# Patient Record
Sex: MALE | Race: BLACK OR AFRICAN AMERICAN | Employment: OTHER | URBAN - METROPOLITAN AREA
[De-identification: names, ages, dates, MRNs, and addresses within clinical notes are randomized per-mention and may not be internally consistent; named-entity substitution may affect disease eponyms.]

---

## 2017-01-09 ENCOUNTER — TELEPHONE (OUTPATIENT)
Dept: FAMILY MEDICINE CLINIC | Age: 57
End: 2017-01-09

## 2017-02-24 ENCOUNTER — TELEPHONE (OUTPATIENT)
Dept: FAMILY MEDICINE CLINIC | Age: 57
End: 2017-02-24

## 2017-02-24 NOTE — TELEPHONE ENCOUNTER
Spoke with pt and notified pt that  has given him a extension for his fmla paperwork to the end of February and also informed pt that paperwork can take up to 7-10days to be completed. Tolf pt that Brianne Patel is out of the office but will inform him to complete when he returns on Monday.  Pt verbalized understanding

## 2017-02-24 NOTE — TELEPHONE ENCOUNTER
Called pt back and informed fmla was not ready yet. Pt reported he dropped it off on Monday and that it is due today. Told pt would call aetyakelin about fmla and ask for a extension and called him back.  Pt verbalized understanding

## 2017-03-07 ENCOUNTER — OFFICE VISIT (OUTPATIENT)
Dept: FAMILY MEDICINE CLINIC | Age: 57
End: 2017-03-07

## 2017-03-07 VITALS
TEMPERATURE: 98.6 F | HEIGHT: 75 IN | DIASTOLIC BLOOD PRESSURE: 74 MMHG | BODY MASS INDEX: 34.02 KG/M2 | SYSTOLIC BLOOD PRESSURE: 140 MMHG | RESPIRATION RATE: 14 BRPM | HEART RATE: 79 BPM | WEIGHT: 273.6 LBS | OXYGEN SATURATION: 98 %

## 2017-03-07 DIAGNOSIS — E11.9 TYPE 2 DIABETES MELLITUS WITHOUT COMPLICATION, WITHOUT LONG-TERM CURRENT USE OF INSULIN (HCC): ICD-10-CM

## 2017-03-07 DIAGNOSIS — Z00.00 ROUTINE GENERAL MEDICAL EXAMINATION AT A HEALTH CARE FACILITY: Primary | ICD-10-CM

## 2017-03-07 DIAGNOSIS — R29.898 WEAKNESS OF BOTH LEGS: ICD-10-CM

## 2017-03-07 LAB — HBA1C MFR BLD HPLC: 11.8 %

## 2017-03-07 RX ORDER — SILDENAFIL 100 MG/1
100 TABLET, FILM COATED ORAL AS NEEDED
Qty: 3 TAB | Refills: 12 | Status: SHIPPED | OUTPATIENT
Start: 2017-03-07 | End: 2018-12-05

## 2017-03-07 NOTE — MR AVS SNAPSHOT
Visit Information Date & Time Provider Department Dept. Phone Encounter #  
 3/7/2017  9:30 AM Valentina العلي MD [facility] [phone] [id number] Follow-up Instructions Return in about 6 months (around 9/7/2017). Upcoming Health Maintenance Date Due  
 FOOT EXAM Q1 6/30/2016 EYE EXAM RETINAL OR DILATED Q1 6/30/2016 HEMOGLOBIN A1C Q6M 5/29/2017 MICROALBUMIN Q1 11/29/2017 LIPID PANEL Q1 11/29/2017 COLONOSCOPY 5/28/2019 DTaP/Tdap/Td series (2 - Td) 11/29/2026 Allergies as of 3/7/2017  Review Complete On: 3/7/2017 By: Valentina العلي MD  
  
 Severity Noted Reaction Type Reactions Lisinopril  07/06/2011   Systemic Cough Current Immunizations  Reviewed on 7/6/2011 No immunizations on file. Not reviewed this visit You Were Diagnosed With   
  
 Codes Comments Routine general medical examination at a health care facility    -  Primary ICD-10-CM: Z00.00 ICD-9-CM: V70.0 Type 2 diabetes mellitus without complication, without long-term current use of insulin (HCC)     ICD-10-CM: E11.9 ICD-9-CM: 250.00 Weakness of both legs     ICD-10-CM: M62.81 ICD-9-CM: 729.89 Vitals BP Pulse Temp Resp Height(growth percentile) Weight(growth percentile) 140/74 79 98.6 °F (37 °C) (Oral) 14 6' 3\" (1.905 m) 273 lb 9.6 oz (124.1 kg) SpO2 BMI Smoking Status 98% 34.2 kg/m2 Former Smoker BMI and BSA Data Body Mass Index Body Surface Area  
 34.2 kg/m 2 2.56 m 2 Preferred Pharmacy [facility] Phone [facility] [address] [phone] Your Updated Medication List  
  
   
This list is accurate as of: 3/7/17 10:38 AM.  Always use your most recent med list.  
  
  
  
  
 aspirin delayed-release 81 mg tablet Take  by mouth daily. CRESTOR 20 mg tablet Generic drug:  rosuvastatin TAKE 1 TABLET DAILY FOR    CHOLESTEROL * exenatide microspheres 2 mg Serr Commonly known as:  BYDUREON  
2 mg by SubCUTAneous route every seven (7) days. Please give pt needles also * exenatide microspheres 2 mg Serr Commonly known as:  BYDUREON  
2 mg by SubCUTAneous route every seven (7) days. Please give pt needles for shots also FISH OIL 1,000 mg Cap Generic drug:  omega-3 fatty acids-vitamin e Take 2 Caps by mouth two (2) times a day. gemfibrozil 600 mg tablet Commonly known as:  LOPID TAKE 1 TABLET TWICE A DAY  FOR CHOLESTEROL  
  
 glipiZIDE 10 mg tablet Commonly known as:  GLUCOTROL  
TAKE 1 TABLET TWICE A DAY  FOR DIABETES  
  
 losartan 25 mg tablet Commonly known as:  COZAAR  
TAKE 1 TABLET BY MOUTH EVERY DAY ---REPLACES LISINOPRIL---  
  
 magnesium oxide 400 mg tablet Commonly known as:  MAG-OX  
TAKE 2 TABLETS BY MOUTH DAILY  
  
 metFORMIN 1,000 mg tablet Commonly known as:  GLUCOPHAGE  
TAKE 1 TABLET TWICE A DAY  FOR DIABETES  
  
 methocarbamol 500 mg tablet Commonly known as:  ROBAXIN  
TAKE 2 TABLETS BY MOUTH 4 TIMES A DAY AS NEEDED FOR MUSCLE SPASMS  
  
 metoprolol tartrate 25 mg tablet Commonly known as:  LOPRESSOR  
TAKE 1 TABLET BY MOUTH EVERY 12 HOURS *APPOINTMENT REQUIRED FOR MORE REFILLS*  
  
 nitroglycerin 0.4 mg SL tablet Commonly known as:  NITROSTAT  
1 Tab by SubLINGual route every five (5) minutes as needed for Chest Pain (call 911 if CP/ SOB not relieved by 3 tabs). piroxicam 20 mg capsule Commonly known as:  Dean Hence Take 1 Cap by mouth daily. For back pain  
  
 sildenafil citrate 100 mg tablet Commonly known as:  VIAGRA Take 1 Tab by mouth as needed. 1/2- 1 tab  1/2 hour before sex on an empty stomach * Notice: This list has 2 medication(s) that are the same as other medications prescribed for you. Read the directions carefully, and ask your doctor or other care provider to review them with you. Prescriptions Sent to Pharmacy Refills sildenafil citrate (VIAGRA) 100 mg tablet 12 Sig: Take 1 Tab by mouth as needed. 1/2- 1 tab  1/2 hour before sex on an empty stomach Class: Normal  
 Pharmacy: [address] [place] [address], [address] [place] [address] #: [phone] Route: Oral  
  
We Performed the Following AMB POC HEMOGLOBIN A1C [88014 CPT(R)] CBC WITH AUTOMATED DIFF [09849 CPT(R)]  DIABETES FOOT EXAM [HM7 Custom] LIPID PANEL [65079 CPT(R)] METABOLIC PANEL, COMPREHENSIVE [50461 CPT(R)] REFERRAL TO NEUROLOGY [XFQ88 Custom] Comments:  
 Please evaluate patient for bilateral leg weakness when climbs stairs. TSH 3RD GENERATION [92972 CPT(R)] Follow-up Instructions Return in about 6 months (around 9/7/2017). Referral Information Referral ID Referred By Referred To  
  
 [id number] Lilly Isa Not Available Visits Status Start Date End Date 1 New Request 3/7/17 3/7/18 If your referral has a status of pending review or denied, additional information will be sent to support the outcome of this decision. Introducing [facility]! [facility] introduces [employer] patient portal. Now you can access parts of your medical record, email your doctor's office, and request medication refills online. 1. In your internet browser, go to [email or web address] "[email or web address]"/[employer] 2. Click on the First Time User? Click Here link in the Sign In box. You will see the New Member Sign Up page. 3. Enter your [employer] Access Code exactly as it appears below. You will not need to use this code after youve completed the sign-up process. If you do not sign up before the expiration date, you must request a new code. · [employer] Access Code: [email or web address] Expires: 6/5/2017 10:38 AM 
 
4. Enter the last four digits of your Social Security Number (xxxx) and Date of Birth (mm/dd/yyyy) as indicated and click Submit. You will be taken to the next sign-up page. 5. Create a [employer] ID. This will be your [employer] login ID and cannot be changed, so think of one that is secure and easy to remember. 6. Create a [employer] password. You can change your password at any time. 7. Enter your Password Reset Question and Answer. This can be used at a later time if you forget your password. 8. Enter your e-mail address. You will receive e-mail notification when new information is available in [address] 9. Click Sign Up. You can now view and download portions of your medical record. 10. Click the Download Summary menu link to download a portable copy of your medical information. If you have questions, please visit the Frequently Asked Questions section of the [employer] website. Remember, [employer] is NOT to be used for urgent needs. For medical emergencies, dial 911. Now available from your iPhone and Android! Please provide this summary of care documentation to your next provider. Your primary care clinician is listed as Nakita Lepe. If you have any questions after today's visit, please call [phone].

## 2017-03-07 NOTE — PROGRESS NOTES
HISTORY OF PRESENT ILLNESS  Gabbie Martinez is a 60 y.o. male. HPI in for physical. Has been having some problems with ED. Has tried cialis- no relief. Has never tried viagra. Says that legs have been somewhat weak, has difficulty climbing stairs, ever since knee replacements. hasnt started bydurion yet, just got needles. Review of Systems   Constitutional: Positive for weight loss. Negative for malaise/fatigue. HENT: Negative for hearing loss. Respiratory: Negative for cough and shortness of breath. Quit smoking 1989   Cardiovascular: Negative for chest pain and claudication. Gastrointestinal: Negative for abdominal pain and blood in stool. Genitourinary: Negative for frequency and hematuria. Skin: Negative for itching and rash. Neurological: Negative for focal weakness, loss of consciousness and headaches. Endo/Heme/Allergies: Negative for environmental allergies and polydipsia. Psychiatric/Behavioral: Negative for depression. The patient does not have insomnia. Physical Exam   Constitutional: He appears well-developed and well-nourished. HENT:   Right Ear: External ear normal.   Left Ear: External ear normal.   Mouth/Throat: Oropharynx is clear and moist.   Neck: No thyromegaly present. Cardiovascular: Normal rate, regular rhythm, normal heart sounds and intact distal pulses. Absent distal pulses   Pulmonary/Chest: Effort normal and breath sounds normal. No respiratory distress. He has no wheezes. Abdominal: Soft. Bowel sounds are normal. He exhibits no distension and no mass. There is no tenderness. There is no guarding. Musculoskeletal: Normal range of motion. He exhibits no edema. Knees- full rom  Hips- full rom   Lymphadenopathy:     He has no cervical adenopathy. Nursing note and vitals reviewed.       ASSESSMENT and PLAN  Orders Placed This Encounter    CBC WITH AUTOMATED DIFF    METABOLIC PANEL, COMPREHENSIVE    LIPID PANEL    TSH 3RD GENERATION    REFERRAL TO NEUROLOGY    AMB POC HEMOGLOBIN A1C     DIABETES FOOT EXAM    sildenafil citrate (VIAGRA) 100 mg tablet     Ruby Simon was seen today for complete physical.    Diagnoses and all orders for this visit:    Routine general medical examination at a health care facility  -     CBC WITH AUTOMATED DIFF  -     METABOLIC PANEL, COMPREHENSIVE    Type 2 diabetes mellitus without complication, without long-term current use of insulin ([facility])  -     LIPID PANEL  -      DIABETES FOOT EXAM  -     AMB POC HEMOGLOBIN A1C    Weakness of both legs  -     TSH 3RD GENERATION  -     REFERRAL TO NEUROLOGY    Other orders  -     sildenafil citrate (VIAGRA) 100 mg tablet; Take 1 Tab by mouth as needed. 1/2- 1 tab  1/2 hour before sex on an empty stomach      Follow-up Disposition:  Return in about 6 months (around 9/7/2017).

## 2017-03-07 NOTE — PROGRESS NOTES
Chief Complaint   Patient presents with    Complete Physical     1. Have you been to the ER, urgent care clinic since your last visit? Hospitalized since your last visit? No    2. Have you seen or consulted any other health care providers outside of the [address] [place] [address] since your last visit? Include any pap smears or colon screening.  No     Health Maintenance Due   Topic Date Due    FOOT EXAM Q1  06/30/2016    EYE EXAM RETINAL OR DILATED Q1  06/30/2016

## 2017-03-08 LAB
ALBUMIN SERPL-MCNC: 4.3 G/DL (ref 3.5–5.5)
ALBUMIN/GLOB SERPL: 1.9 {RATIO} (ref 1.1–2.5)
ALP SERPL-CCNC: 54 IU/L (ref 39–117)
ALT SERPL-CCNC: 10 IU/L (ref 0–44)
AST SERPL-CCNC: 9 IU/L (ref 0–40)
BASOPHILS # BLD AUTO: 0 X10E3/UL (ref 0–0.2)
BASOPHILS NFR BLD AUTO: 1 %
BILIRUB SERPL-MCNC: 0.3 MG/DL (ref 0–1.2)
BUN SERPL-MCNC: 16 MG/DL (ref 6–24)
BUN/CREAT SERPL: 24 (ref 9–20)
CALCIUM SERPL-MCNC: 9.4 MG/DL (ref 8.7–10.2)
CHLORIDE SERPL-SCNC: 98 MMOL/L (ref 96–106)
CHOLEST SERPL-MCNC: 116 MG/DL (ref 100–199)
CO2 SERPL-SCNC: 22 MMOL/L (ref 18–29)
CREAT SERPL-MCNC: 0.68 MG/DL (ref 0.76–1.27)
EOSINOPHIL # BLD AUTO: 0.2 X10E3/UL (ref 0–0.4)
EOSINOPHIL NFR BLD AUTO: 2 %
ERYTHROCYTE [DISTWIDTH] IN BLOOD BY AUTOMATED COUNT: 13.6 % (ref 12.3–15.4)
GLOBULIN SER CALC-MCNC: 2.3 G/DL (ref 1.5–4.5)
GLUCOSE SERPL-MCNC: 281 MG/DL (ref 65–99)
HCT VFR BLD AUTO: 40 % (ref 37.5–51)
HDLC SERPL-MCNC: 28 MG/DL
HGB BLD-MCNC: 13.2 G/DL (ref 12.6–17.7)
IMM GRANULOCYTES # BLD: 0 X10E3/UL (ref 0–0.1)
IMM GRANULOCYTES NFR BLD: 0 %
INTERPRETATION, 910389: NORMAL
LDLC SERPL CALC-MCNC: 67 MG/DL (ref 0–99)
LYMPHOCYTES # BLD AUTO: 2 X10E3/UL (ref 0.7–3.1)
LYMPHOCYTES NFR BLD AUTO: 30 %
Lab: NORMAL
MCH RBC QN AUTO: 27.2 PG (ref 26.6–33)
MCHC RBC AUTO-ENTMCNC: 33 G/DL (ref 31.5–35.7)
MCV RBC AUTO: 82 FL (ref 79–97)
MONOCYTES # BLD AUTO: 0.5 X10E3/UL (ref 0.1–0.9)
MONOCYTES NFR BLD AUTO: 7 %
NEUTROPHILS # BLD AUTO: 4 X10E3/UL (ref 1.4–7)
NEUTROPHILS NFR BLD AUTO: 60 %
PLATELET # BLD AUTO: 257 X10E3/UL (ref 150–379)
POTASSIUM SERPL-SCNC: 4.3 MMOL/L (ref 3.5–5.2)
PROT SERPL-MCNC: 6.6 G/DL (ref 6–8.5)
RBC # BLD AUTO: 4.86 X10E6/UL (ref 4.14–5.8)
SODIUM SERPL-SCNC: 139 MMOL/L (ref 134–144)
TRIGL SERPL-MCNC: 105 MG/DL (ref 0–149)
TSH SERPL DL<=0.005 MIU/L-ACNC: 4.67 UIU/ML (ref 0.45–4.5)
VLDLC SERPL CALC-MCNC: 21 MG/DL (ref 5–40)
WBC # BLD AUTO: 6.7 X10E3/UL (ref 3.4–10.8)

## 2017-06-13 RX ORDER — GEMFIBROZIL 600 MG/1
TABLET, FILM COATED ORAL
Qty: 180 TAB | Refills: 0 | Status: SHIPPED | OUTPATIENT
Start: 2017-06-13 | End: 2017-12-28 | Stop reason: SDUPTHER

## 2017-06-13 RX ORDER — ROSUVASTATIN CALCIUM 20 MG/1
TABLET, COATED ORAL
Qty: 90 TAB | Refills: 0 | Status: SHIPPED | OUTPATIENT
Start: 2017-06-13 | End: 2017-12-28 | Stop reason: SDUPTHER

## 2017-08-01 ENCOUNTER — TELEPHONE (OUTPATIENT)
Dept: FAMILY MEDICINE CLINIC | Age: 57
End: 2017-08-01

## 2017-08-02 ENCOUNTER — OFFICE VISIT (OUTPATIENT)
Dept: FAMILY MEDICINE CLINIC | Age: 57
End: 2017-08-02

## 2017-08-02 VITALS
OXYGEN SATURATION: 99 % | RESPIRATION RATE: 14 BRPM | SYSTOLIC BLOOD PRESSURE: 116 MMHG | HEART RATE: 68 BPM | DIASTOLIC BLOOD PRESSURE: 69 MMHG | WEIGHT: 278.4 LBS | BODY MASS INDEX: 34.62 KG/M2 | HEIGHT: 75 IN | TEMPERATURE: 96.8 F

## 2017-08-02 DIAGNOSIS — G89.29 CHRONIC BILATERAL LOW BACK PAIN WITHOUT SCIATICA: Primary | ICD-10-CM

## 2017-08-02 DIAGNOSIS — M54.50 CHRONIC BILATERAL LOW BACK PAIN WITHOUT SCIATICA: Primary | ICD-10-CM

## 2017-08-02 RX ORDER — IBUPROFEN 800 MG/1
800 TABLET ORAL
Qty: 60 TAB | Refills: 5 | Status: SHIPPED | OUTPATIENT
Start: 2017-08-02 | End: 2018-02-08 | Stop reason: ALTCHOICE

## 2017-08-02 RX ORDER — IBUPROFEN 800 MG/1
800 TABLET ORAL
Qty: 50 TAB | Refills: 12 | Status: SHIPPED | OUTPATIENT
Start: 2017-08-02 | End: 2020-01-28 | Stop reason: SDUPTHER

## 2017-08-02 NOTE — PROGRESS NOTES
HISTORY OF PRESENT ILLNESS  Amando Kramer is a 60 y.o. male. HPI Onset low back pain Sunday night, while at work. Pain was on L side. No radiation of pain into leg. [address] working. Has been taking some pain pills, Ibuprofen 800 mg- moderate relief. Has had some intermittent back pain for the last few years. Took PT- minimal relief. No hematuria. ROS    Physical Exam   Constitutional: He appears well-developed and well-nourished. HENT:   Right Ear: External ear normal.   Left Ear: External ear normal.   Mouth/Throat: Oropharynx is clear and moist.   Neck: No thyromegaly present. Cardiovascular: Normal rate, regular rhythm, normal heart sounds and intact distal pulses. Pulmonary/Chest: Effort normal and breath sounds normal. No respiratory distress. He has no wheezes. Abdominal: Soft. Bowel sounds are normal. He exhibits no distension and no mass. There is no tenderness. There is no guarding. Musculoskeletal: Normal range of motion. He exhibits no edema. Lymphadenopathy:     He has no cervical adenopathy. Nursing note and vitals reviewed. ASSESSMENT and PLAN  Orders Placed This Encounter    ibuprofen (MOTRIN) 800 mg tablet    ibuprofen (MOTRIN) 800 mg tablet     Diagnoses and all orders for this visit:    1. Chronic bilateral low back pain without sciatica    Other orders  -     ibuprofen (MOTRIN) 800 mg tablet; Take 1 Tab by mouth every six (6) hours as needed for Pain.  -     ibuprofen (MOTRIN) 800 mg tablet; Take 1 Tab by mouth every six (6) hours as needed for Pain.       Follow-up Disposition: Not on File

## 2017-08-02 NOTE — PROGRESS NOTES
Chief Complaint   Patient presents with    LOW BACK PAIN      near Left  flank   pronouced pain Last night   pain up to a \"10\"     1. Have you been to the ER, urgent care clinic since your last visit? Hospitalized since your last visit? No    2. Have you seen or consulted any other health care providers outside of the [address] [place] [address] since your last visit? Include any pap smears or colon screening.  No     Health Maintenance Due   Topic Date Due    EYE EXAM RETINAL OR DILATED Q1  06/30/2016    INFLUENZA AGE 9 TO ADULT  08/01/2017     Pt refused retinal scan

## 2017-08-02 NOTE — MR AVS SNAPSHOT
Visit Information Date & Time Provider Department Dept. Phone Encounter #  
 8/2/2017 12:30 PM Jodee Davenport MD [facility] [phone] [id number] Upcoming Health Maintenance Date Due  
 EYE EXAM RETINAL OR DILATED Q1 6/30/2016 INFLUENZA AGE 9 TO ADULT 8/1/2017 HEMOGLOBIN A1C Q6M 9/7/2017 MICROALBUMIN Q1 11/29/2017 FOOT EXAM Q1 3/7/2018 LIPID PANEL Q1 3/7/2018 COLONOSCOPY 5/28/2019 DTaP/Tdap/Td series (2 - Td) 11/29/2026 Allergies as of 8/2/2017  Review Complete On: 8/2/2017 By: Jodee Davenport MD  
  
 Severity Noted Reaction Type Reactions Lisinopril  07/06/2011   Systemic Cough Current Immunizations  Reviewed on 7/6/2011 No immunizations on file. Not reviewed this visit You Were Diagnosed With   
  
 Codes Comments Chronic bilateral low back pain without sciatica    -  Primary ICD-10-CM: M54.5, G89.29 ICD-9-CM: 724.2, 338.29 Vitals BP Pulse Temp Resp Height(growth percentile) Weight(growth percentile) 116/69 68 96.8 °F (36 °C) (Oral) 14 6' 3\" (1.905 m) 278 lb 6.4 oz (126.3 kg) SpO2 BMI Smoking Status 99% 34.8 kg/m2 Former Smoker Vitals History BMI and BSA Data Body Mass Index Body Surface Area 34.8 kg/m 2 2.59 m 2 Preferred Pharmacy [facility] [address] [place] [address] [phone] Your Updated Medication List  
  
   
This list is accurate as of: 8/2/17  1:46 PM.  Always use your most recent med list.  
  
  
  
  
 aspirin delayed-release 81 mg tablet Take  by mouth daily. * exenatide microspheres 2 mg Serr Commonly known as:  BYDUREON  
2 mg by SubCUTAneous route every seven (7) days. Please give pt needles also * exenatide microspheres 2 mg Serr Commonly known as:  BYDUREON  
2 mg by SubCUTAneous route every seven (7) days. Please give pt needles for shots also FISH OIL 1,000 mg Cap Generic drug:  omega-3 fatty acids-vitamin e Take 2 Caps by mouth two (2) times a day. gemfibrozil 600 mg tablet Commonly known as:  LOPID TAKE 1 TABLET BY MOUTH TWICE DAILY FOR CHOLESTEROL  
  
 glipiZIDE 10 mg tablet Commonly known as:  GLUCOTROL  
TAKE 1 TABLET TWICE A DAY  FOR DIABETES * ibuprofen 800 mg tablet Commonly known as:  MOTRIN Take 1 Tab by mouth every six (6) hours as needed for Pain. * ibuprofen 800 mg tablet Commonly known as:  MOTRIN Take 1 Tab by mouth every six (6) hours as needed for Pain.  
  
 losartan 25 mg tablet Commonly known as:  COZAAR  
TAKE 1 TABLET BY MOUTH EVERY DAY ---REPLACES LISINOPRIL---  
  
 magnesium oxide 400 mg tablet Commonly known as:  MAG-OX  
TAKE 2 TABLETS BY MOUTH DAILY  
  
 metFORMIN 1,000 mg tablet Commonly known as:  GLUCOPHAGE  
TAKE 1 TABLET TWICE A DAY  FOR DIABETES  
  
 methocarbamol 500 mg tablet Commonly known as:  ROBAXIN  
TAKE 2 TABLETS BY MOUTH 4 TIMES A DAY AS NEEDED FOR MUSCLE SPASMS  
  
 metoprolol tartrate 25 mg tablet Commonly known as:  LOPRESSOR  
TAKE 1 TABLET BY MOUTH EVERY 12 HOURS *APPOINTMENT REQUIRED FOR MORE REFILLS*  
  
 nitroglycerin 0.4 mg SL tablet Commonly known as:  NITROSTAT  
1 Tab by SubLINGual route every five (5) minutes as needed for Chest Pain (call 911 if CP/ SOB not relieved by 3 tabs). piroxicam 20 mg capsule Commonly known as:  Andria Gonzalez Take 1 Cap by mouth daily. For back pain  
  
 rosuvastatin 20 mg tablet Commonly known as:  CRESTOR  
TAKE 1 TABLET BY MOUTH DAILY FOR CHOLESTEROL  
  
 sildenafil citrate 100 mg tablet Commonly known as:  VIAGRA Take 1 Tab by mouth as needed. 1/2- 1 tab  1/2 hour before sex on an empty stomach * Notice: This list has 4 medication(s) that are the same as other medications prescribed for you. Read the directions carefully, and ask your doctor or other care provider to review them with you. Prescriptions Sent to Pharmacy Refills  
 ibuprofen (MOTRIN) 800 mg tablet 12 Sig: Take 1 Tab by mouth every six (6) hours as needed for Pain. Class: Normal  
 Pharmacy: [facility] [address] Ph #: [phone] Route: Oral  
 ibuprofen (MOTRIN) 800 mg tablet 5 Sig: Take 1 Tab by mouth every six (6) hours as needed for Pain. Class: Normal  
 Pharmacy: [facility], [address] [place] [address] Ph #: [phone] Route: Oral  
  
Introducing [facility]! [facility] introduces [employer] patient portal. Now you can access parts of your medical record, email your doctor's office, and request medication refills online. 1. In your internet browser, go to [email or web address] 2. Click on the First Time User? Click Here link in the Sign In box. You will see the New Member Sign Up page. 3. Enter your [employer] Access Code exactly as it appears below. You will not need to use this code after youve completed the sign-up process. If you do not sign up before the expiration date, you must request a new code. · [employer] Access Code: [email or web address] Expires: 10/31/2017  1:40 PM 
 
4. Enter the last four digits of your Social Security Number (xxxx) and Date of Birth (mm/dd/yyyy) as indicated and click Submit. You will be taken to the next sign-up page. 5. Create a [employer] ID. This will be your [employer] login ID and cannot be changed, so think of one that is secure and easy to remember. 6. Create a [employer] password. You can change your password at any time. 7. Enter your Password Reset Question and Answer. This can be used at a later time if you forget your password. 8. Enter your e-mail address. You will receive e-mail notification when new information is available in [address] 9. Click Sign Up. You can now view and download portions of your medical record. 10. Click the Download Summary menu link to download a portable copy of your medical information. If you have questions, please visit the Frequently Asked Questions section of the [employer] website. Remember, [employer] is NOT to be used for urgent needs. For medical emergencies, dial 911. Now available from your iPhone and Android! Please provide this summary of care documentation to your next provider. Your primary care clinician is listed as Johana Sebastian. If you have any questions after today's visit, please call [phone].

## 2017-09-25 RX ORDER — GLIPIZIDE 10 MG/1
TABLET ORAL
Qty: 180 TAB | Refills: 1 | Status: SHIPPED | OUTPATIENT
Start: 2017-09-25 | End: 2018-05-25 | Stop reason: SDUPTHER

## 2017-09-25 RX ORDER — METFORMIN HYDROCHLORIDE 1000 MG/1
TABLET ORAL
Qty: 180 TAB | Refills: 1 | Status: SHIPPED | OUTPATIENT
Start: 2017-09-25 | End: 2018-05-25 | Stop reason: SDUPTHER

## 2017-10-31 RX ORDER — METOPROLOL TARTRATE 25 MG/1
TABLET, FILM COATED ORAL
Qty: 180 TAB | Refills: 2 | Status: SHIPPED | OUTPATIENT
Start: 2017-10-31 | End: 2018-08-22 | Stop reason: SDUPTHER

## 2017-11-03 ENCOUNTER — DOCUMENTATION ONLY (OUTPATIENT)
Dept: FAMILY MEDICINE CLINIC | Age: 57
End: 2017-11-03

## 2017-12-20 RX ORDER — LOSARTAN POTASSIUM 25 MG/1
TABLET ORAL
Qty: 90 TAB | Refills: 3 | Status: SHIPPED | OUTPATIENT
Start: 2017-12-20 | End: 2018-08-28 | Stop reason: ALTCHOICE

## 2017-12-28 RX ORDER — ROSUVASTATIN CALCIUM 20 MG/1
TABLET, COATED ORAL
Qty: 90 TAB | Refills: 0 | Status: SHIPPED | OUTPATIENT
Start: 2017-12-28 | End: 2018-08-28 | Stop reason: SDUPTHER

## 2017-12-28 RX ORDER — GEMFIBROZIL 600 MG/1
TABLET, FILM COATED ORAL
Qty: 180 TAB | Refills: 0 | Status: SHIPPED | OUTPATIENT
Start: 2017-12-28 | End: 2018-05-25 | Stop reason: SDUPTHER

## 2018-02-08 ENCOUNTER — OFFICE VISIT (OUTPATIENT)
Dept: FAMILY MEDICINE CLINIC | Age: 58
End: 2018-02-08

## 2018-02-08 VITALS
DIASTOLIC BLOOD PRESSURE: 69 MMHG | HEIGHT: 75 IN | HEART RATE: 67 BPM | WEIGHT: 281.6 LBS | SYSTOLIC BLOOD PRESSURE: 138 MMHG | RESPIRATION RATE: 14 BRPM | BODY MASS INDEX: 35.01 KG/M2 | OXYGEN SATURATION: 96 % | TEMPERATURE: 98.1 F

## 2018-02-08 DIAGNOSIS — B36.0 TINEA VERSICOLOR: ICD-10-CM

## 2018-02-08 DIAGNOSIS — E11.9 TYPE 2 DIABETES MELLITUS WITHOUT COMPLICATION, WITHOUT LONG-TERM CURRENT USE OF INSULIN (HCC): Primary | ICD-10-CM

## 2018-02-08 LAB — HBA1C MFR BLD HPLC: 12.5 %

## 2018-02-08 RX ORDER — KETOCONAZOLE 20 MG/G
CREAM TOPICAL DAILY
Qty: 60 G | Refills: 5 | Status: SHIPPED | OUTPATIENT
Start: 2018-02-08 | End: 2018-02-09 | Stop reason: SDUPTHER

## 2018-02-08 NOTE — MR AVS SNAPSHOT
[facility] 
 
 
 [address] [facility] [place] [facility] [phone] 
[phone] Patient: Catherine Bronson MRN: [id number] EJI:[id number] Visit Information Date & Time Provider Department Dept. Phone Encounter #  
 2/8/2018 11:30 AM Maryan Wetzel MD [facility] [phone] [id number] Upcoming Health Maintenance Date Due  
 EYE EXAM RETINAL OR DILATED Q1 6/30/2016 HEMOGLOBIN A1C Q6M 9/7/2017 MICROALBUMIN Q1 11/29/2017 FOOT EXAM Q1 3/7/2018 LIPID PANEL Q1 3/7/2018 COLONOSCOPY 5/28/2019 DTaP/Tdap/Td series (2 - Td) 11/29/2026 Allergies as of 2/8/2018  Review Complete On: 2/8/2018 By: Maryan Wetzel MD  
  
 Severity Noted Reaction Type Reactions Lisinopril  07/06/2011   Systemic Cough Current Immunizations  Reviewed on 7/6/2011 No immunizations on file. Not reviewed this visit You Were Diagnosed With   
  
 Codes Comments Type 2 diabetes mellitus without complication, without long-term current use of insulin ([facility])    -  Primary ICD-10-CM: E11.9 ICD-9-CM: 250.00 Tinea versicolor     ICD-10-CM: B36.0 ICD-9-CM: 111.0 Vitals BP Pulse Temp Resp Height(growth percentile) Weight(growth percentile)  
 138/69 67 98.1 °F (36.7 °C) (Oral) 14 6' 3\" (1.905 m) 281 lb 9.6 oz (127.7 kg) SpO2 BMI Smoking Status 96% 35.2 kg/m2 Former Smoker BMI and BSA Data Body Mass Index Body Surface Area  
 35.2 kg/m 2 2.6 m 2 Preferred Pharmacy Pharmacy Name Phone [facility], [address] [phone] Your Updated Medication List  
  
   
This list is accurate as of: 2/8/18 12:15 PM.  Always use your most recent med list.  
  
  
  
  
 aspirin delayed-release 81 mg tablet Take  by mouth daily. FISH OIL 1,000 mg Cap Generic drug:  omega-3 fatty acids-vitamin e Take 2 Caps by mouth two (2) times a day. gemfibrozil 600 mg tablet Commonly known as:  LOPID TAKE 1 TABLET BY MOUTH TWICE DAILY FOR CHOLESTEROL  
  
 glipiZIDE 10 mg tablet Commonly known as:  GLUCOTROL  
TAKE 1 TABLET BY MOUTH TWICE DAILY FOR DIABETES  
  
 ibuprofen 800 mg tablet Commonly known as:  MOTRIN Take 1 Tab by mouth every six (6) hours as needed for Pain.  
  
 ketoconazole 2 % topical cream  
Commonly known as:  NIZORAL Apply  to affected area daily. losartan 25 mg tablet Commonly known as:  COZAAR  
TAKE 1 TABLET BY MOUTH EVERY DAY ---REPLACES LISINOPRIL---  
  
 magnesium oxide 400 mg tablet Commonly known as:  MAG-OX  
TAKE 2 TABLETS BY MOUTH DAILY  
  
 metFORMIN 1,000 mg tablet Commonly known as:  GLUCOPHAGE  
TAKE 1 TABLET BY MOUTH TWICE DAILY FOR DIABETES  
  
 metoprolol tartrate 25 mg tablet Commonly known as:  LOPRESSOR  
TAKE 1 TABLET BY MOUTH EVERY 12 HOURS *APPOINTMENT REQUIRED FOR MORE REFILLS*  
  
 nitroglycerin 0.4 mg SL tablet Commonly known as:  NITROSTAT  
1 Tab by SubLINGual route every five (5) minutes as needed for Chest Pain (call 911 if CP/ SOB not relieved by 3 tabs). rosuvastatin 20 mg tablet Commonly known as:  CRESTOR  
TAKE 1 TABLET BY MOUTH DAILY FOR CHOLESTEROL  
  
 sildenafil citrate 100 mg tablet Commonly known as:  VIAGRA Take 1 Tab by mouth as needed. 1/2- 1 tab  1/2 hour before sex on an empty stomach Prescriptions Sent to Pharmacy Refills  
 ketoconazole (NIZORAL) 2 % topical cream 5 Sig: Apply  to affected area daily. Class: Normal  
 Pharmacy: [address] [place], [place] [address]y [address], [place] [address] Ph #: [phone] Route: Topical  
  
We Performed the Following AMB POC HEMOGLOBIN A1C [38541 CPT(R)] CBC WITH AUTOMATED DIFF [22158 CPT(R)]  DIABETES FOOT EXAM [HM7 Custom] LIPID PANEL [23153 CPT(R)] METABOLIC PANEL, COMPREHENSIVE [18882 CPT(R)] MICROALBUMIN, UR, RAND W/ MICROALBUMIN/CREA RATIO [id number] CPT(R)] Introducing [facility] & HEALTH SERVICES! Sunil Danielle introduces "[employer]" patient portal. Now you can access parts of your medical record, email your doctor's office, and request medication refills online. 1. In your internet browser, go to [email or web address] 2. Click on the First Time User? Click Here link in the Sign In box. You will see the New Member Sign Up page. 3. Enter your "[employer]" Access Code exactly as it appears below. You will not need to use this code after youve completed the sign-up process. If you do not sign up before the expiration date, you must request a new code. · "[employer]" Access Code: [email or web address] Expires: 5/9/2018 11:25 AM 
 
4. Enter the last four digits of your Social Security Number (xxxx) and Date of Birth (mm/dd/yyyy) as indicated and click Submit. You will be taken to the next sign-up page. 5. Create a "[employer]" ID. This will be your "[employer]" login ID and cannot be changed, so think of one that is secure and easy to remember. 6. Create a "[employer]" password. You can change your password at any time. 7. Enter your Password Reset Question and Answer. This can be used at a later time if you forget your password. 8. Enter your e-mail address. You will receive e-mail notification when new information is available in [address] 9. Click Sign Up. You can now view and download portions of your medical record. 10. Click the Download Summary menu link to download a portable copy of your medical information. If you have questions, please visit the Frequently Asked Questions section of the "[employer]" website. Remember, "[employer]" is NOT to be used for urgent needs. For medical emergencies, dial 911. Now available from your iPhone and Android! Please provide this summary of care documentation to your next provider. Your primary care clinician is listed as Janette Goodrich.  If you have any questions after today's visit, please call [phone].

## 2018-02-08 NOTE — PROGRESS NOTES
Chief Complaint   Patient presents with    Malaise    Decreased Appetite    Sweats     1. Have you been to the ER, urgent care clinic since your last visit? Hospitalized since your last visit? No    2. Have you seen or consulted any other health care providers outside of the [address] [place] [address] since your last visit? Include any pap smears or colon screening. No       Health Maintenance Due   Topic Date Due    EYE EXAM RETINAL OR DILATED Q1  06/30/2016    HEMOGLOBIN A1C Q6M  09/07/2017    MICROALBUMIN Q1  11/29/2017    FOOT EXAM Q1  03/07/2018    LIPID PANEL Q1  03/07/2018     Pt refused retinal scan.   Pt given release form for eye doctor

## 2018-02-08 NOTE — PROGRESS NOTES
HISTORY OF PRESENT ILLNESS  Narinder Powell is a 59 y.o. male. HPI Monday night had the sweats, doing better now. Also has a rash on chest and antecubital fossa bilaterally. Has been there for 6 months. Needs  diabetes checked. Breathing has been doing ok. No chest pains, tia symptoms. ROS    Physical Exam   Constitutional: He appears well-developed and well-nourished. HENT:   Right Ear: External ear normal.   Left Ear: External ear normal.   Mouth/Throat: Oropharynx is clear and moist.   Neck: No thyromegaly present. Cardiovascular: Normal rate, regular rhythm, normal heart sounds and intact distal pulses. Pulmonary/Chest: Effort normal and breath sounds normal. No respiratory distress. He has no wheezes. Abdominal: Soft. Bowel sounds are normal. He exhibits no distension and no mass. There is no tenderness. There is no guarding. Musculoskeletal: Normal range of motion. He exhibits no edema. Lymphadenopathy:     He has no cervical adenopathy. Skin:   Multiple hyperpigmented maculse on antecubital area and chest area   Nursing note and vitals reviewed. ASSESSMENT and PLAN  Orders Placed This Encounter    CBC WITH AUTOMATED DIFF    METABOLIC PANEL, COMPREHENSIVE    LIPID PANEL    MICROALBUMIN, UR, RAND W/ MICROALBUMIN/CREA RATIO    AMB POC HEMOGLOBIN A1C     DIABETES FOOT EXAM    ketoconazole (NIZORAL) 2 % topical cream     Diagnoses and all orders for this visit:    1. Type 2 diabetes mellitus without complication, without long-term current use of insulin ([facility])  -     CBC WITH AUTOMATED DIFF  -     METABOLIC PANEL, COMPREHENSIVE  -     LIPID PANEL  -     AMB POC HEMOGLOBIN A1C  -      DIABETES FOOT EXAM  -     MICROALBUMIN, UR, RAND W/ MICROALBUMIN/CREA RATIO    2. Tinea versicolor    Other orders  -     ketoconazole (NIZORAL) 2 % topical cream; Apply  to affected area daily. Follow-up Disposition:  Return in about 6 months (around 8/8/2018).

## 2018-02-09 LAB
ALBUMIN SERPL-MCNC: 4 G/DL (ref 3.5–5.5)
ALBUMIN/CREAT UR: 161.4 MG/G CREAT (ref 0–30)
ALBUMIN/GLOB SERPL: 1.7 {RATIO} (ref 1.2–2.2)
ALP SERPL-CCNC: 56 IU/L (ref 39–117)
ALT SERPL-CCNC: 16 IU/L (ref 0–44)
AST SERPL-CCNC: 16 IU/L (ref 0–40)
BASOPHILS # BLD AUTO: 0 X10E3/UL (ref 0–0.2)
BASOPHILS NFR BLD AUTO: 1 %
BILIRUB SERPL-MCNC: 0.2 MG/DL (ref 0–1.2)
BUN SERPL-MCNC: 16 MG/DL (ref 6–24)
BUN/CREAT SERPL: 23 (ref 9–20)
CALCIUM SERPL-MCNC: 9.4 MG/DL (ref 8.7–10.2)
CHLORIDE SERPL-SCNC: 99 MMOL/L (ref 96–106)
CHOLEST SERPL-MCNC: 89 MG/DL (ref 100–199)
CO2 SERPL-SCNC: 23 MMOL/L (ref 18–29)
CREAT SERPL-MCNC: 0.69 MG/DL (ref 0.76–1.27)
CREAT UR-MCNC: 43.5 MG/DL
EOSINOPHIL # BLD AUTO: 0.2 X10E3/UL (ref 0–0.4)
EOSINOPHIL NFR BLD AUTO: 4 %
ERYTHROCYTE [DISTWIDTH] IN BLOOD BY AUTOMATED COUNT: 13.8 % (ref 12.3–15.4)
GFR SERPLBLD CREATININE-BSD FMLA CKD-EPI: 104 ML/MIN/1.73
GFR SERPLBLD CREATININE-BSD FMLA CKD-EPI: 120 ML/MIN/1.73
GLOBULIN SER CALC-MCNC: 2.3 G/DL (ref 1.5–4.5)
GLUCOSE SERPL-MCNC: 307 MG/DL (ref 65–99)
HCT VFR BLD AUTO: 39.6 % (ref 37.5–51)
HDLC SERPL-MCNC: 19 MG/DL
HGB BLD-MCNC: 13.2 G/DL (ref 13–17.7)
IMM GRANULOCYTES # BLD: 0 X10E3/UL (ref 0–0.1)
IMM GRANULOCYTES NFR BLD: 0 %
INTERPRETATION, 910389: NORMAL
LDLC SERPL CALC-MCNC: 38 MG/DL (ref 0–99)
LYMPHOCYTES # BLD AUTO: 2 X10E3/UL (ref 0.7–3.1)
LYMPHOCYTES NFR BLD AUTO: 40 %
Lab: NORMAL
Lab: NORMAL
MCH RBC QN AUTO: 27.3 PG (ref 26.6–33)
MCHC RBC AUTO-ENTMCNC: 33.3 G/DL (ref 31.5–35.7)
MCV RBC AUTO: 82 FL (ref 79–97)
MICROALBUMIN UR-MCNC: 70.2 UG/ML
MONOCYTES # BLD AUTO: 0.5 X10E3/UL (ref 0.1–0.9)
MONOCYTES NFR BLD AUTO: 11 %
NEUTROPHILS # BLD AUTO: 2.2 X10E3/UL (ref 1.4–7)
NEUTROPHILS NFR BLD AUTO: 44 %
PLATELET # BLD AUTO: 252 X10E3/UL (ref 150–379)
POTASSIUM SERPL-SCNC: 4.2 MMOL/L (ref 3.5–5.2)
PROT SERPL-MCNC: 6.3 G/DL (ref 6–8.5)
RBC # BLD AUTO: 4.83 X10E6/UL (ref 4.14–5.8)
SODIUM SERPL-SCNC: 139 MMOL/L (ref 134–144)
TRIGL SERPL-MCNC: 162 MG/DL (ref 0–149)
VLDLC SERPL CALC-MCNC: 32 MG/DL (ref 5–40)
WBC # BLD AUTO: 4.9 X10E3/UL (ref 3.4–10.8)

## 2018-02-09 RX ORDER — KETOCONAZOLE 20 MG/G
CREAM TOPICAL DAILY
Qty: 60 G | Refills: 5 | Status: SHIPPED | OUTPATIENT
Start: 2018-02-09 | End: 2018-12-05

## 2018-02-09 NOTE — TELEPHONE ENCOUNTER
Pt.states that he saw Pauline Quiros yesterday and he was supposed to give him a RX (cream) for spots on his arms he did not get the RX he can be reached at 21 [phone]

## 2018-02-09 NOTE — TELEPHONE ENCOUNTER
Called pt back and informed pt that rx was prescribed but sent to [facility] instead of local. [facility] [address]. Told pt would resend to DR. Bonds to sign and [facility].

## 2018-02-13 ENCOUNTER — TELEPHONE (OUTPATIENT)
Dept: FAMILY MEDICINE CLINIC | Age: 58
End: 2018-02-13

## 2018-02-14 ENCOUNTER — TELEPHONE (OUTPATIENT)
Dept: FAMILY MEDICINE CLINIC | Age: 58
End: 2018-02-14

## 2018-02-14 NOTE — TELEPHONE ENCOUNTER
Received new referral from [address] for Diabetes Education / [employer] teaching (new start). Called pt to attempt to schedule visit. Left message and asked to call back.     Dayton Sawant, PHARMD, CDE

## 2018-03-05 RX ORDER — LANOLIN ALCOHOL/MO/W.PET/CERES
CREAM (GRAM) TOPICAL
Qty: 180 TAB | Refills: 3 | Status: SHIPPED | OUTPATIENT
Start: 2018-03-05 | End: 2019-08-16 | Stop reason: SDUPTHER

## 2018-05-25 NOTE — TELEPHONE ENCOUNTER
Pt.is requesting three RX refills metFORMIN (GLUCOPHAGE) 1,000 mg tablet,gemfibrozil (LOPID) 600 mg tablet,glipiZIDE (GLUCOTROL) 10 mg tablet he can be reached @ 21 [phone]

## 2018-05-26 RX ORDER — GEMFIBROZIL 600 MG/1
TABLET, FILM COATED ORAL
Qty: 180 TAB | Refills: 0 | Status: SHIPPED | OUTPATIENT
Start: 2018-05-26 | End: 2018-08-24 | Stop reason: SDUPTHER

## 2018-05-26 RX ORDER — GLIPIZIDE 10 MG/1
TABLET ORAL
Qty: 180 TAB | Refills: 1 | Status: SHIPPED | OUTPATIENT
Start: 2018-05-26 | End: 2018-11-29 | Stop reason: SDUPTHER

## 2018-05-26 RX ORDER — METFORMIN HYDROCHLORIDE 1000 MG/1
TABLET ORAL
Qty: 180 TAB | Refills: 1 | Status: SHIPPED | OUTPATIENT
Start: 2018-05-26 | End: 2018-10-10 | Stop reason: SDUPTHER

## 2018-08-22 RX ORDER — METOPROLOL TARTRATE 25 MG/1
TABLET, FILM COATED ORAL
Qty: 60 TAB | Refills: 0 | Status: SHIPPED | OUTPATIENT
Start: 2018-08-22 | End: 2018-08-28 | Stop reason: SDUPTHER

## 2018-08-24 RX ORDER — GEMFIBROZIL 600 MG/1
TABLET, FILM COATED ORAL
Qty: 180 TAB | Refills: 2 | Status: SHIPPED | OUTPATIENT
Start: 2018-08-24 | End: 2019-05-24 | Stop reason: SDUPTHER

## 2018-08-28 ENCOUNTER — OFFICE VISIT (OUTPATIENT)
Dept: FAMILY MEDICINE CLINIC | Age: 58
End: 2018-08-28

## 2018-08-28 VITALS
SYSTOLIC BLOOD PRESSURE: 112 MMHG | WEIGHT: 275 LBS | HEART RATE: 72 BPM | RESPIRATION RATE: 14 BRPM | OXYGEN SATURATION: 96 % | BODY MASS INDEX: 34.19 KG/M2 | HEIGHT: 75 IN | TEMPERATURE: 97.7 F | DIASTOLIC BLOOD PRESSURE: 61 MMHG

## 2018-08-28 DIAGNOSIS — E78.2 MIXED HYPERLIPIDEMIA: ICD-10-CM

## 2018-08-28 DIAGNOSIS — Z12.5 PROSTATE CANCER SCREENING: ICD-10-CM

## 2018-08-28 DIAGNOSIS — I10 BENIGN ESSENTIAL HYPERTENSION: Chronic | ICD-10-CM

## 2018-08-28 DIAGNOSIS — E11.9 TYPE 2 DIABETES MELLITUS WITHOUT COMPLICATION, WITHOUT LONG-TERM CURRENT USE OF INSULIN (HCC): Primary | ICD-10-CM

## 2018-08-28 LAB — HBA1C MFR BLD HPLC: 12.9 %

## 2018-08-28 RX ORDER — METOPROLOL TARTRATE 25 MG/1
TABLET, FILM COATED ORAL
Qty: 60 TAB | Refills: 12 | Status: SHIPPED | OUTPATIENT
Start: 2018-08-28 | End: 2018-12-05

## 2018-08-28 RX ORDER — ROSUVASTATIN CALCIUM 20 MG/1
TABLET, COATED ORAL
Qty: 90 TAB | Refills: 3 | Status: SHIPPED | OUTPATIENT
Start: 2018-08-28 | End: 2020-01-28 | Stop reason: SDUPTHER

## 2018-08-28 NOTE — PROGRESS NOTES
Chief Complaint   Patient presents with    Hypotension    Diabetes    ED Follow-up     WORK PLACE NURSE   HYPOTENSION    PT GOT SENT HOME 8/27/18 LAST NIGHT     1. Have you been to the ER, urgent care clinic since your last visit? Hospitalized since your last visit? SEE CHIEF COMPLAINT    2. Have you seen or consulted any other health care providers outside of the [address] [place] [address] since your last visit? Include any pap smears or colon screening.  No   Health Maintenance Due   Topic Date Due    EYE EXAM RETINAL OR DILATED Q1  06/30/2016    Influenza Age 5 to Adult  08/01/2018    HEMOGLOBIN A1C Q6M  08/08/2018

## 2018-08-28 NOTE — PROGRESS NOTES
HISTORY OF PRESENT ILLNESS  Evie Lieberman is a 59 y.o. male. HPI in for blood pressure and cholesterol check. Got dizzy at work last night, bp was low 105/60, 95/60. Feels better today. Needs diabetes checked also. Blood sugar was 310 last night. ROS    Physical Exam   Constitutional: He appears well-developed and well-nourished. HENT:   Right Ear: External ear normal.   Left Ear: External ear normal.   Mouth/Throat: Oropharynx is clear and moist.   Neck: No thyromegaly present. Cardiovascular: Normal rate, regular rhythm, normal heart sounds and intact distal pulses. Pulmonary/Chest: Effort normal and breath sounds normal. No respiratory distress. He has no wheezes. Abdominal: Soft. Bowel sounds are normal. He exhibits no distension and no mass. There is no tenderness. There is no guarding. Musculoskeletal: Normal range of motion. He exhibits no edema. Lymphadenopathy:     He has no cervical adenopathy. Nursing note and vitals reviewed. ASSESSMENT and PLAN  Orders Placed This Encounter    METABOLIC PANEL, COMPREHENSIVE    LIPID PANEL    CBC WITH AUTOMATED DIFF    MICROALBUMIN, UR, RAND W/ MICROALB/CREAT RATIO    PROSTATE SPECIFIC AG    AMB POC HEMOGLOBIN A1C    metoprolol tartrate (LOPRESSOR) 25 mg tablet    rosuvastatin (CRESTOR) 20 mg tablet     Diagnoses and all orders for this visit:    1. Type 2 diabetes mellitus without complication, without long-term current use of insulin (HCC)  -     AMB POC HEMOGLOBIN A1C  -     MICROALBUMIN, UR, RAND W/ MICROALB/CREAT RATIO    2. Benign essential hypertension  -     METABOLIC PANEL, COMPREHENSIVE    3. Mixed hyperlipidemia  -     LIPID PANEL  -     CBC WITH AUTOMATED DIFF    4.  Prostate cancer screening  -     PROSTATE SPECIFIC AG    Other orders  -     metoprolol tartrate (LOPRESSOR) 25 mg tablet; TAKE 1 TABLET BY MOUTH EVERY 12 HOURS *APPOINTMENT REQUIRED FOR MORE REFILLS*  -     rosuvastatin (CRESTOR) 20 mg tablet; TAKE 1 TABLET BY MOUTH DAILY FOR CHOLESTEROL      Follow-up Disposition:  Return in about 6 months (around 2/28/2019).

## 2018-08-28 NOTE — MR AVS SNAPSHOT
[facility] 
 
 
 [address] [facility] [id number] [phone] Patient: Jaylen Gavin MRN: [id number] MQI:[id number] Visit Information Date & Time Provider Department Dept. Phone Encounter #  
 8/28/2018 12:15 PM Stalin Dunn MD [facility] [phone] [id number] Follow-up Instructions Return in about 6 months (around 2/28/2019). Upcoming Health Maintenance Date Due  
 EYE EXAM RETINAL OR DILATED Q1 6/30/2016 Influenza Age 5 to Adult 8/1/2018 HEMOGLOBIN A1C Q6M 8/8/2018 FOOT EXAM Q1 2/8/2019 MICROALBUMIN Q1 2/8/2019 LIPID PANEL Q1 2/8/2019 COLONOSCOPY 5/28/2019 DTaP/Tdap/Td series (2 - Td) 11/29/2026 Allergies as of 8/28/2018  Review Complete On: 8/28/2018 By: Stalin Dunn MD  
  
 Severity Noted Reaction Type Reactions Lisinopril  07/06/2011   Systemic Cough Losartan  08/28/2018    Other (comments) Hypotension Current Immunizations  Reviewed on 7/6/2011 No immunizations on file. Not reviewed this visit You Were Diagnosed With   
  
 Codes Comments Type 2 diabetes mellitus without complication, without long-term current use of insulin ([facility]CC)    -  Primary ICD-10-CM: E11.9 ICD-9-CM: 250.00 Benign essential hypertension     ICD-10-CM: I10 
ICD-9-CM: 401.1 Mixed hyperlipidemia     ICD-10-CM: E78.2 ICD-9-CM: 272.2 Prostate cancer screening     ICD-10-CM: Z12.5 ICD-9-CM: V76.44 Vitals BP Pulse Temp Resp Height(growth percentile) Weight(growth percentile) 112/61 72 97.7 °F (36.5 °C) (Oral) 14 6' 3\" (1.905 m) 275 lb (124.7 kg) SpO2 BMI Smoking Status 96% 34.37 kg/m2 Former Smoker BMI and BSA Data Body Mass Index Body Surface Area  
 34.37 kg/m 2 2.57 m 2 Preferred Pharmacy Pharmacy Name Phone [facility] [address] [place] [address] [phone] Your Updated Medication List  
  
   
This list is accurate as of 8/28/18  1:57 PM.  Always use your most recent med list.  
  
  
  
  
 aspirin delayed-release 81 mg tablet Take  by mouth daily. FISH OIL 1,000 mg Cap Generic drug:  omega-3 fatty acids-vitamin e Take 2 Caps by mouth two (2) times a day. gemfibrozil 600 mg tablet Commonly known as:  LOPID TAKE 1 TABLET BY MOUTH TWICE DAILY FOR CHOLESTEROL  
  
 glipiZIDE 10 mg tablet Commonly known as:  GLUCOTROL  
TAKE 1 TABLET BY MOUTH TWICE DAILY FOR DIABETES  
  
 ibuprofen 800 mg tablet Commonly known as:  MOTRIN Take 1 Tab by mouth every six (6) hours as needed for Pain.  
  
 ketoconazole 2 % topical cream  
Commonly known as:  NIZORAL Apply  to affected area daily. magnesium oxide 400 mg tablet Commonly known as:  MAG-OX  
TAKE 2 TABLETS BY MOUTH EVERY DAY  
  
 metFORMIN 1,000 mg tablet Commonly known as:  GLUCOPHAGE  
TAKE 1 TABLET BY MOUTH TWICE DAILY FOR DIABETES  
  
 metoprolol tartrate 25 mg tablet Commonly known as:  LOPRESSOR  
TAKE 1 TABLET BY MOUTH EVERY 12 HOURS *APPOINTMENT REQUIRED FOR MORE REFILLS*  
  
 nitroglycerin 0.4 mg SL tablet Commonly known as:  NITROSTAT  
1 Tab by SubLINGual route every five (5) minutes as needed for Chest Pain (call 911 if CP/ SOB not relieved by 3 tabs). rosuvastatin 20 mg tablet Commonly known as:  CRESTOR  
TAKE 1 TABLET BY MOUTH DAILY FOR CHOLESTEROL  
  
 sildenafil citrate 100 mg tablet Commonly known as:  VIAGRA Take 1 Tab by mouth as needed. 1/2- 1 tab  1/2 hour before sex on an empty stomach Prescriptions Sent to Pharmacy Refills  
 metoprolol tartrate (LOPRESSOR) 25 mg tablet 12 Sig: TAKE 1 TABLET BY MOUTH EVERY 12 HOURS *APPOINTMENT REQUIRED FOR MORE REFILLS*  Class: Normal  
 Pharmacy: [address] [place] [address] Ph #: [phone]  
 rosuvastatin (CRESTOR) 20 mg tablet 3  
 Sig: TAKE 1 TABLET BY MOUTH DAILY FOR CHOLESTEROL Class: Normal  
 Pharmacy: [address] [place] [address], [address] [place] [address] #: [phone] We Performed the Following AMB POC HEMOGLOBIN A1C [71665 CPT(R)] CBC WITH AUTOMATED DIFF [77162 CPT(R)] LIPID PANEL [13438 CPT(R)] METABOLIC PANEL, COMPREHENSIVE [47696 CPT(R)] MICROALBUMIN, UR, RAND W/ MICROALB/CREAT RATIO [id number] CPT(R)] PSA, DIAGNOSTIC (PROSTATE SPECIFIC AG) [id number] CPT(R)] Follow-up Instructions Return in about 6 months (around 2/28/2019). Introducing [facility] & HEALTH SERVICES! [employer] introduces [employer] patient portal. Now you can access parts of your medical record, email your doctor's office, and request medication refills online. 1. In your internet browser, go to [email or web address] "[email or web address] 2. Click on the First Time User? Click Here link in the Sign In box. You will see the New Member Sign Up page. 3. Enter your [employer] Access Code exactly as it appears below. You will not need to use this code after youve completed the sign-up process. If you do not sign up before the expiration date, you must request a new code. · [employer] Access Code: [email or web address] Expires: 11/26/2018  1:57 PM 
 
4. Enter the last four digits of your Social Security Number (xxxx) and Date of Birth (mm/dd/yyyy) as indicated and click Submit. You will be taken to the next sign-up page. 5. Create a [employer] ID. This will be your [employer] login ID and cannot be changed, so think of one that is secure and easy to remember. 6. Create a [employer] password. You can change your password at any time. 7. Enter your Password Reset Question and Answer. This can be used at a later time if you forget your password. 8. Enter your e-mail address. You will receive e-mail notification when new information is available in [address] 9. Click Sign Up.  You can now view and download portions of your medical record. 10. Click the Download Summary menu link to download a portable copy of your medical information. If you have questions, please visit the Frequently Asked Questions section of the [employer] website. Remember, [employer] is NOT to be used for urgent needs. For medical emergencies, dial 911. Now available from your iPhone and Android! Please provide this summary of care documentation to your next provider. Your primary care clinician is listed as Roque Ragland. If you have any questions after today's visit, please call [phone].

## 2018-08-29 LAB
ALBUMIN SERPL-MCNC: 4.1 G/DL (ref 3.5–5.5)
ALBUMIN/CREAT UR: 173 MG/G CREAT (ref 0–30)
ALBUMIN/GLOB SERPL: 1.6 {RATIO} (ref 1.2–2.2)
ALP SERPL-CCNC: 50 IU/L (ref 39–117)
ALT SERPL-CCNC: 10 IU/L (ref 0–44)
AST SERPL-CCNC: 14 IU/L (ref 0–40)
BASOPHILS # BLD AUTO: 0 X10E3/UL (ref 0–0.2)
BASOPHILS NFR BLD AUTO: 1 %
BILIRUB SERPL-MCNC: 0.4 MG/DL (ref 0–1.2)
BUN SERPL-MCNC: 25 MG/DL (ref 6–24)
BUN/CREAT SERPL: 31 (ref 9–20)
CALCIUM SERPL-MCNC: 9.5 MG/DL (ref 8.7–10.2)
CHLORIDE SERPL-SCNC: 100 MMOL/L (ref 96–106)
CHOLEST SERPL-MCNC: 172 MG/DL (ref 100–199)
CO2 SERPL-SCNC: 22 MMOL/L (ref 20–29)
CREAT SERPL-MCNC: 0.8 MG/DL (ref 0.76–1.27)
CREAT UR-MCNC: 78.5 MG/DL
EOSINOPHIL # BLD AUTO: 0.1 X10E3/UL (ref 0–0.4)
EOSINOPHIL NFR BLD AUTO: 2 %
ERYTHROCYTE [DISTWIDTH] IN BLOOD BY AUTOMATED COUNT: 13.3 % (ref 12.3–15.4)
GLOBULIN SER CALC-MCNC: 2.6 G/DL (ref 1.5–4.5)
GLUCOSE SERPL-MCNC: 306 MG/DL (ref 65–99)
HCT VFR BLD AUTO: 40.1 % (ref 37.5–51)
HDLC SERPL-MCNC: 28 MG/DL
HGB BLD-MCNC: 13.3 G/DL (ref 13–17.7)
IMM GRANULOCYTES # BLD: 0 X10E3/UL (ref 0–0.1)
IMM GRANULOCYTES NFR BLD: 0 %
INTERPRETATION, 910389: NORMAL
LDLC SERPL CALC-MCNC: 111 MG/DL (ref 0–99)
LYMPHOCYTES # BLD AUTO: 1.8 X10E3/UL (ref 0.7–3.1)
LYMPHOCYTES NFR BLD AUTO: 32 %
Lab: NORMAL
MCH RBC QN AUTO: 27.4 PG (ref 26.6–33)
MCHC RBC AUTO-ENTMCNC: 33.2 G/DL (ref 31.5–35.7)
MCV RBC AUTO: 83 FL (ref 79–97)
MICROALBUMIN UR-MCNC: 135.8 UG/ML
MONOCYTES # BLD AUTO: 0.3 X10E3/UL (ref 0.1–0.9)
MONOCYTES NFR BLD AUTO: 6 %
NEUTROPHILS # BLD AUTO: 3.5 X10E3/UL (ref 1.4–7)
NEUTROPHILS NFR BLD AUTO: 59 %
PLATELET # BLD AUTO: 280 X10E3/UL (ref 150–379)
POTASSIUM SERPL-SCNC: 4.4 MMOL/L (ref 3.5–5.2)
PROT SERPL-MCNC: 6.7 G/DL (ref 6–8.5)
PSA SERPL-MCNC: 0.2 NG/ML (ref 0–4)
RBC # BLD AUTO: 4.85 X10E6/UL (ref 4.14–5.8)
SODIUM SERPL-SCNC: 136 MMOL/L (ref 134–144)
TRIGL SERPL-MCNC: 166 MG/DL (ref 0–149)
VLDLC SERPL CALC-MCNC: 33 MG/DL (ref 5–40)
WBC # BLD AUTO: 5.8 X10E3/UL (ref 3.4–10.8)

## 2018-09-10 NOTE — PROGRESS NOTES
pc with pt. Will start invokana for diabetes. He had been off crestor for awhile, but is now back on it.

## 2018-10-10 RX ORDER — METFORMIN HYDROCHLORIDE 1000 MG/1
TABLET ORAL
Qty: 180 TAB | Refills: 2 | Status: SHIPPED | OUTPATIENT
Start: 2018-10-10 | End: 2019-10-14 | Stop reason: SDUPTHER

## 2018-10-10 NOTE — TELEPHONE ENCOUNTER
Last Visit: 8/28  Next Appt: 2/27/19  Previous Refill Encounter: 5/+1    Requested Prescriptions     Pending Prescriptions Disp Refills    metFORMIN (GLUCOPHAGE) 1,000 mg tablet 180 Tab 2     Sig: TAKE 1 TABLET BY MOUTH TWICE DAILY FOR DIABETES

## 2018-11-29 RX ORDER — GLIPIZIDE 10 MG/1
TABLET ORAL
Qty: 180 TAB | Refills: 1 | Status: SHIPPED | OUTPATIENT
Start: 2018-11-29 | End: 2019-05-23 | Stop reason: SDUPTHER

## 2018-11-29 NOTE — TELEPHONE ENCOUNTER
Last Visit: 8/28  Next Appt: 2/27  Previous Refill Encounter: 5/+1    Requested Prescriptions     Pending Prescriptions Disp Refills    glipiZIDE (GLUCOTROL) 10 mg tablet 180 Tab 1     Sig: TAKE 1 TABLET BY MOUTH TWICE DAILY FOR DIABETES

## 2018-12-05 ENCOUNTER — HOSPITAL ENCOUNTER (EMERGENCY)
Age: 58
Discharge: HOME | End: 2018-12-05
Attending: EMERGENCY MEDICINE
Payer: COMMERCIAL

## 2018-12-05 VITALS
HEART RATE: 71 BPM | TEMPERATURE: 97.4 F | RESPIRATION RATE: 14 BRPM | SYSTOLIC BLOOD PRESSURE: 147 MMHG | BODY MASS INDEX: 35.16 KG/M2 | OXYGEN SATURATION: 97 % | DIASTOLIC BLOOD PRESSURE: 69 MMHG | HEIGHT: 74 IN | WEIGHT: 274 LBS

## 2018-12-05 DIAGNOSIS — I48.91 ATRIAL FIBRILLATION WITH RAPID VENTRICULAR RESPONSE (HCC): Primary | ICD-10-CM

## 2018-12-05 LAB
ALBUMIN SERPL-MCNC: 3.2 G/DL (ref 3.5–5)
ALBUMIN/GLOB SERPL: 0.9 {RATIO} (ref 1.1–2.2)
ALP SERPL-CCNC: 52 U/L (ref 45–117)
ALT SERPL-CCNC: 20 U/L (ref 12–78)
AMPHET UR QL SCN: NEGATIVE
ANION GAP SERPL CALC-SCNC: 7 MMOL/L (ref 5–15)
APTT PPP: 25.1 SEC (ref 22.1–32)
AST SERPL-CCNC: 10 U/L (ref 15–37)
ATRIAL RATE: 144 BPM
ATRIAL RATE: 72 BPM
BARBITURATES UR QL SCN: NEGATIVE
BASOPHILS # BLD: 0 K/UL (ref 0–0.1)
BASOPHILS NFR BLD: 0 % (ref 0–1)
BENZODIAZ UR QL: NEGATIVE
BILIRUB SERPL-MCNC: 0.6 MG/DL (ref 0.2–1)
BUN SERPL-MCNC: 17 MG/DL (ref 6–20)
BUN/CREAT SERPL: 14 (ref 12–20)
CALCIUM SERPL-MCNC: 8.1 MG/DL (ref 8.5–10.1)
CALCULATED P AXIS, ECG09: 49 DEGREES
CALCULATED R AXIS, ECG10: -3 DEGREES
CALCULATED R AXIS, ECG10: 14 DEGREES
CALCULATED T AXIS, ECG11: -7 DEGREES
CALCULATED T AXIS, ECG11: 4 DEGREES
CANNABINOIDS UR QL SCN: NEGATIVE
CHLORIDE SERPL-SCNC: 102 MMOL/L (ref 97–108)
CK MB CFR SERPL CALC: 3 % (ref 0–2.5)
CK MB SERPL-MCNC: 7.8 NG/ML (ref 5–25)
CK SERPL-CCNC: 259 U/L (ref 39–308)
CO2 SERPL-SCNC: 25 MMOL/L (ref 21–32)
COCAINE UR QL SCN: NEGATIVE
CREAT SERPL-MCNC: 1.21 MG/DL (ref 0.7–1.3)
DIAGNOSIS, 93000: NORMAL
DIAGNOSIS, 93000: NORMAL
DIFFERENTIAL METHOD BLD: ABNORMAL
DRUG SCRN COMMENT,DRGCM: NORMAL
EOSINOPHIL # BLD: 0.1 K/UL (ref 0–0.4)
EOSINOPHIL NFR BLD: 1 % (ref 0–7)
ERYTHROCYTE [DISTWIDTH] IN BLOOD BY AUTOMATED COUNT: 13.1 % (ref 11.5–14.5)
GLOBULIN SER CALC-MCNC: 3.5 G/DL (ref 2–4)
GLUCOSE BLD STRIP.AUTO-MCNC: 295 MG/DL (ref 65–100)
GLUCOSE SERPL-MCNC: 413 MG/DL (ref 65–100)
HCT VFR BLD AUTO: 39.6 % (ref 36.6–50.3)
HGB BLD-MCNC: 13.4 G/DL (ref 12.1–17)
IMM GRANULOCYTES # BLD: 0 K/UL (ref 0–0.04)
IMM GRANULOCYTES NFR BLD AUTO: 0 % (ref 0–0.5)
INR PPP: 1 (ref 0.9–1.1)
LYMPHOCYTES # BLD: 1.4 K/UL (ref 0.8–3.5)
LYMPHOCYTES NFR BLD: 14 % (ref 12–49)
MAGNESIUM SERPL-MCNC: 1.4 MG/DL (ref 1.6–2.4)
MCH RBC QN AUTO: 27.9 PG (ref 26–34)
MCHC RBC AUTO-ENTMCNC: 33.8 G/DL (ref 30–36.5)
MCV RBC AUTO: 82.5 FL (ref 80–99)
METHADONE UR QL: NEGATIVE
MONOCYTES # BLD: 0.7 K/UL (ref 0–1)
MONOCYTES NFR BLD: 7 % (ref 5–13)
NEUTS SEG # BLD: 8 K/UL (ref 1.8–8)
NEUTS SEG NFR BLD: 78 % (ref 32–75)
NRBC # BLD: 0 K/UL (ref 0–0.01)
NRBC BLD-RTO: 0 PER 100 WBC
OPIATES UR QL: NEGATIVE
P-R INTERVAL, ECG05: 178 MS
PCP UR QL: NEGATIVE
PLATELET # BLD AUTO: 265 K/UL (ref 150–400)
PMV BLD AUTO: 9.6 FL (ref 8.9–12.9)
POTASSIUM SERPL-SCNC: 3.8 MMOL/L (ref 3.5–5.1)
PROT SERPL-MCNC: 6.7 G/DL (ref 6.4–8.2)
PROTHROMBIN TIME: 10.6 SEC (ref 9–11.1)
Q-T INTERVAL, ECG07: 304 MS
Q-T INTERVAL, ECG07: 376 MS
QRS DURATION, ECG06: 86 MS
QRS DURATION, ECG06: 92 MS
QTC CALCULATION (BEZET), ECG08: 411 MS
QTC CALCULATION (BEZET), ECG08: 452 MS
RBC # BLD AUTO: 4.8 M/UL (ref 4.1–5.7)
SERVICE CMNT-IMP: ABNORMAL
SODIUM SERPL-SCNC: 134 MMOL/L (ref 136–145)
THERAPEUTIC RANGE,PTTT: NORMAL SECS (ref 58–77)
TROPONIN I SERPL-MCNC: <0.05 NG/ML
TSH SERPL DL<=0.05 MIU/L-ACNC: 5.23 UIU/ML (ref 0.36–3.74)
VENTRICULAR RATE, ECG03: 133 BPM
VENTRICULAR RATE, ECG03: 72 BPM
WBC # BLD AUTO: 10.2 K/UL (ref 4.1–11.1)

## 2018-12-05 PROCEDURE — 96361 HYDRATE IV INFUSION ADD-ON: CPT

## 2018-12-05 PROCEDURE — 99285 EMERGENCY DEPT VISIT HI MDM: CPT

## 2018-12-05 PROCEDURE — 85610 PROTHROMBIN TIME: CPT

## 2018-12-05 PROCEDURE — 36415 COLL VENOUS BLD VENIPUNCTURE: CPT

## 2018-12-05 PROCEDURE — 83735 ASSAY OF MAGNESIUM: CPT

## 2018-12-05 PROCEDURE — 80307 DRUG TEST PRSMV CHEM ANLYZR: CPT

## 2018-12-05 PROCEDURE — 80053 COMPREHEN METABOLIC PANEL: CPT

## 2018-12-05 PROCEDURE — 93005 ELECTROCARDIOGRAM TRACING: CPT

## 2018-12-05 PROCEDURE — 84484 ASSAY OF TROPONIN QUANT: CPT

## 2018-12-05 PROCEDURE — 85730 THROMBOPLASTIN TIME PARTIAL: CPT

## 2018-12-05 PROCEDURE — 96360 HYDRATION IV INFUSION INIT: CPT

## 2018-12-05 PROCEDURE — 74011250636 HC RX REV CODE- 250/636: Performed by: EMERGENCY MEDICINE

## 2018-12-05 PROCEDURE — 85025 COMPLETE CBC W/AUTO DIFF WBC: CPT

## 2018-12-05 PROCEDURE — 82962 GLUCOSE BLOOD TEST: CPT

## 2018-12-05 PROCEDURE — 84443 ASSAY THYROID STIM HORMONE: CPT

## 2018-12-05 PROCEDURE — 82550 ASSAY OF CK (CPK): CPT

## 2018-12-05 RX ORDER — METOPROLOL TARTRATE 25 MG/1
TABLET, FILM COATED ORAL
Qty: 60 TAB | Refills: 0 | Status: SHIPPED | OUTPATIENT
Start: 2018-12-05 | End: 2019-01-11 | Stop reason: SDUPTHER

## 2018-12-05 RX ORDER — SODIUM CHLORIDE 0.9 % (FLUSH) 0.9 %
5-10 SYRINGE (ML) INJECTION AS NEEDED
Status: DISCONTINUED | OUTPATIENT
Start: 2018-12-05 | End: 2018-12-05 | Stop reason: HOSPADM

## 2018-12-05 RX ORDER — SODIUM CHLORIDE 0.9 % (FLUSH) 0.9 %
5-10 SYRINGE (ML) INJECTION EVERY 8 HOURS
Status: DISCONTINUED | OUTPATIENT
Start: 2018-12-05 | End: 2018-12-05 | Stop reason: HOSPADM

## 2018-12-05 RX ADMIN — SODIUM CHLORIDE 1000 ML: 900 INJECTION, SOLUTION INTRAVENOUS at 01:49

## 2018-12-05 RX ADMIN — Medication 10 ML: at 01:27

## 2018-12-05 RX ADMIN — Medication 10 ML: at 01:49

## 2018-12-05 NOTE — LETTER
ADULT WORK/SCHOOL NOTE Francois Rodriguez [address] [id number] 
 
 
12/05/18 To whom it may concern, Please be advised that Francois Rodriguez was evaluated and discharged from the Emergency Department on 12/05/18. Francois Rodriguez is excused from work/school now through 12/06/2018 Francois Rodriguez may return to work with the following restrictions: 
 
         none Should Francois Rodriguez require more time off or further clearance, he should follow up with his  own regular physician or specialist. 
 
 
Sincerely, Kalia Curiel

## 2018-12-05 NOTE — DISCHARGE INSTRUCTIONS

## 2018-12-05 NOTE — LETTER
NOTIFICATION RETURN TO WORK / SCHOOL 
 
12/5/2018 6:24 AM 
 
Mr. Raúl Muniz [address] [id number] [facility] To Whom It May Concern: 
 
Raúl Muniz is currently under the care of [facility] EMERGENCY DEPT. He will return to work/school on: 12/6/2018 If there are questions or concerns please have the patient contact our office. Sincerely, 
 
 
Melanie Sutton.  Monica Parry

## 2018-12-05 NOTE — ED PROVIDER NOTES
EMERGENCY DEPARTMENT HISTORY AND PHYSICAL EXAM 
     
 
Date: 12/5/2018 Patient Name: Yumiko Macdonald History of Presenting Illness Chief Complaint Patient presents with  Dizziness History Provided By: Patient HPI: Yumiko Macdonald is a 59 y.o. male, pmhx HTN / DM / Billee Cesar / CAD, who presents ambulatory to the ED c/o sudden onset lightheadedness and nausea that began while at work x 2030 yesterday evening. Pt states he followed up with the nurse at work at which time he had an EKG that showed A-fib with RVR HR 140s, and was advised to report to the ED for further workup. He reports having a single episode of similar sxs in August 2018, but denies any known hx of arrhythmias at that time. Pt notes a hx of BLE swelling at baseline. He states his lightheadedness is resolved with rest and exacerbated with movement and positional changes. Pt reports drinking up to 2 liters of pop in addition to coffee daily. He denies any recent alcohol ingestion. Pt denies any recent medications for his current sxs. Per chart review, pt's last 2D ECHO in 06/2014 demonstrated an EF 60-65%. Pt's last catheterization documented in 11/2012. Pt otherwise specifically denies any recent fever, chills, vomiting, diarrhea, abd pain, CP, SOB, dizziness, numbness, weakness, tingling, BLE swelling, HA, heart palpitations, urinary sxs, changes in BM, changes in PO intake, melena, hematochezia, cough, or congestion. PCP: Marion Gregory MD  
Cardiology: Shira Andres PMHx: Significant for arthritis, HLD, DM, CAD, HTN 
PSHx: Significant for orthopedic surgery, cardiac cath / stent Social Hx: -tobacco (former 3209), -EtOH, -Illicit Drugs There are no other complaints, changes, or physical findings at this time. Current Facility-Administered Medications Medication Dose Route Frequency Provider Last Rate Last Dose  sodium chloride (NS) flush 5-10 mL  5-10 mL IntraVENous Q8H Dianne Brothers MD   10 mL at 12/05/18 0125  sodium chloride (NS) flush 5-10 mL  5-10 mL IntraVENous PRN Dianne Brothers MD   10 mL at 12/05/18 0149 Current Outpatient Medications Medication Sig Dispense Refill  metoprolol tartrate (LOPRESSOR) 25 mg tablet TAKE 2 TABLETs BY MOUTH EVERY 12 HOURS *APPOINTMENT REQUIRED FOR MORE REFILLS* 60 Tab 0  
 apixaban (ELIQUIS) 5 mg tablet Take 1 Tab by mouth two (2) times a day. 60 Tab 0  
 glipiZIDE (GLUCOTROL) 10 mg tablet TAKE 1 TABLET BY MOUTH TWICE DAILY FOR DIABETES 180 Tab 1  
 metFORMIN (GLUCOPHAGE) 1,000 mg tablet TAKE 1 TABLET BY MOUTH TWICE DAILY FOR DIABETES 180 Tab 2  
 rosuvastatin (CRESTOR) 20 mg tablet TAKE 1 TABLET BY MOUTH DAILY FOR CHOLESTEROL 90 Tab 3  
 gemfibrozil (LOPID) 600 mg tablet TAKE 1 TABLET BY MOUTH TWICE DAILY FOR CHOLESTEROL 180 Tab 2  
 magnesium oxide (MAG-OX) 400 mg tablet TAKE 2 TABLETS BY MOUTH EVERY  Tab 3  ibuprofen (MOTRIN) 800 mg tablet Take 1 Tab by mouth every six (6) hours as needed for Pain. 50 Tab 12  
 nitroglycerin (NITROSTAT) 0.4 mg SL tablet 1 Tab by SubLINGual route every five (5) minutes as needed for Chest Pain (call 911 if CP/ SOB not relieved by 3 tabs). 25 Tab 1  
 aspirin delayed-release 81 mg tablet Take  by mouth daily.  omega-3 fatty acids-vitamin e (FISH OIL) 1,000 mg Cap Take 2 Caps by mouth two (2) times a day. Past History Past Medical History: 
Past Medical History:  
Diagnosis Date  Arthritis   
 knees  Benign essential hypertension 6/10/2011  CAD (coronary artery disease)  Diabetes ([facility] [address] DX  AGE 26    
 Hypercholesterolemia  Hypertriglyceridemia  Other ill-defined conditions(799.89)   
 obesity  Other ill-defined conditions(799.89)   
 high cholesterol  SOB (shortness of breath) on exertion 5/16/2011 Past Surgical History: 
Past Surgical History:  
Procedure Laterality Date  CARDIAC CATHETERIZATION  6/9/2011  CARDIAC CATHETERIZATION  11/1/2012  CARDIAC SURG PROCEDURE UNLIST    
 STENT RCA    [facility]. ANGIOSEAL VIP 6FR  2011  HX ORTHOPAEDIC    
 left knee replacement  HX ORTHOPAEDIC    
 R knee replacement  2010 [place]  [facility]ING[facility]  2011 Family History: 
Family History Problem Relation Age of Onset  Diabetes Mother  Hypertension Mother  Elevated Lipids Mother  Cancer Mother  Diabetes Father  Heart Disease Father  Hypertension Father  Elevated Lipids Father Social History: 
Social History Tobacco Use  Smoking status: Former Smoker Last attempt to quit: 11/10/1989 Years since quittin.0  Smokeless tobacco: Never Used Substance Use Topics  Alcohol use: Yes Comment: social  DRINKS ONE WEEKEND A MONTH/\"BIG IMPROVEMENT\"  Drug use: No  
 
 
Allergies: Allergies Allergen Reactions  Lisinopril Cough  Losartan Other (comments) Hypotension Review of Systems Review of Systems Constitutional: Negative for chills and fever. HENT: Negative for congestion, ear pain, rhinorrhea and sore throat. Respiratory: Negative for cough and shortness of breath. Cardiovascular: Negative for chest pain, palpitations and leg swelling. Gastrointestinal: Positive for nausea. Negative for abdominal pain, constipation, diarrhea and vomiting. No melena No hematochezia Endocrine: Negative for polyuria. Genitourinary: Negative for dysuria, frequency and hematuria. Neurological: Positive for light-headedness. Negative for dizziness, weakness, numbness and headaches. No tingling All other systems reviewed and are negative. Physical Exam  
Physical Exam  
Nursing note and vitals reviewed. General appearance - overweight, well appearing, and in no distress Eyes - pupils equal and reactive, extraocular eye movements intact ENT - mucous membranes moist, pharynx normal without lesions Neck - supple, no significant adenopathy; non-tender to palpation Chest - clear to auscultation, no wheezes, rales or rhonchi; non-tender to palpation Heart - tachycardic rate and Irregularly irregular rhythm, while in room pt converted to NSR with HR 70s-80s, S1 and S2 normal, no murmurs noted Abdomen - soft, nontender, nondistended, no masses or organomegaly Musculoskeletal - no joint tenderness, deformity or swelling; normal ROM Extremities - peripheral pulses normal, no pedal edema Skin - normal coloration and turgor, no rashes Neurological - alert, oriented x3, normal speech, no focal findings or movement disorder noted Written by Shivani Khan ED Scribe, as dictated by Iza Sanchez MD 
 
 
Diagnostic Study Results Labs - Recent Results (from the past 12 hour(s)) EKG, 12 LEAD, INITIAL Collection Time: 12/05/18 12:59 AM  
Result Value Ref Range Ventricular Rate 133 BPM  
 Atrial Rate 144 BPM  
 QRS Duration 86 ms  
 Q-T Interval 304 ms QTC Calculation (Bezet) 452 ms Calculated R Axis 14 degrees Calculated T Axis -7 degrees Diagnosis Atrial fibrillation with rapid ventricular response with premature  
ventricular or aberrantly conducted complexes Nonspecific T wave abnormality When compared with ECG of 09-JUN-2011 15:01, Atrial fibrillation has replaced Sinus rhythm Vent. rate has increased BY  61 BPM 
Nonspecific T wave abnormality has replaced inverted T waves in Inferior  
leads CBC WITH AUTOMATED DIFF Collection Time: 12/05/18  1:44 AM  
Result Value Ref Range WBC 10.2 4.1 - 11.1 K/uL  
 RBC 4.80 4. 10 - 5.70 M/uL  
 HGB 13.4 12.1 - 17.0 g/dL HCT 39.6 36.6 - 50.3 % MCV 82.5 80.0 - 99.0 FL  
 MCH 27.9 26.0 - 34.0 PG  
 MCHC 33.8 30.0 - 36.5 g/dL  
 RDW 13.1 11.5 - 14.5 % PLATELET 538 206 - 897 K/uL MPV 9.6 8.9 - 12.9 FL  
 NRBC 0.0 0  WBC ABSOLUTE NRBC 0.00 0.00 - 0.01 K/uL NEUTROPHILS 78 (H) 32 - 75 % LYMPHOCYTES 14 12 - 49 % MONOCYTES 7 5 - 13 % EOSINOPHILS 1 0 - 7 % BASOPHILS 0 0 - 1 % IMMATURE GRANULOCYTES 0 0.0 - 0.5 % ABS. NEUTROPHILS 8.0 1.8 - 8.0 K/UL  
 ABS. LYMPHOCYTES 1.4 0.8 - 3.5 K/UL  
 ABS. MONOCYTES 0.7 0.0 - 1.0 K/UL  
 ABS. EOSINOPHILS 0.1 0.0 - 0.4 K/UL  
 ABS. BASOPHILS 0.0 0.0 - 0.1 K/UL  
 ABS. IMM. GRANS. 0.0 0.00 - 0.04 K/UL  
 DF AUTOMATED METABOLIC PANEL, COMPREHENSIVE Collection Time: 12/05/18  1:44 AM  
Result Value Ref Range Sodium 134 (L) 136 - 145 mmol/L Potassium 3.8 3.5 - 5.1 mmol/L Chloride 102 97 - 108 mmol/L  
 CO2 25 21 - 32 mmol/L Anion gap 7 5 - 15 mmol/L Glucose 413 (H) 65 - 100 mg/dL BUN 17 6 - 20 MG/DL Creatinine 1.21 0.70 - 1.30 MG/DL  
 BUN/Creatinine ratio 14 12 - 20 GFR est AA >60 >60 ml/min/1.73m2 GFR est non-AA >60 >60 ml/min/1.73m2 Calcium 8.1 (L) 8.5 - 10.1 MG/DL Bilirubin, total 0.6 0.2 - 1.0 MG/DL  
 ALT (SGPT) 20 12 - 78 U/L  
 AST (SGOT) 10 (L) 15 - 37 U/L Alk. phosphatase 52 45 - 117 U/L Protein, total 6.7 6.4 - 8.2 g/dL Albumin 3.2 (L) 3.5 - 5.0 g/dL Globulin 3.5 2.0 - 4.0 g/dL A-G Ratio 0.9 (L) 1.1 - 2.2 CK W/ CKMB & INDEX Collection Time: 12/05/18  1:44 AM  
Result Value Ref Range  39 - 308 U/L  
 CK - MB 7.8 (H) <3.6 NG/ML  
 CK-MB Index 3.0 (H) 0 - 2.5    
TROPONIN I Collection Time: 12/05/18  1:44 AM  
Result Value Ref Range Troponin-I, Qt. <0.05 <0.05 ng/mL MAGNESIUM Collection Time: 12/05/18  1:44 AM  
Result Value Ref Range Magnesium 1.4 (L) 1.6 - 2.4 mg/dL PROTHROMBIN TIME + INR Collection Time: 12/05/18  1:44 AM  
Result Value Ref Range INR 1.0 0.9 - 1.1 Prothrombin time 10.6 9.0 - 11.1 sec PTT Collection Time: 12/05/18  1:44 AM  
Result Value Ref Range aPTT 25.1 22.1 - 32.0 sec  
 aPTT, therapeutic range     58.0 - 77.0 SECS  
TSH 3RD GENERATION Collection Time: 12/05/18  1:44 AM  
Result Value Ref Range TSH 5.23 (H) 0.36 - 3.74 uIU/mL DRUG SCREEN, URINE Collection Time: 12/05/18  3:05 AM  
Result Value Ref Range AMPHETAMINES NEGATIVE  NEG    
 BARBITURATES NEGATIVE  NEG BENZODIAZEPINES NEGATIVE  NEG    
 COCAINE NEGATIVE  NEG METHADONE NEGATIVE  NEG    
 OPIATES NEGATIVE  NEG    
 PCP(PHENCYCLIDINE) NEGATIVE  NEG    
 THC (TH-CANNABINOL) NEGATIVE  NEG Drug screen comment (NOTE) GLUCOSE, POC Collection Time: 12/05/18  4:39 AM  
Result Value Ref Range Glucose (POC) 295 (H) 65 - 100 mg/dL Performed by Erin Rivero EKG, 12 LEAD, SUBSEQUENT Collection Time: 12/05/18  4:40 AM  
Result Value Ref Range Ventricular Rate 72 BPM  
 Atrial Rate 72 BPM  
 P-R Interval 178 ms QRS Duration 92 ms Q-T Interval 376 ms QTC Calculation (Bezet) 411 ms Calculated P Axis 49 degrees Calculated R Axis -3 degrees Calculated T Axis 4 degrees Diagnosis Sinus rhythm with premature atrial complexes Nonspecific T wave abnormality When compared with ECG of 05-DEC-2018 00:59, 
MANUAL COMPARISON REQUIRED, DATA IS UNCONFIRMED Radiologic Studies - No orders to display CT Results  (Last 48 hours) None CXR Results  (Last 48 hours) None Medical Decision Making I am the first provider for this patient. I reviewed the vital signs, available nursing notes, past medical history, past surgical history, family history and social history. Vital Signs-Reviewed the patient's vital signs. Patient Vitals for the past 12 hrs: 
 Temp Pulse Resp BP SpO2  
12/05/18 0613  71 14  97 % 12/05/18 0445  75 14 147/69 98 % 12/05/18 0430  72 13 131/66 96 % 12/05/18 0330  72 14 131/68 93 % 12/05/18 0257  76 15  95 % 12/05/18 0238  84 14 146/73 98 % 12/05/18 0237  79 14 131/70 99 % 12/05/18 0234  76 13 137/69 98 % 12/05/18 0145  84 15 123/66 95 % 12/05/18 0143  85 11  97 % 12/05/18 0130  85 14 122/62 96 % 12/05/18 0115  (!) 138 17 131/58 96 % 12/05/18 0056 97.4 °F (36.3 °C) 96 14 102/64 98 % Pulse Oximetry Analysis - 99% on RA Cardiac Monitor:  
Rate: 113bpm 
Rhythm: Atrial Fibrillation Records Reviewed: Nursing Notes, Old Medical Records, Previous electrocardiograms, Previous Radiology Studies and Previous Laboratory Studies Provider Notes (Medical Decision Making): DDx: dehydration, electrolyte abnormality, a-fib with RVR, ACS, hyperthyroidism ED Course:  
Initial assessment performed. The patients presenting problems have been discussed, and they are in agreement with the care plan formulated and outlined with them. I have encouraged them to ask questions as they arise throughout their visit. EKG interpretation: (Preliminary) 0104 Rhythm: atrial fib with RVR. Rate (approx.): 133bpm; Axis: normal; Normal SC, QRS, QTc intervals; ST/T wave: non-specific changes; Other findings: possible ischemia. Written by Marj Lees ED Scribe, as dictated by Whitney Bedoya MD 
 
EKG interpretation: [id number] Rhythm: sinus rhythm and PAC's. Rate (approx.): 72bpm; Axis: normal; Normal SC, QRS, QTc intervals; ST/T wave: non-specific changes; 
Written by Parrish Deleon. Amita Zapien ED scribe, as dictated by Whitney Bedoya MD  
 
CONSULT NOTE:  
6:06 AM 
Whitney Bedoya MD spoke with Dr. Elo Edward, Specialty: Cardiology Discussed pt's hx, disposition, and available diagnostic and imaging results. Reviewed care plans. Consultant recommends starting the pt on Eliquis and increasing his daily Metoprolol to 50mg. Follow up as outpatient with Dr. Nikky Richards. Written by Marj Lees ED Scribe, as dictated by Whitney Bedoya MD. 
 
Progress note: 
6:15 AM 
Pt noted to be feeling better, ready for discharge. Updated pt and/or family on all final lab findings. Will follow up as instructed.  All questions have been answered, pt voiced understanding and agreement with plan. Specific return precautions provided as well as instructions to return to the ED should sx worsen at any time. Vital signs stable for discharge. Written by Gracia Mejia, ED Scribe, as dictated by Luis Irwin MD 
 
Critical Care Time: CRITICAL CARE NOTE : 
7:00 AM 
 
IMPENDING DETERIORATION -Cardiovascular ASSOCIATED RISK FACTORS - Dysrhythmia MANAGEMENT- Bedside Assessment and Supervision of Care INTERPRETATION -  ECG and Blood Pressure INTERVENTIONS - hemodynamic mngmt and vascular control CASE REVIEW - Medical Sub-Specialist (cardiology) Nursing and Family TREATMENT RESPONSE -Improved PERFORMED BY - Self NOTES   : 
I have spent 45 minutes of critical care time involved in lab review, consultations with specialist, family decision- making, bedside attention and documentation. During this entire length of time I was immediately available to the patient . Luis Irwin MD  
 
 
Diagnosis Clinical Impression: 1. Atrial fibrillation with rapid ventricular response ([address] PLAN: 
1. Discharge Medication List as of 12/5/2018  6:15 AM  
  
START taking these medications Details  
apixaban (ELIQUIS) 5 mg tablet Take 1 Tab by mouth two (2) times a day., Print, Disp-60 Tab, R-0  
  
  
CONTINUE these medications which have CHANGED Details  
metoprolol tartrate (LOPRESSOR) 25 mg tablet TAKE 2 TABLETs BY MOUTH EVERY 12 HOURS *APPOINTMENT REQUIRED FOR MORE REFILLS*, Print, Disp-60 Tab, R-0  
  
  
CONTINUE these medications which have NOT CHANGED  Details  
glipiZIDE (GLUCOTROL) 10 mg tablet TAKE 1 TABLET BY MOUTH TWICE DAILY FOR DIABETES, Normal, Disp-180 Tab, R-1  
  
metFORMIN (GLUCOPHAGE) 1,000 mg tablet TAKE 1 TABLET BY MOUTH TWICE DAILY FOR DIABETES, Normal, Disp-180 Tab, R-2  
  
rosuvastatin (CRESTOR) 20 mg tablet TAKE 1 TABLET BY MOUTH DAILY FOR CHOLESTEROL, Normal, Disp-90 Tab, R-3  
  
gemfibrozil (LOPID) 600 mg tablet TAKE 1 TABLET BY MOUTH TWICE DAILY FOR CHOLESTEROL, Normal, Disp-180 Tab, R-2  
  
magnesium oxide (MAG-OX) 400 mg tablet TAKE 2 TABLETS BY MOUTH EVERY DAY, Normal, Disp-180 Tab, R-3  
  
ibuprofen (MOTRIN) 800 mg tablet Take 1 Tab by mouth every six (6) hours as needed for Pain., Normal, Disp-50 Tab, R-12  
  
nitroglycerin (NITROSTAT) 0.4 mg SL tablet 1 Tab by SubLINGual route every five (5) minutes as needed for Chest Pain (call 911 if CP/ SOB not relieved by 3 tabs). , Normal, Disp-25 Tab, R-1  
  
aspirin delayed-release 81 mg tablet Take  by mouth daily. , Historical Med  
  
omega-3 fatty acids-vitamin e (FISH OIL) 1,000 mg Cap Take 2 Caps by mouth two (2) times a day., Historical Med 2. Follow-up Information Follow up With Specialties Details Why Contact Info Gualberto Bullock MD [facility] Call  [address] [facility] [phone] Leslie Cardona MD Cardiology, [address] [place] [address] Vascular Surgery, Internal Medicine Schedule an appointment as soon as possible for a visit  [address] 
[phone] [facility] [place] EMERGENCY DEPT Emergency Medicine  If symptoms worsen [address] [facility] 6[address] [facility] 
[phone] Return to ED if worse Disposition: 
 
DISCHARGE NOTE: 
6:15 AM 
The patient's results have been reviewed with family and/or caregiver. They verbally convey their understanding and agreement of the patient's signs, symptoms, diagnosis, treatment, and prognosis. They additionally agree to follow up as recommended in the discharge instructions or to return to the Emergency Room should the patient's condition change prior to their follow-up appointment. The family and/or caregiver verbally agrees with the care-plan and all of their questions have been answered.  The discharge instructions have also been provided to the them along with educational information regarding the patient's diagnosis and a list of reasons why the patient would want to return to the ER prior to their follow-up appointment should their condition change. Written by Alex Rashid ED Scribe, as dictated by Kenneth Skiff, MD. Attestations: This note is prepared by Alex Rashid, acting as Scribe for MD Dianne Guerrero MD : The scribe's documentation has been prepared under my direction and personally reviewed by me in its entirety. I confirm that the note above accurately reflects all work, treatment, procedures, and medical decision making performed by me. This note will not be viewable in [address]

## 2018-12-05 NOTE — LETTER
[address] 
[facility] EMERGENCY DEPT 
[address] [id number] 
[phone] Work/School Note Date: 12/5/2018 To Whom It May concern: 
 
Jonh Kim was seen and treated today in the emergency room by the following provider(s): 
Attending Provider: Jaya aGffney MD. Jonh Kim should be excused from work on 12/5/2018. Sincerely, Nikole Gibson MD

## 2018-12-12 ENCOUNTER — OFFICE VISIT (OUTPATIENT)
Dept: CARDIOLOGY CLINIC | Age: 58
End: 2018-12-12

## 2018-12-12 VITALS
HEART RATE: 70 BPM | BODY MASS INDEX: 35.99 KG/M2 | RESPIRATION RATE: 16 BRPM | SYSTOLIC BLOOD PRESSURE: 120 MMHG | WEIGHT: 280.4 LBS | OXYGEN SATURATION: 96 % | HEIGHT: 74 IN | DIASTOLIC BLOOD PRESSURE: 60 MMHG

## 2018-12-12 DIAGNOSIS — E11.8 CONTROLLED TYPE 2 DIABETES MELLITUS WITH COMPLICATION, WITHOUT LONG-TERM CURRENT USE OF INSULIN (HCC): ICD-10-CM

## 2018-12-12 DIAGNOSIS — I25.10 CORONARY ARTERY DISEASE DUE TO LIPID RICH PLAQUE: ICD-10-CM

## 2018-12-12 DIAGNOSIS — E78.2 MIXED HYPERLIPIDEMIA: ICD-10-CM

## 2018-12-12 DIAGNOSIS — I10 BENIGN ESSENTIAL HYPERTENSION: ICD-10-CM

## 2018-12-12 DIAGNOSIS — I25.83 CORONARY ARTERY DISEASE DUE TO LIPID RICH PLAQUE: ICD-10-CM

## 2018-12-12 DIAGNOSIS — I48.0 PAF (PAROXYSMAL ATRIAL FIBRILLATION) (HCC): Primary | ICD-10-CM

## 2018-12-12 PROBLEM — E66.01 SEVERE OBESITY (HCC): Status: ACTIVE | Noted: 2018-12-12

## 2018-12-12 PROBLEM — E11.21 TYPE 2 DIABETES WITH NEPHROPATHY (HCC): Status: ACTIVE | Noted: 2018-12-12

## 2018-12-12 NOTE — PROGRESS NOTES
1. Have you been to the ER, urgent care clinic since your last visit? Hospitalized since your last visit? Yes When: 12/2018 [facility] irregular heart beat    2. Have you seen or consulted any other health care providers outside of the [address] [place] [address] since your last visit? Include any pap smears or colon screening. No     Patient seen in ED recently due to irregular heart rhythm.

## 2018-12-12 NOTE — PROGRESS NOTES
[address]  [phone]     Subjective:      Jake Rodriguez is a 59 y.o. male with pmhx HTN / DM / Valentina Sidhu / CAD, who presents to clinic today to reestablish care. Initially presented to ED 12/5/18 c/o sudden onset lightheadedness and nausea while at work, where his EKG showed A-fib with RVR HR 140s, and was advised to report to the ED for further workup. In the ED, was found to be in AF with RVR, normal labs/neg troponin. Currently, reports unchanged mild LE swelling, resolves with elevation. The patient denies chest pain/ shortness of breath, orthopnea, PND, palpitations syncope, or presyncope.        Patient Active Problem List    Diagnosis Date Noted    Type 2 diabetes with nephropathy ([address] 12/12/2018    Severe obesity ([address] 12/12/2018    Controlled type 2 diabetes mellitus with complication, without long-term current use of insulin ([address] 12/12/2018    PAF (paroxysmal atrial fibrillation) ([address] 12/12/2018    Mixed hyperlipidemia 04/18/2012    Mitral valve disorders(424.0) 03/27/2012    Coronary artery disease 06/10/2011    S/P coronary artery stent placement 06/10/2011    Type 2 diabetes mellitus ([address] 06/10/2011    Benign essential hypertension 06/10/2011    Hypertriglyceridemia     Arthritis       Kyrie Acharya MD  Past Medical History:   Diagnosis Date    Arthritis     knees    Benign essential hypertension 6/10/2011    CAD (coronary artery disease)     Diabetes ([address]     DX  AGE 26      Hypercholesterolemia     Hypertriglyceridemia     Other ill-defined conditions(799.89)     obesity    Other ill-defined conditions(799.89)     high cholesterol    PAF (paroxysmal atrial fibrillation) (HCC) 12/12/2018    SOB (shortness of breath) on exertion 5/16/2011      Past Surgical History:   Procedure Laterality Date    CARDIAC CATHETERIZATION  6/9/2011         CARDIAC CATHETERIZATION  11/1/2012         CARDIAC SURG PROCEDURE UNLIST STENT RCA      HC ANGIOSEAL VIP 6FR  2011         HX ORTHOPAEDIC      left knee replacement    HX ORTHOPAEDIC      R knee replacement   -Clau Castillo.  AK DRUG-ELUTING STENTS, SINGLE  2011          Allergies   Allergen Reactions    Lisinopril Cough    Losartan Other (comments)     Hypotension        Family History   Problem Relation Age of Onset    Diabetes Mother     Hypertension Mother    Tramaine Davenport Elevated Lipids Mother     Cancer Mother     Diabetes Father     Heart Disease Father     Hypertension Father     Elevated Lipids Father       Social History     Socioeconomic History    Marital status:      Spouse name: Not on file    Number of children: Not on file    Years of education: Not on file    Highest education level: Not on file   Social Needs    Financial resource strain: Not on file    Food insecurity - worry: Not on file    Food insecurity - inability: Not on file   [email or web address] needs - medical: Not on file   [email or web address] needs - non-medical: Not on file   Occupational History    Not on file   Tobacco Use    Smoking status: Former Smoker     Last attempt to quit: 11/10/1989     Years since quittin.1    Smokeless tobacco: Never Used   Substance and Sexual Activity    Alcohol use: Yes     Comment: social  DRINKS ONE WEEKEND A MONTH/\"BIG IMPROVEMENT\"    Drug use: No    Sexual activity: Yes     Partners: Female     Birth control/protection: None   Other Topics Concern    Not on file   Social History Narrative    , 3 children, twin sons 25 and one son 15. Works at [employer] for the last 9 years, used to work a [employer]      Current Outpatient Medications   Medication Sig    metoprolol tartrate (LOPRESSOR) 25 mg tablet TAKE 2 TABLETs BY MOUTH EVERY 12 HOURS *APPOINTMENT REQUIRED FOR MORE REFILLS*    apixaban (ELIQUIS) 5 mg tablet Take 1 Tab by mouth two (2) times a day.     glipiZIDE (GLUCOTROL) 10 mg tablet TAKE 1 TABLET BY MOUTH TWICE DAILY FOR DIABETES    metFORMIN (GLUCOPHAGE) 1,000 mg tablet TAKE 1 TABLET BY MOUTH TWICE DAILY FOR DIABETES    rosuvastatin (CRESTOR) 20 mg tablet TAKE 1 TABLET BY MOUTH DAILY FOR CHOLESTEROL    gemfibrozil (LOPID) 600 mg tablet TAKE 1 TABLET BY MOUTH TWICE DAILY FOR CHOLESTEROL    magnesium oxide (MAG-OX) 400 mg tablet TAKE 2 TABLETS BY MOUTH EVERY DAY    ibuprofen (MOTRIN) 800 mg tablet Take 1 Tab by mouth every six (6) hours as needed for Pain.  nitroglycerin (NITROSTAT) 0.4 mg SL tablet 1 Tab by SubLINGual route every five (5) minutes as needed for Chest Pain (call 911 if CP/ SOB not relieved by 3 tabs).  aspirin delayed-release 81 mg tablet Take  by mouth daily.  omega-3 fatty acids-vitamin e (FISH OIL) 1,000 mg Cap Take 2 Caps by mouth two (2) times a day. No current facility-administered medications for this visit. Review of Symptoms:  11 systems reviewed, negative other than as stated in the HPI    Physical ExamPhysical Exam:    Vitals:    12/12/18 1340 12/12/18 1341   BP: 130/60 120/60   Pulse: 70    Resp: 16    SpO2: 96%    Weight: 280 lb 6.4 oz (127.2 kg)    Height: 6' 2\" (1.88 m)      Body mass index is 36 kg/m². General PE   Gen:  NAD  Mental Status - Alert. General Appearance - Not in acute distress. Chest and Lung Exam   Inspection: Accessory muscles - No use of accessory muscles in breathing. Auscultation:   Breath sounds: - Normal.   Cardiovascular   Inspection: Jugular vein - Bilateral - Inspection Normal.   Palpation/Percussion:   Apical Impulse: - Normal.   Auscultation: Rhythm - Regular. Heart Sounds - S1 WNL and S2 WNL. No S3 or S4. Murmurs & Other Heart Sounds: Auscultation of the heart reveals - No Murmurs. Peripheral Vascular   Upper Extremity: Inspection - Bilateral - No Cyanotic nailbeds or Digital clubbing. Lower Extremity:   Palpation: Edema - Bilateral - No edema. Abdomen:   Soft, non-tender, bowel sounds are active.   Neuro: A&O times 3, CN and motor grossly WNL    Labs:   Lab Results   Component Value Date/Time    Cholesterol, total 172 08/28/2018 02:13 PM    Cholesterol, total 89 (L) 02/08/2018 12:23 PM    Cholesterol, total 116 03/07/2017 11:06 AM    Cholesterol, total 144 11/29/2016 10:51 AM    Cholesterol, total 112 06/30/2015 10:58 AM    HDL Cholesterol 28 (L) 08/28/2018 02:13 PM    HDL Cholesterol 19 (L) 02/08/2018 12:23 PM    HDL Cholesterol 28 (L) 03/07/2017 11:06 AM    HDL Cholesterol 37 (L) 11/29/2016 10:51 AM    HDL Cholesterol 21 (L) 06/30/2015 10:58 AM    LDL, calculated 111 (H) 08/28/2018 02:13 PM    LDL, calculated 38 02/08/2018 12:23 PM    LDL, calculated 67 03/07/2017 11:06 AM    LDL, calculated 89 11/29/2016 10:51 AM    LDL, calculated 38 06/30/2015 10:58 AM    Triglyceride 166 (H) 08/28/2018 02:13 PM    Triglyceride 162 (H) 02/08/2018 12:23 PM    Triglyceride 105 03/07/2017 11:06 AM    Triglyceride 89 11/29/2016 10:51 AM    Triglyceride 265 (H) 06/30/2015 10:58 AM    CHOL/HDL Ratio 7.1 (H) 06/09/2011 02:12 PM    CHOL/HDL Ratio 5.2 (H) 12/21/2009 10:25 AM    CHOL/HDL Ratio 5.9 (H) 06/18/2009 12:14 PM     Lab Results   Component Value Date/Time     12/05/2018 01:44 AM     Lab Results   Component Value Date/Time    Sodium 134 (L) 12/05/2018 01:44 AM    Potassium 3.8 12/05/2018 01:44 AM    Chloride 102 12/05/2018 01:44 AM    CO2 25 12/05/2018 01:44 AM    Anion gap 7 12/05/2018 01:44 AM    Glucose 413 (H) 12/05/2018 01:44 AM    BUN 17 12/05/2018 01:44 AM    Creatinine 1.21 12/05/2018 01:44 AM    BUN/Creatinine ratio 14 12/05/2018 01:44 AM    GFR est AA >60 12/05/2018 01:44 AM    GFR est non-AA >60 12/05/2018 01:44 AM    Calcium 8.1 (L) 12/05/2018 01:44 AM    Bilirubin, total 0.6 12/05/2018 01:44 AM    AST (SGOT) 10 (L) 12/05/2018 01:44 AM    Alk.  phosphatase 52 12/05/2018 01:44 AM    Protein, total 6.7 12/05/2018 01:44 AM    Albumin 3.2 (L) 12/05/2018 01:44 AM    Globulin 3.5 12/05/2018 01:44 AM    A-G Ratio 0.9 (L) 12/05/2018 01:44 AM ALT (SGPT) 20 12/05/2018 01:44 AM       EKG:  NSR     Assessment:      1. PAF (paroxysmal atrial fibrillation) ([address]    2. Mixed hyperlipidemia    3. Coronary artery disease due to lipid rich plaque    4. Benign essential hypertension    5. Controlled type 2 diabetes mellitus with complication, without long-term current use of insulin (HCC)        Orders Placed This Encounter    AMB POC EKG ROUTINE W/ 12 LEADS, INTER & REP     Order Specific Question:   Reason for Exam:     Answer:   routine        Plan:     Pt is a 58 YO M with pmhx HTN / DM / HLD / CAD, who presents to clinic today to reestablish care. Initially presented to ED 12/5/18 c/o sudden onset lightheadedness and nausea while at work, where his EKG showed A-fib with RVR HR 140s, and was advised to report to the ED for further workup. In the ED, was found to be in AF with RVR, normal labs/neg troponin. To note, he spontaneously converted to SR in the ED. Lasted about 4 hours. EKG in [facility]. Currently, reports unchanged mild LE swelling, resolves with elevation. AF with RVR, documented in the ED 12/5/18  Normal EF, grade 1 diastolic dysfunction, no significant valvular pathology per echo in 06/14  Admits to consuming about a liter of soda a day, recommend to cut back if not stop  Mildly elevated TSH, unknown free T4 per lab 12/18  Presenting SR in clinic today  Continue BB  [place] [facility] [place]      BP well controlled    ASHD per cardiac cath 11/12  Unable to cross Mid Cx lesion due to extreme bend in proximal LCx and will treat medically unless recurrent symptoms. Stable. Continue ASA, BB, statin    BP well controlled    HLD  8/18 LDL at 111. On statin. Lipids and labs followed by PCP. He admits there is a period of time where he had not been taking his Crestor for about 3 weeks when his insurance changed. Aching it regularly again. DM  On oral agents    Continue current care and f/u in 6 months.   If more than rare episodes of atrial fibrillation, will need to consider additional medications and/or referral to Dr. Bill Garcia MD

## 2019-01-11 NOTE — TELEPHONE ENCOUNTER
----- Message from Chidi Haley sent at 1/11/2019  1:54 PM EST -----  Regarding: Dr. Carmel Prince requesting a refill on prescription: \"Metoprolol\" . [facility] pharmacy is on file. Pt best contact number is [phone].

## 2019-01-13 RX ORDER — METOPROLOL TARTRATE 25 MG/1
TABLET, FILM COATED ORAL
Qty: 60 TAB | Refills: 0 | Status: SHIPPED | OUTPATIENT
Start: 2019-01-13 | End: 2019-01-25 | Stop reason: SDUPTHER

## 2019-01-26 RX ORDER — METOPROLOL TARTRATE 25 MG/1
TABLET, FILM COATED ORAL
Qty: 60 TAB | Refills: 0 | Status: SHIPPED | OUTPATIENT
Start: 2019-01-26 | End: 2019-02-06 | Stop reason: SDUPTHER

## 2019-01-29 ENCOUNTER — OFFICE VISIT (OUTPATIENT)
Dept: FAMILY MEDICINE CLINIC | Age: 59
End: 2019-01-29

## 2019-01-29 VITALS — WEIGHT: 280 LBS | BODY MASS INDEX: 35.94 KG/M2 | HEIGHT: 74 IN

## 2019-01-29 DIAGNOSIS — E11.9 CONTROLLED TYPE 2 DIABETES MELLITUS WITHOUT COMPLICATION, WITHOUT LONG-TERM CURRENT USE OF INSULIN (HCC): Primary | ICD-10-CM

## 2019-01-29 DIAGNOSIS — I10 ESSENTIAL HYPERTENSION: ICD-10-CM

## 2019-01-29 DIAGNOSIS — I48.0 PAROXYSMAL ATRIAL FIBRILLATION (HCC): ICD-10-CM

## 2019-01-29 LAB — HBA1C MFR BLD HPLC: 12.2 %

## 2019-01-29 RX ORDER — PROMETHAZINE HYDROCHLORIDE AND DEXTROMETHORPHAN HYDROBROMIDE 6.25; 15 MG/5ML; MG/5ML
5 SYRUP ORAL
Qty: 240 ML | Refills: 2 | Status: SHIPPED | OUTPATIENT
Start: 2019-01-29 | End: 2021-02-24 | Stop reason: ALTCHOICE

## 2019-01-29 RX ORDER — AMOXICILLIN 500 MG/1
500 CAPSULE ORAL 3 TIMES DAILY
Qty: 30 CAP | Refills: 0 | Status: SHIPPED | OUTPATIENT
Start: 2019-01-29 | End: 2019-02-08

## 2019-01-29 NOTE — PROGRESS NOTES
Chief Complaint Patient presents with  Foot Pain  
  sharp pain  dorsal side of left foot   Started this past Thurs.  ED Follow-up Low BP   [facility]   DEC 2018    pt saw Dr. Dalia Gaytan on 12/12/18    Arrhythmia  Documentation FMLA 1. Have you been to the ER, urgent care clinic since your last visit? Hospitalized since your last visit? No 
 
2. Have you seen or consulted any other health care providers outside of the [address] [place] [address] since your last visit? Include any pap smears or colon screening. No  
 
Health Maintenance Due Topic Date Due  Shingrix Vaccine Age 50> (1 of 2) 11/16/2008  
 EYE EXAM RETINAL OR DILATED  06/30/2017  Influenza Age 5 to Adult  08/01/2018  
 FOOT EXAM Q1  02/08/2019  
 HEMOGLOBIN A1C Q6M  02/28/2019  COLONOSCOPY  05/28/2019

## 2019-01-29 NOTE — PROGRESS NOTES
HISTORY OF PRESENT ILLNESS Cb Gifford is a 59 y.o. male. HPI Onset pain in L foot last Thursday, pain got worse, has been taking tylenol 500 mg and heat- some relief. Icy Hot - helped pain. Has also developed a cough and scratchy throat since Friday. Was in ER a month ago for rapid heart and low blood pressure. Had gone to see her for dizziness. Had metoprolol dose doubled in Er and was started on Eloiquis. Had also had an episode of dizziness in August.  
Some back pain and legs get tired at times. Says that legs have been weak since knee surgery. ROS Physical Exam  
Constitutional: He appears well-developed and well-nourished. HENT:  
Right Ear: External ear normal.  
Left Ear: External ear normal.  
Mouth/Throat: Oropharynx is clear and moist.  
Neck: No thyromegaly present. Cardiovascular: Normal rate, regular rhythm and normal heart sounds. Faint pulses bilaterally. No femoral bruits. Pulmonary/Chest: Effort normal and breath sounds normal. No respiratory distress. He has no wheezes. Abdominal: Soft. Bowel sounds are normal. He exhibits no distension and no mass. There is no tenderness. There is no guarding. Musculoskeletal: Normal range of motion. He exhibits no edema. Lymphadenopathy:  
  He has no cervical adenopathy. Nursing note and vitals reviewed. ASSESSMENT and PLAN Orders Placed This Encounter  METABOLIC PANEL, COMPREHENSIVE  LIPID PANEL  
 MICROALBUMIN, UR, RAND W/ MICROALB/CREAT RATIO  AMB POC HEMOGLOBIN A1C  
 promethazine-dextromethorphan (PROMETHAZINE-DM) 6.25-15 mg/5 mL syrup  amoxicillin (AMOXIL) 500 mg capsule Diagnoses and all orders for this visit: 1. Controlled type 2 diabetes mellitus without complication, without long-term current use of insulin ([address] -     LIPID PANEL 
-     AMB POC HEMOGLOBIN A1C 
-     MICROALBUMIN, UR, RAND W/ MICROALB/CREAT RATIO 2. Essential hypertension -     METABOLIC PANEL, COMPREHENSIVE 
 
 3. Paroxysmal atrial fibrillation (HCC) Other orders -     promethazine-dextromethorphan (PROMETHAZINE-DM) 6.25-15 mg/5 mL syrup; Take 5 mL by mouth four (4) times daily as needed for Cough. -     amoxicillin (AMOXIL) 500 mg capsule; Take 1 Cap by mouth three (3) times daily for 10 days. Follow-up Disposition: 
Return in about 6 months (around 7/29/2019).

## 2019-01-30 LAB
ALBUMIN SERPL-MCNC: 4.3 G/DL (ref 3.6–4.8)
ALBUMIN/CREAT UR: 462.1 MG/G CREAT (ref 0–30)
ALBUMIN/GLOB SERPL: 1.6 {RATIO} (ref 1.2–2.2)
ALP SERPL-CCNC: 59 IU/L (ref 39–117)
ALT SERPL-CCNC: 11 IU/L (ref 0–44)
AST SERPL-CCNC: 9 IU/L (ref 0–40)
BILIRUB SERPL-MCNC: 0.4 MG/DL (ref 0–1.2)
BUN SERPL-MCNC: 13 MG/DL (ref 8–27)
BUN/CREAT SERPL: 18 (ref 10–24)
CALCIUM SERPL-MCNC: 9.4 MG/DL (ref 8.6–10.2)
CHLORIDE SERPL-SCNC: 99 MMOL/L (ref 96–106)
CHOLEST SERPL-MCNC: 120 MG/DL (ref 100–199)
CO2 SERPL-SCNC: 22 MMOL/L (ref 20–29)
CREAT SERPL-MCNC: 0.72 MG/DL (ref 0.76–1.27)
CREAT UR-MCNC: 34.6 MG/DL
GLOBULIN SER CALC-MCNC: 2.7 G/DL (ref 1.5–4.5)
GLUCOSE SERPL-MCNC: 362 MG/DL (ref 65–99)
HDLC SERPL-MCNC: 30 MG/DL
INTERPRETATION, 910389: NORMAL
LDLC SERPL CALC-MCNC: 68 MG/DL (ref 0–99)
Lab: NORMAL
Lab: NORMAL
MICROALBUMIN UR-MCNC: 159.9 UG/ML
POTASSIUM SERPL-SCNC: 4.7 MMOL/L (ref 3.5–5.2)
PROT SERPL-MCNC: 7 G/DL (ref 6–8.5)
SODIUM SERPL-SCNC: 138 MMOL/L (ref 134–144)
TRIGL SERPL-MCNC: 112 MG/DL (ref 0–149)
VLDLC SERPL CALC-MCNC: 22 MG/DL (ref 5–40)

## 2019-02-06 RX ORDER — METOPROLOL TARTRATE 25 MG/1
TABLET, FILM COATED ORAL
Qty: 180 TAB | Refills: 1 | Status: SHIPPED | OUTPATIENT
Start: 2019-02-06 | End: 2019-05-15 | Stop reason: SDUPTHER

## 2019-02-06 NOTE — TELEPHONE ENCOUNTER
[facility] Requests A 90 day supply on the patients behalf.        Last Visit: 1/29/19  Next Appt: 7/29/19  Previous Refill Encounter: 1/26-60+0    Requested Prescriptions     Pending Prescriptions Disp Refills    metoprolol tartrate (LOPRESSOR) 25 mg tablet 180 Tab 1     Sig: TAKE 2 TABLETS BY MOUTH EVERY 12 HOURS *APPOINTMENT REQUIRED FOR MORE REFILLS*

## 2019-02-07 RX ORDER — APIXABAN 5 MG/1
TABLET, FILM COATED ORAL
Qty: 60 TAB | Refills: 0 | Status: SHIPPED | OUTPATIENT
Start: 2019-02-07 | End: 2020-08-20

## 2019-04-01 NOTE — TELEPHONE ENCOUNTER
**Patient's pharmacy requesting refill for Magnesium Oxide 400mg**.      Last fill: 12/30/18- 90 day supply + 3 refills  Last visit:1.29/19  Next appt:7/29/19    Thank you,   Aleth, CPhT

## 2019-05-15 RX ORDER — METOPROLOL TARTRATE 25 MG/1
TABLET, FILM COATED ORAL
Qty: 180 TAB | Refills: 1 | Status: SHIPPED | OUTPATIENT
Start: 2019-05-15 | End: 2019-07-29 | Stop reason: SDUPTHER

## 2019-05-15 NOTE — TELEPHONE ENCOUNTER
Last visit:1/29/19  Next visit:7/29/19  Previous refill: 2/06/19 (180+ 1R)    Requested Prescriptions     Pending Prescriptions Disp Refills    metoprolol tartrate (LOPRESSOR) 25 mg tablet 180 Tab 1     Sig: TAKE 2 TABLETS BY MOUTH EVERY 12 HOURS

## 2019-05-23 RX ORDER — GLIPIZIDE 10 MG/1
10 TABLET ORAL 2 TIMES DAILY
Qty: 180 TAB | Refills: 1 | Status: SHIPPED | OUTPATIENT
Start: 2019-05-23 | End: 2020-03-02

## 2019-05-23 NOTE — TELEPHONE ENCOUNTER
Last Visit: 1/29/19 with MD Elisha Gatica  Next Appointment: 7/29/19 with MD Elisha Gatica  Previous Refill Encounter(s): 11/29/18 #180 with 1 refill    Requested Prescriptions     Pending Prescriptions Disp Refills    glipiZIDE (GLUCOTROL) 10 mg tablet 180 Tab 1     Sig: Take 1 Tab by mouth two (2) times a day.  FOR DIABETES

## 2019-05-24 NOTE — TELEPHONE ENCOUNTER
Last Visit: 1/29/19 with MD Sunitha Jacob  Next Appointment: 7/29/19 with MD Sunitha Jacob  Previous Refill Encounter(s): 8/24/18 #180 with 2 refills    Requested Prescriptions     Pending Prescriptions Disp Refills    gemfibrozil (LOPID) 600 mg tablet 180 Tab 2     Sig: Take 1 Tab by mouth two (2) times daily as needed (cholesterol).

## 2019-05-25 RX ORDER — GEMFIBROZIL 600 MG/1
600 TABLET, FILM COATED ORAL
Qty: 180 TAB | Refills: 2 | Status: SHIPPED | OUTPATIENT
Start: 2019-05-25 | End: 2020-08-28 | Stop reason: SDUPTHER

## 2019-06-17 RX ORDER — LANOLIN ALCOHOL/MO/W.PET/CERES
CREAM (GRAM) TOPICAL
Qty: 180 TAB | Refills: 3 | OUTPATIENT
Start: 2019-06-17

## 2019-07-29 ENCOUNTER — OFFICE VISIT (OUTPATIENT)
Dept: FAMILY MEDICINE CLINIC | Age: 59
End: 2019-07-29

## 2019-07-29 VITALS
OXYGEN SATURATION: 96 % | BODY MASS INDEX: 36.09 KG/M2 | RESPIRATION RATE: 14 BRPM | WEIGHT: 281.2 LBS | SYSTOLIC BLOOD PRESSURE: 130 MMHG | HEART RATE: 78 BPM | DIASTOLIC BLOOD PRESSURE: 70 MMHG | HEIGHT: 74 IN | TEMPERATURE: 98.1 F

## 2019-07-29 DIAGNOSIS — E78.00 HYPERCHOLESTEROLEMIA: ICD-10-CM

## 2019-07-29 DIAGNOSIS — E11.9 DIABETES MELLITUS WITHOUT COMPLICATION (HCC): ICD-10-CM

## 2019-07-29 DIAGNOSIS — I10 ESSENTIAL HYPERTENSION: Primary | ICD-10-CM

## 2019-07-29 LAB — HBA1C MFR BLD HPLC: 11.2 %

## 2019-07-29 RX ORDER — METOPROLOL TARTRATE 50 MG/1
TABLET ORAL
Qty: 180 TAB | Refills: 3 | Status: SHIPPED | OUTPATIENT
Start: 2019-07-29 | End: 2020-09-28 | Stop reason: SDUPTHER

## 2019-07-29 RX ORDER — NITROGLYCERIN 0.4 MG/1
0.4 TABLET SUBLINGUAL
Qty: 25 TAB | Refills: 1 | Status: SHIPPED | OUTPATIENT
Start: 2019-07-29 | End: 2021-07-12 | Stop reason: SDUPTHER

## 2019-07-29 NOTE — PROGRESS NOTES
Chief Complaint   Patient presents with    Neck Pain     lateral sides  \"sore\" pt said   2 weeks ago.  Hypertension    Cholesterol Problem    Diabetes     1. Have you been to the ER, urgent care clinic since your last visit? [facility]  Stiff neck 2 or 3 weeks ago     2. Have you seen or consulted any other health care providers outside of the [address] [place] [address] since your last visit? Include any pap smears or colon screening.  No     Health Maintenance Due   Topic Date Due    Pneumococcal 0-64 years (1 of 1 - PPSV23) 11/16/1966    Shingrix Vaccine Age 50> (1 of 2) 11/16/2008    EYE EXAM RETINAL OR DILATED  06/30/2017    FOOT EXAM Q1  02/08/2019    COLONOSCOPY  05/28/2019    HEMOGLOBIN A1C Q6M  07/29/2019

## 2019-07-29 NOTE — PROGRESS NOTES
HISTORY OF PRESENT ILLNESS  Elizabeth Garsia is a 24[address] [facility] y.o. male. HPI Pt. Comes in for blood pressure, cholesterol, and diabetes check. No complaints of chest pain, shortness of breath, TIAs, claudication or edema. Has been having some neck pain on both sides. Pt attributed pain to ozempic, stopped this medicine 2 weeks ago. Had gotten up to the 1 mg dose. ROS    Physical Exam   Constitutional: He appears well-developed and well-nourished. HENT:   Right Ear: External ear normal.   Left Ear: External ear normal.   Mouth/Throat: Oropharynx is clear and moist.   Neck: No thyromegaly present. Cardiovascular: Normal rate, regular rhythm, normal heart sounds and intact distal pulses. Pulmonary/Chest: Effort normal and breath sounds normal. No respiratory distress. He has no wheezes. Abdominal: Soft. Bowel sounds are normal. He exhibits no distension and no mass. There is no tenderness. There is no guarding. Musculoskeletal: Normal range of motion. He exhibits no edema. Lymphadenopathy:     He has no cervical adenopathy. Nursing note and vitals reviewed. ASSESSMENT and PLAN  Orders Placed This Encounter    CBC WITH AUTOMATED DIFF    METABOLIC PANEL, COMPREHENSIVE    LIPID PANEL    MICROALBUMIN, UR, RAND W/ MICROALB/CREAT RATIO    AMB POC HEMOGLOBIN A1C     DIABETES FOOT EXAM    nitroglycerin (NITROSTAT) 0.4 mg SL tablet    metoprolol tartrate (LOPRESSOR) 50 mg tablet    semaglutide (OZEMPIC) 0.25 mg/0.2 mL (2 mg/1.5 mL) sub-q pen     Diagnoses and all orders for this visit:    1. Essential hypertension  -     CBC WITH AUTOMATED DIFF  -     METABOLIC PANEL, COMPREHENSIVE    2.  Hypercholesterolemia  -     LIPID PANEL    3. Diabetes mellitus without complication (HCC)  -     MICROALBUMIN, UR, RAND W/ MICROALB/CREAT RATIO  -     AMB POC HEMOGLOBIN A1C  -     HM DIABETES FOOT EXAM    Other orders  -     nitroglycerin (NITROSTAT) 0.4 mg SL tablet; 1 Tab by SubLINGual route every five (5) minutes as needed for Chest Pain (call 911 if CP/ SOB not relieved by 3 tabs). -     metoprolol tartrate (LOPRESSOR) 50 mg tablet; TAKE 1 TABLET BY MOUTH EVERY 12 HOURS  -     semaglutide (OZEMPIC) 0.25 mg/0.2 mL (2 mg/1.5 mL) sub-q pen; 0.5 mg by SubCUTAneous route every seven (7) days. Follow-up and Dispositions    · Return in about 6 months (around 1/29/2020).

## 2019-07-30 LAB
ALBUMIN SERPL-MCNC: 3.9 G/DL (ref 3.6–4.8)
ALBUMIN/CREAT UR: 664.8 MG/G CREAT (ref 0–30)
ALBUMIN/GLOB SERPL: 1.7 {RATIO} (ref 1.2–2.2)
ALP SERPL-CCNC: 52 IU/L (ref 39–117)
ALT SERPL-CCNC: 10 IU/L (ref 0–44)
AST SERPL-CCNC: 12 IU/L (ref 0–40)
BASOPHILS # BLD AUTO: 0.1 X10E3/UL (ref 0–0.2)
BASOPHILS NFR BLD AUTO: 1 %
BILIRUB SERPL-MCNC: 0.4 MG/DL (ref 0–1.2)
BUN SERPL-MCNC: 12 MG/DL (ref 8–27)
BUN/CREAT SERPL: 17 (ref 10–24)
CALCIUM SERPL-MCNC: 8.8 MG/DL (ref 8.6–10.2)
CHLORIDE SERPL-SCNC: 101 MMOL/L (ref 96–106)
CHOLEST SERPL-MCNC: 116 MG/DL (ref 100–199)
CO2 SERPL-SCNC: 23 MMOL/L (ref 20–29)
CREAT SERPL-MCNC: 0.69 MG/DL (ref 0.76–1.27)
CREAT UR-MCNC: 72.8 MG/DL
EOSINOPHIL # BLD AUTO: 0.2 X10E3/UL (ref 0–0.4)
EOSINOPHIL NFR BLD AUTO: 2 %
ERYTHROCYTE [DISTWIDTH] IN BLOOD BY AUTOMATED COUNT: 13 % (ref 12.3–15.4)
GLOBULIN SER CALC-MCNC: 2.3 G/DL (ref 1.5–4.5)
GLUCOSE SERPL-MCNC: 318 MG/DL (ref 65–99)
HCT VFR BLD AUTO: 38.2 % (ref 37.5–51)
HDLC SERPL-MCNC: 25 MG/DL
HGB BLD-MCNC: 12.4 G/DL (ref 13–17.7)
IMM GRANULOCYTES # BLD AUTO: 0 X10E3/UL (ref 0–0.1)
IMM GRANULOCYTES NFR BLD AUTO: 0 %
INTERPRETATION, 910389: NORMAL
LDLC SERPL CALC-MCNC: 53 MG/DL (ref 0–99)
LYMPHOCYTES # BLD AUTO: 1.8 X10E3/UL (ref 0.7–3.1)
LYMPHOCYTES NFR BLD AUTO: 23 %
Lab: NORMAL
MCH RBC QN AUTO: 26.6 PG (ref 26.6–33)
MCHC RBC AUTO-ENTMCNC: 32.5 G/DL (ref 31.5–35.7)
MCV RBC AUTO: 82 FL (ref 79–97)
MICROALBUMIN UR-MCNC: 484 UG/ML
MONOCYTES # BLD AUTO: 0.6 X10E3/UL (ref 0.1–0.9)
MONOCYTES NFR BLD AUTO: 7 %
NEUTROPHILS # BLD AUTO: 5.5 X10E3/UL (ref 1.4–7)
NEUTROPHILS NFR BLD AUTO: 67 %
PLATELET # BLD AUTO: 286 X10E3/UL (ref 150–450)
POTASSIUM SERPL-SCNC: 4.4 MMOL/L (ref 3.5–5.2)
PROT SERPL-MCNC: 6.2 G/DL (ref 6–8.5)
RBC # BLD AUTO: 4.66 X10E6/UL (ref 4.14–5.8)
SODIUM SERPL-SCNC: 139 MMOL/L (ref 134–144)
TRIGL SERPL-MCNC: 188 MG/DL (ref 0–149)
VLDLC SERPL CALC-MCNC: 38 MG/DL (ref 5–40)
WBC # BLD AUTO: 8.1 X10E3/UL (ref 3.4–10.8)

## 2019-08-17 RX ORDER — LANOLIN ALCOHOL/MO/W.PET/CERES
CREAM (GRAM) TOPICAL
Qty: 180 TAB | Refills: 3 | Status: SHIPPED | OUTPATIENT
Start: 2019-08-17 | End: 2020-08-27 | Stop reason: SDUPTHER

## 2019-10-14 RX ORDER — METFORMIN HYDROCHLORIDE 1000 MG/1
TABLET ORAL
Qty: 180 TAB | Refills: 2 | Status: SHIPPED | OUTPATIENT
Start: 2019-10-14 | End: 2020-08-28 | Stop reason: SDUPTHER

## 2019-11-08 RX ORDER — SEMAGLUTIDE 1.34 MG/ML
INJECTION, SOLUTION SUBCUTANEOUS
Qty: 2 PEN | Refills: 12 | Status: SHIPPED | OUTPATIENT
Start: 2019-11-08 | End: 2021-02-09 | Stop reason: SDUPTHER

## 2020-01-28 ENCOUNTER — OFFICE VISIT (OUTPATIENT)
Dept: FAMILY MEDICINE CLINIC | Age: 60
End: 2020-01-28

## 2020-01-28 VITALS
TEMPERATURE: 97.2 F | RESPIRATION RATE: 16 BRPM | BODY MASS INDEX: 35.73 KG/M2 | HEART RATE: 87 BPM | DIASTOLIC BLOOD PRESSURE: 60 MMHG | SYSTOLIC BLOOD PRESSURE: 118 MMHG | OXYGEN SATURATION: 98 % | HEIGHT: 74 IN | WEIGHT: 278.4 LBS

## 2020-01-28 DIAGNOSIS — M54.2 NECK PAIN: ICD-10-CM

## 2020-01-28 DIAGNOSIS — E11.9 TYPE 2 DIABETES MELLITUS WITHOUT COMPLICATION, WITHOUT LONG-TERM CURRENT USE OF INSULIN (HCC): Primary | ICD-10-CM

## 2020-01-28 DIAGNOSIS — I10 BENIGN ESSENTIAL HYPERTENSION: Chronic | ICD-10-CM

## 2020-01-28 DIAGNOSIS — E78.1 HYPERTRIGLYCERIDEMIA: ICD-10-CM

## 2020-01-28 DIAGNOSIS — E11.8 CONTROLLED TYPE 2 DIABETES MELLITUS WITH COMPLICATION, WITHOUT LONG-TERM CURRENT USE OF INSULIN (HCC): ICD-10-CM

## 2020-01-28 LAB — HBA1C MFR BLD HPLC: 9.8 %

## 2020-01-28 RX ORDER — ROSUVASTATIN CALCIUM 20 MG/1
TABLET, COATED ORAL
Qty: 90 TAB | Refills: 3 | Status: SHIPPED | OUTPATIENT
Start: 2020-01-28 | End: 2021-04-16

## 2020-01-28 RX ORDER — IBUPROFEN 800 MG/1
800 TABLET ORAL
Qty: 90 TAB | Refills: 3 | Status: SHIPPED | OUTPATIENT
Start: 2020-01-28 | End: 2020-08-20

## 2020-01-28 RX ORDER — CYCLOBENZAPRINE HCL 10 MG
10 TABLET ORAL
COMMUNITY
Start: 2020-01-25 | End: 2021-02-24 | Stop reason: ALTCHOICE

## 2020-01-28 NOTE — LETTER
NOTIFICATION RETURN TO WORK / SCHOOL 
 
1/28/2020 4:58 PM 
 
Mr. Moreno Nielsen [address] [id number] [facility] To Whom It May Concern: 
 
Moreno Nielsen is currently under the care of [facility]. He will return to work/school on: 2-2-2020 If there are questions or concerns please have the patient contact our office.  
 
 
 
Sincerely, 
 
 
Roderick Villagran MD

## 2020-01-28 NOTE — PROGRESS NOTES
HISTORY OF PRESENT ILLNESS  Samson Hinds is a 62 y.o. male. HPI Pt. Comes in for blood pressure, cholesterol, and diabetes check. No complaints of chest pain, shortness of breath, TIAs, claudication or edema. Has been having 1 1/2 week hx pain and stiffness in neck. Pain radiates into L shoulder at times. Pain is worse with movement. [facility] a muscle relaxer- didn't help that much. No prior hx frequent neck pain. [facility]. Has also been taking tylenol 500 mg- no relief. Has been off crestor for a few months. ROS    Physical Exam  Vitals signs and nursing note reviewed. Constitutional:       Appearance: He is well-developed. HENT:      Right Ear: External ear normal.      Left Ear: External ear normal.   Neck:      Thyroid: No thyromegaly. Cardiovascular:      Rate and Rhythm: Normal rate and regular rhythm. Heart sounds: Normal heart sounds. Pulmonary:      Effort: Pulmonary effort is normal. No respiratory distress. Breath sounds: Normal breath sounds. No wheezing. Abdominal:      General: Bowel sounds are normal. There is no distension. Palpations: Abdomen is soft. There is no mass. Tenderness: There is no abdominal tenderness. There is no guarding. Musculoskeletal: Normal range of motion. Comments: Neck- marked decrease in rom. Moderate tenderness paravertebral cervical muscles. Ext- gross motor intact   Lymphadenopathy:      Cervical: No cervical adenopathy. ASSESSMENT and PLAN  Orders Placed This Encounter    XR SPINE CERV PA LAT ODONT 3 V MAX    CBC WITH AUTOMATED DIFF    METABOLIC PANEL, COMPREHENSIVE    AMB POC HEMOGLOBIN A1C    rosuvastatin (CRESTOR) 20 mg tablet    empagliflozin (JARDIANCE) 10 mg tablet    ibuprofen (MOTRIN) 800 mg tablet     Diagnoses and all orders for this visit:    1.  Type 2 diabetes mellitus without complication, without long-term current use of insulin (HCC)  -     AMB POC HEMOGLOBIN A1C    2. Hypertriglyceridemia    3. Controlled type 2 diabetes mellitus with complication, without long-term current use of insulin (HCC)  -     AMB POC HEMOGLOBIN A1C    4. Benign essential hypertension  -     CBC WITH AUTOMATED DIFF  -     METABOLIC PANEL, COMPREHENSIVE    5. Neck pain  -     XR SPINE CERV PA LAT ODONT 3 V MAX; Future    Other orders  -     rosuvastatin (CRESTOR) 20 mg tablet; TAKE 1 TABLET BY MOUTH DAILY FOR CHOLESTEROL  -     empagliflozin (JARDIANCE) 10 mg tablet; Take 1 Tab by mouth daily. For diabetes  -     ibuprofen (MOTRIN) 800 mg tablet; Take 1 Tab by mouth every six (6) hours as needed for Pain (neck pain). Follow-up and Dispositions    · Return in about 6 months (around 7/28/2020).

## 2020-01-28 NOTE — PROGRESS NOTES
Chief Complaint   Patient presents with    Follow Up Chronic Condition    Stiff Neck       1. Have you been to the ER, urgent care clinic since your last visit? Hospitalized [facility] last visit? [facility]    2. Have you seen or consulted any other health care providers outside of the [address] [place] [address] since your last visit? Include any pap smears or colon screening. No     Patient here for six month follow up. Neck stiffness onset two weeks.

## 2020-01-29 LAB
ALBUMIN SERPL-MCNC: 3.6 G/DL (ref 3.8–4.8)
ALBUMIN/GLOB SERPL: 1.4 {RATIO} (ref 1.2–2.2)
ALP SERPL-CCNC: 62 IU/L (ref 39–117)
ALT SERPL-CCNC: 9 IU/L (ref 0–44)
AST SERPL-CCNC: 9 IU/L (ref 0–40)
BASOPHILS # BLD AUTO: 0 X10E3/UL (ref 0–0.2)
BASOPHILS NFR BLD AUTO: 1 %
BILIRUB SERPL-MCNC: <0.2 MG/DL (ref 0–1.2)
BUN SERPL-MCNC: 18 MG/DL (ref 8–27)
BUN/CREAT SERPL: 24 (ref 10–24)
CALCIUM SERPL-MCNC: 9.1 MG/DL (ref 8.6–10.2)
CHLORIDE SERPL-SCNC: 97 MMOL/L (ref 96–106)
CHOLEST SERPL-MCNC: 188 MG/DL (ref 100–199)
CO2 SERPL-SCNC: 22 MMOL/L (ref 20–29)
CREAT SERPL-MCNC: 0.76 MG/DL (ref 0.76–1.27)
EOSINOPHIL # BLD AUTO: 0.2 X10E3/UL (ref 0–0.4)
EOSINOPHIL NFR BLD AUTO: 3 %
ERYTHROCYTE [DISTWIDTH] IN BLOOD BY AUTOMATED COUNT: 12.7 % (ref 11.6–15.4)
GLOBULIN SER CALC-MCNC: 2.6 G/DL (ref 1.5–4.5)
GLUCOSE SERPL-MCNC: 253 MG/DL (ref 65–99)
HCT VFR BLD AUTO: 39.9 % (ref 37.5–51)
HDLC SERPL-MCNC: 24 MG/DL
HGB BLD-MCNC: 13.4 G/DL (ref 13–17.7)
IMM GRANULOCYTES # BLD AUTO: 0 X10E3/UL (ref 0–0.1)
IMM GRANULOCYTES NFR BLD AUTO: 0 %
INTERPRETATION, 910389: NORMAL
LDLC SERPL CALC-MCNC: 125 MG/DL (ref 0–99)
LYMPHOCYTES # BLD AUTO: 2.1 X10E3/UL (ref 0.7–3.1)
LYMPHOCYTES NFR BLD AUTO: 28 %
MCH RBC QN AUTO: 27.6 PG (ref 26.6–33)
MCHC RBC AUTO-ENTMCNC: 33.6 G/DL (ref 31.5–35.7)
MCV RBC AUTO: 82 FL (ref 79–97)
MONOCYTES # BLD AUTO: 0.5 X10E3/UL (ref 0.1–0.9)
MONOCYTES NFR BLD AUTO: 7 %
NEUTROPHILS # BLD AUTO: 4.6 X10E3/UL (ref 1.4–7)
NEUTROPHILS NFR BLD AUTO: 61 %
PLATELET # BLD AUTO: 409 X10E3/UL (ref 150–450)
POTASSIUM SERPL-SCNC: 4.5 MMOL/L (ref 3.5–5.2)
PROT SERPL-MCNC: 6.2 G/DL (ref 6–8.5)
RBC # BLD AUTO: 4.86 X10E6/UL (ref 4.14–5.8)
SODIUM SERPL-SCNC: 138 MMOL/L (ref 134–144)
TRIGL SERPL-MCNC: 196 MG/DL (ref 0–149)
VLDLC SERPL CALC-MCNC: 39 MG/DL (ref 5–40)
WBC # BLD AUTO: 7.5 X10E3/UL (ref 3.4–10.8)

## 2020-02-13 ENCOUNTER — DOCUMENTATION ONLY (OUTPATIENT)
Dept: FAMILY MEDICINE CLINIC | Age: 60
End: 2020-02-13

## 2020-03-02 RX ORDER — GLIPIZIDE 10 MG/1
TABLET ORAL
Qty: 180 TAB | Refills: 1 | Status: SHIPPED | OUTPATIENT
Start: 2020-03-02 | End: 2020-12-29 | Stop reason: SDUPTHER

## 2020-07-28 ENCOUNTER — OFFICE VISIT (OUTPATIENT)
Dept: FAMILY MEDICINE CLINIC | Age: 60
End: 2020-07-28

## 2020-07-28 VITALS
RESPIRATION RATE: 16 BRPM | OXYGEN SATURATION: 95 % | HEIGHT: 74 IN | WEIGHT: 275 LBS | DIASTOLIC BLOOD PRESSURE: 62 MMHG | TEMPERATURE: 97.5 F | BODY MASS INDEX: 35.29 KG/M2 | SYSTOLIC BLOOD PRESSURE: 122 MMHG | HEART RATE: 75 BPM

## 2020-07-28 DIAGNOSIS — E11.9 TYPE 2 DIABETES MELLITUS WITHOUT COMPLICATION, WITHOUT LONG-TERM CURRENT USE OF INSULIN (HCC): Primary | ICD-10-CM

## 2020-07-28 DIAGNOSIS — E78.00 HYPERCHOLESTEROLEMIA: ICD-10-CM

## 2020-07-28 DIAGNOSIS — Z12.5 PROSTATE CANCER SCREENING: ICD-10-CM

## 2020-07-28 DIAGNOSIS — I10 ESSENTIAL HYPERTENSION: ICD-10-CM

## 2020-07-28 LAB — HBA1C MFR BLD HPLC: 7.3 %

## 2020-07-28 NOTE — PROGRESS NOTES
HISTORY OF PRESENT ILLNESS  Williams Christiansen is a 62 y.o. male. HPI  Pt. Comes in for blood pressure, cholesterol, and diabetes check. No complaints of chest pain, shortness of breath, TIAs, claudication or edema. Not checking blood sugars. No hypoglycemic episodes. ROS    Physical Exam  Vitals signs and nursing note reviewed. Constitutional:       Appearance: He is well-developed. HENT:      Right Ear: External ear normal.      Left Ear: External ear normal.   Neck:      Thyroid: No thyromegaly. Cardiovascular:      Rate and Rhythm: Normal rate and regular rhythm. Heart sounds: Normal heart sounds. Pulmonary:      Effort: Pulmonary effort is normal. No respiratory distress. Breath sounds: Normal breath sounds. No wheezing. Abdominal:      General: Bowel sounds are normal. There is no distension. Palpations: Abdomen is soft. There is no mass. Tenderness: There is no abdominal tenderness. There is no guarding. Musculoskeletal: Normal range of motion. Lymphadenopathy:      Cervical: No cervical adenopathy. ASSESSMENT and PLAN  Orders Placed This Encounter    METABOLIC PANEL, COMPREHENSIVE    LIPID PANEL    PROSTATE SPECIFIC AG    AMB POC COMPLETE CBC, AUTOMATED    AMB POC HEMOGLOBIN A1C    empagliflozin (Jardiance) 10 mg tablet     Diagnoses and all orders for this visit:    1. Type 2 diabetes mellitus without complication, without long-term current use of insulin (HCC)  -     AMB POC HEMOGLOBIN A1C    2. Hypercholesterolemia  -     LIPID PANEL    3. Essential hypertension  -     METABOLIC PANEL, COMPREHENSIVE  -     AMB POC COMPLETE CBC, AUTOMATED    4. Prostate cancer screening  -     PSA, DIAGNOSTIC (PROSTATE SPECIFIC AG)    Other orders  -     empagliflozin (Jardiance) 10 mg tablet; Take 1 Tab by mouth daily. For diabetes      Follow-up and Dispositions    · Return in about 6 months (around 1/28/2021).

## 2020-07-28 NOTE — PROGRESS NOTES
Chief Complaint   Patient presents with    Follow Up Chronic Condition     six month follow up     1. Have you been to the ER, urgent care clinic since your last visit? Hospitalized since your last visit? [facility] covid exposure 2-3 weeks ago . Results negative    2. Have you seen or consulted any other health care providers outside of the [address] [place] [address] since your last visit? Include any pap smears or colon screening. No     Patient here for routine follow up.

## 2020-07-29 LAB
ALBUMIN SERPL-MCNC: 4.2 G/DL (ref 3.8–4.8)
ALBUMIN/GLOB SERPL: 1.9 {RATIO} (ref 1.2–2.2)
ALP SERPL-CCNC: 44 IU/L (ref 39–117)
ALT SERPL-CCNC: 9 IU/L (ref 0–44)
AST SERPL-CCNC: 14 IU/L (ref 0–40)
BILIRUB SERPL-MCNC: 0.2 MG/DL (ref 0–1.2)
BUN SERPL-MCNC: 22 MG/DL (ref 8–27)
BUN/CREAT SERPL: 24 (ref 10–24)
CALCIUM SERPL-MCNC: 9.4 MG/DL (ref 8.6–10.2)
CHLORIDE SERPL-SCNC: 103 MMOL/L (ref 96–106)
CHOLEST SERPL-MCNC: 126 MG/DL (ref 100–199)
CO2 SERPL-SCNC: 21 MMOL/L (ref 20–29)
CREAT SERPL-MCNC: 0.91 MG/DL (ref 0.76–1.27)
GLOBULIN SER CALC-MCNC: 2.2 G/DL (ref 1.5–4.5)
GLUCOSE SERPL-MCNC: 154 MG/DL (ref 65–99)
HDLC SERPL-MCNC: 30 MG/DL
INTERPRETATION, 910389: NORMAL
LDLC SERPL CALC-MCNC: 72 MG/DL (ref 0–99)
POTASSIUM SERPL-SCNC: 4.4 MMOL/L (ref 3.5–5.2)
PROT SERPL-MCNC: 6.4 G/DL (ref 6–8.5)
PSA SERPL-MCNC: 0.2 NG/ML (ref 0–4)
SODIUM SERPL-SCNC: 141 MMOL/L (ref 134–144)
TRIGL SERPL-MCNC: 121 MG/DL (ref 0–149)
VLDLC SERPL CALC-MCNC: 24 MG/DL (ref 5–40)

## 2020-08-16 ENCOUNTER — HOSPITAL ENCOUNTER (INPATIENT)
Age: 60
LOS: 4 days | Discharge: HOME HEALTH | DRG: 481 | End: 2020-08-20
Attending: EMERGENCY MEDICINE | Admitting: INTERNAL MEDICINE
Payer: OTHER MISCELLANEOUS

## 2020-08-16 ENCOUNTER — APPOINTMENT (OUTPATIENT)
Dept: GENERAL RADIOLOGY | Age: 60
DRG: 481 | End: 2020-08-16
Attending: EMERGENCY MEDICINE
Payer: OTHER MISCELLANEOUS

## 2020-08-16 ENCOUNTER — APPOINTMENT (OUTPATIENT)
Dept: CT IMAGING | Age: 60
DRG: 481 | End: 2020-08-16
Attending: PHYSICIAN ASSISTANT
Payer: OTHER MISCELLANEOUS

## 2020-08-16 DIAGNOSIS — S72.451A CLOSED DISPLACED SUPRACONDYLAR FRACTURE OF DISTAL END OF RIGHT FEMUR WITHOUT INTRACONDYLAR EXTENSION, INITIAL ENCOUNTER (HCC): Primary | ICD-10-CM

## 2020-08-16 DIAGNOSIS — S72.91XA CLOSED FRACTURE OF RIGHT FEMUR, UNSPECIFIED FRACTURE MORPHOLOGY, UNSPECIFIED PORTION OF FEMUR, INITIAL ENCOUNTER (HCC): ICD-10-CM

## 2020-08-16 PROBLEM — S72.90XA FEMUR FRACTURE (HCC): Status: ACTIVE | Noted: 2020-08-16

## 2020-08-16 LAB
ABO + RH BLD: NORMAL
ALBUMIN SERPL-MCNC: 3.2 G/DL (ref 3.5–5)
ALBUMIN/GLOB SERPL: 0.9 {RATIO} (ref 1.1–2.2)
ALP SERPL-CCNC: 45 U/L (ref 45–117)
ALT SERPL-CCNC: 18 U/L (ref 12–78)
ANION GAP SERPL CALC-SCNC: 5 MMOL/L (ref 5–15)
AST SERPL-CCNC: 12 U/L (ref 15–37)
ATRIAL RATE: 63 BPM
BASOPHILS # BLD: 0 K/UL (ref 0–0.1)
BASOPHILS NFR BLD: 1 % (ref 0–1)
BILIRUB SERPL-MCNC: 0.4 MG/DL (ref 0.2–1)
BLOOD GROUP ANTIBODIES SERPL: NORMAL
BNP SERPL-MCNC: 145 PG/ML
BUN SERPL-MCNC: 16 MG/DL (ref 6–20)
BUN/CREAT SERPL: 21 (ref 12–20)
CALCIUM SERPL-MCNC: 8.9 MG/DL (ref 8.5–10.1)
CALCULATED P AXIS, ECG09: 45 DEGREES
CALCULATED R AXIS, ECG10: -14 DEGREES
CALCULATED T AXIS, ECG11: -5 DEGREES
CHLORIDE SERPL-SCNC: 111 MMOL/L (ref 97–108)
CO2 SERPL-SCNC: 25 MMOL/L (ref 21–32)
COMMENT, HOLDF: NORMAL
CREAT SERPL-MCNC: 0.75 MG/DL (ref 0.7–1.3)
DIAGNOSIS, 93000: NORMAL
DIFFERENTIAL METHOD BLD: ABNORMAL
EOSINOPHIL # BLD: 0.1 K/UL (ref 0–0.4)
EOSINOPHIL NFR BLD: 2 % (ref 0–7)
ERYTHROCYTE [DISTWIDTH] IN BLOOD BY AUTOMATED COUNT: 14 % (ref 11.5–14.5)
EST. AVERAGE GLUCOSE BLD GHB EST-MCNC: 160 MG/DL
GLOBULIN SER CALC-MCNC: 3.5 G/DL (ref 2–4)
GLUCOSE BLD STRIP.AUTO-MCNC: 109 MG/DL (ref 65–100)
GLUCOSE BLD STRIP.AUTO-MCNC: 130 MG/DL (ref 65–100)
GLUCOSE SERPL-MCNC: 127 MG/DL (ref 65–100)
HBA1C MFR BLD: 7.2 % (ref 4–5.6)
HCT VFR BLD AUTO: 38.8 % (ref 36.6–50.3)
HGB BLD-MCNC: 12.1 G/DL (ref 12.1–17)
IMM GRANULOCYTES # BLD AUTO: 0 K/UL (ref 0–0.04)
IMM GRANULOCYTES NFR BLD AUTO: 0 % (ref 0–0.5)
LYMPHOCYTES # BLD: 1.2 K/UL (ref 0.8–3.5)
LYMPHOCYTES NFR BLD: 14 % (ref 12–49)
MCH RBC QN AUTO: 26.9 PG (ref 26–34)
MCHC RBC AUTO-ENTMCNC: 31.2 G/DL (ref 30–36.5)
MCV RBC AUTO: 86.2 FL (ref 80–99)
MONOCYTES # BLD: 0.5 K/UL (ref 0–1)
MONOCYTES NFR BLD: 6 % (ref 5–13)
NEUTS SEG # BLD: 6.5 K/UL (ref 1.8–8)
NEUTS SEG NFR BLD: 77 % (ref 32–75)
NRBC # BLD: 0 K/UL (ref 0–0.01)
NRBC BLD-RTO: 0 PER 100 WBC
P-R INTERVAL, ECG05: 190 MS
PLATELET # BLD AUTO: 258 K/UL (ref 150–400)
PMV BLD AUTO: 9.1 FL (ref 8.9–12.9)
POTASSIUM SERPL-SCNC: 3.9 MMOL/L (ref 3.5–5.1)
PROT SERPL-MCNC: 6.7 G/DL (ref 6.4–8.2)
Q-T INTERVAL, ECG07: 408 MS
QRS DURATION, ECG06: 92 MS
QTC CALCULATION (BEZET), ECG08: 417 MS
RBC # BLD AUTO: 4.5 M/UL (ref 4.1–5.7)
SAMPLES BEING HELD,HOLD: NORMAL
SERVICE CMNT-IMP: ABNORMAL
SERVICE CMNT-IMP: ABNORMAL
SODIUM SERPL-SCNC: 141 MMOL/L (ref 136–145)
SPECIMEN EXP DATE BLD: NORMAL
VENTRICULAR RATE, ECG03: 63 BPM
WBC # BLD AUTO: 8.4 K/UL (ref 4.1–11.1)

## 2020-08-16 PROCEDURE — 73562 X-RAY EXAM OF KNEE 3: CPT

## 2020-08-16 PROCEDURE — 73700 CT LOWER EXTREMITY W/O DYE: CPT

## 2020-08-16 PROCEDURE — 83880 ASSAY OF NATRIURETIC PEPTIDE: CPT

## 2020-08-16 PROCEDURE — 36415 COLL VENOUS BLD VENIPUNCTURE: CPT

## 2020-08-16 PROCEDURE — 82962 GLUCOSE BLOOD TEST: CPT

## 2020-08-16 PROCEDURE — 65270000029 HC RM PRIVATE

## 2020-08-16 PROCEDURE — 85025 COMPLETE CBC W/AUTO DIFF WBC: CPT

## 2020-08-16 PROCEDURE — 73610 X-RAY EXAM OF ANKLE: CPT

## 2020-08-16 PROCEDURE — 86900 BLOOD TYPING SEROLOGIC ABO: CPT

## 2020-08-16 PROCEDURE — 74011250637 HC RX REV CODE- 250/637: Performed by: INTERNAL MEDICINE

## 2020-08-16 PROCEDURE — 93005 ELECTROCARDIOGRAM TRACING: CPT

## 2020-08-16 PROCEDURE — 74011250636 HC RX REV CODE- 250/636: Performed by: INTERNAL MEDICINE

## 2020-08-16 PROCEDURE — 83036 HEMOGLOBIN GLYCOSYLATED A1C: CPT

## 2020-08-16 PROCEDURE — 99284 EMERGENCY DEPT VISIT MOD MDM: CPT

## 2020-08-16 PROCEDURE — 80053 COMPREHEN METABOLIC PANEL: CPT

## 2020-08-16 RX ORDER — ACETAMINOPHEN 650 MG/1
650 SUPPOSITORY RECTAL
Status: DISCONTINUED | OUTPATIENT
Start: 2020-08-16 | End: 2020-08-17

## 2020-08-16 RX ORDER — SODIUM CHLORIDE 0.9 % (FLUSH) 0.9 %
5-40 SYRINGE (ML) INJECTION EVERY 8 HOURS
Status: DISCONTINUED | OUTPATIENT
Start: 2020-08-16 | End: 2020-08-20 | Stop reason: HOSPADM

## 2020-08-16 RX ORDER — POLYETHYLENE GLYCOL 3350 17 G/17G
17 POWDER, FOR SOLUTION ORAL DAILY PRN
Status: DISCONTINUED | OUTPATIENT
Start: 2020-08-16 | End: 2020-08-20 | Stop reason: HOSPADM

## 2020-08-16 RX ORDER — DEXTROSE MONOHYDRATE 100 MG/ML
0-250 INJECTION, SOLUTION INTRAVENOUS AS NEEDED
Status: DISCONTINUED | OUTPATIENT
Start: 2020-08-16 | End: 2020-08-20 | Stop reason: HOSPADM

## 2020-08-16 RX ORDER — ACETAMINOPHEN 325 MG/1
650 TABLET ORAL
Status: DISCONTINUED | OUTPATIENT
Start: 2020-08-16 | End: 2020-08-17

## 2020-08-16 RX ORDER — OXYCODONE AND ACETAMINOPHEN 5; 325 MG/1; MG/1
1 TABLET ORAL
Status: DISCONTINUED | OUTPATIENT
Start: 2020-08-16 | End: 2020-08-17

## 2020-08-16 RX ORDER — MAGNESIUM SULFATE 100 %
4 CRYSTALS MISCELLANEOUS AS NEEDED
Status: DISCONTINUED | OUTPATIENT
Start: 2020-08-16 | End: 2020-08-20 | Stop reason: HOSPADM

## 2020-08-16 RX ORDER — SODIUM CHLORIDE 0.9 % (FLUSH) 0.9 %
5-40 SYRINGE (ML) INJECTION AS NEEDED
Status: DISCONTINUED | OUTPATIENT
Start: 2020-08-16 | End: 2020-08-20 | Stop reason: HOSPADM

## 2020-08-16 RX ORDER — SODIUM CHLORIDE 9 MG/ML
75 INJECTION, SOLUTION INTRAVENOUS ONCE
Status: COMPLETED | OUTPATIENT
Start: 2020-08-16 | End: 2020-08-16

## 2020-08-16 RX ORDER — ONDANSETRON 2 MG/ML
4 INJECTION INTRAMUSCULAR; INTRAVENOUS
Status: DISCONTINUED | OUTPATIENT
Start: 2020-08-16 | End: 2020-08-18 | Stop reason: SDUPTHER

## 2020-08-16 RX ORDER — INSULIN LISPRO 100 [IU]/ML
INJECTION, SOLUTION INTRAVENOUS; SUBCUTANEOUS
Status: DISCONTINUED | OUTPATIENT
Start: 2020-08-16 | End: 2020-08-20 | Stop reason: HOSPADM

## 2020-08-16 RX ADMIN — Medication 10 ML: at 06:20

## 2020-08-16 RX ADMIN — OXYCODONE HYDROCHLORIDE AND ACETAMINOPHEN 1 TABLET: 5; 325 TABLET ORAL at 09:35

## 2020-08-16 RX ADMIN — OXYCODONE HYDROCHLORIDE AND ACETAMINOPHEN 1 TABLET: 5; 325 TABLET ORAL at 20:28

## 2020-08-16 RX ADMIN — Medication 10 ML: at 14:26

## 2020-08-16 RX ADMIN — SODIUM CHLORIDE 75 ML/HR: 9 INJECTION, SOLUTION INTRAVENOUS at 06:19

## 2020-08-16 NOTE — ED NOTES
TRANSFER - OUT REPORT:    Verbal report given to Bradley García RN(name) on Sandro Diez  being transferred to (unit) for routine progression of care       Report consisted of patients Situation, Background, Assessment and   Recommendations(SBAR). Information from the following report(s) SBAR, ED Summary, [facility] and Recent Results was reviewed with the receiving nurse. Lines:       Opportunity for questions and clarification was provided.       Patient transported with:   Registered Nurse

## 2020-08-16 NOTE — PROGRESS NOTES
TRANSFER - IN REPORT:    Verbal report received from Winsome Griffith RN(name) on Génesis Brink  being received from ED(unit) for routine progression of care      Report consisted of patients Situation, Background, Assessment and   Recommendations(SBAR). Information from the following report(s) Kardex and ED Summary was reviewed with the receiving nurse. Opportunity for questions and clarification was provided. Assessment completed upon patients arrival to unit and care assumed.

## 2020-08-16 NOTE — CONSULTS
Geriatric Fracture Consult  Patient: Charlyn Gosselin  YOB: 1960   MRN: [id number]      Consult Date: 2020     Chief Complaint: RIGHT DISTAL FEMUR FRACTURE  ED Presentation Time: < 8 hours  Mechanism of Injury: Fall from standing  Ambulatory Status: Independent  Past Medical History:   Past Medical History:   Diagnosis Date    Arthritis     knees    Benign essential hypertension 6/10/2011    CAD (coronary artery disease)     Diabetes ([facility] [address]     DX  AGE 26      Hypercholesterolemia     Hypertriglyceridemia     Other ill-defined conditions(799.89)     obesity    Other ill-defined conditions(799.89)     high cholesterol    PAF (paroxysmal atrial fibrillation) ([facility] [address] 2018    SOB (shortness of breath) on exertion 2011       Allergies: Allergies   Allergen Reactions    Lisinopril Cough    Losartan Other (comments)     Hypotension        Past Surgical History:   Past Surgical History:   Procedure Laterality Date    CARDIAC CATHETERIZATION  2011         CARDIAC CATHETERIZATION  2012         CARDIAC SURG PROCEDURE UNLIST      STENT RCA      HC ANGIOSEAL VIP 6FR  2011         HX ORTHOPAEDIC      left knee replacement    HX ORTHOPAEDIC      R knee replacement   -Jackson Morgan.     [place] DRUG-ELUTING STENTS, SINGLE  2011           Social History:   Social History     Socioeconomic History    Marital status:      Spouse name: Not on file    Number of children: Not on file    Years of education: Not on file    Highest education level: Not on file   Occupational History    Not on file   Social Needs    Financial resource strain: Not on file    Food insecurity     Worry: Not on file     Inability: Not on file    Transportation needs     Medical: Not on file     Non-medical: Not on file   Tobacco Use    Smoking status: Former Smoker     Last attempt to quit: 11/10/1989     Years since quittin.7    Smokeless tobacco: Never Used   Substance and Sexual Activity    Alcohol use: Yes     Comment: social  DRINKS ONE WEEKEND A MONTH/\"BIG IMPROVEMENT\"    Drug use: No    Sexual activity: Yes     Partners: Female     Birth control/protection: None   Lifestyle    Physical activity     Days per week: Not on file     Minutes per session: Not on file    Stress: Not on file   Relationships    Social connections     Talks on phone: Not on file     Gets together: Not on file     Attends Protestant service: Not on file     Active member of club or organization: Not on file     Attends meetings of clubs or organizations: Not on file     Relationship status: Not on file    Intimate partner violence     Fear of current or ex partner: Not on file     Emotionally abused: Not on file     Physically abused: Not on file     Forced sexual activity: Not on file   Other Topics Concern    Not on file   Social History Narrative    , 3 children, twin sons 25 and one son 15. Works at [employer] for the last 9 years, used to work a [email or web address]      Dwelling Status: Alone with Spouse/Significant Other  Current Anticoagulant Medications:none  History of falls: NO    Labs:    Lab Results   Component Value Date/Time    HGB 12.1 08/16/2020 02:48 AM    WBC 8.4 08/16/2020 02:48 AM    INR 1.0 12/05/2018 01:44 AM    Albumin 3.2 (L) 08/16/2020 02:48 AM     Imaging Studies:  XR KNEE RT 3 V 8/16/2020 02:03  INDICATION: Right knee pain after fall and injury. COMPARISON: None. FINDINGS: Three views of the right knee demonstrate comminuted, probably  transverse fracture of the distal right femoral metaphysis. The fracture is  located 1.5 cm proximal to the femoral prosthesis. Posterior angulation of the  distal femoral fragment. There is a suprapatellar joint effusion. Tricompartmental prosthesis. Patellar component is radiolucent. No evidence of  loosening on x-ray. No tibia, patella, or fibular fracture.   IMPRESSION:   Distal femoral metaphyseal fracture    CT LOW EXT RT WO CONT 8/16/2020 09:28  ----PRELIMINARY REPORT----  The patient is status post total knee replacement. Comminuted fracture distal  femur is noted just proximal to the femoral component. There is posterior  displacement of the distal femoral fracture by nearly 1/2 shaft width. Preliminary report was provided by Dr. Kevin Campbell, the on-call radiologist, at 9:40  AM.  Final report to follow.  ----END PRELIMINARY REPORT----    Physical Exam:   General: 59yo male, pleasant/cooperative, no distress, appears stated age  CNS: alert/oriented, normal mood  Vascular: pedal pulses normal and mild bilateral LE edema   Skin: hematoma right knee, remote surgical scar anterior knee  Extremities:     Assessment: Comminuted fracture right distal femur just proximal to femoral component of total knee    Plan: Discussed diagnosis and treatment options with patient. Will need surgical repair. Plan for ORIF of right femur later today vs tomorrow. Will keep NPO for now. Will discuss with on call orthopedic attending, Dr. Mirta Vogel, as well as patient's total knee surgeon, Dr. Macie Ca. Continue knee immobilizer. NWB. Pain management. Will update with plan for surgical timing.            Signed by: JUS Corado   Today's Date: August 16, 2020

## 2020-08-16 NOTE — H&P
[address] [facility] Adult  Hospitalist Group  History and Physical    Primary Care Provider: Isma Camargo MD  Date of Service:  8/16/2020    Subjective:     Nasra Ochoa is a 62 y.o. male with PMH Diabetes Mellitus, HTN , CAD presents to the ED c/o right knee pain after he sustained a fall at work. Patient states that he was walking on wet floor when he slipped and fell falling with his right leg bended under him. He was not able to stand up due to pain. He denies any symptoms such as lightheadedness, dizziness, tunnel vision, palpitations, chest pain or SOB prior to the fall. In the ED he had X-ray that showed a Distal femoral metaphyseal fracture. Orthopedic surgery was contacted by the ER physician who requested hospitalist admission and plan for possible ORIF. No history of complication related to anesthesia. No history of CHF, CKD. Pre-Operative Risk Assessment Using 2014 ACC/AHA Guidelines     Emergent procedure: No  Active Cardiac Condition including decompensated HF, Arrhythmia, MI <3 weeks, severe valve disease: No  Risk Level of Procedure: low risk  Revised Cardiac Risk Index Risk factors: III  Measurement of Exercise Tolerance before Surgery >4 METS (climbing > 1 flight of stairs without stopping, walking up hill > 1-2 blocks, scrubbing floors, moving furniture, golf, bowling, dancing or tennis, or running short distance): YES      Review of Systems:    Review of Systems   Constitutional: Negative for chills, fever, malaise/fatigue and weight loss. HENT: Negative for congestion, ear discharge and hearing loss. Eyes: Negative for blurred vision and double vision. Respiratory: Negative for cough, sputum production, shortness of breath and wheezing. Cardiovascular: Negative for chest pain, palpitations, orthopnea, leg swelling and PND. Gastrointestinal: Negative for abdominal pain, constipation, diarrhea, melena, nausea and vomiting.    Genitourinary: Negative for dysuria, frequency and urgency. Musculoskeletal: Positive for joint pain (knee pain). Negative for back pain and myalgias. Skin: Negative for itching and rash. Neurological: Negative for dizziness, sensory change, speech change, focal weakness, weakness and headaches. Endo/Heme/Allergies: Negative for polydipsia. Does not bruise/bleed easily. Past Medical History:   Diagnosis Date    Arthritis     knees    Benign essential hypertension 6/10/2011    CAD (coronary artery disease)     Diabetes ([facility] [address]     DX  AGE 33      Hypercholesterolemia     Hypertriglyceridemia     Other ill-defined conditions(799.89)     obesity    Other ill-defined conditions(799.89)     high cholesterol    PAF (paroxysmal atrial fibrillation) ([facility] [address] 12/12/2018    SOB (shortness of breath) on exertion 5/16/2011      Past Surgical History:   Procedure Laterality Date    CARDIAC CATHETERIZATION  6/9/2011         CARDIAC CATHETERIZATION  11/1/2012         CARDIAC SURG PROCEDURE UNLIST      STENT RCA  6/11    HC ANGIOSEAL VIP 6FR  6/9/2011         HX ORTHOPAEDIC      left knee replacement    HX ORTHOPAEDIC      R knee replacement  2010 -Nikia Kelly.  [place] DRUG-ELUTING STENTS, SINGLE  6/9/2011          Prior to Admission medications    Medication Sig Start Date End Date Taking? Authorizing Provider   empagliflozin (Jardiance) 10 mg tablet Take 1 Tab by mouth daily. For diabetes 7/28/20   Sarah Quispe MD   glipiZIDE (GLUCOTROL) 10 mg tablet TAKE 1 TABLET BY MOUTH TWO (2) TIMES A DAY. FOR DIABETES 3/2/20   Sarah Quispe MD   cyclobenzaprine (FLEXERIL) 10 mg tablet Take 10 mg by mouth three (3) times daily as needed. 1/25/20   Provider, Historical   rosuvastatin (CRESTOR) 20 mg tablet TAKE 1 TABLET BY MOUTH DAILY FOR CHOLESTEROL 1/28/20   Sarah Quispe MD   ibuprofen (MOTRIN) 800 mg tablet Take 1 Tab by mouth every six (6) hours as needed for Pain (neck pain).  1/28/20   Sarah Quispe MD   OZEMPIC 1 mg/dose (2 mg/1.5 mL) sub-q pen 1 MG BY SUBCUTANEOUS ROUTE EVERY SEVEN (7) DAYS. 11/8/19   Yee Clark MD   metFORMIN (GLUCOPHAGE) 1,000 mg tablet TAKE 1 TABLET BY MOUTH TWICE DAILY FOR DIABETES 10/14/19   Yee Clark MD   magnesium oxide (MAG-OX) 400 mg tablet TAKE 2 TABLETS BY MOUTH EVERY DAY 8/17/19   Yee Clark MD   nitroglycerin (NITROSTAT) 0.4 mg SL tablet 1 Tab by SubLINGual route every five (5) minutes as needed for Chest Pain (call 911 if CP/ SOB not relieved by 3 tabs). 7/29/19   Yee Clark MD   metoprolol tartrate (LOPRESSOR) 50 mg tablet TAKE 1 TABLET BY MOUTH EVERY 12 HOURS 7/29/19   Yee Clark MD   semaglutide (OZEMPIC) 0.25 mg/0.2 mL (2 mg/1.5 mL) sub-q pen 0.5 mg by SubCUTAneous route every seven (7) days. 7/29/19   Yee Clark MD   gemfibrozil (LOPID) 600 mg tablet Take 1 Tab by mouth two (2) times daily as needed (cholesterol). 5/25/19   Yee Clark MD   ELIQUIS 5 mg tablet TAKE 1 TABLET BY MOUTH TWICE A DAY 2/7/19   Yee Clark MD   promethazine-dextromethorphan (PROMETHAZINE-DM) 6.25-15 mg/5 mL syrup Take 5 mL by mouth four (4) times daily as needed for Cough. 1/29/19   Yee Clark MD   aspirin delayed-release 81 mg tablet Take  by mouth daily. Provider, Historical   omega-3 fatty acids-vitamin e (FISH OIL) 1,000 mg Cap Take 2 Caps by mouth two (2) times a day.  7/13/11   Yee Clark MD     Allergies   Allergen Reactions    Lisinopril Cough    Losartan Other (comments)     Hypotension        Family History   Problem Relation Age of Onset    Diabetes Mother     Hypertension Mother     Elevated Lipids Mother     Cancer Mother     Diabetes Father     Heart Disease Father     Hypertension Father     Elevated Lipids Father         SOCIAL HISTORY:  Patient resides at home  Patient ambulates independently   Smoking history: none  Alcohol history: occasional         Objective:       Physical Exam:   Patient Vitals for the past 12 hrs:   Temp Pulse Resp BP SpO2   08/16/20 0430    143/67 99 %   08/16/20 0345    136/64 96 %   08/16/20 0132 98 °F (36.7 °C) 64 16 161/76 99 %     GEN APPEARANCE: Patient resting in bed in NAD  HEENT: Conjunctiva Clear  CVS: RRR, S1, S2; No M/G/R  LUNGS: CTAB; No Wheezes; No Rhonchi: No rales  ABD: Soft; No TTP; + Normoactive BS  EXT:right leg on splint. Left leg trace edema   Skin exam: No gross lesions noted on exposed skin surfaces  MENTAL STATUS: Answers questions appropriately, responds to commands. NEURO: No gross motor or sensory deficits      Data Review:   Recent Results (from the past 24 hour(s))   CBC WITH AUTOMATED DIFF    Collection Time: 08/16/20  2:48 AM   Result Value Ref Range    WBC 8.4 4.1 - 11.1 K/uL    RBC 4.50 4. 10 - 5.70 M/uL    HGB 12.1 12.1 - 17.0 g/dL    HCT 38.8 36.6 - 50.3 %    MCV 86.2 80.0 - 99.0 FL    MCH 26.9 26.0 - 34.0 PG    MCHC 31.2 30.0 - 36.5 g/dL    RDW 14.0 11.5 - 14.5 %    PLATELET 390 175 - 577 K/uL    MPV 9.1 8.9 - 12.9 FL    NRBC 0.0 0  WBC    ABSOLUTE NRBC 0.00 0.00 - 0.01 K/uL    NEUTROPHILS 77 (H) 32 - 75 %    LYMPHOCYTES 14 12 - 49 %    MONOCYTES 6 5 - 13 %    EOSINOPHILS 2 0 - 7 %    BASOPHILS 1 0 - 1 %    IMMATURE GRANULOCYTES 0 0.0 - 0.5 %    ABS. NEUTROPHILS 6.5 1.8 - 8.0 K/UL    ABS. LYMPHOCYTES 1.2 0.8 - 3.5 K/UL    ABS. MONOCYTES 0.5 0.0 - 1.0 K/UL    ABS. EOSINOPHILS 0.1 0.0 - 0.4 K/UL    ABS. BASOPHILS 0.0 0.0 - 0.1 K/UL    ABS. IMM.  GRANS. 0.0 0.00 - 0.04 K/UL    DF AUTOMATED     METABOLIC PANEL, COMPREHENSIVE    Collection Time: 08/16/20  2:48 AM   Result Value Ref Range    Sodium 141 136 - 145 mmol/L    Potassium 3.9 3.5 - 5.1 mmol/L    Chloride 111 (H) 97 - 108 mmol/L    CO2 25 21 - 32 mmol/L    Anion gap 5 5 - 15 mmol/L    Glucose 127 (H) 65 - 100 mg/dL    BUN 16 6 - 20 MG/DL    Creatinine 0.75 0.70 - 1.30 MG/DL    BUN/Creatinine ratio 21 (H) 12 - 20      GFR est AA >60 >60 ml/min/1.73m2    GFR est non-AA >60 >60 ml/min/1.73m2    Calcium 8.9 8.5 - 10.1 MG/DL Bilirubin, total 0.4 0.2 - 1.0 MG/DL    ALT (SGPT) 18 12 - 78 U/L    AST (SGOT) 12 (L) 15 - 37 U/L    Alk. phosphatase 45 45 - 117 U/L    Protein, total 6.7 6.4 - 8.2 g/dL    Albumin 3.2 (L) 3.5 - 5.0 g/dL    Globulin 3.5 2.0 - 4.0 g/dL    A-G Ratio 0.9 (L) 1.1 - 2.2     TYPE & SCREEN    Collection Time: 08/16/20  2:48 AM   Result Value Ref Range    Crossmatch Expiration 08/19/2020     ABO/Rh(D) B POSITIVE     Antibody screen NEG    SAMPLES BEING HELD    Collection Time: 08/16/20  2:48 AM   Result Value Ref Range    SAMPLES BEING HELD 1RED 1BLUE     COMMENT        Add-on orders for these samples will be processed based on acceptable specimen integrity and analyte stability, which may vary by analyte. EKG, 12 LEAD, INITIAL    Collection Time: 08/16/20  2:57 AM   Result Value Ref Range    Ventricular Rate 63 BPM    Atrial Rate 63 BPM    P-R Interval 190 ms    QRS Duration 92 ms    Q-T Interval 408 ms    QTC Calculation (Bezet) 417 ms    Calculated P Axis 45 degrees    Calculated R Axis -14 degrees    Calculated T Axis -5 degrees    Diagnosis       Normal sinus rhythm  Nonspecific T wave abnormality  When compared with ECG of 05-DEC-2018 04:40,  Sinus rhythm has replaced Atrial fibrillation       Xr Ankle Rt Min 3 V    Result Date: 8/16/2020  IMPRESSION: Mild lateral soft tissue swelling without evidence of underlying fracture. Xr Knee Rt 3 V    Result Date: 8/16/2020  IMPRESSION: Distal femoral metaphyseal fracture. Assessment:     Active Problems:    Femur fracture ([address] (8/16/2020)        Plan:     1. Distal Femoral fracture: due to mechanical fall.  - Ortho consult  - Pain control  - NPO  - According to the 2014 ACC/AHA pre-operative risk assessment guidelines Hornell Bolds is at intermidaite risk for major cardiac complications during a low risk procedure, exercise tolerance is > MET >4. Recommend to obtain probnp. - ECG: NSR.  non specific t wave changes.    - continue with Metoprolol the day of the surgery. 2.  Diabetes mellitus type 2:  Hold metformin, Glipizide and Jardiance for now. Place patient sliding-scale insulin before meals and at bedtime. 3.  Hypertension: BP stable. Continue with metoprolol for now. 4.  CAD: No active cardiac issues. - EKG showed normal sinus rhythm with nonspecific T wave changes.  -Continue with aspirin after surgery. 5.  Hyperlipidemia: Continue home medication after surgical intervention. 6.  History of paroxysmal A. fib: Currently normal sinus rhythm. ANGELA?DS?-VASc Score 3. Not on Eliquis anymore due to side effect. DVT prophy: SCD for now  Code status: full code  Plan discussed with patient.      FUNCTIONAL STATUS PRIOR TO HOSPITALIZATION Ambulates Independently     Signed By: Janak Brooks MD     August 16, 2020

## 2020-08-16 NOTE — PROGRESS NOTES
Ortho Update    ORIF right femur planned for tomorrow 8/17/20 with Dr. Selena Denney  Can eat today, NPO after midnight  Patient updated    [facility]

## 2020-08-16 NOTE — ED TRIAGE NOTES
Patient arrives via EMS from [facility]. Stated that he stumbled and fell onto his right knee.      Stated that he has had bilateral knee replacements in the past.

## 2020-08-17 ENCOUNTER — APPOINTMENT (OUTPATIENT)
Dept: GENERAL RADIOLOGY | Age: 60
DRG: 481 | End: 2020-08-17
Attending: ORTHOPAEDIC SURGERY
Payer: OTHER MISCELLANEOUS

## 2020-08-17 ENCOUNTER — ANESTHESIA (OUTPATIENT)
Dept: SURGERY | Age: 60
DRG: 481 | End: 2020-08-17
Payer: OTHER MISCELLANEOUS

## 2020-08-17 ENCOUNTER — ANESTHESIA EVENT (OUTPATIENT)
Dept: SURGERY | Age: 60
DRG: 481 | End: 2020-08-17
Payer: OTHER MISCELLANEOUS

## 2020-08-17 PROBLEM — S72.453A SUPRACONDYLAR FRACTURE OF DISTAL END OF FEMUR WITHOUT INTRACONDYLAR EXTENSION (HCC): Status: ACTIVE | Noted: 2020-08-17

## 2020-08-17 LAB
GLUCOSE BLD STRIP.AUTO-MCNC: 117 MG/DL (ref 65–100)
GLUCOSE BLD STRIP.AUTO-MCNC: 123 MG/DL (ref 65–100)
GLUCOSE BLD STRIP.AUTO-MCNC: 124 MG/DL (ref 65–100)
GLUCOSE BLD STRIP.AUTO-MCNC: 171 MG/DL (ref 65–100)
SERVICE CMNT-IMP: ABNORMAL

## 2020-08-17 PROCEDURE — C1769 GUIDE WIRE: HCPCS | Performed by: ORTHOPAEDIC SURGERY

## 2020-08-17 PROCEDURE — C1713 ANCHOR/SCREW BN/BN,TIS/BN: HCPCS | Performed by: ORTHOPAEDIC SURGERY

## 2020-08-17 PROCEDURE — 74011000258 HC RX REV CODE- 258: Performed by: PHYSICIAN ASSISTANT

## 2020-08-17 PROCEDURE — 76010000171 HC OR TIME 2 TO 2.5 HR INTENSV-TIER 1: Performed by: ORTHOPAEDIC SURGERY

## 2020-08-17 PROCEDURE — 74011250636 HC RX REV CODE- 250/636: Performed by: NURSE ANESTHETIST, CERTIFIED REGISTERED

## 2020-08-17 PROCEDURE — 77030031139 HC SUT VCRL2 J&J -A: Performed by: ORTHOPAEDIC SURGERY

## 2020-08-17 PROCEDURE — 74011250636 HC RX REV CODE- 250/636: Performed by: PHYSICIAN ASSISTANT

## 2020-08-17 PROCEDURE — 77030002933 HC SUT MCRYL J&J -A: Performed by: ORTHOPAEDIC SURGERY

## 2020-08-17 PROCEDURE — 73552 X-RAY EXAM OF FEMUR 2/>: CPT

## 2020-08-17 PROCEDURE — 82962 GLUCOSE BLOOD TEST: CPT

## 2020-08-17 PROCEDURE — 77030040922 HC BLNKT HYPOTHRM STRY -A

## 2020-08-17 PROCEDURE — 77030026438 HC STYL ET INTUB CARD -A: Performed by: NURSE ANESTHETIST, CERTIFIED REGISTERED

## 2020-08-17 PROCEDURE — 74011000250 HC RX REV CODE- 250: Performed by: NURSE ANESTHETIST, CERTIFIED REGISTERED

## 2020-08-17 PROCEDURE — 77030011640 HC PAD GRND REM COVD -A: Performed by: ORTHOPAEDIC SURGERY

## 2020-08-17 PROCEDURE — 74011636637 HC RX REV CODE- 636/637: Performed by: INTERNAL MEDICINE

## 2020-08-17 PROCEDURE — 76060000036 HC ANESTHESIA 2.5 TO 3 HR: Performed by: ORTHOPAEDIC SURGERY

## 2020-08-17 PROCEDURE — 77030008684 HC TU ET CUF COVD -B: Performed by: NURSE ANESTHETIST, CERTIFIED REGISTERED

## 2020-08-17 PROCEDURE — 74011250636 HC RX REV CODE- 250/636: Performed by: ANESTHESIOLOGY

## 2020-08-17 PROCEDURE — 77030026352 HC BIT DRL QC PERC SYNT -C: Performed by: ORTHOPAEDIC SURGERY

## 2020-08-17 PROCEDURE — 74011250637 HC RX REV CODE- 250/637: Performed by: PHYSICIAN ASSISTANT

## 2020-08-17 PROCEDURE — 76210000006 HC OR PH I REC 0.5 TO 1 HR: Performed by: ORTHOPAEDIC SURGERY

## 2020-08-17 PROCEDURE — 74011250637 HC RX REV CODE- 250/637: Performed by: INTERNAL MEDICINE

## 2020-08-17 PROCEDURE — 77030038552 HC DRN WND MDII -A: Performed by: ORTHOPAEDIC SURGERY

## 2020-08-17 PROCEDURE — 77030008462 HC STPLR SKN PROX J&J -A: Performed by: ORTHOPAEDIC SURGERY

## 2020-08-17 PROCEDURE — 77030003862 HC BIT DRL SYNT -B: Performed by: ORTHOPAEDIC SURGERY

## 2020-08-17 PROCEDURE — 65270000029 HC RM PRIVATE

## 2020-08-17 PROCEDURE — P9045 ALBUMIN (HUMAN), 5%, 250 ML: HCPCS | Performed by: NURSE ANESTHETIST, CERTIFIED REGISTERED

## 2020-08-17 PROCEDURE — 77030039497 HC CST PAD STERILE CHCS -A: Performed by: ORTHOPAEDIC SURGERY

## 2020-08-17 PROCEDURE — 0QSB04Z REPOSITION RIGHT LOWER FEMUR WITH INTERNAL FIXATION DEVICE, OPEN APPROACH: ICD-10-PCS | Performed by: ORTHOPAEDIC SURGERY

## 2020-08-17 DEVICE — SCREW BNE L52MM DIA4.5MM PROX CORT TIB S STL ST LOK FULL
Type: IMPLANTABLE DEVICE | Site: LEG | Status: NON-FUNCTIONAL
Removed: 2022-01-03

## 2020-08-17 DEVICE — SCREW BNE L85MM DIA5MM CNDYL S STL ST VAR ANG LOK COMPR T25
Type: IMPLANTABLE DEVICE | Site: LEG | Status: NON-FUNCTIONAL
Removed: 2022-01-03

## 2020-08-17 DEVICE — SCREW BNE L54MM DIA4.5MM PROX CORT TIB S STL ST LOK FULL
Type: IMPLANTABLE DEVICE | Site: LEG | Status: NON-FUNCTIONAL
Removed: 2022-01-03

## 2020-08-17 DEVICE — SCREW BNE L90MM DIA5MM CNDYL S STL ST VAR ANG LOK FULL THRD
Type: IMPLANTABLE DEVICE | Site: LEG | Status: NON-FUNCTIONAL
Removed: 2022-01-03

## 2020-08-17 DEVICE — SCREW BNE L90MM DIA5MM S STL ST LOK FULL THRD T25 STARDRV
Type: IMPLANTABLE DEVICE | Site: LEG | Status: NON-FUNCTIONAL
Removed: 2022-01-03

## 2020-08-17 DEVICE — PLATE BNE L159MM 6 H NONSTERILE R CNDYL S STL CRV LOK COMPR
Type: IMPLANTABLE DEVICE | Site: LEG | Status: NON-FUNCTIONAL
Removed: 2022-01-03

## 2020-08-17 DEVICE — SCREW BNE L50MM DIA4.5MM PROX CORT TIB S STL ST LOK FULL
Type: IMPLANTABLE DEVICE | Site: LEG | Status: NON-FUNCTIONAL
Removed: 2022-01-03

## 2020-08-17 DEVICE — SCREW BNE L48MM DIA4.5MM PROX CORT TIB S STL ST LOK FULL
Type: IMPLANTABLE DEVICE | Site: LEG | Status: NON-FUNCTIONAL
Removed: 2022-01-03

## 2020-08-17 RX ORDER — SODIUM CHLORIDE, SODIUM LACTATE, POTASSIUM CHLORIDE, CALCIUM CHLORIDE 600; 310; 30; 20 MG/100ML; MG/100ML; MG/100ML; MG/100ML
50 INJECTION, SOLUTION INTRAVENOUS CONTINUOUS
Status: DISCONTINUED | OUTPATIENT
Start: 2020-08-17 | End: 2020-08-17 | Stop reason: HOSPADM

## 2020-08-17 RX ORDER — POLYETHYLENE GLYCOL 3350 17 G/17G
17 POWDER, FOR SOLUTION ORAL DAILY
Status: DISCONTINUED | OUTPATIENT
Start: 2020-08-18 | End: 2020-08-20 | Stop reason: HOSPADM

## 2020-08-17 RX ORDER — LIDOCAINE HYDROCHLORIDE 10 MG/ML
0.1 INJECTION, SOLUTION EPIDURAL; INFILTRATION; INTRACAUDAL; PERINEURAL AS NEEDED
Status: DISCONTINUED | OUTPATIENT
Start: 2020-08-17 | End: 2020-08-17 | Stop reason: HOSPADM

## 2020-08-17 RX ORDER — FAMOTIDINE 20 MG/1
20 TABLET, FILM COATED ORAL 2 TIMES DAILY
Status: DISCONTINUED | OUTPATIENT
Start: 2020-08-17 | End: 2020-08-20 | Stop reason: HOSPADM

## 2020-08-17 RX ORDER — ONDANSETRON 2 MG/ML
4 INJECTION INTRAMUSCULAR; INTRAVENOUS AS NEEDED
Status: DISCONTINUED | OUTPATIENT
Start: 2020-08-17 | End: 2020-08-17 | Stop reason: HOSPADM

## 2020-08-17 RX ORDER — NITROGLYCERIN 0.4 MG/1
0.4 TABLET SUBLINGUAL
Status: DISCONTINUED | OUTPATIENT
Start: 2020-08-17 | End: 2020-08-20 | Stop reason: HOSPADM

## 2020-08-17 RX ORDER — MORPHINE SULFATE 2 MG/ML
2 INJECTION, SOLUTION INTRAMUSCULAR; INTRAVENOUS
Status: ACTIVE | OUTPATIENT
Start: 2020-08-17 | End: 2020-08-18

## 2020-08-17 RX ORDER — SODIUM CHLORIDE 9 MG/ML
125 INJECTION, SOLUTION INTRAVENOUS CONTINUOUS
Status: DISPENSED | OUTPATIENT
Start: 2020-08-17 | End: 2020-08-18

## 2020-08-17 RX ORDER — HYDROXYZINE HYDROCHLORIDE 10 MG/1
10 TABLET, FILM COATED ORAL
Status: DISCONTINUED | OUTPATIENT
Start: 2020-08-17 | End: 2020-08-20 | Stop reason: HOSPADM

## 2020-08-17 RX ORDER — SUCCINYLCHOLINE CHLORIDE 20 MG/ML
INJECTION INTRAMUSCULAR; INTRAVENOUS AS NEEDED
Status: DISCONTINUED | OUTPATIENT
Start: 2020-08-17 | End: 2020-08-17 | Stop reason: HOSPADM

## 2020-08-17 RX ORDER — SODIUM CHLORIDE 0.9 % (FLUSH) 0.9 %
5-40 SYRINGE (ML) INJECTION EVERY 8 HOURS
Status: DISCONTINUED | OUTPATIENT
Start: 2020-08-17 | End: 2020-08-17 | Stop reason: HOSPADM

## 2020-08-17 RX ORDER — METFORMIN HYDROCHLORIDE 500 MG/1
1000 TABLET ORAL 2 TIMES DAILY WITH MEALS
Status: DISCONTINUED | OUTPATIENT
Start: 2020-08-18 | End: 2020-08-19 | Stop reason: SDUPTHER

## 2020-08-17 RX ORDER — SODIUM CHLORIDE, SODIUM LACTATE, POTASSIUM CHLORIDE, CALCIUM CHLORIDE 600; 310; 30; 20 MG/100ML; MG/100ML; MG/100ML; MG/100ML
INJECTION, SOLUTION INTRAVENOUS
Status: DISCONTINUED | OUTPATIENT
Start: 2020-08-17 | End: 2020-08-17 | Stop reason: HOSPADM

## 2020-08-17 RX ORDER — FACIAL-BODY WIPES
10 EACH TOPICAL DAILY PRN
Status: DISCONTINUED | OUTPATIENT
Start: 2020-08-19 | End: 2020-08-20 | Stop reason: HOSPADM

## 2020-08-17 RX ORDER — OXYCODONE HYDROCHLORIDE 5 MG/1
10 TABLET ORAL
Status: DISCONTINUED | OUTPATIENT
Start: 2020-08-17 | End: 2020-08-20 | Stop reason: HOSPADM

## 2020-08-17 RX ORDER — SODIUM CHLORIDE 0.9 % (FLUSH) 0.9 %
5-40 SYRINGE (ML) INJECTION AS NEEDED
Status: DISCONTINUED | OUTPATIENT
Start: 2020-08-17 | End: 2020-08-17 | Stop reason: HOSPADM

## 2020-08-17 RX ORDER — LIDOCAINE HYDROCHLORIDE 20 MG/ML
INJECTION, SOLUTION EPIDURAL; INFILTRATION; INTRACAUDAL; PERINEURAL AS NEEDED
Status: DISCONTINUED | OUTPATIENT
Start: 2020-08-17 | End: 2020-08-17 | Stop reason: HOSPADM

## 2020-08-17 RX ORDER — OXYCODONE HYDROCHLORIDE 5 MG/1
5 TABLET ORAL
Status: DISCONTINUED | OUTPATIENT
Start: 2020-08-17 | End: 2020-08-20 | Stop reason: HOSPADM

## 2020-08-17 RX ORDER — HYDROMORPHONE HYDROCHLORIDE 2 MG/ML
INJECTION, SOLUTION INTRAMUSCULAR; INTRAVENOUS; SUBCUTANEOUS AS NEEDED
Status: DISCONTINUED | OUTPATIENT
Start: 2020-08-17 | End: 2020-08-17 | Stop reason: HOSPADM

## 2020-08-17 RX ORDER — ONDANSETRON 2 MG/ML
4 INJECTION INTRAMUSCULAR; INTRAVENOUS
Status: ACTIVE | OUTPATIENT
Start: 2020-08-17 | End: 2020-08-18

## 2020-08-17 RX ORDER — AMOXICILLIN 250 MG
1 CAPSULE ORAL 2 TIMES DAILY
Status: DISCONTINUED | OUTPATIENT
Start: 2020-08-17 | End: 2020-08-20 | Stop reason: HOSPADM

## 2020-08-17 RX ORDER — ROSUVASTATIN CALCIUM 10 MG/1
20 TABLET, COATED ORAL DAILY
Status: DISCONTINUED | OUTPATIENT
Start: 2020-08-18 | End: 2020-08-20 | Stop reason: HOSPADM

## 2020-08-17 RX ORDER — ALBUMIN HUMAN 50 G/1000ML
SOLUTION INTRAVENOUS AS NEEDED
Status: DISCONTINUED | OUTPATIENT
Start: 2020-08-17 | End: 2020-08-17 | Stop reason: HOSPADM

## 2020-08-17 RX ORDER — NALOXONE HYDROCHLORIDE 0.4 MG/ML
0.4 INJECTION, SOLUTION INTRAMUSCULAR; INTRAVENOUS; SUBCUTANEOUS AS NEEDED
Status: DISCONTINUED | OUTPATIENT
Start: 2020-08-17 | End: 2020-08-20 | Stop reason: HOSPADM

## 2020-08-17 RX ORDER — SODIUM CHLORIDE 0.9 % (FLUSH) 0.9 %
5-40 SYRINGE (ML) INJECTION EVERY 8 HOURS
Status: DISCONTINUED | OUTPATIENT
Start: 2020-08-17 | End: 2020-08-20 | Stop reason: HOSPADM

## 2020-08-17 RX ORDER — DEXMEDETOMIDINE HYDROCHLORIDE 100 UG/ML
INJECTION, SOLUTION INTRAVENOUS AS NEEDED
Status: DISCONTINUED | OUTPATIENT
Start: 2020-08-17 | End: 2020-08-17 | Stop reason: HOSPADM

## 2020-08-17 RX ORDER — ROCURONIUM BROMIDE 10 MG/ML
INJECTION, SOLUTION INTRAVENOUS AS NEEDED
Status: DISCONTINUED | OUTPATIENT
Start: 2020-08-17 | End: 2020-08-17 | Stop reason: HOSPADM

## 2020-08-17 RX ORDER — ONDANSETRON 2 MG/ML
INJECTION INTRAMUSCULAR; INTRAVENOUS AS NEEDED
Status: DISCONTINUED | OUTPATIENT
Start: 2020-08-17 | End: 2020-08-17 | Stop reason: HOSPADM

## 2020-08-17 RX ORDER — SODIUM CHLORIDE 0.9 % (FLUSH) 0.9 %
5-40 SYRINGE (ML) INJECTION AS NEEDED
Status: DISCONTINUED | OUTPATIENT
Start: 2020-08-17 | End: 2020-08-20 | Stop reason: HOSPADM

## 2020-08-17 RX ORDER — GLYCOPYRROLATE 0.2 MG/ML
INJECTION INTRAMUSCULAR; INTRAVENOUS AS NEEDED
Status: DISCONTINUED | OUTPATIENT
Start: 2020-08-17 | End: 2020-08-17 | Stop reason: HOSPADM

## 2020-08-17 RX ORDER — MIDAZOLAM HYDROCHLORIDE 1 MG/ML
INJECTION, SOLUTION INTRAMUSCULAR; INTRAVENOUS AS NEEDED
Status: DISCONTINUED | OUTPATIENT
Start: 2020-08-17 | End: 2020-08-17 | Stop reason: HOSPADM

## 2020-08-17 RX ORDER — NEOSTIGMINE METHYLSULFATE 1 MG/ML
INJECTION, SOLUTION INTRAVENOUS AS NEEDED
Status: DISCONTINUED | OUTPATIENT
Start: 2020-08-17 | End: 2020-08-17 | Stop reason: HOSPADM

## 2020-08-17 RX ORDER — FENTANYL CITRATE 50 UG/ML
25 INJECTION, SOLUTION INTRAMUSCULAR; INTRAVENOUS
Status: DISCONTINUED | OUTPATIENT
Start: 2020-08-17 | End: 2020-08-17 | Stop reason: HOSPADM

## 2020-08-17 RX ORDER — METOPROLOL TARTRATE 50 MG/1
50 TABLET ORAL 2 TIMES DAILY
Status: DISCONTINUED | OUTPATIENT
Start: 2020-08-18 | End: 2020-08-20 | Stop reason: HOSPADM

## 2020-08-17 RX ORDER — FENTANYL CITRATE 50 UG/ML
INJECTION, SOLUTION INTRAMUSCULAR; INTRAVENOUS AS NEEDED
Status: DISCONTINUED | OUTPATIENT
Start: 2020-08-17 | End: 2020-08-17 | Stop reason: HOSPADM

## 2020-08-17 RX ORDER — ACETAMINOPHEN 500 MG
1000 TABLET ORAL EVERY 6 HOURS
Status: DISCONTINUED | OUTPATIENT
Start: 2020-08-17 | End: 2020-08-19 | Stop reason: SDUPTHER

## 2020-08-17 RX ORDER — HYDROMORPHONE HYDROCHLORIDE 1 MG/ML
0.2 INJECTION, SOLUTION INTRAMUSCULAR; INTRAVENOUS; SUBCUTANEOUS
Status: DISCONTINUED | OUTPATIENT
Start: 2020-08-17 | End: 2020-08-17 | Stop reason: HOSPADM

## 2020-08-17 RX ORDER — ONDANSETRON 4 MG/1
4 TABLET, ORALLY DISINTEGRATING ORAL
Status: DISCONTINUED | OUTPATIENT
Start: 2020-08-17 | End: 2020-08-20 | Stop reason: HOSPADM

## 2020-08-17 RX ORDER — GLIPIZIDE 5 MG/1
10 TABLET ORAL 2 TIMES DAILY
Status: DISCONTINUED | OUTPATIENT
Start: 2020-08-17 | End: 2020-08-19 | Stop reason: SDUPTHER

## 2020-08-17 RX ORDER — GEMFIBROZIL 600 MG/1
600 TABLET, FILM COATED ORAL
Status: DISCONTINUED | OUTPATIENT
Start: 2020-08-17 | End: 2020-08-20 | Stop reason: HOSPADM

## 2020-08-17 RX ORDER — PROPOFOL 10 MG/ML
INJECTION, EMULSION INTRAVENOUS AS NEEDED
Status: DISCONTINUED | OUTPATIENT
Start: 2020-08-17 | End: 2020-08-17 | Stop reason: HOSPADM

## 2020-08-17 RX ORDER — MORPHINE SULFATE 10 MG/ML
2 INJECTION, SOLUTION INTRAMUSCULAR; INTRAVENOUS
Status: DISCONTINUED | OUTPATIENT
Start: 2020-08-17 | End: 2020-08-17 | Stop reason: HOSPADM

## 2020-08-17 RX ORDER — PHENYLEPHRINE HCL IN 0.9% NACL 0.4MG/10ML
SYRINGE (ML) INTRAVENOUS
Status: DISCONTINUED | OUTPATIENT
Start: 2020-08-17 | End: 2020-08-17 | Stop reason: HOSPADM

## 2020-08-17 RX ADMIN — ACETAMINOPHEN 1000 MG: 500 TABLET ORAL at 19:32

## 2020-08-17 RX ADMIN — INSULIN LISPRO 2 UNITS: 100 INJECTION, SOLUTION INTRAVENOUS; SUBCUTANEOUS at 09:10

## 2020-08-17 RX ADMIN — HYDROMORPHONE HYDROCHLORIDE 0.5 MG: 2 INJECTION, SOLUTION INTRAMUSCULAR; INTRAVENOUS; SUBCUTANEOUS at 16:50

## 2020-08-17 RX ADMIN — SUCCINYLCHOLINE CHLORIDE 200 MG: 20 INJECTION, SOLUTION INTRAMUSCULAR; INTRAVENOUS at 14:58

## 2020-08-17 RX ADMIN — SODIUM CHLORIDE, POTASSIUM CHLORIDE, SODIUM LACTATE AND CALCIUM CHLORIDE: 600; 310; 30; 20 INJECTION, SOLUTION INTRAVENOUS at 14:45

## 2020-08-17 RX ADMIN — GLYCOPYRROLATE 0.8 MG: 0.2 INJECTION, SOLUTION INTRAMUSCULAR; INTRAVENOUS at 16:31

## 2020-08-17 RX ADMIN — SUCCINYLCHOLINE CHLORIDE 200 MG: 20 INJECTION, SOLUTION INTRAMUSCULAR; INTRAVENOUS at 15:15

## 2020-08-17 RX ADMIN — ROCURONIUM BROMIDE 45 MG: 10 SOLUTION INTRAVENOUS at 14:58

## 2020-08-17 RX ADMIN — SODIUM CHLORIDE, SODIUM LACTATE, POTASSIUM CHLORIDE, AND CALCIUM CHLORIDE 50 ML/HR: 600; 310; 30; 20 INJECTION, SOLUTION INTRAVENOUS at 14:05

## 2020-08-17 RX ADMIN — DOCUSATE SODIUM 50MG AND SENNOSIDES 8.6MG 1 TABLET: 8.6; 5 TABLET, FILM COATED ORAL at 19:32

## 2020-08-17 RX ADMIN — MIDAZOLAM 2 MG: 1 INJECTION INTRAMUSCULAR; INTRAVENOUS at 14:45

## 2020-08-17 RX ADMIN — SODIUM CHLORIDE, POTASSIUM CHLORIDE, SODIUM LACTATE AND CALCIUM CHLORIDE: 600; 310; 30; 20 INJECTION, SOLUTION INTRAVENOUS at 16:26

## 2020-08-17 RX ADMIN — SUCCINYLCHOLINE CHLORIDE 200 MG: 20 INJECTION, SOLUTION INTRAMUSCULAR; INTRAVENOUS at 15:20

## 2020-08-17 RX ADMIN — OXYCODONE HYDROCHLORIDE 5 MG: 5 TABLET ORAL at 22:41

## 2020-08-17 RX ADMIN — DEXMEDETOMIDINE HYDROCHLORIDE 10 MCG: 100 INJECTION, SOLUTION, CONCENTRATE INTRAVENOUS at 16:13

## 2020-08-17 RX ADMIN — CEFAZOLIN 3 G: 1 INJECTION, POWDER, FOR SOLUTION INTRAMUSCULAR; INTRAVENOUS; PARENTERAL at 15:00

## 2020-08-17 RX ADMIN — PROPOFOL 200 MG: 10 INJECTION, EMULSION INTRAVENOUS at 14:53

## 2020-08-17 RX ADMIN — DEXMEDETOMIDINE HYDROCHLORIDE 10 MCG: 100 INJECTION, SOLUTION, CONCENTRATE INTRAVENOUS at 15:51

## 2020-08-17 RX ADMIN — LIDOCAINE HYDROCHLORIDE 100 MG: 20 INJECTION, SOLUTION EPIDURAL; INFILTRATION; INTRACAUDAL; PERINEURAL at 14:53

## 2020-08-17 RX ADMIN — Medication 40 MCG/MIN: at 15:15

## 2020-08-17 RX ADMIN — SUCCINYLCHOLINE CHLORIDE 200 MG: 20 INJECTION, SOLUTION INTRAMUSCULAR; INTRAVENOUS at 15:10

## 2020-08-17 RX ADMIN — HYDROMORPHONE HYDROCHLORIDE 0.5 MG: 2 INJECTION, SOLUTION INTRAMUSCULAR; INTRAVENOUS; SUBCUTANEOUS at 17:07

## 2020-08-17 RX ADMIN — ALBUMIN (HUMAN) 250 ML: 12.5 INJECTION, SOLUTION INTRAVENOUS at 16:50

## 2020-08-17 RX ADMIN — OXYCODONE HYDROCHLORIDE AND ACETAMINOPHEN 1 TABLET: 5; 325 TABLET ORAL at 12:17

## 2020-08-17 RX ADMIN — CEFAZOLIN SODIUM 3 G: 10 INJECTION, POWDER, FOR SOLUTION INTRAVENOUS at 22:41

## 2020-08-17 RX ADMIN — FAMOTIDINE 20 MG: 20 TABLET ORAL at 19:32

## 2020-08-17 RX ADMIN — ONDANSETRON HYDROCHLORIDE 4 MG: 2 INJECTION, SOLUTION INTRAMUSCULAR; INTRAVENOUS at 16:20

## 2020-08-17 RX ADMIN — FENTANYL CITRATE 100 MCG: 50 INJECTION, SOLUTION INTRAMUSCULAR; INTRAVENOUS at 14:53

## 2020-08-17 RX ADMIN — ALBUMIN (HUMAN) 250 ML: 12.5 INJECTION, SOLUTION INTRAVENOUS at 16:13

## 2020-08-17 RX ADMIN — OXYCODONE HYDROCHLORIDE 5 MG: 5 TABLET ORAL at 19:35

## 2020-08-17 RX ADMIN — Medication 4 MG: at 16:31

## 2020-08-17 RX ADMIN — ROCURONIUM BROMIDE 5 MG: 10 SOLUTION INTRAVENOUS at 14:53

## 2020-08-17 NOTE — ANESTHESIA POSTPROCEDURE EVALUATION
Post-Anesthesia Evaluation and Assessment    Patient: Angeles Villar MRN: [id number]  SSN: [id number]    YOB: 1960  Age: 62 y.o. Sex: male      I have evaluated the patient and they are stable and ready for discharge from the PACU. Cardiovascular Function/Vital Signs  Visit Vitals  /79   Pulse 78   Temp 36.7 °C (98.1 °F)   Resp 16   Ht 6' 1\" (1.854 m)   Wt 127.2 kg (280 lb 6.8 oz)   SpO2 100%   BMI 37.00 kg/m²       Patient is status post General anesthesia for Procedure(s):  Open reduction internal reductionof right supracondylar femur fracture. Synthes plate. Flouro. Request to follow self. Nausea/Vomiting: None    Postoperative hydration reviewed and adequate. Pain:  Pain Scale 1: Numeric (0 - 10) (08/17/20 1350)  Pain Intensity 1: 0 (08/17/20 1350)   Managed    Neurological Status:   Neuro  Neurologic State: Alert (08/17/20 3907)  Orientation Level: Oriented X4 (08/17/20 2803)  Cognition: Appropriate for age attention/concentration (08/17/20 7960)  Speech: Appropriate for age (08/17/20 0808)   At baseline    Mental Status, Level of Consciousness: Alert and  oriented to person, place, and time    Pulmonary Status:   O2 Device: CO2 nasal cannula (08/17/20 1711)   Adequate oxygenation and airway patent    Complications related to anesthesia: None    Post-anesthesia assessment completed. No concerns    Signed By: Noemi Bowie MD     August 17, 2020              Procedure(s):  Open reduction internal reductionof right supracondylar femur fracture. Synthes plate. Flouro. Request to follow self.    general    <BSHSIANPOST>    INITIAL Post-op Vital signs:   Vitals Value Taken Time   /79 8/17/2020  5:21 PM   Temp 36.7 °C (98.1 °F) 8/17/2020  5:11 PM   Pulse 78 8/17/2020  5:23 PM   Resp 16 8/17/2020  5:11 PM   SpO2 99 % 8/17/2020  5:23 PM   Vitals shown include unvalidated device data.

## 2020-08-17 NOTE — PROGRESS NOTES
Bedside and Verbal shift change report given to Reymundo Holloway RN (oncoming nurse) by Kd Mueller RN (offgoing nurse). Report included the following information SBAR.

## 2020-08-17 NOTE — PROGRESS NOTES
[facility] [address] FRACTURE PROGRESS NOTE    2020  Admit Date:   2020    Post Op day: * No surgery date entered *    Subjective:    Audrey Muniz states his pain is controlled at rest. No new issues. Somewhat frustrated about not knowing when his surgery will be.    NPO now  Denies N/V/SOB or CP     PT/OT:   Gait:                    Vital Signs:    Patient Vitals for the past 8 hrs:   BP Temp Pulse Resp SpO2   20 0808 126/81 98.6 °F (37 °C) (!) 109 16 98 %   20 0421 152/77 97.4 °F (36.3 °C) 83 16 97 %     Temp (24hrs), Av °F (36.7 °C), Min:97.4 °F (36.3 °C), Max:98.6 °F (37 °C)      Pain Control:   Pain Assessment  Pain Scale 1: Numeric (0 - 10)  Pain Intensity 1: 0  Pain Location 1: Leg  Pain Orientation 1: Right  Pain Description 1: Aching  Pain Intervention(s) 1: Medication (see MAR)    Meds:    Current Facility-Administered Medications   Medication Dose Route Frequency    sodium chloride (NS) flush 5-40 mL  5-40 mL IntraVENous Q8H    sodium chloride (NS) flush 5-40 mL  5-40 mL IntraVENous PRN    acetaminophen (TYLENOL) tablet 650 mg  650 mg Oral Q6H PRN    Or    acetaminophen (TYLENOL) suppository 650 mg  650 mg Rectal Q6H PRN    polyethylene glycol (MIRALAX) packet 17 g  17 g Oral DAILY PRN    ondansetron (ZOFRAN) injection 4 mg  4 mg IntraVENous Q6H PRN    oxyCODONE-acetaminophen (PERCOCET) 5-325 mg per tablet 1 Tab  1 Tab Oral Q4H PRN    glucose chewable tablet 16 g  4 Tab Oral PRN    glucagon (GLUCAGEN) injection 1 mg  1 mg IntraMUSCular PRN    dextrose 10% infusion 0-250 mL  0-250 mL IntraVENous PRN    insulin lispro (HUMALOG) injection   SubCUTAneous AC&HS       LAB:    Recent Labs     20  0248   HCT 38.8   HGB 12.1       Transfuse PRBC's:      Assessment & Physician's Comment:  Right leg in knee immobilizer  Moves through ankles and toes to command  Distal sensory function grossly intact  Distal pulses palpable  Neurovascular checks within normal limits  Orientation: Oriented    Active Problems:    Femur fracture ([facility] [address] (8/16/2020)        Plan:    NPO  Bed rest  Remain in knee immobilizer for now  Pain control  Consent for ORIF right femur fracture signed and on chart  To OR later today with Dr Sharon Louis PA-C   Orthopedic Trauma Service  Replaced by [facility]

## 2020-08-17 NOTE — PROGRESS NOTES
Hospitalist Progress Note  Ynes Corcoran MD  Answering service: [phone] from in house phone      Date of Service:  2020  NAME:  Betty Simeon  :  1960  MRN:  [id number]      Admission Summary:   Betty Simeon is a 62 y.o. male with PMH Diabetes Mellitus, HTN , CAD presents to the ED c/o right knee pain after he sustained a fall at work. Patient states that he was walking on wet floor when he slipped and fell falling with his right leg bended under him. He was not able to stand up due to pain. He denies any symptoms such as lightheadedness, dizziness, tunnel vision, palpitations, chest pain or SOB prior to the fall. In the ED he had X-ray that showed a Distal femoral metaphyseal fracture. Orthopedic surgery was contacted by the ER physician who requested hospitalist admission and plan for possible ORIF. Interval history / Subjective:      Patient is seen and examined this morning. Patient pain is well controlled with oral oxycodone. Leg in brace. No other complaints     Assessment & Plan:     # Distal Femoral fracture due to mechanical fall.  - Ortho consulted-- plan for ORIF today   - Pain control  - NPO  - According to the 2014 ACC/AHA pre-operative risk assessment guidelines Betty Simeon is at intermidaite risk for major cardiac complications during a low risk procedure, exercise tolerance is > MET >4. Recommend to obtain probnp. - ECG: NSR.  non specific t wave changes.        # Diabetes mellitus type 2:  - Hold metformin, Glipizide and Jardiance for now. - on SSI, accu-chek      # Hypertension: BP stable. Continue with metoprolol for now.     # CAD: No active cardiac issues. - EKG showed normal sinus rhythm with nonspecific T wave changes.   - Continue with aspirin after surgery.     # Hyperlipidemia: Continue home medication after surgical intervention.     # History of paroxysmal A. fib: Currently normal sinus rhythm. ANGELA?DS?-VASc Score 3. Not on Eliquis anymore due to side effect.     DVT prophy: SCD for now  Code status: full code  Plan discussed with patient.      FUNCTIONAL STATUS PRIOR TO HOSPITALIZATION Ambulates Independently      Hospital Problems  Date Reviewed: 1/28/2020          Codes Class Noted POA    Femur fracture ([facility] [address] ICD-10-CM: H21.37CD  ICD-9-CM: 821.00  8/16/2020 Unknown                Review of Systems:   Pertinent positive mentioned in interval hx/HPI. Other systems reviewed and negative     Vital Signs:    Last 24hrs VS reviewed since prior progress note. Most recent are:  Visit Vitals  /81 (BP 1 Location: Right arm)   Pulse (!) 109   Temp 98.6 °F (37 °C)   Resp 16   Ht 6' 1\" (1.854 m)   Wt 127.2 kg (280 lb 6.8 oz)   SpO2 98%   BMI 37.00 kg/m²         Intake/Output Summary (Last 24 hours) at 8/17/2020 1155  Last data filed at 8/17/2020 9400  Gross per 24 hour   Intake 0 ml   Output    Net 0 ml        Physical Examination:             Constitutional:  No acute distress, cooperative, pleasant    ENT:  Oral mucous moist, oropharynx benign. Neck supple,    Resp:  CTA bilaterally. No wheezing/rhonchi/rales. No accessory muscle use   CV:  Regular rhythm, normal rate, no murmurs, gallops, rubs    GI:  Soft, non distended, non tender. normoactive bowel sounds, no hepatosplenomegaly     Musculoskeletal:  right leg in brace. Intact pulse and sensation     Neurologic:  Moves all extremities. AAOx3, CN II-XII reviewed           Data Review:     I personally reviewed labs and imaging     Labs:     Recent Labs     08/16/20 0248   WBC 8.4   HGB 12.1   HCT 38.8        Recent Labs     08/16/20 0248      K 3.9   *   CO2 25   BUN 16   CREA 0.75   *   CA 8.9     Recent Labs     08/16/20 0248   ALT 18   AP 45   TBILI 0.4   TP 6.7   ALB 3.2*   GLOB 3.5     No results for input(s): INR, PTP, APTT, INREXT in the last 72 hours.    No results for input(s): FE, TIBC, PSAT, FERR in the last 72 hours. No results found for: FOL, RBCF   No results for input(s): PH, PCO2, PO2 in the last 72 hours. No results for input(s): CPK, CKNDX, TROIQ in the last 72 hours.     No lab exists for component: CPKMB  Lab Results   Component Value Date/Time    Cholesterol, total 126 07/28/2020 09:09 AM    HDL Cholesterol 30 (L) 07/28/2020 09:09 AM    LDL, calculated 72 07/28/2020 09:09 AM    Triglyceride 121 07/28/2020 09:09 AM    CHOL/HDL Ratio 7.1 (H) 06/09/2011 02:12 PM     Lab Results   Component Value Date/Time    Glucose (POC) 117 (H) 08/17/2020 11:26 AM    Glucose (POC) 171 (H) 08/17/2020 06:18 AM    Glucose (POC) 130 (H) 08/16/2020 09:11 PM    Glucose (POC) 109 (H) 08/16/2020 04:03 PM    Glucose (POC) 295 (H) 12/05/2018 04:39 AM     Lab Results   Component Value Date/Time    Color YELLOW 02/21/2012 05:15 PM    Appearance CLEAR 02/21/2012 05:15 PM    Specific gravity 1.027 02/21/2012 05:15 PM    pH (UA) 7.0 02/21/2012 05:15 PM    Protein NEGATIVE  02/21/2012 05:15 PM    Glucose >1000 (A) 02/21/2012 05:15 PM    Ketone NEGATIVE  02/21/2012 05:15 PM    Bilirubin NEGATIVE  02/21/2012 05:15 PM    Urobilinogen 0.2 02/21/2012 05:15 PM    Nitrites NEGATIVE  02/21/2012 05:15 PM    Leukocyte Esterase NEGATIVE  02/21/2012 05:15 PM    Epithelial cells 0-5 02/21/2012 05:15 PM    Bacteria NEGATIVE  02/21/2012 05:15 PM    WBC 0-4 02/21/2012 05:15 PM    RBC 0-3 02/21/2012 05:15 PM         Medications Reviewed:     Current Facility-Administered Medications   Medication Dose Route Frequency    sodium chloride (NS) flush 5-40 mL  5-40 mL IntraVENous Q8H    sodium chloride (NS) flush 5-40 mL  5-40 mL IntraVENous PRN    acetaminophen (TYLENOL) tablet 650 mg  650 mg Oral Q6H PRN    Or    acetaminophen (TYLENOL) suppository 650 mg  650 mg Rectal Q6H PRN    polyethylene glycol (MIRALAX) packet 17 g  17 g Oral DAILY PRN    ondansetron (ZOFRAN) injection 4 mg  4 mg IntraVENous Q6H PRN    oxyCODONE-acetaminophen (PERCOCET) 5-325 mg per tablet 1 Tab  1 Tab Oral Q4H PRN    glucose chewable tablet 16 g  4 Tab Oral PRN    glucagon (GLUCAGEN) injection 1 mg  1 mg IntraMUSCular PRN    dextrose 10% infusion 0-250 mL  0-250 mL IntraVENous PRN    insulin lispro (HUMALOG) injection   SubCUTAneous AC&HS     ______________________________________________________________________  EXPECTED LENGTH OF STAY: - - -  ACTUAL LENGTH OF STAY:          1                 Nadira Isbell MD   Patient has given Verbal permission to discuss medical care with   persons present in the room and and also with contact as listed on face sheet.

## 2020-08-17 NOTE — BRIEF OP NOTE
Brief Postoperative Note    Patient: Nasra Ochoa  YOB: 1960  MRN: [id number]    Date of Procedure: 8/17/2020     Pre-Op Diagnosis: Right Supracondylar femur fracture    Post-Op Diagnosis: Same as preoperative diagnosis. Procedure(s):  Open reduction internal reductionof right supracondylar femur fracture. Synthes plate. Flouro. Request to follow self    Surgeon(s):  Karl Lawson MD    Surgical Assistant: Physician Assistant: Malvina Felty, PA-C    Anesthesia: General     Estimated Blood Loss (mL): 125     Complications: None    Specimens: * No specimens in log *     Implants:   Implant Name Type Inv.  Item Serial No.  Lot No. LRB No. Used Action   SCR LCK VA ST STARDRV [id number] --  - SN/A  SCR LCK VA ST STARDRV 5X90MM --  N/A SYNTHES [place] N/A Right 3 Implanted   SCR BNE CRTX ST 4.5X54MM SS --  - SN/A  SCR BNE CRTX ST 4.5X54MM SS --  N/A SY[employer] [place] N/A Right 2 Implanted   PLATE CONDYLR 6H [id number] RT -- VA-LCP - SN/A  PLATE CONDYLR 6H [id number] RT -- VA-LCP N/A SY[employer]TH[employer] [place] N/A Right 1 Implanted   SCR LCK VA ST STARDRV 5X85MM --  - SN/A  SCR LCK VA ST STARDRV 5X85MM --  N/A SYNTHES [place] N/A Right 1 Implanted   SCR BNE CRTX ST 4.5X48MM SS --  - SN/A  SCR BNE CRTX ST 4.5X48MM SS --  N/A SYNTHES [place] N/A Right 1 Implanted   SCR BNE LCK ST T25 3.5X90MM SS --  - SN/A  SCR BNE LCK ST T25 3.5X90MM SS --  N/A SYNTHES [place] N/A Right 2 Implanted   SCR BNE CRTX ST 4.5X52MM SS --  - SN/A  SCR BNE CRTX ST 4.5X52MM SS --  N/A SYNTHES [place] N/A Right 2 Implanted   SCR BNE CRTX ST 4.5X50MM SS --  - SN/A  SCR BNE CRTX ST 4.5X50MM SS --  N/A SYNTHES [place] N/A Right 1 Implanted       Drains: * No LDAs found *    Findings: as above    Electronically Signed by Peyton Schumacher MD on 8/17/2020 at 4:48 PM

## 2020-08-17 NOTE — PROGRESS NOTES
The following SGLT2 inhibitor has been discontinued per [facility] approved policy:    Empagliflozin 10 mg daily    Please reorder upon discharge if appropriate.

## 2020-08-17 NOTE — ROUTINE PROCESS
Patient: Amadou Riggs MRN: [id number]  SSN: [id number] YOB: 1960  Age: 62 y.o. Sex: male Patient is status post Procedure(s): 
Open reduction internal reductionof right supracondylar femur fracture. Synthes plate. Flouro. Request to follow self. Surgeon(s) and Role: David Perez MD - Primary Local/Dose/Irrigation:   
 
             
Peripheral IV 08/16/20 Right Antecubital (Active) Site Assessment Clean, dry, & intact 08/17/20 2590 Phlebitis Assessment 0 08/17/20 3658 Infiltration Assessment 0 08/17/20 7331 Dressing Status Clean, dry, & intact 08/17/20 0908 Dressing Type Transparent 08/17/20 8422 Hub Color/Line Status Capped 08/17/20 6114 Action Taken Open ports on tubing capped 08/17/20 0008 Alcohol Cap Used Yes 08/17/20 0008 Airway - Endotracheal Tube 08/17/20 Oral (Active) Dressing/Packing:  Wound Leg Right;Lateral-Dressing Type: 4 x 4;ABD pad;Cast padding;Elastic bandage;Petroleum gauze (08/17/20 1651) Splint/Cast:  ] Other:  No alford for case

## 2020-08-17 NOTE — ANESTHESIA PREPROCEDURE EVALUATION
Relevant Problems   No relevant active problems       Anesthetic History   No history of anesthetic complications            Review of Systems / Medical History  Patient summary reviewed, nursing notes reviewed and pertinent labs reviewed    Pulmonary  Within defined limits                 Neuro/Psych   Within defined limits           Cardiovascular    Hypertension        Dysrhythmias   CAD and cardiac stents    Exercise tolerance: >4 METS     GI/Hepatic/Renal  Within defined limits              Endo/Other    Diabetes    Obesity and arthritis     Other Findings              Physical Exam    Airway  Mallampati: I  TM Distance: > 6 cm  Neck ROM: normal range of motion   Mouth opening: Normal     Cardiovascular    Rhythm: regular  Rate: normal         Dental  No notable dental hx       Pulmonary  Breath sounds clear to auscultation               Abdominal         Other Findings            Anesthetic Plan    ASA: 3  Anesthesia type: general          Induction: Intravenous  Anesthetic plan and risks discussed with: Patient

## 2020-08-17 NOTE — PROGRESS NOTES
Bedside shift change report given to Marianna Nelson (oncoming nurse) by Hailey Centeno (offgoing nurse). Report included the following information SBAR, Kardex, Intake/Output and MAR.

## 2020-08-18 LAB
ANION GAP SERPL CALC-SCNC: 13 MMOL/L (ref 5–15)
BUN SERPL-MCNC: 14 MG/DL (ref 6–20)
BUN/CREAT SERPL: 21 (ref 12–20)
CALCIUM SERPL-MCNC: 7.8 MG/DL (ref 8.5–10.1)
CHLORIDE SERPL-SCNC: 109 MMOL/L (ref 97–108)
CO2 SERPL-SCNC: 16 MMOL/L (ref 21–32)
CREAT SERPL-MCNC: 0.68 MG/DL (ref 0.7–1.3)
GLUCOSE BLD STRIP.AUTO-MCNC: 117 MG/DL (ref 65–100)
GLUCOSE BLD STRIP.AUTO-MCNC: 132 MG/DL (ref 65–100)
GLUCOSE BLD STRIP.AUTO-MCNC: 161 MG/DL (ref 65–100)
GLUCOSE BLD STRIP.AUTO-MCNC: 241 MG/DL (ref 65–100)
GLUCOSE SERPL-MCNC: 111 MG/DL (ref 65–100)
HGB BLD-MCNC: 9.7 G/DL (ref 12.1–17)
POTASSIUM SERPL-SCNC: 3.5 MMOL/L (ref 3.5–5.1)
SERVICE CMNT-IMP: ABNORMAL
SODIUM SERPL-SCNC: 138 MMOL/L (ref 136–145)

## 2020-08-18 PROCEDURE — 85018 HEMOGLOBIN: CPT

## 2020-08-18 PROCEDURE — 97165 OT EVAL LOW COMPLEX 30 MIN: CPT

## 2020-08-18 PROCEDURE — 74011000258 HC RX REV CODE- 258: Performed by: PHYSICIAN ASSISTANT

## 2020-08-18 PROCEDURE — 97166 OT EVAL MOD COMPLEX 45 MIN: CPT

## 2020-08-18 PROCEDURE — 36415 COLL VENOUS BLD VENIPUNCTURE: CPT

## 2020-08-18 PROCEDURE — 80048 BASIC METABOLIC PNL TOTAL CA: CPT

## 2020-08-18 PROCEDURE — 97530 THERAPEUTIC ACTIVITIES: CPT

## 2020-08-18 PROCEDURE — 74011250636 HC RX REV CODE- 250/636: Performed by: PHYSICIAN ASSISTANT

## 2020-08-18 PROCEDURE — 97116 GAIT TRAINING THERAPY: CPT

## 2020-08-18 PROCEDURE — 97161 PT EVAL LOW COMPLEX 20 MIN: CPT

## 2020-08-18 PROCEDURE — 65270000029 HC RM PRIVATE

## 2020-08-18 PROCEDURE — 74011250637 HC RX REV CODE- 250/637: Performed by: PHYSICIAN ASSISTANT

## 2020-08-18 PROCEDURE — 74011636637 HC RX REV CODE- 636/637: Performed by: INTERNAL MEDICINE

## 2020-08-18 PROCEDURE — 82962 GLUCOSE BLOOD TEST: CPT

## 2020-08-18 RX ORDER — AMOXICILLIN 250 MG
1 CAPSULE ORAL 2 TIMES DAILY
Qty: 20 TAB | Refills: 0 | Status: SHIPPED | OUTPATIENT
Start: 2020-08-18 | End: 2021-01-12 | Stop reason: ALTCHOICE

## 2020-08-18 RX ORDER — ACETAMINOPHEN 500 MG
1000 TABLET ORAL EVERY 6 HOURS
Qty: 120 TAB | Refills: 0 | Status: SHIPPED | OUTPATIENT
Start: 2020-08-18 | End: 2020-08-20

## 2020-08-18 RX ORDER — OXYCODONE HYDROCHLORIDE 5 MG/1
5 TABLET ORAL
Qty: 40 TAB | Refills: 0 | Status: SHIPPED | OUTPATIENT
Start: 2020-08-18 | End: 2020-08-25

## 2020-08-18 RX ADMIN — METOPROLOL TARTRATE 50 MG: 50 TABLET, FILM COATED ORAL at 09:44

## 2020-08-18 RX ADMIN — ACETAMINOPHEN 1000 MG: 500 TABLET ORAL at 03:01

## 2020-08-18 RX ADMIN — FAMOTIDINE 20 MG: 20 TABLET ORAL at 09:43

## 2020-08-18 RX ADMIN — METFORMIN HYDROCHLORIDE 1000 MG: 500 TABLET ORAL at 09:43

## 2020-08-18 RX ADMIN — OXYCODONE HYDROCHLORIDE 5 MG: 5 TABLET ORAL at 03:01

## 2020-08-18 RX ADMIN — DOCUSATE SODIUM 50MG AND SENNOSIDES 8.6MG 1 TABLET: 8.6; 5 TABLET, FILM COATED ORAL at 09:43

## 2020-08-18 RX ADMIN — FAMOTIDINE 20 MG: 20 TABLET ORAL at 17:49

## 2020-08-18 RX ADMIN — ACETAMINOPHEN 1000 MG: 500 TABLET ORAL at 19:41

## 2020-08-18 RX ADMIN — OXYCODONE HYDROCHLORIDE 10 MG: 5 TABLET ORAL at 18:53

## 2020-08-18 RX ADMIN — INSULIN LISPRO 2 UNITS: 100 INJECTION, SOLUTION INTRAVENOUS; SUBCUTANEOUS at 17:48

## 2020-08-18 RX ADMIN — DOCUSATE SODIUM 50MG AND SENNOSIDES 8.6MG 1 TABLET: 8.6; 5 TABLET, FILM COATED ORAL at 17:49

## 2020-08-18 RX ADMIN — ACETAMINOPHEN 1000 MG: 500 TABLET ORAL at 14:13

## 2020-08-18 RX ADMIN — OXYCODONE HYDROCHLORIDE 10 MG: 5 TABLET ORAL at 10:32

## 2020-08-18 RX ADMIN — ACETAMINOPHEN 1000 MG: 500 TABLET ORAL at 07:16

## 2020-08-18 RX ADMIN — METFORMIN HYDROCHLORIDE 1000 MG: 500 TABLET ORAL at 17:49

## 2020-08-18 RX ADMIN — POLYETHYLENE GLYCOL 3350 17 G: 17 POWDER, FOR SOLUTION ORAL at 09:42

## 2020-08-18 RX ADMIN — CEFAZOLIN SODIUM 3 G: 10 INJECTION, POWDER, FOR SOLUTION INTRAVENOUS at 07:16

## 2020-08-18 RX ADMIN — METOPROLOL TARTRATE 50 MG: 50 TABLET, FILM COATED ORAL at 17:49

## 2020-08-18 RX ADMIN — INSULIN LISPRO 3 UNITS: 100 INJECTION, SOLUTION INTRAVENOUS; SUBCUTANEOUS at 12:47

## 2020-08-18 RX ADMIN — GLIPIZIDE 10 MG: 5 TABLET ORAL at 17:49

## 2020-08-18 RX ADMIN — ROSUVASTATIN 20 MG: 10 TABLET, FILM COATED ORAL at 21:51

## 2020-08-18 RX ADMIN — OXYCODONE HYDROCHLORIDE 5 MG: 5 TABLET ORAL at 07:17

## 2020-08-18 RX ADMIN — APIXABAN 2.5 MG: 2.5 TABLET, FILM COATED ORAL at 17:50

## 2020-08-18 RX ADMIN — GLIPIZIDE 10 MG: 5 TABLET ORAL at 07:21

## 2020-08-18 RX ADMIN — APIXABAN 2.5 MG: 2.5 TABLET, FILM COATED ORAL at 07:26

## 2020-08-18 RX ADMIN — Medication 10 ML: at 21:16

## 2020-08-18 NOTE — PROGRESS NOTES
Nutrition Note    Nutrition risk trigger for hx EN/TPN in error. No hx nutrition support. Regular diet; 's; Admit wt ~280# BMI 37 obesity. Pt claims wt stable. Natural teeth, chew/swallow without difficulty. Current appetite ok, \"eat what I want to\". No GI complaints. Rx Glucophage and Glucotrol. Last A1C POC 7.3 (7/28/2020). No malnutrition risk.      Electronically signed by Amna Smith RD on 8/18/2020 at 12:31 PM    Contact: [employer]

## 2020-08-18 NOTE — PROGRESS NOTES
Problem: Mobility Impaired (Adult and Pediatric)  Goal: *Acute Goals and Plan of Care (Insert Text)  Description: FUNCTIONAL STATUS PRIOR TO ADMISSION: Patient was independent and active without use of DME. Pt was working full time. HOME SUPPORT PRIOR TO ADMISSION: The patient lived with wife but did not require assist.    Physical Therapy Goals  Initiated 8/18/2020  1. Patient will move from supine to sit and sit to supine , scoot up and down, and roll side to side in bed with modified independence within 7 day(s). 2.  Patient will transfer from bed to chair and chair to bed with modified independence using the least restrictive device within 7 day(s). 3.  Patient will perform sit to stand with modified independence within 7 day(s). 4.  Patient will ambulate with modified independence for 40 feet with the least restrictive device within 7 day(s). 5.  Patient will ascend/descend 1 step with no handrail(s) with minimal assistance/contact guard assist within 7 day(s). PHYSICAL THERAPY EVALUATION  Patient: Génesis Brink (89 y.o. male)  Date: 8/18/2020  Primary Diagnosis: Femur fracture ([address] [S72.90XA]  Procedure(s) (LRB):  Open reduction internal reductionof right supracondylar femur fracture. Synthes plate. Flouro. Request to follow self (Right) 1 Day Post-Op   Precautions:   Fall, TTWB Right leg    ASSESSMENT  Based on the objective data described below, the patient presents POD # 1 open reduction internal reduction of right supracondylar femur fracture - TTWB - with pain right thigh, decreased ROM/strength/function right leg, decline in functional mobility, and impaired gait/balance. Pt mobilized bed to chair - no reports of dizziness - pt having difficulty maintaining TTWB status. Current Level of Function Impacting Discharge (mobility/balance):  Mod assist x 1 supine to sit; mod assist x 2 for sit to/from stand, min assist x 1 for TTWB / RW - 2 feet (limited secondary to unable to maintain TTWB status)    Functional Outcome Measure: The patient scored 45/100 on the Barthel Index outcome measure which is indicative of 55% disability of ADL's. Other factors to consider for discharge: pt planning to stay on first floor of home     Patient will benefit from skilled therapy intervention to address the above noted impairments. PLAN :  Recommendations and Planned Interventions: bed mobility training, transfer training, gait training, therapeutic exercises, patient and family training/education, and therapeutic activities      Frequency/Duration: Patient will be followed by physical therapy:  twice daily to address goals. Recommendation for discharge: (in order for the patient to meet his/her long term goals)  Physical therapy at least 2 days/week in the home AND ensure assist and/or supervision for safety with functional mobility    This discharge recommendation:  Has been made in collaboration with the attending provider and/or case management    IF patient discharges home will need the following DME: patient owns DME required for discharge         SUBJECTIVE:   Patient stated i'm in a lot of pain.   when right leg dependent    OBJECTIVE DATA SUMMARY:   HISTORY:    Past Medical History:   Diagnosis Date    Arthritis     knees    Benign essential hypertension 6/10/2011    CAD (coronary artery disease)     Diabetes (HCC)     DX  AGE 33      Hypercholesterolemia     Hypertriglyceridemia     Other ill-defined conditions(799.89)     obesity    Other ill-defined conditions(799.89)     high cholesterol    PAF (paroxysmal atrial fibrillation) ([facility] [address] 12/12/2018    SOB (shortness of breath) on exertion 5/16/2011     Past Surgical History:   Procedure Laterality Date    CARDIAC CATHETERIZATION  6/9/2011         CARDIAC CATHETERIZATION  11/1/2012         CARDIAC SURG PROCEDURE UNLIST      STENT RCA  6/11    HC ANGIOSEAL VIP 6FR  6/9/2011         HX ORTHOPAEDIC      left knee replacement    HX ORTHOPAEDIC R knee replacement  2010 -Starr Sauer. [place] DRUG-ELUTING STENTS, SINGLE  6/9/2011            Personal factors and/or comorbidities impacting plan of care: none    Home Situation  Home Environment: Private residence  # Steps to Enter: 1  Rails to Enter: No  One/Two Story Residence: Two story(pt plans to stay on first floor)  Living Alone: No  Support Systems: Spouse/Significant Other/Partner(2 children)  Patient Expects to be Discharged to[de-identified] Private residence  Current DME Used/Available at Home: oliva Pacheco Harvin Cater, straight    EXAMINATION/PRESENTATION/DECISION MAKING:   Critical Behavior:  Neurologic State: Alert  Orientation Level: Oriented X4  Cognition: Appropriate decision making, Appropriate for age attention/concentration, Appropriate safety awareness  Safety/Judgement: Awareness of environment, Good awareness of safety precautions  Hearing: Auditory  Auditory Impairment: None    Range Of Motion:  AROM: Within functional limits(except right leg)                       Strength:    Strength: Within functional limits(except right leg)                    Tone & Sensation:   Tone: Normal              Sensation: Intact               Coordination:  Coordination: Within functional limits  Functional Mobility:  Bed Mobility:     Supine to Sit: Moderate assistance;Assist x1     Scooting: Moderate assistance;Assist x1  Transfers:  Sit to Stand: Moderate assistance;Assist x1(min assist x 1)  Stand to Sit: Moderate assistance;Assist x2        Bed to Chair: Moderate assistance;Assist x2              Balance:   Sitting: Impaired; Without support  Sitting - Static: Good (unsupported)  Sitting - Dynamic: Fair (occasional)(limited by pain - keeps right leg extended)  Standing: Impaired; With support  Standing - Static: Good;Constant support  Standing - Dynamic : Fair;Constant support  Ambulation/Gait Training:  Distance (ft): 2 Feet (ft)     Ambulation - Level of Assistance: Minimal assistance;Assist x1(repeated verbal cues for TTWB right leg)        Gait Abnormalities: Antalgic;Decreased step clearance(decreased stride)  Right Side Weight Bearing: Toe touch  Left Side Weight Bearing: Full  Base of Support: Narrowed; Shift to left  Stance: (right TTWB)  Speed/Lexi: Slow  Step Length: Left shortened  Swing Pattern: Right asymmetrical       Therapeutic Exercises:   Pt instructed to perform ankle pumps - 10 reps once hr when awake. Functional Measure:  Barthel Index:    Bathin  Bladder: 10  Bowels: 10  Groomin  Dressin  Feeding: 10  Mobility: 0  Stairs: 0  Toilet Use: 0  Transfer (Bed to Chair and Back): 5  Total: 45/100       The Barthel ADL Index: Guidelines  1. The index should be used as a record of what a patient does, not as a record of what a patient could do. 2. The main aim is to establish degree of independence from any help, physical or verbal, however minor and for whatever reason. 3. The need for supervision renders the patient not independent. 4. A patient's performance should be established using the best available evidence. Asking the patient, friends/relatives and nurses are the usual sources, but direct observation and common sense are also important. However direct testing is not needed. 5. Usually the patient's performance over the preceding 24-48 hours is important, but occasionally longer periods will be relevant. 6. Middle categories imply that the patient supplies over 50 per cent of the effort. 7. Use of aids to be independent is allowed. Rashawn Barber., Barthel, DNatashaW. (4613). Functional evaluation: the Barthel Index. [address] [facility] (14)2. [facility] Fall GUCCI Chu Maybell Castilla.[facility]., Cindy, [address] [place] [address] Ave (). Measuring the change indisability after inpatient rehabilitation; comparison of the responsiveness of the Barthel Index and Functional Cherry Measure. Journal of Neurology, Neurosurgery, and Psychiatry, 66(4), 797-134.   KELSEY Hamilton.CHERELLE, TROY Yao, & Tremayne Regan M.A. (2004.) Assessment of post-stroke quality of life in cost-effectiveness studies: The usefulness of the Barthel Index and the EuroQoL-5D. Quality of Life Research, 15, 177-20           Physical Therapy Evaluation Charge Determination   History Examination Presentation Decision-Making   LOW Complexity : Zero comorbidities / personal factors that will impact the outcome / POC LOW Complexity : 1-2 Standardized tests and measures addressing body structure, function, activity limitation and / or participation in recreation  LOW Complexity : Stable, uncomplicated  LOW Complexity : FOTO score of       Based on the above components, the patient evaluation is determined to be of the following complexity level: LOW     Pain Rating:  Right thigh 3/10 at rest; during standing/transfers 8/10    Activity Tolerance:   Fair - limited by pain and ability to perform TTWB  Please refer to the flowsheet for vital signs taken during this treatment. After treatment patient left in no apparent distress:   Sitting in chair, Call bell within reach, and Ice right thigh    COMMUNICATION/EDUCATION:   The patients plan of care was discussed with: Registered nurse. Fall prevention education was provided and the patient/caregiver indicated understanding., Patient/family have participated as able in goal setting and plan of care. , and Patient/family agree to work toward stated goals and plan of care.     Thank you for this referral.  Binh Sauer, PT   Time Calculation: 20 mins

## 2020-08-18 NOTE — PROGRESS NOTES
Problem: Mobility Impaired (Adult and Pediatric)  Goal: *Acute Goals and Plan of Care (Insert Text)  Description: FUNCTIONAL STATUS PRIOR TO ADMISSION: Patient was independent and active without use of DME. Pt was working full time. HOME SUPPORT PRIOR TO ADMISSION: The patient lived with wife but did not require assist.    Physical Therapy Goals  Initiated 8/18/2020  1. Patient will move from supine to sit and sit to supine , scoot up and down, and roll side to side in bed with modified independence within 7 day(s). 2.  Patient will transfer from bed to chair and chair to bed with modified independence using the least restrictive device within 7 day(s). 3.  Patient will perform sit to stand with modified independence within 7 day(s). 4.  Patient will ambulate with modified independence for 40 feet with the least restrictive device within 7 day(s). 5.  Patient will ascend/descend 1 step with no handrail(s) with minimal assistance/contact guard assist within 7 day(s). 8/18/2020 1331 by Dottie Cope PT  Outcome: Progressing Towards Goal  PHYSICAL THERAPY TREATMENT  Patient: Tali Calderon (21 y.o. male)  Date: 8/18/2020  Diagnosis: Femur fracture ([address] [S72.90XA]   Supracondylar fracture of distal end of femur without intracondylar extension ([address]  Procedure(s) (LRB):  Open reduction internal reductionof right supracondylar femur fracture. Synthes plate. Flouro. Request to follow self (Right) 1 Day Post-Op  Precautions: Fall, TTWB  Chart, physical therapy assessment, plan of care and goals were reviewed. ASSESSMENT  Patient continues with skilled PT services and is progressing towards goals. Pt tolerated OOB/ up in chair > 2 hours. Pt requiring mod assist x 2 to stand from low surface - TTWB right leg. Pt continues to have difficulty maintaining TTWB right leg. Current Level of Function Impacting Discharge (mobility/balance):  Mod assist x 2 for sit to stand from low surface; CGA for amb with RW - limited distance 2 feet     Other factors to consider for discharge: only 1 step to enter; per pt has adult sons who can assist at home         PLAN :  Patient continues to benefit from skilled intervention to address the above impairments. Continue treatment per established plan of care. to address goals. Recommendation for discharge: (in order for the patient to meet his/her long term goals)  Physical therapy at least 2 days/week in the home AND ensure assist and/or supervision for safety with functional transfers    This discharge recommendation:  Has been made in collaboration with the attending provider and/or case management    IF patient discharges home will need the following DME: patient owns DME required for discharge - pt may rent wheelchair       SUBJECTIVE:   Patient stated I might just rent a wheelchair.     OBJECTIVE DATA SUMMARY:   Critical Behavior:  Neurologic State: Alert  Orientation Level: Oriented X4  Cognition: Appropriate decision making, Appropriate for age attention/concentration, Appropriate safety awareness  Safety/Judgement: Awareness of environment, Good awareness of safety precautions  Functional Mobility Training:  Bed Mobility:     Supine to Sit: Moderate assistance;Assist x1     Scooting: Moderate assistance;Assist x1        Transfers:  Sit to Stand: Moderate assistance;Assist x2(from low surfaces)  Stand to Sit: Moderate assistance;Assist x1        Bed to Chair: Moderate assistance;Assist x1                    Balance:  Sitting: Impaired; Without support  Sitting - Static: Good (unsupported)  Sitting - Dynamic: Fair (occasional)  Standing: Impaired; With support  Standing - Static: Good  Standing - Dynamic : Fair  Ambulation/Gait Training:  Distance (ft): 2 Feet (ft)     Ambulation - Level of Assistance: Contact guard assistance        Gait Abnormalities: Antalgic;Decreased step clearance  Right Side Weight Bearing:  Toe touch  Left Side Weight Bearing: Full  Base of Support: Narrowed; Shift to left  Stance: (right TTWB)  Speed/Lexi: Slow  Step Length: Left shortened  Swing Pattern: Right asymmetrical             Therapeutic Exercises:   Pt perform ankle pumps x 10 - sitting in chair. Pain Rating:  Right thigh 3/10    Activity Tolerance:   Good - POD # 1 - tolerated up in chair > 2 hrs. Please refer to the flowsheet for vital signs taken during this treatment. After treatment patient left in no apparent distress:   Supine in bed and Call bell within reach    COMMUNICATION/COLLABORATION:   The patients plan of care was discussed with: Registered nurse.      Nayeli Roberts, PT   Time Calculation: 15 mins

## 2020-08-18 NOTE — OP NOTES
[address] [facility]  OPERATIVE REPORT    Name:  Swati Clark  MR#:  [id number]  :  1960  ACCOUNT #:  [de-identified]  DATE OF SERVICE:  2020      PREOPERATIVE DIAGNOSIS:  Right supracondylar femur fracture above total knee prosthesis. POSTOPERATIVE DIAGNOSIS:  Right supracondylar femur fracture above total knee prosthesis. PROCEDURE PERFORMED:  Open reduction and internal fixation of right supracondylar femur fracture with Synthes plate. SURGEON:  Gene Cordova MD    ASSISTANT:  Brii Greer, physician assistant    ANESTHESIA:  General.    COMPLICATIONS:  None. SPECIMENS REMOVED:  None. IMPLANTS:  Supracondylar femoral plate (Synthes). ESTIMATED BLOOD LOSS:  300 mL. INDICATIONS:  A 57-year-old gentleman who has undergone bilateral total knee replacement in the past, who fell yesterday on a wet floor as he was going in to his home and suffered an acute hyperflexion of the knee resulting in a displaced supracondylar femur fracture. PROCEDURE:  The patient was taken to the operating room and underwent general inhalational anesthesia. The right leg was prepped and draped in the usual fashion. A longitudinal lateral incision was made down through the skin and subcutaneous tissue. Fascia was incised longitudinally. Vastus lateralis muscle was incised longitudinally with the Bovie. Vessels were cauterized. Distal femur was dissected subperiosteally exposing the joint and distal femoral component. A six-hole supracondylar plate was approximated to the distal femur and fluoro was used to assess our position. The reduction was anatomic both visually and by x-ray. Two pins were placed in the distal screw holes to temporarily hold the plate in place. A turkey claw clamp was used to approximate the plate proximally to the femoral shaft. Again, x-rays were used to confirm our reduction both in AP and lateral planes. I then placed locking screws distally.   I had to angulate these to go around the posterior stabilized box of the femoral component. We got good fixation with all our screws distally. Cortical screws were placed proximally also with good fixation. Fluoroscopy was used to confirm reduction of the fracture and placement of all hardware in AP and lateral planes. Reduction appeared anatomic. Wounds were extensively irrigated with saline solution. Fascia was repaired using #2 Vicryl interrupted figure-of-eight fashion. Subcutaneous tissue was approximated using 2-0 Vicryl in interrupted fashion. Skin edges were approximated using stapling apparatus. Bulky sterile dressing was applied and the patient was awakened, extubated, and transferred to recovery room in stable condition. The physician assistant was critical throughout the case in assisting with reduction of the fracture as well as retraction and closure.         Mayo Srivastava MD      [email or web address]  D:  08/17/2020 16:54  T:  08/17/2020 20:41  JOB #:  [id number]

## 2020-08-18 NOTE — PROGRESS NOTES
Problem: Self Care Deficits Care Plan (Adult)  Goal: *Acute Goals and Plan of Care (Insert Text)  Description:   FUNCTIONAL STATUS PRIOR TO ADMISSION: Pt was independent; working full time, driving     HOME SUPPORT: The patient lived with wife and 2 adult sons but did not require assist.    Occupational Therapy Goals  Initiated 8/18/2020  1. Patient will perform grooming standing at sink with minimal assistance/contact guard assist within 7 day(s). 2.  Patient will perform upper body dressing with supervision/set-up within 7 day(s). 3.  Patient will perform lower body dressing with moderate assistance  within 7 day(s). 4.  Patient will perform toilet transfers with moderate assistance  within 7 day(s). 5.  Patient will perform all aspects of toileting with moderate assistance  within 7 day(s). Outcome: Not Met     OCCUPATIONAL THERAPY EVALUATION  Patient: Amadou Riggs (89 y.o. male)  Date: 8/18/2020  Primary Diagnosis: Femur fracture ([address] [S72.90XA]  Procedure(s) (LRB):  Open reduction internal reductionof right supracondylar femur fracture. Synthes plate. Flouro. Request to follow self (Right) 1 Day Post-Op   Precautions:   Fall, TTWB    ASSESSMENT  Based on the objective data described below, the patient presents with decreased LB strength, endurance, activity tolerance, difficulty maintaining TTWB, functional transfers, standing tolerance, RLE pain s/p open reduction internal reduction of R supracondylar femur fx. Pt received sitting up in chair agreeable to therapy and received pain meds from RN at start of session. Pt unable to perform anterior reach to perform LB dressing limited by pain requiring overall total A. Educated on LB AE and pt reports he owns hip kit and will utilize at home. Pt performed sit>stand with max A RW with pt demonstrating difficulty with RLE management and maintaining TTWB precautions.  Recommend x2 for safety to maintain precautions and pt presents with difficulty with transitional movements. Pt is functioning below his baseline and initially recommended IPR for d/c but spoke with pt and pt declined 2/2 Covid epidemic. Pt reports his adult sons can assist at home. OT educated pt on need for mod-max A with transfers with RW and assist with dressing and pt reports family will assist. Donny Ha at d/c to address increased independence with ADLs. Current Level of Function Impacting Discharge (ADLs/self-care): total A LB dressing, mod-max A transfers with RW, min A UB dressing, mod A bathing    Functional Outcome Measure: The patient scored 45/100 on the Barthel Index outcome measure which is indicative of 55% impairment. Other factors to consider for discharge: Independent prior, requires mod-max A with transfers     Patient will benefit from skilled therapy intervention to address the above noted impairments. PLAN :  Recommendations and Planned Interventions: self care training, functional mobility training, therapeutic exercise, balance training, therapeutic activities, endurance activities, patient education, home safety training, and family training/education    Frequency/Duration: Patient will be followed by occupational therapy 5 times a week to address goals. Recommendation for discharge: (in order for the patient to meet his/her long term goals)  Occupational therapy at least 2 days/week in the home AND ensure assist and/or supervision for safety with ADLs and functional transfers    This discharge recommendation:  Has been made in collaboration with the attending provider and/or case management    IF patient discharges home will need the following DME: patient owns DME required for discharge       SUBJECTIVE:   Patient stated i'm not going there (rehab). Thank you for telling me about it but no way. I'm already nervous about Covid here.     OBJECTIVE DATA SUMMARY:   HISTORY:   Past Medical History:   Diagnosis Date    Arthritis     knees    Benign essential hypertension 6/10/2011    CAD (coronary artery disease)     Diabetes ([facility]     DX  AGE 33      Hypercholesterolemia     Hypertriglyceridemia     Other ill-defined conditions(799.89)     obesity    Other ill-defined conditions(799.89)     high cholesterol    PAF (paroxysmal atrial fibrillation) ([facility] 12/12/2018    SOB (shortness of breath) on exertion 5/16/2011     Past Surgical History:   Procedure Laterality Date    CARDIAC CATHETERIZATION  6/9/2011         CARDIAC CATHETERIZATION  11/1/2012         CARDIAC SURG PROCEDURE UNLIST      STENT RCA  6/11    HC ANGIOSEAL VIP 6FR  6/9/2011         HX ORTHOPAEDIC      left knee replacement    HX ORTHOPAEDIC      R knee replacement  2010 -Nikia Kelly.  MA DRUG-ELUTING STENTS, SINGLE  6/9/2011            Expanded or extensive additional review of patient history:     Home Situation  Home Environment: Private residence  # Steps to Enter: 1  Rails to Enter: No  One/Two Story Residence: Two story(pt plans to stay on first floor)  Living Alone: No  Support Systems: Spouse/Significant Other/Partner(2 children)  Patient Expects to be Discharged to[de-identified] Private residence  Current DME Used/Available at Home: [employer], [facility], [place], straight  Tub or Shower Type: Tub/Shower combination    Hand dominance: Right    EXAMINATION OF PERFORMANCE DEFICITS:  Cognitive/Behavioral Status:  Neurologic State: Alert  Orientation Level: Oriented X4  Cognition: Appropriate decision making; Appropriate for age attention/concentration; Appropriate safety awareness  Perception: Appears intact  Perseveration: No perseveration noted  Safety/Judgement: Awareness of environment;Good awareness of safety precautions    Skin: visible skin intact    Edema: none noted    Hearing:   Auditory  Auditory Impairment: None    Vision/Perceptual:    Tracking: Able to track stimulus in all quadrants w/o difficulty                                Range of Motion:    AROM: Within functional limits(except right leg)                         Strength:    Strength: Within functional limits(except right leg)                Coordination:  Coordination: Within functional limits  Fine Motor Skills-Upper: Left Intact; Right Intact    Gross Motor Skills-Upper: Left Intact; Right Intact    Tone & Sensation:    Tone: Normal  Sensation: Intact                      Balance:  Sitting: Impaired; Without support  Sitting - Static: Good (unsupported)  Sitting - Dynamic: Fair (occasional)(limited by pain - keeps right leg extended)  Standing: Impaired; With support  Standing - Static: Good;Constant support  Standing - Dynamic : Fair;Constant support    Functional Mobility and Transfers for ADLs:  Bed Mobility:  Supine to Sit: Moderate assistance;Assist x1  Scooting: Moderate assistance;Assist x1    Transfers:  Sit to Stand: Moderate assistance;Assist x1(min assist x 1)  Stand to Sit: Moderate assistance;Assist x2  Bed to Chair: Moderate assistance;Assist x2    ADL Assessment:  Feeding: Independent    Oral Facial Hygiene/Grooming: Independent    Bathing: Maximum assistance    Upper Body Dressing: Minimum assistance    Lower Body Dressing: Total assistance    Toileting: Maximum assistance                ADL Intervention and task modifications:                                     Cognitive Retraining  Safety/Judgement: Awareness of environment;Good awareness of safety precautions    Therapeutic Exercise:     Functional Measure:  Barthel Index:    Bathin  Bladder: 10  Bowels: 10  Groomin  Dressin  Feeding: 10  Mobility: 0  Stairs: 0  Toilet Use: 0  Transfer (Bed to Chair and Back): 5  Total: 45/100        The Barthel ADL Index: Guidelines  1. The index should be used as a record of what a patient does, not as a record of what a patient could do. 2. The main aim is to establish degree of independence from any help, physical or verbal, however minor and for whatever reason.   3. The need for supervision renders the patient not independent. 4. A patient's performance should be established using the best available evidence. Asking the patient, friends/relatives and nurses are the usual sources, but direct observation and common sense are also important. However direct testing is not needed. 5. Usually the patient's performance over the preceding 24-48 hours is important, but occasionally longer periods will be relevant. 6. Middle categories imply that the patient supplies over 50 per cent of the effort. 7. Use of aids to be independent is allowed. Medina Moore., Barthel, D.W. (0271). Functional evaluation: the Barthel Index. [address] [facility] (14)2. [facility] percy GUCCI Vargas, Steffanie Jack., Reta Mckenzie., Anastasiia Washburn, [address] [place] [address] (1999). Measuring the change indisability after inpatient rehabilitation; comparison of the responsiveness of the Barthel Index and Functional [place]row Measure. Journal of Neurology, Neurosurgery, and Psychiatry, 66(4), 266-451. LULU Wheeler, TROY Yao, & Corey Zuluaga M.A. (2004.) Assessment of post-stroke quality of life in cost-effectiveness studies: The usefulness of the Barthel Index and the EuroQoL-5D.  Quality of Life Research, 15, 705-98        Occupational Therapy Evaluation Charge Determination   History Examination Decision-Making   LOW Complexity : Brief history review  MEDIUM Complexity : 3-5 performance deficits relating to physical, cognitive , or psychosocial skils that result in activity limitations and / or participation restrictions LOW Complexity : No comorbidities that affect functional and no verbal or physical assistance needed to complete eval tasks       Based on the above components, the patient evaluation is determined to be of the following complexity level: LOW   Pain Rating:  10/10 R leg; RN aware and provided pain meds at start of session    Activity Tolerance:   Fair and requires frequent rest breaks  Please refer to the flowsheet for vital signs taken during this treatment. After treatment patient left in no apparent distress:    Sitting in chair and Call bell within reach    COMMUNICATION/EDUCATION:   The patients plan of care was discussed with: Physical therapist and Case management. Home safety education was provided and the patient/caregiver indicated understanding., Patient/family have participated as able in goal setting and plan of care. and Patient/family agree to work toward stated goals and plan of care. This patients plan of care is appropriate for delegation to [facility].     Thank you for this referral.  Kristen Carrasco  Time Calculation: 28 mins

## 2020-08-18 NOTE — PROGRESS NOTES
Primary Nurse Sia Schwarz RN and Elisa Anton RN performed a dual skin assessment on this patient No impairment noted  Joseph score is 17

## 2020-08-18 NOTE — PROGRESS NOTES
0800  Bedside and Verbal shift change report given to Daisy Irvin (oncoming nurse) by Marcie Lucas RN  (offgoing nurse).  Report included the following information SBAR, Kardex, OR Summary, Procedure Summary, Intake/Output, MAR and Recent Results.

## 2020-08-18 NOTE — PROGRESS NOTES
Hospitalist Progress Note  Savannah Do MD  Answering service: [phone] from in house phone      Date of Service:  2020  NAME:  Lidia Bailey  :  1960  MRN:  [id number]      Admission Summary:   Lidia Bailey is a 62 y.o. male with PMH Diabetes Mellitus, HTN , CAD presents to the ED c/o right knee pain after he sustained a fall at work. Patient states that he was walking on wet floor when he slipped and fell falling with his right leg bended under him. He was not able to stand up due to pain. He denies any symptoms such as lightheadedness, dizziness, tunnel vision, palpitations, chest pain or SOB prior to the fall. In the ED he had X-ray that showed a Distal femoral metaphyseal fracture. Orthopedic surgery was contacted by the ER physician who requested hospitalist admission and plan for possible ORIF. Interval history / Subjective:      Patient is seen and examined this morning. He has tolerated the procedure well. No overnight event. Patient not in pain but afraid to move his leg. Pain controlled with oral oxycodone. Assessment & Plan:     # Distal Femoral fracture due to mechanical fall.  - s/p ORIF with synthes plate   - Pain control  - PT/OT  - Ortho following appt input   - DVT ppx: Eliquis      # Acute anemia due to blood loss  - expected blood loss during surgery   - monitor Hgb, no need for transfusion     # Diabetes mellitus type 2:  - resume home metformin, Glipizide and Jardiance for now. - off SSI     # Hypertension: BP stable. Continue with metoprolol for now.     # CAD: No active cardiac issues. - EKG showed normal sinus rhythm with nonspecific T wave changes. - Continue with aspirin after surgery.     # Hyperlipidemia: Continue home medication after surgical intervention.     # History of paroxysmal A. fib: Currently normal sinus rhythm. ANGELA?DS?-VASc Score 3.  Not on Eliquis anymore due to side effect but now he will be on it for one month for DVT ppx      DVT prophy: Eliquis   Code status: full code  Plan discussed with patient.      FUNCTIONAL STATUS PRIOR TO HOSPITALIZATION Ambulates Independently      Hospital Problems  Date Reviewed: 8/17/2020          Codes Class Noted POA    * (Principal) Supracondylar fracture of distal end of femur without intracondylar extension ([facility] [address] ICD-10-CM: X58.336D  ICD-9-CM: 821.23  8/17/2020 Yes        Femur fracture ([facility] [address] ICD-10-CM: U37.75OY  ICD-9-CM: 821.00  8/16/2020 Unknown                Review of Systems:   Pertinent positive mentioned in interval hx/HPI. Other systems reviewed and negative     Vital Signs:    Last 24hrs VS reviewed since prior progress note. Most recent are:  Visit Vitals  /62 (BP 1 Location: Left arm, BP Patient Position: At rest)   Pulse 93   Temp 99.6 °F (37.6 °C)   Resp 16   Ht 6' 1\" (1.854 m)   Wt 127.2 kg (280 lb 6.8 oz)   SpO2 95%   BMI 37.00 kg/m²         Intake/Output Summary (Last 24 hours) at 8/18/2020 1111  Last data filed at 8/18/2020 0946  Gross per 24 hour   Intake 1100 ml   Output 1275 ml   Net -175 ml        Physical Examination:             Constitutional:  No acute distress, cooperative, pleasant    ENT:  Oral mucous moist, oropharynx benign. Neck supple,    Resp:  CTA bilaterally. No wheezing/rhonchi/rales. No accessory muscle use   CV:  Regular rhythm, normal rate, no murmurs, gallops, rubs    GI:  Soft, non distended, non tender. normoactive bowel sounds, no hepatosplenomegaly     Musculoskeletal:  right leg covered with dressing. +DP, PT pulse. He can wiggle his toes     Neurologic:  Moves all extremities except right leg due to fear of pain.   AAOx3, CN II-XII reviewed           Data Review:     I personally reviewed labs and imaging     Labs:     Recent Labs     08/18/20  0303 08/16/20  0248   WBC  --  8.4   HGB 9.7* 12.1   HCT  --  38.8   PLT  --  258     Recent Labs     08/18/20  0303 08/16/20  0248    141   K 3.5 3.9   * 111*   CO2 16* 25   BUN 14 16   CREA 0.68* 0.75   * 127*   CA 7.8* 8.9     Recent Labs     08/16/20  0248   ALT 18   AP 45   TBILI 0.4   TP 6.7   ALB 3.2*   GLOB 3.5     No results for input(s): INR, PTP, APTT, INREXT, INREXT in the last 72 hours. No results for input(s): FE, TIBC, PSAT, FERR in the last 72 hours. No results found for: FOL, RBCF   No results for input(s): PH, PCO2, PO2 in the last 72 hours. No results for input(s): CPK, CKNDX, TROIQ in the last 72 hours.     No lab exists for component: CPKMB  Lab Results   Component Value Date/Time    Cholesterol, total 126 07/28/2020 09:09 AM    HDL Cholesterol 30 (L) 07/28/2020 09:09 AM    LDL, calculated 72 07/28/2020 09:09 AM    Triglyceride 121 07/28/2020 09:09 AM    CHOL/HDL Ratio 7.1 (H) 06/09/2011 02:12 PM     Lab Results   Component Value Date/Time    Glucose (POC) 132 (H) 08/18/2020 07:06 AM    Glucose (POC) 124 (H) 08/17/2020 09:09 PM    Glucose (POC) 123 (H) 08/17/2020 05:59 PM    Glucose (POC) 117 (H) 08/17/2020 11:26 AM    Glucose (POC) 171 (H) 08/17/2020 06:18 AM     Lab Results   Component Value Date/Time    Color YELLOW 02/21/2012 05:15 PM    Appearance CLEAR 02/21/2012 05:15 PM    Specific gravity 1.027 02/21/2012 05:15 PM    pH (UA) 7.0 02/21/2012 05:15 PM    Protein NEGATIVE  02/21/2012 05:15 PM    Glucose >1000 (A) 02/21/2012 05:15 PM    Ketone NEGATIVE  02/21/2012 05:15 PM    Bilirubin NEGATIVE  02/21/2012 05:15 PM    Urobilinogen 0.2 02/21/2012 05:15 PM    Nitrites NEGATIVE  02/21/2012 05:15 PM    Leukocyte Esterase NEGATIVE  02/21/2012 05:15 PM    Epithelial cells 0-5 02/21/2012 05:15 PM    Bacteria NEGATIVE  02/21/2012 05:15 PM    WBC 0-4 02/21/2012 05:15 PM    RBC 0-3 02/21/2012 05:15 PM         Medications Reviewed:     Current Facility-Administered Medications   Medication Dose Route Frequency    gemfibroziL (LOPID) tablet 600 mg  600 mg Oral BID PRN    glipiZIDE (GLUCOTROL) tablet 10 mg  10 mg Oral BID    metFORMIN (GLUCOPHAGE) tablet 1,000 mg  1,000 mg Oral BID WITH MEALS    metoprolol tartrate (LOPRESSOR) tablet 50 mg  50 mg Oral BID    nitroglycerin (NITROSTAT) tablet 0.4 mg  0.4 mg SubLINGual Q5MIN PRN    rosuvastatin (CRESTOR) tablet 20 mg  20 mg Oral DAILY    apixaban (ELIQUIS) tablet 2.5 mg  2.5 mg Oral BID    0.9% sodium chloride infusion  125 mL/hr IntraVENous CONTINUOUS    sodium chloride 0.9 % bolus infusion 500 mL  500 mL IntraVENous ONCE PRN    sodium chloride (NS) flush 5-40 mL  5-40 mL IntraVENous Q8H    sodium chloride (NS) flush 5-40 mL  5-40 mL IntraVENous PRN    acetaminophen (TYLENOL) tablet 1,000 mg  1,000 mg Oral Q6H    oxyCODONE IR (ROXICODONE) tablet 5 mg  5 mg Oral Q3H PRN    oxyCODONE IR (ROXICODONE) tablet 10 mg  10 mg Oral Q3H PRN    morphine injection 2 mg  2 mg IntraVENous Q3H PRN    naloxone (NARCAN) injection 0.4 mg  0.4 mg IntraVENous PRN    ondansetron (ZOFRAN) injection 4 mg  4 mg IntraVENous Q4H PRN    prochlorperazine (COMPAZINE) with saline injection 5 mg  5 mg IntraVENous Q6H PRN    ondansetron (ZOFRAN ODT) tablet 4 mg  4 mg Oral Q6H PRN    hydrOXYzine HCL (ATARAX) tablet 10 mg  10 mg Oral Q8H PRN    famotidine (PEPCID) tablet 20 mg  20 mg Oral BID    senna-docusate (PERICOLACE) 8.6-50 mg per tablet 1 Tab  1 Tab Oral BID    polyethylene glycol (MIRALAX) packet 17 g  17 g Oral DAILY    [START ON 8/19/2020] bisacodyL (DULCOLAX) suppository 10 mg  10 mg Rectal DAILY PRN    sodium chloride (NS) flush 5-40 mL  5-40 mL IntraVENous Q8H    sodium chloride (NS) flush 5-40 mL  5-40 mL IntraVENous PRN    polyethylene glycol (MIRALAX) packet 17 g  17 g Oral DAILY PRN    ondansetron (ZOFRAN) injection 4 mg  4 mg IntraVENous Q6H PRN    glucose chewable tablet 16 g  4 Tab Oral PRN    glucagon (GLUCAGEN) injection 1 mg  1 mg IntraMUSCular PRN    dextrose 10% infusion 0-250 mL  0-250 mL IntraVENous PRN    insulin lispro (HUMALOG) injection   SubCUTAneous AC&HS     ______________________________________________________________________  EXPECTED LENGTH OF STAY: - - -  ACTUAL LENGTH OF STAY:          2                 Nadira Isbell MD   Patient has given Verbal permission to discuss medical care with   persons present in the room and and also with contact as listed on face sheet.

## 2020-08-18 NOTE — PROGRESS NOTES
DAVID: Home with wife and home health. Patient is worker's comp. Worker's comp will need to arrange the home health. Patient has a cane and walker at home. Care Management Interventions  PCP Verified by CM: Yes  Mode of Transport at Discharge: Other (see comment)(TBD)  Transition of Care Consult (CM Consult): Home Health, Discharge Planning(TBD by worker's comp)  MyChart Signup: No  Discharge Durable Medical Equipment: (patient owns cane and walker)  Physical Therapy Consult: Yes  Occupational Therapy Consult: Yes  Speech Therapy Consult: No  Current Support Network: Lives with Spouse, Own Home  Confirm Follow Up Transport: Other (see comment)(TBD)  The Patient and/or Patient Representative was Provided with a Choice of Provider and Agrees with the Discharge Plan?: Yes  Freedom of Choice List was Provided with Basic Dialogue that Supports the Patient's Individualized Plan of Care/Goals, Treatment Preferences and Shares the Quality Data Associated with the Providers?: Yes  Discharge Location  Discharge Placement: Home with home health     Reason for Admission:  Femur fracture                    RUR Score:   11%                  Plan for utilizing home health:   TBD by worker's comp       PCP: First and Last name:  Erna Clark MD   Name of Practice: [facility]   Are you a current patient: Yes/No: yes   Approximate date of last visit: last week   Can you participate in a virtual visit with your PCP: yes                    Current Advanced Directive/Advance Care Plan: full code. No AMD on file                         Transition of Care Plan:   Home with wife and home health. Chart reviewed. CM met with patient at bedside. Patient stated he is here under worker's comp. Claim information is:    [employer] [facility] [employer] [address] , [address] [place] [address]  FAX#[phone]  Claim# [id number]. Claims rep is [employer]; [phone] [id number]; direct# [phone].     CM called Jennie with worker's comp. She is arranging for a nurse  to come and assess the patient tomorrow. The nurse LAY will follow-up to arrange home health.     CM faxed clinicals to worker's comp  at [phone], B[place]/CRM

## 2020-08-18 NOTE — PROGRESS NOTES
Ortho Progress Note  1 Day Post-Op  Procedure(s):  Open reduction internal reductionof right supracondylar femur fracture. Synthes plate. Flouro. Request to follow self    Subjective: Pt doing well today, c/o pain in operative leg. Is very nervous about working with PT. \"I'm afraid to move that leg because I'm afraid it will really hurt. \" Denies N/V, lightheadedness, dizziness, SOB, or abdominal pain. Physical Exam:   Visit Vitals  /65 (BP 1 Location: Left arm, BP Patient Position: At rest)   Pulse 98   Temp 99.8 °F (37.7 °C)   Resp 16   Ht 6' 1\" (1.854 m)   Wt 127.2 kg (280 lb 6.8 oz)   SpO2 96%   BMI 37.00 kg/m²     General appearance: alert, cooperative, no distress, appears stated age  Abdomen: soft, non-tender.  Bowel sounds normal. No masses,  no organomegaly  Extremities: Homans sign is negative, no sign of DVT, limited AROM RLE, good PROM RLE without difficulty, thigh soft with tenderness upon palpation, calf soft NTTP  Pulses: 2+ and symmetric  Wound: dressing c/d/i, no drainage  Neurologic: Grossly normal, ankle dorsi/plantar flexion intact, heel raise limited, good quad sets    Recent Results (from the past 24 hour(s))   GLUCOSE, POC    Collection Time: 08/17/20 11:26 AM   Result Value Ref Range    Glucose (POC) 117 (H) 65 - 100 mg/dL    Performed by Geeta Amin    GLUCOSE, POC    Collection Time: 08/17/20  5:59 PM   Result Value Ref Range    Glucose (POC) 123 (H) 65 - 100 mg/dL    Performed by [facility], POC    Collection Time: 08/17/20  9:09 PM   Result Value Ref Range    Glucose (POC) 124 (H) 65 - 100 mg/dL    Performed by Jose E Mclean    METABOLIC PANEL, BASIC    Collection Time: 08/18/20  3:03 AM   Result Value Ref Range    Sodium 138 136 - 145 mmol/L    Potassium 3.5 3.5 - 5.1 mmol/L    Chloride 109 (H) 97 - 108 mmol/L    CO2 16 (L) 21 - 32 mmol/L    Anion gap 13 5 - 15 mmol/L    Glucose 111 (H) 65 - 100 mg/dL    BUN 14 6 - 20 MG/DL    Creatinine 0.68 (L) 0.70 - 1.30 MG/DL    BUN/Creatinine ratio 21 (H) 12 - 20      GFR est AA >60 >60 ml/min/1.73m2    GFR est non-AA >60 >60 ml/min/1.73m2    Calcium 7.8 (L) 8.5 - 10.1 MG/DL   HEMOGLOBIN    Collection Time: 08/18/20  3:03 AM   Result Value Ref Range    HGB 9.7 (L) 12.1 - 17.0 g/dL   GLUCOSE, POC    Collection Time: 08/18/20  7:06 AM   Result Value Ref Range    Glucose (POC) 132 (H) 65 - 100 mg/dL    Performed by Jimmie Chapin        Assessment:  Stable Post Op    Plan:  DVT Prophylaxis:Eliquis 2.5 mg x 1mo  Physical Therapy: TTWB  Discharge Planning  Pain control: tylenol, oxycodone 5mg  Acute blood loss anemia: hgb 9.7, normal post op finding, pt asymptomatic, will continue to monitor  Obesity: BMI 37, chronic, encourage OOB for all meals, use of IS, good pulmonary toilet, may limit pt's ability to progress with PT/OT

## 2020-08-18 NOTE — PROGRESS NOTES
Bedside shift change report given to PAU Martini (oncoming nurse) by Jewels Diamond (offgoing nurse). Report included the following information SBAR, Kardex, Intake/Output and MAR. Principal Discharge DX:	Fall, initial encounter

## 2020-08-19 LAB
GLUCOSE BLD STRIP.AUTO-MCNC: 140 MG/DL (ref 65–100)
GLUCOSE BLD STRIP.AUTO-MCNC: 150 MG/DL (ref 65–100)
GLUCOSE BLD STRIP.AUTO-MCNC: 158 MG/DL (ref 65–100)
GLUCOSE BLD STRIP.AUTO-MCNC: 198 MG/DL (ref 65–100)
GLUCOSE BLD STRIP.AUTO-MCNC: 246 MG/DL (ref 65–100)
SERVICE CMNT-IMP: ABNORMAL

## 2020-08-19 PROCEDURE — 74011636637 HC RX REV CODE- 636/637: Performed by: INTERNAL MEDICINE

## 2020-08-19 PROCEDURE — 97116 GAIT TRAINING THERAPY: CPT

## 2020-08-19 PROCEDURE — 82962 GLUCOSE BLOOD TEST: CPT

## 2020-08-19 PROCEDURE — 97530 THERAPEUTIC ACTIVITIES: CPT

## 2020-08-19 PROCEDURE — 74011250637 HC RX REV CODE- 250/637: Performed by: PHYSICIAN ASSISTANT

## 2020-08-19 PROCEDURE — 65270000029 HC RM PRIVATE

## 2020-08-19 RX ORDER — ACETAMINOPHEN 500 MG
1000 TABLET ORAL EVERY 6 HOURS
Status: DISCONTINUED | OUTPATIENT
Start: 2020-08-19 | End: 2020-08-20 | Stop reason: HOSPADM

## 2020-08-19 RX ORDER — GUAIFENESIN 100 MG/5ML
81 LIQUID (ML) ORAL DAILY
Status: DISCONTINUED | OUTPATIENT
Start: 2020-08-20 | End: 2020-08-20 | Stop reason: HOSPADM

## 2020-08-19 RX ORDER — METFORMIN HYDROCHLORIDE 500 MG/1
1000 TABLET ORAL 2 TIMES DAILY WITH MEALS
Status: DISCONTINUED | OUTPATIENT
Start: 2020-08-19 | End: 2020-08-20 | Stop reason: HOSPADM

## 2020-08-19 RX ORDER — GLIPIZIDE 5 MG/1
10 TABLET ORAL 2 TIMES DAILY
Status: DISCONTINUED | OUTPATIENT
Start: 2020-08-19 | End: 2020-08-20 | Stop reason: HOSPADM

## 2020-08-19 RX ADMIN — OXYCODONE HYDROCHLORIDE 5 MG: 5 TABLET ORAL at 12:27

## 2020-08-19 RX ADMIN — Medication 10 ML: at 23:34

## 2020-08-19 RX ADMIN — Medication 10 ML: at 23:33

## 2020-08-19 RX ADMIN — FAMOTIDINE 20 MG: 20 TABLET ORAL at 18:18

## 2020-08-19 RX ADMIN — ACETAMINOPHEN 1000 MG: 500 TABLET ORAL at 16:15

## 2020-08-19 RX ADMIN — GLIPIZIDE 10 MG: 5 TABLET ORAL at 07:04

## 2020-08-19 RX ADMIN — INSULIN LISPRO 2 UNITS: 100 INJECTION, SOLUTION INTRAVENOUS; SUBCUTANEOUS at 12:27

## 2020-08-19 RX ADMIN — GLIPIZIDE 10 MG: 5 TABLET ORAL at 09:03

## 2020-08-19 RX ADMIN — FAMOTIDINE 20 MG: 20 TABLET ORAL at 09:03

## 2020-08-19 RX ADMIN — APIXABAN 2.5 MG: 2.5 TABLET, FILM COATED ORAL at 09:03

## 2020-08-19 RX ADMIN — POLYETHYLENE GLYCOL 3350 17 G: 17 POWDER, FOR SOLUTION ORAL at 09:03

## 2020-08-19 RX ADMIN — METFORMIN HYDROCHLORIDE 1000 MG: 500 TABLET ORAL at 07:04

## 2020-08-19 RX ADMIN — Medication 10 ML: at 16:15

## 2020-08-19 RX ADMIN — DOCUSATE SODIUM 50MG AND SENNOSIDES 8.6MG 1 TABLET: 8.6; 5 TABLET, FILM COATED ORAL at 18:18

## 2020-08-19 RX ADMIN — INSULIN LISPRO 2 UNITS: 100 INJECTION, SOLUTION INTRAVENOUS; SUBCUTANEOUS at 18:18

## 2020-08-19 RX ADMIN — ACETAMINOPHEN 1000 MG: 500 TABLET ORAL at 09:03

## 2020-08-19 RX ADMIN — APIXABAN 2.5 MG: 2.5 TABLET, FILM COATED ORAL at 18:18

## 2020-08-19 RX ADMIN — METFORMIN HYDROCHLORIDE 1000 MG: 500 TABLET ORAL at 18:18

## 2020-08-19 RX ADMIN — OXYCODONE HYDROCHLORIDE 10 MG: 5 TABLET ORAL at 09:03

## 2020-08-19 RX ADMIN — INSULIN LISPRO 2 UNITS: 100 INJECTION, SOLUTION INTRAVENOUS; SUBCUTANEOUS at 07:03

## 2020-08-19 RX ADMIN — METFORMIN HYDROCHLORIDE 1000 MG: 500 TABLET ORAL at 09:03

## 2020-08-19 RX ADMIN — GLIPIZIDE 10 MG: 5 TABLET ORAL at 18:18

## 2020-08-19 RX ADMIN — ACETAMINOPHEN 1000 MG: 500 TABLET ORAL at 07:04

## 2020-08-19 RX ADMIN — ROSUVASTATIN 20 MG: 10 TABLET, FILM COATED ORAL at 21:23

## 2020-08-19 RX ADMIN — Medication 10 ML: at 07:05

## 2020-08-19 RX ADMIN — METOPROLOL TARTRATE 50 MG: 50 TABLET, FILM COATED ORAL at 09:03

## 2020-08-19 RX ADMIN — ACETAMINOPHEN 1000 MG: 500 TABLET ORAL at 21:23

## 2020-08-19 RX ADMIN — DOCUSATE SODIUM 50MG AND SENNOSIDES 8.6MG 1 TABLET: 8.6; 5 TABLET, FILM COATED ORAL at 09:03

## 2020-08-19 NOTE — PROGRESS NOTES
Bedside shift change report given to Daisy Noble (oncoming nurse) by Debra Romero (offgoing nurse). Report included the following information Kardex, MAR and Recent Results.

## 2020-08-19 NOTE — PROGRESS NOTES
Bedside and Verbal shift change report given to Marcial Dick RN (oncoming nurse) by Shruti Rogers RN (offgoing nurse). Report included the following information SBAR, Kardex, Procedure Summary, Intake/Output, MAR and Recent Results.

## 2020-08-19 NOTE — PROGRESS NOTES
Informed Dr. Mccall Show that pt's BP is 93/53, and he has metoprolol due. Per Dr. Mccall Show, hold metoprolol.

## 2020-08-19 NOTE — PROGRESS NOTES
Hospitalist Progress Note  Armando Mckeon MD  Answering service: [phone] from in house phone      Date of Service:  2020  NAME:  Vanesa Mccoy  :  1960  MRN:  [id number]      Admission Summary:   Vanesa Mccoy is a 62 y.o. male with PMH Diabetes Mellitus, HTN , CAD presents to the ED c/o right knee pain after he sustained a fall at work. Patient states that he was walking on wet floor when he slipped and fell falling with his right leg bended under him. He was not able to stand up due to pain. He denies any symptoms such as lightheadedness, dizziness, tunnel vision, palpitations, chest pain or SOB prior to the fall. In the ED he had X-ray that showed a Distal femoral metaphyseal fracture. Orthopedic surgery was contacted by the ER physician who requested hospitalist admission and plan for possible ORIF. Interval history / Subjective:      Patient is seen and examined this morning. Pain well controlled on oral medication. Was about to do PT/OT. No BM yet. Pain controlled with oral oxycodone. Assessment & Plan:     # Distal Femoral fracture due to mechanical fall.  - s/p ORIF with synthes plate   - Pain control  - PT/OT  - Ortho following appt input   - DVT ppx: Eliquis      # Acute anemia due to blood loss  - expected blood loss during surgery   - monitor Hgb, no need for transfusion     # Diabetes mellitus type 2:  - resume home metformin, Glipizide and Jardiance for now. - off SSI  - diabetic diet, accu-chek      # Hypertension: BP stable. Continue with metoprolol for now.     # CAD: No active cardiac issues. - EKG showed normal sinus rhythm with nonspecific T wave changes. - - on aspirin and Metoprolol      # Hyperlipidemia:   - resume statin      # History of paroxysmal A. fib: Currently normal sinus rhythm. ANGELA?DS?-VASc Score 3.  Not on Eliquis anymore due to side effect but now he will be on it for one month for DVT ppx      DVT prophy: Eliquis   Code status: full code  Plan discussed with patient.      FUNCTIONAL STATUS PRIOR TO HOSPITALIZATION Ambulates Independently      Hospital Problems  Date Reviewed: 8/17/2020          Codes Class Noted POA    * (Principal) Supracondylar fracture of distal end of femur without intracondylar extension ([facility] [address] ICD-10-CM: M75.164Z  ICD-9-CM: 821.23  8/17/2020 Yes        Femur fracture ([facility] [address] ICD-10-CM: H18.78TM  ICD-9-CM: 821.00  8/16/2020 Unknown                Review of Systems:   Pertinent positive mentioned in interval hx/HPI. Other systems reviewed and negative     Vital Signs:    Last 24hrs VS reviewed since prior progress note. Most recent are:  Visit Vitals  /68   Pulse 95   Temp 98.9 °F (37.2 °C)   Resp 16   Ht 6' 1\" (1.854 m)   Wt 127.2 kg (280 lb 6.8 oz)   SpO2 95%   BMI 37.00 kg/m²         Intake/Output Summary (Last 24 hours) at 8/19/2020 1253  Last data filed at 8/18/2020 1940  Gross per 24 hour   Intake    Output 350 ml   Net -350 ml        Physical Examination:             Constitutional:  No acute distress, cooperative, pleasant    ENT:  Oral mucous moist, oropharynx benign. Neck supple,    Resp:  CTA bilaterally. No wheezing/rhonchi/rales. No accessory muscle use   CV:  Regular rhythm, normal rate, no murmurs, gallops, rubs    GI:  Soft, non distended, non tender. normoactive bowel sounds, no hepatosplenomegaly     Musculoskeletal:  right leg covered with dressing. +DP, PT pulse. He can wiggle his toes     Neurologic:  Moves all extremities except right leg due to fear of pain. AAOx3, CN II-XII reviewed           Data Review:     I personally reviewed labs and imaging     Labs:     Recent Labs     08/18/20  0303   HGB 9.7*     Recent Labs     08/18/20  0303      K 3.5   *   CO2 16*   BUN 14   CREA 0.68*   *   CA 7.8*     No results for input(s): ALT, AP, TBIL, TBILI, TP, ALB, GLOB, GGT, AML, LPSE in the last 72 hours.     No lab exists for component: SGOT, GPT, AMYP, HLPSE  No results for input(s): INR, PTP, APTT, INREXT, INREXT in the last 72 hours. No results for input(s): FE, TIBC, PSAT, FERR in the last 72 hours. No results found for: FOL, RBCF   No results for input(s): PH, PCO2, PO2 in the last 72 hours. No results for input(s): CPK, CKNDX, TROIQ in the last 72 hours.     No lab exists for component: CPKMB  Lab Results   Component Value Date/Time    Cholesterol, total 126 07/28/2020 09:09 AM    HDL Cholesterol 30 (L) 07/28/2020 09:09 AM    LDL, calculated 72 07/28/2020 09:09 AM    Triglyceride 121 07/28/2020 09:09 AM    CHOL/HDL Ratio 7.1 (H) 06/09/2011 02:12 PM     Lab Results   Component Value Date/Time    Glucose (POC) 198 (H) 08/19/2020 12:07 PM    Glucose (POC) 140 (H) 08/19/2020 06:23 AM    Glucose (POC) 117 (H) 08/18/2020 09:01 PM    Glucose (POC) 161 (H) 08/18/2020 04:12 PM    Glucose (POC) 241 (H) 08/18/2020 12:42 PM     Lab Results   Component Value Date/Time    Color YELLOW 02/21/2012 05:15 PM    Appearance CLEAR 02/21/2012 05:15 PM    Specific gravity 1.027 02/21/2012 05:15 PM    pH (UA) 7.0 02/21/2012 05:15 PM    Protein NEGATIVE  02/21/2012 05:15 PM    Glucose >1000 (A) 02/21/2012 05:15 PM    Ketone NEGATIVE  02/21/2012 05:15 PM    Bilirubin NEGATIVE  02/21/2012 05:15 PM    Urobilinogen 0.2 02/21/2012 05:15 PM    Nitrites NEGATIVE  02/21/2012 05:15 PM    Leukocyte Esterase NEGATIVE  02/21/2012 05:15 PM    Epithelial cells 0-5 02/21/2012 05:15 PM    Bacteria NEGATIVE  02/21/2012 05:15 PM    WBC 0-4 02/21/2012 05:15 PM    RBC 0-3 02/21/2012 05:15 PM         Medications Reviewed:     Current Facility-Administered Medications   Medication Dose Route Frequency    glipiZIDE (GLUCOTROL) tablet 10 mg  10 mg Oral BID    metFORMIN (GLUCOPHAGE) tablet 1,000 mg  1,000 mg Oral BID WITH MEALS    acetaminophen (TYLENOL) tablet 1,000 mg  1,000 mg Oral Q6H    gemfibroziL (LOPID) tablet 600 mg  600 mg Oral BID PRN    metoprolol tartrate (LOPRESSOR) tablet 50 mg  50 mg Oral BID    nitroglycerin (NITROSTAT) tablet 0.4 mg  0.4 mg SubLINGual Q5MIN PRN    rosuvastatin (CRESTOR) tablet 20 mg  20 mg Oral DAILY    apixaban (ELIQUIS) tablet 2.5 mg  2.5 mg Oral BID    sodium chloride (NS) flush 5-40 mL  5-40 mL IntraVENous Q8H    sodium chloride (NS) flush 5-40 mL  5-40 mL IntraVENous PRN    oxyCODONE IR (ROXICODONE) tablet 5 mg  5 mg Oral Q3H PRN    oxyCODONE IR (ROXICODONE) tablet 10 mg  10 mg Oral Q3H PRN    naloxone (NARCAN) injection 0.4 mg  0.4 mg IntraVENous PRN    ondansetron (ZOFRAN ODT) tablet 4 mg  4 mg Oral Q6H PRN    hydrOXYzine HCL (ATARAX) tablet 10 mg  10 mg Oral Q8H PRN    famotidine (PEPCID) tablet 20 mg  20 mg Oral BID    senna-docusate (PERICOLACE) 8.6-50 mg per tablet 1 Tab  1 Tab Oral BID    polyethylene glycol (MIRALAX) packet 17 g  17 g Oral DAILY    bisacodyL (DULCOLAX) suppository 10 mg  10 mg Rectal DAILY PRN    sodium chloride (NS) flush 5-40 mL  5-40 mL IntraVENous Q8H    sodium chloride (NS) flush 5-40 mL  5-40 mL IntraVENous PRN    polyethylene glycol (MIRALAX) packet 17 g  17 g Oral DAILY PRN    glucose chewable tablet 16 g  4 Tab Oral PRN    glucagon (GLUCAGEN) injection 1 mg  1 mg IntraMUSCular PRN    dextrose 10% infusion 0-250 mL  0-250 mL IntraVENous PRN    insulin lispro (HUMALOG) injection   SubCUTAneous AC&HS     ______________________________________________________________________  EXPECTED LENGTH OF STAY: 3d 7h  ACTUAL LENGTH OF STAY:          3                 Nadira Isbell MD   Patient has given Verbal permission to discuss medical care with   persons present in the room and and also with contact as listed on face sheet.

## 2020-08-19 NOTE — PROGRESS NOTES
Problem: Mobility Impaired (Adult and Pediatric)  Goal: *Acute Goals and Plan of Care (Insert Text)  Description: FUNCTIONAL STATUS PRIOR TO ADMISSION: Patient was independent and active without use of DME. Pt was working full time. HOME SUPPORT PRIOR TO ADMISSION: The patient lived with wife but did not require assist.    Physical Therapy Goals  Initiated 8/18/2020  1. Patient will move from supine to sit and sit to supine , scoot up and down, and roll side to side in bed with modified independence within 7 day(s). 2.  Patient will transfer from bed to chair and chair to bed with modified independence using the least restrictive device within 7 day(s). 3.  Patient will perform sit to stand with modified independence within 7 day(s). 4.  Patient will ambulate with modified independence for 40 feet with the least restrictive device within 7 day(s). 5.  Patient will ascend/descend 1 step with no handrail(s) with minimal assistance/contact guard assist within 7 day(s). 8/19/2020 1245 by Bal Lane PT  Outcome: Progressing Towards Goal   PHYSICAL THERAPY TREATMENT  Patient: Rachelle Mayorga (42 y.o. male)  Date: 8/19/2020  Diagnosis: Femur fracture ([address] [S72.90XA]   Supracondylar fracture of distal end of femur without intracondylar extension ([address]  Procedure(s) (LRB):  Open reduction internal reductionof right supracondylar femur fracture. Synthes plate. Flouro. Request to follow self (Right) 2 Days Post-Op  Precautions: Fall, 50% weight bearing right leg  Chart, physical therapy assessment, plan of care and goals were reviewed. ASSESSMENT  Patient continues with skilled PT services and is progressing towards goals. Pt continues to report \"no energy\" as limiting factor to progressing with ambulation - this pm able to progress to 30 feet with multiple standing rest with RW. With effort and mod assist x 1 able to perform 1 stair with no rails using crutches.   Patient continues to be fearful increasing weight bearing to right leg or bending right leg. Pt c/o dizziness at end of session - Bp 99/50 - . Nursing notified. Current Level of Function Impacting Discharge (mobility/balance): Min to mod assist x 1 for sit to stand with RW; CGA for amb with RW; mod assist for 1 step without rail    Other factors to consider for discharge: has 1 step onto stoop - no rail - adult sons who can assist         PLAN :  Patient continues to benefit from skilled intervention to address the above impairments. Continue treatment per established plan of care. to address goals. Recommendation for discharge: (in order for the patient to meet his/her long term goals)  Physical therapy at least 2 days/week in the home AND ensure assist and/or supervision for safety with step into home    This discharge recommendation:  Has been made in collaboration with the attending provider and/or case management    IF patient discharges home will need the following DME: patient owns DME required for discharge       SUBJECTIVE:   Patient stated Delle Minors will have to check with my wife to see if I still have my crutches.     OBJECTIVE DATA SUMMARY:   Critical Behavior:  Neurologic State: Alert  Orientation Level: Oriented X4  Cognition: Appropriate for age attention/concentration  Safety/Judgement: Awareness of environment, Good awareness of safety precautions  Functional Mobility Training:  Bed Mobility:     Supine to Sit: Minimum assistance;Assist x1     Scooting: Minimum assistance;Assist x1        Transfers:  Sit to Stand: Minimum assistance; Additional time(to mod assist x 1 ; difficulty bending right knee)  Stand to Sit: Minimum assistance;Assist x1;Additional time(requires assist to extend right foot prior to sitting)        Bed to Chair: Moderate assistance;Assist x1                    Balance:  Sitting: Intact; Without support  Sitting - Static: Good (unsupported)  Sitting - Dynamic: Good (unsupported)(verbal cues to lean to right)  Standing: Impaired; With support  Standing - Static: Good;Constant support  Standing - Dynamic : Fair;Constant support  Ambulation/Gait Training:  Distance (ft): 30 Feet (ft)(x 2)     Ambulation - Level of Assistance: Contact guard assistance        Gait Abnormalities: Antalgic;Decreased step clearance  Right Side Weight Bearing: Partial (%)(Right 50% weight bearing)  Left Side Weight Bearing: Full  Base of Support: Narrowed; Shift to left  Stance: Right decreased  Speed/Lexi: Slow  Step Length: Left shortened;Right shortened  Swing Pattern: Right asymmetrical        Stairs:  Number of Stairs Trained: 1(No rail; attempted use of RW; performed with crutches)  Stairs - Level of Assistance: Moderate assistance;Assist X1        Therapeutic Exercises:   Pt performed ankle pumps and heel slides. Pain Rating:  Right thigh 5/10    Activity Tolerance:   Fair and requires frequent rest breaks  Please refer to the flowsheet for vital signs taken during this treatment. After treatment patient left in no apparent distress:   Sitting in chair - legs elevated and Call bell within reach    COMMUNICATION/COLLABORATION:   The patients plan of care was discussed with: Registered nurse.      Conchita Fuchs PT   Time Calculation: 45 mins

## 2020-08-19 NOTE — PROGRESS NOTES
DAVID: Home with wife and home health. Patient is worker's comp. Home health to be arranged through [facility]. This has not been secured yet. Patient has a cane and walker at home. CM received call from [employer],  with worker's comp #[phone]. The home health will need to be arranged through [facility]. CM called [facility] at [phone] and spoke with Farida Remy. CM faxed home health orders to [phone]. Referral #[id number]. Farida Remy stated that they will follow-up with CM once home care is secured.         FABRICE NolandW/CRM

## 2020-08-19 NOTE — PROGRESS NOTES
Christina LUU notified of T.100.9. Pt given tylenol from 1600 on bedside table and encouraged to use IS q hour.

## 2020-08-19 NOTE — PROGRESS NOTES
Problem: Mobility Impaired (Adult and Pediatric)  Goal: *Acute Goals and Plan of Care (Insert Text)  Description: FUNCTIONAL STATUS PRIOR TO ADMISSION: Patient was independent and active without use of DME. Pt was working full time. HOME SUPPORT PRIOR TO ADMISSION: The patient lived with wife but did not require assist.    Physical Therapy Goals  Initiated 8/18/2020  1. Patient will move from supine to sit and sit to supine , scoot up and down, and roll side to side in bed with modified independence within 7 day(s). 2.  Patient will transfer from bed to chair and chair to bed with modified independence using the least restrictive device within 7 day(s). 3.  Patient will perform sit to stand with modified independence within 7 day(s). 4.  Patient will ambulate with modified independence for 40 feet with the least restrictive device within 7 day(s). 5.  Patient will ascend/descend 1 step with no handrail(s) with minimal assistance/contact guard assist within 7 day(s). Outcome: Progressing Towards Goal  PHYSICAL THERAPY TREATMENT  Patient: Nasra Ochoa (52 y.o. male)  Date: 8/19/2020  Diagnosis: Femur fracture ([address] [S72.90XA]   Supracondylar fracture of distal end of femur without intracondylar extension ([address]  Procedure(s) (LRB):  Open reduction internal reductionof right supracondylar femur fracture. Synthes plate. Flouro. Request to follow self (Right) 2 Days Post-Op  Precautions: Fall, Right leg 50% WB  Chart, physical therapy assessment, plan of care and goals were reviewed. ASSESSMENT  Patient continues with skilled PT services and is progressing towards goals. Per Dr. Naty High, pt 50% weight bearing right leg - however pt continues very limited in distance he can tolerate with RW - pt c/o maximal effort. Pt agreeable to attempt 1 stair this pm.  Anticipate pt will be cleared for discharge in 1 - 2 sessions.       Current Level of Function Impacting Discharge (mobility/balance): Min assist x 1 supine to sit; mod assist x 1 sit to stand; CGA for amb with RW - 3 feet    Other factors to consider for discharge: has adult sons at home to assist with 1 step into home         PLAN :  Patient continues to benefit from skilled intervention to address the above impairments. Continue treatment per established plan of care. to address goals. Recommendation for discharge: (in order for the patient to meet his/her long term goals)  Physical therapy at least 2 days/week in the home     This discharge recommendation:  Has been made in collaboration with the attending provider and/or case management    IF patient discharges home will need the following DME: patient owns DME required for discharge       SUBJECTIVE:   Patient stated it's not the pain - it just takes so much effort.     OBJECTIVE DATA SUMMARY:   Critical Behavior:  Neurologic State: Alert  Orientation Level: Oriented X4  Cognition: Appropriate for age attention/concentration  Safety/Judgement: Awareness of environment, Good awareness of safety precautions  Functional Mobility Training:  Bed Mobility:     Supine to Sit: Minimum assistance;Assist x1     Scooting: Minimum assistance;Assist x1        Transfers:  Sit to Stand: Minimum assistance;Assist x1(from high bed surface)  Stand to Sit: Moderate assistance;Assist x1(requires assist to move right leg out to extended position)        Bed to Chair: Moderate assistance;Assist x1                    Balance:  Sitting: Intact; With support  Sitting - Static: Good (unsupported)  Sitting - Dynamic: Fair (occasional)(pt tends to lean left - decreased weight to right hip)  Standing: Impaired; With support  Standing - Static: Good;Constant support  Standing - Dynamic : Fair;Constant support(limited by right leg 50% weight bearing)  Ambulation/Gait Training:  Distance (ft): 3 Feet (ft)     Ambulation - Level of Assistance: Contact guard assistance        Gait Abnormalities: Antalgic;Decreased step clearance  Right Side Weight Bearing: Partial (%)(right leg 50% weight bearing)  Left Side Weight Bearing: Full  Base of Support: Narrowed; Shift to left  Stance: Right decreased(right 50% weight bearing)  Speed/Lexi: Slow  Step Length: Left shortened  Swing Pattern: Right asymmetrical        Pain Rating:  Right hip/thigh - 0/10 at rest; during amb 3-4/10    Activity Tolerance:   Fair - decreased amb endurance/distance  Please refer to the flowsheet for vital signs taken during this treatment. After treatment patient left in no apparent distress:   Sitting in chair and Call bell within reach    COMMUNICATION/COLLABORATION:   The patients plan of care was discussed with: Registered nurse.      Claudio Day, PT   Time Calculation: 25 mins

## 2020-08-19 NOTE — PROGRESS NOTES
Ortho POD#2  No c/o pain, concerned about ability to ambulate and protect fracture  afeb  Dressing intact  N/V intact  Plan: PT: PEDRO           D/C planning

## 2020-08-20 VITALS
HEART RATE: 98 BPM | HEIGHT: 73 IN | OXYGEN SATURATION: 96 % | BODY MASS INDEX: 37.17 KG/M2 | DIASTOLIC BLOOD PRESSURE: 64 MMHG | RESPIRATION RATE: 16 BRPM | TEMPERATURE: 99.4 F | SYSTOLIC BLOOD PRESSURE: 124 MMHG | WEIGHT: 280.43 LBS

## 2020-08-20 LAB
GLUCOSE BLD STRIP.AUTO-MCNC: 125 MG/DL (ref 65–100)
GLUCOSE BLD STRIP.AUTO-MCNC: 143 MG/DL (ref 65–100)
GLUCOSE BLD STRIP.AUTO-MCNC: 183 MG/DL (ref 65–100)
SERVICE CMNT-IMP: ABNORMAL

## 2020-08-20 PROCEDURE — 74011250637 HC RX REV CODE- 250/637: Performed by: INTERNAL MEDICINE

## 2020-08-20 PROCEDURE — 82962 GLUCOSE BLOOD TEST: CPT

## 2020-08-20 PROCEDURE — 74011636637 HC RX REV CODE- 636/637: Performed by: INTERNAL MEDICINE

## 2020-08-20 PROCEDURE — 97116 GAIT TRAINING THERAPY: CPT

## 2020-08-20 PROCEDURE — 74011250637 HC RX REV CODE- 250/637: Performed by: PHYSICIAN ASSISTANT

## 2020-08-20 PROCEDURE — 97530 THERAPEUTIC ACTIVITIES: CPT

## 2020-08-20 RX ORDER — ACETAMINOPHEN 325 MG/1
650 TABLET ORAL
Qty: 1 TAB | Status: SHIPPED | COMMUNITY
Start: 2020-08-20 | End: 2021-02-24 | Stop reason: DRUGHIGH

## 2020-08-20 RX ADMIN — APIXABAN 2.5 MG: 2.5 TABLET, FILM COATED ORAL at 09:12

## 2020-08-20 RX ADMIN — GLIPIZIDE 10 MG: 5 TABLET ORAL at 07:02

## 2020-08-20 RX ADMIN — DOCUSATE SODIUM 50MG AND SENNOSIDES 8.6MG 1 TABLET: 8.6; 5 TABLET, FILM COATED ORAL at 09:12

## 2020-08-20 RX ADMIN — ACETAMINOPHEN 1000 MG: 500 TABLET ORAL at 09:12

## 2020-08-20 RX ADMIN — FAMOTIDINE 20 MG: 20 TABLET ORAL at 09:12

## 2020-08-20 RX ADMIN — OXYCODONE HYDROCHLORIDE 10 MG: 5 TABLET ORAL at 16:25

## 2020-08-20 RX ADMIN — Medication 10 ML: at 07:03

## 2020-08-20 RX ADMIN — OXYCODONE HYDROCHLORIDE 5 MG: 5 TABLET ORAL at 12:38

## 2020-08-20 RX ADMIN — METFORMIN HYDROCHLORIDE 1000 MG: 500 TABLET ORAL at 07:02

## 2020-08-20 RX ADMIN — INSULIN LISPRO 2 UNITS: 100 INJECTION, SOLUTION INTRAVENOUS; SUBCUTANEOUS at 07:06

## 2020-08-20 RX ADMIN — ASPIRIN 81 MG CHEWABLE TABLET 81 MG: 81 TABLET CHEWABLE at 09:13

## 2020-08-20 RX ADMIN — OXYCODONE HYDROCHLORIDE 5 MG: 5 TABLET ORAL at 09:12

## 2020-08-20 RX ADMIN — METOPROLOL TARTRATE 50 MG: 50 TABLET, FILM COATED ORAL at 09:13

## 2020-08-20 RX ADMIN — ACETAMINOPHEN 1000 MG: 500 TABLET ORAL at 16:25

## 2020-08-20 RX ADMIN — POLYETHYLENE GLYCOL 3350 17 G: 17 POWDER, FOR SOLUTION ORAL at 09:12

## 2020-08-20 RX ADMIN — INSULIN LISPRO 2 UNITS: 100 INJECTION, SOLUTION INTRAVENOUS; SUBCUTANEOUS at 12:37

## 2020-08-20 NOTE — PROGRESS NOTES
Bedside and verbal shift change report given to Mat Calles (oncoming nurse) by Sofie Goode (offgoing nurse). Report included the following information SBAR, Kardex, Intake/Output, MAR and Recent Results.

## 2020-08-20 NOTE — PROGRESS NOTES
Ortho Daily Progress Note      Patient: Nasra Ochoa                   MRN: [id number]  Sex: male  YOB: 1960           Age: 62 y.o.    3 Days Post-Op    Procedure(s): ORIF right supracondylar femur fracture    Subjective: No pain at rest, walking with therapy now    Afeb  Dressing intact  N/V intact    Home w/HHPT today      JUS Mayer  8/20/2020   10:13 AM      .

## 2020-08-20 NOTE — DISCHARGE SUMMARY
Orthopedic Service Discharge Summary    Patient ID:  Carolin Rosas  [id number]  male  62 y.o.  1960    Admit date: 8/16/2020    Discharge date and time: 8/20/2020  6:43 PM     Admitting Physician: Abdirahman Langley MD     Discharge Physician: Abdirahman Langley MD    Consulting Physician(s): Treatment Team: Consulting Provider: JUS Vickers; Surgeon: Jesu Wilkinson MD; Care Manager: Elaine Garces; Utilization Review: Mary Rodriguez RN    Date of Surgery: 8/17/2020     Preoperative Diagnosis:  Right Supracondylar femur fracture    Postoperative Diagnosis: Right Supracondylar femur fracture    Procedure(s): Procedure(s):  Open reduction internal reductionof right supracondylar femur fracture. Synthes plate. Flouro. Request to follow self    Surgeon: Surgeon(s) and Role:     * Jesu Wilkinson MD - Primary      Anesthesia:  General    Preoperative Medical Clearance:                           HPI:  Pt is a 62 y.o. male who has a history of Femur fracture ([facility] [address] [S72.90XA]  with pain and limitations of activities of daily living who presents at this time for an ORIF right supracondylar femur fracture following the failure of conservative management. PMH:   Past Medical History:   Diagnosis Date    Arthritis     knees    Benign essential hypertension 6/10/2011    CAD (coronary artery disease)     Diabetes (HCC)     DX  AGE 26      Hypercholesterolemia     Hypertriglyceridemia     Other ill-defined conditions(799.89)     obesity    Other ill-defined conditions(799.89)     high cholesterol    PAF (paroxysmal atrial fibrillation) ([facility]) 12/12/2018    SOB (shortness of breath) on exertion 5/16/2011       Medications upon admission :   Prior to Admission Medications   Prescriptions Last Dose Informant Patient Reported? Taking?    ELIQUIS 5 mg tablet Not Taking at Unknown time  No No   Sig: TAKE 1 TABLET BY MOUTH TWICE A DAY   OZEMPIC 1 mg/dose (2 mg/1.5 mL) sub-q pen 8/13/2020 at Unknown time  No Yes Si MG BY SUBCUTANEOUS ROUTE EVERY SEVEN (7) DAYS. acetaminophen (TYLENOL) 325 mg tablet   Yes Yes   Sig: Take 2 Tabs by mouth every six (6) hours as needed for Pain. aspirin delayed-release 81 mg tablet 8/15/2020  Yes No   Sig: Take  by mouth daily. cyclobenzaprine (FLEXERIL) 10 mg tablet Not Taking at Unknown time  Yes No   Sig: Take 10 mg by mouth three (3) times daily as needed. empagliflozin (Jardiance) 10 mg tablet 8/15/2020 at Unknown time  No Yes   Sig: Take 1 Tab by mouth daily. For diabetes   gemfibrozil (LOPID) 600 mg tablet 8/15/2020 at Unknown time  No Yes   Sig: Take 1 Tab by mouth two (2) times daily as needed (cholesterol). glipiZIDE (GLUCOTROL) 10 mg tablet 8/15/2020 at Unknown time  No Yes   Sig: TAKE 1 TABLET BY MOUTH TWO (2) TIMES A DAY. FOR DIABETES   ibuprofen (MOTRIN) 800 mg tablet Unknown at Unknown time  No No   Sig: Take 1 Tab by mouth every six (6) hours as needed for Pain (neck pain). magnesium oxide (MAG-OX) 400 mg tablet 8/15/2020 at Unknown time  No Yes   Sig: TAKE 2 TABLETS BY MOUTH EVERY DAY   metFORMIN (GLUCOPHAGE) 1,000 mg tablet 8/15/2020 at Unknown time  No Yes   Sig: TAKE 1 TABLET BY MOUTH TWICE DAILY FOR DIABETES   metoprolol tartrate (LOPRESSOR) 50 mg tablet 8/15/2020 at Unknown time  No Yes   Sig: TAKE 1 TABLET BY MOUTH EVERY 12 HOURS   nitroglycerin (NITROSTAT) 0.4 mg SL tablet Unknown at Unknown time  No No   Si Tab by SubLINGual route every five (5) minutes as needed for Chest Pain (call 911 if CP/ SOB not relieved by 3 tabs). omega-3 fatty acids-vitamin e (FISH OIL) 1,000 mg Cap 8/15/2020 at Unknown time  Yes Yes   Sig: Take 2 Caps by mouth two (2) times a day. promethazine-dextromethorphan (PROMETHAZINE-DM) 6.25-15 mg/5 mL syrup Unknown at Unknown time  No No   Sig: Take 5 mL by mouth four (4) times daily as needed for Cough.    rosuvastatin (CRESTOR) 20 mg tablet Not Taking at Unknown time  No No   Sig: TAKE 1 TABLET BY MOUTH DAILY FOR CHOLESTEROL   semaglutide (OZEMPIC) 0.25 mg/0.2 mL (2 mg/1.5 mL) sub-q pen Not Taking at Unknown time  No No   Si.5 mg by SubCUTAneous route every seven (7) days. Facility-Administered Medications: None        Allergies: Allergies   Allergen Reactions    Lisinopril Cough    Losartan Other (comments)     Hypotension          Hospital Course: The patient underwent surgery. Complications:  None; patient tolerated the procedure well. Was taken to the PACU in stable condition and then transferred to the Orthopedics floor. Condition on discharge: good    Perioperative Antibiotics:  Ancef     Postoperative Pain Management:  Acetaminophen and Oxycodone    DVT Prophylaxis: ASA and Eliquis, scds    Postoperative transfusions:     none Banked PRBCs     Post Op complications: none    Hemoglobin at discharge:    Lab Results   Component Value Date/Time    HGB 9.7 (L) 2020 03:03 AM    INR 1.0 2018 01:44 AM       Dressing was changed on POD # 2. Incision - clean, dry and intact. No significant erythema or swelling. Neurovascular exam within normal limits. Wound appears to be healing without any evidence of infection. Physical Therapy started on the day following surgery and progressed to ambulation with the aid of a rolling walker for distances of 30 feet with supervision. Range of motion  limited by pain. At the time of discharge, able to go up and down stairs and had understanding of precautions needed following surgery. Discharged to: home with home health. Discharge instructions:  -See Full Summary of discharge instructions attached  -Anticoagulate with ASA and eliquis  -Resume pre hospital diet            -Resume home medications   Discharge Medication List as of 2020  4:34 PM      START taking these medications    Details   oxyCODONE IR (ROXICODONE) 5 mg immediate release tablet Take 1 Tab by mouth every four (4) hours as needed (severe pain) for up to 7 days.  Max Daily Amount: 30 mg., Normal, Disp-40 Tab,R-0      senna-docusate (PERICOLACE) 8.6-50 mg per tablet Take 1 Tab by mouth two (2) times a day., Normal, Disp-20 Tab,R-0         CONTINUE these medications which have CHANGED    Details   acetaminophen (TYLENOL) 325 mg tablet Take 2 Tabs by mouth every six (6) hours as needed for Pain., OTC, Disp-1 Tab      apixaban (ELIQUIS) 2.5 mg tablet Take 1 Tab by mouth two (2) times a day. Indications: deep vein thrombosis prevention, Normal, Disp-28 Tab,R-0         CONTINUE these medications which have NOT CHANGED    Details   empagliflozin (Jardiance) 10 mg tablet Take 1 Tab by mouth daily. For diabetes, Normal, Disp-90 Tab,R-3      glipiZIDE (GLUCOTROL) 10 mg tablet TAKE 1 TABLET BY MOUTH TWO (2) TIMES A DAY. FOR DIABETES, Normal, Disp-180 Tab,R-1      OZEMPIC 1 mg/dose (2 mg/1.5 mL) sub-q pen 1 MG BY SUBCUTANEOUS ROUTE EVERY SEVEN (7) DAYS., Normal, Disp-2 Pen, R-12      metFORMIN (GLUCOPHAGE) 1,000 mg tablet TAKE 1 TABLET BY MOUTH TWICE DAILY FOR DIABETES, Normal, Disp-180 Tab, R-2      magnesium oxide (MAG-OX) 400 mg tablet TAKE 2 TABLETS BY MOUTH EVERY DAY, Normal, Disp-180 Tab, R-3      metoprolol tartrate (LOPRESSOR) 50 mg tablet TAKE 1 TABLET BY MOUTH EVERY 12 HOURS, Normal, Disp-180 Tab, R-3      gemfibrozil (LOPID) 600 mg tablet Take 1 Tab by mouth two (2) times daily as needed (cholesterol). , Normal, Disp-180 Tab, R-2      omega-3 fatty acids-vitamin e (FISH OIL) 1,000 mg Cap Take 2 Caps by mouth two (2) times a day., Historical Med      cyclobenzaprine (FLEXERIL) 10 mg tablet Take 10 mg by mouth three (3) times daily as needed., Historical Med      rosuvastatin (CRESTOR) 20 mg tablet TAKE 1 TABLET BY MOUTH DAILY FOR CHOLESTEROL, Normal, Disp-90 Tab, R-3      nitroglycerin (NITROSTAT) 0.4 mg SL tablet 1 Tab by SubLINGual route every five (5) minutes as needed for Chest Pain (call 911 if CP/ SOB not relieved by 3 tabs). , Normal, Disp-25 Tab, R-1      semaglutide (OZEMPIC) 0.25 mg/0.2 mL (2 mg/1.5 mL) sub-q pen 0.5 mg by SubCUTAneous route every seven (7) days. , Normal, Disp-1 Box, R-3      promethazine-dextromethorphan (PROMETHAZINE-DM) 6.25-15 mg/5 mL syrup Take 5 mL by mouth four (4) times daily as needed for Cough., Normal, Disp-240 mL, R-2      aspirin delayed-release 81 mg tablet Take  by mouth daily.   , Historical Med         STOP taking these medications       ibuprofen (MOTRIN) 800 mg tablet Comments:   Reason for Stopping:            per medical continuation form  -Discharge activity: Activity as tolerated  -Ambulate with Crutches, Type: [place] Crutch, appropriate total joint protocol  -Wound Care Keep wound clean and dry, Reinforce dressing PRN, Ice to area for comfort and As directed  -Follow up in office in 2 weeks      Signed:  Bradford Cole PA-C  8/26/2020  12:18 PM        No att. providers found

## 2020-08-20 NOTE — PROGRESS NOTES
DAVID: Home with wife and home health. Patient is worker's comp. [facility] has accepted patient for home health. Patient has a cane and walker at home. Worker's comp is ordering the 3-in-1. Patient's wife will transport him home via car at discharge. CM called [facility] at [phone] and left message for Scott Nurse to follow-up on the status of home health. Awaiting response. CM called [employer],  with worker's comp #[phone]. Naun Trung stated that patient can get prescriptions filled at any pharmacy. The pharmacy is to call #[phone] and give the Claim# [id number]. Patient is aware. Naun Nino is following up on the status of  and will contact this CM shortly. Juan Daniel spoke with Scott Nurse with [employer]. She [facility]. She will call this CM once this is secured.       Marlon Kiran, BSW/CRM

## 2020-08-20 NOTE — PROGRESS NOTES
Bedside and Verbal shift change report given to Lalo Mcclure RN (oncoming nurse) by Amy Kruger RN (offgoing nurse). Report included the following information SBAR, Kardex, Procedure Summary, Intake/Output, MAR and Recent Results.

## 2020-08-20 NOTE — PROGRESS NOTES
Pt received sitting up in chair fast asleep. Pt declined participating in OT session stating \"I just need to relax because I'm discharging soon. \" Will continue to follow and will follow up with pt tomorrow if still in hospital.     Thank you,  Nikia Rice, OTR/L

## 2020-08-20 NOTE — PROGRESS NOTES
Problem: Mobility Impaired (Adult and Pediatric)  Goal: *Acute Goals and Plan of Care (Insert Text)  Description: FUNCTIONAL STATUS PRIOR TO ADMISSION: Patient was independent and active without use of DME. Pt was working full time. HOME SUPPORT PRIOR TO ADMISSION: The patient lived with wife but did not require assist.    Physical Therapy Goals  Initiated 8/18/2020  1. Patient will move from supine to sit and sit to supine , scoot up and down, and roll side to side in bed with modified independence within 7 day(s). 2.  Patient will transfer from bed to chair and chair to bed with modified independence using the least restrictive device within 7 day(s). 3.  Patient will perform sit to stand with modified independence within 7 day(s). 4.  Patient will ambulate with modified independence for 40 feet with the least restrictive device within 7 day(s). 5.  Patient will ascend/descend 1 step with no handrail(s) with minimal assistance/contact guard assist within 7 day(s). Outcome: Progressing Towards Goal  PHYSICAL THERAPY TREATMENT  Patient: Amna Delcid (7 y.o. male)  Date: 8/20/2020  Diagnosis: Femur fracture ([address] [S72.90XA]   Supracondylar fracture of distal end of femur without intracondylar extension ([address]  Procedure(s) (LRB):  Open reduction internal reductionof right supracondylar femur fracture. Synthes plate. Flouro. Request to follow self (Right) 3 Days Post-Op  Precautions: Fall, TTWB  Chart, physical therapy assessment, plan of care and goals were reviewed. ASSESSMENT  Patient continues with skilled PT services and is progressing towards goals. Pt with significant improvement with bed mobility and transfers. Pt tolerating amb with less reported pain - right thigh 4-5 today and required only CGA for 1 step today with crutches. Pt cleared for discharge from PT standpoint.     Current Level of Function Impacting Discharge (mobility/balance): Min assist for bed mobility / transfers; CGA for amb with RW/ crutches for 1 step    Other factors to consider for discharge:          PLAN :  Patient continues to benefit from skilled intervention to address the above impairments. Continue treatment per established plan of care. to address goals. Recommendation for discharge: (in order for the patient to meet his/her long term goals)  Physical therapy at least 2 days/week in the home     This discharge recommendation:  Has been made in collaboration with the attending provider and/or case management    IF patient discharges home will need the following DME: patient owns DME required for discharge       SUBJECTIVE:   Patient stated THe pain is not that bad today.     OBJECTIVE DATA SUMMARY:   Critical Behavior:  Neurologic State: Alert  Orientation Level: Oriented X4  Cognition: Appropriate for age attention/concentration  Safety/Judgement: Awareness of environment, Good awareness of safety precautions  Functional Mobility Training:  Bed Mobility:     Supine to Sit: Minimum assistance;Assist x1(to support weight right leg )              Transfers:  Sit to Stand: Minimum assistance;Assist x1  Stand to Sit: Contact guard assistance;Assist x1        Bed to Chair: Minimum assistance;Assist x1                    Balance:  Sitting: Intact; Without support  Sitting - Static: Good (unsupported)  Sitting - Dynamic: Good (unsupported)  Standing: Impaired; With support  Standing - Static: Good;Constant support  Standing - Dynamic : Fair;Constant support  Ambulation/Gait Training:  Distance (ft): 30 Feet (ft)     Ambulation - Level of Assistance: Contact guard assistance        Gait Abnormalities: Antalgic;Decreased step clearance  Right Side Weight Bearing: Partial (%)(50% weight bearing)  Left Side Weight Bearing: Full  Base of Support: Narrowed; Shift to left  Stance: Right decreased  Speed/Lexi: Slow  Step Length: Left shortened;Right shortened  Swing Pattern: Right asymmetrical           Stairs:  Number of Stairs Trained: 1(crutches)  Stairs - Level of Assistance: Contact guard assistance;Assist X1        Therapeutic Exercises:   Pt performed in recliner - ankle pumps x 10, quad sets x 10, and AAROM right hip/knee flexion. Pain Rating:  Right thigh - 0/10 at rest; 4-5/10 during ambulation    Activity Tolerance:   Fair and requires rest breaks; amb limited to short distances but improving. Please refer to the flowsheet for vital signs taken during this treatment. After treatment patient left in no apparent distress:   Call bell within reach and sitting in recliner with legs elevated    COMMUNICATION/COLLABORATION:   The patients plan of care was discussed with: Registered nurse.      Randi Ames, PT   Time Calculation: 35 mins

## 2020-08-20 NOTE — PROGRESS NOTES
Problem: Falls - Risk of  Goal: *Absence of Falls  Description: Document Donata Carrel Fall Risk and appropriate interventions in the flowsheet.   Outcome: Progressing Towards Goal  Note: Fall Risk Interventions:  Mobility Interventions: Communicate number of staff needed for ambulation/transfer, OT consult for ADLs, Patient to call before getting OOB, PT Consult for mobility concerns, PT Consult for assist device competence, Utilize walker, cane, or other assistive device         Medication Interventions: Evaluate medications/consider consulting pharmacy, Patient to call before getting OOB, Teach patient to arise slowly, Utilize gait belt for transfers/ambulation    Elimination Interventions: Call light in reach, Elevated toilet seat, Patient to call for help with toileting needs, Urinal in reach    History of Falls Interventions: Consult care management for discharge planning, Utilize gait belt for transfer/ambulation

## 2020-08-20 NOTE — PROGRESS NOTES
Patient watched discharge joint video. I have reviewed discharge instructions with the patient and spouse. The patient and spouse verbalized understanding.

## 2020-08-20 NOTE — PROGRESS NOTES
Bedside shift change report given to Shemar Slaughter (oncoming nurse) by Hima Hernandez (offgoing nurse). Report included the following information Kardex, MAR and Recent Results.

## 2020-08-21 ENCOUNTER — PATIENT OUTREACH (OUTPATIENT)
Dept: CASE MANAGEMENT | Age: 60
End: 2020-08-21

## 2020-08-21 NOTE — PROGRESS NOTES
Patient was admitted to [facility] on 2020 and discharged on 2020 for femur fracture. Patient was contacted within 1 business days of discharge. Top Discharge Challenges to be reviewed by the provider   [facility] identified [facility] be [facility]  Discussed COVID-19 related testing which was not done at this time. Test results were not done. Patient informed of results, if available? n/a   Method of communication with provider : none       Advance Care Planning:   Does patient have an Advance Directive:  not on file     Inpatient Readmission Risk score:   Was this a readmission? no   Patient stated reason for the admission: surgery  Patients top risk factors for readmission: medical condition  Interventions to address risk factors: attend appointments, participate in [address] Transition Nurse (CTN) contacted the patient by telephone to perform post hospital discharge assessment. Verified name and  with patient as identifiers. Provided introduction to self, and explanation of the CTN role. CTN reviewed discharge instructions, medical action plan and red flags with patient who verbalized understanding. Patient given an opportunity to ask questions and does not have any further questions or concerns at this time. The patient agrees to contact the PCP office for questions related to their healthcare. Medication reconciliation was performed with patient, who verbalizes understanding of administration of home medications. Advised obtaining a 90-day supply of all daily and as-needed medications.    Referral to Pharm D needed: no     Home Health/Outpatient orders at discharge: home health care  [address]: [facility]  Date of initial visit: 2020    Durable Medical Equipment ordered at discharge: none  [address] received: n/a    Covid Risk Education    Patient has following risk factors of: diabetes. Education provided regarding infection prevention, and signs and symptoms of COVID-19 and when to seek medical attention with patient who verbalized understanding. Discussed exposure protocols and quarantine From CDC: Are you at higher risk for severe illness?  and given an opportunity for questions and concerns. The patient agrees to contact the COVID-19 hotline [phone] or PCP office for questions related to COVID-19. For more information on steps you can take to protect yourself, see CDC's How to Protect Yourself     Patient/family/caregiver given information for [employer] and agrees to enroll no  Patient's preferred e-mail: declines  Patient's preferred phone number: declines    Discussed follow-up appointments. If no appointment was previously scheduled, appointment scheduling offered: [facility] follow up appointment(s):   Future Appointments   Date Time Provider Jeff Henry   1/26/2021  8:40 AM Dixon Cooks, MD [facility]     Non-[facility] follow up appointment(s): Ortho TBD  Plan for follow-up call in 7-10 days based on severity of symptoms and risk factors. CTN provided contact information for future needs. Goals Addressed                 This Visit's Progress     Prevent complications post hospitalization. 8/21/2020 Patient reports pain control on current pain plan. HH PT will began today, 8/21/2020. Ortho appointment TBD. Denies chest pain, N/V/D, fever, SOB. Patient will report ortho appointment details at next outreach.  MARTINA

## 2020-08-23 NOTE — DISCHARGE SUMMARY
Discharge Summary       PATIENT ID: Luisa Longoria  MRN: [id number]   YOB: 1960    DATE OF ADMISSION: 8/16/2020  1:22 AM    DATE OF DISCHARGE: 8/20/2020   PRIMARY CARE PROVIDER: Ranjan Rashid MD     ATTENDING PHYSICIAN: Chace Blackman MD    DISCHARGING PROVIDER: Brenda Peace MD    To contact this individual call [phone] and ask the  to page. If unavailable ask to be transferred the Adult Hospitalist Department. CONSULTATIONS: IP CONSULT TO ORTHOPEDIC SURGERY    PROCEDURES/SURGERIES: Procedure(s):  Open reduction internal reductionof right supracondylar femur fracture. Synthes plate. Flouro. Request to follow self    [place] / PLAN:      Emily Santillan a 62 y. o. male with PMH Diabetes Mellitus, HTN , CAD presents to the ED c/o right knee pain after he sustained a fall at work. Patient states that he was walking on wet floor when he slipped and fell falling with his right leg bended under him. He was not able to stand up due to pain. # Distal Femoral fracture due to mechanical fall.  - s/p ORIF with synthes plate 1/19  - Pain control  - PT/OT  - Ortho following appt input   - DVT ppx: Eliquis      # Acute anemia due to blood loss  - expected blood loss during surgery   - monitor Hgb, no need for transfusion      # Diabetes mellitus type 2:  - resume home metformin, Glipizide and Jardiance for now.   - off SSI  - diabetic diet, accu-chek      # Hypertension: BP stable.  Continue with metoprolol for now.     # CAD: No active cardiac issues.     - on aspirin and Metoprolol      # Hyperlipidemia:   - resume statin               PENDING TEST RESULTS:   At the time of discharge the following test results are still pending: none    FOLLOW UP APPOINTMENTS:    Follow-up Information     Follow up With Specialties Details Why Contact Info    Ranjan Rashid MD Family Medicine   [address] [place] [address] [place] Markie Inman MD Orthopedic Surgery In 2 weeks  [address] [facility] [address] [place] [address]      [address] [facility] [phone]             ADDITIONAL CARE RECOMMENDATIONS:  -follow up with PCP and ortho surgeon  -You were prescribed blood thinners to prevent blood clots. DIET: Regular Diet      ACTIVITY: Activity as tolerated    WOUND CARE: na    EQUIPMENT needed: na      DISCHARGE MEDICATIONS:  Discharge Medication List as of 8/20/2020  4:34 PM      START taking these medications    Details   oxyCODONE IR (ROXICODONE) 5 mg immediate release tablet Take 1 Tab by mouth every four (4) hours as needed (severe pain) for up to 7 days. Max Daily Amount: 30 mg., Normal, Disp-40 Tab,R-0      senna-docusate (PERICOLACE) 8.6-50 mg per tablet Take 1 Tab by mouth two (2) times a day., Normal, Disp-20 Tab,R-0         CONTINUE these medications which have CHANGED    Details   acetaminophen (TYLENOL) 325 mg tablet Take 2 Tabs by mouth every six (6) hours as needed for Pain., OTC, Disp-1 Tab      apixaban (ELIQUIS) 2.5 mg tablet Take 1 Tab by mouth two (2) times a day. Indications: deep vein thrombosis prevention, Normal, Disp-28 Tab,R-0         CONTINUE these medications which have NOT CHANGED    Details   empagliflozin (Jardiance) 10 mg tablet Take 1 Tab by mouth daily. For diabetes, Normal, Disp-90 Tab,R-3      glipiZIDE (GLUCOTROL) 10 mg tablet TAKE 1 TABLET BY MOUTH TWO (2) TIMES A DAY.  FOR DIABETES, Normal, Disp-180 Tab,R-1      OZEMPIC 1 mg/dose (2 mg/1.5 mL) sub-q pen 1 MG BY SUBCUTANEOUS ROUTE EVERY SEVEN (7) DAYS., Normal, Disp-2 Pen, R-12      metFORMIN (GLUCOPHAGE) 1,000 mg tablet TAKE 1 TABLET BY MOUTH TWICE DAILY FOR DIABETES, Normal, Disp-180 Tab, R-2      magnesium oxide (MAG-OX) 400 mg tablet TAKE 2 TABLETS BY MOUTH EVERY DAY, Normal, Disp-180 Tab, R-3      metoprolol tartrate (LOPRESSOR) 50 mg tablet TAKE 1 TABLET BY MOUTH EVERY 12 HOURS, Normal, Disp-180 Tab, R-3      gemfibrozil (LOPID) 600 mg tablet Take 1 Tab by mouth two (2) times daily as needed (cholesterol). , Normal, Disp-180 Tab, R-2      omega-3 fatty acids-vitamin e (FISH OIL) 1,000 mg Cap Take 2 Caps by mouth two (2) times a day., Historical Med      cyclobenzaprine (FLEXERIL) 10 mg tablet Take 10 mg by mouth three (3) times daily as needed., Historical Med      rosuvastatin (CRESTOR) 20 mg tablet TAKE 1 TABLET BY MOUTH DAILY FOR CHOLESTEROL, Normal, Disp-90 Tab, R-3      nitroglycerin (NITROSTAT) 0.4 mg SL tablet 1 Tab by SubLINGual route every five (5) minutes as needed for Chest Pain (call 911 if CP/ SOB not relieved by 3 tabs). , Normal, Disp-25 Tab, R-1      semaglutide (OZEMPIC) 0.25 mg/0.2 mL (2 mg/1.5 mL) sub-q pen 0.5 mg by SubCUTAneous route every seven (7) days. , Normal, Disp-1 Box, R-3      promethazine-dextromethorphan (PROMETHAZINE-DM) 6.25-15 mg/5 mL syrup Take 5 mL by mouth four (4) times daily as needed for Cough., Normal, Disp-240 mL, R-2      aspirin delayed-release 81 mg tablet Take  by mouth daily. , Historical Med         STOP taking these medications       ibuprofen (MOTRIN) 800 mg tablet Comments:   Reason for Stopping:                 NOTIFY YOUR PHYSICIAN FOR ANY OF THE FOLLOWING:   Fever over 101 degrees for 24 hours. Chest pain, shortness of breath, fever, chills, nausea, vomiting, diarrhea, change in mentation, falling, weakness, bleeding. Severe pain or pain not relieved by medications. Or, any other signs or symptoms that you may have questions about.     DISPOSITION:  x  Home With:   OT  PT  HH  RN       Long term SNF/Inpatient Rehab    Independent/assisted living    Hospice    Other:       PATIENT CONDITION AT DISCHARGE:     Functional status    Poor     Deconditioned    x Independent      Cognition   x  Lucid     Forgetful     Dementia      Catheters/lines (plus indication)    Tyler     PICC     PEG    x None      Code status    x Full code     DNR PHYSICAL EXAMINATION AT DISCHARGE:  General:          Alert,  no distress     HEENT:           Atraumatic, anicteric sclerae, pink conjunctivae              Lungs:             Clear to auscultation bilaterally. No Wheezing or Rhonchi. No rales. Heart:              Regular  rhythm,  No  murmur   No edema  Abdomen:        Soft, non-tender. Not distended. Bowel sounds normal  Extremities:     dry dressing on RLE                          Psych:             Not anxious or agitated.   Neurologic:      Alert, moves all extremities, answers questions appropriately and responds to commands       [address]:  Problem List as of 8/20/2020 Date Reviewed: 8/17/2020          Codes Class Noted - Resolved    * (Principal) Supracondylar fracture of distal end of femur without intracondylar extension ([facility] ICD-10-CM: L39.713W  ICD-9-CM: 821.23  8/17/2020 - Present        Femur fracture ([facility] ICD-10-CM: S72.90XA  ICD-9-CM: 821.00  8/16/2020 - Present        Type 2 diabetes with nephropathy ([facility] ICD-10-CM: E11.21  ICD-9-CM: 250.40, 583.81  12/12/2018 - Present        Severe obesity ([facility] ICD-10-CM: E66.01  ICD-9-CM: 278.01  12/12/2018 - Present        Controlled type 2 diabetes mellitus with complication, without long-term current use of insulin ([facility] ICD-10-CM: E11.8  ICD-9-CM: 250.90  12/12/2018 - Present        PAF (paroxysmal atrial fibrillation) ([facility] ICD-10-CM: I48.0  ICD-9-CM: 427.31  12/12/2018 - Present        Mixed hyperlipidemia ICD-10-CM: E78.2  ICD-9-CM: 272.2  4/18/2012 - Present        Mitral valve disorders(424.0) ICD-10-CM: I05.9  ICD-9-CM: 424.0  3/27/2012 - Present        Coronary artery disease ICD-10-CM: I25.10  ICD-9-CM: 414.00  6/10/2011 - Present        S/P coronary artery stent placement ICD-10-CM: Z95.5  ICD-9-CM: V45.82  6/10/2011 - Present    Overview Addendum 6/10/2011  8:45 AM by Mathew Cruz NP     6/9/11 PCI/JENNIFER to RCA             Type 2 diabetes mellitus ([facility] ICD-10-CM: E11.9  ICD-9-CM: 250.00  6/10/2011 - Present        Benign essential hypertension (Chronic) ICD-10-CM: I10  ICD-9-CM: 401.1  6/10/2011 - Present        Hypertriglyceridemia ICD-10-CM: E78.1  ICD-9-CM: 272.1  Unknown - Present        Arthritis ICD-10-CM: M19.90  ICD-9-CM: 716.90  Unknown - Present    Overview Signed 1/3/2011  4:28 PM by Flavia No MD     knees             RESOLVED: Essential hypertension, benign ICD-10-CM: I10  ICD-9-CM: 401.1  4/18/2012 - 10/4/2012        RESOLVED: Coronary atherosclerosis of native coronary artery ICD-10-CM: I25.10  ICD-9-CM: 414.01  4/18/2012 - 10/4/2012        RESOLVED: Type II or unspecified type diabetes mellitus without mention of complication, not stated as uncontrolled ICD-10-CM: E11.9  ICD-9-CM: 250.00  4/18/2012 - 10/4/2012        RESOLVED: Pre-op evaluation ICD-10-CM: B53.048  ICD-9-CM: V72.84  4/2/2012 - 10/4/2012        RESOLVED: SOB (shortness of breath) on exertion ICD-10-CM: R06.02  ICD-9-CM: 786.05  5/16/2011 - 10/4/2012        RESOLVED: Acute bronchitis ICD-10-CM: J20.9  ICD-9-CM: 466.0  5/16/2011 - 10/4/2012        RESOLVED: Diabetes ([address] ICD-10-CM: E11.9  ICD-9-CM: 250.00  Unknown - 10/4/2012            20 minutes were spent with the patient on counseling and coordination of care    Signed:   Hugo Del Castillo MD  8/23/2020  9:51 AM

## 2020-08-27 RX ORDER — LANOLIN ALCOHOL/MO/W.PET/CERES
CREAM (GRAM) TOPICAL
Qty: 180 TAB | Refills: 3 | Status: SHIPPED | OUTPATIENT
Start: 2020-08-27 | End: 2021-12-06

## 2020-08-27 NOTE — DISCHARGE INSTRUCTIONS
Discharge Instructions Knee Replacement  Dr. aJckson Morgan      Patient Name  Charlyn Gosselin  Date of procedure  8/17/2020    Procedure  Procedure(s):  Open reduction internal reductionof right supracondylar femur fracture. Synthes plate. Flouro. Request to follow self  Surgeon  Surgeon(s) and Role:     * Pamela Coffey MD - Primary  Date of discharge: No discharge date for patient encounter. PCP: Layla Ruelas MD    Follow up care   Follow up visit with Dr. Jackson Morgan in 2 weeks. Call [phone] to make an appointment   The staples in your knee incision will be taken out at this follow up appointment    Activity at home   Take a short walk every hour; except at night when sleeping   Do your Home Exercise Program 3 times every day    After exercising lie down and elevate your leg on pillows for 15-30 minutes to decrease swelling   Refer to your patient notebook for more information  Bathing and caring for your incision   You may take a shower and wash your incision with soap and water   Change your knee dressing daily for one week. You may then leave your incision open to air unless you see drainage from your knee. Preventing blood clots   Take Eliquis every day as prescribed   Call Dr. Jackson Morgan if you have side effects of blood thinning medication: bleeding, bruising, upset stomach, or diarrhea.  Call Dr. Jackosn Morgan for signs of a blood clot in your leg: calf pain, tenderness, redness, swelling of lower leg   Preventing lung congestion   Use your incentive spirometer 4 times a day; do 10 repetitions each time   Remember to keep the small blue ball between the two arrows when taking a slow, deep breath   Pain Management   Get up and walk a short distance to relieve pain and stiffness.  Place ice wrap on your knee except when you are walking.  The gel ice packs should be changed about every 4 hours   Elevate your leg on pillows for 15-30 minutes    Take Tylenol 1000mg (take two 500mg tablets) every 6 hours for the next 2 weeks   If needed, take a narcotic pain pill every 4-6 hours as prescribed. Take with a small amount of food.  As your pain decreases, take the narcotics less often or take ½ of a pill   Call Dr. Romy Ford if you have side effects from your narcotic pain medication: itching, drowsiness, dizziness, upset stomach, dry mouth, constipation or if you medication is not relieving your pain. Diet after surgery   You may resume your normal diet. Include vegetables, fruit, whole grains, lean meats, and low-fat dairy products. Eat food high in fiber    Drink plenty of fluids, including 8 cups of water daily   Take over-the-counter stool softeners and laxatives to prevent constipation    Avoid after surgery   Do not take any over-the-counter medication for pain except Tylenol     Do not take more than 4000 mg (4 Grams) of Tylenol in 24 hours     Do not drink alcoholic beverages   Do not smoke   Do not drive until seen for follow up appointment   Do not place frozen gel pack directly on your skin. It can cause frostbite.  Do not take a tub bath, swim or get in a hot tub for 6 weeks  Prevention of falls and safety at home   Set up an area where you can rest comfortably leaving space around furniture to allow you to walk with your walker   Keep stairs, hallways and bathrooms well lit; especially at night   Arrange for care for your pets   Keep your home free of clutter   Call Dr. Romy Ford at [phone] for:   Pain that is not relieved by pain medication, ice and activity   Side effects of medications   Increased/spread of bruising   Warning signs of infection:  ? persistent fever greater than 100 degrees  ?  shaking or chills  ? increased redness, tenderness, swelling or drainage from incision  ? increased pain during activity or rest   Warning signs of a blood clot in your leg:  ? increased pain in your calf  ? tenderness or redness  ? increased swelling or knee, calf, ankle or foot    If you call after 5pm or on a weekend, the on call physician will return your phone call  Call your Primary Care Doctor for:    Concerns about your medical conditions such as diabetes, high blood pressure, asthma, congestive heart failure.  Blood sugars greater than 180   Persistent headache or dizziness   Coughing or congestion   Constipation or diarrhea   Burning when you go to the bathroom   Abnormal heart rate (fast or slow)               Call 911 and go to the nearest hospital for:    Sudden increased shortness of breath   Sudden onset of chest pain   Difficulty breathing   Localized chest pain with coughing or taking a deep breath                Discharge Instructions       PATIENT ID: Williams Christiansen  MRN: [id number]   YOB: 1960    DATE OF ADMISSION: 8/16/2020  1:22 AM    DATE OF DISCHARGE: 8/20/2020    PRIMARY CARE PROVIDER: Dixon Cooks, MD     ATTENDING PHYSICIAN: Alfredito Jose MD  DISCHARGING PROVIDER: Egbert Lundborg, MD    To contact this individual call [phone] and ask the  to page. If unavailable ask to be transferred the Adult Hospitalist Department. DISCHARGE DIAGNOSES -Right supracondylar femur fracture above total knee prosthesis.     PROCEDURE PERFORMED:  Open reduction and internal fixation of right supracondylar femur fracture with Synthes plate. CONSULTATIONS: IP CONSULT TO ORTHOPEDIC SURGERY    PROCEDURES/SURGERIES: Procedure(s):  Open reduction internal reductionof right supracondylar femur fracture. Synthes plate. Flouro. Request to follow self    PENDING TEST RESULTS:   At the time of discharge the following test results are still pending: none    FOLLOW UP APPOINTMENTS:   Follow-up Information     Follow up With Specialties Details Why Contact Info    Dixon Cooks, MD Family Medicine   [facility] [address].  [phone]      Jane Sosa MD Orthopedic Surgery In 2 weeks  [facility]   [address] [facility] [phone]           ADDITIONAL CARE RECOMMENDATIONS:  -follow up with PCP and ortho surgeon  -You were prescribed blood thinners to prevent blood clots. DIET: Regular Diet      ACTIVITY: Activity as tolerated    WOUND CARE: na    EQUIPMENT needed: na      DISCHARGE MEDICATIONS:   See Medication Reconciliation Form    · It is important that you take the medication exactly as they are prescribed. · Keep your medication in the bottles provided by the pharmacist and keep a list of the medication names, dosages, and times to be taken in your wallet. · Do not take other medications without consulting your doctor. NOTIFY YOUR PHYSICIAN FOR ANY OF THE FOLLOWING:   Fever over 101 degrees for 24 hours. Chest pain, shortness of breath, fever, chills, nausea, vomiting, diarrhea, change in mentation, falling, weakness, bleeding. Severe pain or pain not relieved by medications. Or, any other signs or symptoms that you may have questions about.       DISPOSITION:  X  Home With:   OT  PT  IONA  RN       SNF/Inpatient Rehab/LTAC    Independent/assisted living    Hospice    Other:         Signed:   Salazar Davila MD  8/20/2020  3:27 PM

## 2020-08-27 NOTE — TELEPHONE ENCOUNTER
Last visit:7/28/20  Next visit:1/26/21  Previous refill 8/17/19(180+3R)    Requested Prescriptions     Pending Prescriptions Disp Refills    magnesium oxide (MAG-OX) 400 mg tablet 180 Tab 3     Sig: TAKE 2 TABLETS BY MOUTH EVERY DAY

## 2020-08-28 RX ORDER — METFORMIN HYDROCHLORIDE 1000 MG/1
1000 TABLET ORAL 2 TIMES DAILY WITH MEALS
Qty: 180 TAB | Refills: 3 | Status: SHIPPED | OUTPATIENT
Start: 2020-08-28 | End: 2021-11-28

## 2020-08-28 RX ORDER — GEMFIBROZIL 600 MG/1
600 TABLET, FILM COATED ORAL
Qty: 180 TAB | Refills: 3 | Status: SHIPPED | OUTPATIENT
Start: 2020-08-28 | End: 2021-09-17

## 2020-09-28 RX ORDER — METOPROLOL TARTRATE 50 MG/1
TABLET ORAL
Qty: 180 TAB | Refills: 3 | Status: SHIPPED | OUTPATIENT
Start: 2020-09-28 | End: 2021-03-04

## 2020-09-28 NOTE — TELEPHONE ENCOUNTER
Last visit:7/28/20  Next visit:1/26/21  Previous refill 7/29/19(180+3R)    Requested Prescriptions     Pending Prescriptions Disp Refills    metoprolol tartrate (LOPRESSOR) 50 mg tablet 180 Tab 3     Sig: TAKE 1 TABLET BY MOUTH EVERY 12 HOURS

## 2020-12-29 RX ORDER — GLIPIZIDE 10 MG/1
TABLET ORAL
Qty: 180 TAB | Refills: 1 | Status: SHIPPED | OUTPATIENT
Start: 2020-12-29 | End: 2021-06-28

## 2020-12-29 NOTE — TELEPHONE ENCOUNTER
----- Message from Arline Jacobs sent at 12/29/2020 10:31 AM EST -----  Regarding: Dr. Bridgette Pinedo refill  Medication Refill    Caller (if not patient): Pt       Relationship of caller (if not patient): n/a       Best contact number(s): [phone]      Name of medication and dosage if known: glipizide 10 mg       Is patient out of this medication (yes/no): yes       Pharmacy name: [address] [place] [address], [address] [place] [address] listed in chart? (yes/no): yes       Pharmacy phone number: [phone]      Details to clarify the request: Pt submitted refill request to pharmacy two weeks prior to message       Arline Jacobs

## 2021-01-12 ENCOUNTER — OFFICE VISIT (OUTPATIENT)
Dept: FAMILY MEDICINE CLINIC | Age: 61
End: 2021-01-12
Payer: COMMERCIAL

## 2021-01-12 DIAGNOSIS — E78.00 HYPERCHOLESTEROLEMIA: ICD-10-CM

## 2021-01-12 DIAGNOSIS — I10 ESSENTIAL HYPERTENSION: ICD-10-CM

## 2021-01-12 DIAGNOSIS — E11.8 CONTROLLED TYPE 2 DIABETES MELLITUS WITH COMPLICATION, WITHOUT LONG-TERM CURRENT USE OF INSULIN (HCC): Primary | ICD-10-CM

## 2021-01-12 DIAGNOSIS — R53.1 WEAKNESS: ICD-10-CM

## 2021-01-12 PROCEDURE — 99214 OFFICE O/P EST MOD 30 MIN: CPT | Performed by: FAMILY MEDICINE

## 2021-01-12 RX ORDER — FUROSEMIDE 40 MG/1
TABLET ORAL
Qty: 60 TAB | Refills: 5 | Status: SHIPPED | OUTPATIENT
Start: 2021-01-12 | End: 2021-07-06

## 2021-01-12 NOTE — PROGRESS NOTES
Chief Complaint   Patient presents with    Leg Pain     1. Have you been to the ER, urgent care clinic since your last visit? Hospitalized since your last visit? No    2. Have you seen or consulted any other health care providers outside of the [address] [place] [address] since your last visit? Include any pap smears or colon screening.  Yes Ortho - Dr. Jeffrey Mills

## 2021-01-12 NOTE — PROGRESS NOTES
HISTORY OF PRESENT ILLNESS  Erin Mariee is a 56 y.o. male. HPI Has been having swelling in both legs since slipped and fell and broke R femur just above the knee on . No dyspnea. hasnt been back to work yet. NO chest pains, dyspnea, difficulty voiding, hematuria. Currently going to [facility] for a work hardening program 5 hours a day. Also needs diabetes, cholesterol and bp checked. No complaints of chest pain, shortness of breath, TIAs, claudication or edema. Past Medical History:   Diagnosis Date    Arthritis     knees    Benign essential hypertension 6/10/2011    CAD (coronary artery disease)     Diabetes ([facility])     DX  AGE 26      Hypercholesterolemia     Hypertriglyceridemia     Other ill-defined conditions(799.89)     obesity    Other ill-defined conditions(799.89)     high cholesterol    PAF (paroxysmal atrial fibrillation) ([facility]) 2018    SOB (shortness of breath) on exertion 2011       Social History     Socioeconomic History    Marital status:      Spouse name: Not on file    Number of children: Not on file    Years of education: Not on file    Highest education level: Not on file   Tobacco Use    Smoking status: Former Smoker     Quit date: 11/10/1989     Years since quittin.1    Smokeless tobacco: Never Used   Substance and Sexual Activity    Alcohol use: Yes     Comment: social  DRINKS ONE WEEKEND A MONTH/\"BIG IMPROVEMENT\"    Drug use: No    Sexual activity: Yes     Partners: Female     Birth control/protection: None   Social History Narrative    , 3 children, twin sons 25 and one son 15. Works at [employer] for the last 9 years, used to work a [employer]       Current Outpatient Medications   Medication Sig Dispense Refill    furosemide (LASIX) 40 mg tablet 1-2 tabs po daily as needed for fluid 60 Tab 5    glipiZIDE (GLUCOTROL) 10 mg tablet TAKE 1 TABLET BY MOUTH TWO (2) TIMES A DAY.  FOR DIABETES 180 Tab 1    metoprolol tartrate (LOPRESSOR) 50 mg tablet TAKE 1 TABLET BY MOUTH EVERY 12 HOURS 180 Tab 3    metFORMIN (GLUCOPHAGE) 1,000 mg tablet Take 1 Tab by mouth two (2) times daily (with meals). 180 Tab 3    gemfibroziL (LOPID) 600 mg tablet Take 1 Tab by mouth two (2) times daily as needed (cholesterol). 180 Tab 3    magnesium oxide (MAG-OX) 400 mg tablet TAKE 2 TABLETS BY MOUTH EVERY  Tab 3    acetaminophen (TYLENOL) 325 mg tablet Take 2 Tabs by mouth every six (6) hours as needed for Pain. 1 Tab     empagliflozin (Jardiance) 10 mg tablet Take 1 Tab by mouth daily. For diabetes 90 Tab 3    cyclobenzaprine (FLEXERIL) 10 mg tablet Take 10 mg by mouth three (3) times daily as needed.  rosuvastatin (CRESTOR) 20 mg tablet TAKE 1 TABLET BY MOUTH DAILY FOR CHOLESTEROL 90 Tab 3    OZEMPIC 1 mg/dose (2 mg/1.5 mL) sub-q pen 1 MG BY SUBCUTANEOUS ROUTE EVERY SEVEN (7) DAYS. 2 Pen 12    nitroglycerin (NITROSTAT) 0.4 mg SL tablet 1 Tab by SubLINGual route every five (5) minutes as needed for Chest Pain (call 911 if CP/ SOB not relieved by 3 tabs). 25 Tab 1    promethazine-dextromethorphan (PROMETHAZINE-DM) 6.25-15 mg/5 mL syrup Take 5 mL by mouth four (4) times daily as needed for Cough. 240 mL 2    aspirin delayed-release 81 mg tablet Take  by mouth daily.  omega-3 fatty acids-vitamin e (FISH OIL) 1,000 mg Cap Take 2 Caps by mouth two (2) times a day. ROS    Physical Exam  Vitals signs and nursing note reviewed. Constitutional:       Appearance: He is well-developed. HENT:      Right Ear: External ear normal.      Left Ear: External ear normal.   Neck:      Thyroid: No thyromegaly. Cardiovascular:      Rate and Rhythm: Normal rate and regular rhythm. Heart sounds: Normal heart sounds. Pulmonary:      Effort: Pulmonary effort is normal. No respiratory distress. Breath sounds: Normal breath sounds. No wheezing. Abdominal:      General: Bowel sounds are normal. There is no distension.       Palpations: Abdomen is soft. There is no mass.      Tenderness: There is no abdominal tenderness. There is no guarding.   Musculoskeletal: Normal range of motion.      Right lower leg: Edema (2+) present.      Left lower leg: Edema (1+) present.   Lymphadenopathy:      Cervical: No cervical adenopathy.         ASSESSMENT and PLAN  Orders Placed This Encounter   • METABOLIC PANEL, COMPREHENSIVE   • TSH 3RD GENERATION   • URINALYSIS W/ RFLX MICROSCOPIC   • HEMOGLOBIN A1C WITH EAG   • LIPID PANEL   • furosemide (LASIX) 40 mg tablet     Diagnoses and all orders for this visit:    1. Controlled type 2 diabetes mellitus with complication, without long-term current use of insulin (HCC)  -     METABOLIC PANEL, COMPREHENSIVE; Future  -     URINALYSIS W/ RFLX MICROSCOPIC; Future  -     HEMOGLOBIN A1C WITH EAG; Future    2. Weakness  -     TSH 3RD GENERATION; Future    3. Hypercholesterolemia  -     LIPID PANEL; Future    4. Essential hypertension    Other orders  -     furosemide (LASIX) 40 mg tablet; 1-2 tabs po daily as needed for fluid

## 2021-01-14 LAB
ALBUMIN SERPL-MCNC: 4.2 G/DL (ref 3.8–4.8)
ALBUMIN/GLOB SERPL: 1.7 {RATIO} (ref 1.2–2.2)
ALP SERPL-CCNC: 105 IU/L (ref 39–117)
ALT SERPL-CCNC: 7 IU/L (ref 0–44)
APPEARANCE UR: CLEAR
AST SERPL-CCNC: 11 IU/L (ref 0–40)
BACTERIA #/AREA URNS HPF: ABNORMAL /[HPF]
BILIRUB SERPL-MCNC: 0.4 MG/DL (ref 0–1.2)
BILIRUB UR QL STRIP: NEGATIVE
BUN SERPL-MCNC: 16 MG/DL (ref 8–27)
BUN/CREAT SERPL: 24 (ref 10–24)
CALCIUM SERPL-MCNC: 8.9 MG/DL (ref 8.6–10.2)
CASTS URNS QL MICRO: ABNORMAL /LPF
CHLORIDE SERPL-SCNC: 106 MMOL/L (ref 96–106)
CHOLEST SERPL-MCNC: 107 MG/DL (ref 100–199)
CO2 SERPL-SCNC: 22 MMOL/L (ref 20–29)
COLOR UR: YELLOW
CREAT SERPL-MCNC: 0.68 MG/DL (ref 0.76–1.27)
EPI CELLS #/AREA URNS HPF: ABNORMAL /HPF (ref 0–10)
EST. AVERAGE GLUCOSE BLD GHB EST-MCNC: 154 MG/DL
GLOBULIN SER CALC-MCNC: 2.5 G/DL (ref 1.5–4.5)
GLUCOSE SERPL-MCNC: 77 MG/DL (ref 65–99)
GLUCOSE UR QL: ABNORMAL
HBA1C MFR BLD: 7 % (ref 4.8–5.6)
HDLC SERPL-MCNC: 33 MG/DL
HGB UR QL STRIP: ABNORMAL
INTERPRETATION, 910389: NORMAL
KETONES UR QL STRIP: NEGATIVE
LDLC SERPL CALC-MCNC: 58 MG/DL (ref 0–99)
LEUKOCYTE ESTERASE UR QL STRIP: NEGATIVE
Lab: NORMAL
MICRO URNS: ABNORMAL
NITRITE UR QL STRIP: NEGATIVE
PH UR STRIP: 6 [PH] (ref 5–7.5)
POTASSIUM SERPL-SCNC: 4.3 MMOL/L (ref 3.5–5.2)
PROT SERPL-MCNC: 6.7 G/DL (ref 6–8.5)
PROT UR QL STRIP: ABNORMAL
RBC #/AREA URNS HPF: ABNORMAL /HPF (ref 0–2)
SODIUM SERPL-SCNC: 140 MMOL/L (ref 134–144)
SP GR UR: >=1.03 (ref 1–1.03)
TRIGL SERPL-MCNC: 77 MG/DL (ref 0–149)
TSH SERPL DL<=0.005 MIU/L-ACNC: 5.13 UIU/ML (ref 0.45–4.5)
UROBILINOGEN UR STRIP-MCNC: 0.2 MG/DL (ref 0.2–1)
VLDLC SERPL CALC-MCNC: 16 MG/DL (ref 5–40)
WBC #/AREA URNS HPF: ABNORMAL /HPF (ref 0–5)

## 2021-01-18 ENCOUNTER — TELEPHONE (OUTPATIENT)
Dept: FAMILY MEDICINE CLINIC | Age: 61
End: 2021-01-18

## 2021-01-18 RX ORDER — IBUPROFEN 800 MG/1
800 TABLET ORAL
Qty: 90 TAB | Refills: 5 | Status: SHIPPED | OUTPATIENT
Start: 2021-01-18 | End: 2022-01-06

## 2021-02-09 ENCOUNTER — OFFICE VISIT (OUTPATIENT)
Dept: FAMILY MEDICINE CLINIC | Age: 61
End: 2021-02-09
Payer: COMMERCIAL

## 2021-02-09 VITALS
BODY MASS INDEX: 37.72 KG/M2 | SYSTOLIC BLOOD PRESSURE: 154 MMHG | OXYGEN SATURATION: 96 % | HEIGHT: 73 IN | WEIGHT: 284.6 LBS | HEART RATE: 70 BPM | RESPIRATION RATE: 16 BRPM | TEMPERATURE: 98.6 F | DIASTOLIC BLOOD PRESSURE: 69 MMHG

## 2021-02-09 DIAGNOSIS — R42 DIZZINESS: Primary | ICD-10-CM

## 2021-02-09 DIAGNOSIS — R55 SYNCOPE, UNSPECIFIED SYNCOPE TYPE: ICD-10-CM

## 2021-02-09 PROCEDURE — 99213 OFFICE O/P EST LOW 20 MIN: CPT | Performed by: FAMILY MEDICINE

## 2021-02-09 PROCEDURE — 93000 ELECTROCARDIOGRAM COMPLETE: CPT | Performed by: FAMILY MEDICINE

## 2021-02-09 RX ORDER — SEMAGLUTIDE 1.34 MG/ML
INJECTION, SOLUTION SUBCUTANEOUS
Qty: 2 PEN | Refills: 12 | Status: SHIPPED | OUTPATIENT
Start: 2021-02-09 | End: 2021-07-12

## 2021-02-09 NOTE — PROGRESS NOTES
HISTORY OF PRESENT ILLNESS  Talisha Spears is a 56 y.o. male. HPIwent to work Sunday night, sat down, became very dizzy. Symptoms came back an hour and a half later, this one came on while kneeling down, woke up on floor. Had some assc nausea, no vomiting. The following night, bent over to take socks off, and became dizzy. No prior hx similar problems. NO new medications. No dyspnea, chest pains, palpitations. Some mild headaches at times. ROS    Physical Exam  Vitals signs and nursing note reviewed. Constitutional:       Appearance: He is well-developed. HENT:      Right Ear: External ear normal.      Left Ear: External ear normal.   Neck:      Thyroid: No thyromegaly. Cardiovascular:      Rate and Rhythm: Normal rate and regular rhythm. Heart sounds: Normal heart sounds. Pulmonary:      Effort: Pulmonary effort is normal. No respiratory distress. Breath sounds: Normal breath sounds. No wheezing. Abdominal:      General: Bowel sounds are normal. There is no distension. Palpations: Abdomen is soft. There is no mass. Tenderness: There is no abdominal tenderness. There is no guarding. Musculoskeletal: Normal range of motion. Lymphadenopathy:      Cervical: No cervical adenopathy. bp 140/80 sitting, 140/80 standing. ASSESSMENT and PLAN  Orders Placed This Encounter    METABOLIC PANEL, COMPREHENSIVE    CBC WITH AUTOMATED DIFF    [facility]    AMB POC EKG ROUTINE W/ 12 LEADS, INTER & REP    semaglutide (Ozempic) 1 mg/dose (2 mg/1.5 mL) sub-q pen     Diagnoses and all orders for this visit:    1. Dizziness  -     AMB POC EKG ROUTINE W/ 12 LEADS, INTER & REP  -     METABOLIC PANEL, COMPREHENSIVE; Future  -     CBC WITH AUTOMATED DIFF; Future    2. Syncope, unspecified syncope type  -     METABOLIC PANEL, COMPREHENSIVE; Future  -     CBC WITH AUTOMATED DIFF;  Future  -     REFERRAL TO CARDIOLOGY    Other orders  -     semaglutide (Ozempic) 1 mg/dose (2 mg/1.5 mL) sub-q pen; 1 MG BY SUBCUTANEOUS ROUTE EVERY SEVEN (7) DAYS.           Sounds like orthostatic hypotension

## 2021-02-13 LAB
ALBUMIN SERPL-MCNC: 4.1 G/DL (ref 3.8–4.8)
ALBUMIN/GLOB SERPL: 1.6 {RATIO} (ref 1.2–2.2)
ALP SERPL-CCNC: 117 IU/L (ref 39–117)
ALT SERPL-CCNC: 7 IU/L (ref 0–44)
AST SERPL-CCNC: 9 IU/L (ref 0–40)
BASOPHILS # BLD AUTO: 0 X10E3/UL (ref 0–0.2)
BASOPHILS NFR BLD AUTO: 1 %
BILIRUB SERPL-MCNC: 0.2 MG/DL (ref 0–1.2)
BUN SERPL-MCNC: 20 MG/DL (ref 8–27)
BUN/CREAT SERPL: 23 (ref 10–24)
CALCIUM SERPL-MCNC: 9.6 MG/DL (ref 8.6–10.2)
CHLORIDE SERPL-SCNC: 108 MMOL/L (ref 96–106)
CO2 SERPL-SCNC: 22 MMOL/L (ref 20–29)
CREAT SERPL-MCNC: 0.87 MG/DL (ref 0.76–1.27)
EOSINOPHIL # BLD AUTO: 0.2 X10E3/UL (ref 0–0.4)
EOSINOPHIL NFR BLD AUTO: 3 %
ERYTHROCYTE [DISTWIDTH] IN BLOOD BY AUTOMATED COUNT: 14.5 % (ref 11.6–15.4)
GLOBULIN SER CALC-MCNC: 2.6 G/DL (ref 1.5–4.5)
GLUCOSE SERPL-MCNC: 135 MG/DL (ref 65–99)
HCT VFR BLD AUTO: 39.4 % (ref 37.5–51)
HGB BLD-MCNC: 12.6 G/DL (ref 13–17.7)
IMM GRANULOCYTES # BLD AUTO: 0 X10E3/UL (ref 0–0.1)
IMM GRANULOCYTES NFR BLD AUTO: 0 %
LYMPHOCYTES # BLD AUTO: 1.6 X10E3/UL (ref 0.7–3.1)
LYMPHOCYTES NFR BLD AUTO: 23 %
MCH RBC QN AUTO: 25.8 PG (ref 26.6–33)
MCHC RBC AUTO-ENTMCNC: 32 G/DL (ref 31.5–35.7)
MCV RBC AUTO: 81 FL (ref 79–97)
MONOCYTES # BLD AUTO: 0.6 X10E3/UL (ref 0.1–0.9)
MONOCYTES NFR BLD AUTO: 9 %
NEUTROPHILS # BLD AUTO: 4.5 X10E3/UL (ref 1.4–7)
NEUTROPHILS NFR BLD AUTO: 64 %
PLATELET # BLD AUTO: 300 X10E3/UL (ref 150–450)
POTASSIUM SERPL-SCNC: 4.4 MMOL/L (ref 3.5–5.2)
PROT SERPL-MCNC: 6.7 G/DL (ref 6–8.5)
RBC # BLD AUTO: 4.88 X10E6/UL (ref 4.14–5.8)
SODIUM SERPL-SCNC: 143 MMOL/L (ref 134–144)
WBC # BLD AUTO: 6.9 X10E3/UL (ref 3.4–10.8)

## 2021-02-16 ENCOUNTER — TELEPHONE (OUTPATIENT)
Dept: FAMILY MEDICINE CLINIC | Age: 61
End: 2021-02-16

## 2021-02-16 NOTE — PROGRESS NOTES
pc with pt. Had an episode of chest pain after cutting down a tree this weekend. To remain off work until after seen by Dr. Ambrose Moncada 2-2.

## 2021-02-16 NOTE — TELEPHONE ENCOUNTER
Patient enquiring about recent lab results. Notes still having dizzy spells. Also while cutting up tree branch on Sunday experienced chest tightness only and notes it resolved after coming inside to rest.  Will share message with pcp.

## 2021-02-16 NOTE — TELEPHONE ENCOUNTER
Patient would like a call from [address] regarding his lab results and is it safe for him to go back to work he can be reached @ [phone]

## 2021-02-24 ENCOUNTER — OFFICE VISIT (OUTPATIENT)
Dept: CARDIOLOGY CLINIC | Age: 61
End: 2021-02-24
Payer: COMMERCIAL

## 2021-02-24 ENCOUNTER — CLINICAL SUPPORT (OUTPATIENT)
Dept: CARDIOLOGY CLINIC | Age: 61
End: 2021-02-24

## 2021-02-24 VITALS — RESPIRATION RATE: 16 BRPM | WEIGHT: 287.3 LBS | BODY MASS INDEX: 38.08 KG/M2 | OXYGEN SATURATION: 98 % | HEIGHT: 73 IN

## 2021-02-24 DIAGNOSIS — Z95.5 S/P CORONARY ARTERY STENT PLACEMENT: ICD-10-CM

## 2021-02-24 DIAGNOSIS — I25.83 CORONARY ARTERY DISEASE DUE TO LIPID RICH PLAQUE: ICD-10-CM

## 2021-02-24 DIAGNOSIS — E78.2 MIXED HYPERLIPIDEMIA: ICD-10-CM

## 2021-02-24 DIAGNOSIS — I48.0 PAF (PAROXYSMAL ATRIAL FIBRILLATION) (HCC): ICD-10-CM

## 2021-02-24 DIAGNOSIS — R55 SYNCOPE, UNSPECIFIED SYNCOPE TYPE: ICD-10-CM

## 2021-02-24 DIAGNOSIS — R55 SYNCOPE, UNSPECIFIED SYNCOPE TYPE: Primary | ICD-10-CM

## 2021-02-24 DIAGNOSIS — I10 BENIGN ESSENTIAL HYPERTENSION: ICD-10-CM

## 2021-02-24 DIAGNOSIS — I25.10 CORONARY ARTERY DISEASE DUE TO LIPID RICH PLAQUE: ICD-10-CM

## 2021-02-24 DIAGNOSIS — I48.0 PAF (PAROXYSMAL ATRIAL FIBRILLATION) (HCC): Primary | ICD-10-CM

## 2021-02-24 PROCEDURE — 93000 ELECTROCARDIOGRAM COMPLETE: CPT | Performed by: INTERNAL MEDICINE

## 2021-02-24 PROCEDURE — 93270 REMOTE 30 DAY ECG REV/REPORT: CPT | Performed by: INTERNAL MEDICINE

## 2021-02-24 PROCEDURE — 93272 ECG/REVIEW INTERPRET ONLY: CPT | Performed by: INTERNAL MEDICINE

## 2021-02-24 PROCEDURE — 99214 OFFICE O/P EST MOD 30 MIN: CPT | Performed by: INTERNAL MEDICINE

## 2021-02-24 NOTE — PROGRESS NOTES
Chief Complaint   Patient presents with   • Follow-up     Last seen 2018   • Coronary Artery Disease   • Hypertension   • Cholesterol Problem   • Irregular Heart Beat     Patient C/O dizziness and syncope spell at work 2/6/2021 felt dizzy went to bend over and passed out was seen by nurse at work and was sent home. Then followed up with PCP and was recommended to see cardiology before being released back to work. Was out of work with right broken femur from 8/2020 - 1/21/21 has questions about [place] paper work for work leave.

## 2021-02-24 NOTE — PROGRESS NOTES
[address], [address] [facility]  [phone]     Subjective:      [place] Hill is a 56 y.o. male is here for long follow up. He has pmhx PAF, CAD, HTN, HLD and DM. Last seen by us in 12/18. Doing well for the most part until presyncopal episode this month. Saw PCP 2/9/2021: went to work Sunday night 2/6/2021, sat down, became very dizzy. Symptoms came back an hour and a half later, this one came on while kneeling down, woke up on floor. Had some assc nausea, no vomiting. The following night, bent over to take socks off, and became dizzy. No prior hx similar problems. NO new medications. No dyspnea, chest pains, palpitations. Some mild headaches at times    No further syncope, still has intermittent dizziness. Note that he had right femur fracture in august and underwent surgery. He had some swelling post op, called the surgeon (Dr Maribel Salamanca at [facility]) and was started on Lasix 40 mg daily. Prior to his surgery he had no dizziness. The patient denies chest pain/ shortness of breath, orthopnea, PND,  palpitations.       Patient Active Problem List    Diagnosis Date Noted    Supracondylar fracture of distal end of femur without intracondylar extension ([address] 08/17/2020    Femur fracture ([address] 08/16/2020    Type 2 diabetes with nephropathy ([address] 12/12/2018    Severe obesity ([address] 12/12/2018    Controlled type 2 diabetes mellitus with complication, without long-term current use of insulin ([address] 12/12/2018    PAF (paroxysmal atrial fibrillation) ([address] 12/12/2018    Mixed hyperlipidemia 04/18/2012    Mitral valve disorders(424.0) 03/27/2012    Coronary artery disease 06/10/2011    S/P coronary artery stent placement 06/10/2011    Type 2 diabetes mellitus ([address] 06/10/2011    Benign essential hypertension 06/10/2011    Hypertriglyceridemia     Arthritis       Rom Torres MD  Past Medical History:   Diagnosis Date    Arthritis     knees    Benign essential hypertension 6/10/2011    CAD (coronary artery disease)     Diabetes ([facility]     DX  AGE 26      Hypercholesterolemia     Hypertriglyceridemia     Other ill-defined conditions(799.89)     obesity    Other ill-defined conditions(799.89)     high cholesterol    PAF (paroxysmal atrial fibrillation) ([facility] 2018    SOB (shortness of breath) on exertion 2011      Past Surgical History:   Procedure Laterality Date    CARDIAC CATHETERIZATION  2011         CARDIAC CATHETERIZATION  2012         HC ANGIOSEAL VIP 6FR  2011         HX ORTHOPAEDIC      left knee replacement    HX ORTHOPAEDIC      R knee replacement   -Gris Valladares.     GA CARDIAC SURG PROCEDURE UNLIST      STENT RCA      GA DRUG-ELUTING STENTS, SINGLE  2011          Allergies   Allergen Reactions    Lisinopril Cough    Losartan Other (comments)     Hypotension        Family History   Problem Relation Age of Onset    Diabetes Mother     Hypertension Mother    [facility] Elevated Lipids Mother     Cancer Mother     Diabetes Father     Heart Disease Father     Hypertension Father     Elevated Lipids Father       Social History     Socioeconomic History    Marital status:      Spouse name: Not on file    Number of children: Not on file    Years of education: Not on file    Highest education level: Not on file   Occupational History    Not on file   Social Needs    Financial resource strain: Not on file    Food insecurity     Worry: Not on file     Inability: Not on file    Transportation needs     Medical: Not on file     Non-medical: Not on file   Tobacco Use    Smoking status: Former Smoker     Quit date: 11/10/1989     Years since quittin.3    Smokeless tobacco: Never Used   Substance and Sexual Activity    Alcohol use: Yes     Comment: social  DRINKS ONE WEEKEND A MONTH/\"BIG IMPROVEMENT\"    Drug use: No    Sexual activity: Yes     Partners: Female     Birth control/protection: None   Lifestyle    Physical activity     Days per week: Not on file     Minutes per session: Not on file    Stress: Not on file   Relationships    Social connections     Talks on phone: Not on file     Gets together: Not on file     Attends Rastafari service: Not on file     Active member of club or organization: Not on file     Attends meetings of clubs or organizations: Not on file     Relationship status: Not on file    Intimate partner violence     Fear of current or ex partner: Not on file     Emotionally abused: Not on file     Physically abused: Not on file     Forced sexual activity: Not on file   Other Topics Concern    Not on file   Social History Narrative    , 3 children, twin sons 25 and one son 15. Works at [employer] for the last 9 years, used to work a [employer]      Current Outpatient Medications   Medication Sig    ACETAMINOPHEN PO Take 500 mg by mouth as needed. 1-2 tabs prn as needed    semaglutide (Ozempic) 1 mg/dose (2 mg/1.5 mL) sub-q pen 1 MG BY SUBCUTANEOUS ROUTE EVERY SEVEN (7) DAYS.  ibuprofen (MOTRIN) 800 mg tablet Take 1 Tab by mouth every eight (8) hours as needed for Pain.  furosemide (LASIX) 40 mg tablet 1-2 tabs po daily as needed for fluid    glipiZIDE (GLUCOTROL) 10 mg tablet TAKE 1 TABLET BY MOUTH TWO (2) TIMES A DAY. FOR DIABETES    metoprolol tartrate (LOPRESSOR) 50 mg tablet TAKE 1 TABLET BY MOUTH EVERY 12 HOURS    metFORMIN (GLUCOPHAGE) 1,000 mg tablet Take 1 Tab by mouth two (2) times daily (with meals).  gemfibroziL (LOPID) 600 mg tablet Take 1 Tab by mouth two (2) times daily as needed (cholesterol). (Patient taking differently: Take 600 mg by mouth two (2) times a day.)    magnesium oxide (MAG-OX) 400 mg tablet TAKE 2 TABLETS BY MOUTH EVERY DAY    empagliflozin (Jardiance) 10 mg tablet Take 1 Tab by mouth daily.  For diabetes    rosuvastatin (CRESTOR) 20 mg tablet TAKE 1 TABLET BY MOUTH DAILY FOR CHOLESTEROL    nitroglycerin (NITROSTAT) 0.4 mg SL tablet 1 Tab by SubLINGual route every five (5) minutes as needed for Chest Pain (call 911 if CP/ SOB not relieved by 3 tabs).  aspirin delayed-release 81 mg tablet Take  by mouth daily.  omega-3 fatty acids-vitamin e (FISH OIL) 1,000 mg Cap Take 2 Caps by mouth two (2) times a day. No current facility-administered medications for this visit. Review of Symptoms:  11 systems reviewed, negative other than as stated in the HPI    Physical ExamPhysical Exam:    Vitals:    02/24/21 1533 02/24/21 1534 02/24/21 1535   Resp:  16    SpO2: 99% 98% 98%   Weight:  287 lb 4.8 oz (130.3 kg)    Height:  6' 1\" (1.854 m)      Body mass index is 37.9 kg/m². General PE  Gen:  NAD  Mental Status - Alert. General Appearance - Not in acute distress. HEENT:  PERRL, no carotid bruits or JVD  Chest and Lung Exam   Inspection: Accessory muscles - No use of accessory muscles in breathing. Auscultation:   Breath sounds: - Normal.   Cardiovascular   Inspection: Jugular vein - Bilateral - Inspection Normal.   Palpation/Percussion:   Apical Impulse: - Normal.   Auscultation: Rhythm - Regular. Heart Sounds - S1 WNL and S2 WNL. No S3 or S4. Murmurs & Other Heart Sounds: Auscultation of the heart reveals - No Murmurs. Peripheral Vascular   Upper Extremity: Inspection - Bilateral - No Cyanotic nailbeds or Digital clubbing. Lower Extremity:   Palpation: Edema - Bilateral - 1+ bilateral ankle edema. Abdomen:   Soft, non-tender, bowel sounds are active.   Neuro: A&O times 3, CN and motor grossly WNL    Labs:   Lab Results   Component Value Date/Time    Cholesterol, total 107 01/13/2021 01:38 PM    Cholesterol, total 126 07/28/2020 09:09 AM    Cholesterol, total 188 01/28/2020 04:12 PM    Cholesterol, total 116 07/29/2019 09:23 AM    Cholesterol, total 120 01/29/2019 11:51 AM    HDL Cholesterol 33 (L) 01/13/2021 01:38 PM    HDL Cholesterol 30 (L) 07/28/2020 09:09 AM    HDL Cholesterol 24 (L) 01/28/2020 04:12 PM    HDL Cholesterol 25 (L) 07/29/2019 09:23 AM    HDL Cholesterol 30 (L) 01/29/2019 11:51 AM    LDL, calculated 58 01/13/2021 01:38 PM    LDL, calculated 72 07/28/2020 09:09 AM    LDL, calculated 125 (H) 01/28/2020 04:12 PM    LDL, calculated 53 07/29/2019 09:23 AM    LDL, calculated 68 01/29/2019 11:51 AM    LDL, calculated 111 (H) 08/28/2018 02:13 PM    Triglyceride 77 01/13/2021 01:38 PM    Triglyceride 121 07/28/2020 09:09 AM    Triglyceride 196 (H) 01/28/2020 04:12 PM    Triglyceride 188 (H) 07/29/2019 09:23 AM    Triglyceride 112 01/29/2019 11:51 AM    CHOL/HDL Ratio 7.1 (H) 06/09/2011 02:12 PM    CHOL/HDL Ratio 5.2 (H) 12/21/2009 10:25 AM    CHOL/HDL Ratio 5.9 (H) 06/18/2009 12:14 PM     Lab Results   Component Value Date/Time     12/05/2018 01:44 AM     Lab Results   Component Value Date/Time    Sodium 143 02/12/2021 03:57 PM    Potassium 4.4 02/12/2021 03:57 PM    Chloride 108 (H) 02/12/2021 03:57 PM    CO2 22 02/12/2021 03:57 PM    Anion gap 13 08/18/2020 03:03 AM    Glucose 135 (H) 02/12/2021 03:57 PM    BUN 20 02/12/2021 03:57 PM    Creatinine 0.87 02/12/2021 03:57 PM    BUN/Creatinine ratio 23 02/12/2021 03:57 PM    GFR est  02/12/2021 03:57 PM    GFR est non-AA 92 02/12/2021 03:57 PM    Calcium 9.6 02/12/2021 03:57 PM    Bilirubin, total 0.2 02/12/2021 03:57 PM    Alk. phosphatase 117 02/12/2021 03:57 PM    Protein, total 6.7 02/12/2021 03:57 PM    Albumin 4.1 02/12/2021 03:57 PM    Globulin 3.5 08/16/2020 02:48 AM    A-G Ratio 1.6 02/12/2021 03:57 PM    ALT (SGPT) 7 02/12/2021 03:57 PM       EKG:  NSR        Assessment:     Assessment:      1. Syncope, unspecified syncope type    2. PAF (paroxysmal atrial fibrillation) ([facility]    3. Mixed hyperlipidemia    4. Coronary artery disease due to lipid rich plaque    5. Benign essential hypertension    6.  S/P coronary artery stent placement        Orders Placed This Encounter    LOOP MONITOR, Clinic Performed     Standing Status:   Future     Standing Expiration Date: 8/24/2021     Scheduling Instructions:      1 month     Order Specific Question:   Reason for Exam:     Answer:   syncope    AMB POC EKG ROUTINE W/ 12 LEADS, INTER & REP     Order Specific Question:   Reason for Exam:     Answer:   routine    ACETAMINOPHEN PO     Sig: Take 500 mg by mouth as needed. 1-2 tabs prn as needed        Plan:     AF with RVR, documented in the ED 12/5/18  Normal EF, grade 1 diastolic dysfunction, no significant valvular pathology per echo in 06/14  Continue BB  Prev on Eliquis now on ASA  In light of recent syncope and intermittent dizziness, will repeat echo; obtain 1 mos event monitor  If negative, likely vaso- vagal and or contribution from borderline orthostasiscaution with position change, can consider decreasing diuretics if necessary        BP well controlled. 150/70 supine; 130/70 standing     ASHD per cardiac cath 11/12  Unable to cross Mid Cx lesion due to extreme bend in proximal LCx and will treat medically unless recurrent symptoms. Has done well since 2012 with medical management  Stable.    Continue ASA, BB, statin  No angina or exertional symptoms     BP well controlled     HLD  1/2021 LDL 58 On statin  Labs and lipids per PCP       DM  On oral agents    Distal Femoral fracture due to mechanical fall   s/p ORIF with synthes plate 9/60      Follow-up to be determined based on test results.              Orlin Alexander MD

## 2021-02-24 NOTE — PROGRESS NOTES
Patient received a 30 day event monitor. Instructions given verbally as well as an instruction sheet. Pt verbalized understanding.     [facility] Event Monitoring

## 2021-02-24 NOTE — LETTER
2/24/2021 Patient: [place] Hill YOB: 1960 Date of Visit: 2/24/2021 Franny Clarke MD 
[address] [place] [id number] [address] [employer] Dear Franny Clarke MD, Thank you for referring Mr. [place] Hill to [address] [place] [address] for evaluation. My notes for this consultation are attached. If you have questions, please do not hesitate to call me. I look forward to following your patient along with you.  
 
 
Sincerely, 
 
Jose Silva MD

## 2021-02-25 ENCOUNTER — TELEPHONE (OUTPATIENT)
Dept: CARDIOLOGY CLINIC | Age: 61
End: 2021-02-25

## 2021-02-25 RX ORDER — DILTIAZEM HYDROCHLORIDE 120 MG/1
120 CAPSULE, COATED, EXTENDED RELEASE ORAL DAILY
Qty: 90 CAP | Refills: 1 | Status: SHIPPED | OUTPATIENT
Start: 2021-02-25 | End: 2021-03-04 | Stop reason: SINTOL

## 2021-02-25 NOTE — TELEPHONE ENCOUNTER
Verified patient with two identifiers. Pt informed that the recent transmission from his event monitor revealed rapid a-fib ( explained a-fib). Per Dr Ana Huber he is to start diltiazem ( BP was above 110) and eliquis. Explained side effects of eliquis. He is to continue the metoprolol. He will  discount cards. I will have Alix Grace NP send the scripts to his pharmacy. He will continue to wear the monitor and follow up with Dr Ana Huber once complete. Pt verbalized understanding.

## 2021-02-25 NOTE — TELEPHONE ENCOUNTER
Please advise that the patient had rapid atrial fibrillation. This could predispose to formation of a clot inside of the heart which could go to the brain and cause a stroke. I do not see blood pressures from yesterday but if his blood pressure is above 110 on the top number I would recommend starting 120 mg of diltiazem daily in addition to his metoprolol, and Eliquis 5 mg p.o. twice daily. 30-day free voucher and discount cards can be obtained in our office. Follow-up after event monitor. Let us know if any signs of more than minor bleeding such as bloody or black stools or blood in the urine. FYI to Gus Brar.

## 2021-02-26 ENCOUNTER — TELEPHONE (OUTPATIENT)
Dept: FAMILY MEDICINE CLINIC | Age: 61
End: 2021-02-26

## 2021-02-26 NOTE — TELEPHONE ENCOUNTER
Patient wanted to make sure Dr. Alena Leiva saw updates in chart from Dr. Arianna Abarca - monitor placed on 2/24/21 and wearing for one month. Had event this week and had two meds started per chart. Patient to drop off FMLA form. Patient notified that office policy is 3-65 business days for form completion from date of receipt.

## 2021-03-04 ENCOUNTER — OFFICE VISIT (OUTPATIENT)
Dept: CARDIOLOGY CLINIC | Age: 61
End: 2021-03-04
Payer: COMMERCIAL

## 2021-03-04 ENCOUNTER — TELEPHONE (OUTPATIENT)
Dept: CARDIOLOGY CLINIC | Age: 61
End: 2021-03-04

## 2021-03-04 ENCOUNTER — TELEPHONE (OUTPATIENT)
Dept: FAMILY MEDICINE CLINIC | Age: 61
End: 2021-03-04

## 2021-03-04 ENCOUNTER — HOSPITAL ENCOUNTER (OUTPATIENT)
Dept: GENERAL RADIOLOGY | Age: 61
Discharge: HOME | End: 2021-03-04
Payer: COMMERCIAL

## 2021-03-04 VITALS
OXYGEN SATURATION: 98 % | WEIGHT: 289.8 LBS | DIASTOLIC BLOOD PRESSURE: 60 MMHG | SYSTOLIC BLOOD PRESSURE: 112 MMHG | HEIGHT: 73 IN | BODY MASS INDEX: 38.41 KG/M2 | RESPIRATION RATE: 18 BRPM | HEART RATE: 67 BPM

## 2021-03-04 DIAGNOSIS — I48.0 PAF (PAROXYSMAL ATRIAL FIBRILLATION) (HCC): ICD-10-CM

## 2021-03-04 DIAGNOSIS — E78.2 MIXED HYPERLIPIDEMIA: ICD-10-CM

## 2021-03-04 DIAGNOSIS — I10 BENIGN ESSENTIAL HYPERTENSION: ICD-10-CM

## 2021-03-04 DIAGNOSIS — I48.0 PAF (PAROXYSMAL ATRIAL FIBRILLATION) (HCC): Primary | ICD-10-CM

## 2021-03-04 DIAGNOSIS — I49.5 TACHY-BRADY SYNDROME (HCC): ICD-10-CM

## 2021-03-04 PROCEDURE — 99215 OFFICE O/P EST HI 40 MIN: CPT | Performed by: INTERNAL MEDICINE

## 2021-03-04 PROCEDURE — 71046 X-RAY EXAM CHEST 2 VIEWS: CPT

## 2021-03-04 RX ORDER — METOPROLOL TARTRATE 25 MG/1
25 TABLET, FILM COATED ORAL 2 TIMES DAILY
Qty: 180 TAB | Refills: 0 | Status: ON HOLD | OUTPATIENT
Start: 2021-03-04 | End: 2021-03-15 | Stop reason: SDUPTHER

## 2021-03-04 NOTE — LETTER
3/4/2021 Patient: Kam Marion YOB: 1960 Date of Visit: 3/4/2021 Isaiah Quezada MD 
[address] [place] [address] [address] [employer] Dear Isaiah Quezada MD, Thank you for referring Mr. Kam Marion to [facility] for evaluation. My notes for this consultation are attached. If you have questions, please do not hesitate to call me. I look forward to following your patient along with you. Sincerely, Giana Stiles MD

## 2021-03-04 NOTE — PROGRESS NOTES
Chief Complaint   Patient presents with    New Patient     Referred by Dr Chriss Martins for a fib/abn event monitor     Dizziness     had an episode yesterday     Leg Swelling     spike' lower legs and feet      1. Have you been to the ER, urgent care clinic since your last visit? Hospitalized since your last visit? No     2. Have you seen or consulted any other health care providers outside of the [address] [place] [address] since your last visit? Include any pap smears or colon screening.   No

## 2021-03-04 NOTE — TELEPHONE ENCOUNTER
Paper work from [employer] [facility] along with office notes  completed and faxed to [phone]

## 2021-03-04 NOTE — TELEPHONE ENCOUNTER
Verified patient with two identifiers. Pt informed that due to the recent event monitor transmissions revealing A-fib and 4 second pause per Dr Arianna Abarca he is to see Dr Esperanza Haskins for evaluation. His appt is today at 11:00 am.  Advised not to drive. Pt verbalized understanding.

## 2021-03-04 NOTE — H&P (VIEW-ONLY)
Subjective:  
  
Jacqueline John is a 56 y.o. male is here for EP consult. Pt with abnl monitor - had episode of dizziness. Patient Active Problem List  
 Diagnosis Date Noted  Supracondylar fracture of distal end of femur without intracondylar extension ([address] 08/17/2020  Femur fracture ([address] 08/16/2020  Type 2 diabetes with nephropathy ([address] 12/12/2018  Severe obesity ([address] 12/12/2018  Controlled type 2 diabetes mellitus with complication, without long-term current use of insulin ([address] 12/12/2018  PAF (paroxysmal atrial fibrillation) ([address] 12/12/2018  Mixed hyperlipidemia 04/18/2012  Mitral valve disorders(424.0) 03/27/2012  Coronary artery disease 06/10/2011  S/P coronary artery stent placement 06/10/2011  Type 2 diabetes mellitus ([address] 06/10/2011  Benign essential hypertension 06/10/2011  Hypertriglyceridemia  Arthritis Victoriano Whitt MD 
Past Medical History:  
Diagnosis Date  Arthritis   
 knees  Atrial fibrillation with RVR ([address]  Benign essential hypertension 6/10/2011  CAD (coronary artery disease)  Diabetes ([address] DX  AGE 26    
 Hypercholesterolemia  Hypertriglyceridemia  Other ill-defined conditions(799.89)   
 obesity  Other ill-defined conditions(799.89)   
 high cholesterol  PAF (paroxysmal atrial fibrillation) ([address] 12/12/2018  SOB (shortness of breath) on exertion 5/16/2011 Past Surgical History:  
Procedure Laterality Date  CARDIAC CATHETERIZATION  6/9/2011  CARDIAC CATHETERIZATION  11/1/2012  [facility]. ANGIOSEAL VIP 6FR  6/9/2011  HX ORTHOPAEDIC    
 left knee replacement  HX ORTHOPAEDIC    
 R knee replacement  2010 -Priscilla Guard.  NM CARDIAC SURG PROCEDURE UNLIST    
 STENT RCA  6/11  NM DRUG-ELUTING STENTS, SINGLE  6/9/2011 Allergies Allergen Reactions  Lisinopril Cough  Losartan Other (comments) Hypotension Family History Problem Relation Age of Onset  Diabetes Mother  Hypertension Mother  Elevated Lipids Mother  Cancer Mother  Diabetes Father  Heart Disease Father  Hypertension Father  Elevated Lipids Father   
 negative for cardiac disease Social History Socioeconomic History  Marital status:  Spouse name: Not on file  Number of children: Not on file  Years of education: Not on file  Highest education level: Not on file Tobacco Use  Smoking status: Former Smoker Quit date: 11/10/1989 Years since quittin.3  Smokeless tobacco: Never Used Substance and Sexual Activity  Alcohol use: Yes Comment: social  DRINKS ONE WEEKEND A MONTH/\"BIG IMPROVEMENT\"  Drug use: No  
 Sexual activity: Yes  
  Partners: Female Birth control/protection: None Social History Narrative , 3 children, twin sons 25 and one son 15. Works at [employer] for the last 9 years, used to work a [employer]  
 
Current Outpatient Medications Medication Sig  
 metoprolol tartrate (LOPRESSOR) 25 mg tablet Take 1 Tab by mouth two (2) times a day for 90 days. TAKE 1 TABLET BY MOUTH EVERY 12 HOURS  
 apixaban (ELIQUIS) 5 mg tablet Take 1 Tab by mouth two (2) times a day.  ACETAMINOPHEN PO Take 500 mg by mouth as needed. 1-2 tabs prn as needed  semaglutide (Ozempic) 1 mg/dose (2 mg/1.5 mL) sub-q pen 1 MG BY SUBCUTANEOUS ROUTE EVERY SEVEN (7) DAYS.  ibuprofen (MOTRIN) 800 mg tablet Take 1 Tab by mouth every eight (8) hours as needed for Pain.  furosemide (LASIX) 40 mg tablet 1-2 tabs po daily as needed for fluid  glipiZIDE (GLUCOTROL) 10 mg tablet TAKE 1 TABLET BY MOUTH TWO (2) TIMES A DAY. FOR DIABETES  
 metFORMIN (GLUCOPHAGE) 1,000 mg tablet Take 1 Tab by mouth two (2) times daily (with meals).  gemfibroziL (LOPID) 600 mg tablet Take 1 Tab by mouth two (2) times daily as needed (cholesterol).  (Patient taking differently: Take 600 mg by mouth two (2) times a day.)  magnesium oxide (MAG-OX) 400 mg tablet TAKE 2 TABLETS BY MOUTH EVERY DAY  empagliflozin (Jardiance) 10 mg tablet Take 1 Tab by mouth daily. For diabetes  rosuvastatin (CRESTOR) 20 mg tablet TAKE 1 TABLET BY MOUTH DAILY FOR CHOLESTEROL  nitroglycerin (NITROSTAT) 0.4 mg SL tablet 1 Tab by SubLINGual route every five (5) minutes as needed for Chest Pain (call 911 if CP/ SOB not relieved by 3 tabs).  aspirin delayed-release 81 mg tablet Take  by mouth daily.  omega-3 fatty acids-vitamin e (FISH OIL) 1,000 mg Cap Take 2 Caps by mouth two (2) times a day. No current facility-administered medications for this visit. Vitals:  
 03/04/21 1043 BP: 112/60 Pulse: 67 Resp: 18 SpO2: 98% Weight: 289 lb 12.8 oz (131.5 kg) Height: 6' 1\" (1.854 m) I have reviewed the nurses notes, vitals, problem list, allergy list, medical history, family, social history and medications. Review of Symptoms: 
 
General: Pt denies excessive weight gain or loss. Pt is able to conduct ADL's HEENT: Denies blurred vision, headaches, hearing loss, epistaxis and difficulty swallowing. Respiratory: Denies cough, congestion, shortness of breath, KRAMER, wheezing or stridor. Cardiovascular: +dizziness, Denies precordial pain, palpitations, edema or PND Gastrointestinal: Denies poor appetite, indigestion, abdominal pain or blood in stool Genitourinary: Denies hematuria, dysuria, increased urinary frequency Musculoskeletal: Denies joint pain or swelling from muscles or joints Neurologic: Denies tremor, paresthesias, headache, or sensory motor disturbance Psychiatric: Denies confusion, insomnia, depression Integumentray: Denies rash, itching or ulcers. Hematologic: Denies easy bruising, bleeding Physical Exam:   
 
General: Well developed, in no acute distress. HEENT: Eyes - PERRL, no jvd Heart:  Normal S1/S2 negative S3 or S4. Regular, no murmur, gallop or rub.   
Respiratory: Clear bilaterally x 4, no wheezing or rales Abdomen:   Soft, non-tender, bowel sounds are active. Extremities:  No edema, normal cap refill, no cyanosis. Musculoskeletal: No clubbing Neuro: A&Ox3, speech clear, gait stable. Skin: Skin color is normal. No rashes or lesions. Non diaphoretic, no ulcers or subcutaneous nodule Vascular: 2+ pulses symmetric in all extremities Psych - judgement intact and orientation is wnl Cardiographics Monitor - tachy-amina, PAF with rvr and greater than 4 sec pause, junctional rhythm associated with dizziness Results for orders placed or performed during the hospital encounter of 08/16/20 EKG, 12 LEAD, INITIAL Result Value Ref Range Ventricular Rate 63 BPM  
 Atrial Rate 63 BPM  
 P-R Interval 190 ms QRS Duration 92 ms Q-T Interval 408 ms QTC Calculation (Bezet) 417 ms Calculated P Axis 45 degrees Calculated R Axis -14 degrees Calculated T Axis -5 degrees Diagnosis Normal sinus rhythm Nonspecific T wave abnormality When compared with ECG of 05-DEC-2018 04:40, No significant change Confirmed by Cachorro Kahn M.D., Jeremys Joby ([id number]) on 8/16/2020 10:46:56 AM 
  
 
 
 
Lab Results Component Value Date/Time WBC 6.9 02/12/2021 03:57 PM  
 HGB 12.6 (L) 02/12/2021 03:57 PM  
 HCT 39.4 02/12/2021 03:57 PM  
 PLATELET 213 09/67/6404 03:57 PM  
 MCV 81 02/12/2021 03:57 PM  
  
Lab Results Component Value Date/Time Sodium 143 02/12/2021 03:57 PM  
 Potassium 4.4 02/12/2021 03:57 PM  
 Chloride 108 (H) 02/12/2021 03:57 PM  
 CO2 22 02/12/2021 03:57 PM  
 Anion gap 13 08/18/2020 03:03 AM  
 Glucose 135 (H) 02/12/2021 03:57 PM  
 BUN 20 02/12/2021 03:57 PM  
 Creatinine 0.87 02/12/2021 03:57 PM  
 BUN/Creatinine ratio 23 02/12/2021 03:57 PM  
 GFR est  02/12/2021 03:57 PM  
 GFR est non-AA 92 02/12/2021 03:57 PM  
 Calcium 9.6 02/12/2021 03:57 PM  
 Bilirubin, total 0.2 02/12/2021 03:57 PM  
 Alk.  phosphatase 117 02/12/2021 03:57 PM  
 Protein, total 6.7 02/12/2021 03:57 PM  
 Albumin 4.1 02/12/2021 03:57 PM  
 Globulin 3.5 08/16/2020 02:48 AM  
 A-G Ratio 1.6 02/12/2021 03:57 PM  
 ALT (SGPT) 7 02/12/2021 03:57 PM  
  
 
 Assessment: 
 
 Assessment: ICD-10-CM ICD-9-CM 1. PAF (paroxysmal atrial fibrillation) (HCC)  I48.0 427.31 CBC WITH AUTOMATED DIFF PROTHROMBIN TIME + INR  
   METABOLIC PANEL, COMPREHENSIVE  
   XR CHEST PA LAT  
   metoprolol tartrate (LOPRESSOR) 25 mg tablet 2. Benign essential hypertension  I10 401.1 CBC WITH AUTOMATED DIFF PROTHROMBIN TIME + INR  
   METABOLIC PANEL, COMPREHENSIVE  
   XR CHEST PA LAT  
   metoprolol tartrate (LOPRESSOR) 25 mg tablet 3. Mixed hyperlipidemia  E78.2 272.2 CBC WITH AUTOMATED DIFF PROTHROMBIN TIME + INR  
   METABOLIC PANEL, COMPREHENSIVE  
   XR CHEST PA LAT  
   metoprolol tartrate (LOPRESSOR) 25 mg tablet 4. Tachy-amina syndrome (HCC)  I49.5 427.81 CBC WITH AUTOMATED DIFF PROTHROMBIN TIME + INR  
   METABOLIC PANEL, COMPREHENSIVE  
   XR CHEST PA LAT  
   metoprolol tartrate (LOPRESSOR) 25 mg tablet Orders Placed This Encounter  XR CHEST PA LAT Standing Status:   Future Standing Expiration Date:   4/4/2021 Order Specific Question:   Reason for Exam  
  Answer:   pre op  CBC WITH AUTOMATED DIFF Standing Status:   Future Standing Expiration Date:   9/4/2021  
 PROTHROMBIN TIME + INR Standing Status:   Future Standing Expiration Date:   9/4/2021  METABOLIC PANEL, COMPREHENSIVE Standing Status:   Future Standing Expiration Date:   9/4/2021  
 metoprolol tartrate (LOPRESSOR) 25 mg tablet Sig: Take 1 Tab by mouth two (2) times a day for 90 days. TAKE 1 TABLET BY MOUTH EVERY 12 HOURS Dispense:  180 Tab Refill:  0 Plan:  
Duyen De Leon is a pleasant gentleman with symptomatic PAF with rvr and sick sinus syndrome.  He has been on lopressor 50 mg bid for cad and was recently started on dilt 120 mg daily for paf with rvr. We will try and avoid a pacemaker for his sick sinus. I will stop his dilt and lower his dose of bb. Velia Solis is a candidate for an afib ablation. I discussed the risks/benefits/alternatives of the procedure with the patient. Risks include (but are not limited to) bleeding, heart block, infection, cva/mi/tamponade/esophageal perforation/pv stenosis/death. The patient understands that there is a 2-8% major complication rate and agrees to proceed. Thank you for this interesting consultation. During this visit,  the patient and I had a comprehensive discussion of arrhythmia management using principles of shared decision making. This included a discussion of anti-arrhythmic medications including class I agents (e.g. flecainide) and class III agents (e.g. amiodarone, sotalol etc) and both class II and IV agents (beta-blockers and calcium channel blockers). We reviewed the potentially life-threatening side effects of these medications, including (but not limited to) fatal tachyarrhythmias, pulmonary toxicity, liver toxicity and thyroid disorders. We also reviewed the requisite monitoring required of some of these medications. In addition, we reviewed ablative therapy, including the potential benefits and risks of catheter ablation. These risks include death, myocardial infarction, stroke, cardiac perforation, vascular injury, and other less severe complications. The patient demonstrated a clear understanding of these issues during out discussion. Our plans, determined together after thorough consideration, are outlined else where in this note. Continue medical management for AF, htn and sss. Thank you for allowing me to participate in Velia Solis 's care.  
 
Raciel Beasley MD, Erum Menard

## 2021-03-04 NOTE — PROGRESS NOTES
Subjective:      Kevin Luna is a 56 y.o. male is here for EP consult. Pt with abnl monitor - had episode of dizziness. Patient Active Problem List    Diagnosis Date Noted    Supracondylar fracture of distal end of femur without intracondylar extension ([address] 08/17/2020    Femur fracture ([address] 08/16/2020    Type 2 diabetes with nephropathy ([address] 12/12/2018    Severe obesity ([address] 12/12/2018    Controlled type 2 diabetes mellitus with complication, without long-term current use of insulin ([address] 12/12/2018    PAF (paroxysmal atrial fibrillation) ([address] 12/12/2018    Mixed hyperlipidemia 04/18/2012    Mitral valve disorders(424.0) 03/27/2012    Coronary artery disease 06/10/2011    S/P coronary artery stent placement 06/10/2011    Type 2 diabetes mellitus ([address] 06/10/2011    Benign essential hypertension 06/10/2011    Hypertriglyceridemia     Arthritis       Yobani Aaron MD  Past Medical History:   Diagnosis Date    Arthritis     knees    Atrial fibrillation with RVR ([address]     Benign essential hypertension 6/10/2011    CAD (coronary artery disease)     Diabetes ([address]     DX  AGE 33      Hypercholesterolemia     Hypertriglyceridemia     Other ill-defined conditions(799.89)     obesity    Other ill-defined conditions(799.89)     high cholesterol    PAF (paroxysmal atrial fibrillation) ([address] 12/12/2018    SOB (shortness of breath) on exertion 5/16/2011      Past Surgical History:   Procedure Laterality Date    CARDIAC CATHETERIZATION  6/9/2011         CARDIAC CATHETERIZATION  11/1/2012         HC ANGIOSEAL VIP 6FR  6/9/2011         HX ORTHOPAEDIC      left knee replacement    HX ORTHOPAEDIC      R knee replacement  2010 -Michael Curran.     MD CARDIAC SURG PROCEDURE UNLIST      STENT RCA  6/11    MD DRUG-ELUTING STENTS, SINGLE  6/9/2011          Allergies   Allergen Reactions    Lisinopril Cough    Losartan Other (comments)     Hypotension        Family History   Problem Relation Age of Onset    Diabetes Mother     Hypertension Mother    [facility] Elevated Lipids Mother     Cancer Mother     Diabetes Father     Heart Disease Father     Hypertension Father     Elevated Lipids Father     negative for cardiac disease  Social History     Socioeconomic History    Marital status:      Spouse name: Not on file    Number of children: Not on file    Years of education: Not on file    Highest education level: Not on file   Tobacco Use    Smoking status: Former Smoker     Quit date: 11/10/1989     Years since quittin.3    Smokeless tobacco: Never Used   Substance and Sexual Activity    Alcohol use: Yes     Comment: social  DRINKS ONE WEEKEND A MONTH/\"BIG IMPROVEMENT\"    Drug use: No    Sexual activity: Yes     Partners: Female     Birth control/protection: None   Social History Narrative    , 3 children, twin sons 25 and one son 15. Works at [employer] for the last 9 years, used to work a [employer]     Current Outpatient Medications   Medication Sig    metoprolol tartrate (LOPRESSOR) 25 mg tablet Take 1 Tab by mouth two (2) times a day for 90 days. TAKE 1 TABLET BY MOUTH EVERY 12 HOURS    apixaban (ELIQUIS) 5 mg tablet Take 1 Tab by mouth two (2) times a day.  ACETAMINOPHEN PO Take 500 mg by mouth as needed. 1-2 tabs prn as needed    semaglutide (Ozempic) 1 mg/dose (2 mg/1.5 mL) sub-q pen 1 MG BY SUBCUTANEOUS ROUTE EVERY SEVEN (7) DAYS.  ibuprofen (MOTRIN) 800 mg tablet Take 1 Tab by mouth every eight (8) hours as needed for Pain.  furosemide (LASIX) 40 mg tablet 1-2 tabs po daily as needed for fluid    glipiZIDE (GLUCOTROL) 10 mg tablet TAKE 1 TABLET BY MOUTH TWO (2) TIMES A DAY. FOR DIABETES    metFORMIN (GLUCOPHAGE) 1,000 mg tablet Take 1 Tab by mouth two (2) times daily (with meals).  gemfibroziL (LOPID) 600 mg tablet Take 1 Tab by mouth two (2) times daily as needed (cholesterol).  (Patient taking differently: Take 600 mg by mouth two (2) times a day.)  magnesium oxide (MAG-OX) 400 mg tablet TAKE 2 TABLETS BY MOUTH EVERY DAY    empagliflozin (Jardiance) 10 mg tablet Take 1 Tab by mouth daily. For diabetes    rosuvastatin (CRESTOR) 20 mg tablet TAKE 1 TABLET BY MOUTH DAILY FOR CHOLESTEROL    nitroglycerin (NITROSTAT) 0.4 mg SL tablet 1 Tab by SubLINGual route every five (5) minutes as needed for Chest Pain (call 911 if CP/ SOB not relieved by 3 tabs).  aspirin delayed-release 81 mg tablet Take  by mouth daily.  omega-3 fatty acids-vitamin e (FISH OIL) 1,000 mg Cap Take 2 Caps by mouth two (2) times a day. No current facility-administered medications for this visit. Vitals:    03/04/21 1043   BP: 112/60   Pulse: 67   Resp: 18   SpO2: 98%   Weight: 289 lb 12.8 oz (131.5 kg)   Height: 6' 1\" (1.854 m)       I have reviewed the nurses notes, vitals, problem list, allergy list, medical history, family, social history and medications. Review of Symptoms:    General: Pt denies excessive weight gain or loss. Pt is able to conduct ADL's  HEENT: Denies blurred vision, headaches, hearing loss, epistaxis and difficulty swallowing. Respiratory: Denies cough, congestion, shortness of breath, KRAMER, wheezing or stridor. Cardiovascular: +dizziness, Denies precordial pain, palpitations, edema or PND  Gastrointestinal: Denies poor appetite, indigestion, abdominal pain or blood in stool  Genitourinary: Denies hematuria, dysuria, increased urinary frequency  Musculoskeletal: Denies joint pain or swelling from muscles or joints  Neurologic: Denies tremor, paresthesias, headache, or sensory motor disturbance  Psychiatric: Denies confusion, insomnia, depression  Integumentray: Denies rash, itching or ulcers. Hematologic: Denies easy bruising, bleeding    Physical Exam:      General: Well developed, in no acute distress. HEENT: Eyes - PERRL, no jvd  Heart:  Normal S1/S2 negative S3 or S4. Regular, no murmur, gallop or rub.    Respiratory: Clear bilaterally x 4, no wheezing or rales  Abdomen:   Soft, non-tender, bowel sounds are active. Extremities:  No edema, normal cap refill, no cyanosis. Musculoskeletal: No clubbing  Neuro: A&Ox3, speech clear, gait stable. Skin: Skin color is normal. No rashes or lesions. Non diaphoretic, no ulcers or subcutaneous nodule  Vascular: 2+ pulses symmetric in all extremities  Psych - judgement intact and orientation is wnl     Cardiographics    Monitor - tachy-amina, PAF with rvr and greater than 4 sec pause, junctional rhythm associated with dizziness    Results for orders placed or performed during the hospital encounter of 08/16/20   EKG, 12 LEAD, INITIAL   Result Value Ref Range    Ventricular Rate 63 BPM    Atrial Rate 63 BPM    P-R Interval 190 ms    QRS Duration 92 ms    Q-T Interval 408 ms    QTC Calculation (Bezet) 417 ms    Calculated P Axis 45 degrees    Calculated R Axis -14 degrees    Calculated T Axis -5 degrees    Diagnosis       Normal sinus rhythm  Nonspecific T wave abnormality  When compared with ECG of 05-DEC-2018 04:40,  No significant change  Confirmed by Rubén Olivrea M.D., Tinnie Millin ([id number]) on 8/16/2020 10:46:56 AM           Lab Results   Component Value Date/Time    WBC 6.9 02/12/2021 03:57 PM    HGB 12.6 (L) 02/12/2021 03:57 PM    HCT 39.4 02/12/2021 03:57 PM    PLATELET 661 08/29/0287 03:57 PM    MCV 81 02/12/2021 03:57 PM      Lab Results   Component Value Date/Time    Sodium 143 02/12/2021 03:57 PM    Potassium 4.4 02/12/2021 03:57 PM    Chloride 108 (H) 02/12/2021 03:57 PM    CO2 22 02/12/2021 03:57 PM    Anion gap 13 08/18/2020 03:03 AM    Glucose 135 (H) 02/12/2021 03:57 PM    BUN 20 02/12/2021 03:57 PM    Creatinine 0.87 02/12/2021 03:57 PM    BUN/Creatinine ratio 23 02/12/2021 03:57 PM    GFR est  02/12/2021 03:57 PM    GFR est non-AA 92 02/12/2021 03:57 PM    Calcium 9.6 02/12/2021 03:57 PM    Bilirubin, total 0.2 02/12/2021 03:57 PM    Alk.  phosphatase 117 02/12/2021 03:57 PM    Protein, total 6.7 02/12/2021 03:57 PM    Albumin 4.1 02/12/2021 03:57 PM    Globulin 3.5 08/16/2020 02:48 AM    A-G Ratio 1.6 02/12/2021 03:57 PM    ALT (SGPT) 7 02/12/2021 03:57 PM         Assessment:     Assessment:        ICD-10-CM ICD-9-CM    1. PAF (paroxysmal atrial fibrillation) (HCC)  I48.0 427.31 CBC WITH AUTOMATED DIFF      PROTHROMBIN TIME + INR      METABOLIC PANEL, COMPREHENSIVE      XR CHEST PA LAT      metoprolol tartrate (LOPRESSOR) 25 mg tablet   2. Benign essential hypertension  I10 401.1 CBC WITH AUTOMATED DIFF      PROTHROMBIN TIME + INR      METABOLIC PANEL, COMPREHENSIVE      XR CHEST PA LAT      metoprolol tartrate (LOPRESSOR) 25 mg tablet   3. Mixed hyperlipidemia  E78.2 272.2 CBC WITH AUTOMATED DIFF      PROTHROMBIN TIME + INR      METABOLIC PANEL, COMPREHENSIVE      XR CHEST PA LAT      metoprolol tartrate (LOPRESSOR) 25 mg tablet   4. Tachy-amina syndrome (HCC)  I49.5 427.81 CBC WITH AUTOMATED DIFF      PROTHROMBIN TIME + INR      METABOLIC PANEL, COMPREHENSIVE      XR CHEST PA LAT      metoprolol tartrate (LOPRESSOR) 25 mg tablet     Orders Placed This Encounter    XR CHEST PA LAT     Standing Status:   Future     Standing Expiration Date:   4/4/2021     Order Specific Question:   Reason for Exam     Answer:   pre op    CBC WITH AUTOMATED DIFF     Standing Status:   Future     Standing Expiration Date:   9/4/2021    PROTHROMBIN TIME + INR     Standing Status:   Future     Standing Expiration Date:   6/7/8655    METABOLIC PANEL, COMPREHENSIVE     Standing Status:   Future     Standing Expiration Date:   9/4/2021    metoprolol tartrate (LOPRESSOR) 25 mg tablet     Sig: Take 1 Tab by mouth two (2) times a day for 90 days. TAKE 1 TABLET BY MOUTH EVERY 12 HOURS     Dispense:  180 Tab     Refill:  0        Plan:   Jacqueline John is a pleasant gentleman with symptomatic PAF with rvr and sick sinus syndrome.  He has been on lopressor 50 mg bid for cad and was recently started on dilt 120 mg daily for paf with rvr. We will try and avoid a pacemaker for his sick sinus. I will stop his dilt and lower his dose of bb. Kevin Luna is a candidate for an afib ablation. I discussed the risks/benefits/alternatives of the procedure with the patient. Risks include (but are not limited to) bleeding, heart block, infection, cva/mi/tamponade/esophageal perforation/pv stenosis/death. The patient understands that there is a 2-6% major complication rate and agrees to proceed. Thank you for this interesting consultation. During this visit,  the patient and I had a comprehensive discussion of arrhythmia management using principles of shared decision making. This included a discussion of anti-arrhythmic medications including class I agents (e.g. flecainide) and class III agents (e.g. amiodarone, sotalol etc) and both class II and IV agents (beta-blockers and calcium channel blockers). We reviewed the potentially life-threatening side effects of these medications, including (but not limited to) fatal tachyarrhythmias, pulmonary toxicity, liver toxicity and thyroid disorders. We also reviewed the requisite monitoring required of some of these medications. In addition, we reviewed ablative therapy, including the potential benefits and risks of catheter ablation. These risks include death, myocardial infarction, stroke, cardiac perforation, vascular injury, and other less severe complications. The patient demonstrated a clear understanding of these issues during out discussion. Our plans, determined together after thorough consideration, are outlined else where in this note. Continue medical management for AF, htn and sss. Thank you for allowing me to participate in Kevin Luna 's care.     Alcon Navarrete MD, [place]

## 2021-03-05 LAB
ALBUMIN SERPL-MCNC: 3.8 G/DL (ref 3.8–4.8)
ALBUMIN/GLOB SERPL: 1.4 {RATIO} (ref 1.2–2.2)
ALP SERPL-CCNC: 127 IU/L (ref 39–117)
ALT SERPL-CCNC: 4 IU/L (ref 0–44)
AST SERPL-CCNC: 10 IU/L (ref 0–40)
BASOPHILS # BLD AUTO: 0.1 X10E3/UL (ref 0–0.2)
BASOPHILS NFR BLD AUTO: 1 %
BILIRUB SERPL-MCNC: 0.3 MG/DL (ref 0–1.2)
BUN SERPL-MCNC: 16 MG/DL (ref 8–27)
BUN/CREAT SERPL: 19 (ref 10–24)
CALCIUM SERPL-MCNC: 8.8 MG/DL (ref 8.6–10.2)
CHLORIDE SERPL-SCNC: 106 MMOL/L (ref 96–106)
CO2 SERPL-SCNC: 22 MMOL/L (ref 20–29)
CREAT SERPL-MCNC: 0.84 MG/DL (ref 0.76–1.27)
EOSINOPHIL # BLD AUTO: 0.2 X10E3/UL (ref 0–0.4)
EOSINOPHIL NFR BLD AUTO: 3 %
ERYTHROCYTE [DISTWIDTH] IN BLOOD BY AUTOMATED COUNT: 14.4 % (ref 11.6–15.4)
GLOBULIN SER CALC-MCNC: 2.7 G/DL (ref 1.5–4.5)
GLUCOSE SERPL-MCNC: 150 MG/DL (ref 65–99)
HCT VFR BLD AUTO: 37.6 % (ref 37.5–51)
HGB BLD-MCNC: 12.2 G/DL (ref 13–17.7)
IMM GRANULOCYTES # BLD AUTO: 0 X10E3/UL (ref 0–0.1)
IMM GRANULOCYTES NFR BLD AUTO: 0 %
INR PPP: 1 (ref 0.9–1.2)
LYMPHOCYTES # BLD AUTO: 1.4 X10E3/UL (ref 0.7–3.1)
LYMPHOCYTES NFR BLD AUTO: 23 %
MCH RBC QN AUTO: 25.9 PG (ref 26.6–33)
MCHC RBC AUTO-ENTMCNC: 32.4 G/DL (ref 31.5–35.7)
MCV RBC AUTO: 80 FL (ref 79–97)
MONOCYTES # BLD AUTO: 0.5 X10E3/UL (ref 0.1–0.9)
MONOCYTES NFR BLD AUTO: 8 %
NEUTROPHILS # BLD AUTO: 4 X10E3/UL (ref 1.4–7)
NEUTROPHILS NFR BLD AUTO: 65 %
PLATELET # BLD AUTO: 312 X10E3/UL (ref 150–450)
POTASSIUM SERPL-SCNC: 4.5 MMOL/L (ref 3.5–5.2)
PROT SERPL-MCNC: 6.5 G/DL (ref 6–8.5)
PROTHROMBIN TIME: 10.8 SEC (ref 9.1–12)
RBC # BLD AUTO: 4.71 X10E6/UL (ref 4.14–5.8)
SODIUM SERPL-SCNC: 143 MMOL/L (ref 134–144)
WBC # BLD AUTO: 6.2 X10E3/UL (ref 3.4–10.8)

## 2021-03-10 ENCOUNTER — TELEPHONE (OUTPATIENT)
Dept: CARDIOLOGY CLINIC | Age: 61
End: 2021-03-10

## 2021-03-10 NOTE — TELEPHONE ENCOUNTER
Verified patient with two identifiers. Pt informed that due to recent event monitor transmissions revealing two significant pauses per Rylee Twila, NP he is to decrease the metoprolol to 12.5 mg bid. He will continue to wear the event monitor and keep his appt for March 15. Pt verbalized understanding.

## 2021-03-11 ENCOUNTER — HOSPITAL ENCOUNTER (OUTPATIENT)
Dept: PREADMISSION TESTING | Age: 61
Discharge: HOME | End: 2021-03-11
Payer: COMMERCIAL

## 2021-03-11 LAB — SARS-COV-2, COV2: NORMAL

## 2021-03-11 PROCEDURE — U0003 INFECTIOUS AGENT DETECTION BY NUCLEIC ACID (DNA OR RNA); SEVERE ACUTE RESPIRATORY SYNDROME CORONAVIRUS 2 (SARS-COV-2) (CORONAVIRUS DISEASE [COVID-19]), AMPLIFIED PROBE TECHNIQUE, MAKING USE OF HIGH THROUGHPUT TECHNOLOGIES AS DESCRIBED BY CMS-2020-01-R: HCPCS

## 2021-03-12 LAB — SARS-COV-2, COV2NT: NOT DETECTED

## 2021-03-15 ENCOUNTER — ANESTHESIA (OUTPATIENT)
Dept: CARDIAC CATH/INVASIVE PROCEDURES | Age: 61
End: 2021-03-15
Payer: COMMERCIAL

## 2021-03-15 ENCOUNTER — ANESTHESIA EVENT (OUTPATIENT)
Dept: CARDIAC CATH/INVASIVE PROCEDURES | Age: 61
End: 2021-03-15
Payer: COMMERCIAL

## 2021-03-15 ENCOUNTER — HOSPITAL ENCOUNTER (OUTPATIENT)
Age: 61
Discharge: HOME | End: 2021-03-15
Attending: INTERNAL MEDICINE | Admitting: INTERNAL MEDICINE
Payer: COMMERCIAL

## 2021-03-15 ENCOUNTER — APPOINTMENT (OUTPATIENT)
Dept: CARDIAC CATH/INVASIVE PROCEDURES | Age: 61
End: 2021-03-15
Attending: INTERNAL MEDICINE
Payer: COMMERCIAL

## 2021-03-15 VITALS
SYSTOLIC BLOOD PRESSURE: 153 MMHG | HEART RATE: 80 BPM | DIASTOLIC BLOOD PRESSURE: 65 MMHG | HEIGHT: 73 IN | WEIGHT: 280 LBS | OXYGEN SATURATION: 95 % | TEMPERATURE: 98.2 F | BODY MASS INDEX: 37.11 KG/M2 | RESPIRATION RATE: 14 BRPM

## 2021-03-15 DIAGNOSIS — I25.10 CORONARY ARTERY DISEASE DUE TO LIPID RICH PLAQUE: ICD-10-CM

## 2021-03-15 DIAGNOSIS — I10 BENIGN ESSENTIAL HYPERTENSION: Chronic | ICD-10-CM

## 2021-03-15 DIAGNOSIS — E78.2 MIXED HYPERLIPIDEMIA: ICD-10-CM

## 2021-03-15 DIAGNOSIS — I48.91 ATRIAL FIBRILLATION, UNSPECIFIED TYPE (HCC): ICD-10-CM

## 2021-03-15 DIAGNOSIS — I49.5 TACHY-BRADY SYNDROME (HCC): Chronic | ICD-10-CM

## 2021-03-15 DIAGNOSIS — I48.0 PAF (PAROXYSMAL ATRIAL FIBRILLATION) (HCC): Chronic | ICD-10-CM

## 2021-03-15 DIAGNOSIS — I25.83 CORONARY ARTERY DISEASE DUE TO LIPID RICH PLAQUE: ICD-10-CM

## 2021-03-15 DIAGNOSIS — E11.8 CONTROLLED TYPE 2 DIABETES MELLITUS WITH COMPLICATION, WITHOUT LONG-TERM CURRENT USE OF INSULIN (HCC): ICD-10-CM

## 2021-03-15 LAB
ACT BLD: 125 SECS (ref 79–138)
ACT BLD: 246 SECS (ref 79–138)
GLUCOSE BLD STRIP.AUTO-MCNC: 161 MG/DL (ref 65–100)
GLUCOSE BLD STRIP.AUTO-MCNC: 166 MG/DL (ref 65–100)
SERVICE CMNT-IMP: ABNORMAL
SERVICE CMNT-IMP: ABNORMAL

## 2021-03-15 PROCEDURE — 85347 COAGULATION TIME ACTIVATED: CPT

## 2021-03-15 PROCEDURE — 93656 COMPRE EP EVAL ABLTJ ATR FIB: CPT | Performed by: INTERNAL MEDICINE

## 2021-03-15 PROCEDURE — 77030030261 HC CBL UMB ELEC DISP MEDT -C: Performed by: INTERNAL MEDICINE

## 2021-03-15 PROCEDURE — 93623 PRGRMD STIMJ&PACG IV RX NFS: CPT | Performed by: INTERNAL MEDICINE

## 2021-03-15 PROCEDURE — 77030029065 HC DRSG HEMO QCLOT ZMED -B: Performed by: INTERNAL MEDICINE

## 2021-03-15 PROCEDURE — 76210000006 HC OR PH I REC 0.5 TO 1 HR

## 2021-03-15 PROCEDURE — 77030018729 HC ELECTRD DEFIB PAD CARD -B: Performed by: INTERNAL MEDICINE

## 2021-03-15 PROCEDURE — 77030018733 HC ELECTRD KT ENSITE STJU -G: Performed by: INTERNAL MEDICINE

## 2021-03-15 PROCEDURE — 93613 INTRACARDIAC EPHYS 3D MAPG: CPT

## 2021-03-15 PROCEDURE — C1730 CATH, EP, 19 OR FEW ELECT: HCPCS | Performed by: INTERNAL MEDICINE

## 2021-03-15 PROCEDURE — C1725 CATH, TRANSLUMIN NON-LASER: HCPCS | Performed by: INTERNAL MEDICINE

## 2021-03-15 PROCEDURE — 74011250636 HC RX REV CODE- 250/636: Performed by: INTERNAL MEDICINE

## 2021-03-15 PROCEDURE — C1894 INTRO/SHEATH, NON-LASER: HCPCS | Performed by: INTERNAL MEDICINE

## 2021-03-15 PROCEDURE — 93662 INTRACARDIAC ECG (ICE): CPT | Performed by: INTERNAL MEDICINE

## 2021-03-15 PROCEDURE — 77030016894 HC CBL EP DX CATH3 STJU -B: Performed by: INTERNAL MEDICINE

## 2021-03-15 PROCEDURE — 82962 GLUCOSE BLOOD TEST: CPT

## 2021-03-15 PROCEDURE — 77030015398 HC CBL EP EXT STJU -C: Performed by: INTERNAL MEDICINE

## 2021-03-15 PROCEDURE — 77030029163 HC CBL EP DX ACHV DISP MEDT -C: Performed by: INTERNAL MEDICINE

## 2021-03-15 PROCEDURE — 74011000250 HC RX REV CODE- 250: Performed by: NURSE ANESTHETIST, CERTIFIED REGISTERED

## 2021-03-15 PROCEDURE — 77030026438 HC STYL ET INTUB CARD -A: Performed by: ANESTHESIOLOGY

## 2021-03-15 PROCEDURE — C1733 CATH, EP, OTHR THAN COOL-TIP: HCPCS | Performed by: INTERNAL MEDICINE

## 2021-03-15 PROCEDURE — 77030029359 HC PRB ESOPH TEMP CATH ANTM -F: Performed by: INTERNAL MEDICINE

## 2021-03-15 PROCEDURE — 77030008684 HC TU ET CUF COVD -B: Performed by: ANESTHESIOLOGY

## 2021-03-15 PROCEDURE — 74011000250 HC RX REV CODE- 250: Performed by: INTERNAL MEDICINE

## 2021-03-15 PROCEDURE — 77030013797 HC KT TRNSDUC PRSSR EDWD -A: Performed by: INTERNAL MEDICINE

## 2021-03-15 PROCEDURE — 77030004530 HC CATH ANGI DX IMGR BSC -A: Performed by: INTERNAL MEDICINE

## 2021-03-15 PROCEDURE — C1769 GUIDE WIRE: HCPCS | Performed by: INTERNAL MEDICINE

## 2021-03-15 PROCEDURE — 77030030278 HC COAX UMB DISP MEDT -C: Performed by: INTERNAL MEDICINE

## 2021-03-15 PROCEDURE — 93613 INTRACARDIAC EPHYS 3D MAPG: CPT | Performed by: INTERNAL MEDICINE

## 2021-03-15 PROCEDURE — 76060000033 HC ANESTHESIA 1 TO 1.5 HR: Performed by: INTERNAL MEDICINE

## 2021-03-15 PROCEDURE — 77030008543 HC TBNG MON PRSS MRTM -A: Performed by: INTERNAL MEDICINE

## 2021-03-15 PROCEDURE — 77030013079 HC BLNKT BAIR HGGR 3M -A: Performed by: ANESTHESIOLOGY

## 2021-03-15 PROCEDURE — 74011250636 HC RX REV CODE- 250/636: Performed by: NURSE ANESTHETIST, CERTIFIED REGISTERED

## 2021-03-15 PROCEDURE — 74011000258 HC RX REV CODE- 258: Performed by: NURSE ANESTHETIST, CERTIFIED REGISTERED

## 2021-03-15 RX ORDER — MORPHINE SULFATE 10 MG/ML
INJECTION, SOLUTION INTRAMUSCULAR; INTRAVENOUS AS NEEDED
Status: DISCONTINUED | OUTPATIENT
Start: 2021-03-15 | End: 2021-03-15 | Stop reason: HOSPADM

## 2021-03-15 RX ORDER — HEPARIN SODIUM 1000 [USP'U]/ML
INJECTION, SOLUTION INTRAVENOUS; SUBCUTANEOUS AS NEEDED
Status: DISCONTINUED | OUTPATIENT
Start: 2021-03-15 | End: 2021-03-15 | Stop reason: HOSPADM

## 2021-03-15 RX ORDER — ROCURONIUM BROMIDE 10 MG/ML
INJECTION, SOLUTION INTRAVENOUS AS NEEDED
Status: DISCONTINUED | OUTPATIENT
Start: 2021-03-15 | End: 2021-03-15 | Stop reason: HOSPADM

## 2021-03-15 RX ORDER — PROPOFOL 10 MG/ML
INJECTION, EMULSION INTRAVENOUS AS NEEDED
Status: DISCONTINUED | OUTPATIENT
Start: 2021-03-15 | End: 2021-03-15 | Stop reason: HOSPADM

## 2021-03-15 RX ORDER — HEPARIN SODIUM 200 [USP'U]/100ML
INJECTION, SOLUTION INTRAVENOUS
Status: COMPLETED | OUTPATIENT
Start: 2021-03-15 | End: 2021-03-15

## 2021-03-15 RX ORDER — ONDANSETRON 2 MG/ML
4 INJECTION INTRAMUSCULAR; INTRAVENOUS AS NEEDED
Status: DISCONTINUED | OUTPATIENT
Start: 2021-03-15 | End: 2021-03-15 | Stop reason: HOSPADM

## 2021-03-15 RX ORDER — SODIUM CHLORIDE 9 MG/ML
INJECTION, SOLUTION INTRAVENOUS
Status: DISCONTINUED | OUTPATIENT
Start: 2021-03-15 | End: 2021-03-15 | Stop reason: HOSPADM

## 2021-03-15 RX ORDER — SODIUM CHLORIDE, SODIUM LACTATE, POTASSIUM CHLORIDE, CALCIUM CHLORIDE 600; 310; 30; 20 MG/100ML; MG/100ML; MG/100ML; MG/100ML
25 INJECTION, SOLUTION INTRAVENOUS CONTINUOUS
Status: DISCONTINUED | OUTPATIENT
Start: 2021-03-15 | End: 2021-03-15 | Stop reason: HOSPADM

## 2021-03-15 RX ORDER — LIDOCAINE HYDROCHLORIDE 10 MG/ML
0.1 INJECTION, SOLUTION EPIDURAL; INFILTRATION; INTRACAUDAL; PERINEURAL AS NEEDED
Status: CANCELLED | OUTPATIENT
Start: 2021-03-15

## 2021-03-15 RX ORDER — ONDANSETRON 2 MG/ML
INJECTION INTRAMUSCULAR; INTRAVENOUS AS NEEDED
Status: DISCONTINUED | OUTPATIENT
Start: 2021-03-15 | End: 2021-03-15 | Stop reason: HOSPADM

## 2021-03-15 RX ORDER — PROTAMINE SULFATE 10 MG/ML
INJECTION, SOLUTION INTRAVENOUS AS NEEDED
Status: DISCONTINUED | OUTPATIENT
Start: 2021-03-15 | End: 2021-03-15 | Stop reason: HOSPADM

## 2021-03-15 RX ORDER — SODIUM CHLORIDE 0.9 % (FLUSH) 0.9 %
5-40 SYRINGE (ML) INJECTION AS NEEDED
Status: DISCONTINUED | OUTPATIENT
Start: 2021-03-15 | End: 2021-03-15 | Stop reason: HOSPADM

## 2021-03-15 RX ORDER — MIDAZOLAM HYDROCHLORIDE 1 MG/ML
1 INJECTION, SOLUTION INTRAMUSCULAR; INTRAVENOUS AS NEEDED
Status: CANCELLED | OUTPATIENT
Start: 2021-03-15

## 2021-03-15 RX ORDER — SODIUM CHLORIDE, SODIUM LACTATE, POTASSIUM CHLORIDE, CALCIUM CHLORIDE 600; 310; 30; 20 MG/100ML; MG/100ML; MG/100ML; MG/100ML
25 INJECTION, SOLUTION INTRAVENOUS CONTINUOUS
Status: CANCELLED | OUTPATIENT
Start: 2021-03-15 | End: 2021-03-16

## 2021-03-15 RX ORDER — METOPROLOL TARTRATE 25 MG/1
12.5 TABLET, FILM COATED ORAL 2 TIMES DAILY
Qty: 90 TAB | Refills: 0 | Status: SHIPPED | OUTPATIENT
Start: 2021-03-15 | End: 2021-04-01

## 2021-03-15 RX ORDER — FENTANYL CITRATE 50 UG/ML
25 INJECTION, SOLUTION INTRAMUSCULAR; INTRAVENOUS
Status: DISCONTINUED | OUTPATIENT
Start: 2021-03-15 | End: 2021-03-15 | Stop reason: HOSPADM

## 2021-03-15 RX ORDER — SODIUM CHLORIDE 0.9 % (FLUSH) 0.9 %
5-40 SYRINGE (ML) INJECTION EVERY 8 HOURS
Status: DISCONTINUED | OUTPATIENT
Start: 2021-03-15 | End: 2021-03-15 | Stop reason: HOSPADM

## 2021-03-15 RX ORDER — HYDROMORPHONE HYDROCHLORIDE 1 MG/ML
.2-.5 INJECTION, SOLUTION INTRAMUSCULAR; INTRAVENOUS; SUBCUTANEOUS
Status: DISCONTINUED | OUTPATIENT
Start: 2021-03-15 | End: 2021-03-15 | Stop reason: HOSPADM

## 2021-03-15 RX ORDER — FENTANYL CITRATE 50 UG/ML
50 INJECTION, SOLUTION INTRAMUSCULAR; INTRAVENOUS AS NEEDED
Status: CANCELLED | OUTPATIENT
Start: 2021-03-15

## 2021-03-15 RX ORDER — SUCCINYLCHOLINE CHLORIDE 20 MG/ML
INJECTION INTRAMUSCULAR; INTRAVENOUS AS NEEDED
Status: DISCONTINUED | OUTPATIENT
Start: 2021-03-15 | End: 2021-03-15 | Stop reason: HOSPADM

## 2021-03-15 RX ADMIN — ROCURONIUM BROMIDE 10 MG: 10 INJECTION INTRAVENOUS at 10:47

## 2021-03-15 RX ADMIN — PROTAMINE SULFATE 100 MG: 10 INJECTION, SOLUTION INTRAVENOUS at 11:34

## 2021-03-15 RX ADMIN — ONDANSETRON HYDROCHLORIDE 4 MG: 2 INJECTION, SOLUTION INTRAMUSCULAR; INTRAVENOUS at 11:34

## 2021-03-15 RX ADMIN — ROCURONIUM BROMIDE 10 MG: 10 INJECTION INTRAVENOUS at 10:56

## 2021-03-15 RX ADMIN — PHENYLEPHRINE HYDROCHLORIDE 80 MCG/MIN: 10 INJECTION INTRAVENOUS at 11:05

## 2021-03-15 RX ADMIN — SUCCINYLCHOLINE CHLORIDE 140 MG: 20 INJECTION, SOLUTION INTRAMUSCULAR; INTRAVENOUS at 10:47

## 2021-03-15 RX ADMIN — MORPHINE SULFATE 5 MG: 10 INJECTION INTRAVENOUS at 10:58

## 2021-03-15 RX ADMIN — SODIUM CHLORIDE: 9 INJECTION, SOLUTION INTRAVENOUS at 10:40

## 2021-03-15 RX ADMIN — MORPHINE SULFATE 5 MG: 10 INJECTION INTRAVENOUS at 10:47

## 2021-03-15 RX ADMIN — PROPOFOL 50 MG: 10 INJECTION, EMULSION INTRAVENOUS at 10:50

## 2021-03-15 RX ADMIN — PROPOFOL 200 MG: 10 INJECTION, EMULSION INTRAVENOUS at 10:47

## 2021-03-15 RX ADMIN — HEPARIN SODIUM 15000 UNITS: 1000 INJECTION, SOLUTION INTRAVENOUS; SUBCUTANEOUS at 11:03

## 2021-03-15 RX ADMIN — HEPARIN SODIUM 7000 UNITS: 1000 INJECTION, SOLUTION INTRAVENOUS; SUBCUTANEOUS at 11:23

## 2021-03-15 NOTE — Clinical Note
Sheath #1: sheath exchanged.  8fr sheath RFV removed/ Lexie VersaCross Access sheath and RRF wire advanced RFV

## 2021-03-15 NOTE — PERIOP NOTES
TRANSFER - OUT REPORT:    Verbal report given to Amari Lui RN(name) on Jacqueline Neigh  being transferred to cath recovery(unit) for routine post - op       Report consisted of patients Situation, Background, Assessment and   Recommendations(SBAR). Information from the following report(s) OR Summary, Procedure Summary, Intake/Output and MAR was reviewed with the receiving nurse. Opportunity for questions and clarification was provided.       Patient transported with:   Monitor  O2 @ 2 liters  Registered Nurse  [facility]

## 2021-03-15 NOTE — Clinical Note
Sheath #1: sheath exchanged for [address] [facility] 12 FR -- . Hemostasis achieved.  FlexCath sheath advanced transeptal aspirated and flushed/ connected to pressure line/ flush

## 2021-03-15 NOTE — PERIOP NOTES
Handoff Report from Operating Room to PACU    Report received from Sunny Anderson RN and Adria Burns CRNA regarding Matthew Tyler. Surgeon(s):  Anesthesia, Case  And Procedure(s) (LRB):  SPECIAL PROCEDURE OUTSIDE OF OR Recovery from EP LAB (N/A)  confirmed   with dressings discussed. Anesthesia type, drugs, patient history, complications, estimated blood loss, vital signs, intake and output, and last pain medication, lines and temperature were reviewed.

## 2021-03-15 NOTE — Clinical Note
TRANSFER - OUT REPORT:     Verbal report given to: TEN Washington. Report consisted of patient's Situation, Background, Assessment and   Recommendations(SBAR). Opportunity for questions and clarification was provided. Patient transported with a Registered Nurse, Monitor and Oxygen. Oxygen used for patient = nasal cannula, @ 2 - 6 Liters.     Patient transported to: PACU.   transported with CRNA and EP Lab staff

## 2021-03-15 NOTE — DISCHARGE INSTRUCTIONS
[address] [facility], [address] [place] [address], [address] [facility]  [phone]        ATRIAL FIBRILLATION ABLATION DISCHARGE INSTRUCTIONS    Patient ID:  Talisha Spears  [id number]  59 y.o.  1960    Admit Date: 3/15/2021    Discharge Date: 3/15/2021     Admitting Physician: Anthony Morales MD     Discharge Physician: Anthony Morales MD    Admission Diagnoses:   Atrial fibrillation, unspecified type [facility]) [I48.91]    Discharge Diagnoses: Active Problems:    Benign essential hypertension (6/10/2011)      PAF (paroxysmal atrial fibrillation) ([facility] [address] (12/12/2018)        Discharge Condition: Good    Cardiology Procedures this Admission:  Atrial fibrillation. Disposition: home    Reference discharge instructions provided by nursing for diet and activity. Follow-up with Dr Tiffani Maldonado or his Nurse Practitioners in 1-2 weeks. Call [phone] to make an appointment. Signed:  JENNY Monzon  3/15/2021  11:36 AM    S/P ATRIAL FIBRILLATION ABLATION DISCHARGE INSTRUCTIONS    It is normal to feel tired the first couple days. Take it easy and follow the physicians instructions. CHECK THE CATHETER INSERTION SITE DAILY:  You may shower 24 hours after the procedure, remove the bandage during showering. Wash with soap and water and pat dry. Gentle cleaning of the site with soap and water is sufficient, cover with a dry clean dressing or bandage. Do not apply creams or powders to the area. Do not sit in a bathtub or pool of water for 7 days or until wound has completely healed. Temporary bruising and discomfort is normal and may last a few weeks. You may have a  formation of a small lump at the site which may last up to 6 weeks. CALL THE PHYSICIAN:  If the site becomes red, swollen or feels warm to the touch  If there is bleeding or drainage or if there is unusual pain at the groin or down the leg.   If there is any bleeding, lie down, apply pressure or have someone apply pressure with a clean cloth until the bleeding stops. If the bleeding continues, call 911 to be transported to the hospital.  DO NOT DRIVE YOURSELF, [place] [address]. Activity:      For the first 24-48 hours or as instructed by the physician:  No lifting, pushing or pulling over 5 pounds and no straining the insertion site. Do not life grocery bags or the garbage can, do not run the vacuum  or  for 7 days. Start with short walks as in the hospital and gradually increase as tolerated each day. It is recommended to walk 30 minutes 5-7 days per week. Follow your physicians instructions on activity. Avoid walking outside in extremes of heat or cold. Walk inside when it is cold and windy or hot and humid. Things to keep in mind:  No driving for at least 5 days or as designated by your physician. Limit the number of times you go up and down the stairs  Take rests and pace yourself with activity. Be careful and do not strain with bowel movements. Medications: Take all medications as prescribed  Call your physician if you have any questions  Keep an updated list of your medications with you at all times and give a list to your physician and pharmacist    Signs and Symptoms:  Be cautious of symptoms of angina or recurrent symptoms such as chest discomfort, unusual shortness of breath or fatigue, palpitations. After Care: Follow up with your physician as instructed. Follow a heart healthy diet with proper portion control, daily stress management, daily exercise, blood pressure and cholesterol control , and smoking cessation.

## 2021-03-15 NOTE — PROGRESS NOTES
TRANSFER - IN REPORT:    Verbal report received from TEN Washington on Jacqueline John  being received from PACU for routine progression of care. Report consisted of patients Situation, Background, Assessment and Recommendations(SBAR). Information from the following report(s) SBAR, Procedure Summary and MAR was reviewed with the receiving clinician. Opportunity for questions and clarification was provided. Assessment completed upon patients arrival to [address] [place] [address] and care assumed. Cardiac Cath Lab Recovery Arrival Note:    Jacqueline John arrived to [facility] recovery area. Patient procedure= PVI ablation. Patient on cardiac monitor, non-invasive blood pressure, SPO2 monitor. On room air. IV  Converted to saline lock. Patient status doing well without problems. Patient is A&Ox 4. Patient reports no complaints of pain or discomfort. PROCEDURE SITE CHECK:    Procedure site:without any bleeding and no hematoma, no pain/discomfort reported at procedure site. No change in patient status. Continue to monitor patient and status.

## 2021-03-15 NOTE — PROGRESS NOTES
Ambulated with pt in gibson to BR. Gait steady. Bilateral groin dressings dry and intact. Pt denies c/o. SL dc'd without difficulty. Pt wheeled to front door to be driven home by wife.

## 2021-03-15 NOTE — PROGRESS NOTES
Cardiac Cath Lab Recovery Arrival Note:      Howie Delacruz arrived to Cardiac Cath Lab, Recovery Area. Staff introduced to patient. Patient identifiers verified with NAME and DATE OF BIRTH. Procedure verified with patient. Consent forms reviewed and signed by patient or authorized representative and verified. Allergies verified. Patient and family oriented to department. Patient and family informed of procedure and plan of care. Questions answered with review. Patient prepped for procedure, per orders from physician, prior to arrival.    Patient on cardiac monitor, non-invasive blood pressure, SPO2 monitor. On room air. Patient is A&Ox 3. Patient reports no c/o. Patient in stretcher, in low position, with side rails up, call bell within reach, patient instructed to call if assistance as needed. Patient prep in: [address], [place] [address]. Patient family has pager # none  Family in: [address] [facility] waiting area.    Prep by: Senthil Singleton, TEN and Horacio Chan RN

## 2021-03-15 NOTE — ANESTHESIA POSTPROCEDURE EVALUATION
Procedure(s):  ABLATION A-FIB  W COMPLETE EP STUDY  Ep 3d Mapping  Intracardiac Echocardiogram.    general    Anesthesia Post Evaluation        Patient location during evaluation: PACU  Note status: Adequate. Level of consciousness: responsive to verbal stimuli and sleepy but conscious  Pain management: satisfactory to patient  Airway patency: patent  Anesthetic complications: no  Cardiovascular status: acceptable  Respiratory status: acceptable  Hydration status: acceptable  Comments: +Post-Anesthesia Evaluation and Assessment    Patient: Jacqueline John MRN: [id number]  SSN: [id number]   YOB: 1960  Age: 56 y.o. Sex: male      Cardiovascular Function/Vital Signs    /64   Pulse 75   Temp 36.8 °C (98.2 °F)   Resp 11   Ht 6' 1\" (1.854 m)   Wt 127 kg (280 lb)   SpO2 98%   BMI 36.94 kg/m²     Patient is status post Procedure(s):  ABLATION A-FIB  W COMPLETE EP STUDY  Ep 3d Mapping  Intracardiac Echocardiogram.    Nausea/Vomiting: Controlled. Postoperative hydration reviewed and adequate. Pain:  Pain Scale 1: Numeric (0 - 10) (03/15/21 1215)  Pain Intensity 1: 0 (03/15/21 1215)   Managed. Neurological Status:   Neuro (WDL): Exceptions to WDL (03/15/21 1210)   At baseline. Mental Status and Level of Consciousness: Arousable. Pulmonary Status:   O2 Device: Nasal cannula (03/15/21 1211)   Adequate oxygenation and airway patent. Complications related to anesthesia: None    Post-anesthesia assessment completed. No concerns. Signed By: Carrie Pabon MD    3/15/2021  Post anesthesia nausea and vomiting:  controlled      INITIAL Post-op Vital signs:   Vitals Value Taken Time   /64 03/15/21 1230   Temp     Pulse 74 03/15/21 1242   Resp 13 03/15/21 1242   SpO2 97 % 03/15/21 1242   Vitals shown include unvalidated device data.

## 2021-03-15 NOTE — Clinical Note
Transseptal Cath Performed check box under hemodynamic and ICE and Fluoro, [employer] RF wire used/ [employer]

## 2021-03-15 NOTE — INTERVAL H&P NOTE
Update History & Physical 
 
The Patient's History and Physical of March 4, 2021 was reviewed with the patient and I examined the patient. There was no change. The surgical site was confirmed by the patient and me. Plan:  The risk, benefits, expected outcome, and alternative to the recommended procedure have been discussed with the patient. Patient understands and wants to proceed with the procedure.  
 
Electronically signed by Johnathan Naik MD on 3/15/2021 at 10:07 AM

## 2021-03-15 NOTE — PROGRESS NOTES
Discharge instructions reviewed with patient and wife. Opportunity to ask questions provided and answered. Went over precautions and restrictions and daily site checks. He can shower tomorrow after 1200 and new medication changes were discussed. Will continue to monitor until discharge.

## 2021-03-15 NOTE — ANESTHESIA PREPROCEDURE EVALUATION
Relevant Problems   CARDIOVASCULAR   (+) Benign essential hypertension   (+) Coronary artery disease   (+) PAF (paroxysmal atrial fibrillation) (HCC)      ENDOCRINE   (+) Arthritis   (+) Controlled type 2 diabetes mellitus with complication, without long-term current use of insulin (HCC)   (+) Severe obesity (HCC)   (+) Type 2 diabetes mellitus (HCC)   (+) Type 2 diabetes with nephropathy (HCC)       Anesthetic History   No history of anesthetic complications            Review of Systems / Medical History  Patient summary reviewed, nursing notes reviewed and pertinent labs reviewed    Pulmonary  Within defined limits                 Neuro/Psych   Within defined limits           Cardiovascular    Hypertension        Dysrhythmias : atrial fibrillation  CAD, cardiac stents and hyperlipidemia    Exercise tolerance: >4 METS  Comments: 2014  Ejection fraction was   estimated in the range of 60 % to 65 %.    GI/Hepatic/Renal  Within defined limits              Endo/Other    Diabetes: type 2    Obesity and arthritis     Other Findings              Physical Exam    Airway  Mallampati: II  TM Distance: 4 - 6 cm  Neck ROM: normal range of motion   Mouth opening: Normal     Cardiovascular    Rhythm: irregular  Rate: normal         Dental  No notable dental hx    Comments: Missing teeth   Pulmonary  Breath sounds clear to auscultation               Abdominal         Other Findings            Anesthetic Plan    ASA: 3  Anesthesia type: general    Monitoring Plan: BIS      Induction: Intravenous  Anesthetic plan and risks discussed with: Patient

## 2021-03-15 NOTE — Clinical Note
Sheath #4: Sheath: inserted. Sheath inserted/placed in the left femoral  vein. Hemostasis achieved.  11fr sheath LFV

## 2021-03-18 ENCOUNTER — TELEPHONE (OUTPATIENT)
Dept: CARDIOLOGY CLINIC | Age: 61
End: 2021-03-18

## 2021-03-18 ENCOUNTER — OFFICE VISIT (OUTPATIENT)
Dept: CARDIOLOGY CLINIC | Age: 61
End: 2021-03-18
Payer: COMMERCIAL

## 2021-03-18 VITALS
SYSTOLIC BLOOD PRESSURE: 120 MMHG | BODY MASS INDEX: 37.51 KG/M2 | HEIGHT: 73 IN | DIASTOLIC BLOOD PRESSURE: 60 MMHG | OXYGEN SATURATION: 98 % | WEIGHT: 283 LBS | HEART RATE: 73 BPM | RESPIRATION RATE: 18 BRPM

## 2021-03-18 DIAGNOSIS — R55 SYNCOPE, UNSPECIFIED SYNCOPE TYPE: ICD-10-CM

## 2021-03-18 DIAGNOSIS — I48.0 PAF (PAROXYSMAL ATRIAL FIBRILLATION) (HCC): Primary | Chronic | ICD-10-CM

## 2021-03-18 DIAGNOSIS — I10 BENIGN ESSENTIAL HYPERTENSION: ICD-10-CM

## 2021-03-18 DIAGNOSIS — I49.5 SSS (SICK SINUS SYNDROME) (HCC): ICD-10-CM

## 2021-03-18 DIAGNOSIS — I49.5 TACHY-BRADY SYNDROME (HCC): ICD-10-CM

## 2021-03-18 DIAGNOSIS — E78.2 MIXED HYPERLIPIDEMIA: ICD-10-CM

## 2021-03-18 PROCEDURE — 93000 ELECTROCARDIOGRAM COMPLETE: CPT | Performed by: INTERNAL MEDICINE

## 2021-03-18 PROCEDURE — 99215 OFFICE O/P EST HI 40 MIN: CPT | Performed by: INTERNAL MEDICINE

## 2021-03-18 NOTE — PROGRESS NOTES
Chief Complaint   Patient presents with    Irregular Heart Beat     a fib abl done 3/15/21 - has been having a lot more fluttering and dizziness than before abl     Shortness of Breath     from time to time upon exertion      1. Have you been to the ER, urgent care clinic since your last visit? Hospitalized since your last visit? No     2. Have you seen or consulted any other health care providers outside of the [address] [place] [address] since your last visit? Include any pap smears or colon screening.   No

## 2021-03-18 NOTE — H&P (VIEW-ONLY)
Subjective:  
  
Dada Edward is a 56 y.o. male is here for EP consult. Lightheaded/dizzy - almost passing out. Patient Active Problem List  
 Diagnosis Date Noted  Tachy-amina syndrome ([address] 03/15/2021  Atrial fibrillation ([address] 03/15/2021  Supracondylar fracture of distal end of femur without intracondylar extension ([address] 08/17/2020  Femur fracture ([address] 08/16/2020  Type 2 diabetes with nephropathy ([address] 12/12/2018  Severe obesity ([address] 12/12/2018  Controlled type 2 diabetes mellitus with complication, without long-term current use of insulin ([address] 12/12/2018  PAF (paroxysmal atrial fibrillation) ([address] 12/12/2018  Mixed hyperlipidemia 04/18/2012  Mitral valve disorders(424.0) 03/27/2012  Coronary artery disease 06/10/2011  S/P coronary artery stent placement 06/10/2011  Type 2 diabetes mellitus ([address] 06/10/2011  Benign essential hypertension 06/10/2011  Hypertriglyceridemia  Arthritis Carmen Farrar MD 
Past Medical History:  
Diagnosis Date  Arthritis   
 knees  Atrial fibrillation with RVR ([address]  Benign essential hypertension 6/10/2011  CAD (coronary artery disease)  Diabetes ([address] DX  AGE 26    
 Hypercholesterolemia  Hypertriglyceridemia  Other ill-defined conditions(799.89)   
 obesity  Other ill-defined conditions(799.89)   
 high cholesterol  PAF (paroxysmal atrial fibrillation) ([address] 12/12/2018  SOB (shortness of breath) on exertion 5/16/2011 Past Surgical History:  
Procedure Laterality Date  CARDIAC CATHETERIZATION  6/9/2011  CARDIAC CATHETERIZATION  11/1/2012  [facility]. ANGIOSEAL VIP 6FR  6/9/2011  HX AFIB ABLATION  03/15/2021  HX ORTHOPAEDIC    
 left knee replacement  HX ORTHOPAEDIC    
 R knee replacement  2010 -Esther Simpson.  INTRACARD ECHO, THER/DX INTERVENT N/A 3/15/2021  Intracardiac Echocardiogram performed by Vangie Tenorio MD at [facility] C[facility] CATH LAB  [facility] CARDIAC SURG PROCEDURE UNLIST    
 STENT RCA    [place] DRUG-ELUTING STENTS, SINGLE  2011  [place] EPHYS EVL TRNSPTL TX ATRIAL FIB ISOLAT PULM VEIN N/A 3/15/2021 ABLATION A-FIB  W COMPLETE EP STUDY performed by Rosa Curiel MD at [facility] CARDIAC CATH LAB  [facility] INTRACARDIAC ELECTROPHYSIOLOGIC 3D MAPPING N/A 3/15/2021 Ep 3d Mapping performed by Rosa Curiel MD at [facility] CARDIAC CATH LAB Allergies Allergen Reactions  Lisinopril Cough  Losartan Other (comments) Hypotension Family History Problem Relation Age of Onset  Diabetes Mother  Hypertension Mother  Elevated Lipids Mother  Cancer Mother  Diabetes Father  Heart Disease Father  Hypertension Father  Elevated Lipids Father   
 negative for cardiac disease Social History Socioeconomic History  Marital status:  Spouse name: Not on file  Number of children: Not on file  Years of education: Not on file  Highest education level: Not on file Tobacco Use  Smoking status: Former Smoker Quit date: 11/10/1989 Years since quittin.3  Smokeless tobacco: Never Used Substance and Sexual Activity  Alcohol use: Yes Comment: social  DRINKS ONE WEEKEND A MONTH/\"BIG IMPROVEMENT\"  Drug use: No  
 Sexual activity: Yes  
  Partners: Female Birth control/protection: None Social History Narrative , 3 children, twin sons 25 and one son 15. Works at [employer] for the last 9 years, used to work a [employer]  
 
Current Outpatient Medications Medication Sig  
 metoprolol tartrate (LOPRESSOR) 25 mg tablet Take 0.5 Tabs by mouth two (2) times a day for 90 days. Lowered 03/15/21  apixaban (ELIQUIS) 5 mg tablet Take 1 Tab by mouth two (2) times a day.  ACETAMINOPHEN PO Take 500 mg by mouth as needed. 1-2 tabs prn as needed  semaglutide (Ozempic) 1 mg/dose (2 mg/1.5 mL) sub-q pen 1 MG BY SUBCUTANEOUS ROUTE EVERY SEVEN (7) DAYS.  
 ibuprofen (MOTRIN) 800 mg tablet Take 1 Tab by mouth every eight (8) hours as needed for Pain.  furosemide (LASIX) 40 mg tablet 1-2 tabs po daily as needed for fluid  glipiZIDE (GLUCOTROL) 10 mg tablet TAKE 1 TABLET BY MOUTH TWO (2) TIMES A DAY. FOR DIABETES  
 metFORMIN (GLUCOPHAGE) 1,000 mg tablet Take 1 Tab by mouth two (2) times daily (with meals).  gemfibroziL (LOPID) 600 mg tablet Take 1 Tab by mouth two (2) times daily as needed (cholesterol). (Patient taking differently: Take 600 mg by mouth two (2) times a day.)  magnesium oxide (MAG-OX) 400 mg tablet TAKE 2 TABLETS BY MOUTH EVERY DAY  empagliflozin (Jardiance) 10 mg tablet Take 1 Tab by mouth daily. For diabetes  rosuvastatin (CRESTOR) 20 mg tablet TAKE 1 TABLET BY MOUTH DAILY FOR CHOLESTEROL  nitroglycerin (NITROSTAT) 0.4 mg SL tablet 1 Tab by SubLINGual route every five (5) minutes as needed for Chest Pain (call 911 if CP/ SOB not relieved by 3 tabs).  aspirin delayed-release 81 mg tablet Take  by mouth daily.  omega-3 fatty acids-vitamin e (FISH OIL) 1,000 mg Cap Take 2 Caps by mouth two (2) times a day. No current facility-administered medications for this visit. Vitals:  
 03/18/21 1110 BP: 120/60 Pulse: 73 Resp: 18 SpO2: 98% Weight: 283 lb (128.4 kg) Height: 6' 1\" (1.854 m) I have reviewed the nurses notes, vitals, problem list, allergy list, medical history, family, social history and medications. Review of Symptoms: 
 
General: Pt denies excessive weight gain or loss. Pt is able to conduct ADL's HEENT: Denies blurred vision, headaches, hearing loss, epistaxis and difficulty swallowing. Respiratory: Denies cough, congestion, shortness of breath, KRAMER, wheezing or stridor. Cardiovascular: +dizzy, lightheaded Denies precordial pain, palpitations, edema or PND Gastrointestinal: Denies poor appetite, indigestion, abdominal pain or blood in stool Genitourinary: Denies hematuria, dysuria, increased urinary frequency Musculoskeletal: Denies joint pain or swelling from muscles or joints Neurologic: Denies tremor, paresthesias, headache, or sensory motor disturbance Psychiatric: Denies confusion, insomnia, depression Integumentray: Denies rash, itching or ulcers. Hematologic: Denies easy bruising, bleeding Physical Exam:   
 
General: Well developed, in no acute distress. HEENT: Eyes - PERRL, no jvd Heart:  Normal S1/S2 negative S3 or S4. Regular, no murmur, gallop or rub. Respiratory: Clear bilaterally x 4, no wheezing or rales Abdomen:   Soft, non-tender, bowel sounds are active. Extremities:  No edema, normal cap refill, no cyanosis. Musculoskeletal: No clubbing Neuro: A&Ox3, speech clear, gait stable. Skin: Skin color is normal. No rashes or lesions. Non diaphoretic, no ulcers or subcutaneous nodule Vascular: 2+ pulses symmetric in all extremities Psych - judgement intact and orientation is wnl Cardiographics 
 
ekg - nsr 
 
Monitor - AF with rvr, pauses of upto EIGHT seconds Results for orders placed or performed during the hospital encounter of 08/16/20 EKG, 12 LEAD, INITIAL Result Value Ref Range Ventricular Rate 63 BPM  
 Atrial Rate 63 BPM  
 P-R Interval 190 ms QRS Duration 92 ms Q-T Interval 408 ms QTC Calculation (Bezet) 417 ms Calculated P Axis 45 degrees Calculated R Axis -14 degrees Calculated T Axis -5 degrees Diagnosis Normal sinus rhythm Nonspecific T wave abnormality When compared with ECG of 05-DEC-2018 04:40, No significant change Confirmed by Jodee Kennedy M.D., Spencer Fulton ([id number]) on 8/16/2020 10:46:56 AM 
  
 
 
 
Lab Results Component Value Date/Time WBC 6.2 03/04/2021 11:57 AM  
 HGB 12.2 (L) 03/04/2021 11:57 AM  
 HCT 37.6 03/04/2021 11:57 AM  
 PLATELET 029 23/81/8321 11:57 AM  
 MCV 80 03/04/2021 11:57 AM  
  
Lab Results Component Value Date/Time  Sodium 143 03/04/2021 11:57 AM  
 Potassium 4.5 03/04/2021 11:57 AM  
 Chloride 106 03/04/2021 11:57 AM  
 CO2 22 03/04/2021 11:57 AM  
 Anion gap 13 08/18/2020 03:03 AM  
 Glucose 150 (H) 03/04/2021 11:57 AM  
 BUN 16 03/04/2021 11:57 AM  
 Creatinine 0.84 03/04/2021 11:57 AM  
 BUN/Creatinine ratio 19 03/04/2021 11:57 AM  
 GFR est  03/04/2021 11:57 AM  
 GFR est non-AA 94 03/04/2021 11:57 AM  
 Calcium 8.8 03/04/2021 11:57 AM  
 Bilirubin, total 0.3 03/04/2021 11:57 AM  
 Alk. phosphatase 127 (H) 03/04/2021 11:57 AM  
 Protein, total 6.5 03/04/2021 11:57 AM  
 Albumin 3.8 03/04/2021 11:57 AM  
 Globulin 3.5 08/16/2020 02:48 AM  
 A-G Ratio 1.4 03/04/2021 11:57 AM  
 ALT (SGPT) 4 03/04/2021 11:57 AM  
  
 
 Assessment: 
 
 Assessment: ICD-10-CM ICD-9-CM 1. PAF (paroxysmal atrial fibrillation) ([facility])  I48.0 427.31 AMB POC EKG ROUTINE W/ 12 LEADS, INTER & REP  
   CBC WITH AUTOMATED DIFF PROTHROMBIN TIME + INR  
   METABOLIC PANEL, COMPREHENSIVE  
   XR CHEST PA LAT 2. Tachy-amina syndrome (HCC)  I49.5 427.81 CBC WITH AUTOMATED DIFF PROTHROMBIN TIME + INR  
   METABOLIC PANEL, COMPREHENSIVE  
   XR CHEST PA LAT 3. Mixed hyperlipidemia  E78.2 272.2 CBC WITH AUTOMATED DIFF PROTHROMBIN TIME + INR  
   METABOLIC PANEL, COMPREHENSIVE  
   XR CHEST PA LAT 4. Benign essential hypertension  I10 401.1 CBC WITH AUTOMATED DIFF PROTHROMBIN TIME + INR  
   METABOLIC PANEL, COMPREHENSIVE  
   XR CHEST PA LAT 5. Syncope, unspecified syncope type  R55 780.2 CBC WITH AUTOMATED DIFF PROTHROMBIN TIME + INR  
   METABOLIC PANEL, COMPREHENSIVE  
   XR CHEST PA LAT 6. SSS (sick sinus syndrome) ([facility])  I49.5 427.81 CBC WITH AUTOMATED DIFF PROTHROMBIN TIME + INR  
   METABOLIC PANEL, COMPREHENSIVE  
   XR CHEST PA LAT Orders Placed This Encounter  XR CHEST PA LAT Standing Status:   Future Standing Expiration Date:   4/18/2021 Order Specific Question:   Reason for Exam  
  Answer:   pre op  CBC WITH AUTOMATED DIFF  
  Standing Status:   Future Standing Expiration Date:   9/18/2021  
 PROTHROMBIN TIME + INR Standing Status:   Future Standing Expiration Date:   9/18/2021  METABOLIC PANEL, COMPREHENSIVE Standing Status:   Future Standing Expiration Date:   9/18/2021  AMB POC EKG ROUTINE W/ 12 LEADS, INTER & REP Order Specific Question:   Reason for Exam: Answer:   routine Plan:  
Howie Delacruz is having PAF with rvr alternating with pauses of upto EIGHT seconds. He needs a dual chamber pacemaker and then we can medically manage his AF and CAD. Advised him to go to the ER if his insurance does not approve. Cont med rx for dm, htn. I discussed the risks/benefits/alternatives of the procedure with the patient. Risks include (but are not limited to) bleeding, heart block, infection, cva/mi/tamponade/death. The patient understands and agrees to proceed. Thank you for this interesting consultation. Continue medical management for AF, tachy-amina syndrome/sss, cad and htn. Thank you for allowing me to participate in Howie Delacruz 's care. Venetta Spatz, MD, Ely Carrel On this date 03/18/2021 I have spent 58 minutes reviewing previous notes, test results and face to face with the patient discussing the diagnosis and importance of compliance with the treatment plan as well as documenting on the day of the visit.

## 2021-03-18 NOTE — PROGRESS NOTES
Subjective:      Erika Benton is a 56 y.o. male is here for EP consult. Lightheaded/dizzy - almost passing out. Patient Active Problem List    Diagnosis Date Noted    Tachy-amina syndrome ([address] 03/15/2021    Atrial fibrillation ([address] 03/15/2021    Supracondylar fracture of distal end of femur without intracondylar extension ([address] 08/17/2020    Femur fracture ([address] 08/16/2020    Type 2 diabetes with nephropathy ([address] 12/12/2018    Severe obesity ([address] 12/12/2018    Controlled type 2 diabetes mellitus with complication, without long-term current use of insulin ([address] 12/12/2018    PAF (paroxysmal atrial fibrillation) ([address] 12/12/2018    Mixed hyperlipidemia 04/18/2012    Mitral valve disorders(424.0) 03/27/2012    Coronary artery disease 06/10/2011    S/P coronary artery stent placement 06/10/2011    Type 2 diabetes mellitus ([address] 06/10/2011    Benign essential hypertension 06/10/2011    Hypertriglyceridemia     Arthritis       Christella Sever, MD  Past Medical History:   Diagnosis Date    Arthritis     knees    Atrial fibrillation with RVR ([address]     Benign essential hypertension 6/10/2011    CAD (coronary artery disease)     Diabetes ([address]     DX  AGE 33      Hypercholesterolemia     Hypertriglyceridemia     Other ill-defined conditions(799.89)     obesity    Other ill-defined conditions(799.89)     high cholesterol    PAF (paroxysmal atrial fibrillation) ([address] 12/12/2018    SOB (shortness of breath) on exertion 5/16/2011      Past Surgical History:   Procedure Laterality Date    CARDIAC CATHETERIZATION  6/9/2011         CARDIAC CATHETERIZATION  11/1/2012         HC ANGIOSEAL VIP 6FR  6/9/2011         HX AFIB ABLATION  03/15/2021    HX ORTHOPAEDIC      left knee replacement    HX ORTHOPAEDIC      R knee replacement  2010 -Bull Naik.     INTRACARD ECHO, THER/DX INTERVENT N/A 3/15/2021    Intracardiac Echocardiogram performed by Serg Renteria MD at [facility] CARDIAC CATH LAB  NV CARDIAC SURG PROCEDURE UNLIST      STENT RCA      NV DRUG-ELUTING STENTS, SINGLE  2011         NV EPHYS EVL TRNSPTL TX ATRIAL FIB ISOLAT PULM VEIN N/A 3/15/2021    ABLATION A-FIB  W COMPLETE EP STUDY performed by Rashawn Fierro MD at [facility] CARDIAC CATH LAB    NV INTRACARDIAC ELECTROPHYSIOLOGIC 3D MAPPING N/A 3/15/2021    Ep 3d Mapping performed by Rashawn Fierro MD at [facility] CARDIAC CATH LAB     Allergies   Allergen Reactions    Lisinopril Cough    Losartan Other (comments)     Hypotension        Family History   Problem Relation Age of Onset    Diabetes Mother     Hypertension Mother    [employer] Elevated Lipids Mother     Cancer Mother     Diabetes Father     Heart Disease Father     Hypertension Father     Elevated Lipids Father     negative for cardiac disease  Social History     Socioeconomic History    Marital status:      Spouse name: Not on file    Number of children: Not on file    Years of education: Not on file    Highest education level: Not on file   Tobacco Use    Smoking status: Former Smoker     Quit date: 11/10/1989     Years since quittin.3    Smokeless tobacco: Never Used   Substance and Sexual Activity    Alcohol use: Yes     Comment: social  DRINKS ONE WEEKEND A MONTH/\"BIG IMPROVEMENT\"    Drug use: No    Sexual activity: Yes     Partners: Female     Birth control/protection: None   Social History Narrative    , 3 children, twin sons 25 and one son 15. Works at [employer] for the last 9 years, used to work a [employer]     Current Outpatient Medications   Medication Sig    metoprolol tartrate (LOPRESSOR) 25 mg tablet Take 0.5 Tabs by mouth two (2) times a day for 90 days. Lowered 03/15/21    apixaban (ELIQUIS) 5 mg tablet Take 1 Tab by mouth two (2) times a day.  ACETAMINOPHEN PO Take 500 mg by mouth as needed.  1-2 tabs prn as needed    semaglutide (Ozempic) 1 mg/dose (2 mg/1.5 mL) sub-q pen 1 MG BY SUBCUTANEOUS ROUTE EVERY SEVEN (7) DAYS.    ibuprofen (MOTRIN) 800 mg tablet Take 1 Tab by mouth every eight (8) hours as needed for Pain.  furosemide (LASIX) 40 mg tablet 1-2 tabs po daily as needed for fluid    glipiZIDE (GLUCOTROL) 10 mg tablet TAKE 1 TABLET BY MOUTH TWO (2) TIMES A DAY. FOR DIABETES    metFORMIN (GLUCOPHAGE) 1,000 mg tablet Take 1 Tab by mouth two (2) times daily (with meals).  gemfibroziL (LOPID) 600 mg tablet Take 1 Tab by mouth two (2) times daily as needed (cholesterol). (Patient taking differently: Take 600 mg by mouth two (2) times a day.)    magnesium oxide (MAG-OX) 400 mg tablet TAKE 2 TABLETS BY MOUTH EVERY DAY    empagliflozin (Jardiance) 10 mg tablet Take 1 Tab by mouth daily. For diabetes    rosuvastatin (CRESTOR) 20 mg tablet TAKE 1 TABLET BY MOUTH DAILY FOR CHOLESTEROL    nitroglycerin (NITROSTAT) 0.4 mg SL tablet 1 Tab by SubLINGual route every five (5) minutes as needed for Chest Pain (call 911 if CP/ SOB not relieved by 3 tabs).  aspirin delayed-release 81 mg tablet Take  by mouth daily.  omega-3 fatty acids-vitamin e (FISH OIL) 1,000 mg Cap Take 2 Caps by mouth two (2) times a day. No current facility-administered medications for this visit. Vitals:    03/18/21 1110   BP: 120/60   Pulse: 73   Resp: 18   SpO2: 98%   Weight: 283 lb (128.4 kg)   Height: 6' 1\" (1.854 m)       I have reviewed the nurses notes, vitals, problem list, allergy list, medical history, family, social history and medications. Review of Symptoms:    General: Pt denies excessive weight gain or loss. Pt is able to conduct ADL's  HEENT: Denies blurred vision, headaches, hearing loss, epistaxis and difficulty swallowing. Respiratory: Denies cough, congestion, shortness of breath, KRAMER, wheezing or stridor.   Cardiovascular: +dizzy, lightheaded Denies precordial pain, palpitations, edema or PND  Gastrointestinal: Denies poor appetite, indigestion, abdominal pain or blood in stool  Genitourinary: Denies hematuria, dysuria, increased urinary frequency  Musculoskeletal: Denies joint pain or swelling from muscles or joints  Neurologic: Denies tremor, paresthesias, headache, or sensory motor disturbance  Psychiatric: Denies confusion, insomnia, depression  Integumentray: Denies rash, itching or ulcers. Hematologic: Denies easy bruising, bleeding    Physical Exam:      General: Well developed, in no acute distress. HEENT: Eyes - PERRL, no jvd  Heart:  Normal S1/S2 negative S3 or S4. Regular, no murmur, gallop or rub. Respiratory: Clear bilaterally x 4, no wheezing or rales  Abdomen:   Soft, non-tender, bowel sounds are active. Extremities:  No edema, normal cap refill, no cyanosis. Musculoskeletal: No clubbing  Neuro: A&Ox3, speech clear, gait stable. Skin: Skin color is normal. No rashes or lesions.  Non diaphoretic, no ulcers or subcutaneous nodule  Vascular: 2+ pulses symmetric in all extremities  Psych - judgement intact and orientation is wnl     Cardiographics    ekg - nsr    Monitor - AF with rvr, pauses of upto EIGHT seconds    Results for orders placed or performed during the hospital encounter of 08/16/20   EKG, 12 LEAD, INITIAL   Result Value Ref Range    Ventricular Rate 63 BPM    Atrial Rate 63 BPM    P-R Interval 190 ms    QRS Duration 92 ms    Q-T Interval 408 ms    QTC Calculation (Bezet) 417 ms    Calculated P Axis 45 degrees    Calculated R Axis -14 degrees    Calculated T Axis -5 degrees    Diagnosis       Normal sinus rhythm  Nonspecific T wave abnormality  When compared with ECG of 05-DEC-2018 04:40,  No significant change  Confirmed by Cata Villanueva M.D., Gloria Nowak ([id number]) on 8/16/2020 10:46:56 AM           Lab Results   Component Value Date/Time    WBC 6.2 03/04/2021 11:57 AM    HGB 12.2 (L) 03/04/2021 11:57 AM    HCT 37.6 03/04/2021 11:57 AM    PLATELET 818 14/13/3439 11:57 AM    MCV 80 03/04/2021 11:57 AM      Lab Results   Component Value Date/Time    Sodium 143 03/04/2021 11:57 AM    Potassium 4.5 03/04/2021 11:57 AM    Chloride 106 03/04/2021 11:57 AM    CO2 22 03/04/2021 11:57 AM    Anion gap 13 08/18/2020 03:03 AM    Glucose 150 (H) 03/04/2021 11:57 AM    BUN 16 03/04/2021 11:57 AM    Creatinine 0.84 03/04/2021 11:57 AM    BUN/Creatinine ratio 19 03/04/2021 11:57 AM    GFR est  03/04/2021 11:57 AM    GFR est non-AA 94 03/04/2021 11:57 AM    Calcium 8.8 03/04/2021 11:57 AM    Bilirubin, total 0.3 03/04/2021 11:57 AM    Alk. phosphatase 127 (H) 03/04/2021 11:57 AM    Protein, total 6.5 03/04/2021 11:57 AM    Albumin 3.8 03/04/2021 11:57 AM    Globulin 3.5 08/16/2020 02:48 AM    A-G Ratio 1.4 03/04/2021 11:57 AM    ALT (SGPT) 4 03/04/2021 11:57 AM         Assessment:     Assessment:        ICD-10-CM ICD-9-CM    1. PAF (paroxysmal atrial fibrillation) ([facility])  I48.0 427.31 AMB POC EKG ROUTINE W/ 12 LEADS, INTER & REP      CBC WITH AUTOMATED DIFF      PROTHROMBIN TIME + INR      METABOLIC PANEL, COMPREHENSIVE      XR CHEST PA LAT   2. Tachy-amina syndrome (HCC)  I49.5 427.81 CBC WITH AUTOMATED DIFF      PROTHROMBIN TIME + INR      METABOLIC PANEL, COMPREHENSIVE      XR CHEST PA LAT   3. Mixed hyperlipidemia  E78.2 272.2 CBC WITH AUTOMATED DIFF      PROTHROMBIN TIME + INR      METABOLIC PANEL, COMPREHENSIVE      XR CHEST PA LAT   4. Benign essential hypertension  I10 401.1 CBC WITH AUTOMATED DIFF      PROTHROMBIN TIME + INR      METABOLIC PANEL, COMPREHENSIVE      XR CHEST PA LAT   5. Syncope, unspecified syncope type  R55 780.2 CBC WITH AUTOMATED DIFF      PROTHROMBIN TIME + INR      METABOLIC PANEL, COMPREHENSIVE      XR CHEST PA LAT   6.  SSS (sick sinus syndrome) ([facility])  I49.5 427.81 CBC WITH AUTOMATED DIFF      PROTHROMBIN TIME + INR      METABOLIC PANEL, COMPREHENSIVE      XR CHEST PA LAT     Orders Placed This Encounter    XR CHEST PA LAT     Standing Status:   Future     Standing Expiration Date:   4/18/2021     Order Specific Question:   Reason for Exam     Answer:   pre op    CBC WITH AUTOMATED DIFF     Standing Status:   Future     Standing Expiration Date:   9/18/2021    PROTHROMBIN TIME + INR     Standing Status:   Future     Standing Expiration Date:   4/14/8691    METABOLIC PANEL, COMPREHENSIVE     Standing Status:   Future     Standing Expiration Date:   9/18/2021    AMB POC EKG ROUTINE W/ 12 LEADS, INTER & REP     Order Specific Question:   Reason for Exam:     Answer:   routine        Plan:   Renee Funes is having PAF with rvr alternating with pauses of upto EIGHT seconds. He needs a dual chamber pacemaker and then we can medically manage his AF and CAD. Advised him to go to the ER if his insurance does not approve. Cont med rx for dm, htn. I discussed the risks/benefits/alternatives of the procedure with the patient. Risks include (but are not limited to) bleeding, heart block, infection, cva/mi/tamponade/death. The patient understands and agrees to proceed. Thank you for this interesting consultation. Continue medical management for AF, tachy-amina syndrome/sss, cad and htn. Thank you for allowing me to participate in Renee Funes 's care. Annamaria Bojorquez MD, [facility], [facility]    On this date 03/18/2021 I have spent 58 minutes reviewing previous notes, test results and face to face with the patient discussing the diagnosis and importance of compliance with the treatment plan as well as documenting on the day of the visit.

## 2021-03-18 NOTE — LETTER
3/18/2021 Patient: Erika Benton YOB: 1960 Date of Visit: 3/18/2021 Margarita Mckeon MD 
[address] [place] [address] [address] [employer] Dear Margarita Mckeon MD, Thank you for referring Mr. Erika Benton to [address] [place], [place] [address] for evaluation. My notes for this consultation are attached. If you have questions, please do not hesitate to call me. I look forward to following your patient along with you. Sincerely, Ellie Camilo MD

## 2021-03-18 NOTE — TELEPHONE ENCOUNTER
Patient stated he was talking with you earlier regarding testing he has additional questions please call    [phone]    Thanks  Lowell Sánchez

## 2021-03-19 ENCOUNTER — ANCILLARY PROCEDURE (OUTPATIENT)
Dept: CARDIOLOGY CLINIC | Age: 61
End: 2021-03-19
Payer: COMMERCIAL

## 2021-03-19 ENCOUNTER — HOSPITAL ENCOUNTER (OUTPATIENT)
Dept: PREADMISSION TESTING | Age: 61
Discharge: HOME | End: 2021-03-19
Payer: COMMERCIAL

## 2021-03-19 VITALS
WEIGHT: 280 LBS | HEIGHT: 73 IN | BODY MASS INDEX: 37.11 KG/M2 | DIASTOLIC BLOOD PRESSURE: 65 MMHG | SYSTOLIC BLOOD PRESSURE: 153 MMHG

## 2021-03-19 LAB
ECHO AO ASC DIAM: 4.16 CM
ECHO AO ROOT DIAM: 3.78 CM
ECHO AV AREA PEAK VELOCITY: 2.74 CM2
ECHO AV AREA/BSA PEAK VELOCITY: 1.1 CM2/M2
ECHO AV PEAK GRADIENT: 6.9 MMHG
ECHO AV PEAK VELOCITY: 131.36 CM/S
ECHO LA AREA 4C: 32.67 CM2
ECHO LA MAJOR AXIS: 4.35 CM
ECHO LA MINOR AXIS: 1.75 CM
ECHO LA VOL 2C: 141.56 ML (ref 18–58)
ECHO LA VOL 4C: 121.95 ML (ref 18–58)
ECHO LA VOL BP: 147.86 ML (ref 18–58)
ECHO LA VOL/BSA BIPLANE: 59.56 ML/M2 (ref 16–28)
ECHO LA VOLUME INDEX A2C: 57.02 ML/M2 (ref 16–28)
ECHO LA VOLUME INDEX A4C: 49.12 ML/M2 (ref 16–28)
ECHO LV E' LATERAL VELOCITY: 12.26 CM/S
ECHO LV E' SEPTAL VELOCITY: 10.11 CM/S
ECHO LV INTERNAL DIMENSION DIASTOLIC: 5.7 CM (ref 4.2–5.9)
ECHO LV INTERNAL DIMENSION SYSTOLIC: 3.79 CM
ECHO LV IVSD: 1.15 CM (ref 0.6–1)
ECHO LV MASS 2D: 264.6 G (ref 88–224)
ECHO LV MASS INDEX 2D: 106.6 G/M2 (ref 49–115)
ECHO LV POSTERIOR WALL DIASTOLIC: 1.1 CM (ref 0.6–1)
ECHO LVOT DIAM: 2.01 CM
ECHO LVOT PEAK GRADIENT: 5.14 MMHG
ECHO LVOT PEAK VELOCITY: 113.31 CM/S
ECHO LVOT SV: 92.1 ML
ECHO LVOT VTI: 28.96 CM
ECHO MV A VELOCITY: 77.92 CM/S
ECHO MV AREA PHT: 3.66 CM2
ECHO MV E DECELERATION TIME (DT): 207.19 MS
ECHO MV E VELOCITY: 86.46 CM/S
ECHO MV E/A RATIO: 1.11
ECHO MV E/E' LATERAL: 7.05
ECHO MV E/E' RATIO (AVERAGED): 7.8
ECHO MV E/E' SEPTAL: 8.55
ECHO MV PRESSURE HALF TIME (PHT): 60.08 MS
ECHO RV TAPSE: 2.16 CM (ref 1.5–2)
SARS-COV-2, COV2: NORMAL

## 2021-03-19 PROCEDURE — 93306 TTE W/DOPPLER COMPLETE: CPT | Performed by: INTERNAL MEDICINE

## 2021-03-19 PROCEDURE — U0003 INFECTIOUS AGENT DETECTION BY NUCLEIC ACID (DNA OR RNA); SEVERE ACUTE RESPIRATORY SYNDROME CORONAVIRUS 2 (SARS-COV-2) (CORONAVIRUS DISEASE [COVID-19]), AMPLIFIED PROBE TECHNIQUE, MAKING USE OF HIGH THROUGHPUT TECHNOLOGIES AS DESCRIBED BY CMS-2020-01-R: HCPCS

## 2021-03-20 LAB — SARS-COV-2, COV2NT: NOT DETECTED

## 2021-03-21 ENCOUNTER — TELEPHONE (OUTPATIENT)
Dept: CARDIOLOGY CLINIC | Age: 61
End: 2021-03-21

## 2021-03-22 ENCOUNTER — ANESTHESIA EVENT (OUTPATIENT)
Dept: CARDIAC CATH/INVASIVE PROCEDURES | Age: 61
End: 2021-03-22
Payer: COMMERCIAL

## 2021-03-22 ENCOUNTER — HOSPITAL ENCOUNTER (OUTPATIENT)
Age: 61
Discharge: HOME | End: 2021-03-22
Attending: INTERNAL MEDICINE | Admitting: INTERNAL MEDICINE
Payer: COMMERCIAL

## 2021-03-22 ENCOUNTER — ANESTHESIA (OUTPATIENT)
Dept: CARDIAC CATH/INVASIVE PROCEDURES | Age: 61
End: 2021-03-22
Payer: COMMERCIAL

## 2021-03-22 ENCOUNTER — TELEPHONE (OUTPATIENT)
Dept: CARDIOLOGY CLINIC | Age: 61
End: 2021-03-22

## 2021-03-22 ENCOUNTER — APPOINTMENT (OUTPATIENT)
Dept: GENERAL RADIOLOGY | Age: 61
End: 2021-03-22
Attending: INTERNAL MEDICINE
Payer: COMMERCIAL

## 2021-03-22 VITALS
OXYGEN SATURATION: 98 % | RESPIRATION RATE: 18 BRPM | WEIGHT: 276 LBS | TEMPERATURE: 98 F | HEART RATE: 70 BPM | HEIGHT: 73 IN | DIASTOLIC BLOOD PRESSURE: 72 MMHG | SYSTOLIC BLOOD PRESSURE: 165 MMHG | BODY MASS INDEX: 36.58 KG/M2

## 2021-03-22 DIAGNOSIS — I10 BENIGN ESSENTIAL HYPERTENSION: Chronic | ICD-10-CM

## 2021-03-22 DIAGNOSIS — I48.0 PAROXYSMAL ATRIAL FIBRILLATION (HCC): Chronic | ICD-10-CM

## 2021-03-22 DIAGNOSIS — I49.5 TACHY-BRADY SYNDROME (HCC): Chronic | ICD-10-CM

## 2021-03-22 DIAGNOSIS — I49.5 SSS (SICK SINUS SYNDROME) (HCC): ICD-10-CM

## 2021-03-22 PROBLEM — E11.8 CONTROLLED TYPE 2 DIABETES MELLITUS WITH COMPLICATION, WITHOUT LONG-TERM CURRENT USE OF INSULIN (HCC): Chronic | Status: ACTIVE | Noted: 2018-12-12

## 2021-03-22 PROBLEM — I48.91 ATRIAL FIBRILLATION (HCC): Chronic | Status: ACTIVE | Noted: 2021-03-15

## 2021-03-22 LAB
GLUCOSE BLD STRIP.AUTO-MCNC: 200 MG/DL (ref 65–100)
SERVICE CMNT-IMP: ABNORMAL

## 2021-03-22 PROCEDURE — 77030010507 HC ADH SKN DERMBND J&J -B: Performed by: INTERNAL MEDICINE

## 2021-03-22 PROCEDURE — 74011000250 HC RX REV CODE- 250: Performed by: INTERNAL MEDICINE

## 2021-03-22 PROCEDURE — 33208 INSRT HEART PM ATRIAL & VENT: CPT | Performed by: INTERNAL MEDICINE

## 2021-03-22 PROCEDURE — C1785 PMKR, DUAL, RATE-RESP: HCPCS | Performed by: INTERNAL MEDICINE

## 2021-03-22 PROCEDURE — 77030002996 HC SUT SLK J&J -A: Performed by: INTERNAL MEDICINE

## 2021-03-22 PROCEDURE — 99153 MOD SED SAME PHYS/QHP EA: CPT | Performed by: INTERNAL MEDICINE

## 2021-03-22 PROCEDURE — C1898 LEAD, PMKR, OTHER THAN TRANS: HCPCS | Performed by: INTERNAL MEDICINE

## 2021-03-22 PROCEDURE — C1893 INTRO/SHEATH, FIXED,NON-PEEL: HCPCS | Performed by: INTERNAL MEDICINE

## 2021-03-22 PROCEDURE — 71045 X-RAY EXAM CHEST 1 VIEW: CPT

## 2021-03-22 PROCEDURE — 74011250636 HC RX REV CODE- 250/636: Performed by: INTERNAL MEDICINE

## 2021-03-22 PROCEDURE — 2709999900 HC NON-CHARGEABLE SUPPLY: Performed by: INTERNAL MEDICINE

## 2021-03-22 PROCEDURE — C1894 INTRO/SHEATH, NON-LASER: HCPCS | Performed by: INTERNAL MEDICINE

## 2021-03-22 PROCEDURE — 77030018729 HC ELECTRD DEFIB PAD CARD -B: Performed by: INTERNAL MEDICINE

## 2021-03-22 PROCEDURE — 82962 GLUCOSE BLOOD TEST: CPT

## 2021-03-22 PROCEDURE — 99152 MOD SED SAME PHYS/QHP 5/>YRS: CPT | Performed by: INTERNAL MEDICINE

## 2021-03-22 PROCEDURE — 77030022704 HC SUT VLOC COVD -B: Performed by: INTERNAL MEDICINE

## 2021-03-22 DEVICE — PACE/SENSE LEAD
Type: IMPLANTABLE DEVICE | Status: FUNCTIONAL
Brand: INGEVITY™+

## 2021-03-22 DEVICE — PACEMAKER
Type: IMPLANTABLE DEVICE | Status: FUNCTIONAL
Brand: ACCOLADE™ MRI EL DR

## 2021-03-22 RX ORDER — NALOXONE HYDROCHLORIDE 0.4 MG/ML
0.4 INJECTION, SOLUTION INTRAMUSCULAR; INTRAVENOUS; SUBCUTANEOUS AS NEEDED
Status: DISCONTINUED | OUTPATIENT
Start: 2021-03-22 | End: 2021-03-22 | Stop reason: HOSPADM

## 2021-03-22 RX ORDER — FENTANYL CITRATE 50 UG/ML
INJECTION, SOLUTION INTRAMUSCULAR; INTRAVENOUS AS NEEDED
Status: DISCONTINUED | OUTPATIENT
Start: 2021-03-22 | End: 2021-03-22 | Stop reason: HOSPADM

## 2021-03-22 RX ORDER — SODIUM CHLORIDE 0.9 % (FLUSH) 0.9 %
5-40 SYRINGE (ML) INJECTION EVERY 8 HOURS
Status: DISCONTINUED | OUTPATIENT
Start: 2021-03-22 | End: 2021-03-22 | Stop reason: HOSPADM

## 2021-03-22 RX ORDER — CEFAZOLIN SODIUM/WATER 2 G/20 ML
SYRINGE (ML) INTRAVENOUS AS NEEDED
Status: DISCONTINUED | OUTPATIENT
Start: 2021-03-22 | End: 2021-03-22 | Stop reason: HOSPADM

## 2021-03-22 RX ORDER — FLECAINIDE ACETATE 100 MG/1
100 TABLET ORAL 2 TIMES DAILY
Qty: 60 TAB | Refills: 12 | Status: SHIPPED | OUTPATIENT
Start: 2021-03-22 | End: 2021-04-13

## 2021-03-22 RX ORDER — SODIUM CHLORIDE 0.9 % (FLUSH) 0.9 %
5-40 SYRINGE (ML) INJECTION AS NEEDED
Status: DISCONTINUED | OUTPATIENT
Start: 2021-03-22 | End: 2021-03-22 | Stop reason: HOSPADM

## 2021-03-22 RX ORDER — MIDAZOLAM HYDROCHLORIDE 1 MG/ML
INJECTION, SOLUTION INTRAMUSCULAR; INTRAVENOUS AS NEEDED
Status: DISCONTINUED | OUTPATIENT
Start: 2021-03-22 | End: 2021-03-22 | Stop reason: HOSPADM

## 2021-03-22 RX ORDER — LIDOCAINE HYDROCHLORIDE 10 MG/ML
INJECTION INFILTRATION; PERINEURAL AS NEEDED
Status: DISCONTINUED | OUTPATIENT
Start: 2021-03-22 | End: 2021-03-22 | Stop reason: HOSPADM

## 2021-03-22 NOTE — ANESTHESIA PREPROCEDURE EVALUATION
Relevant Problems   CARDIOVASCULAR   (+) Atrial fibrillation (HCC)   (+) Benign essential hypertension   (+) Coronary artery disease   (+) PAF (paroxysmal atrial fibrillation) (HCC)   (+) Tachy-amina syndrome (HCC)      ENDOCRINE   (+) Arthritis   (+) Controlled type 2 diabetes mellitus with complication, without long-term current use of insulin (HCC)   (+) Severe obesity (HCC)   (+) Type 2 diabetes mellitus (HCC)   (+) Type 2 diabetes with nephropathy (HCC)       Anesthetic History   No history of anesthetic complications            Review of Systems / Medical History  Patient summary reviewed, nursing notes reviewed and pertinent labs reviewed    Pulmonary  Within defined limits                 Neuro/Psych   Within defined limits           Cardiovascular    Hypertension        Dysrhythmias   CAD and cardiac stents    Exercise tolerance: >4 METS  Comments: Ejection fraction 55-60%   GI/Hepatic/Renal  Within defined limits              Endo/Other    Diabetes    Obesity and arthritis     Other Findings   Comments: Blood sugar 200         Physical Exam    Airway  Mallampati: II  TM Distance: 4 - 6 cm  Neck ROM: normal range of motion   Mouth opening: Normal     Cardiovascular    Rhythm: irregular  Rate: normal         Dental  No notable dental hx    Comments: Missing teeth   Pulmonary  Breath sounds clear to auscultation               Abdominal         Other Findings            Anesthetic Plan    ASA: 3  Anesthesia type: MAC and total IV anesthesia          Induction: Intravenous  Anesthetic plan and risks discussed with: Patient

## 2021-03-22 NOTE — PROGRESS NOTES
Cardiac Cath Lab Recovery Arrival Note:      Krystyna Viramontes arrived to Cardiac Cath Lab, Recovery Area. Staff introduced to patient. Patient identifiers verified with NAME and DATE OF BIRTH. Procedure verified with patient. Consent forms reviewed and signed by patient or authorized representative and verified. Allergies verified. Patient and family oriented to department. Patient and family informed of procedure and plan of care. Questions answered with review. Patient prepped for procedure, per orders from physician, prior to arrival.    Patient on cardiac monitor, non-invasive blood pressure, SPO2 monitor. On RA. Patient is A&Ox 4. Patient reports no complaints. Patient in stretcher, in low position, with side rails up, call bell within reach, patient instructed to call if assistance as needed. Patient prep in: [address], [place] [address]. Patient family has pager # none  Family in: [facility] waiting area.    Prep by: Olya Miles RN and Brodie Vincent RN

## 2021-03-22 NOTE — ROUTINE PROCESS
Bedrest complete. Assisted patient with walking in the [place]. No signs or symptoms of chest pain, shortness of breath, or dizziness. Tolerated well. VSS.

## 2021-03-22 NOTE — Clinical Note
TRANSFER - OUT REPORT:     Verbal report given to: [facility]. Report consisted of patient's Situation, Background, Assessment and   Recommendations(SBAR). Opportunity for questions and clarification was provided. Patient transported with a Registered Nurse. Patient transported to: Recovery.

## 2021-03-22 NOTE — PROGRESS NOTES
Received a call from [employer] regarding auto triggers on Mr. Josh Nguyen device. For the 19th he had a 4.9 second pause, on the 20th a 3.8 second prize however this evening he had a pause of 6.2 seconds. Following that event he had a fever of 140 to 150 beats a minute. All underlying was AF. I called patient to assess him. Mr. Josh Nguyen reports feeling dizzy this evening but is in usual state of health at this point. I advised him if he had any fu rther episodes to head over to the ER as he is for a pacemaker tomorrow. He verbalized understanding and will leave the monitor on.

## 2021-03-22 NOTE — PROGRESS NOTES
Normal pumping heart strength. His aorta is mildly enlarged, will follow up with repeat echo next year or so for surveillance. Need good bp control. F/u with doc corey in 6 mos.   Noted he is for pacemaker insertion tomorrow 3/22/2021 with dr Daron Mohs

## 2021-03-22 NOTE — PROGRESS NOTES
Discharge instructions reviewed with Patient. Patient verbalized understanding of instructions. Opportunities for questions were provided and explained. Patient will be getting discharged Home. Discharge medications reviewed with patient and appropriate educational materials and side effects teaching were provided. D/C home around 1400.

## 2021-03-22 NOTE — Clinical Note
TRANSFER - IN REPORT:     Verbal report received from: [facility]. Report consisted of patient's Situation, Background, Assessment and   Recommendations(SBAR). Opportunity for questions and clarification was provided. Assessment completed upon patient's arrival to unit and care assumed. Patient transported with a Registered Nurse.

## 2021-03-22 NOTE — INTERVAL H&P NOTE
Update History & Physical 
 
The Patient's History and Physical of March 18, 2021 was reviewed with the patient and I examined the patient. There was no change. The surgical site was confirmed by the patient and me. Plan:  The risk, benefits, expected outcome, and alternative to the recommended procedure have been discussed with the patient. Patient understands and wants to proceed with the procedure.  
 
Electronically signed by Sammy Goyal MD on 3/22/2021 at 9:24 AM

## 2021-03-22 NOTE — TELEPHONE ENCOUNTER
----- Message from Dinorah Lind NP sent at 3/21/2021  9:05 PM EDT -----  Normal pumping heart strength. His aorta is mildly enlarged, will follow up with repeat echo next year or so for surveillance. Need good bp control. F/u with juliet nicole in 6 mos.   Noted he is for pacemaker insertion tomorrow 3/22/2021 with dr Jannette Traore

## 2021-03-22 NOTE — DISCHARGE INSTRUCTIONS
[address] [phone]        NEW PACEMAKER IMPLANT DISCHARGE INSTRUCTIONS    Patient ID:  Vic Burnett  [id number]  36 y.o.  1960    Admit Date: 3/22/2021    Discharge Date: 3/22/2021     Admitting Physician: Giana Stiles MD     Discharge Physician: Giana Stiles MD    Admission Diagnoses:   SSS (sick sinus syndrome) [facility]) [I49.5]    Discharge Diagnoses: Active Problems:    * No active hospital problems. *      Discharge Condition: Good    Cardiology Procedures this Admission:  Pacemaker insertion. Disposition: home    Reference discharge instructions provided by nursing for diet and activity. Follow-up with device clinic in three weeks. Call [phone] to make an appointment. Signed:  JENNY Ruff  3/22/2021  9:51 AM      DISCHARGE INSTRUCTIONS FOR PATIENTS WITH PACEMAKERS    1. Remember to call for an appointment for 3 weeks [phone] to check healing and implant programming. 2. Medic Alert Bracelets are available from your pharmacist to wear at all times if you choose to wear one. 3. Carry your ID card for pacemaker with you at all times. This card will be given to you in the hospital or mailed to you. 4. The pacemaker will bulge slightly under your skin. The bulge will decrease in size over the next few weeks. Please notify the doctor's office if you notice any of the following around your site:   A.  A bruise that does not go away. B.  Soreness or yellow, green, or brown drainage from the site. C. Any swelling from the site. D. If you have a fever of 100 degrees or higher that lasts for a few days. INCISION CARE       1.  Leave the dressing over your site until your follow up visit. 2.  You may not shower until after follow up visit. 3.  For comfort, wear loose fitting clothing. 1. 4.  Ice pack to affected shoulder for first 24 hours, wear your sling for 2 days.   2. 5.  Report any signs of infection, fever, pain, swelling, redness, oozing, or heat at site especially if these symptoms increase after the first 3 to 4 days. ACTIVITY PRECAUTIONS     1. Avoid rough contact with the implant site. 2. No driving for 14 days. 3. Avoid lifting your arm over your head, carrying anything on the affected side, or lifting over 10 pounds for 90 days. For the first 2 days only bend your arm at the elbow. 4. Any extreme activity such as golf, weight lifting or exercise biking should be restricted for 60 days. 5. Do not carry objects by holding them against your implant site. 6.  No shooting rifles or any type of gun with the affected shoulder permanently. SPECIAL PRECAUTIONS     1. You should avoid all strong magnetic fields, such as arc welding, large transformers, large motors. 2.  You may or may not (depending on your device) have an MRI which uses a strong magnet to take pictures. 3.  Treatments or surgery that requires diathermy or electrocautery should be discussed with your doctor before scheduled. 4. Avoid radio frequency transmitters, including radar. 5. Advise dentist or other medical personnel you see that you have a pacemaker. 6.  Cell phones and microwave oven use is okay. 7.  If you plan to move or take a trip to a new area, the doctor's office will give you a name of a doctor to contact for any problems. ANTIBIOTIC THERAPY    During the first 8 weeks after your pacemaker insertion, you may need antibiotics before any dental work or certain tests or operations. Let the dentist or doctor who is caring for you know that you have had an implanted device.

## 2021-03-25 ENCOUNTER — CLINICAL SUPPORT (OUTPATIENT)
Dept: CARDIOLOGY CLINIC | Age: 61
End: 2021-03-25
Payer: COMMERCIAL

## 2021-03-25 ENCOUNTER — TELEPHONE (OUTPATIENT)
Dept: CARDIOLOGY CLINIC | Age: 61
End: 2021-03-25

## 2021-03-25 VITALS
RESPIRATION RATE: 16 BRPM | OXYGEN SATURATION: 97 % | WEIGHT: 284.1 LBS | HEART RATE: 76 BPM | SYSTOLIC BLOOD PRESSURE: 90 MMHG | BODY MASS INDEX: 36.46 KG/M2 | DIASTOLIC BLOOD PRESSURE: 60 MMHG | HEIGHT: 74 IN

## 2021-03-25 DIAGNOSIS — I25.10 CORONARY ARTERY DISEASE DUE TO LIPID RICH PLAQUE: ICD-10-CM

## 2021-03-25 DIAGNOSIS — I25.83 CORONARY ARTERY DISEASE DUE TO LIPID RICH PLAQUE: ICD-10-CM

## 2021-03-25 DIAGNOSIS — I10 BENIGN ESSENTIAL HYPERTENSION: Chronic | ICD-10-CM

## 2021-03-25 DIAGNOSIS — I48.0 PAROXYSMAL ATRIAL FIBRILLATION (HCC): Primary | Chronic | ICD-10-CM

## 2021-03-25 PROCEDURE — 93000 ELECTROCARDIOGRAM COMPLETE: CPT | Performed by: INTERNAL MEDICINE

## 2021-03-25 NOTE — TELEPHONE ENCOUNTER
Patient was seen in office this afternoon for EKG  He C/O dizziness since having pacemaker inserted checked BP supine 120/80 sitting and standing 90/60. Checked with NP Meenu Hilario she recommend patient increase fluid hydration and will have Dr Babatunde Campbell review EKG when back in office for further advise. Notified dr Babatunde Campbell of EKG he confirmed flutter and recommends physician on call to review since he is out of office.

## 2021-03-25 NOTE — PROGRESS NOTES
Chief Complaint   Patient presents with    Cardiac Testing    Irregular Heart Beat     1. Have you been to the ER, urgent care clinic since your last visit? Hospitalized since your last visit? Yes 3/22/21 S/P pacemaker insert. 2. Have you seen or consulted any other health care providers outside of the [address] [place] [address] since your last visit? No Include any pap smears or colon screening. NO    Patient C/O dizziness.

## 2021-03-30 ENCOUNTER — OFFICE VISIT (OUTPATIENT)
Dept: CARDIOLOGY CLINIC | Age: 61
End: 2021-03-30

## 2021-03-30 DIAGNOSIS — Z95.0 CARDIAC PACEMAKER IN SITU: Primary | ICD-10-CM

## 2021-03-30 DIAGNOSIS — I49.5 SSS (SICK SINUS SYNDROME) (HCC): ICD-10-CM

## 2021-03-30 NOTE — TELEPHONE ENCOUNTER
Requested remote transmission be sent per ROSA M Mckee. Will review once Mr. Arnaldo Miller has completed this.

## 2021-04-01 ENCOUNTER — OFFICE VISIT (OUTPATIENT)
Dept: CARDIOLOGY CLINIC | Age: 61
End: 2021-04-01
Payer: COMMERCIAL

## 2021-04-01 VITALS
WEIGHT: 286 LBS | DIASTOLIC BLOOD PRESSURE: 80 MMHG | BODY MASS INDEX: 37.91 KG/M2 | HEIGHT: 73 IN | HEART RATE: 71 BPM | RESPIRATION RATE: 16 BRPM | OXYGEN SATURATION: 99 % | SYSTOLIC BLOOD PRESSURE: 140 MMHG

## 2021-04-01 DIAGNOSIS — I48.0 PAF (PAROXYSMAL ATRIAL FIBRILLATION) (HCC): Chronic | ICD-10-CM

## 2021-04-01 DIAGNOSIS — E78.2 MIXED HYPERLIPIDEMIA: ICD-10-CM

## 2021-04-01 DIAGNOSIS — I10 BENIGN ESSENTIAL HYPERTENSION: Primary | Chronic | ICD-10-CM

## 2021-04-01 DIAGNOSIS — I48.4 ATYPICAL ATRIAL FLUTTER (HCC): ICD-10-CM

## 2021-04-01 DIAGNOSIS — I49.5 TACHY-BRADY SYNDROME (HCC): Chronic | ICD-10-CM

## 2021-04-01 PROCEDURE — 99215 OFFICE O/P EST HI 40 MIN: CPT | Performed by: INTERNAL MEDICINE

## 2021-04-01 PROCEDURE — 93000 ELECTROCARDIOGRAM COMPLETE: CPT | Performed by: INTERNAL MEDICINE

## 2021-04-01 RX ORDER — METOPROLOL TARTRATE 25 MG/1
25 TABLET, FILM COATED ORAL 2 TIMES DAILY
Qty: 180 TAB | Refills: 0 | Status: SHIPPED | OUTPATIENT
Start: 2021-04-01 | End: 2021-07-06

## 2021-04-01 NOTE — PROGRESS NOTES
1. Have you been to the ER, urgent care clinic since your last visit? Hospitalized since your last visit? No.    2. Have you seen or consulted any other health care providers outside of the [address] [place] [address] since your last visit? Include any pap smears or colon screening.    No.        Chief Complaint   Patient presents with   [facility] Follow Up     f/u from pacemaker- some dizziness

## 2021-04-01 NOTE — PROGRESS NOTES
Subjective:      Jake Nuñez is a 56 y.o. male is here for EP consult. He c/o of episodes of dizziness. Had an ekg last week and was in atypical afl. Patient Active Problem List    Diagnosis Date Noted    Tachy-amina syndrome ([address] 03/15/2021    Atrial fibrillation ([address] 03/15/2021    Supracondylar fracture of distal end of femur without intracondylar extension ([address] 08/17/2020    Femur fracture ([address] 08/16/2020    Type 2 diabetes with nephropathy ([address] 12/12/2018    Severe obesity ([address] 12/12/2018    Controlled type 2 diabetes mellitus with complication, without long-term current use of insulin ([address] 12/12/2018    PAF (paroxysmal atrial fibrillation) ([address] 12/12/2018    Mixed hyperlipidemia 04/18/2012    Mitral valve disorders(424.0) 03/27/2012    Coronary artery disease 06/10/2011    S/P coronary artery stent placement 06/10/2011    Type 2 diabetes mellitus ([address] 06/10/2011    Benign essential hypertension 06/10/2011    Hypertriglyceridemia     Arthritis       Kayce Tobin MD  Past Medical History:   Diagnosis Date    Arthritis     knees    Atrial fibrillation with RVR ([address]     Benign essential hypertension 6/10/2011    CAD (coronary artery disease)     Diabetes ([address]     DX  AGE 33      Hypercholesterolemia     Hypertriglyceridemia     Other ill-defined conditions(799.89)     obesity    Other ill-defined conditions(799.89)     high cholesterol    PAF (paroxysmal atrial fibrillation) ([address] 12/12/2018    SOB (shortness of breath) on exertion 5/16/2011      Past Surgical History:   Procedure Laterality Date    CARDIAC CATHETERIZATION  6/9/2011         CARDIAC CATHETERIZATION  11/1/2012         HC ANGIOSEAL VIP 6FR  6/9/2011         HX AFIB ABLATION  03/15/2021    HX ORTHOPAEDIC      left knee replacement    HX ORTHOPAEDIC      R knee replacement  2010 -Shelly Del Castillo.     INTRACARD ECHO, THER/DX INTERVENT N/A 3/15/2021    Intracardiac Echocardiogram performed by Celine Disa MD at [facility] CARDIAC CATH LAB    MI CARDIAC SURG PROCEDURE UNLIST      STENT RCA      MI DRUG-ELUTING STENTS, SINGLE  2011         MI EPHYS EVL TRNSPTL TX ATRIAL FIB ISOLAT PULM VEIN N/A 3/15/2021    ABLATION A-FIB  W COMPLETE EP STUDY performed by Celine Dias MD at [facility] CARDIAC CATH LAB    MI INS NEW/RPLCMT PRM PM W/TRANSV ELTRD ATRIAL&VENT N/A 3/22/2021    INSERT PPM DUAL performed by Celine Dias MD at [facility] CARDIAC CATH LAB    MI INTRACARDIAC ELECTROPHYSIOLOGIC 3D MAPPING N/A 3/15/2021    Ep 3d Mapping performed by Celine Dias MD at [facility] CARDIAC CATH LAB     Allergies   Allergen Reactions    Lisinopril Cough    Losartan Other (comments)     Hypotension        Family History   Problem Relation Age of Onset    Diabetes Mother     Hypertension Mother    [facility] Elevated Lipids Mother     Cancer Mother     Diabetes Father     Heart Disease Father     Hypertension Father     Elevated Lipids Father     negative for cardiac disease  Social History     Socioeconomic History    Marital status:      Spouse name: Not on file    Number of children: Not on file    Years of education: Not on file    Highest education level: Not on file   Tobacco Use    Smoking status: Former Smoker     Types: Cigarettes     Quit date: 11/10/1989     Years since quittin.4    Smokeless tobacco: Never Used   Substance and Sexual Activity    Alcohol use: Yes     Frequency: Monthly or less     Comment: social  DRINKS ONE WEEKEND A MONTH/\"BIG IMPROVEMENT\"    Drug use: No    Sexual activity: Yes     Partners: Female     Birth control/protection: None   Social History Narrative    , 3 children, twin sons 25 and one son 15. Works at [employer] for the last 9 years, used to work a [employer]     Current Outpatient Medications   Medication Sig    metoprolol tartrate (LOPRESSOR) 25 mg tablet Take 1 Tab by mouth two (2) times a day for 90 days.     flecainide (TAMBOCOR) 100 mg tablet Take 1 Tab by mouth two (2) times a day.  apixaban (ELIQUIS) 5 mg tablet Take 1 Tab by mouth two (2) times a day.  ACETAMINOPHEN PO Take 500 mg by mouth as needed. 1-2 tabs prn as needed    semaglutide (Ozempic) 1 mg/dose (2 mg/1.5 mL) sub-q pen 1 MG BY SUBCUTANEOUS ROUTE EVERY SEVEN (7) DAYS.  ibuprofen (MOTRIN) 800 mg tablet Take 1 Tab by mouth every eight (8) hours as needed for Pain.  furosemide (LASIX) 40 mg tablet 1-2 tabs po daily as needed for fluid    glipiZIDE (GLUCOTROL) 10 mg tablet TAKE 1 TABLET BY MOUTH TWO (2) TIMES A DAY. FOR DIABETES    metFORMIN (GLUCOPHAGE) 1,000 mg tablet Take 1 Tab by mouth two (2) times daily (with meals).  gemfibroziL (LOPID) 600 mg tablet Take 1 Tab by mouth two (2) times daily as needed (cholesterol). (Patient taking differently: Take 600 mg by mouth two (2) times a day.)    magnesium oxide (MAG-OX) 400 mg tablet TAKE 2 TABLETS BY MOUTH EVERY DAY    empagliflozin (Jardiance) 10 mg tablet Take 1 Tab by mouth daily. For diabetes    rosuvastatin (CRESTOR) 20 mg tablet TAKE 1 TABLET BY MOUTH DAILY FOR CHOLESTEROL    nitroglycerin (NITROSTAT) 0.4 mg SL tablet 1 Tab by SubLINGual route every five (5) minutes as needed for Chest Pain (call 911 if CP/ SOB not relieved by 3 tabs).  aspirin delayed-release 81 mg tablet Take  by mouth daily.  omega-3 fatty acids-vitamin e (FISH OIL) 1,000 mg Cap Take 2 Caps by mouth two (2) times a day. No current facility-administered medications for this visit. Vitals:    04/01/21 0920 04/01/21 0936 04/01/21 0938   BP: 136/78 134/78 (!) 140/80   Pulse: 71 74 71   Resp: 16     SpO2: 97% 96% 99%   Weight: 286 lb (129.7 kg)     Height: 6' 1\" (1.854 m)         I have reviewed the nurses notes, vitals, problem list, allergy list, medical history, family, social history and medications. Review of Symptoms:    General: Pt denies excessive weight gain or loss.  Pt is able to conduct ADL's  HEENT: Denies blurred vision, headaches, hearing loss, epistaxis and difficulty swallowing. Respiratory: Denies cough, congestion, shortness of breath, KRAMER, wheezing or stridor. Cardiovascular: +dizziness, Denies precordial pain, palpitations, edema or PND  Gastrointestinal: Denies poor appetite, indigestion, abdominal pain or blood in stool  Genitourinary: Denies hematuria, dysuria, increased urinary frequency  Musculoskeletal: Denies joint pain or swelling from muscles or joints  Neurologic: Denies tremor, paresthesias, headache, or sensory motor disturbance  Psychiatric: Denies confusion, insomnia, depression  Integumentray: Denies rash, itching or ulcers. Hematologic: Denies easy bruising, bleeding    Physical Exam:      General: Well developed, in no acute distress. HEENT: Eyes - PERRL, no jvd  Heart:  Normal S1/S2 negative S3 or S4. Regular, no murmur, gallop or rub. Respiratory: Clear bilaterally x 4, no wheezing or rales  Abdomen:   Soft, non-tender, bowel sounds are active. Extremities:  No edema, normal cap refill, no cyanosis. Musculoskeletal: No clubbing  Neuro: A&Ox3, speech clear, gait stable. Skin: Skin color is normal. No rashes or lesions.  Non diaphoretic, no ulcers or subcutaneous nodule  Vascular: 2+ pulses symmetric in all extremities  Psych - judgement intact and orientation is wnl     Cardiographics    Ekg: nsr    Pacer - AV paced 44%, episodes of AFL with rvr - 3/30 13.5 hours    Results for orders placed or performed during the hospital encounter of 08/16/20   EKG, 12 LEAD, INITIAL   Result Value Ref Range    Ventricular Rate 63 BPM    Atrial Rate 63 BPM    P-R Interval 190 ms    QRS Duration 92 ms    Q-T Interval 408 ms    QTC Calculation (Bezet) 417 ms    Calculated P Axis 45 degrees    Calculated R Axis -14 degrees    Calculated T Axis -5 degrees    Diagnosis       Normal sinus rhythm  Nonspecific T wave abnormality  When compared with ECG of 05-DEC-2018 04:40,  No significant change  Confirmed by Jen Ochoa M.D., Arielle Miguel ([id number]) on 8/16/2020 10:46:56 AM           Lab Results   Component Value Date/Time    WBC 6.2 03/04/2021 11:57 AM    HGB 12.2 (L) 03/04/2021 11:57 AM    HCT 37.6 03/04/2021 11:57 AM    PLATELET 470 20/46/5586 11:57 AM    MCV 80 03/04/2021 11:57 AM      Lab Results   Component Value Date/Time    Sodium 143 03/04/2021 11:57 AM    Potassium 4.5 03/04/2021 11:57 AM    Chloride 106 03/04/2021 11:57 AM    CO2 22 03/04/2021 11:57 AM    Anion gap 13 08/18/2020 03:03 AM    Glucose 150 (H) 03/04/2021 11:57 AM    BUN 16 03/04/2021 11:57 AM    Creatinine 0.84 03/04/2021 11:57 AM    BUN/Creatinine ratio 19 03/04/2021 11:57 AM    GFR est  03/04/2021 11:57 AM    GFR est non-AA 94 03/04/2021 11:57 AM    Calcium 8.8 03/04/2021 11:57 AM    Bilirubin, total 0.3 03/04/2021 11:57 AM    Alk. phosphatase 127 (H) 03/04/2021 11:57 AM    Protein, total 6.5 03/04/2021 11:57 AM    Albumin 3.8 03/04/2021 11:57 AM    Globulin 3.5 08/16/2020 02:48 AM    A-G Ratio 1.4 03/04/2021 11:57 AM    ALT (SGPT) 4 03/04/2021 11:57 AM         Assessment:     Assessment:        ICD-10-CM ICD-9-CM    1. Benign essential hypertension  I10 401.1 AMB POC EKG ROUTINE W/ 12 LEADS, INTER & REP      metoprolol tartrate (LOPRESSOR) 25 mg tablet   2. PAF (paroxysmal atrial fibrillation) ([facility])  I48.0 427.31 AMB POC EKG ROUTINE W/ 12 LEADS, INTER & REP      metoprolol tartrate (LOPRESSOR) 25 mg tablet   3. Tachy-amina syndrome (HCC)  I49.5 427.81 AMB POC EKG ROUTINE W/ 12 LEADS, INTER & REP      metoprolol tartrate (LOPRESSOR) 25 mg tablet   4. Mixed hyperlipidemia  E78.2 272.2 metoprolol tartrate (LOPRESSOR) 25 mg tablet   5.  Atypical atrial flutter (HCC)  I48.4 427.32      Orders Placed This Encounter    AMB POC EKG ROUTINE W/ 12 LEADS, INTER & REP     Order Specific Question:   Reason for Exam:     Answer:   routine    metoprolol tartrate (LOPRESSOR) 25 mg tablet     Sig: Take 1 Tab by mouth two (2) times a day for 90 days.     Dispense:  180 Tab     Refill:  0        Plan:   Kusum Rivero is sp AF abl (cryo) for PAF. He persisted to have AF and tachy-amina issues and subsequently had a dc ppm implanted with initiation of flecainide and bb therapy. Today, he is in sinus. We will increase his bb dose to 25 mg bid. He is stable and compliant with his flecainide and oac for AF. He has a f/u scheduled for 4/13. At that time, we will reassess his AF burden. A paced 44% for sss. Cont med rx for htn and hyperlipidemia. During this visit,  the patient and I had a comprehensive discussion of arrhythmia management using principles of shared decision making. This included a discussion of anti-arrhythmic medications including class I agents (e.g. flecainide) and class III agents (e.g. amiodarone, sotalol etc) and both class II and IV agents (beta-blockers and calcium channel blockers). We reviewed the potentially life-threatening side effects of these medications, including (but not limited to) fatal tachyarrhythmias, pulmonary toxicity, liver toxicity and thyroid disorders. We also reviewed the requisite monitoring required of some of these medications. Our plans, determined together after thorough consideration, are outlined else where in this note. Continue medical management for AF, sss, htn. Thank you for allowing me to participate in Kusum Rivero 's care. Kellie Tang MD, [facility]    On this date 04/01/2021 I have spent 58 minutes reviewing previous notes, test results and face to face with the patient discussing the diagnosis and importance of compliance with the treatment plan as well as documenting on the day of the visit.

## 2021-04-01 NOTE — LETTER
4/1/2021 Patient: Candi Canales YOB: 1960 Date of Visit: 4/1/2021 Tonny Scott MD 
[address] [place] [address] Via In [place] Dear Tonny Scott MD, Thank you for referring Mr. Luis Hayes to [address] [place] [address] for evaluation. My notes for this consultation are attached. If you have questions, please do not hesitate to call me. I look forward to following your patient along with you. Sincerely, Marine Reilly MD 
 
 Freddie is an 8 yo boy with hx of G6PD deficiency who preseents with fever 1 day. Over the past two weeks patient reports that he has had URI symptoms and that he continues to have a dry cough and nasal congestion. Day of presentation patient spike fever to 102.9 prompting visit to OSH ED.  Denies headaches, changes in vision, nausea, vomiting, change in bowel habits, dysuria, flank pain.                                                                   PMH: G6PD Deficiency - Baseline Hgb 11  PSH: Splenoplexy  Hospitalizations: 2 years ago for bloody urine - Triggered by Colds/Fever  Medications: Folic Acid  Allergies: Blueberries, Avoids foods/products that would exacerbate G6PD  Immunizations: Up to date.   PMD: Dr. Jimenez    OSH ED: Febrile to 102.9, found to be positive for Influenza A. Developed "bloody urine" - patient transfered to [facility] for further management.   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 Over the past two weeks patient reports that he has had URI symptoms and that he continues to have a dry cough and nasal congestion. Day of presentation patient spike fever to 102.9 prompting visit to OSH ED.  Denies headaches, changes in vision, nausea, vomiting, change in bowel habits, dysuria, flank pain.                                                                   PMH: G6PD Deficiency - Baseline Hgb 11  PSH: Splenoplexy  Hospitalizations: 2 years ago for bloody urine - Triggered by Colds/Fever  Medications: Folic Acid  Allergies: Blueberries, Avoids foods/products that would exacerbate G6PD  Immunizations: Up to date.   PMD: Dr. Jimenez    OSH ED: Febrile to 102.9, found to be positive for Influenza A. Developed "bloody urine" - patient transfered to [facility] for further management.   Labs from OSH (Hard copies in Chart)    CBC 13.4>10.2/30.1<144  N 80% Bands 5 %  , Retic Count 9.5%,   /4.0 99/22 21/0.7<119 Calcium 9 Protein 7 Albumin 4.4 Total Bilirubin 5.7,   CRP 3.4,   U/A + 4 Urobilinogen

## 2021-04-07 ENCOUNTER — OFFICE VISIT (OUTPATIENT)
Dept: CARDIOLOGY CLINIC | Age: 61
End: 2021-04-07

## 2021-04-07 DIAGNOSIS — Z95.0 CARDIAC PACEMAKER IN SITU: Primary | ICD-10-CM

## 2021-04-07 DIAGNOSIS — I49.5 SSS (SICK SINUS SYNDROME) (HCC): ICD-10-CM

## 2021-04-13 ENCOUNTER — CLINICAL SUPPORT (OUTPATIENT)
Dept: CARDIOLOGY CLINIC | Age: 61
End: 2021-04-13

## 2021-04-13 ENCOUNTER — TELEPHONE (OUTPATIENT)
Dept: CARDIOLOGY CLINIC | Age: 61
End: 2021-04-13

## 2021-04-13 ENCOUNTER — OFFICE VISIT (OUTPATIENT)
Dept: CARDIOLOGY CLINIC | Age: 61
End: 2021-04-13
Payer: COMMERCIAL

## 2021-04-13 VITALS
BODY MASS INDEX: 38.43 KG/M2 | HEIGHT: 73 IN | HEART RATE: 92 BPM | RESPIRATION RATE: 18 BRPM | WEIGHT: 290 LBS | OXYGEN SATURATION: 98 % | SYSTOLIC BLOOD PRESSURE: 98 MMHG | DIASTOLIC BLOOD PRESSURE: 64 MMHG

## 2021-04-13 DIAGNOSIS — Z95.0 CARDIAC PACEMAKER IN SITU: Primary | ICD-10-CM

## 2021-04-13 DIAGNOSIS — I49.5 TACHY-BRADY SYNDROME (HCC): Chronic | ICD-10-CM

## 2021-04-13 DIAGNOSIS — I10 BENIGN ESSENTIAL HYPERTENSION: ICD-10-CM

## 2021-04-13 DIAGNOSIS — Z95.5 S/P CORONARY ARTERY STENT PLACEMENT: ICD-10-CM

## 2021-04-13 DIAGNOSIS — I49.5 SSS (SICK SINUS SYNDROME) (HCC): ICD-10-CM

## 2021-04-13 DIAGNOSIS — E78.2 MIXED HYPERLIPIDEMIA: ICD-10-CM

## 2021-04-13 DIAGNOSIS — I48.0 PAF (PAROXYSMAL ATRIAL FIBRILLATION) (HCC): Primary | Chronic | ICD-10-CM

## 2021-04-13 PROCEDURE — 93000 ELECTROCARDIOGRAM COMPLETE: CPT | Performed by: INTERNAL MEDICINE

## 2021-04-13 PROCEDURE — 93280 PM DEVICE PROGR EVAL DUAL: CPT | Performed by: INTERNAL MEDICINE

## 2021-04-13 PROCEDURE — 99024 POSTOP FOLLOW-UP VISIT: CPT | Performed by: INTERNAL MEDICINE

## 2021-04-13 RX ORDER — AMIODARONE HYDROCHLORIDE 200 MG/1
200 TABLET ORAL 2 TIMES DAILY
Qty: 60 TAB | Refills: 0 | Status: SHIPPED | OUTPATIENT
Start: 2021-04-19 | End: 2021-05-03

## 2021-04-13 RX ORDER — AMIODARONE HYDROCHLORIDE 200 MG/1
200 TABLET ORAL 2 TIMES DAILY
Qty: 60 TAB | Refills: 0 | Status: SHIPPED | OUTPATIENT
Start: 2021-04-13 | End: 2021-04-13

## 2021-04-13 NOTE — PROGRESS NOTES
Patient feels well following pacemaker implantation. Subclavian pacemaker site approximated well, no discharge. No erythema or heat. Device interrogation WNL. Follow up as planned in 3 mo. Dr. Harriet Lunsford to discuss aflutter options today as burden is 62%.     Cee Osorio RN

## 2021-04-13 NOTE — LETTER
4/13/2021 Patient: Candi Canales YOB: 1960 Date of Visit: 4/13/2021 Tonny Scott MD 
[address] [place] [address] Via In [employer] Dear Tonny Scott MD, Thank you for referring Mr. Luis Hayes to [address] [place] [address] for evaluation. My notes for this consultation are attached. If you have questions, please do not hesitate to call me. I look forward to following your patient along with you. Sincerely, Marine Reilly MD

## 2021-04-13 NOTE — PROGRESS NOTES
Chief Complaint   Patient presents with    Irregular Heart Beat     SSS, pacemaker - add on from PM visit    Shortness of Breath     with activity/walking; mild    Dizziness     lightheadedness, denies dizziness; also says saw black spots in vision on Sat    Foot Swelling     mild, bilateral; states always has some mild swelling     1. Have you been to the ER, urgent care clinic since your last visit? Hospitalized since your last visit? No    2. Have you seen or consulted any other health care providers outside of the [address] [place] [address] since your last visit? Include any pap smears or colon screening.  No

## 2021-04-13 NOTE — TELEPHONE ENCOUNTER
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 4-13-20 - fmla forms received gave to [facility].

## 2021-04-13 NOTE — PROGRESS NOTES
Subjective:      Denise Minaya is a 56 y.o. male is here for EP consult. He continues to have heart racing, sob/fatigue and le edema. Patient Active Problem List    Diagnosis Date Noted    Tachy-amina syndrome ([address] 03/15/2021    Atrial fibrillation ([address] 03/15/2021    Supracondylar fracture of distal end of femur without intracondylar extension ([address] 08/17/2020    Femur fracture ([address] 08/16/2020    Type 2 diabetes with nephropathy ([address] 12/12/2018    Severe obesity ([address] 12/12/2018    Controlled type 2 diabetes mellitus with complication, without long-term current use of insulin ([address] 12/12/2018    PAF (paroxysmal atrial fibrillation) ([address] 12/12/2018    Mixed hyperlipidemia 04/18/2012    Mitral valve disorders(424.0) 03/27/2012    Coronary artery disease 06/10/2011    S/P coronary artery stent placement 06/10/2011    Type 2 diabetes mellitus ([address] 06/10/2011    Benign essential hypertension 06/10/2011    Hypertriglyceridemia     Arthritis       Balbir Welch MD  Past Medical History:   Diagnosis Date    Arthritis     knees    Atrial fibrillation with RVR ([address]     Benign essential hypertension 6/10/2011    CAD (coronary artery disease)     Diabetes ([address]     DX  AGE 33      Hypercholesterolemia     Hypertriglyceridemia     Other ill-defined conditions(799.89)     obesity    Other ill-defined conditions(799.89)     high cholesterol    PAF (paroxysmal atrial fibrillation) ([address] 12/12/2018    SOB (shortness of breath) on exertion 5/16/2011      Past Surgical History:   Procedure Laterality Date    CARDIAC CATHETERIZATION  6/9/2011         CARDIAC CATHETERIZATION  11/1/2012         HC ANGIOSEAL VIP 6FR  6/9/2011         HX AFIB ABLATION  03/15/2021    HX ORTHOPAEDIC      left knee replacement    HX ORTHOPAEDIC      R knee replacement  2010 -Heather Posadas.     INTRACARD ECHO, THER/DX INTERVENT N/A 3/15/2021    Intracardiac Echocardiogram performed by Crystal Herrera MD at [facility] CARDIAC CATH LAB    [place] CARDIAC SURG PROCEDURE UNLIST      STENT RCA      [place] DRUG-ELUTING STENTS, SINGLE  2011         MN EPHYS EVL TRNSPTL TX ATRIAL FIB ISOLAT PULM VEIN N/A 3/15/2021    ABLATION A-FIB  W COMPLETE EP STUDY performed by Mikayla Gabriel MD at [facility] CARDIAC [facility]ATH LAB    MN INS NEW/RPLCMT PRM PM W/TRANSV ELTRD ATRIAL&VENT N/A 3/22/2021    INSERT PPM DUAL performed by Mikayla Gabriel MD at [facility] CARDIAC CATH LAB    MN INTRACARDIAC ELECTROPHYSIOLOGIC 3D MAPPING N/A 3/15/2021    Ep 3d Mapping performed by Mikayla Gabriel MD at [facility] CARDIAC CATH LAB     Allergies   Allergen Reactions    Lisinopril Cough    Losartan Other (comments)     Hypotension        Family History   Problem Relation Age of Onset    Diabetes Mother     Hypertension Mother    [facility] Elevated Lipids Mother     Cancer Mother     Diabetes Father     Heart Disease Father     Hypertension Father     Elevated Lipids Father     negative for cardiac disease  Social History     Socioeconomic History    Marital status:      Spouse name: Not on file    Number of children: Not on file    Years of education: Not on file    Highest education level: Not on file   Tobacco Use    Smoking status: Former Smoker     Types: Cigarettes     Quit date: 11/10/1989     Years since quittin.4    Smokeless tobacco: Never Used   Substance and Sexual Activity    Alcohol use: Yes     Frequency: Monthly or less     Comment: social  DRINKS ONE WEEKEND A MONTH/\"BIG IMPROVEMENT\"    Drug use: No    Sexual activity: Yes     Partners: Female     Birth control/protection: None   Social History Narrative    , 3 children, twin sons 25 and one son 15. Works at [employer] for the last 9 years, used to work a [employer]     Current Outpatient Medications   Medication Sig    [START ON 2021] amiodarone (CORDARONE) 200 mg tablet Take 1 Tab by mouth two (2) times a day for 30 days.     metoprolol tartrate (LOPRESSOR) 25 mg tablet Take 1 Tab by mouth two (2) times a day for 90 days.  apixaban (ELIQUIS) 5 mg tablet Take 1 Tab by mouth two (2) times a day.  ACETAMINOPHEN PO Take 500 mg by mouth as needed. 1-2 tabs prn as needed    semaglutide (Ozempic) 1 mg/dose (2 mg/1.5 mL) sub-q pen 1 MG BY SUBCUTANEOUS ROUTE EVERY SEVEN (7) DAYS.  ibuprofen (MOTRIN) 800 mg tablet Take 1 Tab by mouth every eight (8) hours as needed for Pain.  furosemide (LASIX) 40 mg tablet 1-2 tabs po daily as needed for fluid    glipiZIDE (GLUCOTROL) 10 mg tablet TAKE 1 TABLET BY MOUTH TWO (2) TIMES A DAY. FOR DIABETES    metFORMIN (GLUCOPHAGE) 1,000 mg tablet Take 1 Tab by mouth two (2) times daily (with meals).  gemfibroziL (LOPID) 600 mg tablet Take 1 Tab by mouth two (2) times daily as needed (cholesterol). (Patient taking differently: Take 600 mg by mouth two (2) times a day.)    magnesium oxide (MAG-OX) 400 mg tablet TAKE 2 TABLETS BY MOUTH EVERY DAY    empagliflozin (Jardiance) 10 mg tablet Take 1 Tab by mouth daily. For diabetes    rosuvastatin (CRESTOR) 20 mg tablet TAKE 1 TABLET BY MOUTH DAILY FOR CHOLESTEROL    nitroglycerin (NITROSTAT) 0.4 mg SL tablet 1 Tab by SubLINGual route every five (5) minutes as needed for Chest Pain (call 911 if CP/ SOB not relieved by 3 tabs).  aspirin delayed-release 81 mg tablet Take  by mouth daily.  omega-3 fatty acids-vitamin e (FISH OIL) 1,000 mg Cap Take 2 Caps by mouth two (2) times a day. No current facility-administered medications for this visit. Vitals:    04/13/21 0951 04/13/21 1005 04/13/21 1006   BP: 118/70 100/60 98/64   Pulse: 74 93 92   Resp: 18     SpO2: 98%     Weight: 290 lb (131.5 kg)     Height: 6' 1\" (1.854 m)         I have reviewed the nurses notes, vitals, problem list, allergy list, medical history, family, social history and medications. Review of Symptoms:    General: Pt denies excessive weight gain or loss.  Pt is able to conduct ADL's  HEENT: Denies blurred vision, headaches, hearing loss, epistaxis and difficulty swallowing. Respiratory: +sob Denies cough, congestion, wheezing or stridor. Cardiovascular: +palpitations, edema, Denies precordial pain or PND  Gastrointestinal: Denies poor appetite, indigestion, abdominal pain or blood in stool  Genitourinary: Denies hematuria, dysuria, increased urinary frequency  Musculoskeletal: Denies joint pain or swelling from muscles or joints  Neurologic: Denies tremor, paresthesias, headache, or sensory motor disturbance  Psychiatric: Denies confusion, insomnia, depression  Integumentray: Denies rash, itching or ulcers. Hematologic: Denies easy bruising, bleeding    Physical Exam:      General: Well developed, in no acute distress. HEENT: Eyes - PERRL, no jvd  Heart:  Normal S1/S2 negative S3 or S4. irr irr, no murmur, gallop or rub. Respiratory: Clear bilaterally x 4, no wheezing or rales  Abdomen:   Soft, non-tender, bowel sounds are active. Extremities:  1+ b/l le edema, normal cap refill, no cyanosis. Musculoskeletal: No clubbing  Neuro: A&Ox3, speech clear, gait stable. Skin: Skin color is normal. No rashes or lesions.  Non diaphoretic, no ulcers or subcutaneous nodule  Vascular: 2+ pulses symmetric in all extremities  Psych - judgement intact and orientation is wnl     Cardiographics    Ekg: afib, v paced beats    Results for orders placed or performed during the hospital encounter of 08/16/20   EKG, 12 LEAD, INITIAL   Result Value Ref Range    Ventricular Rate 63 BPM    Atrial Rate 63 BPM    P-R Interval 190 ms    QRS Duration 92 ms    Q-T Interval 408 ms    QTC Calculation (Bezet) 417 ms    Calculated P Axis 45 degrees    Calculated R Axis -14 degrees    Calculated T Axis -5 degrees    Diagnosis       Normal sinus rhythm  Nonspecific T wave abnormality  When compared with ECG of 05-DEC-2018 04:40,  No significant change  Confirmed by Roly Zuniga M.D., Oscar Patel ([id number]) on 8/16/2020 10:46:56 AM           Lab Results   Component Value Date/Time    WBC 6.2 03/04/2021 11:57 AM    HGB 12.2 (L) 03/04/2021 11:57 AM    HCT 37.6 03/04/2021 11:57 AM    PLATELET 911 93/46/9015 11:57 AM    MCV 80 03/04/2021 11:57 AM      Lab Results   Component Value Date/Time    Sodium 143 03/04/2021 11:57 AM    Potassium 4.5 03/04/2021 11:57 AM    Chloride 106 03/04/2021 11:57 AM    CO2 22 03/04/2021 11:57 AM    Anion gap 13 08/18/2020 03:03 AM    Glucose 150 (H) 03/04/2021 11:57 AM    BUN 16 03/04/2021 11:57 AM    Creatinine 0.84 03/04/2021 11:57 AM    BUN/Creatinine ratio 19 03/04/2021 11:57 AM    GFR est  03/04/2021 11:57 AM    GFR est non-AA 94 03/04/2021 11:57 AM    Calcium 8.8 03/04/2021 11:57 AM    Bilirubin, total 0.3 03/04/2021 11:57 AM    Alk. phosphatase 127 (H) 03/04/2021 11:57 AM    Protein, total 6.5 03/04/2021 11:57 AM    Albumin 3.8 03/04/2021 11:57 AM    Globulin 3.5 08/16/2020 02:48 AM    A-G Ratio 1.4 03/04/2021 11:57 AM    ALT (SGPT) 4 03/04/2021 11:57 AM         Assessment:     Assessment:        ICD-10-CM ICD-9-CM    1. PAF (paroxysmal atrial fibrillation) ([facility])  I48.0 427.31 AMB POC EKG ROUTINE W/ 12 LEADS, INTER & REP      amiodarone (CORDARONE) 200 mg tablet      DISCONTINUED: amiodarone (CORDARONE) 200 mg tablet   2. Tachy-amina syndrome ([facility])  I49.5 427.81 AMB POC EKG ROUTINE W/ 12 LEADS, INTER & REP      amiodarone (CORDARONE) 200 mg tablet      DISCONTINUED: amiodarone (CORDARONE) 200 mg tablet   3. Benign essential hypertension  I10 401.1 amiodarone (CORDARONE) 200 mg tablet      DISCONTINUED: amiodarone (CORDARONE) 200 mg tablet   4. Mixed hyperlipidemia  E78.2 272.2 amiodarone (CORDARONE) 200 mg tablet      DISCONTINUED: amiodarone (CORDARONE) 200 mg tablet   5.  S/P coronary artery stent placement  Z95.5 V45.82 amiodarone (CORDARONE) 200 mg tablet      DISCONTINUED: amiodarone (CORDARONE) 200 mg tablet     Orders Placed This Encounter    AMB POC EKG ROUTINE W/ 12 LEADS, INTER & REP     Order Specific Question:   Reason for Exam:     Answer:   routine    DISCONTD: amiodarone (CORDARONE) 200 mg tablet     Sig: Take 1 Tab by mouth two (2) times a day for 30 days. Dispense:  60 Tab     Refill:  0    amiodarone (CORDARONE) 200 mg tablet     Sig: Take 1 Tab by mouth two (2) times a day for 30 days. Dispense:  60 Tab     Refill:  0        Plan:   Ninfa San is in AF about 62% of the time (despite being on flec and bb and sp PVI). He is stable and compliant with his oac. We will dc flecainide and start amio on Monday (after flec washout). I will refer him to  [facility] for consideration of hybrid AF abl. He is on med rx for htn and hyperlipidemia. I discussed the risks/benefits/alternatives of the procedure with the patient. Risks include (but are not limited to) bleeding, heart block, infection, cva/mi/tamponade/esophageal perforation/pv stenosis/death. The patient understands that there is a 6-9% major complication rate and agrees to proceed. He is A paced 7% for sss and v paced 8% for high grade av block. During this visit,  the patient and I had a comprehensive discussion of arrhythmia management using principles of shared decision making. This included a discussion of anti-arrhythmic medications including class I agents (e.g. flecainide) and class III agents (e.g. amiodarone, sotalol etc) and both class II and IV agents (beta-blockers and calcium channel blockers). We reviewed the potentially life-threatening side effects of these medications, including (but not limited to) fatal tachyarrhythmias, pulmonary toxicity, liver toxicity and thyroid disorders. We also reviewed the requisite monitoring required of some of these medications. In addition, we reviewed ablative therapy, including the potential benefits and risks of catheter ablation. These risks include death, myocardial infarction, stroke, cardiac perforation, vascular injury, and other less severe complications.  The patient demonstrated a clear understanding of these issues during out discussion. Our plans, determined together after thorough consideration, are outlined else where in this note. Continue medical management for sss, af and htn. Thank you for allowing me to participate in Renee Wilson 's care. Jackie Hartman MD, [facility] [place]    On this date 04/13/2021 I have spent 50 minutes reviewing previous notes, test results and face to face with the patient discussing the diagnosis and importance of compliance with the treatment plan as well as documenting on the day of the visit.

## 2021-04-16 RX ORDER — ROSUVASTATIN CALCIUM 20 MG/1
TABLET, COATED ORAL
Qty: 90 TAB | Refills: 3 | Status: SHIPPED | OUTPATIENT
Start: 2021-04-16 | End: 2022-06-15

## 2021-04-23 ENCOUNTER — OFFICE VISIT (OUTPATIENT)
Dept: CARDIOTHORACIC SURGERY | Age: 61
End: 2021-04-23
Payer: COMMERCIAL

## 2021-04-23 VITALS
DIASTOLIC BLOOD PRESSURE: 82 MMHG | SYSTOLIC BLOOD PRESSURE: 140 MMHG | HEART RATE: 70 BPM | TEMPERATURE: 97 F | OXYGEN SATURATION: 97 % | RESPIRATION RATE: 18 BRPM

## 2021-04-23 DIAGNOSIS — I48.0 PAF (PAROXYSMAL ATRIAL FIBRILLATION) (HCC): Primary | ICD-10-CM

## 2021-04-23 PROCEDURE — 99204 OFFICE O/P NEW MOD 45 MIN: CPT | Performed by: THORACIC SURGERY (CARDIOTHORACIC VASCULAR SURGERY)

## 2021-04-23 NOTE — PROGRESS NOTES
CSS   Consult    Subjective:      Deborah Guzmán is a 56 y.o. BLACK/ male who was referred for evaluation of hybrid ablation by Dr. Jeromy Parham. Pt with PMH significant for a-fib on Eliquis s/p ablation in March and prior PVI, SSS s/p PPM 3/22/21, CAD w/ prior stents several years ago, DM, HTN, HLD, femur fx and repair last year, and arthritis. Pt reports an episode of dizziness and syncope while at work in February. He was placed on a Holter monitor which revealed a-fib w/ RVR, started on dilt and Eliquis, had already been on BB therapy. He then developed SSS, medications were adjusted but did not resolve, therefore had PPM insertion on 3/22/21. In terms of his a-fib, he has failed rate control with medications and PVI, had cryo ablation in March by Dr. Jeromy Parham, but remains in a-fib a majority of the time. Pt reports some fatigue, no longer has any dizziness since pacer inserted. He had reported SOB previously per records but denies any SOB while in clinic today. He denies any palpitations even with a-fib, no CP, SOB/KRAMER or orthopnea. He has chronic lower ext edema, worse at end of the day. Pt has a remote history of tobacco abuse, quit in 1989, drinks alcohol socially only 1-2 times per month. He has been in and out of work since his femur fracture last year and now due to his prior dizziness/syncopal episode. He is otherwise independent in his ADL's. Cardiac Testing    Cardiac catheterization: n/a     ECHO 3/19/21:   · LV: Estimated LVEF is 55 - 60%. Normal cavity size, wall thickness, systolic function (ejection fraction normal) and diastolic function. Wall motion: normal.  · LA: Severely dilated left atrium. · MV: Mild mitral valve regurgitation is present. · AO: Mild ascending aorta dilatation. Ascending aorta diameter = 4.2 cm. Echo Findings    Left Ventricle Normal cavity size, wall thickness, systolic function (ejection fraction normal) and diastolic function.  Wall motion: normal. The estimated EF is 55 - 60%. Wall Scoring The left ventricular wall motion is normal.            Left Atrium Severely dilated left atrium. Right Ventricle Normal cavity size and global systolic function. Right Atrium Normal cavity size. Aortic Valve Trileaflet valve structure, no stenosis and no regurgitation. Aortic valve sclerosis. Mitral Valve Normal valve structure and no stenosis. Mild regurgitation. Tricuspid Valve Normal valve structure and no stenosis. Trace regurgitation. Pulmonic Valve Normal valve structure, no stenosis and no regurgitation. Aorta Normal aortic root. Mildly dilated ascending aorta; diameter is 4.2 cm. Pulmonary Artery Pulmonary hypertension not suggested by Doppler findings. IVC/Hepatic Veins Normal structure. Normal central venous pressure (3 mmHg); IVC diameter is less than 21 mm and collapses more than 50% with respiration. Pericardium No evidence of pericardial effusion. Past Medical History:   Diagnosis Date    Arthritis     knees    [facility] RVR [facility]     Benign essential hypertension 6/10/2011    CAD (coronary artery disease)     Diabetes ([facility]     DX  AGE 33      Hypercholesterolemia     Hypertriglyceridemia     Obesity     PAF (paroxysmal atrial fibrillation) ([facility] 12/12/2018     Past Surgical History:   Procedure Laterality Date    CARDIAC CATHETERIZATION  6/9/2011         CARDIAC CATHETERIZATION  11/1/2012         HC ANGIOSEAL VIP 6FR  6/9/2011         HX AFIB ABLATION  03/15/2021    HX ORTHOPAEDIC      left knee replacement    HX ORTHOPAEDIC      R knee replacement  2010 -Denis Jordan.     INTRACARD ECHO, THER/DX INTERVENT N/A 3/15/2021    Intracardiac Echocardiogram performed by Jessica Garcia MD at [facility] CARDIAC CATH LAB    GA CARDIAC SURG PROCEDURE UNLIST      STENT RCA  6/11    GA DRUG-ELUTING STENTS, SINGLE  6/9/2011         GA EPHYS EVL TRNSPTL TX ATRIAL FIB ISOLAT PULM VEIN N/A 3/15/2021    ABLATION A-FIB W COMPLETE EP STUDY performed by Chad Thompson MD at [facility] CARDIAC CATH LAB    DE INS NEW/RPLCMT PRM PM W/TRANSV ELTRD ATRIAL&VENT N/A 3/22/2021    INSERT PPM DUAL performed by Chad Thompson MD at [facility] CARDIAC CATH LAB    DE INTRACARDIAC ELECTROPHYSIOLOGIC 3D MAPPING N/A 3/15/2021    Ep 3d Mapping performed by Chad Thompson MD at [facility] CARDIAC CATH LAB      Social History     Tobacco Use    Smoking status: Former Smoker     Types: Cigarettes     Quit date: 11/10/1989     Years since quittin.4    Smokeless tobacco: Never Used   Substance Use Topics    Alcohol use: Yes     Frequency: Monthly or less     Comment: social  DRINKS ONE WEEKEND A MONTH/\"BIG IMPROVEMENT\"      Family History   Problem Relation Age of Onset    Diabetes Mother     Hypertension Mother     Elevated Lipids Mother     Cancer Mother     Diabetes Father     Heart Disease Father     Hypertension Father     Elevated Lipids Father      Prior to Admission medications    Medication Sig Start Date End Date Taking? Authorizing Provider   rosuvastatin (CRESTOR) 20 mg tablet TAKE 1 TABLET BY MOUTH EVERY DAY FOR CHOLESTEROL 21  Yes Vangie Pinzon MD   amiodarone (CORDARONE) 200 mg tablet Take 1 Tab by mouth two (2) times a day for 30 days. 21 Yes Chad Thompson MD   metoprolol tartrate (LOPRESSOR) 25 mg tablet Take 1 Tab by mouth two (2) times a day for 90 days. 21 Yes Chad Thompson MD   apixaban (ELIQUIS) 5 mg tablet Take 1 Tab by mouth two (2) times a day. 21  Yes Emomaria Granado., ROSA M   semaglutide (Ozempic) 1 mg/dose (2 mg/1.5 mL) sub-q pen 1 MG BY SUBCUTANEOUS ROUTE EVERY SEVEN (7) DAYS. 21  Yes Vangie Pinzon MD   furosemide (LASIX) 40 mg tablet 1-2 tabs po daily as needed for fluid 21  Yes Vangie Pinzon MD   glipiZIDE (GLUCOTROL) 10 mg tablet TAKE 1 TABLET BY MOUTH TWO (2) TIMES A DAY.  FOR DIABETES 20  Yes Vangie Pinzon MD   metFORMIN (GLUCOPHAGE) 1,000 mg tablet Take 1 Tab by mouth two (2) times daily (with meals). 8/28/20  Yes Gurvinder Mota MD   gemfibroziL (LOPID) 600 mg tablet Take 1 Tab by mouth two (2) times daily as needed (cholesterol). Patient taking differently: Take 600 mg by mouth two (2) times a day. 8/28/20  Yes Gurvinder Mota MD   magnesium oxide (MAG-OX) 400 mg tablet TAKE 2 TABLETS BY MOUTH EVERY DAY 8/27/20  Yes Gurvinder Mota MD   empagliflozin (Jardiance) 10 mg tablet Take 1 Tab by mouth daily. For diabetes 7/28/20  Yes Gurvinder Mota MD   aspirin delayed-release 81 mg tablet Take  by mouth daily. Yes Provider, Historical   omega-3 fatty acids-vitamin e (FISH OIL) 1,000 mg Cap Take 2 Caps by mouth two (2) times a day. 7/13/11  Yes Gurvinder Mota MD   ACETAMINOPHEN PO Take 500 mg by mouth as needed. 1-2 tabs prn as needed    Provider, Historical   ibuprofen (MOTRIN) 800 mg tablet Take 1 Tab by mouth every eight (8) hours as needed for Pain. 1/18/21   Gurvinder Mota MD   nitroglycerin (NITROSTAT) 0.4 mg SL tablet 1 Tab by SubLINGual route every five (5) minutes as needed for Chest Pain (call 911 if CP/ SOB not relieved by 3 tabs). 7/29/19   Gurvinder Mota MD       Allergies   Allergen Reactions    Lisinopril Cough    Losartan Other (comments)     Hypotension         Review of Systems:   Consititutional: Denies fever or chills. Eyes:  Denies use of glasses or vision problems (cataracts). ENT:  Denies hearing or swallowing difficulty. CV: Denies CP, claudication  Resp: Denies dyspnea, productive cough. : Denies dialysis or kidney problems. GI: Denies ulcers, esophageal strictures, liver problems. M/S: Denies joint or bone problems, or implanted artificial hardware. Skin: Denies varicose veins  Neuro: Denies strokes, or TIAs. Psych: Denies anxiety or depression. Endocrine: Denies thyroid problems   Heme/Lymphatic: Denies easy bruising or lymphedema.      Objective:     Physical Exam:    Visit Vitals  BP (!) 140/82 (BP 1 Location: Left arm, BP Patient Position: Sitting)   Pulse 70   Temp 97 °F (36.1 °C) (Oral)   Resp 18   SpO2 97%     General appearance: alert, cooperative, no distress, appears stated age  Head: Normocephalic, without obvious abnormality, atraumatic  Eyes: conjunctivae/corneas clear. PERRL, EOM's intact. Neck: supple, symmetrical, trachea midline, no adenopathy, thyroid: not enlarged, symmetric, no tenderness/mass/nodules, no carotid bruit and no JVD  Lungs: clear to auscultation bilaterally  Heart: regular rate and rhythm, S1, S2 normal, no murmur, click, rub or gallop  Abdomen: soft, non-tender. Bowel sounds normal. No masses,  no organomegaly  Extremities: extremities normal, atraumatic, no cyanosis or edema  Neurologic: Grossly normal    Assessment:     The encounter diagnosis was PAF (paroxysmal atrial fibrillation) ([address] Plan:   1. PAF: remains in a-fib >60% of time despite therapy. Dr. Jennie Yepez to discuss surgical hybrid ablation, will plan on 5/10/21. On amio, BB and Eliquis. Hold Eliquis 48 hrs prior to surgery, with lovenox to be given first day off Eliquis. 2. SSS s/p PPM: follows w/ EP     3. HTN: on metoprolol, lasix    4. HLD/high triglycerides: on Crestor, gemfibrozil     5. DM: on metformin, Jardiance, glipizide, Ozempic weekly injections     6. Hx of CAD w/ stents several years ago: on ASA, statin, BB therapy     Signed By: Beth Peters NP     April 23, 2021      Addendum:  Pt seen and examined. Agree with ROSA M Shields note. He is a good candidate for a hybrid ablation having failed one ablation and being in afib approximately 60% of the time. I discussed the risks and benefits with him and he agreed to proceed. We have him tentatively scheduled for May 10.

## 2021-05-03 DIAGNOSIS — I48.0 PAF (PAROXYSMAL ATRIAL FIBRILLATION) (HCC): Chronic | ICD-10-CM

## 2021-05-03 DIAGNOSIS — I10 BENIGN ESSENTIAL HYPERTENSION: ICD-10-CM

## 2021-05-03 DIAGNOSIS — I49.5 TACHY-BRADY SYNDROME (HCC): Chronic | ICD-10-CM

## 2021-05-03 DIAGNOSIS — E78.2 MIXED HYPERLIPIDEMIA: ICD-10-CM

## 2021-05-03 DIAGNOSIS — Z95.5 S/P CORONARY ARTERY STENT PLACEMENT: ICD-10-CM

## 2021-05-03 RX ORDER — AMIODARONE HYDROCHLORIDE 200 MG/1
200 TABLET ORAL 2 TIMES DAILY
Qty: 60 TAB | Refills: 0 | Status: SHIPPED | OUTPATIENT
Start: 2021-05-03 | End: 2021-05-25

## 2021-05-04 ENCOUNTER — HOSPITAL ENCOUNTER (OUTPATIENT)
Dept: PREADMISSION TESTING | Age: 61
Discharge: HOME | End: 2021-05-04
Attending: THORACIC SURGERY (CARDIOTHORACIC VASCULAR SURGERY)
Payer: COMMERCIAL

## 2021-05-04 ENCOUNTER — HOSPITAL ENCOUNTER (OUTPATIENT)
Dept: GENERAL RADIOLOGY | Age: 61
Discharge: HOME | End: 2021-05-04
Attending: NURSE PRACTITIONER
Payer: COMMERCIAL

## 2021-05-04 VITALS
RESPIRATION RATE: 16 BRPM | TEMPERATURE: 98.5 F | HEART RATE: 72 BPM | SYSTOLIC BLOOD PRESSURE: 124 MMHG | HEIGHT: 73 IN | DIASTOLIC BLOOD PRESSURE: 50 MMHG | BODY MASS INDEX: 39.36 KG/M2 | WEIGHT: 296.96 LBS | OXYGEN SATURATION: 98 %

## 2021-05-04 DIAGNOSIS — I48.0 PAROXYSMAL ATRIAL FIBRILLATION (HCC): Primary | Chronic | ICD-10-CM

## 2021-05-04 LAB
ALBUMIN SERPL-MCNC: 3.1 G/DL (ref 3.5–5)
ALBUMIN/GLOB SERPL: 0.9 {RATIO} (ref 1.1–2.2)
ALP SERPL-CCNC: 73 U/L (ref 45–117)
ALT SERPL-CCNC: 12 U/L (ref 12–78)
ANION GAP SERPL CALC-SCNC: 5 MMOL/L (ref 5–15)
APPEARANCE UR: CLEAR
APTT PPP: 25.4 SEC (ref 22.1–31)
ARTERIAL PATENCY WRIST A: YES
AST SERPL-CCNC: 5 U/L (ref 15–37)
ATRIAL RATE: 70 BPM
BACTERIA URNS QL MICRO: NEGATIVE /HPF
BASE DEFICIT BLD-SCNC: 1 MMOL/L
BASOPHILS # BLD: 0.1 K/UL (ref 0–0.1)
BASOPHILS NFR BLD: 1 % (ref 0–1)
BDY SITE: NORMAL
BILIRUB SERPL-MCNC: 0.4 MG/DL (ref 0.2–1)
BILIRUB UR QL: NEGATIVE
BUN SERPL-MCNC: 19 MG/DL (ref 6–20)
BUN/CREAT SERPL: 20 (ref 12–20)
CA-I BLD-SCNC: 1.28 MMOL/L (ref 1.12–1.32)
CALCIUM SERPL-MCNC: 8.6 MG/DL (ref 8.5–10.1)
CALCULATED P AXIS, ECG09: 0 DEGREES
CALCULATED R AXIS, ECG10: -7 DEGREES
CALCULATED T AXIS, ECG11: -2 DEGREES
CHLORIDE SERPL-SCNC: 108 MMOL/L (ref 97–108)
CO2 SERPL-SCNC: 26 MMOL/L (ref 21–32)
COLOR UR: ABNORMAL
CREAT SERPL-MCNC: 0.94 MG/DL (ref 0.7–1.3)
DIAGNOSIS, 93000: NORMAL
DIFFERENTIAL METHOD BLD: ABNORMAL
EOSINOPHIL # BLD: 0.2 K/UL (ref 0–0.4)
EOSINOPHIL NFR BLD: 3 % (ref 0–7)
EPITH CASTS URNS QL MICRO: ABNORMAL /LPF
ERYTHROCYTE [DISTWIDTH] IN BLOOD BY AUTOMATED COUNT: 15.6 % (ref 11.5–14.5)
EST. AVERAGE GLUCOSE BLD GHB EST-MCNC: 160 MG/DL
GAS FLOW.O2 O2 DELIVERY SYS: NORMAL L/MIN
GLOBULIN SER CALC-MCNC: 3.5 G/DL (ref 2–4)
GLUCOSE SERPL-MCNC: 174 MG/DL (ref 65–100)
GLUCOSE UR STRIP.AUTO-MCNC: >1000 MG/DL
HBA1C MFR BLD: 7.2 % (ref 4–5.6)
HCO3 BLD-SCNC: 23.4 MMOL/L (ref 22–26)
HCT VFR BLD AUTO: 35.8 % (ref 36.6–50.3)
HGB BLD-MCNC: 11.1 G/DL (ref 12.1–17)
HGB UR QL STRIP: ABNORMAL
HISTORY CHECKED?,CKHIST: NORMAL
HYALINE CASTS URNS QL MICRO: ABNORMAL /LPF (ref 0–5)
IMM GRANULOCYTES # BLD AUTO: 0 K/UL (ref 0–0.04)
IMM GRANULOCYTES NFR BLD AUTO: 0 % (ref 0–0.5)
INR PPP: 1.1 (ref 0.9–1.1)
KETONES UR QL STRIP.AUTO: NEGATIVE MG/DL
LEUKOCYTE ESTERASE UR QL STRIP.AUTO: NEGATIVE
LYMPHOCYTES # BLD: 1.6 K/UL (ref 0.8–3.5)
LYMPHOCYTES NFR BLD: 24 % (ref 12–49)
MAGNESIUM SERPL-MCNC: 2 MG/DL (ref 1.6–2.4)
MCH RBC QN AUTO: 25.9 PG (ref 26–34)
MCHC RBC AUTO-ENTMCNC: 31 G/DL (ref 30–36.5)
MCV RBC AUTO: 83.4 FL (ref 80–99)
MONOCYTES # BLD: 0.4 K/UL (ref 0–1)
MONOCYTES NFR BLD: 6 % (ref 5–13)
NEUTS SEG # BLD: 4.4 K/UL (ref 1.8–8)
NEUTS SEG NFR BLD: 66 % (ref 32–75)
NITRITE UR QL STRIP.AUTO: NEGATIVE
NRBC # BLD: 0 K/UL (ref 0–0.01)
NRBC BLD-RTO: 0 PER 100 WBC
P-R INTERVAL, ECG05: 134 MS
PCO2 BLD: 36.9 MMHG (ref 35–45)
PH BLD: 7.41 [PH] (ref 7.35–7.45)
PH UR STRIP: 5.5 [PH] (ref 5–8)
PLATELET # BLD AUTO: 277 K/UL (ref 150–400)
PMV BLD AUTO: 9.6 FL (ref 8.9–12.9)
PO2 BLD: 83 MMHG (ref 80–100)
POTASSIUM SERPL-SCNC: 3.8 MMOL/L (ref 3.5–5.1)
PROT SERPL-MCNC: 6.6 G/DL (ref 6.4–8.2)
PROT UR STRIP-MCNC: 100 MG/DL
PROTHROMBIN TIME: 11.4 SEC (ref 9–11.1)
Q-T INTERVAL, ECG07: 386 MS
QRS DURATION, ECG06: 94 MS
QTC CALCULATION (BEZET), ECG08: 416 MS
RBC # BLD AUTO: 4.29 M/UL (ref 4.1–5.7)
RBC #/AREA URNS HPF: ABNORMAL /HPF (ref 0–5)
SAO2 % BLD: 96 % (ref 92–97)
SODIUM SERPL-SCNC: 139 MMOL/L (ref 136–145)
SP GR UR REFRACTOMETRY: 1.02 (ref 1–1.03)
SPECIMEN TYPE: NORMAL
THERAPEUTIC RANGE,PTTT: NORMAL SECS (ref 58–77)
TSH SERPL DL<=0.05 MIU/L-ACNC: 5.51 UIU/ML (ref 0.36–3.74)
UA: UC IF INDICATED,UAUC: ABNORMAL
UROBILINOGEN UR QL STRIP.AUTO: 0.2 EU/DL (ref 0.2–1)
VENTRICULAR RATE, ECG03: 70 BPM
WBC # BLD AUTO: 6.7 K/UL (ref 4.1–11.1)
WBC URNS QL MICRO: ABNORMAL /HPF (ref 0–4)

## 2021-05-04 PROCEDURE — 83735 ASSAY OF MAGNESIUM: CPT

## 2021-05-04 PROCEDURE — 36600 WITHDRAWAL OF ARTERIAL BLOOD: CPT

## 2021-05-04 PROCEDURE — 82803 BLOOD GASES ANY COMBINATION: CPT

## 2021-05-04 PROCEDURE — 86923 COMPATIBILITY TEST ELECTRIC: CPT

## 2021-05-04 PROCEDURE — 84443 ASSAY THYROID STIM HORMONE: CPT

## 2021-05-04 PROCEDURE — 85730 THROMBOPLASTIN TIME PARTIAL: CPT

## 2021-05-04 PROCEDURE — 71046 X-RAY EXAM CHEST 2 VIEWS: CPT

## 2021-05-04 PROCEDURE — 85025 COMPLETE CBC W/AUTO DIFF WBC: CPT

## 2021-05-04 PROCEDURE — 36415 COLL VENOUS BLD VENIPUNCTURE: CPT

## 2021-05-04 PROCEDURE — 93005 ELECTROCARDIOGRAM TRACING: CPT

## 2021-05-04 PROCEDURE — 81001 URINALYSIS AUTO W/SCOPE: CPT

## 2021-05-04 PROCEDURE — 85610 PROTHROMBIN TIME: CPT

## 2021-05-04 PROCEDURE — 83036 HEMOGLOBIN GLYCOSYLATED A1C: CPT

## 2021-05-04 PROCEDURE — 80053 COMPREHEN METABOLIC PANEL: CPT

## 2021-05-04 PROCEDURE — 86900 BLOOD TYPING SEROLOGIC ABO: CPT

## 2021-05-04 RX ORDER — SODIUM CHLORIDE, SODIUM LACTATE, POTASSIUM CHLORIDE, CALCIUM CHLORIDE 600; 310; 30; 20 MG/100ML; MG/100ML; MG/100ML; MG/100ML
25 INJECTION, SOLUTION INTRAVENOUS ONCE
Status: CANCELLED | OUTPATIENT
Start: 2021-05-10 | End: 2021-05-10

## 2021-05-04 RX ORDER — CHLORHEXIDINE GLUCONATE 1.2 MG/ML
15 RINSE ORAL EVERY 12 HOURS
Qty: 420 ML | Refills: 0 | Status: SHIPPED | OUTPATIENT
Start: 2021-05-08 | End: 2021-05-11

## 2021-05-04 RX ORDER — MUPIROCIN 20 MG/G
OINTMENT TOPICAL 2 TIMES DAILY
Qty: 22 G | Refills: 0 | Status: SHIPPED | OUTPATIENT
Start: 2021-05-08 | End: 2021-05-11

## 2021-05-04 RX ORDER — ENOXAPARIN SODIUM 150 MG/ML
120 INJECTION SUBCUTANEOUS EVERY 12 HOURS
Qty: 2 SYRINGE | Refills: 0 | Status: ON HOLD | OUTPATIENT
Start: 2021-05-08 | End: 2021-05-11

## 2021-05-04 NOTE — PERIOP NOTES
[facility]  Preoperative Instructions      Surgery Date 5/10/2021       Time of Arrival 0530am  Contact# [phone]  1. On the day of your surgery, please report to the Surgical Services Registration Desk and sign in at your designated time. The Surgery Center is located to the right of the Emergency Room. 2. You must have someone with you to drive you home. You should not drive a car for 24 hours following surgery. Please make arrangements for a friend or family member to stay with you for the first 24 hours after your surgery. 3. Do not have anything to eat or drink (including water, gum, mints, coffee, juice) after midnight  5/9/2021 . ? This may not apply to medications prescribed by your physician. ?(Please note below the special instructions with medications to take the morning of your procedure.)    4. We recommend you do not drink any alcoholic beverages for 24 hours before and after your surgery. 5. Contact your surgeons office for instructions on the following medications: non-steroidal anti-inflammatory drugs (i.e. Advil, Aleve), vitamins, and supplements. (Some surgeons will want you to stop these medications prior to surgery and others may allow you to take them)  **If you are currently taking Plavix, Coumadin, Aspirin and/or other blood-thinning agents, contact your surgeon for instructions. ** Your surgeon will partner with the physician prescribing these medications to determine if it is safe to stop or if you need to continue taking. Please do not stop taking these medications without instructions from your surgeon    6. Wear comfortable clothes. Wear glasses instead of contacts. Do not bring any money or jewelry. Please bring picture ID, insurance card, and any prearranged co-payment or hospital payment. Do not wear make-up, particularly mascara the morning of your surgery. Do not wear nail polish, particularly if you are having foot /hand surgery.   Wear your hair loose or down, no ponytails, buns, vita pins or clips. All body piercings must be removed. Please shower with antibacterial soap for three consecutive days before and on the morning of surgery, but do not apply any lotions, powders or deodorants after the shower on the day of surgery. Please use a fresh towels after each shower. Please sleep in clean clothes and change bed linens the night before surgery. Please do not shave for 48 hours prior to surgery. Shaving of the face is acceptable. 7. You should understand that if you do not follow these instructions your surgery may be cancelled. If your physical condition changes (I.e. fever, cold or flu) please contact your surgeon as soon as possible. 8. It is important that you be on time. If a situation occurs where you may be late, please call [phone] (OR Holding Area). 9. If you have any questions and or problems, please call [phone] (Pre-admission Testing). 10. Your surgery time may be subject to change. You will receive a phone call the evening prior if your time changes. 11.  If having outpatient surgery, you must have someone to drive you here, stay with you during the duration of your stay, and to drive you home at time of discharge. Special Instructions: Follow all instructions given to you by your doctor. Use your incentive spirometer everyday 20x a day and bring with you to the hospital.  Covid Testing  5/6/2021 arrive between 7am - 11:45am  - [address] [facility] side - [address] [place] [address]. Follow the signs to the [facility] Covid [facility] drive up under the office overhang. (used to be [facility]) We recommend you self quarantine from time of testing til day of surgery.     MEDICATIONS:  Stop: Eliquis - Last dose 5/7/2021            Fish Oil - Last dose 5/4/2021  Start: Lovenox - Start 5/8/2021 - once in the morning/once in evening    Start 5/8/2021:  Bactroban (mupirocin) 2% nasal ointment : use as directed - Use the day of surgery and bring with you to the hospital.  Peridex (chlorhexidine) 0.12% Mouthwash : use as directed - Use the day of surgery and bring with you to the hospital.    ---NO MEDICATIONS THE MORNING OF SURGERY---    I understand a pre-operative phone call will be made to verify my surgery time. In the event that I am not available, I give permission for a message to be left on my answering service and/or with another person?   yes     ___________________      __________   _________    (Signature of Patient)             (Witness)                (Date and Time)

## 2021-05-04 NOTE — CONSULTS
Anesthesia  Consult note     Anesthesia consult requested by the surgeon for:  suitability of patient for General anesthesia for  minimal  Invasive surgery  Contraindication for KRISTIAN  Suitability for invasive lines    Subjective:      Patient:  Roman Eaton     Procedure: Procedure(s):  TRANSPERICADIAL HYBRID ABLATION WITH FLOURO      Allergies   Allergen Reactions    Lisinopril Cough    Losartan Other (comments)     Hypotension         No current facility-administered medications for this encounter. Current Outpatient Medications   Medication Sig    amiodarone (CORDARONE) 200 mg tablet TAKE 1 TAB BY MOUTH TWO (2) TIMES A DAY FOR 30 DAYS.  rosuvastatin (CRESTOR) 20 mg tablet TAKE 1 TABLET BY MOUTH EVERY DAY FOR CHOLESTEROL    metoprolol tartrate (LOPRESSOR) 25 mg tablet Take 1 Tab by mouth two (2) times a day for 90 days.  apixaban (ELIQUIS) 5 mg tablet Take 1 Tab by mouth two (2) times a day.  ACETAMINOPHEN PO Take 500 mg by mouth as needed. 1-2 tabs prn as needed    semaglutide (Ozempic) 1 mg/dose (2 mg/1.5 mL) sub-q pen 1 MG BY SUBCUTANEOUS ROUTE EVERY SEVEN (7) DAYS.  ibuprofen (MOTRIN) 800 mg tablet Take 1 Tab by mouth every eight (8) hours as needed for Pain.  furosemide (LASIX) 40 mg tablet 1-2 tabs po daily as needed for fluid    glipiZIDE (GLUCOTROL) 10 mg tablet TAKE 1 TABLET BY MOUTH TWO (2) TIMES A DAY. FOR DIABETES    metFORMIN (GLUCOPHAGE) 1,000 mg tablet Take 1 Tab by mouth two (2) times daily (with meals).  gemfibroziL (LOPID) 600 mg tablet Take 1 Tab by mouth two (2) times daily as needed (cholesterol). (Patient taking differently: Take 600 mg by mouth two (2) times a day.)    magnesium oxide (MAG-OX) 400 mg tablet TAKE 2 TABLETS BY MOUTH EVERY DAY    empagliflozin (Jardiance) 10 mg tablet Take 1 Tab by mouth daily.  For diabetes    nitroglycerin (NITROSTAT) 0.4 mg SL tablet 1 Tab by SubLINGual route every five (5) minutes as needed for Chest Pain (call 911 if CP/ SOB not relieved by 3 tabs).  aspirin delayed-release 81 mg tablet Take  by mouth daily.  omega-3 fatty acids-vitamin e (FISH OIL) 1,000 mg Cap Take 2 Caps by mouth two (2) times a day. Past Medical History:   Diagnosis Date    Arthritis     knees    [facility]rial fibrillation [facility]VR [facility] [address]     Benign essential hypertension 6/10/2011    CAD (coronary artery disease)     Diabetes ([facility] [address]     DX  AGE 33      Hypercholesterolemia     Hypertriglyceridemia     Obesity     PAF (paroxysmal atrial fibrillation) ([facility] [address] 2018       Past Surgical History:   Procedure Laterality Date    CARDIAC CATHETERIZATION  2011         CARDIAC CATHETERIZATION  2012         HC ANGIOSEAL VIP 6FR  2011         HX AFIB ABLATION  03/15/2021    HX ORTHOPAEDIC      left knee replacement    HX ORTHOPAEDIC      R knee replacement   -Fred Albright.     INTRACARD ECHO, THER/DX INTERVENT N/A 3/15/2021    Intracardiac Echocardiogram performed by Celine Dias MD at [facility] CARDIAC CATH LAB    MD CARDIAC SURG PROCEDURE UNLIST      STENT RCA      MD DRUG-ELUTING STENTS, SINGLE  2011         MD EPHYS EVL TRNSPTL TX ATRIAL FIB ISOLAT PULM VEIN N/A 3/15/2021    ABLATION A-FIB  W COMPLETE EP STUDY performed by Celine Dias MD at [facility] CARDIAC CATH LAB    MD INS NEW/RPLCMT PRM PM W/TRANSV ELTRD ATRIAL&VENT N/A 3/22/2021    INSERT PPM DUAL performed by Celine Dias MD at [facility] CARDIAC CATH LAB    MD INTRACARDIAC ELECTROPHYSIOLOGIC 3D MAPPING N/A 3/15/2021    Ep 3d Mapping performed by Celine Dias MD at [facility] CARDIAC CATH LAB       Social History     Tobacco Use    Smoking status: Former Smoker     Types: Cigarettes     Quit date: 11/10/1989     Years since quittin.5    Smokeless tobacco: Never Used   Substance Use Topics    Alcohol use: Yes     Frequency: Monthly or less     Comment: social  DRINKS ONE WEEKEND A MONTH/\"BIG IMPROVEMENT\"         Anesthetic Problems: None in the past patient denies any problems with swallowing, esophageal stricture, upper GI bleed, GI surgery. No contraindications for KRISTIAN. Objective:     Physical Exam:    No data found. No data recorded. Airway Class: 2  Heart-  Regular rate and rhythm,   s1 s2 heard  Lungs- Clear bilateral on ascultation  Abd- Soft, non tender no organomegaly  Limbs- Normal  Neuro- Normal    Labs:   Recent Results (from the past 24 hour(s))   EKG, 12 LEAD, INITIAL    Collection Time: 05/04/21  1:29 PM   Result Value Ref Range    Ventricular Rate 70 BPM    Atrial Rate 70 BPM    P-R Interval 134 ms    QRS Duration 94 ms    Q-T Interval 386 ms    QTC Calculation (Bezet) 416 ms    Calculated P Axis 0 degrees    Calculated R Axis -7 degrees    Calculated T Axis -2 degrees    Diagnosis       Normal sinus rhythm  Nonspecific T wave abnormality  When compared with ECG of 16-AUG-2020 02:57,  Nonspecific T wave abnormality, worse in Lateral leads           Assessment:       Active Problems:    * No active hospital problems. *      ASA3    Plan/Recommendations/Medical Decision Making:       Anesthetic Plan: GETA with invasive monitoring, post op ventilation and KRISTIAN monitoring  Risks and benefits of the anesthetic plan discussed and agreed upon by the patient.      No contraindications for large bore lines, KRISTIAN  Signed By: Ant Kelly MD  Date:           5/4/2021  Time:          1:50 PM

## 2021-05-04 NOTE — PERIOP NOTES
Hibiclens/Chlorhexidine    Preventing Infections Before and After  Your Surgery    IMPORTANT INSTRUCTIONS    Please read and follow these instructions carefully. If you are unable to comply with the below instructions your procedure will be cancelled. Every Night for Three (3) nights before your surgery:  1. Shower with an antibacterial soap, such as Dial, or the soap provided at your preassessment appointment. A shower is better than a bath for cleaning your skin. 2. If needed, ask someone to help you reach all areas of your body. Dont forget to clean your belly button with every shower. The night before your surgery: If you lose your Hibiclens/chlorhexidine please contact surgery center or you can purchase it at a local pharmacy  1. On the night before your surgery, shower with an antibacterial soap, such as Dial, or the soap provided at your preassessment appointment. 2. With one packet of Hibiclens/Chlorhexidine in hand, turn water off.  3. Apply Hibiclens antiseptic skin cleanser with a clean, freshly washed washcloth. ? Gently apply to your body from chin to toes (except the genital area) and especially the area(s) where your incision(s) will be. ? Leave Hibiclens/Chlorhexidine on your skin for at least 20 seconds. CAUTION: If needed, Hibiclens/chlorhexidine may be used to clean the folds of skin of the legs (such as in the area of the groin) and on your buttocks and hips. However, do not use Hibiclens/Chlorhexidine above the neck or in the genital area (your bottom) or put inside any area of your body. 4. Turn the water back on and rinse. 5. Dry gently with a clean, freshly washed towel. 6. After your shower, do not use any powder, deodorant, perfumes or lotion. 7. Use clean, freshly washed towels and washcloths every time you shower. 8. Wear clean, freshly washed pajamas to bed the night before surgery. 9. Sleep on clean, freshly washed sheets.   10. Do not allow pets to sleep in your bed with you. The Morning of your surgery:  1. Shower again thoroughly with an antibacterial soap, such as Dial or the soap provided at your preassessment appointment. If needed, ask someone for help to reach all areas of your body. Dont forget to clean your belly button! Rinse. 2. Dry gently with a clean, freshly washed towel. 3. After your shower, do not use any powder, deodorant, perfumes or lotion prior to surgery. 4. Put on clean, freshly washed clothing. Tips to help prevent infections after your surgery:  1. Protect your surgical wound from germs:  ? Hand washing is the most important thing you and your caregivers can do to prevent infections. ? Keep your bandage clean and dry! ? Do not touch your surgical wound. 2. Use clean, freshly washed towels and washcloths every time you shower; do not share bath linens with others. 3. Until your surgical wound is healed, wear clothing and sleep on bed linens each day that are clean and freshly washed. 4. Do not allow pets to sleep in your bed with you or touch your surgical wound. 5. Do not smoke  smoking delays wound healing. This may be a good time to stop smoking. 6. If you have diabetes, it is important for you to manage your blood sugar levels properly before your surgery as well as after your surgery. Poorly managed blood sugar levels slow down wound healing and prevent you from healing completely. If you lose your Hibiclens/chlorhexidine, please call the [facility], or it is available for purchase at your pharmacy.                ___________________      ___________________      ________________  (Signature of Patient)          (Witness)                   (Date and Time)

## 2021-05-04 NOTE — H&P
[facility]   History and Physical    Subjective:      Bette Hurtado is a 56 y.o. BLACK/ male who was referred for evaluation of hybrid ablation by Dr. Reyna Rankin. Pt with PMH significant for a-fib on Eliquis s/p ablation in March and prior PVI, SSS s/p PPM 3/22/21, CAD w/ prior stents several years ago, DM, HTN, HLD, femur fx and repair last year, and arthritis.      Pt reports an episode of dizziness and syncope while at work in February. He was placed on a Holter monitor which revealed a-fib w/ RVR, started on dilt and Eliquis, had already been on BB therapy. He then developed SSS, medications were adjusted but did not resolve, therefore had PPM insertion on 3/22/21. In terms of his a-fib, he has failed rate control with medications and PVI, had cryo ablation in March by Dr. Reyna Rankin, but remains in a-fib a majority of the time. Pt reports some fatigue, no longer has any dizziness since pacer inserted. He had reported SOB previously per records but denies any SOB while in clinic today. He denies any palpitations even with a-fib, no CP, SOB/KRAMER or orthopnea. He has chronic lower ext edema, worse at end of the day.      Pt has a remote history of tobacco abuse, quit in 1989, drinks alcohol socially only 1-2 times per month. He has been in and out of work since his femur fracture last year and now due to his prior dizziness/syncopal episode. He is otherwise independent in his ADL's.      Cardiac Testing     Cardiac catheterization: n/a      ECHO 3/19/21:   · LV: Estimated LVEF is 55 - 60%. Normal cavity size, wall thickness, systolic function (ejection fraction normal) and diastolic function. Wall motion: normal.  · LA: Severely dilated left atrium. · MV: Mild mitral valve regurgitation is present. · AO: Mild ascending aorta dilatation. Ascending aorta diameter = 4.2 cm.      Echo Findings     Left Ventricle Normal cavity size, wall thickness, systolic function (ejection fraction normal) and diastolic function. Wall motion: normal. The estimated EF is 55 - 60%. Wall Scoring The left ventricular wall motion is normal.             Left Atrium Severely dilated left atrium. Right Ventricle Normal cavity size and global systolic function. Right Atrium Normal cavity size. Aortic Valve Trileaflet valve structure, no stenosis and no regurgitation. Aortic valve sclerosis. Mitral Valve Normal valve structure and no stenosis. Mild regurgitation. Tricuspid Valve Normal valve structure and no stenosis. Trace regurgitation. Pulmonic Valve Normal valve structure, no stenosis and no regurgitation. Aorta Normal aortic root. Mildly dilated ascending aorta; diameter is 4.2 cm. Pulmonary Artery Pulmonary hypertension not suggested by Doppler findings. IVC/Hepatic Veins Normal structure. Normal central venous pressure (3 mmHg); IVC diameter is less than 21 mm and collapses more than 50% with respiration. Pericardium No evidence of pericardial effusion. Past Medical History:   Diagnosis Date    Arthritis     knees    [facility] fibrillation [facility]VR [facility] [address]     Benign essential hypertension 6/10/2011    CAD (coronary artery disease)     Diabetes ([facility] [address]     DX  AGE 33      Hypercholesterolemia     Hypertriglyceridemia     Obesity     PAF (paroxysmal atrial fibrillation) ([facility] [address] 12/12/2018     Past Surgical History:   Procedure Laterality Date    CARDIAC CATHETERIZATION  6/9/2011         CARDIAC CATHETERIZATION  11/1/2012         HC ANGIOSEAL VIP 6FR  6/9/2011         HX AFIB ABLATION  03/15/2021    HX ORTHOPAEDIC      left knee replacement    HX ORTHOPAEDIC      R knee replacement  2010 -Isrrael Horn.     INTRACARD ECHO, THER/DX INTERVENT N/A 3/15/2021    Intracardiac Echocardiogram performed by Zohreh Montelongo MD at [facility] CARDIAC CATH LAB    NM CARDIAC SURG PROCEDURE UNLIST      STENT RCA  6/11    NM DRUG-ELUTING STENTS, SINGLE  6/9/2011         NM EPHYS EVL TRNSPTL TX ATRIAL FIB ISOLAT PULM VEIN N/A 3/15/2021 ABLATION A-FIB  W COMPLETE EP STUDY performed by Capo Villasenor MD at \[facility]\"" CARDIAC CATH LAB    NE INS NEW/RPLCMT PRM PM W/TRANSV ELTRD ATRIAL&VENT N/A 3/22/2021    INSERT PPM DUAL performed by Capo Villasenor MD at \[facility]\"" CARDIAC CATH LAB    NE INTRACARDIAC ELECTROPHYSIOLOGIC 3D MAPPING N/A 3/15/2021    Ep 3d Mapping performed by Capo Villasenor MD at \[facility]\"" CARDIAC CATH LAB      Social History     Tobacco Use    Smoking status: Former Smoker     Types: Cigarettes     Quit date: 11/10/1989     Years since quittin.5    Smokeless tobacco: Never Used   Substance Use Topics    Alcohol use: Yes     Frequency: Monthly or less     Comment: social  DRINKS ONE WEEKEND A MONTH/\"BIG IMPROVEMENT\"      Family History   Problem Relation Age of Onset    Diabetes Mother     Hypertension Mother     Elevated Lipids Mother     Cancer Mother         unsure    Diabetes Father     Heart Disease Father     Hypertension Father     Elevated Lipids Father     Diabetes Brother     Suicide Brother     Diabetes Brother      Prior to Admission medications    Medication Sig Start Date End Date Taking? Authorizing Provider   mupirocin (BACTROBAN) 2 % ointment by Both Nostrils route two (2) times a day. 21   Clare Brown NP   chlorhexidine (PERIDEX) 0.12 % solution 15 mL by Swish and Spit route every twelve (12) hours for 14 days. 21  Clare rBown NP   enoxaparin (LOVENOX) 120 mg/0.8 mL injection 120 mg by SubCUTAneous route every twelve (12) hours for 1 day. 21  Clare Brown NP   amiodarone (CORDARONE) 200 mg tablet TAKE 1 TAB BY MOUTH TWO (2) TIMES A DAY FOR 30 DAYS. 5/3/21 6/2/21  Rylee Melendez, JENNY   rosuvastatin (CRESTOR) 20 mg tablet TAKE 1 TABLET BY MOUTH EVERY DAY FOR CHOLESTEROL 21   Leo Alston MD   metoprolol tartrate (LOPRESSOR) 25 mg tablet Take 1 Tab by mouth two (2) times a day for 90 days.  21  Capo Villasenor MD   apixaban Juanita Bel) 5 mg tablet Take 1 Tab by mouth two (2) times a day. 2/25/21   Enoch Alvarez, ROSA M   ACETAMINOPHEN PO Take 500 mg by mouth as needed. 1-2 tabs prn as needed    Provider, Historical   semaglutide (Ozempic) 1 mg/dose (2 mg/1.5 mL) sub-q pen 1 MG BY SUBCUTANEOUS ROUTE EVERY SEVEN (7) DAYS. 2/9/21   Jorge L Jeffers MD   ibuprofen (MOTRIN) 800 mg tablet Take 1 Tab by mouth every eight (8) hours as needed for Pain. 1/18/21   Jorge L Jeffers MD   furosemide (LASIX) 40 mg tablet 1-2 tabs po daily as needed for fluid 1/12/21   Jorge L Jeffers MD   glipiZIDE (GLUCOTROL) 10 mg tablet TAKE 1 TABLET BY MOUTH TWO (2) TIMES A DAY. FOR DIABETES 12/29/20   Jorge L Jeffers MD   metFORMIN (GLUCOPHAGE) 1,000 mg tablet Take 1 Tab by mouth two (2) times daily (with meals). 8/28/20   Jorge L Jeffers MD   gemfibroziL (LOPID) 600 mg tablet Take 1 Tab by mouth two (2) times daily as needed (cholesterol). Patient taking differently: Take 600 mg by mouth two (2) times a day. 8/28/20   Jorge L Jeffers MD   magnesium oxide (MAG-OX) 400 mg tablet TAKE 2 TABLETS BY MOUTH EVERY DAY 8/27/20   Jorge L Jeffers MD   empagliflozin (Jardiance) 10 mg tablet Take 1 Tab by mouth daily. For diabetes 7/28/20   Jorge L Jeffers MD   nitroglycerin (NITROSTAT) 0.4 mg SL tablet 1 Tab by SubLINGual route every five (5) minutes as needed for Chest Pain (call 911 if CP/ SOB not relieved by 3 tabs). 7/29/19   Jorge L Jeffers MD   aspirin delayed-release 81 mg tablet Take  by mouth daily. Provider, Historical   omega-3 fatty acids-vitamin e (FISH OIL) 1,000 mg Cap Take 2 Caps by mouth two (2) times a day. 7/13/11   Jorge L Jeffers MD       Allergies   Allergen Reactions    Lisinopril Cough    Losartan Other (comments)     Hypotension         Review of Systems:   Consititutional: Denies fever or chills. Eyes:  Denies use of glasses or vision problems(cataracts). ENT:  Denies hearing or swallowing difficulty.   CV: Denies CP, claudication, HTN.  Resp: Denies dyspnea, productive cough. : Denies dialysis or kidney problems. GI: Denies ulcers, esophageal strictures, liver problems. M/S: Denies joint or bone problems, or implanted artificial hardware. Skin: Denies varicose veins, edema. Neuro: Denies strokes, or TIAs. Psych: Denies anxiety or depression. Endocrine: Denies thyroid problems or diabetes. Heme/Lymphatic: Denies easy bruising or lymphedema. Objective:     VS: T 98.5 HR 72 RR 16 sats 98% /50    Physical Exam:    General appearance: alert, cooperative, no distress  Head: normocephalic, without obvious abnormality; atraumatic  Eyes: conjunctivae/corneas clear; EOM's intact. Nose: nares normal; no drainage. Neck: no carotid bruit and no JVD  Lungs: clear to auscultation bilaterally  Heart: regular rate and rhythm; no murmur  Abdomen: soft, non-tender; bowel sounds normal  Extremities: moves all extremities; no weakness. Skin: Skin color normal; No varicose veins or edema. Neurologic: Grossly normal      Labs:   Recent Labs     05/04/21  1322   WBC 6.7   HGB 11.1*   HCT 35.8*         K 3.8   BUN 19   CREA 0.94   *   INR 1.1       Diagnostics:   PA and lateral: pending    EKG: NSR  Assessment:     Active Problems:    PAF (paroxysmal atrial fibrillation) ([facility] [address] (12/12/2018)        Plan:   The risk and benefit of surgery were reviewed with patient and family and all questions answered and the patient wishes to proceed. Risk include infection, bleeding, stroke, heart attack, irregular heart rhythm, kidney failure and death. The patient was given instructions and prescriptions for bactroban, peridex and lovenox. The patient was instructed to stop eliquis and fish oil. Surgery is scheduled for 5/10/21. Treatment Plan:    1. PAF: remains in a-fib >60% of time despite therapy. Plan for hybrid ablation on 5/10/21. On amio, BB and Eliquis.  Hold Eliquis 48 hrs prior to surgery, with lovenox to be given first day off Eliquis.       2. SSS s/p PPM: follows w/ EP      3. HTN: on metoprolol, lasix     4. HLD/high triglycerides: on Crestor, gemfibrozil . Stop fish oil preop     5. DM: on metformin, Jardiance, glipizide, Ozempic weekly injections      6.  Hx of CAD w/ stents several years ago: on ASA, statin, BB therapy       Signed By: Hussain Wills NP     May 4, 2021

## 2021-05-04 NOTE — PERIOP NOTES
Incentive Spirometer        Using the incentive spirometer helps expand the small air sacs of your lungs, helps you breathe deeply, and helps improve your lung function. Use your incentive spirometer twice a day (10 breaths each time) prior to surgery. How to Use Your Incentive Spirometer:  1. Hold the incentive spirometer in an upright position. 2. Breathe out as usual.   3. Place the mouthpiece in your mouth and seal your lips tightly around it. 4. Take a deep breath. Breathe in slowly and as deeply as possible. Keep the blue flow rate guide between the arrows. 5. Hold your breath as long as possible. Then exhale slowly and allow the piston to fall to the bottom of the column. 6. Rest for a few seconds and repeat steps one through five at least 10 times. PAT Tidal Volume_____2500_______  x_______2_________  Date___________5/4/2021________    Tavon Joy THE INCENTIVE SPIROMETER WITH YOU TO THE HOSPITAL ON THE DAY OF YOUR SURGERY. Opportunity given to ask and answer questions as well as to observe return demonstration.     Patient signature_____________________________    Witness____________________________

## 2021-05-05 LAB
BACTERIA SPEC CULT: NORMAL
BACTERIA SPEC CULT: NORMAL
SERVICE CMNT-IMP: NORMAL

## 2021-05-06 ENCOUNTER — HOSPITAL ENCOUNTER (OUTPATIENT)
Dept: PREADMISSION TESTING | Age: 61
Discharge: HOME | End: 2021-05-06
Payer: COMMERCIAL

## 2021-05-06 LAB — SARS-COV-2, COV2: NORMAL

## 2021-05-06 PROCEDURE — U0003 INFECTIOUS AGENT DETECTION BY NUCLEIC ACID (DNA OR RNA); SEVERE ACUTE RESPIRATORY SYNDROME CORONAVIRUS 2 (SARS-COV-2) (CORONAVIRUS DISEASE [COVID-19]), AMPLIFIED PROBE TECHNIQUE, MAKING USE OF HIGH THROUGHPUT TECHNOLOGIES AS DESCRIBED BY CMS-2020-01-R: HCPCS

## 2021-05-07 ENCOUNTER — ANESTHESIA EVENT (OUTPATIENT)
Dept: CARDIOTHORACIC SURGERY | Age: 61
DRG: 229 | End: 2021-05-07
Payer: COMMERCIAL

## 2021-05-07 LAB — SARS-COV-2, COV2NT: NOT DETECTED

## 2021-05-09 RX ORDER — NITROGLYCERIN 20 MG/100ML
0-20 INJECTION INTRAVENOUS
Status: DISCONTINUED | OUTPATIENT
Start: 2021-05-10 | End: 2021-05-10

## 2021-05-10 ENCOUNTER — APPOINTMENT (OUTPATIENT)
Dept: GENERAL RADIOLOGY | Age: 61
DRG: 229 | End: 2021-05-10
Attending: PHYSICIAN ASSISTANT
Payer: COMMERCIAL

## 2021-05-10 ENCOUNTER — HOSPITAL ENCOUNTER (OUTPATIENT)
Dept: NON INVASIVE DIAGNOSTICS | Age: 61
Discharge: HOME | End: 2021-05-10
Attending: THORACIC SURGERY (CARDIOTHORACIC VASCULAR SURGERY)

## 2021-05-10 ENCOUNTER — ANESTHESIA (OUTPATIENT)
Dept: CARDIOTHORACIC SURGERY | Age: 61
DRG: 229 | End: 2021-05-10
Payer: COMMERCIAL

## 2021-05-10 ENCOUNTER — APPOINTMENT (OUTPATIENT)
Dept: GENERAL RADIOLOGY | Age: 61
DRG: 229 | End: 2021-05-10
Attending: THORACIC SURGERY (CARDIOTHORACIC VASCULAR SURGERY)
Payer: COMMERCIAL

## 2021-05-10 ENCOUNTER — HOSPITAL ENCOUNTER (INPATIENT)
Age: 61
LOS: 1 days | Discharge: HOME | DRG: 229 | End: 2021-05-11
Attending: THORACIC SURGERY (CARDIOTHORACIC VASCULAR SURGERY) | Admitting: THORACIC SURGERY (CARDIOTHORACIC VASCULAR SURGERY)
Payer: COMMERCIAL

## 2021-05-10 DIAGNOSIS — I48.0 PAROXYSMAL ATRIAL FIBRILLATION (HCC): Chronic | ICD-10-CM

## 2021-05-10 DIAGNOSIS — E11.8 CONTROLLED TYPE 2 DIABETES MELLITUS WITH COMPLICATION, WITHOUT LONG-TERM CURRENT USE OF INSULIN (HCC): Chronic | ICD-10-CM

## 2021-05-10 DIAGNOSIS — I48.11 LONGSTANDING PERSISTENT ATRIAL FIBRILLATION (HCC): Chronic | ICD-10-CM

## 2021-05-10 DIAGNOSIS — I48.91 ATRIAL FIBRILLATION, UNSPECIFIED TYPE (HCC): ICD-10-CM

## 2021-05-10 DIAGNOSIS — I10 BENIGN ESSENTIAL HYPERTENSION: Chronic | ICD-10-CM

## 2021-05-10 LAB
ALBUMIN SERPL-MCNC: 3.1 G/DL (ref 3.5–5)
ALBUMIN/GLOB SERPL: 0.9 {RATIO} (ref 1.1–2.2)
ALP SERPL-CCNC: 68 U/L (ref 45–117)
ALT SERPL-CCNC: 11 U/L (ref 12–78)
ANION GAP SERPL CALC-SCNC: 4 MMOL/L (ref 5–15)
APTT PPP: 23.9 SEC (ref 22.1–31)
AST SERPL-CCNC: 16 U/L (ref 15–37)
ATRIAL RATE: 75 BPM
BASOPHILS # BLD: 0 K/UL (ref 0–0.1)
BASOPHILS NFR BLD: 0 % (ref 0–1)
BILIRUB SERPL-MCNC: 0.3 MG/DL (ref 0.2–1)
BUN SERPL-MCNC: 18 MG/DL (ref 6–20)
BUN/CREAT SERPL: 20 (ref 12–20)
CALCIUM SERPL-MCNC: 8.2 MG/DL (ref 8.5–10.1)
CALCULATED R AXIS, ECG10: -14 DEGREES
CALCULATED T AXIS, ECG11: 9 DEGREES
CHLORIDE SERPL-SCNC: 109 MMOL/L (ref 97–108)
CO2 SERPL-SCNC: 25 MMOL/L (ref 21–32)
CREAT SERPL-MCNC: 0.91 MG/DL (ref 0.7–1.3)
DIAGNOSIS, 93000: NORMAL
DIFFERENTIAL METHOD BLD: ABNORMAL
EOSINOPHIL # BLD: 0 K/UL (ref 0–0.4)
EOSINOPHIL NFR BLD: 0 % (ref 0–7)
ERYTHROCYTE [DISTWIDTH] IN BLOOD BY AUTOMATED COUNT: 15.7 % (ref 11.5–14.5)
GLOBULIN SER CALC-MCNC: 3.5 G/DL (ref 2–4)
GLUCOSE BLD STRIP.AUTO-MCNC: 162 MG/DL (ref 65–100)
GLUCOSE BLD STRIP.AUTO-MCNC: 190 MG/DL (ref 65–100)
GLUCOSE SERPL-MCNC: 218 MG/DL (ref 65–100)
HCT VFR BLD AUTO: 37.7 % (ref 36.6–50.3)
HGB BLD-MCNC: 11.8 G/DL (ref 12.1–17)
IMM GRANULOCYTES # BLD AUTO: 0.1 K/UL (ref 0–0.04)
IMM GRANULOCYTES NFR BLD AUTO: 1 % (ref 0–0.5)
INR PPP: 1 (ref 0.9–1.1)
INR PPP: 1 (ref 0.9–1.1)
LYMPHOCYTES # BLD: 0.7 K/UL (ref 0.8–3.5)
LYMPHOCYTES NFR BLD: 7 % (ref 12–49)
MAGNESIUM SERPL-MCNC: 2 MG/DL (ref 1.6–2.4)
MCH RBC QN AUTO: 26.5 PG (ref 26–34)
MCHC RBC AUTO-ENTMCNC: 31.3 G/DL (ref 30–36.5)
MCV RBC AUTO: 84.5 FL (ref 80–99)
MONOCYTES # BLD: 0.4 K/UL (ref 0–1)
MONOCYTES NFR BLD: 4 % (ref 5–13)
NEUTS SEG # BLD: 9 K/UL (ref 1.8–8)
NEUTS SEG NFR BLD: 88 % (ref 32–75)
NRBC # BLD: 0 K/UL (ref 0–0.01)
NRBC BLD-RTO: 0 PER 100 WBC
P-R INTERVAL, ECG05: 210 MS
PLATELET # BLD AUTO: 222 K/UL (ref 150–400)
PMV BLD AUTO: 9.1 FL (ref 8.9–12.9)
POTASSIUM SERPL-SCNC: 4.1 MMOL/L (ref 3.5–5.1)
PROT SERPL-MCNC: 6.6 G/DL (ref 6.4–8.2)
PROTHROMBIN TIME: 10.6 SEC (ref 9–11.1)
PROTHROMBIN TIME: 10.9 SEC (ref 9–11.1)
Q-T INTERVAL, ECG07: 416 MS
QRS DURATION, ECG06: 94 MS
QTC CALCULATION (BEZET), ECG08: 464 MS
RBC # BLD AUTO: 4.46 M/UL (ref 4.1–5.7)
RBC MORPH BLD: ABNORMAL
SERVICE CMNT-IMP: ABNORMAL
SERVICE CMNT-IMP: ABNORMAL
SODIUM SERPL-SCNC: 138 MMOL/L (ref 136–145)
THERAPEUTIC RANGE,PTTT: NORMAL SECS (ref 58–77)
VENTRICULAR RATE, ECG03: 75 BPM
WBC # BLD AUTO: 10.2 K/UL (ref 4.1–11.1)

## 2021-05-10 PROCEDURE — 77030027107 HC PTCH EXT REF CARTO3 J&J -F: Performed by: INTERNAL MEDICINE

## 2021-05-10 PROCEDURE — 77030040504 HC DRN WND MDII -B: Performed by: THORACIC SURGERY (CARDIOTHORACIC VASCULAR SURGERY)

## 2021-05-10 PROCEDURE — 77030035291 HC TBNG PMP SMARTABLATE J&J -B: Performed by: INTERNAL MEDICINE

## 2021-05-10 PROCEDURE — B245ZZ4 ULTRASONOGRAPHY OF LEFT HEART, TRANSESOPHAGEAL: ICD-10-PCS | Performed by: THORACIC SURGERY (CARDIOTHORACIC VASCULAR SURGERY)

## 2021-05-10 PROCEDURE — 85347 COAGULATION TIME ACTIVATED: CPT

## 2021-05-10 PROCEDURE — C1759 CATH, INTRA ECHOCARDIOGRAPHY: HCPCS | Performed by: INTERNAL MEDICINE

## 2021-05-10 PROCEDURE — 77030013079 HC BLNKT BAIR HGGR 3M -A: Performed by: ANESTHESIOLOGY

## 2021-05-10 PROCEDURE — 74011250636 HC RX REV CODE- 250/636: Performed by: ANESTHESIOLOGY

## 2021-05-10 PROCEDURE — 93613 INTRACARDIAC EPHYS 3D MAPG: CPT

## 2021-05-10 PROCEDURE — 74011000250 HC RX REV CODE- 250: Performed by: NURSE ANESTHETIST, CERTIFIED REGISTERED

## 2021-05-10 PROCEDURE — 93613 INTRACARDIAC EPHYS 3D MAPG: CPT | Performed by: INTERNAL MEDICINE

## 2021-05-10 PROCEDURE — 85025 COMPLETE CBC W/AUTO DIFF WBC: CPT

## 2021-05-10 PROCEDURE — C1732 CATH, EP, DIAG/ABL, 3D/VECT: HCPCS | Performed by: INTERNAL MEDICINE

## 2021-05-10 PROCEDURE — 93656 COMPRE EP EVAL ABLTJ ATR FIB: CPT | Performed by: INTERNAL MEDICINE

## 2021-05-10 PROCEDURE — 77030029359 HC PRB ESOPH TEMP CATH ANTM -F: Performed by: INTERNAL MEDICINE

## 2021-05-10 PROCEDURE — 77030008684 HC TU ET CUF COVD -B: Performed by: ANESTHESIOLOGY

## 2021-05-10 PROCEDURE — 02584ZZ DESTRUCTION OF CONDUCTION MECHANISM, PERCUTANEOUS ENDOSCOPIC APPROACH: ICD-10-PCS | Performed by: THORACIC SURGERY (CARDIOTHORACIC VASCULAR SURGERY)

## 2021-05-10 PROCEDURE — 74011636637 HC RX REV CODE- 636/637: Performed by: PHYSICIAN ASSISTANT

## 2021-05-10 PROCEDURE — 74011250637 HC RX REV CODE- 250/637: Performed by: INTERNAL MEDICINE

## 2021-05-10 PROCEDURE — 93657 TX L/R ATRIAL FIB ADDL: CPT | Performed by: INTERNAL MEDICINE

## 2021-05-10 PROCEDURE — 77030010880 HC CBL EP SUPRME STJU -C: Performed by: INTERNAL MEDICINE

## 2021-05-10 PROCEDURE — 93662 INTRACARDIAC ECG (ICE): CPT

## 2021-05-10 PROCEDURE — 74011000250 HC RX REV CODE- 250: Performed by: NURSE PRACTITIONER

## 2021-05-10 PROCEDURE — C1730 CATH, EP, 19 OR FEW ELECT: HCPCS | Performed by: INTERNAL MEDICINE

## 2021-05-10 PROCEDURE — 77030002996 HC SUT SLK J&J -A: Performed by: THORACIC SURGERY (CARDIOTHORACIC VASCULAR SURGERY)

## 2021-05-10 PROCEDURE — 76060000035 HC ANESTHESIA 2 TO 2.5 HR: Performed by: THORACIC SURGERY (CARDIOTHORACIC VASCULAR SURGERY)

## 2021-05-10 PROCEDURE — 77030018673: Performed by: THORACIC SURGERY (CARDIOTHORACIC VASCULAR SURGERY)

## 2021-05-10 PROCEDURE — C1894 INTRO/SHEATH, NON-LASER: HCPCS | Performed by: INTERNAL MEDICINE

## 2021-05-10 PROCEDURE — 93662 INTRACARDIAC ECG (ICE): CPT | Performed by: INTERNAL MEDICINE

## 2021-05-10 PROCEDURE — 77030013567 HC DRN WND RESERV BARD -A: Performed by: THORACIC SURGERY (CARDIOTHORACIC VASCULAR SURGERY)

## 2021-05-10 PROCEDURE — 74011250637 HC RX REV CODE- 250/637: Performed by: PHYSICIAN ASSISTANT

## 2021-05-10 PROCEDURE — 82962 GLUCOSE BLOOD TEST: CPT

## 2021-05-10 PROCEDURE — 74011250636 HC RX REV CODE- 250/636: Performed by: NURSE ANESTHETIST, CERTIFIED REGISTERED

## 2021-05-10 PROCEDURE — 2709999900 HC NON-CHARGEABLE SUPPLY: Performed by: THORACIC SURGERY (CARDIOTHORACIC VASCULAR SURGERY)

## 2021-05-10 PROCEDURE — 80053 COMPREHEN METABOLIC PANEL: CPT

## 2021-05-10 PROCEDURE — 77030003029 HC SUT VCRL J&J -B: Performed by: THORACIC SURGERY (CARDIOTHORACIC VASCULAR SURGERY)

## 2021-05-10 PROCEDURE — 76000 FLUOROSCOPY <1 HR PHYS/QHP: CPT

## 2021-05-10 PROCEDURE — 77030008771 HC TU NG SALEM SUMP -A: Performed by: ANESTHESIOLOGY

## 2021-05-10 PROCEDURE — 74011250636 HC RX REV CODE- 250/636: Performed by: THORACIC SURGERY (CARDIOTHORACIC VASCULAR SURGERY)

## 2021-05-10 PROCEDURE — 77030019908 HC STETH ESOPH SIMS -A: Performed by: ANESTHESIOLOGY

## 2021-05-10 PROCEDURE — 77030029065 HC DRSG HEMO QCLOT ZMED -B: Performed by: INTERNAL MEDICINE

## 2021-05-10 PROCEDURE — 77030010507 HC ADH SKN DERMBND J&J -B: Performed by: THORACIC SURGERY (CARDIOTHORACIC VASCULAR SURGERY)

## 2021-05-10 PROCEDURE — 77030002933 HC SUT MCRYL J&J -A: Performed by: THORACIC SURGERY (CARDIOTHORACIC VASCULAR SURGERY)

## 2021-05-10 PROCEDURE — 93621 COMP EP EVL L PAC&REC C SINS: CPT | Performed by: INTERNAL MEDICINE

## 2021-05-10 PROCEDURE — 93623 PRGRMD STIMJ&PACG IV RX NFS: CPT | Performed by: INTERNAL MEDICINE

## 2021-05-10 PROCEDURE — 74011000250 HC RX REV CODE- 250: Performed by: PHYSICIAN ASSISTANT

## 2021-05-10 PROCEDURE — 77030041076 HC DRSG AG OPTICELL MDII -A: Performed by: THORACIC SURGERY (CARDIOTHORACIC VASCULAR SURGERY)

## 2021-05-10 PROCEDURE — 74011250636 HC RX REV CODE- 250/636: Performed by: PHYSICIAN ASSISTANT

## 2021-05-10 PROCEDURE — 71045 X-RAY EXAM CHEST 1 VIEW: CPT

## 2021-05-10 PROCEDURE — 76010000108 HC CV SURG 2 TO 2.5 HR: Performed by: THORACIC SURGERY (CARDIOTHORACIC VASCULAR SURGERY)

## 2021-05-10 PROCEDURE — 33266 ABLATE ATRIA X10SV ENDO: CPT | Performed by: THORACIC SURGERY (CARDIOTHORACIC VASCULAR SURGERY)

## 2021-05-10 PROCEDURE — 77030018729 HC ELECTRD DEFIB PAD CARD -B: Performed by: THORACIC SURGERY (CARDIOTHORACIC VASCULAR SURGERY)

## 2021-05-10 PROCEDURE — 77030031139 HC SUT VCRL2 J&J -A: Performed by: THORACIC SURGERY (CARDIOTHORACIC VASCULAR SURGERY)

## 2021-05-10 PROCEDURE — 77030021678 HC GLIDESCP STAT DISP VERT -B: Performed by: ANESTHESIOLOGY

## 2021-05-10 PROCEDURE — 74011000250 HC RX REV CODE- 250: Performed by: THORACIC SURGERY (CARDIOTHORACIC VASCULAR SURGERY)

## 2021-05-10 PROCEDURE — 77030005513 HC CATH URETH FOL11 MDII -B: Performed by: THORACIC SURGERY (CARDIOTHORACIC VASCULAR SURGERY)

## 2021-05-10 PROCEDURE — C1893 INTRO/SHEATH, FIXED,NON-PEEL: HCPCS | Performed by: INTERNAL MEDICINE

## 2021-05-10 PROCEDURE — 77030037878 HC DRSG MEPILEX >48IN BORD MOLN -B: Performed by: THORACIC SURGERY (CARDIOTHORACIC VASCULAR SURGERY)

## 2021-05-10 PROCEDURE — C9113 INJ PANTOPRAZOLE SODIUM, VIA: HCPCS | Performed by: PHYSICIAN ASSISTANT

## 2021-05-10 PROCEDURE — 77030041009 HC ADTR CBL EP DX J&J -D: Performed by: INTERNAL MEDICINE

## 2021-05-10 PROCEDURE — 74011000250 HC RX REV CODE- 250: Performed by: INTERNAL MEDICINE

## 2021-05-10 PROCEDURE — 83735 ASSAY OF MAGNESIUM: CPT

## 2021-05-10 PROCEDURE — 74011250636 HC RX REV CODE- 250/636: Performed by: NURSE PRACTITIONER

## 2021-05-10 PROCEDURE — 77030016151 HC PROTCTR LNS DFOG COVD -B: Performed by: THORACIC SURGERY (CARDIOTHORACIC VASCULAR SURGERY)

## 2021-05-10 PROCEDURE — 36415 COLL VENOUS BLD VENIPUNCTURE: CPT

## 2021-05-10 PROCEDURE — 77030005401 HC CATH RAD ARRO -A: Performed by: ANESTHESIOLOGY

## 2021-05-10 PROCEDURE — 74011250636 HC RX REV CODE- 250/636: Performed by: INTERNAL MEDICINE

## 2021-05-10 PROCEDURE — C1751 CATH, INF, PER/CENT/MIDLINE: HCPCS | Performed by: ANESTHESIOLOGY

## 2021-05-10 PROCEDURE — 77030011264 HC ELECTRD BLD EXT COVD -A: Performed by: THORACIC SURGERY (CARDIOTHORACIC VASCULAR SURGERY)

## 2021-05-10 PROCEDURE — 85730 THROMBOPLASTIN TIME PARTIAL: CPT

## 2021-05-10 PROCEDURE — 93005 ELECTROCARDIOGRAM TRACING: CPT

## 2021-05-10 PROCEDURE — 85610 PROTHROMBIN TIME: CPT

## 2021-05-10 PROCEDURE — 77030015398 HC CBL EP EXT STJU -C: Performed by: INTERNAL MEDICINE

## 2021-05-10 PROCEDURE — 77030020263 HC SOL INJ SOD CL0.9% LFCR 1000ML: Performed by: THORACIC SURGERY (CARDIOTHORACIC VASCULAR SURGERY)

## 2021-05-10 PROCEDURE — 65620000000 HC RM CCU GENERAL

## 2021-05-10 PROCEDURE — 77030018843 HC SOL IRR SOD CL 9% SLSH BAXT -B: Performed by: THORACIC SURGERY (CARDIOTHORACIC VASCULAR SURGERY)

## 2021-05-10 PROCEDURE — 77030013797 HC KT TRNSDUC PRSSR EDWD -A: Performed by: INTERNAL MEDICINE

## 2021-05-10 PROCEDURE — 77030034404 HC DEV ABLAT CARD EPISENSE ATRC -K3: Performed by: THORACIC SURGERY (CARDIOTHORACIC VASCULAR SURGERY)

## 2021-05-10 RX ORDER — SODIUM CHLORIDE 0.9 % (FLUSH) 0.9 %
5-40 SYRINGE (ML) INJECTION EVERY 8 HOURS
Status: DISCONTINUED | OUTPATIENT
Start: 2021-05-10 | End: 2021-05-11 | Stop reason: HOSPADM

## 2021-05-10 RX ORDER — NITROGLYCERIN 0.4 MG/1
0.4 TABLET SUBLINGUAL
Status: DISCONTINUED | OUTPATIENT
Start: 2021-05-10 | End: 2021-05-11 | Stop reason: HOSPADM

## 2021-05-10 RX ORDER — PROPOFOL 10 MG/ML
INJECTION, EMULSION INTRAVENOUS AS NEEDED
Status: DISCONTINUED | OUTPATIENT
Start: 2021-05-10 | End: 2021-05-10

## 2021-05-10 RX ORDER — FENTANYL CITRATE 50 UG/ML
INJECTION, SOLUTION INTRAMUSCULAR; INTRAVENOUS AS NEEDED
Status: DISCONTINUED | OUTPATIENT
Start: 2021-05-10 | End: 2021-05-10 | Stop reason: HOSPADM

## 2021-05-10 RX ORDER — MIDAZOLAM HYDROCHLORIDE 1 MG/ML
1 INJECTION, SOLUTION INTRAMUSCULAR; INTRAVENOUS
Status: DISCONTINUED | OUTPATIENT
Start: 2021-05-10 | End: 2021-05-11 | Stop reason: HOSPADM

## 2021-05-10 RX ORDER — AMOXICILLIN 250 MG
1 CAPSULE ORAL 2 TIMES DAILY
Status: DISCONTINUED | OUTPATIENT
Start: 2021-05-11 | End: 2021-05-11 | Stop reason: HOSPADM

## 2021-05-10 RX ORDER — GUAIFENESIN 100 MG/5ML
81 LIQUID (ML) ORAL DAILY
Status: DISCONTINUED | OUTPATIENT
Start: 2021-05-11 | End: 2021-05-11 | Stop reason: HOSPADM

## 2021-05-10 RX ORDER — ACETAMINOPHEN 325 MG/1
650 TABLET ORAL EVERY 4 HOURS
Status: DISCONTINUED | OUTPATIENT
Start: 2021-05-10 | End: 2021-05-11 | Stop reason: HOSPADM

## 2021-05-10 RX ORDER — ONDANSETRON 2 MG/ML
4 INJECTION INTRAMUSCULAR; INTRAVENOUS
Status: DISCONTINUED | OUTPATIENT
Start: 2021-05-10 | End: 2021-05-11 | Stop reason: HOSPADM

## 2021-05-10 RX ORDER — DIPHENHYDRAMINE HYDROCHLORIDE 50 MG/ML
25 INJECTION, SOLUTION INTRAMUSCULAR; INTRAVENOUS
Status: DISCONTINUED | OUTPATIENT
Start: 2021-05-10 | End: 2021-05-11 | Stop reason: HOSPADM

## 2021-05-10 RX ORDER — SODIUM CHLORIDE, SODIUM LACTATE, POTASSIUM CHLORIDE, CALCIUM CHLORIDE 600; 310; 30; 20 MG/100ML; MG/100ML; MG/100ML; MG/100ML
25 INJECTION, SOLUTION INTRAVENOUS ONCE
Status: COMPLETED | OUTPATIENT
Start: 2021-05-10 | End: 2021-05-10

## 2021-05-10 RX ORDER — AMIODARONE HYDROCHLORIDE 200 MG/1
200 TABLET ORAL 2 TIMES DAILY
Status: DISCONTINUED | OUTPATIENT
Start: 2021-05-10 | End: 2021-05-10 | Stop reason: SDUPTHER

## 2021-05-10 RX ORDER — SODIUM CHLORIDE 0.9 % (FLUSH) 0.9 %
5-40 SYRINGE (ML) INJECTION AS NEEDED
Status: DISCONTINUED | OUTPATIENT
Start: 2021-05-10 | End: 2021-05-10

## 2021-05-10 RX ORDER — DIPHENHYDRAMINE HCL 25 MG
25 CAPSULE ORAL
Status: DISCONTINUED | OUTPATIENT
Start: 2021-05-10 | End: 2021-05-11 | Stop reason: HOSPADM

## 2021-05-10 RX ORDER — GEMFIBROZIL 600 MG/1
600 TABLET, FILM COATED ORAL 2 TIMES DAILY
Status: DISCONTINUED | OUTPATIENT
Start: 2021-05-11 | End: 2021-05-11 | Stop reason: HOSPADM

## 2021-05-10 RX ORDER — DEXTROSE 50 % IN WATER (D50W) INTRAVENOUS SYRINGE
12.5-25 AS NEEDED
Status: DISCONTINUED | OUTPATIENT
Start: 2021-05-10 | End: 2021-05-11 | Stop reason: HOSPADM

## 2021-05-10 RX ORDER — INSULIN GLARGINE 100 [IU]/ML
1-50 INJECTION, SOLUTION SUBCUTANEOUS
Status: DISCONTINUED | OUTPATIENT
Start: 2021-05-10 | End: 2021-05-10

## 2021-05-10 RX ORDER — PANTOPRAZOLE SODIUM 40 MG/1
40 TABLET, DELAYED RELEASE ORAL
Status: DISCONTINUED | OUTPATIENT
Start: 2021-05-11 | End: 2021-05-11 | Stop reason: HOSPADM

## 2021-05-10 RX ORDER — PROPOFOL 10 MG/ML
INJECTION, EMULSION INTRAVENOUS AS NEEDED
Status: DISCONTINUED | OUTPATIENT
Start: 2021-05-10 | End: 2021-05-10 | Stop reason: HOSPADM

## 2021-05-10 RX ORDER — BUPIVACAINE HYDROCHLORIDE 5 MG/ML
INJECTION, SOLUTION EPIDURAL; INTRACAUDAL AS NEEDED
Status: DISCONTINUED | OUTPATIENT
Start: 2021-05-10 | End: 2021-05-11 | Stop reason: HOSPADM

## 2021-05-10 RX ORDER — SODIUM CHLORIDE 450 MG/100ML
10 INJECTION, SOLUTION INTRAVENOUS CONTINUOUS
Status: DISCONTINUED | OUTPATIENT
Start: 2021-05-10 | End: 2021-05-11 | Stop reason: HOSPADM

## 2021-05-10 RX ORDER — SODIUM CHLORIDE 0.9 % (FLUSH) 0.9 %
5-40 SYRINGE (ML) INJECTION AS NEEDED
Status: DISCONTINUED | OUTPATIENT
Start: 2021-05-10 | End: 2021-05-11 | Stop reason: HOSPADM

## 2021-05-10 RX ORDER — CHLORHEXIDINE GLUCONATE 1.2 MG/ML
10 RINSE ORAL EVERY 12 HOURS
Status: DISCONTINUED | OUTPATIENT
Start: 2021-05-10 | End: 2021-05-11 | Stop reason: HOSPADM

## 2021-05-10 RX ORDER — SODIUM CHLORIDE 0.9 % (FLUSH) 0.9 %
5-40 SYRINGE (ML) INJECTION EVERY 8 HOURS
Status: DISCONTINUED | OUTPATIENT
Start: 2021-05-10 | End: 2021-05-10

## 2021-05-10 RX ORDER — HYDROMORPHONE HYDROCHLORIDE 1 MG/ML
0.5 INJECTION, SOLUTION INTRAMUSCULAR; INTRAVENOUS; SUBCUTANEOUS
Status: DISCONTINUED | OUTPATIENT
Start: 2021-05-10 | End: 2021-05-11 | Stop reason: HOSPADM

## 2021-05-10 RX ORDER — SUCCINYLCHOLINE CHLORIDE 20 MG/ML
INJECTION INTRAMUSCULAR; INTRAVENOUS AS NEEDED
Status: DISCONTINUED | OUTPATIENT
Start: 2021-05-10 | End: 2021-05-10 | Stop reason: HOSPADM

## 2021-05-10 RX ORDER — LANOLIN ALCOHOL/MO/W.PET/CERES
3 CREAM (GRAM) TOPICAL
Status: DISCONTINUED | OUTPATIENT
Start: 2021-05-10 | End: 2021-05-11 | Stop reason: HOSPADM

## 2021-05-10 RX ORDER — BACITRACIN 500 UNIT/G
1 PACKET (EA) TOPICAL AS NEEDED
Status: DISCONTINUED | OUTPATIENT
Start: 2021-05-10 | End: 2021-05-11 | Stop reason: HOSPADM

## 2021-05-10 RX ORDER — NEOSTIGMINE METHYLSULFATE 1 MG/ML
INJECTION, SOLUTION INTRAVENOUS AS NEEDED
Status: DISCONTINUED | OUTPATIENT
Start: 2021-05-10 | End: 2021-05-10 | Stop reason: HOSPADM

## 2021-05-10 RX ORDER — ALBUMIN HUMAN 50 G/1000ML
12.5 SOLUTION INTRAVENOUS
Status: DISCONTINUED | OUTPATIENT
Start: 2021-05-10 | End: 2021-05-11 | Stop reason: HOSPADM

## 2021-05-10 RX ORDER — OXYCODONE HYDROCHLORIDE 5 MG/1
10 TABLET ORAL
Status: DISCONTINUED | OUTPATIENT
Start: 2021-05-10 | End: 2021-05-11 | Stop reason: HOSPADM

## 2021-05-10 RX ORDER — HYDROMORPHONE HYDROCHLORIDE 1 MG/ML
0.2 INJECTION, SOLUTION INTRAMUSCULAR; INTRAVENOUS; SUBCUTANEOUS
Status: DISCONTINUED | OUTPATIENT
Start: 2021-05-10 | End: 2021-05-11 | Stop reason: HOSPADM

## 2021-05-10 RX ORDER — NALOXONE HYDROCHLORIDE 0.4 MG/ML
0.4 INJECTION, SOLUTION INTRAMUSCULAR; INTRAVENOUS; SUBCUTANEOUS AS NEEDED
Status: DISCONTINUED | OUTPATIENT
Start: 2021-05-10 | End: 2021-05-11 | Stop reason: HOSPADM

## 2021-05-10 RX ORDER — HYDROMORPHONE HYDROCHLORIDE 1 MG/ML
1 INJECTION, SOLUTION INTRAMUSCULAR; INTRAVENOUS; SUBCUTANEOUS
Status: DISCONTINUED | OUTPATIENT
Start: 2021-05-10 | End: 2021-05-11 | Stop reason: SDUPTHER

## 2021-05-10 RX ORDER — MIDAZOLAM HYDROCHLORIDE 1 MG/ML
INJECTION, SOLUTION INTRAMUSCULAR; INTRAVENOUS AS NEEDED
Status: DISCONTINUED | OUTPATIENT
Start: 2021-05-10 | End: 2021-05-10 | Stop reason: HOSPADM

## 2021-05-10 RX ORDER — LANOLIN ALCOHOL/MO/W.PET/CERES
400 CREAM (GRAM) TOPICAL 2 TIMES DAILY
Status: DISCONTINUED | OUTPATIENT
Start: 2021-05-11 | End: 2021-05-11 | Stop reason: HOSPADM

## 2021-05-10 RX ORDER — POTASSIUM CHLORIDE 29.8 MG/ML
20 INJECTION INTRAVENOUS
Status: ACTIVE | OUTPATIENT
Start: 2021-05-10 | End: 2021-05-11

## 2021-05-10 RX ORDER — PHENYLEPHRINE HCL IN 0.9% NACL 0.4MG/10ML
SYRINGE (ML) INTRAVENOUS AS NEEDED
Status: DISCONTINUED | OUTPATIENT
Start: 2021-05-10 | End: 2021-05-10 | Stop reason: HOSPADM

## 2021-05-10 RX ORDER — HEPARIN SODIUM 200 [USP'U]/100ML
INJECTION, SOLUTION INTRAVENOUS
Status: COMPLETED | OUTPATIENT
Start: 2021-05-10 | End: 2021-05-10

## 2021-05-10 RX ORDER — ROSUVASTATIN CALCIUM 20 MG/1
20 TABLET, COATED ORAL DAILY
Status: DISCONTINUED | OUTPATIENT
Start: 2021-05-11 | End: 2021-05-11 | Stop reason: HOSPADM

## 2021-05-10 RX ORDER — OXYCODONE HYDROCHLORIDE 5 MG/1
5 TABLET ORAL
Status: DISCONTINUED | OUTPATIENT
Start: 2021-05-10 | End: 2021-05-11 | Stop reason: HOSPADM

## 2021-05-10 RX ORDER — DIPHENHYDRAMINE HYDROCHLORIDE 50 MG/ML
12.5 INJECTION, SOLUTION INTRAMUSCULAR; INTRAVENOUS AS NEEDED
Status: ACTIVE | OUTPATIENT
Start: 2021-05-10 | End: 2021-05-10

## 2021-05-10 RX ORDER — LANOLIN ALCOHOL/MO/W.PET/CERES
400 CREAM (GRAM) TOPICAL 2 TIMES DAILY
Status: DISCONTINUED | OUTPATIENT
Start: 2021-05-11 | End: 2021-05-11 | Stop reason: SDUPTHER

## 2021-05-10 RX ORDER — SODIUM CHLORIDE, SODIUM LACTATE, POTASSIUM CHLORIDE, CALCIUM CHLORIDE 600; 310; 30; 20 MG/100ML; MG/100ML; MG/100ML; MG/100ML
25 INJECTION, SOLUTION INTRAVENOUS CONTINUOUS
Status: DISCONTINUED | OUTPATIENT
Start: 2021-05-10 | End: 2021-05-11 | Stop reason: HOSPADM

## 2021-05-10 RX ORDER — SODIUM CHLORIDE 9 MG/ML
INJECTION, SOLUTION INTRAVENOUS
Status: DISCONTINUED | OUTPATIENT
Start: 2021-05-10 | End: 2021-05-10 | Stop reason: HOSPADM

## 2021-05-10 RX ORDER — LIDOCAINE HYDROCHLORIDE 10 MG/ML
0.1 INJECTION, SOLUTION EPIDURAL; INFILTRATION; INTRACAUDAL; PERINEURAL AS NEEDED
Status: DISCONTINUED | OUTPATIENT
Start: 2021-05-10 | End: 2021-05-10

## 2021-05-10 RX ORDER — KETOROLAC TROMETHAMINE 30 MG/ML
15 INJECTION, SOLUTION INTRAMUSCULAR; INTRAVENOUS EVERY 6 HOURS
Status: DISCONTINUED | OUTPATIENT
Start: 2021-05-10 | End: 2021-05-11 | Stop reason: HOSPADM

## 2021-05-10 RX ORDER — LIDOCAINE HYDROCHLORIDE 20 MG/ML
INJECTION, SOLUTION EPIDURAL; INFILTRATION; INTRACAUDAL; PERINEURAL AS NEEDED
Status: DISCONTINUED | OUTPATIENT
Start: 2021-05-10 | End: 2021-05-10 | Stop reason: HOSPADM

## 2021-05-10 RX ORDER — MAGNESIUM SULFATE 1 G/100ML
1 INJECTION INTRAVENOUS AS NEEDED
Status: DISCONTINUED | OUTPATIENT
Start: 2021-05-10 | End: 2021-05-11 | Stop reason: HOSPADM

## 2021-05-10 RX ORDER — METOPROLOL TARTRATE 25 MG/1
25 TABLET, FILM COATED ORAL 2 TIMES DAILY
Status: DISCONTINUED | OUTPATIENT
Start: 2021-05-10 | End: 2021-05-11 | Stop reason: HOSPADM

## 2021-05-10 RX ORDER — FACIAL-BODY WIPES
10 EACH TOPICAL DAILY PRN
Status: DISCONTINUED | OUTPATIENT
Start: 2021-05-10 | End: 2021-05-11 | Stop reason: HOSPADM

## 2021-05-10 RX ORDER — IBUPROFEN 600 MG/1
600 TABLET ORAL
Status: DISCONTINUED | OUTPATIENT
Start: 2021-05-10 | End: 2021-05-11 | Stop reason: HOSPADM

## 2021-05-10 RX ORDER — ROCURONIUM BROMIDE 10 MG/ML
INJECTION, SOLUTION INTRAVENOUS AS NEEDED
Status: DISCONTINUED | OUTPATIENT
Start: 2021-05-10 | End: 2021-05-10

## 2021-05-10 RX ORDER — POLYETHYLENE GLYCOL 3350 17 G/17G
17 POWDER, FOR SOLUTION ORAL DAILY
Status: DISCONTINUED | OUTPATIENT
Start: 2021-05-11 | End: 2021-05-11 | Stop reason: HOSPADM

## 2021-05-10 RX ORDER — GLIPIZIDE 5 MG/1
10 TABLET ORAL
Status: DISCONTINUED | OUTPATIENT
Start: 2021-05-11 | End: 2021-05-11 | Stop reason: HOSPADM

## 2021-05-10 RX ORDER — MUPIROCIN 20 MG/G
OINTMENT TOPICAL 2 TIMES DAILY
Status: DISCONTINUED | OUTPATIENT
Start: 2021-05-10 | End: 2021-05-11 | Stop reason: HOSPADM

## 2021-05-10 RX ORDER — INSULIN LISPRO 100 [IU]/ML
INJECTION, SOLUTION INTRAVENOUS; SUBCUTANEOUS
Status: DISCONTINUED | OUTPATIENT
Start: 2021-05-10 | End: 2021-05-11 | Stop reason: HOSPADM

## 2021-05-10 RX ORDER — ONDANSETRON 2 MG/ML
INJECTION INTRAMUSCULAR; INTRAVENOUS AS NEEDED
Status: DISCONTINUED | OUTPATIENT
Start: 2021-05-10 | End: 2021-05-10 | Stop reason: HOSPADM

## 2021-05-10 RX ORDER — FENTANYL CITRATE 50 UG/ML
25 INJECTION, SOLUTION INTRAMUSCULAR; INTRAVENOUS
Status: DISCONTINUED | OUTPATIENT
Start: 2021-05-10 | End: 2021-05-11 | Stop reason: HOSPADM

## 2021-05-10 RX ORDER — KETOROLAC TROMETHAMINE 30 MG/ML
30 INJECTION, SOLUTION INTRAMUSCULAR; INTRAVENOUS
Status: COMPLETED | OUTPATIENT
Start: 2021-05-10 | End: 2021-05-10

## 2021-05-10 RX ORDER — AMIODARONE HYDROCHLORIDE 200 MG/1
200 TABLET ORAL 2 TIMES DAILY
Status: DISCONTINUED | OUTPATIENT
Start: 2021-05-10 | End: 2021-05-11 | Stop reason: HOSPADM

## 2021-05-10 RX ORDER — ROCURONIUM BROMIDE 10 MG/ML
INJECTION, SOLUTION INTRAVENOUS AS NEEDED
Status: DISCONTINUED | OUTPATIENT
Start: 2021-05-10 | End: 2021-05-10 | Stop reason: HOSPADM

## 2021-05-10 RX ORDER — SODIUM CHLORIDE 9 MG/ML
9 INJECTION, SOLUTION INTRAVENOUS CONTINUOUS
Status: DISCONTINUED | OUTPATIENT
Start: 2021-05-10 | End: 2021-05-11 | Stop reason: HOSPADM

## 2021-05-10 RX ORDER — HEPARIN SODIUM 1000 [USP'U]/ML
INJECTION, SOLUTION INTRAVENOUS; SUBCUTANEOUS AS NEEDED
Status: DISCONTINUED | OUTPATIENT
Start: 2021-05-10 | End: 2021-05-10 | Stop reason: HOSPADM

## 2021-05-10 RX ORDER — ALBUTEROL SULFATE 0.83 MG/ML
2.5 SOLUTION RESPIRATORY (INHALATION)
Status: DISCONTINUED | OUTPATIENT
Start: 2021-05-10 | End: 2021-05-11 | Stop reason: HOSPADM

## 2021-05-10 RX ORDER — MAGNESIUM SULFATE 100 %
4 CRYSTALS MISCELLANEOUS AS NEEDED
Status: DISCONTINUED | OUTPATIENT
Start: 2021-05-10 | End: 2021-05-11 | Stop reason: HOSPADM

## 2021-05-10 RX ORDER — GLYCOPYRROLATE 0.2 MG/ML
INJECTION INTRAMUSCULAR; INTRAVENOUS AS NEEDED
Status: DISCONTINUED | OUTPATIENT
Start: 2021-05-10 | End: 2021-05-10 | Stop reason: HOSPADM

## 2021-05-10 RX ORDER — PROTAMINE SULFATE 10 MG/ML
INJECTION, SOLUTION INTRAVENOUS AS NEEDED
Status: DISCONTINUED | OUTPATIENT
Start: 2021-05-10 | End: 2021-05-10 | Stop reason: HOSPADM

## 2021-05-10 RX ADMIN — ONDANSETRON HYDROCHLORIDE 4 MG: 2 INJECTION, SOLUTION INTRAMUSCULAR; INTRAVENOUS at 12:59

## 2021-05-10 RX ADMIN — KETOROLAC TROMETHAMINE 15 MG: 30 INJECTION, SOLUTION INTRAMUSCULAR; INTRAVENOUS at 23:17

## 2021-05-10 RX ADMIN — FENTANYL CITRATE 100 MCG: 50 INJECTION, SOLUTION INTRAMUSCULAR; INTRAVENOUS at 09:37

## 2021-05-10 RX ADMIN — Medication 120 MCG: at 09:44

## 2021-05-10 RX ADMIN — ROCURONIUM BROMIDE 30 MG: 10 INJECTION INTRAVENOUS at 11:41

## 2021-05-10 RX ADMIN — ROCURONIUM BROMIDE 25 MG: 10 INJECTION INTRAVENOUS at 09:56

## 2021-05-10 RX ADMIN — Medication 80 MCG: at 09:54

## 2021-05-10 RX ADMIN — ROCURONIUM BROMIDE 25 MG: 10 INJECTION INTRAVENOUS at 09:36

## 2021-05-10 RX ADMIN — ACETAMINOPHEN 650 MG: 325 TABLET ORAL at 17:45

## 2021-05-10 RX ADMIN — CEFAZOLIN 2 G: 1 INJECTION, POWDER, FOR SOLUTION INTRAMUSCULAR; INTRAVENOUS at 23:19

## 2021-05-10 RX ADMIN — AMIODARONE HYDROCHLORIDE 200 MG: 200 TABLET ORAL at 18:17

## 2021-05-10 RX ADMIN — HEPARIN SODIUM 5000 UNITS: 1000 INJECTION, SOLUTION INTRAVENOUS; SUBCUTANEOUS at 12:32

## 2021-05-10 RX ADMIN — METHYLPREDNISOLONE SODIUM SUCCINATE 125 MG: 125 INJECTION, POWDER, FOR SOLUTION INTRAMUSCULAR; INTRAVENOUS at 09:26

## 2021-05-10 RX ADMIN — LIDOCAINE HYDROCHLORIDE 40 MG: 20 INJECTION, SOLUTION EPIDURAL; INFILTRATION; INTRACAUDAL; PERINEURAL at 09:03

## 2021-05-10 RX ADMIN — KETOROLAC TROMETHAMINE 30 MG: 30 INJECTION, SOLUTION INTRAMUSCULAR; INTRAVENOUS at 16:13

## 2021-05-10 RX ADMIN — FENTANYL CITRATE 100 MCG: 50 INJECTION, SOLUTION INTRAMUSCULAR; INTRAVENOUS at 09:39

## 2021-05-10 RX ADMIN — METHYLPREDNISOLONE SODIUM SUCCINATE 125 MG: 125 INJECTION, POWDER, FOR SOLUTION INTRAMUSCULAR; INTRAVENOUS at 14:13

## 2021-05-10 RX ADMIN — SODIUM CHLORIDE: 9 INJECTION, SOLUTION INTRAVENOUS at 09:18

## 2021-05-10 RX ADMIN — SODIUM CHLORIDE: 9 INJECTION, SOLUTION INTRAVENOUS at 12:48

## 2021-05-10 RX ADMIN — APIXABAN 5 MG: 5 TABLET, FILM COATED ORAL at 21:19

## 2021-05-10 RX ADMIN — PROPOFOL 50 MG: 10 INJECTION, EMULSION INTRAVENOUS at 11:10

## 2021-05-10 RX ADMIN — FENTANYL CITRATE 50 MCG: 50 INJECTION, SOLUTION INTRAMUSCULAR; INTRAVENOUS at 07:11

## 2021-05-10 RX ADMIN — Medication 10 ML: at 23:39

## 2021-05-10 RX ADMIN — METOPROLOL TARTRATE 25 MG: 25 TABLET, FILM COATED ORAL at 18:17

## 2021-05-10 RX ADMIN — MIDAZOLAM 1 MG: 1 INJECTION INTRAMUSCULAR; INTRAVENOUS at 07:16

## 2021-05-10 RX ADMIN — HEPARIN SODIUM 15000 UNITS: 1000 INJECTION, SOLUTION INTRAVENOUS; SUBCUTANEOUS at 12:10

## 2021-05-10 RX ADMIN — SODIUM CHLORIDE 40 MG: 9 INJECTION, SOLUTION INTRAMUSCULAR; INTRAVENOUS; SUBCUTANEOUS at 14:14

## 2021-05-10 RX ADMIN — WATER 2 G: 1 INJECTION INTRAMUSCULAR; INTRAVENOUS; SUBCUTANEOUS at 12:37

## 2021-05-10 RX ADMIN — PHENYLEPHRINE HYDROCHLORIDE 40 MCG/MIN: 10 INJECTION INTRAVENOUS at 11:41

## 2021-05-10 RX ADMIN — PROTAMINE SULFATE 100 MG: 10 INJECTION, SOLUTION INTRAVENOUS at 12:52

## 2021-05-10 RX ADMIN — ROCURONIUM BROMIDE 30 MG: 10 INJECTION INTRAVENOUS at 09:23

## 2021-05-10 RX ADMIN — NEOSTIGMINE METHYLSULFATE 3 MG: 1 INJECTION, SOLUTION INTRAVENOUS at 12:57

## 2021-05-10 RX ADMIN — ROCURONIUM BROMIDE 20 MG: 10 INJECTION INTRAVENOUS at 09:11

## 2021-05-10 RX ADMIN — PROPOFOL 50 MG: 10 INJECTION, EMULSION INTRAVENOUS at 11:06

## 2021-05-10 RX ADMIN — MIDAZOLAM 1 MG: 1 INJECTION INTRAMUSCULAR; INTRAVENOUS at 07:18

## 2021-05-10 RX ADMIN — CHLORHEXIDINE GLUCONATE 0.12% ORAL RINSE 10 ML: 1.2 LIQUID ORAL at 21:00

## 2021-05-10 RX ADMIN — SODIUM CHLORIDE, POTASSIUM CHLORIDE, SODIUM LACTATE AND CALCIUM CHLORIDE: 600; 310; 30; 20 INJECTION, SOLUTION INTRAVENOUS at 09:31

## 2021-05-10 RX ADMIN — MIDAZOLAM 2 MG: 1 INJECTION INTRAMUSCULAR; INTRAVENOUS at 07:10

## 2021-05-10 RX ADMIN — Medication 10 ML: at 14:14

## 2021-05-10 RX ADMIN — INSULIN LISPRO 2 UNITS: 100 INJECTION, SOLUTION INTRAVENOUS; SUBCUTANEOUS at 18:17

## 2021-05-10 RX ADMIN — Medication 120 MCG: at 09:34

## 2021-05-10 RX ADMIN — METHYLPREDNISOLONE SODIUM SUCCINATE 125 MG: 125 INJECTION, POWDER, FOR SOLUTION INTRAMUSCULAR; INTRAVENOUS at 21:19

## 2021-05-10 RX ADMIN — Medication 10 ML: at 21:20

## 2021-05-10 RX ADMIN — Medication 80 MCG: at 09:23

## 2021-05-10 RX ADMIN — PROPOFOL 200 MG: 10 INJECTION, EMULSION INTRAVENOUS at 09:03

## 2021-05-10 RX ADMIN — ROCURONIUM BROMIDE 25 MG: 10 INJECTION INTRAVENOUS at 10:24

## 2021-05-10 RX ADMIN — GLYCOPYRROLATE 0.4 MG: 0.2 INJECTION, SOLUTION INTRAMUSCULAR; INTRAVENOUS at 12:57

## 2021-05-10 RX ADMIN — Medication 80 MCG: at 09:15

## 2021-05-10 RX ADMIN — MIDAZOLAM 1 MG: 1 INJECTION INTRAMUSCULAR; INTRAVENOUS at 07:20

## 2021-05-10 RX ADMIN — CEFAZOLIN 2 G: 1 INJECTION, POWDER, FOR SOLUTION INTRAMUSCULAR; INTRAVENOUS at 17:45

## 2021-05-10 RX ADMIN — MUPIROCIN: 20 OINTMENT TOPICAL at 23:22

## 2021-05-10 RX ADMIN — INSULIN LISPRO 3 UNITS: 100 INJECTION, SOLUTION INTRAVENOUS; SUBCUTANEOUS at 23:38

## 2021-05-10 RX ADMIN — SUCCINYLCHOLINE CHLORIDE 200 MG: 20 INJECTION, SOLUTION INTRAMUSCULAR; INTRAVENOUS at 09:03

## 2021-05-10 RX ADMIN — ACETAMINOPHEN 650 MG: 325 TABLET ORAL at 21:22

## 2021-05-10 RX ADMIN — WATER 2 G: 1 INJECTION INTRAMUSCULAR; INTRAVENOUS; SUBCUTANEOUS at 09:32

## 2021-05-10 RX ADMIN — FENTANYL CITRATE 50 MCG: 50 INJECTION, SOLUTION INTRAMUSCULAR; INTRAVENOUS at 07:14

## 2021-05-10 NOTE — PROGRESS NOTES
1400- TRANSFER - IN REPORT:    Verbal report received from CRNA; EP Lab RN(name) on Candi Canales  being received from EP Lab(unit) for routine post - op      Report consisted of patients Situation, Background, Assessment and   Recommendations(SBAR). Information from the following report(s) SBAR, Kardex, Intake/Output, Recent Results, Cardiac Rhythm NSR/paced and Alarm Parameters  was reviewed with the receiving nurse. Opportunity for questions and clarification was provided. Assessment completed upon patients arrival to unit and care assumed. Primary Nurse Kay Pike RN and JANEE Quach RN performed a dual skin assessment on this patient No impairment noted  Joseph score is 14    Admission assessment completed; see flow sheet for details. Pt drowsy but arousable; able to follow commands before drifting back off to sleep. Currently in NSR; BP stable. Lungs are clear; diminished in the bases; remains on NC. ABD is semi-soft; BS hypoactive. Tyler catheter in place; adequate UOP. Skin is warm and dry. Sub xyphoid incision is CDI; approximated. ENRRIQUE drain with sanguinous drainage. Pt denies pain/discomfort at this time. Repositioned. 1600- Reassessment completed; see flow sheet for details. Pt much more alert; oriented x4; following commands. HTN; will start scheduled Lopressor tonight. Spouse at the bedside. Pt denies pain/discomfort at this time. Tolerating PO fluids. Started scheduled Toradol as ordered. 1722- Arterial line removed; pressure applied until hemostasis achieved. Dry dressing placed. 1930- Bedside and Verbal shift change report given to Helga Chaudhry (oncoming nurse) by Jodee Trejo RN (offgoing nurse). Report included the following information SBAR, Kardex, Intake/Output, Recent Results, Cardiac Rhythm NSR and Alarm Parameters .

## 2021-05-10 NOTE — PROCEDURES
[address]  [facility]    Name:  Sea Luciano  MR#:  [id number]  :  1960  ACCOUNT #:  [de-identified]  DATE OF SERVICE:  05/10/2021    TRANSESOPHAGEAL ECHOCARDIOGRAPHIC REPORT    SURGEON:  Sander Arnold MD    INDICATION:  A transesophageal echocardiographic exam was requested by the surgeon in order to evaluate real-time cardiac and valvular form and function in a patient with known atrial fibrillation, scheduled for a transpericardial hybrid ablation. The transesophageal echocardiographic probe was easily and atraumatically inserted into the patient's esophagus while the patient was sedated inside the operating room under general anesthesia. Modalities incorporated included 2-D, 3-D, color-flow mode, pulsed-wave Doppler and continuous wave Doppler. AORTA:    Ascending Aorta: The patient's ascending aorta measured 3.4 cm in diameter. There was no evidence of dissection. There was minimal and nonmobile plaque. Aortic Arch: The aortic arch measured 3.2 cm in diameter. There was no evidence of dissection. There was minimal and nonmobile plaque. Descending Aorta: The descending aorta measured 3.3 cm in diameter. There was no evidence of dissection. There was mild and nonmobile plaque. VALVES:    Aortic Valve: The aortic valve annulus measured 2.3 cm. There was no aortic valve stenosis with a maximum velocity of 127 cm/sec, a peak gradient of 6 mmHg and a mean gradient of 2 mmHg. There was no significant aortic insufficiency. There were Lambl's excrescences visualized on the aortic valve. Otherwise, leaflet morphology and motion were both normal.    Mitral Valve:  Mitral valve annulus was enlarged at 4 cm. There was no mitral valve stenosis. There was trace mitral insufficiency. Mitral valve leaflet morphology and motion were normal.  The mitral valve area traced with three-dimensional planimetry was 4.6 cm2. Tricuspid Valve:   The tricuspid valve annulus measured 3. 6 cm. There was no significant tricuspid stenosis. There was trace tricuspid insufficiency. The tricuspid leaflet morphology and motion were both normal.    Pulmonic Valve: The pulmonic valve annulus measured 2.9 cm. There was trace pulmonic insufficiency. There was no pulmonic stenosis. The pulmonic leaflet morphology and motion were both normal.    ATRIA:    Right Atrium:  Right atrial size measured 4.7 cm. There was no spontaneous echo contrast.  There was no right atrial thrombus or tumor. Left Atrium:  The left atrial size measured 4.3 cm. There was no spontaneous echo contrast.  There was no left atrial thrombus or tumor. Left atrial appendage: There was no thrombus visualized within the left atrial appendage. Multiple views of the appendage were visualized including, but not limited to 0, 45, 90 and 135 degrees. Pulsed-wave Doppler within the appendage measured 46 cm/sec. Interatrial Septum:  The interatrial septum morphology was normal.  There was no patent foramen ovale with color-flow mode. VENTRICLES:    Right Ventricle: The right ventricular major axis was 6.7 cm. There was borderline right ventricular hypertrophy. There was no right ventricular thrombus and the right ventricular ejection fraction was normal.    Left Ventricle: The left ventricular cavity size measured 8.7 cm axis. There was no left ventricular hypertrophy. There was no left ventricular thrombus and the left ventricular ejection fraction was calculated at 60%. Interventricular Septum:  The interventricular septum morphology was normal.    REGIONAL FUNCTION:  There were no isolated regional wall motion abnormalities.     PERICARDIUM:  The pericardium is normal.    PLEURAE:  The pleurae were normal.    POST INTERVENTION FOLLOWUP STUDY:  These results were discussed and viewed with the cardiac surgical team.  The transesophageal echocardiographic probe was easily and atraumatically removed from the patient's esophagus. The patient tolerated the procedure without event.       Dinora Garibay DO      [id number]  D:  05/10/2021 10:50  T:  05/10/2021 15:59  JOB #:  [id number]

## 2021-05-10 NOTE — PROGRESS NOTES
PT note:     Orders received and acknowledged. Chart reviewed and noted patient underwent hybrid ablation this date. Per guidelines, will defer today and follow up tomorrow for PT evaluation.      Macie Hart, PT, DPT

## 2021-05-10 NOTE — PROGRESS NOTES
Cardiac Surgery Care Coordinator- called the wife of Sergio Núñez, introduced role of the Cardiac Surgery Coordinator. Reviewed plan of care and day of surgery expectations. Provided family with an update from OR. Encouraged family to verbalize and emotional support given. Will continue to update throughout the day.  Yaritza Katz RN

## 2021-05-10 NOTE — Clinical Note
TRANSFER - IN REPORT:     Verbal report received from: SHLOMO,RN. Report consisted of patient's Situation, Background, Assessment and   Recommendations(SBAR). Opportunity for questions and clarification was provided. Assessment completed upon patient's arrival to unit and care assumed. Patient transported with a Registered Nurse.  patient  transported with OR Staff and Anesthesia/ see OR Record and Anesthesia Record

## 2021-05-10 NOTE — PROGRESS NOTES
Orders received and acknowledged. Chart reviewed and noted patient underwent hybrid ablation this date.  Per guidelines, will defer today and follow up tomorrow for OT evaluation

## 2021-05-10 NOTE — INTERVAL H&P NOTE
Date of Surgery Update: 
Bette Hurtado was seen and examined. History and physical has been reviewed. The patient has been examined. There have been no significant clinical changes since the completion of the originally dated History and Physical. 
 
Signed By: JUS Garcia  May 10, 2021 7:14 AM
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
## 2021-05-10 NOTE — ANESTHESIA PROCEDURE NOTES
Central Line Placement    Performed by: Aren Dawkins DO  Authorized by: Aren Dawkins DO     Indications: vascular access and need for vasopressors  Preanesthetic Checklist: patient identified, risks and benefits discussed, anesthesia consent, site marked, patient being monitored and timeout performed      Pre-procedure: All elements of maximal sterile barrier technique followed?  Yes    2% Chlorhexidine for cutaneous antisepsis, Hand hygiene performed prior to catheter insertion and Ultrasound guidance    Sterile Ultrasound Technique followed?: Yes            Procedure:   Prep:  Chlorhexidine    Orientation:  Right  Patient position:  Trendelenburg  Catheter type:  Triple lumen  Catheter size:  8 Fr  Catheter length:  16 cm  Number of attempts:  1  Successful placement: Yes      Assessment:   Post-procedure:  Catheter secured, sterile dressing applied and sterile dressing with CHG applied  Assessment:  Blood return through all ports, free fluid flow and guidewire removal verified  Insertion:  Uncomplicated  Patient tolerance:  Patient tolerated the procedure well with no immediate complications

## 2021-05-10 NOTE — PERIOP NOTES
TRANSFER - OUT REPORT:    Verbal report given to HEDY Parker RN and JANEE Ochoa on Chio Andino  being transferred to EP Lab and CCU respectively for routine progression of care       Report consisted of patients Situation, Background, Assessment and   Recommendations(SBAR). Information from the following report(s) SBAR, OR Summary and Procedure Summary was reviewed with the receiving nurse. Lines:   Triple Lumen 05/10/21 Right (Active)       Peripheral IV 05/10/21 Right Forearm (Active)   Site Assessment Clean, dry, & intact 05/10/21 0727   Phlebitis Assessment 0 05/10/21 0727   Infiltration Assessment 0 05/10/21 0727   Dressing Status Clean, dry, & intact 05/10/21 0727   Dressing Type Tape;Transparent 05/10/21 0727   Hub Color/Line Status Pink; Infusing 05/10/21 0727       Arterial Line 05/10/21 Left Radial artery (Active)        Opportunity for questions and clarification was provided.       Patient transported with:   Monitor  O2 @ 10 liters via ET tube and AMBU Bag ventilations accompanied by EMEKA HERNÁNDEZ and RN Circulator

## 2021-05-10 NOTE — BRIEF OP NOTE
BRIEF POSTOPERATIVE NOTE    Patient: Jose Hyatt  YOB: 1960  MRN: [id number]    Pre-Op Diagnosis: A FIB    Post-Op Diagnosis: A FIB      Procedure:   Transpericardial Hybrid Ablation with KRISTIAN    Surgeon: Mary Carmen Sanders MD    Assistant(s): JUS Gómez    Anesthesia: General     Infusions/Support: IVF, Yonis    Estimated Blood Loss (mL): 30    Cell Saver (mL): 0    Specimens: * No specimens in log *    Drains and pacing wires: 1 ananth drain    Complications: none    Findings: AFib    Implants: * No implants in log *    Electronically Signed by JUS Somers on 05/10/21 at 10:56 AM

## 2021-05-10 NOTE — ANESTHESIA PREPROCEDURE EVALUATION
Relevant Problems   CARDIOVASCULAR   (+) Atrial fibrillation (HCC)   (+) Benign essential hypertension   (+) Coronary artery disease   (+) PAF (paroxysmal atrial fibrillation) (HCC)   (+) Tachy-amina syndrome (HCC)      ENDOCRINE   (+) Arthritis   (+) Controlled type 2 diabetes mellitus with complication, without long-term current use of insulin (HCC)   (+) Severe obesity (HCC)   (+) Type 2 diabetes mellitus (HCC)   (+) Type 2 diabetes with nephropathy (HCC)       Anesthetic History   No history of anesthetic complications            Review of Systems / Medical History  Patient summary reviewed, nursing notes reviewed and pertinent labs reviewed    Pulmonary  Within defined limits                 Neuro/Psych   Within defined limits           Cardiovascular    Hypertension  Valvular problems/murmurs: mitral insufficiency      Dysrhythmias : atrial fibrillation  CAD, cardiac stents and hyperlipidemia    Exercise tolerance: >4 METS  Comments: PAF    Patient taking Apixaban (Eliquis)    ECG (5/4/21): Atrial-paced rhythm   Nonspecific T wave abnormality     TTE (3/19/21):  ·LV: Estimated LVEF is 55 - 60%. Normal cavity size, wall thickness, systolic function (ejection fraction normal) and diastolic function. Wall motion: normal.  ·LA: Severely dilated left atrium. ·MV: Mild mitral valve regurgitation is present. ·AO: Mild ascending aorta dilatation. Ascending aorta diameter = 4.2 cm.    GI/Hepatic/Renal  Within defined limits              Endo/Other    Diabetes: type 2    Obesity, morbid obesity, arthritis and anemia    Comments: S/P Bilateral Knee Replacements Other Findings            Physical Exam    Airway  Mallampati: II  TM Distance: > 6 cm  Neck ROM: normal range of motion   Mouth opening: Normal     Cardiovascular    Rhythm: regular  Rate: normal         Dental      Comments: Missing teeth   Pulmonary  Breath sounds clear to auscultation               Abdominal  GI exam deferred       Other Findings Anesthetic Plan    ASA: 3  Anesthesia type: general    Monitoring Plan: BIS and Arterial line      Induction: Intravenous  Anesthetic plan and risks discussed with: Patient

## 2021-05-10 NOTE — ANESTHESIA PROCEDURE NOTES
Arterial Line Placement    Performed by: Dina Jenkins DO  Authorized by: Dina Jenkins DO     Pre-Procedure  Indications:  Arterial pressure monitoring and blood sampling  Preanesthetic Checklist: patient identified, risks and benefits discussed, anesthesia consent, site marked, patient being monitored, timeout performed and patient being monitored      Procedure:   Prep:  ChloraPrep  Seldinger Technique?: Yes    Orientation:  Left  Location:  Radial artery  Catheter size:  20 G  Number of attempts:  1    Assessment:   Post-procedure:  Line secured and sterile dressing applied  Patient Tolerance:  Patient tolerated the procedure well with no immediate complications  Comment:   Collateral perfusion verified

## 2021-05-10 NOTE — Clinical Note
TRANSFER - OUT REPORT:     Verbal report given to: Nadir High, (at bedside). Report consisted of patient's Situation, Background, Assessment and   Recommendations(SBAR). Opportunity for questions and clarification was provided. Patient transported with a Registered Nurse, Monitor and Oxygen. Oxygen used for patient = nasal cannula, @ 2 - 6 Liters. Patient transported to: CCU, 2546.    patient transported with CRNA and EP Lab staff

## 2021-05-10 NOTE — Clinical Note
Sheath #1: sheath exchanged for [employer] 8.5F 22MM MED CRV -- CARTO VIZIGO. Hemostasis achieved.  8fr sheath RFV removed/ Vizigo sheath advanced over package wire/ aspirated and flushed

## 2021-05-10 NOTE — CONSULTS
[facility]       Critical Care Initial Evaluation Note: 5/10/2021 4:03 PM    POD: 0    PROCEDURE PERFORMED:  Transpericardial epicardial ablation.         HPI:    Kavita Clemons a 56 y.o. BLACK/ male who was referred for evaluation of hybrid ablation by Dr. Jeromy Parham. Pt with [address] significant for a-fib on Eliquis s/p ablation in March and prior PVI, SSS s/p PPM 3/22/21, CAD w/ prior stents several years ago, DM, HTN, HLD, femur fx and repair last year, and arthritis.      Pt reports an episode of dizziness and syncope while at work in February. He was placed on a Holter monitor which revealed a-fib w/ RVR, started on dilt and Eliquis, had already been on BB therapy. He then developed SSS, medications were adjusted but did not resolve, therefore had PPM insertion on 3/22/21. In terms of his a-fib, he has failed rate control with medications and PVI, had cryo ablation in March by Dr. Jeromy Parham, but remains in a-fib a majority of the time. Pt reports some fatigue, no longer has any dizziness since pacer inserted. He had reported SOB previously per records but denies any SOB while in clinic today. He denies any palpitations even with a-fib, no CP, SOB/KRAMER or orthopnea. He has chronic lower ext edema, worse at end of the day.      Pt has a remote history of tobacco abuse, quit in 1989, drinks alcohol socially only 1-2 times per month. He has been in and out of work since his femur fracture last year and now due to his prior dizziness/syncopal episode. He is otherwise independent in his ADL's.      Cardiac Testing     Cardiac catheterization: n/a      ECHO 3/19/21:   · LV: Estimated LVEF is 55 - 60%. Normal cavity size, wall thickness, systolic function (ejection fraction normal) and diastolic function. Wall motion: normal.  · LA: Severely dilated left atrium. · MV: Mild mitral valve regurgitation is present. · AO: Mild ascending aorta dilatation.  Ascending aorta diameter = 4.2 cm.      Echo Findings     Left Ventricle Normal cavity size, wall thickness, systolic function (ejection fraction normal) and diastolic function. Wall motion: normal. The estimated EF is 55 - 60%. Wall Scoring The left ventricular wall motion is normal.             Left Atrium Severely dilated left atrium. Right Ventricle Normal cavity size and global systolic function. Right Atrium Normal cavity size. Aortic Valve Trileaflet valve structure, no stenosis and no regurgitation. Aortic valve sclerosis. Mitral Valve Normal valve structure and no stenosis. Mild regurgitation. Tricuspid Valve Normal valve structure and no stenosis. Trace regurgitation. Pulmonic Valve Normal valve structure, no stenosis and no regurgitation. Aorta Normal aortic root. Mildly dilated ascending aorta; diameter is 4.2 cm. Pulmonary Artery Pulmonary hypertension not suggested by Doppler findings. IVC/Hepatic Veins Normal structure. Normal central venous pressure (3 mmHg); IVC diameter is less than 21 mm and collapses more than 50% with respiration. Pericardium No evidence of pericardial effusion.                   Past Medical History:   Diagnosis Date    Arthritis     knees    Atrial fibrillation with RVR (HCC)     Benign essential hypertension 6/10/2011    CAD (coronary artery disease)     Diabetes ([facility]     DX  AGE 33      Hypercholesterolemia     Hypertriglyceridemia     Obesity     PAF (paroxysmal atrial fibrillation) ([facility] 12/12/2018      Past Surgical History:   Procedure Laterality Date    CARDIAC CATHETERIZATION  6/9/2011         CARDIAC CATHETERIZATION  11/1/2012         HC ANGIOSEAL VIP 6FR  6/9/2011         HX AFIB ABLATION  03/15/2021    HX ORTHOPAEDIC      left knee replacement    HX ORTHOPAEDIC      R knee replacement  2010 -Андрей Mayorga.     INTRACARD ECHO, THER/DX INTERVENT N/A 3/15/2021    Intracardiac Echocardiogram performed by Chad Thompson MD at [address] [place] [address] UNLIST      STENT RCA  6/11    NH DRUG-ELUTING STENTS, SINGLE  6/9/2011         NH EPHYS EVL TRNSPTL TX ATRIAL FIB ISOLAT PULM VEIN N/A 3/15/2021    ABLATION A-FIB  W COMPLETE EP STUDY performed by Zia Kenny MD at [facility] CARDIAC CATH LAB    NH INS NEW/RPLCMT PRM PM W/TRANSV ELTRD ATRIAL&VENT N/A 3/22/2021    INSERT PPM DUAL performed by Zia Kenny MD at [facility] CARDIAC CATH LAB    NH INTRACARDIAC ELECTROPHYSIOLOGIC 3D MAPPING N/A 3/15/2021    Ep 3d Mapping performed by Zia Kenny MD at [facility] LAB      Prior to Admission medications    Medication Sig Start Date End Date Taking? Authorizing Provider   amiodarone (CORDARONE) 200 mg tablet TAKE 1 TAB BY MOUTH TWO (2) TIMES A DAY FOR 30 DAYS. 5/3/21 6/2/21 Yes Rylee Melendez ANP   rosuvastatin (CRESTOR) 20 mg tablet TAKE 1 TABLET BY MOUTH EVERY DAY FOR CHOLESTEROL 4/16/21  Yes Sarah Wallace MD   metoprolol tartrate (LOPRESSOR) 25 mg tablet Take 1 Tab by mouth two (2) times a day for 90 days. 4/1/21 6/30/21 Yes Zia Kenny MD   furosemide (LASIX) 40 mg tablet 1-2 tabs po daily as needed for fluid 1/12/21  Yes Sarah Wallace MD   glipiZIDE (GLUCOTROL) 10 mg tablet TAKE 1 TABLET BY MOUTH TWO (2) TIMES A DAY. FOR DIABETES 12/29/20  Yes Sarah Wallace MD   metFORMIN (GLUCOPHAGE) 1,000 mg tablet Take 1 Tab by mouth two (2) times daily (with meals). 8/28/20  Yes Sarah Wallace MD   gemfibroziL (LOPID) 600 mg tablet Take 1 Tab by mouth two (2) times daily as needed (cholesterol). Patient taking differently: Take 600 mg by mouth two (2) times a day. 8/28/20  Yes Sarah Wallace MD   magnesium oxide (MAG-OX) 400 mg tablet TAKE 2 TABLETS BY MOUTH EVERY DAY 8/27/20  Yes Sarah Wallace MD   empagliflozin (Jardiance) 10 mg tablet Take 1 Tab by mouth daily. For diabetes 7/28/20  Yes Sarah Wallace MD   mupirocin [facility]) 2 % ointment by Both Nostrils route two (2) times a day.  5/8/21   Mickey Orf, NP chlorhexidine (PERIDEX) 0.12 % solution 15 mL by Swish and Spit route every twelve (12) hours for 14 days. 21  Joyce Aaron NP   enoxaparin (LOVENOX) 120 mg/0.8 mL injection 120 mg by SubCUTAneous route every twelve (12) hours for 1 day. 21  Joyce Aaron NP   apixaban (ELIQUIS) 5 mg tablet Take 1 Tab by mouth two (2) times a day. 21   Norris Urban NP   ACETAMINOPHEN PO Take 500 mg by mouth as needed. 1-2 tabs prn as needed    Provider, Historical   semaglutide (Ozempic) 1 mg/dose (2 mg/1.5 mL) sub-q pen 1 MG BY SUBCUTANEOUS ROUTE EVERY SEVEN (7) DAYS. 21   Kiran Regalado MD   ibuprofen (MOTRIN) 800 mg tablet Take 1 Tab by mouth every eight (8) hours as needed for Pain. 21   Kiran Regalado MD   nitroglycerin (NITROSTAT) 0.4 mg SL tablet 1 Tab by SubLINGual route every five (5) minutes as needed for Chest Pain (call 911 if CP/ SOB not relieved by 3 tabs). 19   Kiran Regalado MD   aspirin delayed-release 81 mg tablet Take  by mouth daily. Provider, Historical   omega-3 fatty acids-vitamin e (FISH OIL) 1,000 mg Cap Take 2 Caps by mouth two (2) times a day.  11   Kiran Regalado MD     Allergies   Allergen Reactions    Lisinopril Cough    Losartan Other (comments)     Hypotension        Social History     Tobacco Use    Smoking status: Former Smoker     Types: Cigarettes     Quit date: 11/10/1989     Years since quittin.5    Smokeless tobacco: Never Used   Substance Use Topics    Alcohol use: Yes     Frequency: Monthly or less     Comment: social  DRINKS ONE WEEKEND A MONTH/\"BIG IMPROVEMENT\"      Family History   Problem Relation Age of Onset    Diabetes Mother     Hypertension Mother     Elevated Lipids Mother     Cancer Mother         unsure    Diabetes Father     Heart Disease Father     Hypertension Father     Elevated Lipids Father     Diabetes Brother     Suicide Brother     Diabetes Brother         Review of Systems    Denies any chest pain or shortness of breath    Objective:     Visit Vitals  /63   Pulse 75   Temp (P) 98.7 °F (37.1 °C)   Resp 13   Ht 6' 1\" (1.854 m)   Wt 130.2 kg (287 lb 0.6 oz)   SpO2 96%   BMI 37.87 kg/m²       05/10 0701 - 05/10 1900  In: 1350 [I.V.:1350]  Out: 840 [Urine:175; Drains:40]  No intake/output data recorded. Physical Exam:     Well built   Resting comfortably  HEENT: normal  Neck : no JVD, supple  Heart: s1,s2  Lungs: clear  Abd: soft  Ext: no edema  Cns : no focal deficit  Opens eyes and answers appropriately  Data Review     Recent Results (from the past 24 hour(s))   GLUCOSE, POC    Collection Time: 05/10/21  7:20 AM   Result Value Ref Range    Glucose (POC) 162 (H) 65 - 100 mg/dL    Performed by Colin Abbasi    PROTHROMBIN TIME + INR    Collection Time: 05/10/21  8:20 AM   Result Value Ref Range    INR 1.0 0.9 - 1.1      Prothrombin time 10.6 9.0 - 89.4 sec   METABOLIC PANEL, COMPREHENSIVE    Collection Time: 05/10/21  1:55 PM   Result Value Ref Range    Sodium 138 136 - 145 mmol/L    Potassium 4.1 3.5 - 5.1 mmol/L    Chloride 109 (H) 97 - 108 mmol/L    CO2 25 21 - 32 mmol/L    Anion gap 4 (L) 5 - 15 mmol/L    Glucose 218 (H) 65 - 100 mg/dL    BUN 18 6 - 20 MG/DL    Creatinine 0.91 0.70 - 1.30 MG/DL    BUN/Creatinine ratio 20 12 - 20      GFR est AA >60 >60 ml/min/1.73m2    GFR est non-AA >60 >60 ml/min/1.73m2    Calcium 8.2 (L) 8.5 - 10.1 MG/DL    Bilirubin, total 0.3 0.2 - 1.0 MG/DL    ALT (SGPT) 11 (L) 12 - 78 U/L    AST (SGOT) 16 15 - 37 U/L    Alk.  phosphatase 68 45 - 117 U/L    Protein, total 6.6 6.4 - 8.2 g/dL    Albumin 3.1 (L) 3.5 - 5.0 g/dL    Globulin 3.5 2.0 - 4.0 g/dL    A-G Ratio 0.9 (L) 1.1 - 2.2     MAGNESIUM    Collection Time: 05/10/21  1:55 PM   Result Value Ref Range    Magnesium 2.0 1.6 - 2.4 mg/dL   CBC WITH AUTOMATED DIFF    Collection Time: 05/10/21  1:55 PM   Result Value Ref Range    WBC 10.2 4.1 - 11.1 K/uL    RBC 4.46 4.10 - 5.70 M/uL    HGB 11.8 (L) 12.1 - 17.0 g/dL    HCT 37.7 36.6 - 50.3 %    MCV 84.5 80.0 - 99.0 FL    MCH 26.5 26.0 - 34.0 PG    MCHC 31.3 30.0 - 36.5 g/dL    RDW 15.7 (H) 11.5 - 14.5 %    PLATELET 223 236 - 848 K/uL    MPV 9.1 8.9 - 12.9 FL    NRBC 0.0 0  WBC    ABSOLUTE NRBC 0.00 0.00 - 0.01 K/uL    NEUTROPHILS 88 (H) 32 - 75 %    LYMPHOCYTES 7 (L) 12 - 49 %    MONOCYTES 4 (L) 5 - 13 %    EOSINOPHILS 0 0 - 7 %    BASOPHILS 0 0 - 1 %    IMMATURE GRANULOCYTES 1 (H) 0.0 - 0.5 %    ABS. NEUTROPHILS 9.0 (H) 1.8 - 8.0 K/UL    ABS. LYMPHOCYTES 0.7 (L) 0.8 - 3.5 K/UL    ABS. MONOCYTES 0.4 0.0 - 1.0 K/UL    ABS. EOSINOPHILS 0.0 0.0 - 0.4 K/UL    ABS. BASOPHILS 0.0 0.0 - 0.1 K/UL    ABS. IMM. GRANS. 0.1 (H) 0.00 - 0.04 K/UL    DF SMEAR SCANNED      RBC COMMENTS NORMOCYTIC, NORMOCHROMIC     PTT    Collection Time: 05/10/21  1:55 PM   Result Value Ref Range    aPTT 23.9 22.1 - 31.0 sec    aPTT, therapeutic range     58.0 - 77.0 SECS   PROTHROMBIN TIME + INR    Collection Time: 05/10/21  1:55 PM   Result Value Ref Range    INR 1.0 0.9 - 1.1      Prothrombin time 10.9 9.0 - 11.1 sec   EKG, 12 LEAD, INITIAL    Collection Time: 05/10/21  2:22 PM   Result Value Ref Range    Ventricular Rate 75 BPM    Atrial Rate 75 BPM    P-R Interval 210 ms    QRS Duration 94 ms    Q-T Interval 416 ms    QTC Calculation (Bezet) 464 ms    Calculated R Axis -14 degrees    Calculated T Axis 9 degrees    Diagnosis       Atrial-paced rhythm with prolonged AV conduction  Nonspecific T wave abnormality    Confirmed by Padmini Freed MD, Scar Choi ([id number]) on 5/10/2021 3:35:28 PM       CXR: EXAM: Portable CXR. 1334 hours.       INDICATION: postop transpericardial hybrid ablation for atrial fibrillation     FINDINGS:  Right IJ CVL tip is in the SVC. The lungs show low volumes with mild haziness  favoring atelectasis. Heart is top normal in size. There is no overt pulmonary  edema. There is no evident pneumothorax or pleural effusion.  Pacemaker is  present.     IMPRESSION  No pneumothorax.     Assessment:     Active Problems:    Benign essential hypertension (6/10/2011)      Controlled type 2 diabetes mellitus with complication, without long-term current use of insulin ([address] (12/12/2018)      PAF (paroxysmal atrial fibrillation) ([facility] [address] (12/12/2018)      A-fib ([address] (5/10/2021)        Plan:       1) PAF : s/p ablation   solumedrol 125 mg q 12 hrs to prevent pericarditis  eliquis to be resumed in am?    2) HTN : all PTA meds on hold at present    3) DM : sliding scale insulin    4) CAD : s/p stent placement many years ago  Will start ASA in am       Angy Sheldon MD  [facility]

## 2021-05-11 ENCOUNTER — APPOINTMENT (OUTPATIENT)
Dept: GENERAL RADIOLOGY | Age: 61
DRG: 229 | End: 2021-05-11
Attending: PHYSICIAN ASSISTANT
Payer: COMMERCIAL

## 2021-05-11 VITALS
TEMPERATURE: 98.1 F | RESPIRATION RATE: 16 BRPM | DIASTOLIC BLOOD PRESSURE: 55 MMHG | BODY MASS INDEX: 38.04 KG/M2 | HEART RATE: 75 BPM | SYSTOLIC BLOOD PRESSURE: 113 MMHG | WEIGHT: 287.04 LBS | OXYGEN SATURATION: 97 % | HEIGHT: 73 IN

## 2021-05-11 LAB
ACT BLD: 120 SECS (ref 79–138)
ACT BLD: 224 SECS (ref 79–138)
ACT BLD: 230 SECS (ref 79–138)
ALBUMIN SERPL-MCNC: 2.9 G/DL (ref 3.5–5)
ALBUMIN/GLOB SERPL: 0.8 {RATIO} (ref 1.1–2.2)
ALP SERPL-CCNC: 68 U/L (ref 45–117)
ALT SERPL-CCNC: 12 U/L (ref 12–78)
ANION GAP SERPL CALC-SCNC: 7 MMOL/L (ref 5–15)
AST SERPL-CCNC: 18 U/L (ref 15–37)
BILIRUB SERPL-MCNC: 0.4 MG/DL (ref 0.2–1)
BUN SERPL-MCNC: 28 MG/DL (ref 6–20)
BUN/CREAT SERPL: 25 (ref 12–20)
CALCIUM SERPL-MCNC: 8.2 MG/DL (ref 8.5–10.1)
CHLORIDE SERPL-SCNC: 104 MMOL/L (ref 97–108)
CO2 SERPL-SCNC: 24 MMOL/L (ref 21–32)
CREAT SERPL-MCNC: 1.11 MG/DL (ref 0.7–1.3)
ERYTHROCYTE [DISTWIDTH] IN BLOOD BY AUTOMATED COUNT: 15.4 % (ref 11.5–14.5)
GLOBULIN SER CALC-MCNC: 3.8 G/DL (ref 2–4)
GLUCOSE BLD STRIP.AUTO-MCNC: 228 MG/DL (ref 65–117)
GLUCOSE BLD STRIP.AUTO-MCNC: 270 MG/DL (ref 65–117)
GLUCOSE BLD STRIP.AUTO-MCNC: 290 MG/DL (ref 65–117)
GLUCOSE BLD STRIP.AUTO-MCNC: 336 MG/DL (ref 65–117)
GLUCOSE SERPL-MCNC: 251 MG/DL (ref 65–100)
HCT VFR BLD AUTO: 39.9 % (ref 36.6–50.3)
HGB BLD-MCNC: 12.4 G/DL (ref 12.1–17)
MAGNESIUM SERPL-MCNC: 2.3 MG/DL (ref 1.6–2.4)
MCH RBC QN AUTO: 26.1 PG (ref 26–34)
MCHC RBC AUTO-ENTMCNC: 31.1 G/DL (ref 30–36.5)
MCV RBC AUTO: 84 FL (ref 80–99)
NRBC # BLD: 0 K/UL (ref 0–0.01)
NRBC BLD-RTO: 0 PER 100 WBC
PLATELET # BLD AUTO: 271 K/UL (ref 150–400)
PMV BLD AUTO: 9.3 FL (ref 8.9–12.9)
POTASSIUM SERPL-SCNC: 4.4 MMOL/L (ref 3.5–5.1)
PROT SERPL-MCNC: 6.7 G/DL (ref 6.4–8.2)
RBC # BLD AUTO: 4.75 M/UL (ref 4.1–5.7)
SERVICE CMNT-IMP: ABNORMAL
SODIUM SERPL-SCNC: 135 MMOL/L (ref 136–145)
WBC # BLD AUTO: 11.5 K/UL (ref 4.1–11.1)

## 2021-05-11 PROCEDURE — 74011000258 HC RX REV CODE- 258: Performed by: PHYSICIAN ASSISTANT

## 2021-05-11 PROCEDURE — 74011250636 HC RX REV CODE- 250/636: Performed by: NURSE PRACTITIONER

## 2021-05-11 PROCEDURE — 77010033678 HC OXYGEN DAILY

## 2021-05-11 PROCEDURE — P9045 ALBUMIN (HUMAN), 5%, 250 ML: HCPCS | Performed by: PHYSICIAN ASSISTANT

## 2021-05-11 PROCEDURE — 83735 ASSAY OF MAGNESIUM: CPT

## 2021-05-11 PROCEDURE — 74011250637 HC RX REV CODE- 250/637: Performed by: NURSE PRACTITIONER

## 2021-05-11 PROCEDURE — 97535 SELF CARE MNGMENT TRAINING: CPT

## 2021-05-11 PROCEDURE — 97161 PT EVAL LOW COMPLEX 20 MIN: CPT | Performed by: PHYSICAL THERAPIST

## 2021-05-11 PROCEDURE — 97116 GAIT TRAINING THERAPY: CPT | Performed by: PHYSICAL THERAPIST

## 2021-05-11 PROCEDURE — 99232 SBSQ HOSP IP/OBS MODERATE 35: CPT | Performed by: INTERNAL MEDICINE

## 2021-05-11 PROCEDURE — 74011250637 HC RX REV CODE- 250/637: Performed by: PHYSICIAN ASSISTANT

## 2021-05-11 PROCEDURE — 74011250637 HC RX REV CODE- 250/637: Performed by: INTERNAL MEDICINE

## 2021-05-11 PROCEDURE — 74011636637 HC RX REV CODE- 636/637: Performed by: INTERNAL MEDICINE

## 2021-05-11 PROCEDURE — 74011636637 HC RX REV CODE- 636/637: Performed by: PHYSICIAN ASSISTANT

## 2021-05-11 PROCEDURE — 74011250636 HC RX REV CODE- 250/636: Performed by: PHYSICIAN ASSISTANT

## 2021-05-11 PROCEDURE — 36415 COLL VENOUS BLD VENIPUNCTURE: CPT

## 2021-05-11 PROCEDURE — APPSS15 APP SPLIT SHARED TIME 0-15 MINUTES: Performed by: NURSE PRACTITIONER

## 2021-05-11 PROCEDURE — 74011000250 HC RX REV CODE- 250: Performed by: PHYSICIAN ASSISTANT

## 2021-05-11 PROCEDURE — 71045 X-RAY EXAM CHEST 1 VIEW: CPT

## 2021-05-11 PROCEDURE — 80053 COMPREHEN METABOLIC PANEL: CPT

## 2021-05-11 PROCEDURE — 97165 OT EVAL LOW COMPLEX 30 MIN: CPT

## 2021-05-11 PROCEDURE — 85027 COMPLETE CBC AUTOMATED: CPT

## 2021-05-11 RX ORDER — METHYLPREDNISOLONE 4 MG/1
TABLET ORAL
Qty: 1 DOSE PACK | Refills: 0 | Status: SHIPPED | OUTPATIENT
Start: 2021-05-11 | End: 2021-05-25 | Stop reason: ALTCHOICE

## 2021-05-11 RX ORDER — POLYETHYLENE GLYCOL 3350 17 G/17G
17 POWDER, FOR SOLUTION ORAL
Qty: 14 PACKET | Refills: 0 | Status: SHIPPED | OUTPATIENT
Start: 2021-05-11 | End: 2021-09-21

## 2021-05-11 RX ORDER — INSULIN LISPRO 100 [IU]/ML
10 INJECTION, SOLUTION INTRAVENOUS; SUBCUTANEOUS ONCE
Status: COMPLETED | OUTPATIENT
Start: 2021-05-11 | End: 2021-05-11

## 2021-05-11 RX ORDER — AMOXICILLIN 250 MG
1 CAPSULE ORAL
Qty: 60 TAB | Refills: 0 | Status: SHIPPED | OUTPATIENT
Start: 2021-05-11 | End: 2022-02-15 | Stop reason: SDUPTHER

## 2021-05-11 RX ORDER — METFORMIN HYDROCHLORIDE 500 MG/1
1000 TABLET ORAL 2 TIMES DAILY WITH MEALS
Status: DISCONTINUED | OUTPATIENT
Start: 2021-05-11 | End: 2021-05-11 | Stop reason: HOSPADM

## 2021-05-11 RX ORDER — ENOXAPARIN SODIUM 150 MG/ML
120 INJECTION SUBCUTANEOUS EVERY 12 HOURS
Qty: 2 SYRINGE | Refills: 0 | Status: SHIPPED | OUTPATIENT
Start: 2021-05-11 | End: 2021-05-11

## 2021-05-11 RX ADMIN — Medication 10 ML: at 05:30

## 2021-05-11 RX ADMIN — METOPROLOL TARTRATE 25 MG: 25 TABLET, FILM COATED ORAL at 17:11

## 2021-05-11 RX ADMIN — METHYLPREDNISOLONE SODIUM SUCCINATE 125 MG: 125 INJECTION, POWDER, FOR SOLUTION INTRAMUSCULAR; INTRAVENOUS at 08:17

## 2021-05-11 RX ADMIN — INSULIN LISPRO 10 UNITS: 100 INJECTION, SOLUTION INTRAVENOUS; SUBCUTANEOUS at 14:28

## 2021-05-11 RX ADMIN — ALBUMIN (HUMAN) 12.5 G: 12.5 INJECTION, SOLUTION INTRAVENOUS at 13:21

## 2021-05-11 RX ADMIN — KETOROLAC TROMETHAMINE 15 MG: 30 INJECTION, SOLUTION INTRAMUSCULAR; INTRAVENOUS at 06:00

## 2021-05-11 RX ADMIN — KETOROLAC TROMETHAMINE 15 MG: 30 INJECTION, SOLUTION INTRAMUSCULAR; INTRAVENOUS at 16:27

## 2021-05-11 RX ADMIN — METOPROLOL TARTRATE 25 MG: 25 TABLET, FILM COATED ORAL at 08:19

## 2021-05-11 RX ADMIN — ACETAMINOPHEN 650 MG: 325 TABLET ORAL at 08:19

## 2021-05-11 RX ADMIN — GEMFIBROZIL 600 MG: 600 TABLET ORAL at 17:11

## 2021-05-11 RX ADMIN — DOCUSATE SODIUM 50MG AND SENNOSIDES 8.6MG 1 TABLET: 8.6; 5 TABLET, FILM COATED ORAL at 17:11

## 2021-05-11 RX ADMIN — METFORMIN HYDROCHLORIDE 1000 MG: 500 TABLET ORAL at 16:30

## 2021-05-11 RX ADMIN — CEFAZOLIN 2 G: 1 INJECTION, POWDER, FOR SOLUTION INTRAMUSCULAR; INTRAVENOUS at 06:25

## 2021-05-11 RX ADMIN — GLIPIZIDE 10 MG: 5 TABLET ORAL at 08:08

## 2021-05-11 RX ADMIN — APIXABAN 5 MG: 5 TABLET, FILM COATED ORAL at 08:18

## 2021-05-11 RX ADMIN — ASPIRIN 81 MG: 81 TABLET, CHEWABLE ORAL at 08:18

## 2021-05-11 RX ADMIN — ACETAMINOPHEN 650 MG: 325 TABLET ORAL at 13:12

## 2021-05-11 RX ADMIN — AMIODARONE HYDROCHLORIDE 200 MG: 200 TABLET ORAL at 17:11

## 2021-05-11 RX ADMIN — PANTOPRAZOLE SODIUM 40 MG: 40 TABLET, DELAYED RELEASE ORAL at 08:08

## 2021-05-11 RX ADMIN — GEMFIBROZIL 600 MG: 600 TABLET ORAL at 08:18

## 2021-05-11 RX ADMIN — INSULIN LISPRO 7 UNITS: 100 INJECTION, SOLUTION INTRAVENOUS; SUBCUTANEOUS at 11:56

## 2021-05-11 RX ADMIN — INSULIN LISPRO 5 UNITS: 100 INJECTION, SOLUTION INTRAVENOUS; SUBCUTANEOUS at 16:26

## 2021-05-11 RX ADMIN — DOCUSATE SODIUM 50MG AND SENNOSIDES 8.6MG 1 TABLET: 8.6; 5 TABLET, FILM COATED ORAL at 08:18

## 2021-05-11 RX ADMIN — AMIODARONE HYDROCHLORIDE 200 MG: 200 TABLET ORAL at 08:19

## 2021-05-11 RX ADMIN — Medication 10 ML: at 06:40

## 2021-05-11 RX ADMIN — ACETAMINOPHEN 650 MG: 325 TABLET ORAL at 16:30

## 2021-05-11 RX ADMIN — KETOROLAC TROMETHAMINE 15 MG: 30 INJECTION, SOLUTION INTRAMUSCULAR; INTRAVENOUS at 10:27

## 2021-05-11 RX ADMIN — CEFAZOLIN 2 G: 1 INJECTION, POWDER, FOR SOLUTION INTRAMUSCULAR; INTRAVENOUS at 11:57

## 2021-05-11 RX ADMIN — Medication 10 ML: at 13:09

## 2021-05-11 RX ADMIN — GLIPIZIDE 10 MG: 5 TABLET ORAL at 16:31

## 2021-05-11 RX ADMIN — INSULIN LISPRO 5 UNITS: 100 INJECTION, SOLUTION INTRAVENOUS; SUBCUTANEOUS at 08:06

## 2021-05-11 RX ADMIN — Medication 400 MG: at 17:11

## 2021-05-11 RX ADMIN — CHLORHEXIDINE GLUCONATE 0.12% ORAL RINSE 10 ML: 1.2 LIQUID ORAL at 08:10

## 2021-05-11 RX ADMIN — MUPIROCIN: 20 OINTMENT TOPICAL at 08:10

## 2021-05-11 RX ADMIN — METFORMIN HYDROCHLORIDE 1000 MG: 500 TABLET ORAL at 10:26

## 2021-05-11 RX ADMIN — CEFAZOLIN SODIUM 3 G: 10 INJECTION, POWDER, FOR SOLUTION INTRAVENOUS at 17:09

## 2021-05-11 RX ADMIN — ROSUVASTATIN 20 MG: 20 TABLET, FILM COATED ORAL at 08:19

## 2021-05-11 RX ADMIN — ACETAMINOPHEN 650 MG: 325 TABLET ORAL at 04:30

## 2021-05-11 NOTE — PROGRESS NOTES
[facility] ICU Progress Note    Admit Date: 5/10/2021  POD:  1 Day Post-Op    Procedure:  Procedure(s):  KRISTIAN BY DR LEBLANC - TRANSPERICADIAL HYBRID ABLATION WITH FLOURO        Subjective:   Pt seen with Dr. Jaci Price. Afebrile, on RA. In bed. No complaints. Objective:   Vitals:  Blood pressure (!) 116/59, pulse 75, temperature 98.5 °F (36.9 °C), resp. rate 13, height 6' 1\" (1.854 m), weight 287 lb 0.6 oz (130.2 kg), SpO2 96 %. Temp (24hrs), Av.6 °F (37 °C), Min:98.5 °F (36.9 °C), Max:98.8 °F (37.1 °C)    EKG/Rhythm:  NSR    CT Output: 60 ml/24 hrs (5 ml overnight)    Oxygen Therapy:  Oxygen Therapy  O2 Sat (%): 96 % (21 0800)  Pulse via Oximetry: 75 beats per minute (21 0800)  O2 Device: Nasal cannula (05/10/21 1600)  O2 Flow Rate (L/min): 2 l/min (05/10/21 1600)    CXR:   CXR Results  (Last 48 hours)               21 0446  XR CHEST PORT Final result    Impression:  No significant change. Narrative:  INDICATION:  postop heart       EXAM: CXR Portable. FINDINGS: Portable chest shows support lines/devices show no significant change   since yesterday. There is no apparent pneumothorax. Lungs show no acute   findings. Heart size is stable. There is no overt pulmonary edema. 05/10/21 1350  XR CHEST PORT Final result    Impression:  No pneumothorax. Narrative:  EXAM: Portable CXR. 1334 hours. INDICATION: postop transpericardial hybrid ablation for atrial fibrillation       FINDINGS:   Right IJ CVL tip is in the SVC. The lungs show low volumes with mild haziness   favoring atelectasis. Heart is top normal in size. There is no overt pulmonary   edema. There is no evident pneumothorax or pleural effusion. Pacemaker is   present. Admission Weight: Last Weight   Weight: 287 lb 0.6 oz (130.2 kg) Weight: 287 lb 0.6 oz (130.2 kg)     Intake / Output / Drain:  Current Shift: No intake/output data recorded.   Last 24 hrs.:     Intake/Output Summary (Last 24 hours) at 2021 0908  Last data filed at 2021 9547  Gross per 24 hour   Intake 2130 ml   Output 2355 ml   Net -225 ml       EXAM:  General:  Alert, NAD                                                                                            Lungs:   Clear to auscultation bilaterally. Incision:  No erythema, drainage or swelling. Heart:  Regular rate and rhythm, S1, S2 normal, no murmur, click, rub or gallop. Abdomen:   Soft, non-tender. Bowel sounds normal. No masses,  No organomegaly. Extremities:  No edema. PPP. Neurologic:  Gross motor and sensory apparatus intact. Labs:   Recent Labs     21  0751 21  0418 05/10/21  1355 05/10/21  1355   WBC  --  11.5*  --  10.2   HGB  --  12.4  --  11.8*   HCT  --  39.9  --  37.7   PLT  --  271  --  222   NA  --  135*  --  138   K  --  4.4  --  4.1   BUN  --  28*  --  18   CREA  --  1.11  --  0.91   GLU  --  251*  --  218*   GLUCPOC 270*  --    < >  --    INR  --   --   --  1.0    < > = values in this interval not displayed. Assessment:     Active Problems:    Benign essential hypertension (6/10/2011)      Controlled type 2 diabetes mellitus with complication, without long-term current use of insulin ([facility] (2018)      PAF (paroxysmal atrial fibrillation) ([facility] (2018)      A-fib ([facility] (5/10/2021)         Plan/Recommendations/Medical Decision Makin. Afib s/p hybrid ablation: Cont amio 200 BID, eliquis, metoprolol. On solumedrol and toradol-will dc with medrol dose pack for pericarditis ppx. 2. Hx SSS s/p PPM: Sees Dr. Dacia Castillo    3. HTN: cont metoprolol    4. HLD/high triglycerides: On crestor, gemfibroxil, fish oil PTA, resume    5. DM: resume PTA meds -metfomrin, jardiance, glipizide, ozempic weekly injections. 6. Hx CAD with stents several years ago: On ASA, statin, BB    7. Dispo: DC drain, possibly dc home later today.      Signed By: Jesus Manuel Chaudhry NP

## 2021-05-11 NOTE — PROGRESS NOTES
Problem: Mobility Impaired (Adult and Pediatric)  Goal: *Acute Goals and Plan of Care (Insert Text)  Description: FUNCTIONAL STATUS PRIOR TO ADMISSION: Patient was independent and active without use of DME. 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 Stands majority of the day    [address]: The patient lived with family but did not require assist.    Physical Therapy Goals  Initiated 5/11/2021  1. Patient will move from supine to sit and sit to supine  in bed with modified independence within 7 day(s). 2.  Patient will transfer from bed to chair and chair to bed with modified independence using the least restrictive device within 7 day(s). 3.  Patient will perform sit to stand with modified independence within 7 day(s). 4.  Patient will ambulate with modified independence for 250 feet with the least restrictive device within 7 day(s). 5.  Patient will ascend/descend 12 stairs with 1 handrail(s) with modified independence within 7 day(s). Outcome: Progressing Towards Goal   PHYSICAL THERAPY EVALUATION  Patient: Lalito Rodas (59 y.o. male)  Date: 5/11/2021  Primary Diagnosis: Atrial fibrillation, unspecified type ([facility] [I48.91]  A-fib ([facility] [I48.91]  Procedure(s) (LRB):  KRISTIAN BY DR LEBLANC - TRANSPERICADIAL HYBRID ABLATION WITH FLOURO (N/A) 1 Day Post-Op   Precautions:   Fall    ASSESSMENT  Based on the objective data described below, the patient presents with decreased functional mobility from baseline level of function. Patient currently limited by pain and decreased activity tolerance. Currently needing supervision to come to EOB and CGA for transfers. Amb approx 200 feet with no assistive device with CGA. No overt LOB but has slow marshall. Anticipate he will be okay to DC home with [facility] PT and family assistance.     Other factors to consider for discharge: at risk for falls, below functional baseline     Patient will benefit from skilled therapy intervention to address the above noted impairments. PLAN :  Recommendations and Planned Interventions: bed mobility training, transfer training, gait training, therapeutic exercises, neuromuscular re-education, patient and family training/education, and therapeutic activities      Frequency/Duration: Patient will be followed by physical therapy:  5 times a week to address goals. Recommendation for discharge: (in order for the patient to meet his/her long term goals)  Physical therapy at least 2 days/week in the home       IF patient discharges home will need the following DME: patient owns DME required for discharge         SUBJECTIVE:   Patient stated I feel pretty good getting up and walking.     OBJECTIVE DATA SUMMARY:   HISTORY:    Past Medical History:   Diagnosis Date    Arthritis     knees    Atrial fibrillation with RVR ([facility]     Benign essential hypertension 6/10/2011    CAD (coronary artery disease)     Diabetes ([facility]     DX  AGE 33      Hypercholesterolemia     Hypertriglyceridemia     Obesity     PAF (paroxysmal atrial fibrillation) ([facility] 12/12/2018     Past Surgical History:   Procedure Laterality Date    CARDIAC CATHETERIZATION  6/9/2011         CARDIAC CATHETERIZATION  11/1/2012         HC ANGIOSEAL VIP 6FR  6/9/2011         HX AFIB ABLATION  03/15/2021    HX ORTHOPAEDIC      left knee replacement    HX ORTHOPAEDIC      R knee replacement  2010 -Armenbon Harpreet.     INTRACARD ECHO, THER/DX INTERVENT N/A 3/15/2021    Intracardiac Echocardiogram performed by Vicki Bacon MD at [facility] CARDIAC CATH LAB    NC CARDIAC SURG PROCEDURE UNLIST      STENT RCA  6/11    NC DRUG-ELUTING STENTS, SINGLE  6/9/2011         NC EPHYS EVL TRNSPTL TX ATRIAL FIB ISOLAT PULM VEIN N/A 3/15/2021    ABLATION A-FIB  W COMPLETE EP STUDY performed by Vicki Bacon MD at [facility] CARDIAC CATH LAB    NC INS NEW/RPLCMT PRM PM W/TRANSV ELTRD ATRIAL&VENT N/A 3/22/2021    INSERT PPM DUAL performed by Vicki Bacon MD at [facility] CARDIAC CATH LAB    NC INTRACARDIAC ELECTROPHYSIOLOGIC 3D MAPPING N/A 3/15/2021    Ep 3d Mapping performed by Crystal Herrera MD at \[facility]\"" CARDIAC CATH LAB       Personal factors and/or comorbidities impacting plan of care:     Home Situation  Home Environment: Private residence  # Steps to Enter: 1  One/Two Story Residence: Two story  Living Alone: No  Support Systems: Spouse/Significant Other/Partner  Patient Expects to be Discharged to[de-identified] Private residence  Current DME Used/Available at Home: None    EXAMINATION/PRESENTATION/DECISION MAKING:   Critical Behavior:  Neurologic State: Alert  Orientation Level: Oriented X4  Cognition: Follows commands       Range Of Motion:  AROM: Generally decreased, functional                       Strength:    Strength: Generally decreased, functional        Functional Mobility:  Bed Mobility:  Rolling: Supervision  Supine to Sit: Supervision     Scooting: Supervision  Transfers:  Sit to Stand: Contact guard assistance  Stand to Sit: Contact guard assistance                       Balance:   Sitting: Intact  Standing: Intact  Standing - Static: Good  Standing - Dynamic : Good  Ambulation/Gait Training:  Distance (ft): 200 Feet (ft)  Assistive Device: Gait belt  Ambulation - Level of Assistance: Contact guard assistance     Gait Description (WDL): Exceptions to WDL  Gait Abnormalities: Decreased step clearance;Shuffling gait        Base of Support: Widened     Speed/Lexi: Pace decreased (<100 feet/min); Shuffled; Slow  Step Length: Left shortened;Right shortened       Pain Rating:  No c/o pain     Activity Tolerance:   Fair and requires rest breaks    After treatment patient left in no apparent distress:   Sitting in chair and Call bell within reach    COMMUNICATION/EDUCATION:   The patients plan of care was discussed with: Physical therapist, Occupational therapist, and Registered nurse.      Fall prevention education was provided and the patient/caregiver indicated understanding., Patient/family have participated as able in goal setting and plan of care. , and Patient/family agree to work toward stated goals and plan of care.     Thank you for this referral.  Evelyn Norman, PT, DPT   Time Calculation: 12 mins

## 2021-05-11 NOTE — DISCHARGE INSTRUCTIONS
Cardiac Surgery Specialist    [address] [address]                                          [facility], [place] [address]                                       [address], [address]  Office- [phone]  Fax- [phone]       Office- [phone]  Fax- [phone]  _____________________________________________________________  Dr. Carolyne Mackintosh Dr. Lollie Hedger Dr. Glennette Lares NP Winferd Persons PA-C Nancie Senior PA-C Lord Felty PA-C       [address] [place] [address]     Active Problems:    Benign essential hypertension (6/10/2011)      Controlled type 2 diabetes mellitus with complication, without long-term current use of insulin ([facility] (12/12/2018)      PAF (paroxysmal atrial fibrillation) ([facility] (12/12/2018)      A-fib ([facility] (5/10/2021)        Discharge Date: 5/11/2021     Discharge to: Home    INSTRUCTIONS:     Wash incision with soap and water daily. You can shower.  Check incision for any redness, swelling or drainage.  No strenuous activity for a week.  No driving while taking pain medications.  Your follow-up appointment with Dr Lamar Sargent will be on 5/18/21 at 10:45am. Office is located at [facility], [address] [place] [address] One, [address].  Please call our office at [phone] if you are unable to make this appointment.  Follow up with Dr. Raleigh Aguirre on 5/25/21 at 10:00am.   PLEASE bring your medication bottles to each appointment.  Please sign up for My Chart.  Call the office immediately for any shortness of breath or chest pressure/discomfort. CALL [phone] FOR ANY QUESTIONS OR PROBLEMS.     Signature:___________________________________________________

## 2021-05-11 NOTE — DISCHARGE SUMMARY
[facility] Discharge Summary     Patient ID:  Sergio Núñez  [id number]  89 y.o.  1960    Admit date: 5/10/2021    Discharge date: 5/11/2021     Admitting Physician: Kristy Hollingsworth MD     Referring Cardiologist:  Celia Wetzel    PCP:  Geoffrey Fraser MD    Admitting Diagnoses: afib    Discharge Diagnoses:     Hospital Problems  Date Reviewed: 5/10/2021          Codes Class Noted POA    A-fib [facility]) ICD-10-CM: I48.91  ICD-9-CM: 427.31  5/10/2021 Unknown        Controlled type 2 diabetes mellitus with complication, without long-term current use of insulin ([facility] [address] (Chronic) ICD-10-CM: E11.8  ICD-9-CM: 250.90  12/12/2018 Yes        PAF (paroxysmal atrial fibrillation) (HCC) (Chronic) ICD-10-CM: I48.0  ICD-9-CM: 427.31  12/12/2018 Yes        Benign essential hypertension (Chronic) ICD-10-CM: I10  ICD-9-CM: 401.1  6/10/2011 Yes              Discharged Condition: good    Disposition: home, see patient instructions for treatment and plan    Procedures for this admission:  Procedure(s):  KRISTIAN BY DR Nella Faria - TRANSPERICADIAL HYBRID ABLATION WITH FLOURO    Discharge Medications:      My Medications      START taking these medications      Instructions Each Dose to Equal Morning Noon Evening Bedtime   methylPREDNISolone 4 mg tablet  Commonly known as: MEDROL DOSEPACK    Your last dose was: Your next dose is:         As directed. polyethylene glycol 17 gram packet  Commonly known as: MIRALAX    Your last dose was: Your next dose is: Take 1 Packet by mouth daily as needed for Constipation. 17 g                 senna-docusate 8.6-50 mg per tablet  Commonly known as: PERICOLACE    Your last dose was: Your next dose is: Take 1 Tab by mouth two (2) times daily as needed for Constipation.    1 Tab                    CHANGE how you take these medications      Instructions Each Dose to Equal Morning Noon Evening Bedtime   gemfibroziL 600 mg tablet  Commonly known as: LOPID  What changed: when to take this    Your last dose was: Your next dose is: Take 1 Tab by mouth two (2) times daily as needed (cholesterol). 600 mg                    CONTINUE taking these medications      Instructions Each Dose to Equal Morning Noon Evening Bedtime   ACETAMINOPHEN PO    Your last dose was: Your next dose is: Take 500 mg by mouth as needed. 1-2 tabs prn as needed   500 mg                 amiodarone 200 mg tablet  Commonly known as: CORDARONE    Your last dose was: Your next dose is:         TAKE 1 TAB BY MOUTH TWO (2) TIMES A DAY FOR 30 DAYS. 200 mg                 apixaban 5 mg tablet  Commonly known as: ELIQUIS    Your last dose was: Your next dose is: Take 1 Tab by mouth two (2) times a day. 5 mg                 aspirin delayed-release 81 mg tablet    Your last dose was: Your next dose is: Take  by mouth daily. empagliflozin 10 mg tablet  Commonly known as: Jardiance    Your last dose was: Your next dose is: Take 1 Tab by mouth daily. For diabetes   10 mg                 Fish Oil 1,000 mg Cap  Generic drug: omega-3 fatty acids-vitamin e    Your last dose was: Your next dose is: Take 2 Caps by mouth two (2) times a day. 2 Cap                 furosemide 40 mg tablet  Commonly known as: LASIX    Your last dose was: Your next dose is:         1-2 tabs po daily as needed for fluid                  glipiZIDE 10 mg tablet  Commonly known as: GLUCOTROL    Your last dose was: Your next dose is:         TAKE 1 TABLET BY MOUTH TWO (2) TIMES A DAY. FOR DIABETES                  ibuprofen 800 mg tablet  Commonly known as: MOTRIN    Your last dose was: Your next dose is: Take 1 Tab by mouth every eight (8) hours as needed for Pain. 800 mg                 magnesium oxide 400 mg tablet  Commonly known as: MAG-OX    Your last dose was:      Your next dose is:         TAKE 2 TABLETS BY MOUTH EVERY DAY metFORMIN 1,000 mg tablet  Commonly known as: GLUCOPHAGE    Your last dose was: Your next dose is: Take 1 Tab by mouth two (2) times daily (with meals). 1,000 mg                 metoprolol tartrate 25 mg tablet  Commonly known as: LOPRESSOR    Your last dose was: Your next dose is: Take 1 Tab by mouth two (2) times a day for 90 days. 25 mg                 nitroglycerin 0.4 mg SL tablet  Commonly known as: NITROSTAT    Your last dose was: Your next dose is:         1 Tab by SubLINGual route every five (5) minutes as needed for Chest Pain (call 911 if CP/ SOB not relieved by 3 tabs). 0.4 mg                 Ozempic 1 mg/dose (2 mg/1.5 mL) sub-q pen  Generic drug: semaglutide    Your last dose was: Your next dose is:         1 MG BY SUBCUTANEOUS ROUTE EVERY SEVEN (7) DAYS. rosuvastatin 20 mg tablet  Commonly known as: CRESTOR    Your last dose was:      Your next dose is:         TAKE 1 TABLET BY MOUTH EVERY DAY FOR CHOLESTEROL                     STOP taking these medications    chlorhexidine 0.12 % solution  Commonly known as: PERIDEX        enoxaparin 120 mg/0.8 mL injection  Commonly known as: LOVENOX        mupirocin 2 % ointment  Commonly known as: Luis Alfredo Angus              Where to Get Your Medications      These medications were sent to [address] [place] [address], [address] [place] [address]    Phone: [phone]   · methylPREDNISolone 4 mg tablet  · polyethylene glycol 17 gram packet  · senna-docusate 8.6-50 mg per tablet           HPI: Douglas Favors a 56 y.o. BLACK/ male who was referred for evaluation of hybrid ablation by Dr. Nathalia Becker. Pt with [address] [place] [address] significant for a-fib on Eliquis s/p ablation in March and prior PVI, SSS s/p PPM 3/22/21, CAD w/ prior stents several years ago, DM, HTN, HLD, femur fx and repair last year, and arthritis.      Pt reports an episode of dizziness and syncope while at work in February. He was placed on a Holter monitor which revealed a-fib w/ RVR, started on dilt and Eliquis, had already been on BB therapy. He then developed SSS, medications were adjusted but did not resolve, therefore had PPM insertion on 3/22/21. In terms of his a-fib, he has failed rate control with medications and PVI, had cryo ablation in March by Dr. Reyna Rankin, but remains in a-fib a majority of the time. Pt reports some fatigue, no longer has any dizziness since pacer inserted. He had reported SOB previously per records but denies any SOB while in clinic today. He denies any palpitations even with a-fib, no CP, SOB/KRAMER or orthopnea. He has chronic lower ext edema, worse at end of the day.      Pt has a remote history of tobacco abuse, quit in 1989, drinks alcohol socially only 1-2 times per month. He has been in and out of work since his femur fracture last year and now due to his prior dizziness/syncopal episode. He is otherwise independent in his ADL's.      Cardiac Testing     Cardiac catheterization: n/a      ECHO 3/19/21:   · LV: Estimated LVEF is 55 - 60%. Normal cavity size, wall thickness, systolic function (ejection fraction normal) and diastolic function. Wall motion: normal.  · LA: Severely dilated left atrium. · MV: Mild mitral valve regurgitation is present. · AO: Mild ascending aorta dilatation. Ascending aorta diameter = 4.2 cm.      Echo Findings     Left Ventricle Normal cavity size, wall thickness, systolic function (ejection fraction normal) and diastolic function. Wall motion: normal. The estimated EF is 55 - 60%. Wall Scoring The left ventricular wall motion is normal.             Left Atrium Severely dilated left atrium. Right Ventricle Normal cavity size and global systolic function. Right Atrium Normal cavity size. Aortic Valve Trileaflet valve structure, no stenosis and no regurgitation. Aortic valve sclerosis.    Mitral Valve Normal valve structure and no stenosis. Mild regurgitation. Tricuspid Valve Normal valve structure and no stenosis. Trace regurgitation. Pulmonic Valve Normal valve structure, no stenosis and no regurgitation. Aorta Normal aortic root. Mildly dilated ascending aorta; diameter is 4.2 cm. Pulmonary Artery Pulmonary hypertension not suggested by Doppler findings. IVC/Hepatic Veins Normal structure. Normal central venous pressure (3 mmHg); IVC diameter is less than 21 mm and collapses more than 50% with respiration. Pericardium No evidence of pericardial effusion. Hospital Course: The patient underwent a transpericardial hybrid ablation by Dr. Marissa Fishman on 5/10/21 -see op note for details. He was hemodynamically stable and transferred to the ICU on no gtts. After medical optimization he is being discharged home on POD1. 1. Afib s/p hybrid ablation: Cont amio 200 BID, eliquis, metoprolol. On solumedrol and toradol-will dc with medrol dose pack for pericarditis ppx.      2. Hx SSS s/p PPM: Sees Dr. Cristal Smith     3. HTN: cont metoprolol     4. HLD/high triglycerides: On crestor, gemfibroxil, fish oil PTA, resume     5. DM: resume PTA meds -metfomrin, jardiance, glipizide, ozempic weekly injections.     6. Hx CAD with stents several years ago: On ASA, statin, BB     7. Dispo: DC drain, possibly dc home later today. Referral to outpatient cardiac rehab made.      Discharge Vital Signs:   Visit Vitals  BP (!) 109/57   Pulse 75   Temp 98.5 °F (36.9 °C)   Resp 18   Ht 6' 1\" (1.854 m)   Wt 287 lb 0.6 oz (130.2 kg)   SpO2 95%   BMI 37.87 kg/m²       Labs:   Recent Labs     05/11/21  0751 05/11/21  0418 05/10/21  1355 05/10/21  1355   WBC  --  11.5*  --  10.2   HGB  --  12.4  --  11.8*   HCT  --  39.9  --  37.7   PLT  --  271  --  222   NA  --  135*  --  138   K  --  4.4  --  4.1   BUN  --  28*  --  18   CREA  --  1.11  --  0.91   GLU  --  251*  --  218*   GLUCPOC 270*  --    < >  --    INR  --   --   --  1.0    < > = values in this interval not displayed. Diagnostics: CXR:   CXR Results  (Last 48 hours)               05/11/21 0446  XR CHEST PORT Final result    Impression:  No significant change. Narrative:  INDICATION:  postop heart       EXAM: CXR Portable. FINDINGS: Portable chest shows support lines/devices show no significant change   since yesterday. There is no apparent pneumothorax. Lungs show no acute   findings. Heart size is stable. There is no overt pulmonary edema. 05/10/21 1350  XR CHEST PORT Final result    Impression:  No pneumothorax. Narrative:  EXAM: Portable CXR. 1334 hours. INDICATION: postop transpericardial hybrid ablation for atrial fibrillation       FINDINGS:   Right IJ CVL tip is in the SVC. The lungs show low volumes with mild haziness   favoring atelectasis. Heart is top normal in size. There is no overt pulmonary   edema. There is no evident pneumothorax or pleural effusion. Pacemaker is   present. Patient Instructions/Follow Up Care:  Discharge instructions were reviewed with the patient and family present. Questions were also answered at this time. Prescriptions and medications were reviewed. The patient has a follow up appointment with the Nurse Practitioner or [facility] Assistant on 5/18/21. The patient was also instructed to follow up with his primary care physician as needed. The patient and family were encouraged to call with any questions or concerns.        Signed:  Leah Villar NP  5/11/2021  9:19 AM

## 2021-05-11 NOTE — PROGRESS NOTES
OCCUPATIONAL THERAPY EVALUATION/DISCHARGE  Patient: Chio Andino (82 y.o. male)  Date: 5/11/2021  Primary Diagnosis: Atrial fibrillation, unspecified type ([address] [I48.91]  A-fib ([address] [I48.91]  Procedure(s) (LRB):  KRISTIAN BY DR LEBLANC - TRANSPERICADIAL HYBRID ABLATION WITH FLOURO (N/A) 1 Day Post-Op   Precautions: Fall    ASSESSMENT  Based on the objective data described below, the patient presents with slightly decreased independence in self-care and functional mobility secondary to POD 1 hybrid ablation, general weakness, and decreased activity tolerance. Patient is functioning slightly below his independent baseline for self-care and functional mobility, now completing self-care with independence to SBA and functional mobility with supervision to CGA. Patient completed all ADL tasks without rest breaks and demonstrated no LOB during ambulation. Patient does not have any additional acute OT needs at this time. Anticipate patient can return home with family assistance and supervision. Current Level of Function (ADLs/self-care): independent to SBA for self-care, supervision to CGA for functional mobility     Functional Outcome Measure: The patient scored 80 on the Barthel Index outcome measure which is indicative of 20% functional impairment. Other factors to consider for discharge: lives with family, independent PTA, good safety awareness     PLAN :    Recommendation for discharge: (in order for the patient to meet his/her long term goals)  No skilled occupational therapy/ follow up rehabilitation needs identified at this time. This discharge recommendation:  Has been made in collaboration with the attending provider and/or case management    IF patient discharges home will need the following DME: patient owns DME required for discharge       SUBJECTIVE:   Patient stated I thought you were going to put those socks on for me.     OBJECTIVE DATA SUMMARY:   HISTORY:   Past Medical History:   Diagnosis Date  Arthritis     knees    Atrial fibrillation with RVR (HCC)     Benign essential hypertension 6/10/2011    CAD (coronary artery disease)     Diabetes ([facility] [address]     DX  AGE 33      Hypercholesterolemia     Hypertriglyceridemia     Obesity     PAF (paroxysmal atrial fibrillation) ([facility] [address] 12/12/2018     Past Surgical History:   Procedure Laterality Date    CARDIAC CATHETERIZATION  6/9/2011         CARDIAC CATHETERIZATION  11/1/2012         HC ANGIOSEAL VIP 6FR  6/9/2011         HX AFIB ABLATION  03/15/2021    HX ORTHOPAEDIC      left knee replacement    HX ORTHOPAEDIC      R knee replacement  2010 -[facility].  INTRACARD ECHO, THER/DX INTERVENT N/A 3/15/2021    Intracardiac Echocardiogram performed by Gt Maharaj MD at [facility] CARDIAC CATH LAB    NH CARDIAC SURG PROCEDURE UNLIST      STENT RCA  6/11    NH DRUG-ELUTING STENTS, SINGLE  6/9/2011         NH EPHYS EVL TRNSPTL TX ATRIAL FIB ISOLAT PULM VEIN N/A 3/15/2021    ABLATION A-FIB  W COMPLETE EP STUDY performed by Gt Maharaj MD at [facility] CARDIAC CATH LAB    NH INS NEW/RPLCMT PRM PM W/TRANSV ELTRD ATRIAL&VENT N/A 3/22/2021    INSERT PPM DUAL performed by Gt Maharaj MD at [facility] CARDIAC CATH LAB    NH INTRACARDIAC ELECTROPHYSIOLOGIC 3D MAPPING N/A 3/15/2021    Ep 3d Mapping performed by Gt Maharaj MD at [facility] CARDIAC CATH LAB       Prior Level of Function/Environment/Context: Patient was independent for self-care and functional mobility PTA. Patient still drives and works full-time.    Expanded or extensive additional review of patient history:   Home Situation  Home Environment: Private residence  # Steps to Enter: 1  One/Two Story Residence: Two story  Living Alone: No  Support Systems: Spouse/Significant Other/Partner  Patient Expects to be Discharged to[de-identified] Private residence  Current DME Used/Available at Home: None    EXAMINATION OF PERFORMANCE DEFICITS:  Cognitive/Behavioral Status:  Neurologic State: Alert  Orientation Level: Oriented X4  Cognition: Follows commands  Perception: Appears intact  Perseveration: No perseveration noted  Safety/Judgement: Awareness of environment;Good awareness of safety precautions; Insight into deficits    Hearing: Auditory  Auditory Impairment: None    Vision/Perceptual:    Acuity: Within Defined Limits      Range of Motion:  AROM: Generally decreased, functional    Strength:  Strength: Generally decreased, functional    Coordination:  Coordination: Generally decreased, functional  Fine Motor Skills-Upper: Left Intact; Right Intact    Gross Motor Skills-Upper: Left Intact; Right Intact    Tone & Sensation:  Tone: Normal    Balance:  Sitting: Intact  Standing: Intact  Standing - Static: Good  Standing - Dynamic : Good    Functional Mobility and Transfers for ADLs:  Bed Mobility:  Rolling: Supervision  Supine to Sit: Supervision  Scooting: Supervision    Transfers:  Sit to Stand: Contact guard assistance  Stand to Sit: Contact guard assistance  Bed to Chair: Contact guard assistance    ADL Assessment:  Feeding: Independent  Oral Facial Hygiene/Grooming: Modified Independent  Bathing: Stand-by assistance  Upper Body Dressing: Setup;Supervision  Lower Body Dressing: Setup;Supervision  Toileting: Supervision    ADL Intervention and task modifications:    Grooming  Position Performed: Standing(at sink)  Washing Hands: Modified independent    Upper Body Dressing Assistance  Shirt simulation with hospital gown: Supervision;Set-up    Lower Body Dressing Assistance  Socks: Supervision;Set-up  Leg Crossed Method Used: No  Position Performed: Bending forward method;Seated edge of bed  Cues: Don;Verbal cues provided    Cognitive Retraining  Safety/Judgement: Awareness of environment;Good awareness of safety precautions; Insight into deficits    Functional Measure:  Barthel Index:    Bathin  Bladder: 10  Bowels: 10  Groomin  Dressing: 10  Feeding: 10  Mobility: 10  Stairs: 0  Toilet Use: 10  Transfer (Bed to Chair and Back): 10  Total: 80/100        The Barthel ADL Index: Guidelines  1. The index should be used as a record of what a patient does, not as a record of what a patient could do. 2. The main aim is to establish degree of independence from any help, physical or verbal, however minor and for whatever reason. 3. The need for supervision renders the patient not independent. 4. A patient's performance should be established using the best available evidence. Asking the patient, friends/relatives and nurses are the usual sources, but direct observation and common sense are also important. However direct testing is not needed. 5. Usually the patient's performance over the preceding 24-48 hours is important, but occasionally longer periods will be relevant. 6. Middle categories imply that the patient supplies over 50 per cent of the effort. 7. Use of aids to be independent is allowed. Mennie Barthel., Barthel, D.W. (2155). Functional evaluation: the Barthel Index. [address] [facility] [employer] (14)2. GUCCI Flowers, Denise Stewart, Susy Callahan., [facility], [address] [place] [address] (1999). Measuring the change indisability after inpatient rehabilitation; comparison of the responsiveness of the Barthel Index and Functional [place] Measure. Journal of Neurology, Neurosurgery, and Psychiatry, 66(4), 490-166. Lexis Day, N.J.A, TROY Yao, & Kayla Olson, M.A. (2004.) Assessment of post-stroke quality of life in cost-effectiveness studies: The usefulness of the Barthel Index and the EuroQoL-5D. Quality of Life Research, 13, 301-      Based on the above components, the patient evaluation is determined to be of the following complexity level: LOW   Pain Rating:  Patient did not c/o pain during session.      Activity Tolerance:   Good, tolerates ADLs without rest breaks and SpO2 stable on RA    After treatment patient left in no apparent distress:    Sitting in chair and Call bell within reach    COMMUNICATION/EDUCATION:   The patients plan of care was discussed with: Physical therapist and Registered nurse.      Thank you for this referral.  Kennedy Red, OTR/L  Time Calculation: 16 mins

## 2021-05-11 NOTE — PROGRESS NOTES
0700  Bedside and verbal report from Stacy Ch RN  0800  Assessment completed. Dr. Lashonda Freitas and cardiac team rounding. Plan for discharge later today. 0830  Placed on room air. ENRRIQUE bulb removed. 0900  Took diet fair. 0930  Walked in hallway with PT and OT, then to chair at bedside. Awaiting discharge later today. 1030  Continues up in the chair. Denies complaints. 1200  Reassessment completed. Patient remains up in the chair. BP soft 92/44. FSBS 336. Patient asymptomatic/warm/dry/alert/oriented X 4. Alicia Buckley NP notified. 1400  Continues up in the chair; took diet well. 5% albumin infusing. Dr. Jason Loving evaluated BS issue. Orders received. 1600  Reassessment completed. Remains up in the chair. Walked in hallway. /62 after walking. FSBS [facility], NP updated re: patient status. Will discharge patient tonight. 1750  Discharge instructions reviewed with patient, questions answered for clarification, including activity, wound care, meds, and follow-up appointments. PIV right forearm removed. Patient assisted with getting dressed. Personal belongings with patient including home meds, cell phone, and . Discharged via wheelchair with [employer], PCT.  Per patient, wife is meeting him at the front entrance of the hospital.

## 2021-05-11 NOTE — PROGRESS NOTES
1900- Bedside report rec'd from TEN Priest and assumed care of pt.  2000- assessment completed. 0100- pt resting with eyes closed. 1689- pt awakened, am labs drawn, CXR done,    0445- central line discontinued. 0500- Tyler discontinued.      0700- [facility]

## 2021-05-11 NOTE — ANESTHESIA POSTPROCEDURE EVALUATION
Procedure(s):  KRISTIAN BY DR LEBLANC - TRANSPERICADIAL HYBRID ABLATION WITH FLOURO. general    Anesthesia Post Evaluation        Patient location during evaluation: PACU  Note status: Adequate. Level of consciousness: responsive to verbal stimuli and sleepy but conscious  Pain management: satisfactory to patient  Airway patency: patent  Anesthetic complications: no  Cardiovascular status: acceptable  Respiratory status: acceptable  Hydration status: acceptable  Comments: +Post-Anesthesia Evaluation and Assessment    Patient: Clemente Mckeon MRN: [id number]  SSN: [id number]   YOB: 1960  Age: 56 y.o. Sex: male      Cardiovascular Function/Vital Signs    /62   Pulse 75   Temp 37.1 °C (98.8 °F)   Resp 11   Ht 6' 1\" (1.854 m)   Wt 130.2 kg (287 lb 0.6 oz)   SpO2 96%   BMI 37.87 kg/m²     Patient is status post Procedure(s):  KRISTIAN BY DR LEBLANC - TRANSPERICADIAL HYBRID ABLATION WITH FLOURO. Nausea/Vomiting: Controlled. Postoperative hydration reviewed and adequate. Pain:  Pain Scale 1: Numeric (0 - 10) (05/10/21 2000)  Pain Intensity 1: 0 (05/10/21 2000)   Managed. Neurological Status:   Neuro (WDL): Exceptions to WDL (05/10/21 0700)   At baseline. Mental Status and Level of Consciousness: Arousable. Pulmonary Status:   O2 Device: Nasal cannula (05/10/21 1600)   Adequate oxygenation and airway patent. Complications related to anesthesia: None    Post-anesthesia assessment completed. No concerns. Signed By: Sina Escudero MD    5/11/2021  Post anesthesia nausea and vomiting:  controlled      INITIAL Post-op Vital signs:   Vitals Value Taken Time   /62 05/11/21 0600   Temp 37.1 °C (98.8 °F) 05/11/21 0000   Pulse 75 05/11/21 0658   Resp 10 05/11/21 0658   SpO2 95 % 05/11/21 0658   Vitals shown include unvalidated device data.

## 2021-05-11 NOTE — PROGRESS NOTES
Cardiac Electrophysiology Progress Note            [address], [address] [facility]  [phone]    5/11/2021 8:48 AM    Admit Date: 5/10/2021    Admit Diagnosis: Atrial fibrillation, unspecified type ([facility] [address] [I48.91]  A-fib ([facility] [address] [I48.91]    Subjective:     Tc Xiao   denies chest pain, chest pressure/discomfort, dyspnea, palpitations.  Drains pulled this am.     Visit Vitals  BP (!) 116/59   Pulse 75   Temp 98.5 °F (36.9 °C)   Resp 13   Ht 6' 1\" (1.854 m)   Wt 287 lb 0.6 oz (130.2 kg)   SpO2 96%   BMI 37.87 kg/m²     Current Facility-Administered Medications   Medication Dose Route Frequency    alteplase (CATHFLO) 1 mg in sterile water (preservative free) 1 mL injection  1 mg InterCATHeter PRN    bacitracin 500 unit/gram packet 1 Packet  1 Packet Topical PRN    sodium chloride (NS) flush 5-40 mL  5-40 mL IntraVENous Q8H    sodium chloride (NS) flush 5-40 mL  5-40 mL IntraVENous PRN    albumin human 5% (BUMINATE) solution 12.5 g  12.5 g IntraVENous Q2H PRN    0.45% sodium chloride infusion  10 mL/hr IntraVENous CONTINUOUS    0.9% sodium chloride infusion  9 mL/hr IntraVENous CONTINUOUS    acetaminophen (TYLENOL) tablet 650 mg  650 mg Oral Q4H    oxyCODONE IR (ROXICODONE) tablet 5 mg  5 mg Oral Q4H PRN    oxyCODONE IR (ROXICODONE) tablet 10 mg  10 mg Oral Q4H PRN    naloxone (NARCAN) injection 0.4 mg  0.4 mg IntraVENous PRN    mupirocin (BACTROBAN) 2 % ointment   Both Nostrils BID    ceFAZolin (ANCEF) 2 g in sterile water (preservative free) 20 mL IV syringe  2 g IntraVENous Q6H    ondansetron (ZOFRAN) injection 4 mg  4 mg IntraVENous Q4H PRN    albuterol (PROVENTIL VENTOLIN) nebulizer solution 2.5 mg  2.5 mg Nebulization Q4H PRN    aspirin chewable tablet 81 mg  81 mg Oral DAILY    midazolam (VERSED) injection 1 mg  1 mg IntraVENous Q1H PRN    chlorhexidine (PERIDEX) 0.12 % mouthwash 10 mL  10 mL Oral Q12H    magnesium oxide (MAG-OX) tablet 400 mg  400 mg Oral BID    calcium chloride 1 g in 0.9% sodium chloride 250 mL IVPB  1 g IntraVENous PRN    bisacodyL (DULCOLAX) suppository 10 mg  10 mg Rectal DAILY PRN    senna-docusate (PERICOLACE) 8.6-50 mg per tablet 1 Tab  1 Tab Oral BID    polyethylene glycol (MIRALAX) packet 17 g  17 g Oral DAILY    ELECTROLYTE REPLACEMENT NOTE: Nurse to review Serum Potassium and Magnesuim levels and Initiate Electrolyte Replacement Protocol as needed  1 Each Other PRN    magnesium sulfate 1 g/100 ml IVPB (premix or compounded)  1 g IntraVENous PRN    glucose chewable tablet 16 g  4 Tab Oral PRN    dextrose (D50W) injection syrg 12.5-25 g  12.5-25 g IntraVENous PRN    glucagon (GLUCAGEN) injection 1 mg  1 mg IntraMUSCular PRN    insulin lispro (HUMALOG) injection   SubCUTAneous AC&HS    diphenhydrAMINE (BENADRYL) capsule 25 mg  25 mg Oral Q6H PRN    diphenhydrAMINE (BENADRYL) injection 25 mg  25 mg IntraVENous Q6H PRN    HYDROmorphone (DILAUDID) injection 0.5 mg  0.5 mg IntraVENous Q2H PRN    HYDROmorphone (DILAUDID) injection 1 mg  1 mg IntraVENous Q2H PRN    melatonin tablet 3 mg  3 mg Oral QHS PRN    pantoprazole (PROTONIX) tablet 40 mg  40 mg Oral ACB    potassium chloride 20 mEq in 50 ml IVPB  20 mEq IntraVENous Q2H PRN    potassium chloride 20 mEq in 50 ml IVPB  20 mEq IntraVENous Q2H PRN    methylPREDNISolone (PF) (Solu-MEDROL) injection 125 mg  125 mg IntraVENous Q12H    amiodarone (CORDARONE) tablet 200 mg  200 mg Oral BID    gemfibroziL (LOPID) tablet 600 mg  600 mg Oral BID    glipiZIDE (GLUCOTROL) tablet 10 mg  10 mg Oral ACB&D    metoprolol tartrate (LOPRESSOR) tablet 25 mg  25 mg Oral BID    nitroglycerin (NITROSTAT) tablet 0.4 mg  0.4 mg SubLINGual Q5MIN PRN    rosuvastatin (CRESTOR) tablet 20 mg  20 mg Oral DAILY    ibuprofen (MOTRIN) tablet 600 mg  600 mg Oral Q6H PRN    bupivacaine (PF) (MARCAINE) 0.5 % (5 mg/mL) injection    PRN    sodium chloride (NS) flush 5-40 mL  5-40 mL IntraVENous Q8H    sodium chloride (NS) flush 5-40 mL  5-40 mL IntraVENous PRN    apixaban (ELIQUIS) tablet 5 mg  5 mg Oral Q12H    ketorolac (TORADOL) injection 15 mg  15 mg IntraVENous Q6H         Objective:      Visit Vitals  BP (!) 116/59   Pulse 75   Temp 98.5 °F (36.9 °C)   Resp 13   Ht 6' 1\" (1.854 m)   Wt 287 lb 0.6 oz (130.2 kg)   SpO2 96%   BMI 37.87 kg/m²       Physical Exam:  Abdomen: soft, non-tender  Extremities: extremities normal  Heart: regular rate and rhythm  Lungs: clear to auscultation bilaterally  Pulses: 2+ and symmetric    Data Review:   Labs:    Recent Labs     05/11/21  0418 05/10/21  1355   WBC 11.5* 10.2   HGB 12.4 11.8*   HCT 39.9 37.7    222     Recent Labs     05/11/21  0418 05/10/21  1355 05/10/21  0820   * 138  --    K 4.4 4.1  --     109*  --    CO2 24 25  --    * 218*  --    BUN 28* 18  --    CREA 1.11 0.91  --    CA 8.2* 8.2*  --    MG 2.3 2.0  --    ALB 2.9* 3.1*  --    TBILI 0.4 0.3  --    ALT 12 11*  --    INR  --  1.0 1.0       No results for input(s): TROIQ, CPK, CKMB in the last 72 hours. Intake/Output Summary (Last 24 hours) at 5/11/2021 0848  Last data filed at 5/11/2021 7884  Gross per 24 hour   Intake 2130 ml   Output 2355 ml   Net -225 ml        Telemetry: nsr    Assessment:     Active Problems:    Benign essential hypertension (6/10/2011)      Controlled type 2 diabetes mellitus with complication, without long-term current use of insulin ([facility] [address] (12/12/2018)      PAF (paroxysmal atrial fibrillation) ([facility] [address] (12/12/2018)      A-fib ([facility] [address] (5/10/2021)        Plan:     Refugia Parkinson is s/p hybrid ablation 5/10/21  (PVI of all 4 PVs confirmed (WACA around RUPV and RFA of inferior portion of RIPV),  Additional RFA focus of roof line). He remains in sinus, drains removed. Possible discharge today. Continue with amiodarone 200 mg bid and eliquis 5 bid. Follow up in 3 weeks. JENNY Butler    5/11/2021  8:48 AM    Patient seen and examined by me with nurse practitioner.   I personally performed all components of the history, physical, and medical decision making and agree with the assessment and plan with minor modifications as noted. Pt is in sinus sp hybrid af abl. Chest tube already pulled. Anticipate dc today. Cont oac and amio.  F/u in 3 weeks as Anjana Collins MD, Susan Irvin

## 2021-05-12 ENCOUNTER — DOCUMENTATION ONLY (OUTPATIENT)
Dept: CASE MANAGEMENT | Age: 61
End: 2021-05-12

## 2021-05-12 ENCOUNTER — PATIENT OUTREACH (OUTPATIENT)
Dept: CASE MANAGEMENT | Age: 61
End: 2021-05-12

## 2021-05-12 NOTE — PROGRESS NOTES
No transition of care outreach indicated due to patient being managed by [facility] Cardiothoracic Surgery Team for 30 days.

## 2021-05-12 NOTE — PROGRESS NOTES
Cardiac Surgery Discharge - Follow up call placed to Kusum Rivero. Reviewed plan of care after discharge and encouraged Kusum Rivero to verbalize. Discussed precautions and reviewed medications, patient without questions regarding medications. Encouraged continued use of the incentive spirometer. He can pull 1500cc. Confirmed follow up appts. Instructed him to call the office with any SOB, chest tightness/pressure or any dizziness/lightheadedness. Kusum Rivero is without questions or concerns.  Cordelia Garcia RN

## 2021-05-14 LAB
ABO + RH BLD: NORMAL
BLD PROD TYP BPU: NORMAL
BLD PROD TYP BPU: NORMAL
BLOOD GROUP ANTIBODIES SERPL: NORMAL
BPU ID: NORMAL
BPU ID: NORMAL
CROSSMATCH RESULT,%XM: NORMAL
CROSSMATCH RESULT,%XM: NORMAL
SPECIMEN EXP DATE BLD: NORMAL
STATUS OF UNIT,%ST: NORMAL
STATUS OF UNIT,%ST: NORMAL
UNIT DIVISION, %UDIV: 0
UNIT DIVISION, %UDIV: 0

## 2021-05-18 ENCOUNTER — OFFICE VISIT (OUTPATIENT)
Dept: CARDIOTHORACIC SURGERY | Age: 61
End: 2021-05-18
Payer: COMMERCIAL

## 2021-05-18 VITALS
HEART RATE: 80 BPM | SYSTOLIC BLOOD PRESSURE: 130 MMHG | TEMPERATURE: 99 F | WEIGHT: 287.04 LBS | HEIGHT: 73 IN | DIASTOLIC BLOOD PRESSURE: 75 MMHG | BODY MASS INDEX: 38.04 KG/M2

## 2021-05-18 DIAGNOSIS — I48.0 PAF (PAROXYSMAL ATRIAL FIBRILLATION) (HCC): Primary | ICD-10-CM

## 2021-05-18 PROCEDURE — 99024 POSTOP FOLLOW-UP VISIT: CPT | Performed by: THORACIC SURGERY (CARDIOTHORACIC VASCULAR SURGERY)

## 2021-05-18 NOTE — PROGRESS NOTES
Patient: Nasir Pelletier   Age: 56 y.o. Patient Care Team:  Jennie Diana MD as PCP - Norbertoblanca Vidal MD as PCP - [facility]  Charu Peoples MD as Physician (Cardiology)  Wilbert Lara MD as Physician (Cardiology)  Ray Cervantes MD (Cardiothoracic Surgery)    Diagnosis: The encounter diagnosis was PAF (paroxysmal atrial fibrillation) ([address] Problem List:   Patient Active Problem List   Diagnosis Code    Hypertriglyceridemia E78.1    Arthritis M19.90    Coronary artery disease I25.10    S/P coronary artery stent placement Z95.5    Type 2 diabetes mellitus (HCC) E11.9    Benign essential hypertension I10    Mitral valve disorders(424.0) I05.9    Mixed hyperlipidemia E78.2    Type 2 diabetes with nephropathy ([address] E11.21    Severe obesity ([address] E66.01    Controlled type 2 diabetes mellitus with complication, without long-term current use of insulin ([facility]) E11.8    PAF (paroxysmal atrial fibrillation) ([address] I48.0    Femur fracture ([address] S72.90XA    Supracondylar fracture of distal end of femur without intracondylar extension ([address] S72.453A    Tachy-amina syndrome ([address] I49.5    Atrial fibrillation ([address] I48.91    A-fib ([address] I48.91        Date of Surgery: 5-10-21    Surgery: Hybrid ablation    HPI: Patient is here for one week follow up. Feels ok. He is more fatigued the past week. Denies increased swelling in his legs, palpitations, and shortness of breath. Current Medications:   Current Outpatient Medications   Medication Sig Dispense Refill    polyethylene glycol (MIRALAX) 17 gram packet Take 1 Packet by mouth daily as needed for Constipation. 14 Packet 0    senna-docusate (PERICOLACE) 8.6-50 mg per tablet Take 1 Tab by mouth two (2) times daily as needed for Constipation. 60 Tab 0    methylPREDNISolone (MEDROL DOSEPACK) 4 mg tablet As directed.  1 Dose Pack 0    amiodarone (CORDARONE) 200 mg tablet TAKE 1 TAB BY MOUTH TWO (2) TIMES A DAY FOR 30 DAYS. 60 Tab 0    rosuvastatin (CRESTOR) 20 mg tablet TAKE 1 TABLET BY MOUTH EVERY DAY FOR CHOLESTEROL 90 Tab 3    metoprolol tartrate (LOPRESSOR) 25 mg tablet Take 1 Tab by mouth two (2) times a day for 90 days. 180 Tab 0    apixaban (ELIQUIS) 5 mg tablet Take 1 Tab by mouth two (2) times a day. 180 Tab 1    ACETAMINOPHEN PO Take 500 mg by mouth as needed. 1-2 tabs prn as needed      semaglutide (Ozempic) 1 mg/dose (2 mg/1.5 mL) sub-q pen 1 MG BY SUBCUTANEOUS ROUTE EVERY SEVEN (7) DAYS. 2 Pen 12    ibuprofen (MOTRIN) 800 mg tablet Take 1 Tab by mouth every eight (8) hours as needed for Pain. 90 Tab 5    furosemide (LASIX) 40 mg tablet 1-2 tabs po daily as needed for fluid 60 Tab 5    glipiZIDE (GLUCOTROL) 10 mg tablet TAKE 1 TABLET BY MOUTH TWO (2) TIMES A DAY. FOR DIABETES 180 Tab 1    metFORMIN (GLUCOPHAGE) 1,000 mg tablet Take 1 Tab by mouth two (2) times daily (with meals). 180 Tab 3    gemfibroziL (LOPID) 600 mg tablet Take 1 Tab by mouth two (2) times daily as needed (cholesterol). (Patient taking differently: Take 600 mg by mouth two (2) times a day.) 180 Tab 3    magnesium oxide (MAG-OX) 400 mg tablet TAKE 2 TABLETS BY MOUTH EVERY  Tab 3    empagliflozin (Jardiance) 10 mg tablet Take 1 Tab by mouth daily. For diabetes 90 Tab 3    nitroglycerin (NITROSTAT) 0.4 mg SL tablet 1 Tab by SubLINGual route every five (5) minutes as needed for Chest Pain (call 911 if CP/ SOB not relieved by 3 tabs). 25 Tab 1    aspirin delayed-release 81 mg tablet Take  by mouth daily.  omega-3 fatty acids-vitamin e (FISH OIL) 1,000 mg Cap Take 2 Caps by mouth two (2) times a day. Vitals: Blood pressure 130/75, pulse 80, temperature 99 °F (37.2 °C), height 6' 1\" (1.854 m), weight 287 lb 0.6 oz (130.2 kg). Allergies: is allergic to lisinopril and losartan.     Physical Exam:  Wounds: clean, dry, no drainage    Lungs: clear to auscultation bilaterally    Heart: regular rate and rhythm, S1, S2 normal, no murmur, click, rub or gallop    Extremities: 1+ edema in b/l LE    Assessment/Plan:   1. S/P hybrid ablation  -Healing well  -Follow up with cardiology  2.  Fatigue  -Start monitoring weight daily and call cardiologist if increase of 5lbs in one day  -Increase ambulation  -Use IS  -Follow up with cardiologist

## 2021-05-25 ENCOUNTER — OFFICE VISIT (OUTPATIENT)
Dept: CARDIOLOGY CLINIC | Age: 61
End: 2021-05-25
Payer: COMMERCIAL

## 2021-05-25 ENCOUNTER — OFFICE VISIT (OUTPATIENT)
Dept: CARDIOLOGY CLINIC | Age: 61
End: 2021-05-25

## 2021-05-25 VITALS
DIASTOLIC BLOOD PRESSURE: 50 MMHG | HEIGHT: 73 IN | BODY MASS INDEX: 37.64 KG/M2 | RESPIRATION RATE: 16 BRPM | OXYGEN SATURATION: 99 % | WEIGHT: 284 LBS | HEART RATE: 80 BPM | SYSTOLIC BLOOD PRESSURE: 110 MMHG

## 2021-05-25 DIAGNOSIS — I48.0 PAF (PAROXYSMAL ATRIAL FIBRILLATION) (HCC): Chronic | ICD-10-CM

## 2021-05-25 DIAGNOSIS — I10 BENIGN ESSENTIAL HYPERTENSION: ICD-10-CM

## 2021-05-25 DIAGNOSIS — I49.5 SSS (SICK SINUS SYNDROME) (HCC): ICD-10-CM

## 2021-05-25 DIAGNOSIS — I48.0 PAROXYSMAL ATRIAL FIBRILLATION (HCC): ICD-10-CM

## 2021-05-25 DIAGNOSIS — I25.83 CORONARY ARTERY DISEASE DUE TO LIPID RICH PLAQUE: ICD-10-CM

## 2021-05-25 DIAGNOSIS — I25.10 CORONARY ARTERY DISEASE DUE TO LIPID RICH PLAQUE: ICD-10-CM

## 2021-05-25 DIAGNOSIS — Z95.5 S/P CORONARY ARTERY STENT PLACEMENT: ICD-10-CM

## 2021-05-25 DIAGNOSIS — I49.5 TACHY-BRADY SYNDROME (HCC): Chronic | ICD-10-CM

## 2021-05-25 DIAGNOSIS — I48.0 PAROXYSMAL ATRIAL FIBRILLATION (HCC): Primary | ICD-10-CM

## 2021-05-25 DIAGNOSIS — E78.2 MIXED HYPERLIPIDEMIA: ICD-10-CM

## 2021-05-25 DIAGNOSIS — Z95.0 CARDIAC PACEMAKER IN SITU: Primary | ICD-10-CM

## 2021-05-25 PROCEDURE — 93000 ELECTROCARDIOGRAM COMPLETE: CPT | Performed by: INTERNAL MEDICINE

## 2021-05-25 PROCEDURE — 99024 POSTOP FOLLOW-UP VISIT: CPT | Performed by: INTERNAL MEDICINE

## 2021-05-25 RX ORDER — AMIODARONE HYDROCHLORIDE 200 MG/1
200 TABLET ORAL DAILY
Qty: 60 TABLET | Refills: 0
Start: 2021-05-25 | End: 2021-06-04

## 2021-05-25 NOTE — PROGRESS NOTES
Chief Complaint   Patient presents with   [facility] Follow Up     S/P Hybrid Ablation     1. Have you been to the ER, urgent care clinic since your last visit? No Hospitalized since your last visit? Yes 5/21 Hybrid Ablation    2. Have you seen or consulted any other health care providers outside of the [address] [place] [address] since your last visit? No  Include any pap smears or colon screening. No    Patient C/O dizziness at times and fatigue .

## 2021-05-25 NOTE — LETTER
5/25/2021 Patient: Jessika Palomino YOB: 1960 Date of Visit: 5/25/2021 Сергей Lynn MD 
[address] [place] [id number] [address] [employer] Dear Сергей Lynn MD, Thank you for referring Mr. Eva Durán to [address] [place] [address] for evaluation. My notes for this consultation are attached. If you have questions, please do not hesitate to call me. I look forward to following your patient along with you. Sincerely, Diana Campos MD

## 2021-05-25 NOTE — PROGRESS NOTES
ELECTROPHYSIOLOGY        Subjective:      Deborah Guzmán is a 56 y.o. male is here for EP follow up. Deborah Guzmán  is on [address], reports no melena, hematuria, or obvious signs of bleeding. No falls. Patient Active Problem List    Diagnosis Date Noted    A-fib [facility]) 05/10/2021    Tachy-amina syndrome ([address] 03/15/2021    Atrial fibrillation ([address] 03/15/2021    Supracondylar fracture of distal end of femur without intracondylar extension ([address] 08/17/2020    Femur fracture ([address] 08/16/2020    Type 2 diabetes with nephropathy ([address] 12/12/2018    Severe obesity ([address] 12/12/2018    Controlled type 2 diabetes mellitus with complication, without long-term current use of insulin ([address] 12/12/2018    PAF (paroxysmal atrial fibrillation) ([address] 12/12/2018    Mixed hyperlipidemia 04/18/2012    Mitral valve disorders(424.0) 03/27/2012    Coronary artery disease 06/10/2011    S/P coronary artery stent placement 06/10/2011    Type 2 diabetes mellitus ([address] 06/10/2011    Benign essential hypertension 06/10/2011    Hypertriglyceridemia     Arthritis       Sharita Evans MD  Past Medical History:   Diagnosis Date    Arthritis     knees    Atrial fibrillation with RVR ([address]     Benign essential hypertension 6/10/2011    CAD (coronary artery disease)     Diabetes ([address]     DX  AGE 33      Hypercholesterolemia     Hypertriglyceridemia     Obesity     PAF (paroxysmal atrial fibrillation) ([address] 12/12/2018      Past Surgical History:   Procedure Laterality Date    CARDIAC CATHETERIZATION  6/9/2011         CARDIAC CATHETERIZATION  11/1/2012         HC ANGIOSEAL VIP 6FR  6/9/2011         HX AFIB ABLATION  03/15/2021    HX ORTHOPAEDIC      left knee replacement    HX ORTHOPAEDIC      R knee replacement  2010 -Candelaria Flores.     INTRACARD ECHO, THER/DX INTERVENT N/A 3/15/2021    Intracardiac Echocardiogram performed by Angelica Virk MD at [facility] C[facility] CATH LAB    INTRACARD ECHO, THER/DX INTERVENT N/A 5/10/2021    Intracardiac Echocardiogram performed by Enid Sullivan MD at [facility] CARDIAC CATH LAB    ND ABLATE L/R ATRIAL FIBRIL W/ISOLATED PULM VEIN N/A 5/10/2021    Ablation Following A-Fib  Addl performed by Enid Sullivan MD at [facility] CARDIAC CATH LAB    ND CARDIAC SURG PROCEDURE UNLIST      STENT RCA  6/11    ND COMPRE ELECTROPHYSIOL XM W/LEFT ATRIAL PACNG/REC N/A 5/10/2021    Lt Atrial Pace & Record During Ep Study performed by Enid Sullivan MD at [facility] CARDIAC CATH LAB    ND DRUG-ELUTING STENTS, SINGLE  6/9/2011         ND EPHYS EVL TRNSPTL TX ATRIAL FIB ISOLAT PULM VEIN N/A 3/15/2021    ABLATION A-FIB  W COMPLETE EP STUDY performed by Enid Sullivan MD at [facility] CARDIAC CATH LAB    ND EPHYS EVL TRNSPTL TX ATRIAL FIB ISOLAT PULM VEIN N/A 5/10/2021    ABLATION A-FIB  W COMPLETE EP STUDY performed by Enid Sullivan MD at [facility] CARDIAC CATH LAB    ND INS NEW/RPLCMT PRM PM W/TRANSV ELTRD ATRIAL&VENT N/A 3/22/2021    INSERT PPM DUAL performed by Enid Sullivan MD at [facility] CARDIAC CATH LAB    ND INTRACARDIAC ELECTROPHYSIOLOGIC 3D MAPPING N/A 3/15/2021    Ep 3d Mapping performed by Enid Sullivan MD at [facility] CARDIAC CATH LAB    ND INTRACARDIAC ELECTROPHYSIOLOGIC 3D MAPPING N/A 5/10/2021    Ep 3d Mapping performed by Enid Sullivan MD at [facility] CARDIAC CATH LAB     Allergies   Allergen Reactions    Lisinopril Cough    Losartan Other (comments)     Hypotension        Family History   Problem Relation Age of Onset    Diabetes Mother     Hypertension Mother    [facility] Elevated Lipids Mother     Cancer Mother         unsure    Diabetes Father     Heart Disease Father     Hypertension Father     Elevated Lipids Father     Diabetes Brother     Suicide Brother     Diabetes Brother     negative for cardiac disease  Social History     Socioeconomic History    Marital status:      Spouse name: Not on file    Number of children: Not on file    Years of education: Not on file  Highest education level: Not on file   Tobacco Use    Smoking status: Former Smoker     Types: Cigarettes     Quit date: 11/10/1989     Years since quittin.5    Smokeless tobacco: Never Used   Vaping Use    Vaping Use: Never used   Substance and Sexual Activity    Alcohol use: Yes     Comment: social  DRINKS ONE WEEKEND A MONTH/\"BIG IMPROVEMENT\"    Drug use: No    Sexual activity: Yes     Partners: Female     Birth control/protection: None   Social History Narrative    , 3 children, twin sons 25 and one son 15. Works at [employer] for the last 9 years, used to work a [employer] Strain:     Difficulty of Paying Living Expenses:    Food Insecurity:     Worried About [address] in the Last Year:    [address] in the Last Year:    Transportation Needs:     Lack of Transportation (Medical):  Lack of Transportation (Non-Medical):    Physical Activity:     Days of Exercise per Week:     Minutes of Exercise per Session:    Stress:     Feeling of Stress :    Social Connections:     Frequency of Communication with Friends and Family:     Frequency of Social Gatherings with Friends and Family:     Attends [employer] Services:     Active Member of Clubs or Organizations:     Attends Club or Organization Meetings:     Marital Status:      Current Outpatient Medications   Medication Sig    amiodarone (CORDARONE) 200 mg tablet Take 1 Tablet by mouth daily. Lowered 21    polyethylene glycol (MIRALAX) 17 gram packet Take 1 Packet by mouth daily as needed for Constipation. (Patient taking differently: Take 17 g by mouth daily as needed for Constipation. Indications: Not Taking)    rosuvastatin (CRESTOR) 20 mg tablet TAKE 1 TABLET BY MOUTH EVERY DAY FOR CHOLESTEROL    metoprolol tartrate (LOPRESSOR) 25 mg tablet Take 1 Tab by mouth two (2) times a day for 90 days.     apixaban (ELIQUIS) 5 mg tablet Take 1 Tab by mouth two (2) times a day.  ACETAMINOPHEN PO Take 500 mg by mouth as needed. 1-2 tabs prn as needed    semaglutide (Ozempic) 1 mg/dose (2 mg/1.5 mL) sub-q pen 1 MG BY SUBCUTANEOUS ROUTE EVERY SEVEN (7) DAYS.  ibuprofen (MOTRIN) 800 mg tablet Take 1 Tab by mouth every eight (8) hours as needed for Pain.  furosemide (LASIX) 40 mg tablet 1-2 tabs po daily as needed for fluid    glipiZIDE (GLUCOTROL) 10 mg tablet TAKE 1 TABLET BY MOUTH TWO (2) TIMES A DAY. FOR DIABETES    metFORMIN (GLUCOPHAGE) 1,000 mg tablet Take 1 Tab by mouth two (2) times daily (with meals).  gemfibroziL (LOPID) 600 mg tablet Take 1 Tab by mouth two (2) times daily as needed (cholesterol). (Patient taking differently: Take 600 mg by mouth two (2) times a day.)    magnesium oxide (MAG-OX) 400 mg tablet TAKE 2 TABLETS BY MOUTH EVERY DAY    empagliflozin (Jardiance) 10 mg tablet Take 1 Tab by mouth daily. For diabetes    nitroglycerin (NITROSTAT) 0.4 mg SL tablet 1 Tab by SubLINGual route every five (5) minutes as needed for Chest Pain (call 911 if CP/ SOB not relieved by 3 tabs).  aspirin delayed-release 81 mg tablet Take  by mouth daily.  omega-3 fatty acids-vitamin e (FISH OIL) 1,000 mg Cap Take 2 Caps by mouth two (2) times a day.  senna-docusate (PERICOLACE) 8.6-50 mg per tablet Take 1 Tab by mouth two (2) times daily as needed for Constipation. (Patient not taking: Reported on 5/25/2021)     No current facility-administered medications for this visit. Vitals:    05/25/21 1031 05/25/21 1033 05/25/21 1034   BP: (!) 140/80 120/76 (!) 110/50   Pulse:  80    Resp:  16    SpO2:  99%    Weight:  284 lb (128.8 kg)    Height:  6' 1\" (1.854 m)        I have reviewed the nurses notes, vitals, problem list, allergy list, medical history, family, social history and medications. Review of Symptoms:    General: Pt denies excessive weight gain or loss.  Pt is able to conduct ADL's  HEENT: Denies blurred vision, headaches, epistaxis and difficulty swallowing. Respiratory: Denies shortness of breath, KRAMER, wheezing or stridor. Cardiovascular: Denies precordial pain, palpitations, edema or PND  Gastrointestinal: Denies poor appetite, indigestion, abdominal pain or blood in stool  Urinary: Denies dysuria, pyuria  Musculoskeletal: Denies pain or swelling from muscles or joints  Neurologic: Denies tremor, paresthesias, or sensory motor disturbance  Skin: Denies rash, itching or texture change. Psych: Denies depression      Physical Exam:      General: Well developed, in no acute distress. HEENT: Eyes - PERRL  Heart:  Normal S1/S2 negative S3 or S4. Regular, no murmur  Respiratory: Clear bilaterally x 4, no wheezing or rales  Extremities:  No edema, no cyanosis. Musculoskeletal: No clubbing  Neuro: A&Ox3, speech clear  Skin: No visible rashes or lesions. Non diaphoretic.  No visible ulcers  Vascular: 2+ pulses symmetric in all extremities  Psych - judgement intact and orientation is wnl       Cardiographics    Ek21  A paced    Results for orders placed or performed during the hospital encounter of 05/10/21   EKG, 12 LEAD, INITIAL   Result Value Ref Range    Ventricular Rate 75 BPM    Atrial Rate 75 BPM    P-R Interval 210 ms    QRS Duration 94 ms    Q-T Interval 416 ms    QTC Calculation (Bezet) 464 ms    Calculated R Axis -14 degrees    Calculated T Axis 9 degrees    Diagnosis       Atrial-paced rhythm with prolonged AV conduction  Nonspecific T wave abnormality    Confirmed by Kim Felder MD, Kayley Blas ([id number]) on 5/10/2021 3:35:28 PM           Lab Results   Component Value Date/Time    WBC 11.5 (H) 2021 04:18 AM    HGB 12.4 2021 04:18 AM    HCT 39.9 2021 04:18 AM    PLATELET 948  04:18 AM    MCV 84.0 2021 04:18 AM      Lab Results   Component Value Date/Time    Sodium 135 (L) 2021 04:18 AM    Potassium 4.4 2021 04:18 AM    Chloride 104 2021 04:18 AM    CO2 24 2021 04:18 AM Anion gap 7 05/11/2021 04:18 AM    Glucose 251 (H) 05/11/2021 04:18 AM    BUN 28 (H) 05/11/2021 04:18 AM    Creatinine 1.11 05/11/2021 04:18 AM    BUN/Creatinine ratio 25 (H) 05/11/2021 04:18 AM    GFR est AA >60 05/11/2021 04:18 AM    GFR est non-AA >60 05/11/2021 04:18 AM    Calcium 8.2 (L) 05/11/2021 04:18 AM    Bilirubin, total 0.4 05/11/2021 04:18 AM    Alk. phosphatase 68 05/11/2021 04:18 AM    Protein, total 6.7 05/11/2021 04:18 AM    Albumin 2.9 (L) 05/11/2021 04:18 AM    Globulin 3.8 05/11/2021 04:18 AM    A-G Ratio 0.8 (L) 05/11/2021 04:18 AM    ALT (SGPT) 12 05/11/2021 04:18 AM      Lab Results   Component Value Date/Time    TSH 5.51 (H) 05/04/2021 01:22 PM           Assessment:           ICD-10-CM ICD-9-CM    1. Paroxysmal atrial fibrillation (HCC)  I48.0 427.31 AMB POC EKG ROUTINE W/ 12 LEADS, INTER & REP   2. Benign essential hypertension  I10 401.1 AMB POC EKG ROUTINE W/ 12 LEADS, INTER & REP      amiodarone (CORDARONE) 200 mg tablet   3. Coronary artery disease due to lipid rich plaque  I25.10 414.00 AMB POC EKG ROUTINE W/ 12 LEADS, INTER & REP    I25.83 414.3    4. PAF (paroxysmal atrial fibrillation) (HCC)  I48.0 427.31 amiodarone (CORDARONE) 200 mg tablet   5. Tachy-amina syndrome (HCC)  I49.5 427.81 amiodarone (CORDARONE) 200 mg tablet   6. Mixed hyperlipidemia  E78.2 272.2 amiodarone (CORDARONE) 200 mg tablet   7. S/P coronary artery stent placement  Z95.5 V45.82 amiodarone (CORDARONE) 200 mg tablet     Orders Placed This Encounter    AMB POC EKG ROUTINE W/ 12 LEADS, INTER & REP     Order Specific Question:   Reason for Exam:     Answer:   routine    amiodarone (CORDARONE) 200 mg tablet     Sig: Take 1 Tablet by mouth daily. Lowered 05/25/21     Dispense:  60 Tablet     Refill:  0        Plan:     Mr Angeles Mena is s/p hyrbid ablation 5/10/21 (PVI of all 4 PVs confirmed ([place]ACA around RUPV and RFA of inferior portion of RIPV) Additional RFA focus of roof line).  He has had 6 brief AF episdoes, longest 20 seconds. Will lower his amiodarone to 200 mg daily. Accelerometer turned on for fatigue. Advised pt to take lasix PRN for normal EF. Enrolled in remote monitoring and we will see him back in 3 mo. Addressed all patient questions and concerns at this visit. Continue medical management for Af . Discussed side effects, efficacy and good medication compliance with pt re: amiodarone and eliquis. Thank you for allowing me to participate in Marah Benito 's care. Gerald Burnette NP    Patient seen and examined by me with nurse practitioner. I personally performed all components of the history, physical, and medical decision making and agree with the assessment and plan with minor modifications as noted. A paced for sick sinus. Short bursts of AF (less than 20 secs). Stable and compliant with oac. Will lower amio dose. Cont med rx for htn and hyperlipidemia. F/u in 3 months. During this visit,  the patient and I had a comprehensive discussion of arrhythmia management using principles of shared decision making. This included a discussion of anti-arrhythmic medications including class I agents (e.g. flecainide) and class III agents (e.g. amiodarone, sotalol etc) and both class II and IV agents (beta-blockers and calcium channel blockers). We reviewed the potentially life-threatening side effects of these medications, including (but not limited to) fatal tachyarrhythmias, pulmonary toxicity, liver toxicity and thyroid disorders. We also reviewed the requisite monitoring required of some of these medications. The patient demonstrated a clear understanding of these issues during out discussion. Our plans, determined together after thorough consideration, are outlined else where in this note.       Willie Alcala MD, [facility]          On this date 05/25/2021 I have spent 37 minutes reviewing previous notes, test results and face to face with the patient discussing the diagnosis and importance of compliance with the treatment plan as well as documenting on the day of the visit.

## 2021-06-28 RX ORDER — GLIPIZIDE 10 MG/1
TABLET ORAL
Qty: 180 TABLET | Refills: 1 | Status: SHIPPED | OUTPATIENT
Start: 2021-06-28 | End: 2022-02-09

## 2021-07-04 DIAGNOSIS — E78.2 MIXED HYPERLIPIDEMIA: ICD-10-CM

## 2021-07-04 DIAGNOSIS — I10 BENIGN ESSENTIAL HYPERTENSION: Chronic | ICD-10-CM

## 2021-07-04 DIAGNOSIS — I48.0 PAF (PAROXYSMAL ATRIAL FIBRILLATION) (HCC): Chronic | ICD-10-CM

## 2021-07-04 DIAGNOSIS — I49.5 TACHY-BRADY SYNDROME (HCC): Chronic | ICD-10-CM

## 2021-07-06 RX ORDER — METOPROLOL TARTRATE 25 MG/1
25 TABLET, FILM COATED ORAL 2 TIMES DAILY
Qty: 180 TABLET | Refills: 0 | Status: SHIPPED | OUTPATIENT
Start: 2021-07-06 | End: 2021-10-18

## 2021-07-06 RX ORDER — FUROSEMIDE 40 MG/1
TABLET ORAL
Qty: 180 TABLET | Refills: 1 | Status: SHIPPED | OUTPATIENT
Start: 2021-07-06 | End: 2022-08-29

## 2021-07-12 ENCOUNTER — OFFICE VISIT (OUTPATIENT)
Dept: FAMILY MEDICINE CLINIC | Age: 61
End: 2021-07-12
Payer: COMMERCIAL

## 2021-07-12 VITALS
RESPIRATION RATE: 16 BRPM | HEIGHT: 73 IN | TEMPERATURE: 97.8 F | SYSTOLIC BLOOD PRESSURE: 124 MMHG | WEIGHT: 295.2 LBS | DIASTOLIC BLOOD PRESSURE: 74 MMHG | BODY MASS INDEX: 39.12 KG/M2 | HEART RATE: 75 BPM | OXYGEN SATURATION: 96 %

## 2021-07-12 DIAGNOSIS — E66.01 SEVERE OBESITY (BMI 35.0-35.9 WITH COMORBIDITY) (HCC): ICD-10-CM

## 2021-07-12 DIAGNOSIS — S90.221A SUBUNGUAL HEMATOMA OF RIGHT FOOT, INITIAL ENCOUNTER: ICD-10-CM

## 2021-07-12 DIAGNOSIS — E11.9 TYPE 2 DIABETES MELLITUS WITHOUT COMPLICATION, WITHOUT LONG-TERM CURRENT USE OF INSULIN (HCC): Primary | ICD-10-CM

## 2021-07-12 DIAGNOSIS — E78.2 MIXED HYPERLIPIDEMIA: ICD-10-CM

## 2021-07-12 DIAGNOSIS — Z95.5 S/P CORONARY ARTERY STENT PLACEMENT: ICD-10-CM

## 2021-07-12 DIAGNOSIS — I10 BENIGN ESSENTIAL HYPERTENSION: ICD-10-CM

## 2021-07-12 DIAGNOSIS — Z12.11 COLON CANCER SCREENING: ICD-10-CM

## 2021-07-12 DIAGNOSIS — Z12.5 PROSTATE CANCER SCREENING: ICD-10-CM

## 2021-07-12 DIAGNOSIS — E78.00 HYPERCHOLESTEROLEMIA: ICD-10-CM

## 2021-07-12 DIAGNOSIS — I49.5 TACHY-BRADY SYNDROME (HCC): Chronic | ICD-10-CM

## 2021-07-12 DIAGNOSIS — I48.0 PAF (PAROXYSMAL ATRIAL FIBRILLATION) (HCC): Chronic | ICD-10-CM

## 2021-07-12 LAB
CHOLEST SERPL-MCNC: 130 MG/DL
HBA1C MFR BLD HPLC: 7.9 %
HDLC SERPL-MCNC: 37 MG/DL
HDLC SERPL: 3.5 {RATIO} (ref 0–5)
LDLC SERPL CALC-MCNC: 70 MG/DL (ref 0–100)
PSA SERPL-MCNC: 0.2 NG/ML (ref 0.01–4)
TRIGL SERPL-MCNC: 115 MG/DL (ref ?–150)
VLDLC SERPL CALC-MCNC: 23 MG/DL

## 2021-07-12 PROCEDURE — 99214 OFFICE O/P EST MOD 30 MIN: CPT | Performed by: FAMILY MEDICINE

## 2021-07-12 PROCEDURE — 83036 HEMOGLOBIN GLYCOSYLATED A1C: CPT | Performed by: FAMILY MEDICINE

## 2021-07-12 PROCEDURE — 3051F HG A1C>EQUAL 7.0%<8.0%: CPT | Performed by: FAMILY MEDICINE

## 2021-07-12 RX ORDER — AMIODARONE HYDROCHLORIDE 200 MG/1
200 TABLET ORAL DAILY
Qty: 90 TABLET | Refills: 5 | Status: SHIPPED | OUTPATIENT
Start: 2021-07-12 | End: 2021-09-21

## 2021-07-12 RX ORDER — NITROGLYCERIN 0.4 MG/1
0.4 TABLET SUBLINGUAL
Qty: 25 TABLET | Refills: 1 | Status: SHIPPED | OUTPATIENT
Start: 2021-07-12

## 2021-07-12 RX ORDER — SEMAGLUTIDE 1.34 MG/ML
INJECTION, SOLUTION SUBCUTANEOUS
Qty: 6 PEN | Refills: 12 | Status: SHIPPED | OUTPATIENT
Start: 2021-07-12 | End: 2022-07-15

## 2021-07-12 NOTE — PROGRESS NOTES
Chief Complaint   Patient presents with    Hypertension    Diabetes    Cholesterol Problem    Follow Up Chronic Condition     1. Have you been to the ER, urgent care clinic since your last visit? Hospitalized since your last visit? Yes 5/10/21-5/11/21 CARDIOLOGY - DR. Shelley Murrell    2. Have you seen or consulted any other health care providers outside of the [address] [place] [address] since your last visit? Include any pap smears or colon screening. Yes DR. LUIS ALBERTO BARRAZA AND DR. Heide Norman      Patient notes lump on right elbow no injury noted. Great toe right foot.

## 2021-07-12 NOTE — PROGRESS NOTES
HISTORY OF PRESENT ILLNESS  Kelsey Onofre is a 56 y.o. male. HPI Pt. Comes in for blood pressure, cholesterol, and diabetes check. No complaints of chest pain, shortness of breath, TIAs, claudication or edema. 2 weeks ago noted a knot on R proximal ulnar bone, nonpainful. No hx trauma. Also has what looks like a bruise on R 1st toe. No hx trauma. Does note that has soco poor feeling in feet. ROS    Physical Exam  Vitals and nursing note reviewed. Constitutional:       Appearance: He is well-developed. HENT:      Right Ear: External ear normal.      Left Ear: External ear normal.   Neck:      Thyroid: No thyromegaly. Cardiovascular:      Rate and Rhythm: Normal rate and regular rhythm. Heart sounds: Normal heart sounds. Pulmonary:      Effort: Pulmonary effort is normal. No respiratory distress. Breath sounds: Normal breath sounds. No wheezing. Abdominal:      General: Bowel sounds are normal. There is no distension. Palpations: Abdomen is soft. There is no mass. Tenderness: There is no abdominal tenderness. There is no guarding. Musculoskeletal:         General: Normal range of motion. Comments: 3 x2 very soft nontender mass on proximal ulna   Lymphadenopathy:      Cervical: No cervical adenopathy. Skin:     Comments: R 1st toenail- subungual hematoma- draining blood. Neurological:      Comments: Decreased sensation lateral feet to monofilament          ASSESSMENT and PLAN  Orders Placed This Encounter    PROSTATE SPECIFIC AG    LIPID PANEL    [address] [facility]    AMB POC HEMOGLOBIN A1C    amiodarone (CORDARONE) 200 mg tablet    semaglutide (Ozempic) 1 mg/dose (2 mg/1.5 mL) sub-q pen    nitroglycerin (NITROSTAT) 0.4 mg SL tablet     Diagnoses and all orders for this visit:    1. Type 2 diabetes mellitus without complication, without long-term current use of insulin (HCC)  -     AMB POC HEMOGLOBIN A1C    2.  Benign essential hypertension  - amiodarone (CORDARONE) 200 mg tablet; Take 1 Tablet by mouth daily. 3. Hypercholesterolemia  -     LIPID PANEL; Future    4. Prostate cancer screening  -     PSA, DIAGNOSTIC (PROSTATE SPECIFIC AG); Future    5. Severe obesity (BMI 35.0-35.9 with comorbidity) ([facility]    6. Colon cancer screening  -     REFERRAL TO GASTROENTEROLOGY    7. PAF (paroxysmal atrial fibrillation) (HCC)  -     amiodarone (CORDARONE) 200 mg tablet; Take 1 Tablet by mouth daily. 8. Tachy-amina syndrome (HCC)  -     amiodarone (CORDARONE) 200 mg tablet; Take 1 Tablet by mouth daily. 9. Mixed hyperlipidemia  -     amiodarone (CORDARONE) 200 mg tablet; Take 1 Tablet by mouth daily. 10. S/P coronary artery stent placement  -     amiodarone (CORDARONE) 200 mg tablet; Take 1 Tablet by mouth daily. 11. Subungual hematoma of right foot, initial encounter    Other orders  -     semaglutide (Ozempic) 1 mg/dose (2 mg/1.5 mL) sub-q pen; 1 MG BY SUBCUTANEOUS ROUTE EVERY SEVEN (7) DAYS. -     nitroglycerin (NITROSTAT) 0.4 mg SL tablet; 1 Tablet by SubLINGual route every five (5) minutes as needed for Chest Pain (call 911 if CP/ SOB not relieved by 3 tabs). Follow-up and Dispositions    · Return in about 6 months (around 1/12/2022).

## 2021-08-03 PROBLEM — I48.91 ATRIAL FIBRILLATION (HCC): Status: RESOLVED | Noted: 2021-03-15 | Resolved: 2021-08-03

## 2021-09-17 RX ORDER — GEMFIBROZIL 600 MG/1
600 TABLET, FILM COATED ORAL 2 TIMES DAILY
Qty: 180 TABLET | Refills: 3 | Status: SHIPPED | OUTPATIENT
Start: 2021-09-17 | End: 2022-10-17

## 2021-09-21 ENCOUNTER — OFFICE VISIT (OUTPATIENT)
Dept: CARDIOLOGY CLINIC | Age: 61
End: 2021-09-21

## 2021-09-21 ENCOUNTER — OFFICE VISIT (OUTPATIENT)
Dept: CARDIOLOGY CLINIC | Age: 61
End: 2021-09-21
Payer: COMMERCIAL

## 2021-09-21 VITALS
BODY MASS INDEX: 39.76 KG/M2 | HEIGHT: 73 IN | DIASTOLIC BLOOD PRESSURE: 60 MMHG | HEART RATE: 69 BPM | SYSTOLIC BLOOD PRESSURE: 130 MMHG | OXYGEN SATURATION: 98 % | WEIGHT: 300 LBS | RESPIRATION RATE: 18 BRPM

## 2021-09-21 DIAGNOSIS — Z86.79 S/P ABLATION OF ATRIAL FIBRILLATION: ICD-10-CM

## 2021-09-21 DIAGNOSIS — I48.0 PAF (PAROXYSMAL ATRIAL FIBRILLATION) (HCC): Primary | ICD-10-CM

## 2021-09-21 DIAGNOSIS — I49.5 TACHY-BRADY SYNDROME (HCC): ICD-10-CM

## 2021-09-21 DIAGNOSIS — I10 BENIGN ESSENTIAL HYPERTENSION: ICD-10-CM

## 2021-09-21 DIAGNOSIS — Z98.890 S/P ABLATION OF ATRIAL FIBRILLATION: ICD-10-CM

## 2021-09-21 DIAGNOSIS — Z95.5 S/P CORONARY ARTERY STENT PLACEMENT: ICD-10-CM

## 2021-09-21 DIAGNOSIS — I49.5 SSS (SICK SINUS SYNDROME) (HCC): ICD-10-CM

## 2021-09-21 DIAGNOSIS — Z95.0 CARDIAC PACEMAKER IN SITU: Primary | ICD-10-CM

## 2021-09-21 DIAGNOSIS — E78.2 MIXED HYPERLIPIDEMIA: ICD-10-CM

## 2021-09-21 PROCEDURE — 93000 ELECTROCARDIOGRAM COMPLETE: CPT | Performed by: INTERNAL MEDICINE

## 2021-09-21 PROCEDURE — 99214 OFFICE O/P EST MOD 30 MIN: CPT | Performed by: INTERNAL MEDICINE

## 2021-09-21 PROCEDURE — 93280 PM DEVICE PROGR EVAL DUAL: CPT | Performed by: INTERNAL MEDICINE

## 2021-09-21 RX ORDER — AMIODARONE HYDROCHLORIDE 200 MG/1
100 TABLET ORAL DAILY
Qty: 90 TABLET | Refills: 5
Start: 2021-09-21 | End: 2022-04-21 | Stop reason: SINTOL

## 2021-09-21 NOTE — PROGRESS NOTES
ELECTROPHYSIOLOGY        Subjective:      Monika Ann is a 56 y.o. male is here for EP follow up. Pt with PMH significant for a-fib on Eliquis s/p ablation in March and prior PVI, s/p hybrid ablation 5/10/21. SSS s/p PPM 3/22/21, CAD w/ prior stents several years ago, DM, HTN, HLD, femur fx and repair last year, and arthritis.   The patient denies chest pain/ shortness of breath, orthopnea, PND, LE edema, palpitations, syncope, presyncope or fatigue. Monika Ann  is on [address] [place] [address], reports no melena, hematuria, or obvious signs of bleeding. No falls.      Patient Active Problem List    Diagnosis Date Noted    A-fib [facility]) 05/10/2021    Tachy-amina syndrome ([address] 03/15/2021    Supracondylar fracture of distal end of femur without intracondylar extension ([address] 08/17/2020    Femur fracture ([address] 08/16/2020    Type 2 diabetes with nephropathy ([address] 12/12/2018    Severe obesity ([address] 12/12/2018    Controlled type 2 diabetes mellitus with complication, without long-term current use of insulin ([address] 12/12/2018    PAF (paroxysmal atrial fibrillation) ([address] 12/12/2018    Mixed hyperlipidemia 04/18/2012    Mitral valve disorders(424.0) 03/27/2012    Coronary artery disease 06/10/2011    S/P coronary artery stent placement 06/10/2011    Type 2 diabetes mellitus ([address] 06/10/2011    Benign essential hypertension 06/10/2011    Hypertriglyceridemia     Arthritis       Ernestina Yanez MD  Past Medical History:   Diagnosis Date    Arthritis     knees    Atrial fibrillation with RVR ([address]     Benign essential hypertension 6/10/2011    CAD (coronary artery disease)     Diabetes ([address]     DX  AGE 33      Hypercholesterolemia     Hypertriglyceridemia     Obesity     Pacemaker     PAF (paroxysmal atrial fibrillation) ([address] 12/12/2018    Syncope       Past Surgical History:   Procedure Laterality Date    CARDIAC CATHETERIZATION  6/9/2011         CARDIAC CATHETERIZATION  11/1/2012          ANGIOSEAL VIP 6FR  6/9/2011         HX AFIB ABLATION  03/15/2021    HX COLONOSCOPY  05/2009    Dr. Eze Alvarez- normal    HX ORTHOPAEDIC      left knee replacement    HX ORTHOPAEDIC      R knee replacement  2010 -Cheryl Mccoy.     INTRACARD ECHO, THER/DX INTERVENT N/A 3/15/2021    Intracardiac Echocardiogram performed by Simone Srivastava MD at [facility] CARDI[facility] CATH LAB    INTRACARD ECHO, THER/DX INTERVENT N/A 5/10/2021    Intracardiac Echocardiogram performed by Simone Srivastava MD at [facility] CARDI[facility] CATH LAB    ND ABLATE L/R ATRIAL FIBRIL W/ISOLATED PULM VEIN N/A 5/10/2021    Ablation Following A-Fib  Addl performed by Simone Srivastava MD at [facility] CARDIAC CATH LAB    ND CARDIAC SURG PROCEDURE UNLIST      STENT RCA  6/11    ND COMPRE ELECTROPHYSIOL XM W/LEFT ATRIAL PACNG/REC N/A 5/10/2021    Lt Atrial Pace & Record During Ep Study performed by Simone Srivastava MD at [facility] CARDIAC CATH LAB    ND DRUG-ELUTING STENTS, SINGLE  6/9/2011         ND EPHYS EVL TRNSPTL TX ATRIAL FIB ISOLAT PULM VEIN N/A 3/15/2021    ABLATION A-FIB  W COMPLETE EP STUDY performed by Simone Srivastava MD at [facility] CARDIAC CATH LAB    ND EPHYS EVL TRNSPTL TX ATRIAL FIB ISOLAT PULM VEIN N/A 5/10/2021    ABLATION A-FIB  W COMPLETE EP STUDY performed by Simone Srivastava MD at [facility] CARDIAC CATH LAB    ND INS NEW/RPLCMT PRM PM W/TRANSV ELTRD ATRIAL&VENT N/A 3/22/2021    INSERT PPM DUAL performed by Simone Srivastava MD at [facility] CARDIAC CATH LAB    ND INTRACARDIAC ELECTROPHYSIOLOGIC 3D MAPPING N/A 3/15/2021    Ep 3d Mapping performed by Simone Srivastava MD at [facility] CARDIAC CATH LAB    ND INTRACARDIAC ELECTROPHYSIOLOGIC 3D MAPPING N/A 5/10/2021    Ep 3d Mapping performed by Simone Srivastava MD at [facility] CARDIAC CATH LAB     Allergies   Allergen Reactions    Lisinopril Cough    Losartan Other (comments)     Hypotension        Family History   Problem Relation Age of Onset    Diabetes Mother     Hypertension Mother     Elevated Lipids Mother  Cancer Mother         unsure    Diabetes Father     Heart Disease Father     Hypertension Father     Elevated Lipids Father     Diabetes Brother     Suicide Brother     Diabetes Brother     negative for cardiac disease  Social History     Socioeconomic History    Marital status:      Spouse name: Not on file    Number of children: Not on file    Years of education: Not on file    Highest education level: Not on file   Tobacco Use    Smoking status: Former Smoker     Types: Cigarettes     Quit date: 11/10/1989     Years since quittin.8    Smokeless tobacco: Never Used   Vaping Use    Vaping Use: Never used   Substance and Sexual Activity    Alcohol use: Yes     Comment: social  DRINKS ONE WEEKEND A MONTH/\"BIG IMPROVEMENT\"    Drug use: No    Sexual activity: Yes     Partners: Female     Birth control/protection: None   Social History Narrative    , 3 children, twin sons 25 and one son 15. Works at [employer] for the last 9 years, used to work a [employer] [address] Strain:     Difficulty of Paying Living Expenses:    Food Insecurity:     Worried About [address] in the Last Year:    [address] in the Last Year:    Transportation Needs:     Lack of Transportation (Medical):  Lack of Transportation (Non-Medical):    Physical Activity:     Days of Exercise per Week:     Minutes of Exercise per Session:    Stress:     Feeling of Stress :    Social Connections:     Frequency of Communication with Friends and Family:     Frequency of Social Gatherings with Friends and Family:     Attends Oriental orthodox Services:     Active Member of Clubs or Organizations:     Attends Club or Organization Meetings:     Marital Status:      Current Outpatient Medications   Medication Sig    amiodarone (CORDARONE) 200 mg tablet Take 0.5 Tablets by mouth daily.  Lowered 21    gemfibroziL (LOPID) 600 mg tablet Take 1 Tablet by mouth two (2) times a day.  Eliquis 5 mg tablet TAKE 1 TABLET BY MOUTH TWICE A DAY    semaglutide (Ozempic) 1 mg/dose (2 mg/1.5 mL) sub-q pen 1 MG BY SUBCUTANEOUS ROUTE EVERY SEVEN (7) DAYS.  nitroglycerin (NITROSTAT) 0.4 mg SL tablet 1 Tablet by SubLINGual route every five (5) minutes as needed for Chest Pain (call 911 if CP/ SOB not relieved by 3 tabs).  metoprolol tartrate (LOPRESSOR) 25 mg tablet TAKE 1 TAB BY MOUTH TWO (2) TIMES A DAY FOR 90 DAYS.  furosemide (LASIX) 40 mg tablet TAKE 1-2 TABLET BY MOUTH DAILY AS NEEDED FOR FLUID    glipiZIDE (GLUCOTROL) 10 mg tablet TAKE 1 TABLET BY MOUTH TWICE A DAY    senna-docusate (PERICOLACE) 8.6-50 mg per tablet Take 1 Tab by mouth two (2) times daily as needed for Constipation.  rosuvastatin (CRESTOR) 20 mg tablet TAKE 1 TABLET BY MOUTH EVERY DAY FOR CHOLESTEROL    ACETAMINOPHEN PO Take 500 mg by mouth as needed. 1-2 tabs prn as needed    ibuprofen (MOTRIN) 800 mg tablet Take 1 Tab by mouth every eight (8) hours as needed for Pain.  metFORMIN (GLUCOPHAGE) 1,000 mg tablet Take 1 Tab by mouth two (2) times daily (with meals).  magnesium oxide (MAG-OX) 400 mg tablet TAKE 2 TABLETS BY MOUTH EVERY DAY    empagliflozin (Jardiance) 10 mg tablet Take 1 Tab by mouth daily. For diabetes    aspirin delayed-release 81 mg tablet Take  by mouth daily.  omega-3 fatty acids-vitamin e (FISH OIL) 1,000 mg Cap Take 2 Caps by mouth two (2) times a day. No current facility-administered medications for this visit. Vitals:    09/21/21 1030   BP: 130/60   Pulse: 69   Resp: 18   SpO2: 98%   Weight: 300 lb (136.1 kg)   Height: 6' 1\" (1.854 m)       I have reviewed the nurses notes, vitals, problem list, allergy list, medical history, family, social history and medications. Review of Symptoms:  11 systems reviewed, negative other than as stated in the HPI      Physical Exam:      General: Well developed, in no acute distress.   HEENT: Eyes - PERRL  Heart:  Normal S1/S2 negative S3 or S4. Regular, no murmur  Respiratory: Clear bilaterally x 4, no wheezing or rales  Extremities:  No edema, no cyanosis. Musculoskeletal: No clubbing  Neuro: A&Ox3, speech clear  Skin: No visible rashes or lesions. Non diaphoretic. No visible ulcers  Vascular: 2+ pulses symmetric in all extremities  Psych - judgement intact and orientation is wnl       Cardiographics    Ek21  Sinus rhythm. Pacer - A paced 93%    Results for orders placed or performed during the hospital encounter of 05/10/21   EKG, 12 LEAD, INITIAL   Result Value Ref Range    Ventricular Rate 75 BPM    Atrial Rate 75 BPM    P-R Interval 210 ms    QRS Duration 94 ms    Q-T Interval 416 ms    QTC Calculation (Bezet) 464 ms    Calculated R Axis -14 degrees    Calculated T Axis 9 degrees    Diagnosis       Atrial-paced rhythm with prolonged AV conduction  Nonspecific T wave abnormality    Confirmed by Supriya Islas MD, Chong Reyes ([id number]) on 5/10/2021 3:35:28 PM           Lab Results   Component Value Date/Time    WBC 11.5 (H) 2021 04:18 AM    HGB 12.4 2021 04:18 AM    HCT 39.9 2021 04:18 AM    PLATELET 181  04:18 AM    MCV 84.0 2021 04:18 AM      Lab Results   Component Value Date/Time    Sodium 135 (L) 2021 04:18 AM    Potassium 4.4 2021 04:18 AM    Chloride 104 2021 04:18 AM    CO2 24 2021 04:18 AM    Anion gap 7 2021 04:18 AM    Glucose 251 (H) 2021 04:18 AM    BUN 28 (H) 2021 04:18 AM    Creatinine 1.11 2021 04:18 AM    BUN/Creatinine ratio 25 (H) 2021 04:18 AM    GFR est AA >60 2021 04:18 AM    GFR est non-AA >60 2021 04:18 AM    Calcium 8.2 (L) 2021 04:18 AM    Bilirubin, total 0.4 2021 04:18 AM    Alk.  phosphatase 68 2021 04:18 AM    Protein, total 6.7 2021 04:18 AM    Albumin 2.9 (L) 2021 04:18 AM    Globulin 3.8 2021 04:18 AM    A-G Ratio 0.8 (L) 2021 04:18 AM ALT (SGPT) 12 05/11/2021 04:18 AM      Lab Results   Component Value Date/Time    TSH 5.51 (H) 05/04/2021 01:22 PM           Assessment:           ICD-10-CM ICD-9-CM    1. PAF (paroxysmal atrial fibrillation) ([facility])  I48.0 427.31 AMB POC EKG ROUTINE W/ 12 LEADS, INTER & REP      amiodarone (CORDARONE) 200 mg tablet   2. SSS (sick sinus syndrome) ([facility])  I49.5 427.81    3. Tachy-amina syndrome ([facility])  I49.5 427.81 amiodarone (CORDARONE) 200 mg tablet   4. Benign essential hypertension  I10 401.1 amiodarone (CORDARONE) 200 mg tablet   5. S/P ablation of atrial fibrillation  Z98.890 V45.89     Z86.79     6. BMI 39.0-39.9,adult  Z68.39 V85.39    7. Mixed hyperlipidemia  E78.2 272.2 amiodarone (CORDARONE) 200 mg tablet   8. S/P coronary artery stent placement  Z95.5 V45.82 amiodarone (CORDARONE) 200 mg tablet     Orders Placed This Encounter    AMB POC EKG ROUTINE W/ 12 LEADS, INTER & REP     Order Specific Question:   Reason for Exam:     Answer:   routine    amiodarone (CORDARONE) 200 mg tablet     Sig: Take 0.5 Tablets by mouth daily. Lowered 09/21/21     Dispense:  90 Tablet     Refill:  5        Plan:     Mr Karyn Duong is here for follow up s/p hybrid ablation (PVI of all 4 PVs confirmed ([facility] around RUPV and RFA of inferior portion of RIPV). Additional RFA focus of roof line, 5/10/21). He is 93% AP and <1% RVP with > 10 years remaining. Five ATR with longest 5 min (AFL)- all in June. He is in sinus today and normotensive. Echo verified normal EF 5/10/21. During this visit,  the patient and I had a comprehensive discussion of device management using principles of shared decision making. we reviewed device therapy, including the potential risks and benefits of device management. These risks include death, myocardial infarction, stroke, cardiac perforation, vascular injury, injury, pacing induced cardiomyopathy, inappropriate shocks (defibrillator) and other less severe complications.  The patient demonstrated a clear understanding of these issues during out discussion. Our plans, determined together after thorough consideration, are outlined else where in this note. Enrolled in remote monitoring and I will see him back in 1 year. Addressed all patient questions and concerns at this visit. Continue medical management for Af. Discussed side effects, efficacy and good medication compliance with pt re: oac. Thank you for allowing me to participate in Christine Sanchez 's care. Wiht Calderon NP    Patient seen and examined by me with nurse practitioner. I personally performed all components of the history, physical, and medical decision making and agree with the assessment and plan with minor modifications as noted. Pt is A paced 93% for sick sinus syndrome. He is in sinus sp hybrid AF. We will lower his amio dose to 100 mg daily. We will also lower his LRL on his pacer to 70 bpm. He is stable and compliant with oac for elevated chadsvasc score. Cont med rx for htn and hyperlipidemia. F/u in one year. During this visit,  the patient and I had a comprehensive discussion of arrhythmia management using principles of shared decision making. This included a discussion of anti-arrhythmic medications including class I agents (e.g. flecainide) and class III agents (e.g. amiodarone, sotalol etc) and both class II and IV agents (beta-blockers and calcium channel blockers). We reviewed the potentially life-threatening side effects of these medications, including (but not limited to) fatal tachyarrhythmias, pulmonary toxicity, liver toxicity and thyroid disorders. We also reviewed the requisite monitoring required of some of these medications. The patient demonstrated a clear understanding of these issues during out discussion. Our plans, determined together after thorough consideration, are outlined else where in this note.       Mary Stahl MD, Lorenzo Argueta

## 2021-09-21 NOTE — LETTER
9/21/2021    Patient: Georgi Clifton   YOB: 1960   Date of Visit: 9/21/2021     Cristino Clark MD  [address] [place] [address]  [facility] [place]    Dear Cristino Clark MD,      Thank you for referring Mr. Fernando Hardy to [address] [place] [address] for evaluation. My notes for this consultation are attached. If you have questions, please do not hesitate to call me. I look forward to following your patient along with you.       Sincerely,    Silva Carson MD

## 2021-09-21 NOTE — PROGRESS NOTES
Chief Complaint   Patient presents with    Irregular Heart Beat     3 month follow up -     Leg Swelling     spike' lower leg, feet and ankles - small opened area to left shin        1. Have you been to the ER, urgent care clinic since your last visit? Hospitalized since your last visit? No     2. Have you seen or consulted any other health care providers outside of the [address] [place] [address] since your last visit? Include any pap smears or colon screening.   No

## 2021-10-18 RX ORDER — EMPAGLIFLOZIN 10 MG/1
TABLET, FILM COATED ORAL
Qty: 90 TABLET | Refills: 3 | Status: SHIPPED | OUTPATIENT
Start: 2021-10-18 | End: 2022-10-17

## 2021-11-28 RX ORDER — METFORMIN HYDROCHLORIDE 1000 MG/1
TABLET ORAL
Qty: 180 TABLET | Refills: 3 | Status: SHIPPED | OUTPATIENT
Start: 2021-11-28 | End: 2022-10-17

## 2021-12-05 DIAGNOSIS — I48.0 PAF (PAROXYSMAL ATRIAL FIBRILLATION) (HCC): Chronic | ICD-10-CM

## 2021-12-05 DIAGNOSIS — E78.2 MIXED HYPERLIPIDEMIA: ICD-10-CM

## 2021-12-05 DIAGNOSIS — I49.5 TACHY-BRADY SYNDROME (HCC): Chronic | ICD-10-CM

## 2021-12-05 DIAGNOSIS — I10 BENIGN ESSENTIAL HYPERTENSION: Chronic | ICD-10-CM

## 2021-12-06 RX ORDER — LANOLIN ALCOHOL/MO/W.PET/CERES
CREAM (GRAM) TOPICAL
Qty: 180 TABLET | Refills: 3 | Status: SHIPPED | OUTPATIENT
Start: 2021-12-06

## 2021-12-06 RX ORDER — METOPROLOL TARTRATE 25 MG/1
12.5 TABLET, FILM COATED ORAL 2 TIMES DAILY
Qty: 90 TABLET | Refills: 1 | Status: SHIPPED | OUTPATIENT
Start: 2021-12-06 | End: 2021-12-09

## 2021-12-08 ENCOUNTER — TELEPHONE (OUTPATIENT)
Dept: CARDIOLOGY CLINIC | Age: 61
End: 2021-12-08

## 2021-12-08 DIAGNOSIS — E78.2 MIXED HYPERLIPIDEMIA: ICD-10-CM

## 2021-12-08 DIAGNOSIS — I48.0 PAF (PAROXYSMAL ATRIAL FIBRILLATION) (HCC): Chronic | ICD-10-CM

## 2021-12-08 DIAGNOSIS — I10 BENIGN ESSENTIAL HYPERTENSION: Chronic | ICD-10-CM

## 2021-12-08 DIAGNOSIS — I49.5 TACHY-BRADY SYNDROME (HCC): Chronic | ICD-10-CM

## 2021-12-08 NOTE — TELEPHONE ENCOUNTER
Pt called regarding his metoprolol. He states the dosage was changed and it needs to be changed from 1/2 a pill day to 1 pill a day.      Please give pt a call at [phone]    Thanks  Raquel Patricia

## 2021-12-09 RX ORDER — METOPROLOL TARTRATE 25 MG/1
25 TABLET, FILM COATED ORAL 2 TIMES DAILY
Qty: 180 TABLET | Refills: 1 | Status: SHIPPED | OUTPATIENT
Start: 2021-12-09 | End: 2022-04-20

## 2021-12-10 ENCOUNTER — TELEPHONE (OUTPATIENT)
Dept: CARDIOLOGY CLINIC | Age: 61
End: 2021-12-10

## 2021-12-10 NOTE — TELEPHONE ENCOUNTER
Verified patient with 2 identifiers   Patient picked up Metoprolol script and states they gave him 90 tabs and sig read to take 1/2 tab twice daily. Script that was sent in yesterday was 180 tabs - take 1 tab twice daily. Spoke with pharmacist who states they filled what was sent by Dr Jeanne Tomlin on 12/6/21  Advised pharmacist it was changed to what Rylee sent in yesterday - Metoprolol 25 mg tab # 180 - take one tab twice daily. Pharmacist advised me to tell patient to come back in at his convenience - they will fix it. Patient advised.

## 2021-12-23 ENCOUNTER — OFFICE VISIT (OUTPATIENT)
Dept: CARDIOLOGY CLINIC | Age: 61
End: 2021-12-23
Payer: COMMERCIAL

## 2021-12-23 DIAGNOSIS — I48.0 PAF (PAROXYSMAL ATRIAL FIBRILLATION) (HCC): ICD-10-CM

## 2021-12-23 DIAGNOSIS — Z95.0 CARDIAC PACEMAKER IN SITU: Primary | ICD-10-CM

## 2021-12-23 DIAGNOSIS — I49.5 SSS (SICK SINUS SYNDROME) (HCC): ICD-10-CM

## 2021-12-23 PROCEDURE — 93296 REM INTERROG EVL PM/IDS: CPT | Performed by: INTERNAL MEDICINE

## 2021-12-23 PROCEDURE — 93294 REM INTERROG EVL PM/LDLS PM: CPT | Performed by: INTERNAL MEDICINE

## 2022-01-02 ENCOUNTER — APPOINTMENT (OUTPATIENT)
Dept: GENERAL RADIOLOGY | Age: 62
DRG: 486 | End: 2022-01-02
Attending: HOSPITALIST
Payer: COMMERCIAL

## 2022-01-02 ENCOUNTER — APPOINTMENT (OUTPATIENT)
Dept: VASCULAR SURGERY | Age: 62
DRG: 486 | End: 2022-01-02
Attending: PHYSICIAN ASSISTANT
Payer: COMMERCIAL

## 2022-01-02 ENCOUNTER — APPOINTMENT (OUTPATIENT)
Dept: GENERAL RADIOLOGY | Age: 62
DRG: 486 | End: 2022-01-02
Attending: PHYSICIAN ASSISTANT
Payer: COMMERCIAL

## 2022-01-02 ENCOUNTER — HOSPITAL ENCOUNTER (INPATIENT)
Age: 62
LOS: 4 days | Discharge: HOME HEALTH | DRG: 486 | End: 2022-01-06
Attending: EMERGENCY MEDICINE | Admitting: HOSPITALIST
Payer: COMMERCIAL

## 2022-01-02 DIAGNOSIS — E11.69 TYPE 2 DIABETES MELLITUS WITH OTHER SPECIFIED COMPLICATION, WITHOUT LONG-TERM CURRENT USE OF INSULIN (HCC): ICD-10-CM

## 2022-01-02 DIAGNOSIS — L03.115 CELLULITIS OF RIGHT LOWER EXTREMITY: Primary | ICD-10-CM

## 2022-01-02 DIAGNOSIS — Z96.651 AFTERCARE FOLLOWING RIGHT KNEE JOINT REPLACEMENT SURGERY: ICD-10-CM

## 2022-01-02 DIAGNOSIS — Z47.1 AFTERCARE FOLLOWING RIGHT KNEE JOINT REPLACEMENT SURGERY: ICD-10-CM

## 2022-01-02 PROBLEM — L03.90 CELLULITIS: Status: ACTIVE | Noted: 2022-01-02

## 2022-01-02 LAB
ALBUMIN SERPL-MCNC: 2.5 G/DL (ref 3.5–5)
ALBUMIN/GLOB SERPL: 0.6 {RATIO} (ref 1.1–2.2)
ALP SERPL-CCNC: 56 U/L (ref 45–117)
ALT SERPL-CCNC: 13 U/L (ref 12–78)
ANION GAP SERPL CALC-SCNC: 7 MMOL/L (ref 5–15)
APPEARANCE FLD: ABNORMAL
AST SERPL-CCNC: 12 U/L (ref 15–37)
BILIRUB SERPL-MCNC: 0.5 MG/DL (ref 0.2–1)
BODY FLD TYPE: NORMAL
BUN SERPL-MCNC: 24 MG/DL (ref 6–20)
BUN/CREAT SERPL: 20 (ref 12–20)
CALCIUM SERPL-MCNC: 9.1 MG/DL (ref 8.5–10.1)
CHLORIDE SERPL-SCNC: 109 MMOL/L (ref 97–108)
CO2 SERPL-SCNC: 22 MMOL/L (ref 21–32)
COLOR FLD: YELLOW
COVID-19 RAPID TEST, COVR: NOT DETECTED
CREAT SERPL-MCNC: 1.23 MG/DL (ref 0.7–1.3)
CRP SERPL-MCNC: 28.4 MG/DL (ref 0–0.6)
CRYSTALS FLD MICRO: NORMAL
ERYTHROCYTE [SEDIMENTATION RATE] IN BLOOD: 100 MM/HR (ref 0–20)
GLOBULIN SER CALC-MCNC: 4.2 G/DL (ref 2–4)
GLUCOSE BLD STRIP.AUTO-MCNC: 171 MG/DL (ref 65–117)
GLUCOSE SERPL-MCNC: 132 MG/DL (ref 65–100)
LYMPHOCYTES NFR FLD: 4 %
MESOTHL CELL NFR FLD: 8 %
MONOS+MACROS NFR FLD: 53 %
NEUTROPHILS NFR FLD: 35 %
NUC CELL # FLD: ABNORMAL /CU MM
POTASSIUM SERPL-SCNC: 3.5 MMOL/L (ref 3.5–5.1)
PROT SERPL-MCNC: 6.7 G/DL (ref 6.4–8.2)
RBC # FLD: >100 /CU MM
SERVICE CMNT-IMP: ABNORMAL
SODIUM SERPL-SCNC: 138 MMOL/L (ref 136–145)
SOURCE, COVRS: NORMAL
SPECIMEN SOURCE FLD: ABNORMAL

## 2022-01-02 PROCEDURE — 89050 BODY FLUID CELL COUNT: CPT

## 2022-01-02 PROCEDURE — 87186 SC STD MICRODIL/AGAR DIL: CPT

## 2022-01-02 PROCEDURE — 74011000250 HC RX REV CODE- 250: Performed by: PHYSICIAN ASSISTANT

## 2022-01-02 PROCEDURE — 87635 SARS-COV-2 COVID-19 AMP PRB: CPT

## 2022-01-02 PROCEDURE — 87205 SMEAR GRAM STAIN: CPT

## 2022-01-02 PROCEDURE — 87077 CULTURE AEROBIC IDENTIFY: CPT

## 2022-01-02 PROCEDURE — 80053 COMPREHEN METABOLIC PANEL: CPT

## 2022-01-02 PROCEDURE — 87147 CULTURE TYPE IMMUNOLOGIC: CPT

## 2022-01-02 PROCEDURE — 0S9C3ZZ DRAINAGE OF RIGHT KNEE JOINT, PERCUTANEOUS APPROACH: ICD-10-PCS | Performed by: PHYSICIAN ASSISTANT

## 2022-01-02 PROCEDURE — 93971 EXTREMITY STUDY: CPT

## 2022-01-02 PROCEDURE — 73562 X-RAY EXAM OF KNEE 3: CPT

## 2022-01-02 PROCEDURE — 89060 EXAM SYNOVIAL FLUID CRYSTALS: CPT

## 2022-01-02 PROCEDURE — 85652 RBC SED RATE AUTOMATED: CPT

## 2022-01-02 PROCEDURE — 71045 X-RAY EXAM CHEST 1 VIEW: CPT

## 2022-01-02 PROCEDURE — 36415 COLL VENOUS BLD VENIPUNCTURE: CPT

## 2022-01-02 PROCEDURE — 65660000000 HC RM CCU STEPDOWN

## 2022-01-02 PROCEDURE — 74011250637 HC RX REV CODE- 250/637: Performed by: HOSPITALIST

## 2022-01-02 PROCEDURE — 86140 C-REACTIVE PROTEIN: CPT

## 2022-01-02 PROCEDURE — 99285 EMERGENCY DEPT VISIT HI MDM: CPT

## 2022-01-02 PROCEDURE — 82962 GLUCOSE BLOOD TEST: CPT

## 2022-01-02 RX ORDER — METOPROLOL TARTRATE 25 MG/1
25 TABLET, FILM COATED ORAL 2 TIMES DAILY
Status: DISCONTINUED | OUTPATIENT
Start: 2022-01-02 | End: 2022-01-06 | Stop reason: HOSPADM

## 2022-01-02 RX ORDER — AMIODARONE HYDROCHLORIDE 200 MG/1
100 TABLET ORAL DAILY
Status: DISCONTINUED | OUTPATIENT
Start: 2022-01-03 | End: 2022-01-06 | Stop reason: HOSPADM

## 2022-01-02 RX ORDER — TRAMADOL HYDROCHLORIDE 50 MG/1
50 TABLET ORAL
Status: DISCONTINUED | OUTPATIENT
Start: 2022-01-02 | End: 2022-01-04 | Stop reason: SDUPTHER

## 2022-01-02 RX ORDER — FUROSEMIDE 40 MG/1
40 TABLET ORAL DAILY
Status: DISCONTINUED | OUTPATIENT
Start: 2022-01-02 | End: 2022-01-06 | Stop reason: HOSPADM

## 2022-01-02 RX ORDER — LIDOCAINE HYDROCHLORIDE 10 MG/ML
5 INJECTION INFILTRATION; PERINEURAL ONCE
Status: COMPLETED | OUTPATIENT
Start: 2022-01-02 | End: 2022-01-02

## 2022-01-02 RX ORDER — SODIUM CHLORIDE 0.9 % (FLUSH) 0.9 %
5-40 SYRINGE (ML) INJECTION AS NEEDED
Status: DISCONTINUED | OUTPATIENT
Start: 2022-01-02 | End: 2022-01-06 | Stop reason: HOSPADM

## 2022-01-02 RX ORDER — ONDANSETRON 4 MG/1
4 TABLET, ORALLY DISINTEGRATING ORAL
Status: DISCONTINUED | OUTPATIENT
Start: 2022-01-02 | End: 2022-01-06 | Stop reason: HOSPADM

## 2022-01-02 RX ORDER — SODIUM CHLORIDE 0.9 % (FLUSH) 0.9 %
5-40 SYRINGE (ML) INJECTION EVERY 8 HOURS
Status: DISCONTINUED | OUTPATIENT
Start: 2022-01-02 | End: 2022-01-06 | Stop reason: HOSPADM

## 2022-01-02 RX ORDER — ACETAMINOPHEN 325 MG/1
650 TABLET ORAL
Status: DISCONTINUED | OUTPATIENT
Start: 2022-01-02 | End: 2022-01-06 | Stop reason: HOSPADM

## 2022-01-02 RX ORDER — MAGNESIUM SULFATE 100 %
4 CRYSTALS MISCELLANEOUS AS NEEDED
Status: DISCONTINUED | OUTPATIENT
Start: 2022-01-02 | End: 2022-01-06 | Stop reason: HOSPADM

## 2022-01-02 RX ORDER — ROSUVASTATIN CALCIUM 10 MG/1
20 TABLET, COATED ORAL DAILY
Status: DISCONTINUED | OUTPATIENT
Start: 2022-01-03 | End: 2022-01-06 | Stop reason: HOSPADM

## 2022-01-02 RX ORDER — GEMFIBROZIL 600 MG/1
600 TABLET, FILM COATED ORAL 2 TIMES DAILY
Status: DISCONTINUED | OUTPATIENT
Start: 2022-01-02 | End: 2022-01-06 | Stop reason: HOSPADM

## 2022-01-02 RX ORDER — AMOXICILLIN 250 MG
1 CAPSULE ORAL
Status: DISCONTINUED | OUTPATIENT
Start: 2022-01-02 | End: 2022-01-06 | Stop reason: HOSPADM

## 2022-01-02 RX ORDER — POLYETHYLENE GLYCOL 3350 17 G/17G
17 POWDER, FOR SOLUTION ORAL DAILY PRN
Status: DISCONTINUED | OUTPATIENT
Start: 2022-01-02 | End: 2022-01-06 | Stop reason: HOSPADM

## 2022-01-02 RX ORDER — INSULIN LISPRO 100 [IU]/ML
INJECTION, SOLUTION INTRAVENOUS; SUBCUTANEOUS
Status: DISCONTINUED | OUTPATIENT
Start: 2022-01-02 | End: 2022-01-06 | Stop reason: HOSPADM

## 2022-01-02 RX ORDER — HYDROCODONE BITARTRATE AND ACETAMINOPHEN 5; 325 MG/1; MG/1
1 TABLET ORAL
Status: DISCONTINUED | OUTPATIENT
Start: 2022-01-02 | End: 2022-01-04 | Stop reason: SDUPTHER

## 2022-01-02 RX ORDER — DEXTROSE 50 % IN WATER (D50W) INTRAVENOUS SYRINGE
25-50 AS NEEDED
Status: DISCONTINUED | OUTPATIENT
Start: 2022-01-02 | End: 2022-01-06 | Stop reason: HOSPADM

## 2022-01-02 RX ORDER — ASPIRIN 81 MG/1
81 TABLET ORAL DAILY
Status: DISCONTINUED | OUTPATIENT
Start: 2022-01-03 | End: 2022-01-06 | Stop reason: HOSPADM

## 2022-01-02 RX ORDER — ACETAMINOPHEN 650 MG/1
650 SUPPOSITORY RECTAL
Status: DISCONTINUED | OUTPATIENT
Start: 2022-01-02 | End: 2022-01-06 | Stop reason: HOSPADM

## 2022-01-02 RX ORDER — ONDANSETRON 2 MG/ML
4 INJECTION INTRAMUSCULAR; INTRAVENOUS
Status: DISCONTINUED | OUTPATIENT
Start: 2022-01-02 | End: 2022-01-06 | Stop reason: HOSPADM

## 2022-01-02 RX ORDER — GLIPIZIDE 5 MG/1
10 TABLET ORAL
Status: DISCONTINUED | OUTPATIENT
Start: 2022-01-03 | End: 2022-01-06 | Stop reason: HOSPADM

## 2022-01-02 RX ADMIN — METOPROLOL TARTRATE 25 MG: 25 TABLET, FILM COATED ORAL at 21:14

## 2022-01-02 RX ADMIN — FUROSEMIDE 40 MG: 40 TABLET ORAL at 21:13

## 2022-01-02 RX ADMIN — LIDOCAINE HYDROCHLORIDE 5 ML: 10 INJECTION, SOLUTION INFILTRATION; PERINEURAL at 20:25

## 2022-01-02 NOTE — ED TRIAGE NOTES
Pt presents to department as a transfer from [facility]. Pt seen there for right leg swelling from knee down and is transferred for arthrocenteses. Pt given 600 mg of IV clindamycin.

## 2022-01-02 NOTE — H&P
History & Physical    Primary Care Provider: Hiram Peace MD  Source of Information: Patient     History of Presenting Illness:   Reta Manning is a 59 y.o. male who presents with  Right lower leg pain and swelling     59year old M hx a fib, CAD, DM, high cholesterol, obesity prsentingfor RIGHT lower leg pain. Started on Thursday, 3 days ago. Received his COVID booster that AM then later that night developed pain, swelling in the lower leg. Tmax 100 at home  59year old M hx a fib, CAD, DM, high cholesterol, obesity presenting RIGHT lower leg pain. Patient has the pain is so significant that it keeps him from walking. Unable to bend the right knee. Patient had a right TKR with Dr. Luba Sanchez in 2010, subsequently had another surgery on his distal femur last year. Patient was seen in an outside ED where he had WBC 11.5, bedside ultrasound no DVT. Review of Systems:  General: HPI, no changes of weight  HEENT: no headache, no vision changes, no nose discharge, no hearing changes   RES: no wheezing, no cough, no sob  CVS: no cp, no palpitation.   Muscular: HPI   Skin: no rash, no itching   GI: no vomiting, no diarrhea  : no dysuria, no hematuria  Hemo: no gum bleeding, no petechial   Neuro: no sensation changes, no focal weakness   Endo: no polydipsia   Psych: denied depression     Past Medical History:   Diagnosis Date    Arthritis     knees    [facility]     Benign essential hypertension 6/10/2011    CAD (coronary artery disease)     Diabetes ([facility]     DX  AGE 33      Hypercholesterolemia     Hypertriglyceridemia     Obesity     Pacemaker     PAF (paroxysmal atrial fibrillation) ([facility] 12/12/2018    Syncope       Past Surgical History:   Procedure Laterality Date    CARDIAC CATHETERIZATION  6/9/2011         CARDIAC CATHETERIZATION  11/1/2012         HC ANGIOSEAL VIP 6FR  6/9/2011         HX AFIB ABLATION  03/15/2021    HX COLONOSCOPY  05/2009    Dr. Cassi Samano- normal    HX ORTHOPAEDIC      left knee replacement    HX ORTHOPAEDIC      R knee replacement  2010 -Susan Standard.  [facility] ABLATE L/R ATRIAL FIBRIL W/ISOLATED PULM VEIN N/A 5/10/2021    Ablation Following A-Fib  Addl performed by Elizabeth Edwards MD [facility] CARDIAC CATH LAB    VA CARDIAC SURG PROCEDURE UNLIST      STENT RCA  6/11    VA COMPRE ELECTROPHYSIOL XM W/LEFT ATRIAL PACNG/REC N/A 5/10/2021    Lt Atrial Pace & Record During Ep Study performed by Elizabeth Edwards MD at [facility] CARDIAC CATH LAB    VA DRUG-ELUTING STENTS, SINGLE  6/9/2011         VA EPHYS EVL TRNSPTL TX ATRIAL FIB ISOLAT PULM VEIN N/A 3/15/2021    ABLATION A-FIB  W COMPLETE EP STUDY performed by Elizabeth Edwards MD at [facility] CARDIAC CATH LAB    VA EPHYS EVL TRNSPTL TX ATRIAL FIB ISOLAT PULM VEIN N/A 5/10/2021    ABLATION A-FIB  W COMPLETE EP STUDY performed by Elizabeth Edwards MD at [facility] CARDIAC CATH LAB    VA INS NEW/RPLCMT PRM PM W/TRANSV ELTRD ATRIAL&VENT N/A 3/22/2021    INSERT PPM DUAL performed by Elizabeth Edwards MD at [facility] CARDIAC CATH LAB    VA INTRACARD ECHO, THER/DX INTERVENT N/A 3/15/2021    Intracardiac Echocardiogram performed by Elizabeth Edwards MD at [facility] CARDIAC CATH LAB    VA INTRACARD ECHO, THER/DX INTERVENT N/A 5/10/2021    Intracardiac Echocardiogram performed by Elizabeth Edwards MD at [facility] CARDIAC CATH LAB    VA INTRACARDIAC ELECTROPHYSIOLOGIC 3D MAPPING N/A 3/15/2021    Ep 3d Mapping performed by Elizabeth Edwards MD at [facility] CARDIAC CATH LAB    VA INTRACARDIAC ELECTROPHYSIOLOGIC 3D MAPPING N/A 5/10/2021    Ep 3d Mapping performed by Elizabeth Edwards MD at [facility] LAB     Prior to Admission medications    Medication Sig Start Date End Date Taking? Authorizing Provider   metoprolol tartrate (LOPRESSOR) 25 mg tablet Take 1 Tablet by mouth two (2) times a day for 90 days.  12/9/21 3/9/22  Rylee Melendez, ANP   magnesium oxide (MAG-OX) 400 mg tablet TAKE 2 TABLETS BY MOUTH EVERY DAY 12/6/21   Gómez Burrows MD   metFORMIN (GLUCOPHAGE) 1,000 mg tablet TAKE 1 TABLET BY MOUTH TWICE A DAY WITH MEALS 11/28/21   Gómez Burrows MD   Jardiance 10 mg tablet TAKE 1 TAB BY MOUTH DAILY. FOR DIABETES 10/18/21   Gómez Burrows MD   amiodarone (CORDARONE) 200 mg tablet Take 0.5 Tablets by mouth daily. Lowered 09/21/21 9/21/21   Rylee Melendez ANP   gemfibroziL (LOPID) 600 mg tablet Take 1 Tablet by mouth two (2) times a day. 9/17/21   Gómez Burrows MD   Eliquis 5 mg tablet TAKE 1 TABLET BY MOUTH TWICE A DAY 9/3/21   Ocie Severe, MD   semaglutide (Ozempic) 1 mg/dose (2 mg/1.5 mL) sub-q pen 1 MG BY SUBCUTANEOUS ROUTE EVERY SEVEN (7) DAYS. 7/12/21   Gómez Burrows MD   nitroglycerin (NITROSTAT) 0.4 mg SL tablet 1 Tablet by SubLINGual route every five (5) minutes as needed for Chest Pain (call 911 if CP/ SOB not relieved by 3 tabs). 7/12/21   Gómez Burrows MD   furosemide (LASIX) 40 mg tablet TAKE 1-2 TABLET BY MOUTH DAILY AS NEEDED FOR FLUID 7/6/21   Gómez Burrows MD   glipiZIDE (GLUCOTROL) 10 mg tablet TAKE 1 TABLET BY MOUTH TWICE A DAY 6/28/21   Gómez Burrows MD   senna-docusate (PERICOLACE) 8.6-50 mg per tablet Take 1 Tab by mouth two (2) times daily as needed for Constipation. 5/11/21   Citlalli Lim NP   rosuvastatin (CRESTOR) 20 mg tablet TAKE 1 TABLET BY MOUTH EVERY DAY FOR CHOLESTEROL 4/16/21   Gómez Burrows MD   ACETAMINOPHEN PO Take 500 mg by mouth as needed. 1-2 tabs prn as needed    Provider, Historical   ibuprofen (MOTRIN) 800 mg tablet Take 1 Tab by mouth every eight (8) hours as needed for Pain. 1/18/21   Gómez Burrows MD   aspirin delayed-release 81 mg tablet Take  by mouth daily. Provider, Historical   omega-3 fatty acids-vitamin e (FISH OIL) 1,000 mg Cap Take 2 Caps by mouth two (2) times a day.  7/13/11   Gómez Burrows MD     Allergies   Allergen Reactions    Lisinopril Cough    Losartan Other (comments) Hypotension        Family History   Problem Relation Age of Onset    Diabetes Mother     Hypertension Mother    Sophia Melendez Elevated Lipids Mother     Cancer Mother         unsure    Diabetes Father     Heart Disease Father     Hypertension Father     Elevated Lipids Father     Diabetes Brother     Suicide Brother     Diabetes Brother         SOCIAL HISTORY:  Patient resides:  Independently x   Assisted Living    SNF    With family care       Smoking history:   None    Former x   Chronic      Alcohol history:   None    Social x   Chronic      Ambulates:   Independently    w/cane x   w/walker x   w/wc    CODE STATUS:  DNR    Full x   Other      Objective:     Physical Exam:     Visit Vitals  /66   Pulse 69   Temp 97.8 °F (36.6 °C)   Resp 16   SpO2 98%      O2 Device: None (Room air)    General:  Alert, cooperative, no distress, appears stated age. Head:  Normocephalic, without obvious abnormality, atraumatic. Eyes:  Conjunctivae/corneas clear. PERRL, EOMs intact. Nose: Nares normal. Septum midline. Mucosa normal. No drainage or sinus tenderness. Throat: Lips, mucosa, and tongue normal. Teeth and gums normal.   Neck: Supple, symmetrical, trachea midline, no adenopathy, thyroid: no enlargement/tenderness/nodules, no carotid bruit and no JVD. Back:   Symmetric, no curvature. ROM normal. No CVA tenderness. Lungs:   Clear to auscultation bilaterally. Chest wall:  No tenderness or deformity. Heart:  Regular rate and rhythm, S1, S2 normal, no murmur, click, rub or gallop. Abdomen:   Soft, non-tender. Bowel sounds normal. No masses,  No organomegaly. Extremities: Right lower leg swelling, red and warm. Left leg mild swelling and small open wound with mild sourrounding redness. Rt knee ROM decreased. Pulses: 2+ and symmetric all extremities. Skin: As above    Neurologic: CNII-XII intact.  None focal              Data Review:     Recent Days:  No results for input(s): WBC, HGB, HCT, PLT, HGBEXT, HCTEXT, PLTEXT in the last 72 hours. No results for input(s): NA, K, CL, CO2, GLU, BUN, CREA, CA, MG, PHOS, ALB, TBIL, ALT, INR, INREXT in the last 72 hours. No lab exists for component: SGOT  No results for input(s): PH, PCO2, PO2, HCO3, FIO2 in the last 72 hours. 24 Hour Results:  Recent Results (from the past 24 hour(s))   SED RATE (ESR)    Collection Time: 01/02/22  5:06 PM   Result Value Ref Range    Sed rate, automated 100 (H) 0 - 20 mm/hr   C REACTIVE PROTEIN, QT    Collection Time: 01/02/22  5:06 PM   Result Value Ref Range    C-Reactive protein 28.40 (H) 0.00 - 0.60 mg/dL         Imaging:   XR CHEST PORT    Result Date: 1/2/2022  1. Vague opacities in the bilateral lungs may represent multifocal pneumonia. XR KNEE RT 3 V    Result Date: 1/2/2022  Moderate suprapatellar joint effusion. Healed distal femoral fracture and total knee replacement. .      Assessment:     Active Problems:    * No active hospital problems. *         Plan:     1. Bilateral leg cellulitis: iv ancef, po lasix due to swelling. Will follow response. 2. Vague opacities bilateral lung: no respiratory symptoms. Pending covid test   3. DM: glipizide, lantus and ssi will moniotr   4. Right knee pain: mod suprapatellar joint effusion, hold oac, ortho consulted.    5. Chronic afib: remote tele, rate controlled, continue amiodarone        Signed By: Pawan Su MD     January 2, 2022

## 2022-01-02 NOTE — ED PROVIDER NOTES
59year old M hx a fib, CAD, DM, high cholesterol, obesity presenting RIGHT lower leg pain. Started on Thursday, 3 days ago. Received his COVID booster that AM then later that night developed pain, swelling in the lower leg. Tmax 100 at home. Patient has the pain is so significant that it keeps him from walking. Unable to bend the right knee. Patient had a right TKR with Dr. Guevara Hinson in 2010, subsequently had another surgery on his distal femur last year. Patient was seen in an outside ED where he had WBC 11.5, bedside ultrasound was normal, sent to the ED for admission, formal ultrasound, possible arthrocentesis. Past medical history: A. fib on Eliquis, CAD, type 2 diabetes, high cholesterol  Social history: Former smoker           Past Medical History:   Diagnosis Date    Arthritis     knees    [facility] with RVR ([facility]     Benign essential hypertension 6/10/2011    CAD (coronary artery disease)     Diabetes ([facility]     DX  AGE 33      Hypercholesterolemia     Hypertriglyceridemia     Obesity     Pacemaker     PAF (paroxysmal atrial fibrillation) ([facility] 12/12/2018    Syncope        Past Surgical History:   Procedure Laterality Date    CARDIAC CATHETERIZATION  6/9/2011         CARDIAC CATHETERIZATION  11/1/2012         HC ANGIOSEAL VIP 6FR  6/9/2011         HX AFIB ABLATION  03/15/2021    HX COLONOSCOPY  05/2009    Dr. Nicole Coffey- normal    HX ORTHOPAEDIC      left knee replacement    HX ORTHOPAEDIC      R knee replacement  2010 -Guevara Hinson.     VT ABLATE L/R ATRIAL FIBRIL W/ISOLATED PULM VEIN N/A 5/10/2021    Ablation Following A-Fib  Addl performed by Eric Hernandez MD at [facility] CARDIAC CATH LAB    VT CARDIAC SURG PROCEDURE UNLIST      STENT RCA  6/11    VT COMPRE ELECTROPHYSIOL XM W/LEFT ATRIAL PACNG/REC N/A 5/10/2021    Lt Atrial Pace & Record During Ep Study performed by Eric Hernandez MD at [facility] CARDIAC CATH LAB    VT DRUG-ELUTING STENTS, SINGLE  6/9/2011         VT EPHYS EVL TRNSPTL TX ATRIAL FIB ISOLAT PULM VEIN N/A 3/15/2021    ABLATION A-FIB  W COMPLETE EP STUDY performed by Ulysses Crowell MD at [facility] CARDIAC CATH LAB    CO EPHYS EVL TRNSPTL TX ATRIAL FIB ISOLAT PULM VEIN N/A 5/10/2021    ABLATION A-FIB  W COMPLETE EP STUDY performed by Ulysses Crowell MD at [facility] CARDIAC CATH LAB    CO INS NEW/RPLCMT PRM PM W/TRANSV ELTRD ATRIAL&VENT N/A 3/22/2021    INSERT PPM DUAL performed by Ulysses Crowell MD at [facility] CARDIAC CATH LAB    CO INTRACARD ECHO, THER/DX INTERVENT N/A 3/15/2021    Intracardiac Echocardiogram performed by Ulysses Crowell MD at [facility] CARDIAC CATH LAB    CO INTRACARD ECHO, THER/DX INTERVENT N/A 5/10/2021    Intracardiac Echocardiogram performed by Ulysses Crowell MD at [facility] CARDIAC CATH LAB    CO INTRACARDIAC ELECTROPHYSIOLOGIC 3D MAPPING N/A 3/15/2021    Ep 3d Mapping performed by Ulysses Crowell MD at [facility] CARDIAC CATH LAB    CO INTRACARDIAC ELECTROPHYSIOLOGIC 3D MAPPING N/A 5/10/2021    Ep 3d Mapping performed by Ulysses Crowell MD at [facility] CARDIAC CATH LAB         Family History:   Problem Relation Age of Onset    Diabetes Mother     Hypertension Mother    [facility] Elevated Lipids Mother     Cancer Mother         unsure    Diabetes Father     Heart Disease Father     Hypertension Father     Elevated Lipids Father     Diabetes Brother     Suicide Brother     Diabetes Brother        Social History     Socioeconomic History    Marital status:      Spouse name: Not on file    Number of children: Not on file    Years of education: Not on file    Highest education level: Not on file   Occupational History    Not on file   Tobacco Use    Smoking status: Former Smoker     Types: Cigarettes     Quit date: 11/10/1989     Years since quittin.1    Smokeless tobacco: Never Used   Vaping Use    Vaping Use: Never used   Substance and Sexual Activity    Alcohol use: Yes     Comment: social  DRINKS ONE WEEKEND A MONTH/\"BIG IMPROVEMENT\"    Drug use: No    Sexual activity: Yes     Partners: Female     Birth control/protection: None   Other Topics Concern    Not on file   Social History Narrative    , 3 children, twin sons 25 and one son 15. Works at [employer] for the last 9 years, used to work a [employer]     Social Determinants of Health     Financial Resource Strain:     Difficulty of Paying Living Expenses: Not on file   Food Insecurity:     Worried About [address] in the Last Year: Not on file    [address] in the Last Year: Not on file   Transportation Needs:     Lack of Transportation (Medical): Not on file    Lack of Transportation (Non-Medical): Not on file   Physical Activity:     Days of Exercise per Week: Not on file    Minutes of Exercise per Session: Not on file   Stress:     Feeling of Stress : Not on file   Social Connections:     Frequency of Communication with Friends and Family: Not on file    Frequency of Social Gatherings with Friends and Family: Not on file    Attends [employer] Services: Not on file    Active Member of [address] [place] [address] or Organizations: Not on file    Attends Club or Organization Meetings: Not on file    Marital Status: Not on file   Intimate Partner Violence:     Fear of Current or Ex-Partner: Not on file    Emotionally Abused: Not on file    Physically Abused: Not on file    Sexually Abused: Not on file   Housing Stability:     Unable to Pay for Housing in the Last Year: Not on file    Number of [place] in the Last Year: Not on file    Unstable Housing in the Last Year: Not on file         ALLERGIES: Lisinopril and Losartan    Review of Systems   Constitutional: Negative for fever. HENT: Negative for facial swelling. Respiratory: Negative for shortness of breath. Cardiovascular: Positive for leg swelling. Negative for chest pain. Gastrointestinal: Negative for vomiting. Musculoskeletal: Positive for arthralgias and joint swelling.    Skin: Negative for wound. Neurological: Negative for syncope. All other systems reviewed and are negative. Vitals:    01/02/22 1458   BP: 121/66   Pulse: 69   Resp: 16   Temp: 97.8 °F (36.6 °C)   SpO2: 98%            Physical Exam  Vitals and nursing note reviewed. Constitutional:       General: He is not in acute distress. Appearance: He is well-developed. Comments: Pleasant, well-appearing, no distress   HENT:      Head: Normocephalic and atraumatic. Right Ear: External ear normal.      Left Ear: External ear normal.   Eyes:      General: No scleral icterus. Conjunctiva/sclera: Conjunctivae normal.   Neck:      Trachea: No tracheal deviation. Cardiovascular:      Rate and Rhythm: Normal rate and regular rhythm. Heart sounds: Normal heart sounds. No murmur heard. No friction rub. No gallop. Pulmonary:      Effort: Pulmonary effort is normal. No respiratory distress. Breath sounds: Normal breath sounds. No stridor. No wheezing. Abdominal:      General: There is no distension. Palpations: Abdomen is soft. Musculoskeletal:      Cervical back: Neck supple. Comments: Right lower leg: Exposed for exam.  Significant swelling from the knee extending down to the ankle with associated warmth. Patient is unable to flex the right knee. Somewhat difficult to appreciate effusion given body habitus but does feel like there is a joint effusion present. Normal distal pulses. Skin:     General: Skin is warm and dry. Neurological:      Mental Status: He is alert and oriented to person, place, and time. Psychiatric:         Behavior: Behavior normal.          MDM  Number of Diagnoses or Management Options  Cellulitis of right lower extremity  Diagnosis management comments: 24-year-old male presenting to the ED from an outside hospital for admission for right lower leg cellulitis, possible septic arthritis.   Orthopedics involved, would like formal x-ray, CRP, sed rate, will follow. Flex negative, CRP and sed rate elevated. Medicine consulted for admission, orthopedics following. Amount and/or Complexity of Data Reviewed  Clinical lab tests: ordered and reviewed  Tests in the radiology section of CPT®: ordered and reviewed  Discuss the patient with other providers: yes (Dr. Tonie Waters ED attending. Ortho on call.)           Procedures                 Perfect Serve Consult for Admission  6:01 PM    ED Room Number: R32/R32  Patient Name and age:  Jayant Guardado 59 y.o.  male  Working Diagnosis:   1. Cellulitis of right lower extremity        COVID-19 Suspicion:  no  Sepsis present:  no  Reassessment needed: no  Code Status:  Full Code  Readmission: no  Isolation Requirements:  no  Recommended Level of Care:  med/surg  Department:[facility] Adult ED - 21   Other: 57-year-old male4-day history of pain, swelling to the right knee and right lower legT-max at home 100sent from an outside ER for admission as well as formal duplex ultrasound and likely arthrocentesisorthopedics is aware of the patient, requested some additional labs and imaging, will follow along and make decision about need for arthrocentesis.   He already got Clinda at the other hospital

## 2022-01-03 ENCOUNTER — ANESTHESIA EVENT (OUTPATIENT)
Dept: SURGERY | Age: 62
DRG: 486 | End: 2022-01-03
Payer: COMMERCIAL

## 2022-01-03 ENCOUNTER — ANESTHESIA (OUTPATIENT)
Dept: SURGERY | Age: 62
DRG: 486 | End: 2022-01-03
Payer: COMMERCIAL

## 2022-01-03 PROBLEM — M00.061 STAPHYLOCOCCAL ARTHRITIS OF RIGHT KNEE (HCC): Status: ACTIVE | Noted: 2022-01-03

## 2022-01-03 LAB
ALBUMIN SERPL-MCNC: 2.3 G/DL (ref 3.5–5)
ALBUMIN/GLOB SERPL: 0.5 {RATIO} (ref 1.1–2.2)
ALP SERPL-CCNC: 55 U/L (ref 45–117)
ALT SERPL-CCNC: 12 U/L (ref 12–78)
ANION GAP SERPL CALC-SCNC: 7 MMOL/L (ref 5–15)
AST SERPL-CCNC: 9 U/L (ref 15–37)
BASOPHILS # BLD: 0 K/UL (ref 0–0.1)
BASOPHILS NFR BLD: 0 % (ref 0–1)
BILIRUB SERPL-MCNC: 0.5 MG/DL (ref 0.2–1)
BUN SERPL-MCNC: 20 MG/DL (ref 6–20)
BUN/CREAT SERPL: 21 (ref 12–20)
CALCIUM SERPL-MCNC: 8.8 MG/DL (ref 8.5–10.1)
CHLORIDE SERPL-SCNC: 110 MMOL/L (ref 97–108)
CO2 SERPL-SCNC: 20 MMOL/L (ref 21–32)
CREAT SERPL-MCNC: 0.96 MG/DL (ref 0.7–1.3)
DIFFERENTIAL METHOD BLD: ABNORMAL
EOSINOPHIL # BLD: 0 K/UL (ref 0–0.4)
EOSINOPHIL NFR BLD: 0 % (ref 0–7)
ERYTHROCYTE [DISTWIDTH] IN BLOOD BY AUTOMATED COUNT: 15.2 % (ref 11.5–14.5)
EST. AVERAGE GLUCOSE BLD GHB EST-MCNC: 197 MG/DL
GLOBULIN SER CALC-MCNC: 4.3 G/DL (ref 2–4)
GLUCOSE BLD STRIP.AUTO-MCNC: 121 MG/DL (ref 65–117)
GLUCOSE BLD STRIP.AUTO-MCNC: 155 MG/DL (ref 65–117)
GLUCOSE BLD STRIP.AUTO-MCNC: 173 MG/DL (ref 65–117)
GLUCOSE SERPL-MCNC: 136 MG/DL (ref 65–100)
HBA1C MFR BLD: 8.5 % (ref 4–5.6)
HCT VFR BLD AUTO: 30.8 % (ref 36.6–50.3)
HGB BLD-MCNC: 9.6 G/DL (ref 12.1–17)
IMM GRANULOCYTES # BLD AUTO: 0.1 K/UL (ref 0–0.04)
IMM GRANULOCYTES NFR BLD AUTO: 1 % (ref 0–0.5)
LYMPHOCYTES # BLD: 0.7 K/UL (ref 0.8–3.5)
LYMPHOCYTES NFR BLD: 9 % (ref 12–49)
MAGNESIUM SERPL-MCNC: 2.2 MG/DL (ref 1.6–2.4)
MCH RBC QN AUTO: 25.4 PG (ref 26–34)
MCHC RBC AUTO-ENTMCNC: 31.2 G/DL (ref 30–36.5)
MCV RBC AUTO: 81.5 FL (ref 80–99)
MONOCYTES # BLD: 0.9 K/UL (ref 0–1)
MONOCYTES NFR BLD: 12 % (ref 5–13)
NEUTS SEG # BLD: 6 K/UL (ref 1.8–8)
NEUTS SEG NFR BLD: 78 % (ref 32–75)
NRBC # BLD: 0 K/UL (ref 0–0.01)
NRBC BLD-RTO: 0 PER 100 WBC
PHOSPHATE SERPL-MCNC: 3.2 MG/DL (ref 2.6–4.7)
PLATELET # BLD AUTO: 227 K/UL (ref 150–400)
PMV BLD AUTO: 10 FL (ref 8.9–12.9)
POTASSIUM SERPL-SCNC: 3.6 MMOL/L (ref 3.5–5.1)
PROT SERPL-MCNC: 6.6 G/DL (ref 6.4–8.2)
RBC # BLD AUTO: 3.78 M/UL (ref 4.1–5.7)
RBC MORPH BLD: ABNORMAL
SERVICE CMNT-IMP: ABNORMAL
SODIUM SERPL-SCNC: 137 MMOL/L (ref 136–145)
WBC # BLD AUTO: 7.7 K/UL (ref 4.1–11.1)

## 2022-01-03 PROCEDURE — 77030003601 HC NDL NRV BLK BBMI -A

## 2022-01-03 PROCEDURE — 77030026438 HC STYL ET INTUB CARD -A: Performed by: NURSE ANESTHETIST, CERTIFIED REGISTERED

## 2022-01-03 PROCEDURE — 83036 HEMOGLOBIN GLYCOSYLATED A1C: CPT

## 2022-01-03 PROCEDURE — 76060000037 HC ANESTHESIA 3 TO 3.5 HR: Performed by: ORTHOPAEDIC SURGERY

## 2022-01-03 PROCEDURE — 74011000250 HC RX REV CODE- 250: Performed by: NURSE ANESTHETIST, CERTIFIED REGISTERED

## 2022-01-03 PROCEDURE — 85025 COMPLETE CBC W/AUTO DIFF WBC: CPT

## 2022-01-03 PROCEDURE — 74011250636 HC RX REV CODE- 250/636: Performed by: PHYSICIAN ASSISTANT

## 2022-01-03 PROCEDURE — 77030038552 HC DRN WND MDII -A: Performed by: ORTHOPAEDIC SURGERY

## 2022-01-03 PROCEDURE — 87075 CULTR BACTERIA EXCEPT BLOOD: CPT

## 2022-01-03 PROCEDURE — 74011250636 HC RX REV CODE- 250/636: Performed by: HOSPITALIST

## 2022-01-03 PROCEDURE — 77030002916 HC SUT ETHLN J&J -A: Performed by: ORTHOPAEDIC SURGERY

## 2022-01-03 PROCEDURE — 77030008462 HC STPLR SKN PROX J&J -A: Performed by: ORTHOPAEDIC SURGERY

## 2022-01-03 PROCEDURE — C1776 JOINT DEVICE (IMPLANTABLE): HCPCS | Performed by: ORTHOPAEDIC SURGERY

## 2022-01-03 PROCEDURE — 77030003882 HC BIT DRL TWST BRSM -B: Performed by: ORTHOPAEDIC SURGERY

## 2022-01-03 PROCEDURE — 76210000016 HC OR PH I REC 1 TO 1.5 HR: Performed by: ORTHOPAEDIC SURGERY

## 2022-01-03 PROCEDURE — 74011000258 HC RX REV CODE- 258: Performed by: HOSPITALIST

## 2022-01-03 PROCEDURE — L1830 KO IMMOB CANVAS LONG PRE OTS: HCPCS | Performed by: ORTHOPAEDIC SURGERY

## 2022-01-03 PROCEDURE — 84100 ASSAY OF PHOSPHORUS: CPT

## 2022-01-03 PROCEDURE — 74011000258 HC RX REV CODE- 258: Performed by: PHYSICIAN ASSISTANT

## 2022-01-03 PROCEDURE — C1713 ANCHOR/SCREW BN/BN,TIS/BN: HCPCS | Performed by: ORTHOPAEDIC SURGERY

## 2022-01-03 PROCEDURE — 74011000250 HC RX REV CODE- 250: Performed by: ORTHOPAEDIC SURGERY

## 2022-01-03 PROCEDURE — 74011250637 HC RX REV CODE- 250/637: Performed by: PHYSICIAN ASSISTANT

## 2022-01-03 PROCEDURE — 77030029883 HC RETRV SUT ARTHSCP HOFFE BEAT -B: Performed by: ORTHOPAEDIC SURGERY

## 2022-01-03 PROCEDURE — 80053 COMPREHEN METABOLIC PANEL: CPT

## 2022-01-03 PROCEDURE — 83735 ASSAY OF MAGNESIUM: CPT

## 2022-01-03 PROCEDURE — 74011250637 HC RX REV CODE- 250/637: Performed by: HOSPITALIST

## 2022-01-03 PROCEDURE — 77030011992 HC AIRWY NASOPHGL TELE -A: Performed by: ANESTHESIOLOGY

## 2022-01-03 PROCEDURE — 77030000032 HC CUF TRNQT ZIMM -B: Performed by: ORTHOPAEDIC SURGERY

## 2022-01-03 PROCEDURE — 2709999900 HC NON-CHARGEABLE SUPPLY: Performed by: ORTHOPAEDIC SURGERY

## 2022-01-03 PROCEDURE — 74011250636 HC RX REV CODE- 250/636: Performed by: NURSE ANESTHETIST, CERTIFIED REGISTERED

## 2022-01-03 PROCEDURE — 74011000250 HC RX REV CODE- 250: Performed by: PHYSICIAN ASSISTANT

## 2022-01-03 PROCEDURE — 74011250636 HC RX REV CODE- 250/636: Performed by: STUDENT IN AN ORGANIZED HEALTH CARE EDUCATION/TRAINING PROGRAM

## 2022-01-03 PROCEDURE — 77030035236 HC SUT PDS STRATFX BARB J&J -B: Performed by: ORTHOPAEDIC SURGERY

## 2022-01-03 PROCEDURE — 82962 GLUCOSE BLOOD TEST: CPT

## 2022-01-03 PROCEDURE — 77030031139 HC SUT VCRL2 J&J -A: Performed by: ORTHOPAEDIC SURGERY

## 2022-01-03 PROCEDURE — 36415 COLL VENOUS BLD VENIPUNCTURE: CPT

## 2022-01-03 PROCEDURE — 0SUV09Z SUPPLEMENT RIGHT KNEE JOINT, TIBIAL SURFACE WITH LINER, OPEN APPROACH: ICD-10-PCS | Performed by: ORTHOPAEDIC SURGERY

## 2022-01-03 PROCEDURE — 77030008684 HC TU ET CUF COVD -B: Performed by: NURSE ANESTHETIST, CERTIFIED REGISTERED

## 2022-01-03 PROCEDURE — 3E0U329 INTRODUCTION OF OTHER ANTI-INFECTIVE INTO JOINTS, PERCUTANEOUS APPROACH: ICD-10-PCS | Performed by: ORTHOPAEDIC SURGERY

## 2022-01-03 PROCEDURE — 87147 CULTURE TYPE IMMUNOLOGIC: CPT

## 2022-01-03 PROCEDURE — 77030039497 HC CST PAD STERILE CHCS -A: Performed by: ORTHOPAEDIC SURGERY

## 2022-01-03 PROCEDURE — 87205 SMEAR GRAM STAIN: CPT

## 2022-01-03 PROCEDURE — 77030040022 HC WND LAVG DISP BACTISURE ZIMM -F: Performed by: ORTHOPAEDIC SURGERY

## 2022-01-03 PROCEDURE — 77030002966 HC SUT PDS J&J -A: Performed by: ORTHOPAEDIC SURGERY

## 2022-01-03 PROCEDURE — 74011250636 HC RX REV CODE- 250/636: Performed by: ORTHOPAEDIC SURGERY

## 2022-01-03 PROCEDURE — 76010000173 HC OR TIME 3 TO 3.5 HR INTENSV-TIER 1: Performed by: ORTHOPAEDIC SURGERY

## 2022-01-03 PROCEDURE — 74011000250 HC RX REV CODE- 250: Performed by: HOSPITALIST

## 2022-01-03 PROCEDURE — 65660000000 HC RM CCU STEPDOWN

## 2022-01-03 PROCEDURE — 0SPC09Z REMOVAL OF LINER FROM RIGHT KNEE JOINT, OPEN APPROACH: ICD-10-PCS | Performed by: ORTHOPAEDIC SURGERY

## 2022-01-03 PROCEDURE — 77030038692 HC WND DEB SYS IRMX -B: Performed by: ORTHOPAEDIC SURGERY

## 2022-01-03 PROCEDURE — 77030040922 HC BLNKT HYPOTHRM STRY -A

## 2022-01-03 DEVICE — STIMULAN® RAPID CURE PROVIDED STERILE FOR SINGLE PATIENT USE. STIMULAN® RAPID CURE CONTAINS CALCIUM SULFATE POWDER AND MIXING SOLUTION IN PRE-MEASURED QUANTITIES SO THAT WHEN MIXED TOGETHER IN A STERILE MIXING BOWL, THE RESULTANT PASTE IS TO BE DIGITALLY PACKED INTO OPEN BONE VOID/GAP TO SET INSITU OR PLACED INTO THE MOULD PROVIDED, THE MIXTURE SETS TO FORM BEADS. THE BIODEGRADABLE, RADIOPAQUE BEADS ARE RESORBED IN APPROXIMATELY 30 – 60 DAYS WHEN USED IN ACCORDANCE WITH THE DEVICE LABELLING. STIMULAN® RAPID CURE IS MANUFACTURED FROM SYNTHETIC IMPLANT GRADE CALCIUM SULFATE DIHYDRATE(CASO4.2H2O) THAT RESORBS AND IS REPLACED WITH BONE DURING THE HEALING PROCESS. ALSO, AS THE BONE VOID FILLER BEADS ARE BIODEGRADABLE AND BIOCOMPATIBLE, THEY MAY BE USED AT AN INFECTED SITE.
Type: IMPLANTABLE DEVICE | Site: KNEE | Status: FUNCTIONAL
Brand: STIMULAN® RAPID CURE

## 2022-01-03 DEVICE — SIGMA TIBIAL INSERT ROTATING PLATFORM STABILIZED AOX SIZE 5 15MM
Type: IMPLANTABLE DEVICE | Site: KNEE | Status: FUNCTIONAL
Brand: SIGMA AOX

## 2022-01-03 RX ORDER — SODIUM CHLORIDE 9 MG/ML
125 INJECTION, SOLUTION INTRAVENOUS CONTINUOUS
Status: DISPENSED | OUTPATIENT
Start: 2022-01-03 | End: 2022-01-04

## 2022-01-03 RX ORDER — OXYCODONE HYDROCHLORIDE 5 MG/1
5 TABLET ORAL
Status: DISCONTINUED | OUTPATIENT
Start: 2022-01-03 | End: 2022-01-06 | Stop reason: HOSPADM

## 2022-01-03 RX ORDER — VANCOMYCIN/0.9 % SOD CHLORIDE 1.5G/250ML
1500 PLASTIC BAG, INJECTION (ML) INTRAVENOUS EVERY 12 HOURS
Status: DISCONTINUED | OUTPATIENT
Start: 2022-01-03 | End: 2022-01-03 | Stop reason: SDUPTHER

## 2022-01-03 RX ORDER — ONDANSETRON 2 MG/ML
INJECTION INTRAMUSCULAR; INTRAVENOUS AS NEEDED
Status: DISCONTINUED | OUTPATIENT
Start: 2022-01-03 | End: 2022-01-03 | Stop reason: HOSPADM

## 2022-01-03 RX ORDER — SODIUM CHLORIDE 0.9 % (FLUSH) 0.9 %
5-40 SYRINGE (ML) INJECTION AS NEEDED
Status: DISCONTINUED | OUTPATIENT
Start: 2022-01-03 | End: 2022-01-06 | Stop reason: HOSPADM

## 2022-01-03 RX ORDER — SODIUM CHLORIDE, SODIUM LACTATE, POTASSIUM CHLORIDE, CALCIUM CHLORIDE 600; 310; 30; 20 MG/100ML; MG/100ML; MG/100ML; MG/100ML
INJECTION, SOLUTION INTRAVENOUS
Status: DISCONTINUED | OUTPATIENT
Start: 2022-01-03 | End: 2022-01-03 | Stop reason: HOSPADM

## 2022-01-03 RX ORDER — AMOXICILLIN 250 MG
1 CAPSULE ORAL 2 TIMES DAILY
Status: DISCONTINUED | OUTPATIENT
Start: 2022-01-03 | End: 2022-01-06 | Stop reason: HOSPADM

## 2022-01-03 RX ORDER — GLYCOPYRROLATE 0.2 MG/ML
INJECTION INTRAMUSCULAR; INTRAVENOUS AS NEEDED
Status: DISCONTINUED | OUTPATIENT
Start: 2022-01-03 | End: 2022-01-03 | Stop reason: HOSPADM

## 2022-01-03 RX ORDER — HYDROMORPHONE HYDROCHLORIDE 1 MG/ML
0.5 INJECTION, SOLUTION INTRAMUSCULAR; INTRAVENOUS; SUBCUTANEOUS
Status: ACTIVE | OUTPATIENT
Start: 2022-01-03 | End: 2022-01-04

## 2022-01-03 RX ORDER — ROPIVACAINE HYDROCHLORIDE 5 MG/ML
INJECTION, SOLUTION EPIDURAL; INFILTRATION; PERINEURAL
Status: COMPLETED | OUTPATIENT
Start: 2022-01-03 | End: 2022-01-03

## 2022-01-03 RX ORDER — VANCOMYCIN HYDROCHLORIDE 1 G/20ML
INJECTION, POWDER, LYOPHILIZED, FOR SOLUTION INTRAVENOUS AS NEEDED
Status: DISCONTINUED | OUTPATIENT
Start: 2022-01-03 | End: 2022-01-03 | Stop reason: HOSPADM

## 2022-01-03 RX ORDER — LIDOCAINE HYDROCHLORIDE 10 MG/ML
0.1 INJECTION, SOLUTION EPIDURAL; INFILTRATION; INTRACAUDAL; PERINEURAL AS NEEDED
Status: DISCONTINUED | OUTPATIENT
Start: 2022-01-03 | End: 2022-01-03 | Stop reason: HOSPADM

## 2022-01-03 RX ORDER — ONDANSETRON 4 MG/1
4 TABLET, ORALLY DISINTEGRATING ORAL
Status: DISCONTINUED | OUTPATIENT
Start: 2022-01-03 | End: 2022-01-04 | Stop reason: SDUPTHER

## 2022-01-03 RX ORDER — MIDAZOLAM HYDROCHLORIDE 1 MG/ML
1 INJECTION, SOLUTION INTRAMUSCULAR; INTRAVENOUS AS NEEDED
Status: DISCONTINUED | OUTPATIENT
Start: 2022-01-03 | End: 2022-01-03 | Stop reason: HOSPADM

## 2022-01-03 RX ORDER — SODIUM CHLORIDE 0.9 % (FLUSH) 0.9 %
5-40 SYRINGE (ML) INJECTION AS NEEDED
Status: DISCONTINUED | OUTPATIENT
Start: 2022-01-03 | End: 2022-01-03 | Stop reason: HOSPADM

## 2022-01-03 RX ORDER — FACIAL-BODY WIPES
10 EACH TOPICAL DAILY PRN
Status: DISCONTINUED | OUTPATIENT
Start: 2022-01-05 | End: 2022-01-06 | Stop reason: HOSPADM

## 2022-01-03 RX ORDER — SODIUM CHLORIDE 0.9 % (FLUSH) 0.9 %
5-40 SYRINGE (ML) INJECTION EVERY 8 HOURS
Status: DISCONTINUED | OUTPATIENT
Start: 2022-01-03 | End: 2022-01-06 | Stop reason: HOSPADM

## 2022-01-03 RX ORDER — ROCURONIUM BROMIDE 10 MG/ML
INJECTION, SOLUTION INTRAVENOUS AS NEEDED
Status: DISCONTINUED | OUTPATIENT
Start: 2022-01-03 | End: 2022-01-03 | Stop reason: HOSPADM

## 2022-01-03 RX ORDER — SODIUM CHLORIDE 0.9 % (FLUSH) 0.9 %
5-40 SYRINGE (ML) INJECTION EVERY 8 HOURS
Status: DISCONTINUED | OUTPATIENT
Start: 2022-01-03 | End: 2022-01-03 | Stop reason: HOSPADM

## 2022-01-03 RX ORDER — HYDROXYZINE HYDROCHLORIDE 10 MG/1
10 TABLET, FILM COATED ORAL
Status: DISCONTINUED | OUTPATIENT
Start: 2022-01-03 | End: 2022-01-06 | Stop reason: HOSPADM

## 2022-01-03 RX ORDER — HYDROMORPHONE HYDROCHLORIDE 2 MG/ML
INJECTION, SOLUTION INTRAMUSCULAR; INTRAVENOUS; SUBCUTANEOUS AS NEEDED
Status: DISCONTINUED | OUTPATIENT
Start: 2022-01-03 | End: 2022-01-03 | Stop reason: HOSPADM

## 2022-01-03 RX ORDER — HYDROMORPHONE HYDROCHLORIDE 1 MG/ML
0.2 INJECTION, SOLUTION INTRAMUSCULAR; INTRAVENOUS; SUBCUTANEOUS
Status: DISCONTINUED | OUTPATIENT
Start: 2022-01-03 | End: 2022-01-03 | Stop reason: HOSPADM

## 2022-01-03 RX ORDER — MIDAZOLAM HYDROCHLORIDE 1 MG/ML
INJECTION, SOLUTION INTRAMUSCULAR; INTRAVENOUS AS NEEDED
Status: DISCONTINUED | OUTPATIENT
Start: 2022-01-03 | End: 2022-01-03 | Stop reason: HOSPADM

## 2022-01-03 RX ORDER — SODIUM CHLORIDE, SODIUM LACTATE, POTASSIUM CHLORIDE, CALCIUM CHLORIDE 600; 310; 30; 20 MG/100ML; MG/100ML; MG/100ML; MG/100ML
50 INJECTION, SOLUTION INTRAVENOUS CONTINUOUS
Status: DISCONTINUED | OUTPATIENT
Start: 2022-01-03 | End: 2022-01-03 | Stop reason: HOSPADM

## 2022-01-03 RX ORDER — ACETAMINOPHEN 325 MG/1
650 TABLET ORAL ONCE
Status: DISCONTINUED | OUTPATIENT
Start: 2022-01-03 | End: 2022-01-03 | Stop reason: HOSPADM

## 2022-01-03 RX ORDER — FENTANYL CITRATE 50 UG/ML
INJECTION, SOLUTION INTRAMUSCULAR; INTRAVENOUS AS NEEDED
Status: DISCONTINUED | OUTPATIENT
Start: 2022-01-03 | End: 2022-01-03 | Stop reason: HOSPADM

## 2022-01-03 RX ORDER — NEOSTIGMINE METHYLSULFATE 1 MG/ML
INJECTION INTRAVENOUS AS NEEDED
Status: DISCONTINUED | OUTPATIENT
Start: 2022-01-03 | End: 2022-01-03 | Stop reason: HOSPADM

## 2022-01-03 RX ORDER — DEXAMETHASONE SODIUM PHOSPHATE 4 MG/ML
INJECTION, SOLUTION INTRA-ARTICULAR; INTRALESIONAL; INTRAMUSCULAR; INTRAVENOUS; SOFT TISSUE AS NEEDED
Status: DISCONTINUED | OUTPATIENT
Start: 2022-01-03 | End: 2022-01-03 | Stop reason: HOSPADM

## 2022-01-03 RX ORDER — TRANEXAMIC ACID 100 MG/ML
INJECTION, SOLUTION INTRAVENOUS AS NEEDED
Status: DISCONTINUED | OUTPATIENT
Start: 2022-01-03 | End: 2022-01-03 | Stop reason: HOSPADM

## 2022-01-03 RX ORDER — POLYETHYLENE GLYCOL 3350 17 G/17G
17 POWDER, FOR SOLUTION ORAL DAILY
Status: DISCONTINUED | OUTPATIENT
Start: 2022-01-04 | End: 2022-01-06 | Stop reason: HOSPADM

## 2022-01-03 RX ORDER — ACETAMINOPHEN 325 MG/1
650 TABLET ORAL EVERY 6 HOURS
Status: DISCONTINUED | OUTPATIENT
Start: 2022-01-03 | End: 2022-01-06 | Stop reason: HOSPADM

## 2022-01-03 RX ORDER — FAMOTIDINE 20 MG/1
20 TABLET, FILM COATED ORAL
Status: DISCONTINUED | OUTPATIENT
Start: 2022-01-03 | End: 2022-01-06 | Stop reason: HOSPADM

## 2022-01-03 RX ORDER — ONDANSETRON 2 MG/ML
4 INJECTION INTRAMUSCULAR; INTRAVENOUS
Status: DISCONTINUED | OUTPATIENT
Start: 2022-01-03 | End: 2022-01-04 | Stop reason: SDUPTHER

## 2022-01-03 RX ORDER — NALOXONE HYDROCHLORIDE 0.4 MG/ML
0.4 INJECTION, SOLUTION INTRAMUSCULAR; INTRAVENOUS; SUBCUTANEOUS AS NEEDED
Status: DISCONTINUED | OUTPATIENT
Start: 2022-01-03 | End: 2022-01-06 | Stop reason: HOSPADM

## 2022-01-03 RX ORDER — PHENYLEPHRINE HCL IN 0.9% NACL 0.4MG/10ML
SYRINGE (ML) INTRAVENOUS AS NEEDED
Status: DISCONTINUED | OUTPATIENT
Start: 2022-01-03 | End: 2022-01-03 | Stop reason: HOSPADM

## 2022-01-03 RX ORDER — FENTANYL CITRATE 50 UG/ML
50 INJECTION, SOLUTION INTRAMUSCULAR; INTRAVENOUS AS NEEDED
Status: DISCONTINUED | OUTPATIENT
Start: 2022-01-03 | End: 2022-01-03 | Stop reason: HOSPADM

## 2022-01-03 RX ORDER — PROPOFOL 10 MG/ML
INJECTION, EMULSION INTRAVENOUS AS NEEDED
Status: DISCONTINUED | OUTPATIENT
Start: 2022-01-03 | End: 2022-01-03 | Stop reason: HOSPADM

## 2022-01-03 RX ORDER — LIDOCAINE HYDROCHLORIDE 20 MG/ML
INJECTION, SOLUTION EPIDURAL; INFILTRATION; INTRACAUDAL; PERINEURAL AS NEEDED
Status: DISCONTINUED | OUTPATIENT
Start: 2022-01-03 | End: 2022-01-03 | Stop reason: HOSPADM

## 2022-01-03 RX ORDER — FENTANYL CITRATE 50 UG/ML
25 INJECTION, SOLUTION INTRAMUSCULAR; INTRAVENOUS
Status: DISCONTINUED | OUTPATIENT
Start: 2022-01-03 | End: 2022-01-03 | Stop reason: HOSPADM

## 2022-01-03 RX ORDER — SUCCINYLCHOLINE CHLORIDE 20 MG/ML
INJECTION INTRAMUSCULAR; INTRAVENOUS AS NEEDED
Status: DISCONTINUED | OUTPATIENT
Start: 2022-01-03 | End: 2022-01-03 | Stop reason: HOSPADM

## 2022-01-03 RX ORDER — OXYCODONE HYDROCHLORIDE 5 MG/1
10 TABLET ORAL
Status: DISCONTINUED | OUTPATIENT
Start: 2022-01-03 | End: 2022-01-06 | Stop reason: HOSPADM

## 2022-01-03 RX ADMIN — SODIUM CHLORIDE, POTASSIUM CHLORIDE, SODIUM LACTATE AND CALCIUM CHLORIDE: 600; 310; 30; 20 INJECTION, SOLUTION INTRAVENOUS at 11:57

## 2022-01-03 RX ADMIN — HYDROMORPHONE HYDROCHLORIDE 0.5 MG: 2 INJECTION, SOLUTION INTRAMUSCULAR; INTRAVENOUS; SUBCUTANEOUS at 15:29

## 2022-01-03 RX ADMIN — Medication 120 MCG: at 14:23

## 2022-01-03 RX ADMIN — GLIPIZIDE 10 MG: 5 TABLET ORAL at 08:14

## 2022-01-03 RX ADMIN — GLYCOPYRROLATE 0.6 MG: 0.2 INJECTION, SOLUTION INTRAMUSCULAR; INTRAVENOUS at 14:34

## 2022-01-03 RX ADMIN — SODIUM CHLORIDE, PRESERVATIVE FREE 10 ML: 5 INJECTION INTRAVENOUS at 23:11

## 2022-01-03 RX ADMIN — FENTANYL CITRATE 50 MCG: 50 INJECTION INTRAMUSCULAR; INTRAVENOUS at 11:28

## 2022-01-03 RX ADMIN — ROPIVACAINE HYDROCHLORIDE 25 ML: 5 INJECTION, SOLUTION EPIDURAL; INFILTRATION; PERINEURAL at 11:37

## 2022-01-03 RX ADMIN — GLIPIZIDE 10 MG: 5 TABLET ORAL at 18:19

## 2022-01-03 RX ADMIN — HYDROMORPHONE HYDROCHLORIDE 0.5 MG: 2 INJECTION, SOLUTION INTRAMUSCULAR; INTRAVENOUS; SUBCUTANEOUS at 12:45

## 2022-01-03 RX ADMIN — VANCOMYCIN HYDROCHLORIDE 2500 MG: 10 INJECTION, POWDER, LYOPHILIZED, FOR SOLUTION INTRAVENOUS at 11:38

## 2022-01-03 RX ADMIN — DOCUSATE SODIUM 50 MG AND SENNOSIDES 8.6 MG 1 TABLET: 8.6; 5 TABLET, FILM COATED ORAL at 18:19

## 2022-01-03 RX ADMIN — METOPROLOL TARTRATE 25 MG: 25 TABLET, FILM COATED ORAL at 18:19

## 2022-01-03 RX ADMIN — Medication 10 ML: at 07:15

## 2022-01-03 RX ADMIN — Medication 10 ML: at 23:12

## 2022-01-03 RX ADMIN — FENTANYL CITRATE 100 MCG: 50 INJECTION, SOLUTION INTRAMUSCULAR; INTRAVENOUS at 12:31

## 2022-01-03 RX ADMIN — ROCURONIUM BROMIDE 10 MG: 10 SOLUTION INTRAVENOUS at 13:36

## 2022-01-03 RX ADMIN — ROCURONIUM BROMIDE 10 MG: 10 SOLUTION INTRAVENOUS at 14:03

## 2022-01-03 RX ADMIN — MIDAZOLAM 2 MG: 1 INJECTION INTRAMUSCULAR; INTRAVENOUS at 12:07

## 2022-01-03 RX ADMIN — OXYCODONE 10 MG: 5 TABLET ORAL at 23:22

## 2022-01-03 RX ADMIN — ROCURONIUM BROMIDE 40 MG: 10 SOLUTION INTRAVENOUS at 12:31

## 2022-01-03 RX ADMIN — ACETAMINOPHEN 650 MG: 325 TABLET ORAL at 18:19

## 2022-01-03 RX ADMIN — METOPROLOL TARTRATE 25 MG: 25 TABLET, FILM COATED ORAL at 08:49

## 2022-01-03 RX ADMIN — SODIUM CHLORIDE, POTASSIUM CHLORIDE, SODIUM LACTATE AND CALCIUM CHLORIDE 50 ML/HR: 600; 310; 30; 20 INJECTION, SOLUTION INTRAVENOUS at 10:30

## 2022-01-03 RX ADMIN — ROCURONIUM BROMIDE 10 MG: 10 SOLUTION INTRAVENOUS at 12:14

## 2022-01-03 RX ADMIN — GEMFIBROZIL 600 MG: 600 TABLET ORAL at 18:00

## 2022-01-03 RX ADMIN — FENTANYL CITRATE 100 MCG: 50 INJECTION, SOLUTION INTRAMUSCULAR; INTRAVENOUS at 12:14

## 2022-01-03 RX ADMIN — ONDANSETRON HYDROCHLORIDE 4 MG: 2 INJECTION, SOLUTION INTRAMUSCULAR; INTRAVENOUS at 12:32

## 2022-01-03 RX ADMIN — CEFAZOLIN 3 G: 10 INJECTION, POWDER, FOR SOLUTION INTRAVENOUS at 19:16

## 2022-01-03 RX ADMIN — CEFAZOLIN 3 G: 10 INJECTION, POWDER, FOR SOLUTION INTRAVENOUS at 07:15

## 2022-01-03 RX ADMIN — PROPOFOL 200 MG: 10 INJECTION, EMULSION INTRAVENOUS at 12:14

## 2022-01-03 RX ADMIN — PHENYLEPHRINE HYDROCHLORIDE 50 MCG/MIN: 10 INJECTION INTRAVENOUS at 12:17

## 2022-01-03 RX ADMIN — NEOSTIGMINE METHYLSULFATE 4 MG: 1 INJECTION, SOLUTION INTRAVENOUS at 14:34

## 2022-01-03 RX ADMIN — SUCCINYLCHOLINE CHLORIDE 200 MG: 20 INJECTION, SOLUTION INTRAMUSCULAR; INTRAVENOUS at 12:14

## 2022-01-03 RX ADMIN — OXYCODONE 10 MG: 5 TABLET ORAL at 18:18

## 2022-01-03 RX ADMIN — SODIUM CHLORIDE 125 ML/HR: 9 INJECTION, SOLUTION INTRAVENOUS at 15:30

## 2022-01-03 RX ADMIN — DEXAMETHASONE SODIUM PHOSPHATE 2 MG: 4 INJECTION, SOLUTION INTRAMUSCULAR; INTRAVENOUS at 12:32

## 2022-01-03 RX ADMIN — LIDOCAINE HYDROCHLORIDE 80 MG: 20 INJECTION, SOLUTION EPIDURAL; INFILTRATION; INTRACAUDAL; PERINEURAL at 12:14

## 2022-01-03 NOTE — BRIEF OP NOTE
Brief Postoperative Note    Patient: Rosa Jameson  YOB: 1960  MRN: [id number]    Date of Procedure: 1/3/2022     Pre-Op Diagnosis: INFECTED RIGHT TOTAL KNEE    Post-Op Diagnosis: Same as preoperative diagnosis. With infected femoral plate     Procedure(s):  RIGHT KNEE POLY EXCHANGE ; HARDWARE REMOVAL RIGHT LATERAL LEG; INCISION AND DRAINAGE; WOUND IRRIGATION; PLACEMENT OF ANTIBIOTIC BEADS    Surgeon(s):  Edouard Nolen MD    Surgical Assistant: Physician Assistant: Ilene Driver PA-C  Surg Asst-1: Edwige GARRIDO    Anesthesia: General     Estimated Blood Loss (mL): 686     Complications: None    Specimens:   ID Type Source Tests Collected by Time Destination   1 : right knee wound Wound Knee  ANAEROBIC/AEROBIC/GRAM STAIN Edouard Nolen MD 1/3/2022 1300 Microbiology        Implants:   Implant Name Type Inv.  Item Serial No.  Lot No. LRB No. Used Action   GRAFT BNE SUB 10CC BEAD 25CC CA SULPHATE RAP SET W/ INDIV - SNA  GRAFT BNE SUB 10CC BEAD 25CC CA SULPHATE RAP SET W/ INDIV NA [employer] [id number] Right 1 Implanted   PLATE CONDYLR 6H [id number] RT -- VA-LCP - SN/A  PLATE BNE [id number] 6 H NONSTERILE R CNDYL S STL CRV KUSH COMPR N/A SYNTHES [place] N/A Right 1 Explanted   SCR BNE CRTX ST 4.5X48MM SS --  - SN/A  SCR BNE CRTX ST 4.5X48MM SS --  N/A SYNTHES [place] N/A Right 1 Explanted   SCR LCK VA ST STARDRV 5X85MM --  - SN/A  SCREW BNE L85MM DIA5MM CNDYL S STL ST SILKE ANG KUSH COMPR T25 N/A SYNTHES [place] N/A Right 1 Explanted   SCR BNE CRTX ST 4.5X50MM SS --  - SN/A  SCR BNE CRTX ST 4.5X50MM SS --  N/A SYNTHES [place] N/A Right 1 Explanted   SCR LCK VA ST STARDRV 5X90MM --  - SN/A  SCREW BNE L90MM DIA5MM CNDYL S STL ST SILKE ANG KUSH FULL THRD N/A SYNTHES [place] N/A Right 3 Explanted   SCR BNE CRTX ST 4.5X54MM SS --  - SN/A  SCR BNE CRTX ST 4.5X54MM SS --  N/A SYNTHES [place] N/A Right 2 Explanted   SCR BNE LCK ST T25 3.5X90MM SS --  - SN/A  SCR BNE LCK ST T25 3.5X90MM SS --  N/A SYNTHES [place] N/A Right 2 Explanted   SCR BNE CRTX ST 4.5X52MM SS --  - SN/A  SCR BNE CRTX ST 4.5X52MM SS --  N/A SYNTHES [place] N/A Right 2 Explanted   INSERT TIB SZ 5 [id number] KNEE AOX STBL ROT PLATFRM SIG - SNA  INSERT TIB SZ 5 [id number] KNEE AOX STBL ROT PLATFRM SIG NA [facility]_ [id number] Right 1 Implanted   ROTATING PLATFORM TIBIAL INSERT   N/A  [id number] Right 1 Explanted       Drains:   Hemovac Right Leg (Active)   Site Assessment Clean, dry, & intact 01/03/22 1417   Dressing Status Clean, dry, & intact 01/03/22 1417   Drainage Description Sanguinous 01/03/22 1417   Status Patent; Charged;Draining 01/03/22 1417       Findings: gross purulence in prosthetic knee joint which communicated with lateral femoral plate    Electronically Signed by Rosaura Hough MD on 1/3/2022 at 3:06 PM

## 2022-01-03 NOTE — ED NOTES
TRANSFER - OUT REPORT:    Verbal report given to [address] [facility] [address], RN(name) on Amanuel Console  being transferred to (unit) for routine progression of care       Report consisted of patients Situation, Background, Assessment and   Recommendations(SBAR). Information from the following report(s) SBAR, MAR and Recent Results was reviewed with the receiving nurse. Lines:   Peripheral IV 01/02/22 Left Antecubital (Active)   Site Assessment Clean, dry, & intact 01/02/22 1705   Phlebitis Assessment 0 01/02/22 1705   Infiltration Assessment 0 01/02/22 1705   Dressing Status Clean, dry, & intact 01/02/22 1705   Dressing Type Transparent 01/02/22 1705        Opportunity for questions and clarification was provided.       Patient transported with:   [employer]

## 2022-01-03 NOTE — PROGRESS NOTES
Day #1 of cefazolin  Indication:  ssti  Current regimen: 2 gm q8h  Abx regimen: cefazolin  Recent Labs     22  1706   CREA 1.23   BUN 24*     Est CrCl: 80-90 ml/min  Temp (24hrs), Av.8 °F (36.6 °C), Min:97.8 °F (36.6 °C), Max:97.8 °F (36.6 °C)    Plan: Change to 3 gm q8hr per dosing protocol for pts >120 kg

## 2022-01-03 NOTE — PROGRESS NOTES
Occupational Therapy    Order's acknowledged, chart reviewed in prep for skilled OT evaluation; however, chart indicating pt scheduled for I&D in OR later this date. Will hold evaluation and follow up tomorrow, as able and appropriate.     Thank you,  Wolf Gibson, OT

## 2022-01-03 NOTE — PROGRESS NOTES
Problem: Falls - Risk of  Goal: *Absence of Falls  Description: Document Maryann Skill Fall Risk and appropriate interventions in the flowsheet.   Outcome: Progressing Towards Goal  Note: Fall Risk Interventions:

## 2022-01-03 NOTE — PROGRESS NOTES
Physical Therapy  PT consult received and chart reviewed. Patient currently with high pain levels and is planned to go to OR today for I&D. Will hold until surgery and follow up tmrw pending medical stability.   Joe Fink, PT, DPT

## 2022-01-03 NOTE — ROUTINE PROCESS
Patient: Julissa Luna MRN: [id number]  SSN: [id number]   YOB: 1960  Age: 59 y.o. Sex: male     Patient is status post Procedure(s):  RIGHT KNEE POLY EXCHANGE ; HARDWARE REMOVAL RIGHT LATERAL LEG; INCISION AND DRAINAGE; WOUND IRRIGATION; PLACEMENT OF ANTIBIOTIC BEADS. Surgeon(s) and Role:     * Reji Campos MD - Primary    Local/Dose/Irrigation:  SURGICAL PAIN SOLUTION                  Peripheral IV 01/02/22 Left Antecubital (Active)   Site Assessment Clean, dry, & intact 01/02/22 1705   Phlebitis Assessment 0 01/02/22 1705   Infiltration Assessment 0 01/02/22 1705   Dressing Status Clean, dry, & intact 01/02/22 1705   Dressing Type Transparent 01/02/22 1705       Peripheral IV 01/03/22 Right Arm (Active)          Hemovac Right Leg (Active)   Site Assessment Clean, dry, & intact 01/03/22 1417   Dressing Status Clean, dry, & intact 01/03/22 1417   Drainage Description Sanguinous 01/03/22 1417   Status Patent; Charged;Draining 01/03/22 1417      Airway - Endotracheal Tube 01/03/22 Oral (Active)                   Dressing/Packing:  Wound Leg lower Left; Lower;Medial;Distal 01/03/22-Dressing/Treatment: Gauze dressing/dressing sponge;Tape/Soft cloth adhesive tape;Petroleum gauze (01/03/22 1300)  Incision 01/03/22 Leg Right-Dressing/Treatment: ABD pad; Ace wrap;Cast padding;Gauze dressing/dressing sponge;Petroleum gauze; Other (Comment) (KNEE IMMOBILIZER) (01/03/22 1447)    Splint/Cast:  ]    Other:  NO FABIAN FOR CASE; HEMOVAC TO LATERAL RIGHT LEG;

## 2022-01-03 NOTE — PROGRESS NOTES
[facility]                                                                                          Hospitalist Progress Note  Gabe Knapp MD  Answering service: [phone] from in house phone        Date of Service:  1/3/2022  NAME:  Felicity León  :  1960  MRN:  [id number]    This documentation was facilitated by a Voice Recognition software and may contain inadvertent typographical errors. Admission Summary:   Patient presented with right knee pain is admitted with a diagnosis of acute septic arthritis    Interval history / Subjective:        Seen after he returned from OR. He says he is no starting to have pain on the right knee, no other complaints. Assessment & Plan:   Right knee septic arthritis, infected total knee  --Status post right knee polyexchange, hardware removal, incision and drainage, wound irrigation and placement of antibiotic beads  --Synovial fluid culture grew moderate streptococci, beta-hemolytic B, on vancomycin. Add ceftriaxone pending surgical wound culture. Ceftriaxone    Type II DM  --Accu-Cheks plus Humalog sliding scale  --Continued on glipizide. CAD.   Paroxysmal atrial fibrillation, history of sinus pauses status post pacemaker  --Continue amiodarone, aspirin, apixaban, metoprolol    Hypertension, stable: Continue home medications  Hyperlipidemia, stable: Continue statin              Code status: Full code  DVT prophylaxis: Apixaban  Care Plan discussed with: Patient/Family and Nurse  Anticipated Disposition: SNF/LTC  Anticipated Discharge: Greater than 48 hours  Hospital Problems  Date Reviewed: 1/3/2022          Codes Class Noted POA    * (Principal) Staphylococcal arthritis of right knee ([facility] [address] ICD-10-CM: M00.061  ICD-9-CM: 711.06, 041.10  1/3/2022 Yes        Cellulitis ICD-10-CM: L03.90  ICD-9-CM: 682.9  2022 Unknown                Review of Systems:   A comprehensive review of systems was negative except for that written in the HPI. Vital Signs:    Last 24hrs VS reviewed since prior progress note. Most recent are:  Visit Vitals  BP (!) 147/65 (BP 1 Location: Right arm, BP Patient Position: At rest)   Pulse 71   Temp 98.6 °F (37 °C)   Resp 15   Ht 6' (1.829 m)   Wt 136 kg (299 lb 13.2 oz)   SpO2 98%   BMI 40.66 kg/m²         Intake/Output Summary (Last 24 hours) at 1/3/2022 1835  Last data filed at 1/3/2022 1600  Gross per 24 hour   Intake 1100 ml   Output 730 ml   Net 370 ml        Physical Examination:     I had a face to face encounter with this patient and independently examined them on1/3/2022 as outlined below:        Constitutional:  No acute distress, cooperative, pleasant    HEENT:  Atraumatic. Oral mucosa moist,. Non icteric sclera. No pallor. Resp:  CTA bilaterally. No wheezing/rhonchi/rales. No accessory muscle use   Chest Wall: No deformity   CV:  Regular rhythm, normal rate, no murmurs, gallops, rubs    GI:  Soft, non distended, non tender. normoactive bowel sounds, no hepatosplenomegaly    :  No CVA or suprapubic tenderness    Musculoskeletal:  Right knee Ace bandage and immobilized    Neurologic:  Mental status:AAOx3,   Cranial nerves II-XII : WNL  Motor exam:Moves all extremities symmetrically             Data Review:    Review and/or order of clinical lab test  Review and/or order of tests in the radiology section of CPT  Review and/or order of tests in the medicine section of CPT      Labs:     Recent Labs     01/03/22  0723   WBC 7.7   HGB 9.6*   HCT 30.8*        Recent Labs     01/03/22  0723 01/02/22  1706    138   K 3.6 3.5   * 109*   CO2 20* 22   BUN 20 24*   CREA 0.96 1.23   * 132*   CA 8.8 9.1   MG 2.2  --    PHOS 3.2  --      Recent Labs     01/03/22  0723 01/02/22  1706   ALT 12 13   AP 55 56   TBILI 0.5 0.5   TP 6.6 6.7   ALB 2.3* 2.5*   GLOB 4.3* 4.2*     No results for input(s): INR, PTP, APTT, INREXT in the last 72 hours.    No results for input(s): FE, TIBC, PSAT, FERR in the last 72 hours. No results found for: FOL, RBCF   No results for input(s): PH, PCO2, PO2 in the last 72 hours. No results for input(s): CPK, CKNDX, TROIQ in the last 72 hours.     No lab exists for component: CPKMB  Lab Results   Component Value Date/Time    Cholesterol, total 130 07/12/2021 10:21 AM    HDL Cholesterol 37 07/12/2021 10:21 AM    LDL, calculated 70 07/12/2021 10:21 AM    Triglyceride 115 07/12/2021 10:21 AM    CHOL/HDL Ratio 3.5 07/12/2021 10:21 AM     Lab Results   Component Value Date/Time    Glucose (POC) 121 (H) 01/03/2022 03:32 PM    Glucose (POC) 155 (H) 01/03/2022 07:36 AM    Glucose (POC) 171 (H) 01/02/2022 11:21 PM    Glucose (POC) 290 (H) 05/11/2021 04:13 PM    Glucose (POC) 336 (H) 05/11/2021 11:42 AM     Lab Results   Component Value Date/Time    Color YELLOW/STRAW 05/04/2021 01:22 PM    Appearance CLEAR 05/04/2021 01:22 PM    Specific gravity 1.024 05/04/2021 01:22 PM    pH (UA) 5.5 05/04/2021 01:22 PM    Protein 100 (A) 05/04/2021 01:22 PM    Glucose >1,000 (A) 05/04/2021 01:22 PM    Ketone Negative 05/04/2021 01:22 PM    Bilirubin Negative 05/04/2021 01:22 PM    Urobilinogen 0.2 05/04/2021 01:22 PM    Nitrites Negative 05/04/2021 01:22 PM    Leukocyte Esterase Negative 05/04/2021 01:22 PM    Epithelial cells FEW 05/04/2021 01:22 PM    Bacteria Negative 05/04/2021 01:22 PM    WBC 0-4 05/04/2021 01:22 PM    RBC 0-5 05/04/2021 01:22 PM         Medications Reviewed:     Current Facility-Administered Medications   Medication Dose Route Frequency    Vancomycin- Pharmacy to Dose   Other Rx Dosing/Monitoring    vancomycin (VANCOCIN) 1,250 mg in 0.9% sodium chloride 250 mL (VIAL-MATE)  1,250 mg IntraVENous Q12H    [START ON 1/4/2022] apixaban (ELIQUIS) tablet 5 mg  5 mg Oral Q12H    0.9% sodium chloride infusion  125 mL/hr IntraVENous CONTINUOUS    sodium chloride 0.9 % bolus infusion 500 mL  500 mL IntraVENous ONCE PRN    sodium chloride (NS) flush 5-40 mL  5-40 mL IntraVENous Q8H    sodium chloride (NS) flush 5-40 mL  5-40 mL IntraVENous PRN    acetaminophen (TYLENOL) tablet 650 mg  650 mg Oral Q6H    oxyCODONE IR (ROXICODONE) tablet 5 mg  5 mg Oral Q3H PRN    oxyCODONE IR (ROXICODONE) tablet 10 mg  10 mg Oral Q3H PRN    HYDROmorphone (DILAUDID) injection 0.5 mg  0.5 mg IntraVENous Q4H PRN    naloxone (NARCAN) injection 0.4 mg  0.4 mg IntraVENous PRN    ondansetron (ZOFRAN) injection 4 mg  4 mg IntraVENous Q4H PRN    prochlorperazine (COMPAZINE) with saline injection 5 mg  5 mg IntraVENous Q6H PRN    ondansetron (ZOFRAN ODT) tablet 4 mg  4 mg Oral Q6H PRN    hydrOXYzine HCL (ATARAX) tablet 10 mg  10 mg Oral Q8H PRN    famotidine (PEPCID) tablet 20 mg  20 mg Oral BID PRN    senna-docusate (PERICOLACE) 8.6-50 mg per tablet 1 Tablet  1 Tablet Oral BID    [START ON 1/4/2022] polyethylene glycol (MIRALAX) packet 17 g  17 g Oral DAILY    [START ON 1/5/2022] bisacodyL (DULCOLAX) suppository 10 mg  10 mg Rectal DAILY PRN    ceFAZolin (ANCEF) 3 g in 0.9%  ml IVPB  3 g IntraVENous Q8H    sodium chloride (NS) flush 5-40 mL  5-40 mL IntraVENous Q8H    sodium chloride (NS) flush 5-40 mL  5-40 mL IntraVENous PRN    acetaminophen (TYLENOL) tablet 650 mg  650 mg Oral Q6H PRN    Or    acetaminophen (TYLENOL) suppository 650 mg  650 mg Rectal Q6H PRN    polyethylene glycol (MIRALAX) packet 17 g  17 g Oral DAILY PRN    ondansetron (ZOFRAN ODT) tablet 4 mg  4 mg Oral Q8H PRN    Or    ondansetron (ZOFRAN) injection 4 mg  4 mg IntraVENous Q6H PRN    traMADoL (ULTRAM) tablet 50 mg  50 mg Oral Q6H PRN    amiodarone (CORDARONE) tablet 100 mg  100 mg Oral DAILY    aspirin delayed-release tablet 81 mg  81 mg Oral DAILY    furosemide (LASIX) tablet 40 mg  40 mg Oral DAILY    gemfibroziL (LOPID) tablet 600 mg  600 mg Oral BID    glipiZIDE (GLUCOTROL) tablet 10 mg  10 mg Oral ACB&D    metoprolol tartrate (LOPRESSOR) tablet 25 mg  25 mg Oral BID    rosuvastatin (CRESTOR) tablet 20 mg  20 mg Oral DAILY    senna-docusate (PERICOLACE) 8.6-50 mg per tablet 1 Tablet  1 Tablet Oral BID PRN    glucose chewable tablet 16 g  4 Tablet Oral PRN    dextrose (D50W) injection syrg 12.5-25 g  25-50 mL IntraVENous PRN    glucagon (GLUCAGEN) injection 1 mg  1 mg IntraMUSCular PRN    insulin lispro (HUMALOG) injection   SubCUTAneous AC&HS    HYDROcodone-acetaminophen (NORCO) 5-325 mg per tablet 1 Tablet  1 Tablet Oral Q4H PRN     ______________________________________________________________________  EXPECTED LENGTH OF STAY: - - -  ACTUAL LENGTH OF STAY:          1                 Gwendolyn Rios MD

## 2022-01-03 NOTE — ANESTHESIA PREPROCEDURE EVALUATION
Relevant Problems   CARDIOVASCULAR   (+) A-fib (HCC)   (+) Benign essential hypertension   (+) Coronary artery disease   (+) PAF (paroxysmal atrial fibrillation) ([facility])   (+) Tachy-amina syndrome (HCC)      ENDOCRINE   (+) Arthritis   (+) Controlled type 2 diabetes mellitus with complication, without long-term current use of insulin (HCC)   (+) Severe obesity (HCC)   (+) Type 2 diabetes mellitus ([facility])   (+) Type 2 diabetes with nephropathy (HCC)       Anesthetic History   No history of anesthetic complications            Review of Systems / Medical History  Patient summary reviewed, nursing notes reviewed and pertinent labs reviewed    Pulmonary  Within defined limits                 Neuro/Psych   Within defined limits           Cardiovascular    Hypertension  Valvular problems/murmurs: mitral insufficiency      Dysrhythmias : atrial fibrillation  CAD, cardiac stents and hyperlipidemia    Exercise tolerance: >4 METS  Comments: PAF sp ablation     BS- DCPM- DDDR    Patient taking Apixaban (Eliquis)    ECG (5/4/21): Atrial-paced rhythm   Nonspecific T wave abnormality     TTE (3/19/21):  ·LV: Estimated LVEF is 55 - 60%. Normal cavity size, wall thickness, systolic function (ejection fraction normal) and diastolic function. Wall motion: normal.  ·LA: Severely dilated left atrium. ·MV: Mild mitral valve regurgitation is present. ·AO: Mild ascending aorta dilatation. Ascending aorta diameter = 4.2 cm.    GI/Hepatic/Renal  Within defined limits              Endo/Other    Diabetes: type 2    Obesity, morbid obesity, arthritis and anemia    Comments: S/P Bilateral Knee Replacements Other Findings              Physical Exam    Airway  Mallampati: II  TM Distance: > 6 cm  Neck ROM: normal range of motion   Mouth opening: Normal     Cardiovascular    Rhythm: regular  Rate: normal         Dental      Comments: Missing teeth   Pulmonary  Breath sounds clear to auscultation               Abdominal  GI exam deferred       Other Findings            Anesthetic Plan    ASA: 3  Anesthesia type: general and regional - femoral single shot          Induction: Intravenous  Anesthetic plan and risks discussed with: Patient

## 2022-01-03 NOTE — WOUND CARE
Consulted for wound care. He is in the OR today for debridement. Will see tomorrow as needed.    CLARK AaronN

## 2022-01-03 NOTE — CONSULTS
ORTHO CONSULT NOTE    Date of Consultation:  January 2, 2022  Referring Physician:  Darice Osgood  CC: Right Knee pain    HPI:  Trace Govea is a 59 y.o. male who c/o R knee pain; onset after getting COVID vaccine last Thursday; gradually worsening pain and swelling, now unable to walk or move leg without severe pain; pt was doing fine prior to his vaccine shot; denies trauma, numbness, tingling, focal weakness in the leg; no current or recent chest pain, sob, n/v, lightheadedness; has had a negative DVT scan; took Eliquis this morning, ~8am  Current Anticoagulant Medications: Eliquis. Past Medical History:   Diagnosis Date    Arthritis     knees    Atrial fibrillation with RVR ([facility] [address]     Benign essential hypertension 6/10/2011    CAD (coronary artery disease)     Diabetes ([facility] [address]     DX  AGE 33      Hypercholesterolemia     Hypertriglyceridemia     Obesity     Pacemaker     PAF (paroxysmal atrial fibrillation) ([facility] [address] 12/12/2018    Syncope       Past Surgical History:   Procedure Laterality Date    CARDIAC CATHETERIZATION  6/9/2011         CARDIAC CATHETERIZATION  11/1/2012         HC ANGIOSEAL VIP 6FR  6/9/2011         HX AFIB ABLATION  03/15/2021    HX COLONOSCOPY  05/2009    Dr. Arvind Dumas- normal    HX ORTHOPAEDIC      left knee replacement    HX ORTHOPAEDIC      R knee replacement  2010 -Guido Mathias.     DE ABLATE L/R ATRIAL FIBRIL W/ISOLATED PULM VEIN N/A 5/10/2021    Ablation Following A-Fib  Addl performed by Sander Hanley MD at [facility]ATH LAB    DE CARDIAC SURG PROCEDURE UNLIST      STENT RCA  6/11    DE COMPRE ELECTROPHYSIOL XM W/LEFT ATRIAL PACNG/REC N/A 5/10/2021    Lt Atrial Pace & Record During Ep Study performed by Sander Hanley MD at [facility] CARDIAC CATH LAB    DE DRUG-ELUTING STENTS, SINGLE  6/9/2011         DE EPHYS EVL TRNSPTL TX ATRIAL FIB ISOLAT PULM VEIN N/A 3/15/2021    ABLATION A-FIB  W COMPLETE EP STUDY performed by Sander Hanley MD at [facility] CARDIAC CATH LAB    MA EPHYS EVL TRNSPTL TX ATRIAL FIB ISOLAT PULM VEIN N/A 5/10/2021    ABLATION A-FIB  W COMPLETE EP STUDY performed by Elizabeth Edwards MD at [facility] CARDIAC CATH LAB    MA INS NEW/RPLCMT PRM PM W/TRANSV ELTRD ATRIAL&VENT N/A 3/22/2021    INSERT PPM DUAL performed by Elizabeth Edwards MD at [facility] CARDIAC CATH LAB    MA INTRACARD ECHO, THER/DX INTERVENT N/A 3/15/2021    Intracardiac Echocardiogram performed by Elizabeth Edwards MD at [facility] CARDIAC CATH LAB    MA INTRACARD ECHO, THER/DX INTERVENT N/A 5/10/2021    Intracardiac Echocardiogram performed by [facility] MD at [facility] CARDIAC CATH LAB    MA INTRACARDIAC ELECTROPHYSIOLOGIC 3D MAPPING N/A 3/15/2021    Ep 3d Mapping performed by Elizabeth Edwards MD at [facility] CARDIAC CATH LAB    MA INTRACARDIAC ELECTROPHYSIOLOGIC 3D MAPPING N/A 5/10/2021    Ep 3d Mapping performed by Elizabeth Edwards MD at [facility] LAB      Family History   Problem Relation Age of Onset    Diabetes Mother     Hypertension Mother    Dee Carlita Elevated Lipids Mother     Cancer Mother         unsure    Diabetes Father     Heart Disease Father     Hypertension Father     Elevated Lipids Father     Diabetes Brother     Suicide Brother     Diabetes Brother       Social History     Tobacco Use    Smoking status: Former Smoker     Types: Cigarettes     Quit date: 11/10/1989     Years since quittin.1    Smokeless tobacco: Never Used   Substance Use Topics    Alcohol use: Yes     Comment: social  DRINKS ONE WEEKEND A MONTH/\"BIG IMPROVEMENT\"     Allergies   Allergen Reactions    Lisinopril Cough    Losartan Other (comments)     Hypotension          Review of Systems:  Per HPI. Objective:     Patient Vitals for the past 8 hrs:   BP Temp Pulse Resp SpO2 Weight   22 1458 121/66 97.8 °F (36.6 °C) 69 16 98 % 136.1 kg (300 lb)     Temp (24hrs), Av.8 °F (36.6 °C), Min:97.8 °F (36.6 °C), Max:97.8 °F (36.6 °C)      EXAM:   NAD.  Answers questions appropriately. Moves BUE spontaneously with NTTP long bones and joints. Right knee with large effusion, normal temp and color; skin intact; no crepitus; severe pain with PROM, able to actively range; stable when stressed; mild edema in calf, but nonttp; SILT and CR toes < 2 secs. Imaging Review:   Results from Hospital Encounter encounter on 01/02/22    XR CHEST PORT    Narrative  EXAM:  XR CHEST PORT    INDICATION:   fever    COMPARISON: Chest radiograph 5/11/2021. FINDINGS: AP radiograph of the chest was obtained. Left chest pacemaker leads in the right atrium and right ventricle. There are  vague opacities in the bilateral lungs, most evident in the bilateral lower  lungs. No pleural effusion or pneumothorax. Size is within normal limits. Calcifications of the thoracic aorta. No acute osseous abnormalities. Impression  1. Vague opacities in the bilateral lungs may represent multifocal pneumonia. Labs:   Recent Results (from the past 24 hour(s))   SED RATE (ESR)    Collection Time: 01/02/22  5:06 PM   Result Value Ref Range    Sed rate, automated 100 (H) 0 - 20 mm/hr   C REACTIVE PROTEIN, QT    Collection Time: 01/02/22  5:06 PM   Result Value Ref Range    C-Reactive protein 28.40 (H) 0.00 - 8.56 mg/dL   METABOLIC PANEL, COMPREHENSIVE    Collection Time: 01/02/22  5:06 PM   Result Value Ref Range    Sodium 138 136 - 145 mmol/L    Potassium 3.5 3.5 - 5.1 mmol/L    Chloride 109 (H) 97 - 108 mmol/L    CO2 22 21 - 32 mmol/L    Anion gap 7 5 - 15 mmol/L    Glucose 132 (H) 65 - 100 mg/dL    BUN 24 (H) 6 - 20 MG/DL    Creatinine 1.23 0.70 - 1.30 MG/DL    BUN/Creatinine ratio 20 12 - 20      GFR est AA >60 >60 ml/min/1.73m2    GFR est non-AA 59 (L) >60 ml/min/1.73m2    Calcium 9.1 8.5 - 10.1 MG/DL    Bilirubin, total 0.5 0.2 - 1.0 MG/DL    ALT (SGPT) 13 12 - 78 U/L    AST (SGOT) 12 (L) 15 - 37 U/L    Alk.  phosphatase 56 45 - 117 U/L    Protein, total 6.7 6.4 - 8.2 g/dL    Albumin 2.5 (L) 3.5 - 5.0 g/dL    Globulin 4.2 (H) 2.0 - 4.0 g/dL    A-G Ratio 0.6 (L) 1.1 - 2.2         Impression:     Patient Active Problem List    Diagnosis Date Noted    Cellulitis 01/02/2022    A-fib ([address] 05/10/2021    Tachy-amina syndrome ([address] 03/15/2021    Supracondylar fracture of distal end of femur without intracondylar extension ([address] 08/17/2020    Femur fracture ([address] 08/16/2020    Type 2 diabetes with nephropathy ([address] 12/12/2018    Severe obesity ([address] 12/12/2018    Controlled type 2 diabetes mellitus with complication, without long-term current use of insulin ([address] 12/12/2018    PAF (paroxysmal atrial fibrillation) ([address] 12/12/2018    Mixed hyperlipidemia 04/18/2012    Mitral valve disorders(424.0) 03/27/2012    Coronary artery disease 06/10/2011    S/P coronary artery stent placement 06/10/2011    Type 2 diabetes mellitus ([address] 06/10/2011    Benign essential hypertension 06/10/2011    Hypertriglyceridemia     Arthritis      Active Problems:    Cellulitis (1/2/2022)        Plan:   I explained the nature of the injury and discussed the recommended surgery. I discussed potential risks/benefits/alternatives of surgery as well as expected hospital course. Patient consents. Plan for Right Knee I&D with polyexchange with Dr. Peterson Guardado, 1/2/22 in the afternoon, time tbd    - Medical evaluation/clearance pending. Follow culture, hold abx now if okay with attending since patient not septic, as it may yield better culture results in OR. Had negative COVID prior to Arrival.   - Bedrest.  - NPO p MN.  - Pain control, Ace, Ice.  - SCDs OK. Hold Eliquis; last dose will be cleared for 24 hours before surgery. Dr. Peterson Guardado is aware and agrees with above plan.       JUS Lima  Orthopedic Trauma Service  [facility]

## 2022-01-03 NOTE — ANESTHESIA PROCEDURE NOTES
Peripheral Block    Start time: 1/3/2022 11:33 AM  End time: 1/3/2022 11:37 AM  Performed by: Naun Sanchez DO  Authorized by: Naun Sanchez DO       Pre-procedure: Indications: at surgeon's request and post-op pain management    Preanesthetic Checklist: patient identified, risks and benefits discussed, site marked, timeout performed and patient being monitored      Block Type:   Block Type:   Adductor canal  Laterality:  Right  Monitoring:  Standard ASA monitoring, continuous pulse ox, frequent vital sign checks, heart rate, responsive to questions and oxygen  Injection Technique:  Single shot  Procedures: ultrasound guided    Patient Position: supine  Prep: chlorhexidine    Location:  Mid thigh  Needle Type:  Stimuplex  Needle Gauge:  21 G  Needle Localization:  Ultrasound guidance and anatomical landmarks  Medication Injected:  Ropivacaine (PF) (NAROPIN)(0.5%) 5 mg/mL injection, 25 mL    Assessment:  Number of attempts:  1  Injection Assessment:  Incremental injection every 5 mL, local visualized surrounding nerve on ultrasound, negative aspiration for blood, no paresthesia, no intravascular symptoms and ultrasound image on chart  Patient tolerance:  Patient tolerated the procedure well with no immediate complications

## 2022-01-03 NOTE — PROCEDURES
Procedure, risks, benefits, & alternatives explained to patient, who voiced understanding and agreed to proceed with the arthrocentesis. Consent signed and on chart Area prepped and draped in a sterile fashion using Chlorhexidine. Local anesthesia administered using 1% Lidocaine. Patient placed in position of comfort with right knee comfortably extended. Small gauge needle introduced into joint space in a direction parallel to the plane of the posterior surface of the patella in a lateral position. ( 45     ) ccs of (       cloudy              ) fluid removed and sent for cell count, differential, Gram stain, culture and sensitivity, crystal analysis, glucose, proteins. No complications.      Signed By: JUS Dawson     January 2, 2022

## 2022-01-03 NOTE — PROGRESS NOTES
Pharmacist Note - Vancomycin Dosing    Consult provided for this 59 y.o. male for indication of R knee septic arthritis. Antibiotic regimen(s): Vancomycin monotherapy  Patient on vancomycin PTA? NO     Recent Labs     22  0723 22  1706   WBC 7.7  --    CREA 0.96 1.23   BUN 20 24*     Frequency of BMP: Daily x 3 ordered   Height: 185.4 cm  Weight: 136.1 kg  Est CrCl: >100 ml/min; UO: -- ml/kg/hr  Temp (24hrs), Av.1 °F (36.7 °C), Min:97.5 °F (36.4 °C), Max:98.9 °F (37.2 °C)    Cultures:  1/2 Body fluid, synovial fluid: GPC on gram stain (pending)    MRSA Swab ordered (if applicable)? N/A    The plan below is expected to result in a target range of AUC/-600    Therapy will be initiated with a loading dose of 2500 mg IV x 1 to be followed by a maintenance dose of 1250 mg IV every 12 hours for predicted AUC24 of 458 mg/L.hr.  Pharmacy to follow patient daily and order levels / make dose adjustments as appropriate. *Vancomycin has been dosed used Bayesian kinetics software to target an AUC/OMA of 400-600, which provides adequate exposure for an assumed infection due to MRSA with an OMA of 1 or less while reducing the risk of nephrotoxicity as seen with traditional trough based dosing goals.

## 2022-01-03 NOTE — PROGRESS NOTES
1650: TRANSFER - OUT REPORT:    Verbal report given to Agustín Dia RN(name) on Eric Anders  being transferred to 528(unit) for routine post - op       Report consisted of patients Situation, Background, Assessment and   Recommendations(SBAR). Time Pre op antibiotic given:1138  Anesthesia Stop time: 7274  Tyler Present on Transfer to floor:no  Order for Tyler on Chart:no  Discharge Prescriptions with Chart:no    Information from the following report(s) SBAR, Kardex, OR Summary, Intake/Output, MAR, Recent Results and Cardiac Rhythm SR was reviewed with the receiving nurse. Opportunity for questions and clarification was provided. Is the patient on 02? YES       L/Min 2        Is the patient on a monitor? NO    Is the nurse transporting with the patient? NO    Surgical Waiting Area notified of patient's transfer from PACU? YES      The following personal items collected during your admission accompanied patient upon transfer:   Dental Appliance: Dental Appliances: None  Vision:    Hearing Aid:    Jewelry: Jewelry: None  Clothing: Clothing: None  Other Valuables:  Other Valuables: None  Valuables sent to safe:

## 2022-01-04 LAB
ANION GAP SERPL CALC-SCNC: 11 MMOL/L (ref 5–15)
BASOPHILS # BLD: 0 K/UL (ref 0–0.1)
BASOPHILS NFR BLD: 0 % (ref 0–1)
BUN SERPL-MCNC: 26 MG/DL (ref 6–20)
BUN/CREAT SERPL: 22 (ref 12–20)
CALCIUM SERPL-MCNC: 8.2 MG/DL (ref 8.5–10.1)
CHLORIDE SERPL-SCNC: 107 MMOL/L (ref 97–108)
CO2 SERPL-SCNC: 16 MMOL/L (ref 21–32)
CREAT SERPL-MCNC: 1.18 MG/DL (ref 0.7–1.3)
DIFFERENTIAL METHOD BLD: ABNORMAL
EOSINOPHIL # BLD: 0 K/UL (ref 0–0.4)
EOSINOPHIL NFR BLD: 0 % (ref 0–7)
ERYTHROCYTE [DISTWIDTH] IN BLOOD BY AUTOMATED COUNT: 15.6 % (ref 11.5–14.5)
GLUCOSE BLD STRIP.AUTO-MCNC: 112 MG/DL (ref 65–117)
GLUCOSE BLD STRIP.AUTO-MCNC: 128 MG/DL (ref 65–117)
GLUCOSE BLD STRIP.AUTO-MCNC: 142 MG/DL (ref 65–117)
GLUCOSE BLD STRIP.AUTO-MCNC: 156 MG/DL (ref 65–117)
GLUCOSE BLD STRIP.AUTO-MCNC: 176 MG/DL (ref 65–117)
GLUCOSE SERPL-MCNC: 146 MG/DL (ref 65–100)
HCT VFR BLD AUTO: 29.2 % (ref 36.6–50.3)
HGB BLD-MCNC: 8.7 G/DL (ref 12.1–17)
IMM GRANULOCYTES # BLD AUTO: 0.1 K/UL (ref 0–0.04)
IMM GRANULOCYTES NFR BLD AUTO: 1 % (ref 0–0.5)
LYMPHOCYTES # BLD: 0.8 K/UL (ref 0.8–3.5)
LYMPHOCYTES NFR BLD: 8 % (ref 12–49)
MCH RBC QN AUTO: 25 PG (ref 26–34)
MCHC RBC AUTO-ENTMCNC: 29.8 G/DL (ref 30–36.5)
MCV RBC AUTO: 83.9 FL (ref 80–99)
MONOCYTES # BLD: 1.2 K/UL (ref 0–1)
MONOCYTES NFR BLD: 11 % (ref 5–13)
NEUTS SEG # BLD: 8.7 K/UL (ref 1.8–8)
NEUTS SEG NFR BLD: 80 % (ref 32–75)
NRBC # BLD: 0 K/UL (ref 0–0.01)
NRBC BLD-RTO: 0 PER 100 WBC
PLATELET # BLD AUTO: 253 K/UL (ref 150–400)
PMV BLD AUTO: 9.7 FL (ref 8.9–12.9)
POTASSIUM SERPL-SCNC: 3.8 MMOL/L (ref 3.5–5.1)
RBC # BLD AUTO: 3.48 M/UL (ref 4.1–5.7)
SERVICE CMNT-IMP: ABNORMAL
SERVICE CMNT-IMP: NORMAL
SODIUM SERPL-SCNC: 134 MMOL/L (ref 136–145)
WBC # BLD AUTO: 10.8 K/UL (ref 4.1–11.1)

## 2022-01-04 PROCEDURE — 85025 COMPLETE CBC W/AUTO DIFF WBC: CPT

## 2022-01-04 PROCEDURE — 2709999900 HC NON-CHARGEABLE SUPPLY

## 2022-01-04 PROCEDURE — 97535 SELF CARE MNGMENT TRAINING: CPT

## 2022-01-04 PROCEDURE — 74011250637 HC RX REV CODE- 250/637: Performed by: PHYSICIAN ASSISTANT

## 2022-01-04 PROCEDURE — 97530 THERAPEUTIC ACTIVITIES: CPT

## 2022-01-04 PROCEDURE — 36415 COLL VENOUS BLD VENIPUNCTURE: CPT

## 2022-01-04 PROCEDURE — 74011000250 HC RX REV CODE- 250: Performed by: PHYSICIAN ASSISTANT

## 2022-01-04 PROCEDURE — 74011636637 HC RX REV CODE- 636/637: Performed by: PHYSICIAN ASSISTANT

## 2022-01-04 PROCEDURE — 97165 OT EVAL LOW COMPLEX 30 MIN: CPT

## 2022-01-04 PROCEDURE — 74011000250 HC RX REV CODE- 250: Performed by: HOSPITALIST

## 2022-01-04 PROCEDURE — 74011000258 HC RX REV CODE- 258: Performed by: PHYSICIAN ASSISTANT

## 2022-01-04 PROCEDURE — 74011250636 HC RX REV CODE- 250/636: Performed by: HOSPITALIST

## 2022-01-04 PROCEDURE — 74011250636 HC RX REV CODE- 250/636: Performed by: PHYSICIAN ASSISTANT

## 2022-01-04 PROCEDURE — 65660000000 HC RM CCU STEPDOWN

## 2022-01-04 PROCEDURE — 80048 BASIC METABOLIC PNL TOTAL CA: CPT

## 2022-01-04 PROCEDURE — 97161 PT EVAL LOW COMPLEX 20 MIN: CPT

## 2022-01-04 PROCEDURE — 82962 GLUCOSE BLOOD TEST: CPT

## 2022-01-04 PROCEDURE — 99233 SBSQ HOSP IP/OBS HIGH 50: CPT | Performed by: CLINICAL NURSE SPECIALIST

## 2022-01-04 RX ADMIN — ACETAMINOPHEN 650 MG: 325 TABLET ORAL at 17:16

## 2022-01-04 RX ADMIN — Medication 10 ML: at 07:29

## 2022-01-04 RX ADMIN — DOCUSATE SODIUM 50 MG AND SENNOSIDES 8.6 MG 1 TABLET: 8.6; 5 TABLET, FILM COATED ORAL at 17:16

## 2022-01-04 RX ADMIN — METOPROLOL TARTRATE 25 MG: 25 TABLET, FILM COATED ORAL at 17:16

## 2022-01-04 RX ADMIN — FUROSEMIDE 40 MG: 40 TABLET ORAL at 08:37

## 2022-01-04 RX ADMIN — DOCUSATE SODIUM 50 MG AND SENNOSIDES 8.6 MG 1 TABLET: 8.6; 5 TABLET, FILM COATED ORAL at 08:37

## 2022-01-04 RX ADMIN — ASPIRIN 81 MG: 81 TABLET, COATED ORAL at 08:37

## 2022-01-04 RX ADMIN — Medication 2 UNITS: at 12:02

## 2022-01-04 RX ADMIN — APIXABAN 2.5 MG: 2.5 TABLET, FILM COATED ORAL at 08:37

## 2022-01-04 RX ADMIN — APIXABAN 2.5 MG: 2.5 TABLET, FILM COATED ORAL at 21:15

## 2022-01-04 RX ADMIN — ACETAMINOPHEN 650 MG: 325 TABLET ORAL at 07:29

## 2022-01-04 RX ADMIN — SODIUM CHLORIDE 125 ML/HR: 9 INJECTION, SOLUTION INTRAVENOUS at 00:07

## 2022-01-04 RX ADMIN — SODIUM CHLORIDE, PRESERVATIVE FREE 10 ML: 5 INJECTION INTRAVENOUS at 07:28

## 2022-01-04 RX ADMIN — GEMFIBROZIL 600 MG: 600 TABLET ORAL at 08:37

## 2022-01-04 RX ADMIN — GLIPIZIDE 10 MG: 5 TABLET ORAL at 15:10

## 2022-01-04 RX ADMIN — ACETAMINOPHEN 650 MG: 325 TABLET ORAL at 00:14

## 2022-01-04 RX ADMIN — ROSUVASTATIN 20 MG: 10 TABLET, FILM COATED ORAL at 08:37

## 2022-01-04 RX ADMIN — ACETAMINOPHEN 650 MG: 325 TABLET ORAL at 11:43

## 2022-01-04 RX ADMIN — CEFAZOLIN 3 G: 10 INJECTION, POWDER, FOR SOLUTION INTRAVENOUS at 02:57

## 2022-01-04 RX ADMIN — METOPROLOL TARTRATE 25 MG: 25 TABLET, FILM COATED ORAL at 08:37

## 2022-01-04 RX ADMIN — VANCOMYCIN HYDROCHLORIDE 1250 MG: 1.25 INJECTION, POWDER, LYOPHILIZED, FOR SOLUTION INTRAVENOUS at 11:43

## 2022-01-04 RX ADMIN — SODIUM CHLORIDE, PRESERVATIVE FREE 10 ML: 5 INJECTION INTRAVENOUS at 21:16

## 2022-01-04 RX ADMIN — AMIODARONE HYDROCHLORIDE 100 MG: 200 TABLET ORAL at 08:37

## 2022-01-04 RX ADMIN — GEMFIBROZIL 600 MG: 600 TABLET ORAL at 17:16

## 2022-01-04 RX ADMIN — GLIPIZIDE 10 MG: 5 TABLET ORAL at 07:29

## 2022-01-04 RX ADMIN — VANCOMYCIN HYDROCHLORIDE 1250 MG: 1.25 INJECTION, POWDER, LYOPHILIZED, FOR SOLUTION INTRAVENOUS at 00:15

## 2022-01-04 RX ADMIN — SODIUM CHLORIDE, PRESERVATIVE FREE 2 G: 5 INJECTION INTRAVENOUS at 08:36

## 2022-01-04 RX ADMIN — OXYCODONE 10 MG: 5 TABLET ORAL at 11:42

## 2022-01-04 RX ADMIN — OXYCODONE 10 MG: 5 TABLET ORAL at 08:37

## 2022-01-04 RX ADMIN — Medication 10 ML: at 22:00

## 2022-01-04 RX ADMIN — OXYCODONE 10 MG: 5 TABLET ORAL at 15:10

## 2022-01-04 NOTE — PROGRESS NOTES
Problem: Mobility Impaired (Adult and Pediatric)  Goal: *Acute Goals and Plan of Care (Insert Text)  Description: FUNCTIONAL STATUS PRIOR TO ADMISSION: Patient was independent and active without use of DME. Pt reports he used crutches for a few days PTA due to R knee pain. HOME SUPPORT PRIOR TO ADMISSION: The patient lived with wife but did not require assist.    Physical Therapy Goals  Initiated 1/4/2022  1. Patient will move from supine to sit and sit to supine  in bed with independence within 7 day(s). 2.  Patient will transfer from bed to chair and chair to bed with modified independence using the least restrictive device within 7 day(s). 3.  Patient will perform sit to stand with modified independence within 7 day(s). 4.  Patient will ambulate with modified independence for 150 feet with the least restrictive device within 7 day(s). 5.  Patient will ascend/descend full flight of stairs with 1 handrail(s) and 1 crutch with modified independence within 7 day(s). Outcome: Progressing Towards Goal   PHYSICAL THERAPY EVALUATION  Patient: Ariel Samaniego (39 y.o. male)  Date: 1/4/2022  Primary Diagnosis: Cellulitis [L03.90]  Procedure(s) (LRB):  RIGHT KNEE POLY EXCHANGE ; HARDWARE REMOVAL RIGHT LATERAL LEG; INCISION AND DRAINAGE; WOUND IRRIGATION; PLACEMENT OF ANTIBIOTIC BEADS (Right) 1 Day Post-Op   Precautions:   WBAT (knee immobilizer, keep knee in full extension)      ASSESSMENT  Based on the objective data described below, the patient presents with decreased activity tolerance due to severe R knee pain, decreased standing balance, and impaired functional mobility below his baseline. Pt received in bed wearing immobilizer on RLE. Reviewed importance of wearing knee immobilizer at all times and importance of maintaining knee extension. Pt had difficulty transferring to EOB due to pain and required assistance with RLE and heavy use of bed rail. Pt sits with good balance.   Raised height of bed to facilitate ease of transfer and assist x 2 to stand with rolling walker. Pt side stepped to bedside chair slowly with small steps and tolerating minimal WB on RLE. Pt remained up in recliner with BLE elevated. Current Level of Function Impacting Discharge (mobility/balance): bed mobility moderate assist x 2, sit to stand and bed to chair with rolling walker and minimal assist x 2    Functional Outcome Measure: The patient scored Total Score: 8/28 on the Tinetti outcome measure which is indicative of high fall risk. Other factors to consider for discharge: fall risk, below his baseline      Patient will benefit from skilled therapy intervention to address the above noted impairments. PLAN :  Recommendations and Planned Interventions: bed mobility training, transfer training, gait training, and patient and family training/education      Frequency/Duration: Patient will be followed by physical therapy:   daily to address goals. Recommendation for discharge: (in order for the patient to meet his/her long term goals)  Physical therapy at least 2 days/week in the home AND ensure assist and/or supervision for safety with mobility     This discharge recommendation:  A follow-up discussion with the attending provider and/or case management is planned    IF patient discharges home will need the following DME: to be determined (TBD), pt plans to check with his wife to ensure he has a rolling walker          SUBJECTIVE:   Patient agreeable to participate with therapy.      OBJECTIVE DATA SUMMARY:   HISTORY:    Past Medical History:   Diagnosis Date    Arthritis     knees    [facility]rial fibrillation [facility] RVR [facility]     Benign essential hypertension 6/10/2011    CAD (coronary artery disease)     Diabetes (HCC)     DX  AGE 26      Hypercholesterolemia     Hypertriglyceridemia     Obesity     Pacemaker     PAF (paroxysmal atrial fibrillation) ([facility] [address] 12/12/2018    Syncope      Past Surgical History: Procedure Laterality Date    CARDIAC CATHETERIZATION  6/9/2011         CARDIAC CATHETERIZATION  11/1/2012         HC ANGIOSEAL VIP 6FR  6/9/2011         HX AFIB ABLATION  03/15/2021    HX COLONOSCOPY  05/2009    Dr. Puneet Self- normal    HX ORTHOPAEDIC      left knee replacement    HX ORTHOPAEDIC      R knee replacement  2010 -Nadiya Shaw.     GA ABLATE L/R ATRIAL FIBRIL W/ISOLATED PULM VEIN N/A 5/10/2021    Ablation Following A-Fib  Addl performed by Mary [facility]ala [facility] CARD[facility]ATH LAB    GA CARDIAC SURG PROCEDURE UNLIST      STENT RCA  6/11    GA COMPRE ELECTROPHYSIOL XM W/LEFT ATRIAL PACNG/REC N/A 5/10/2021    Lt Atrial Pace & Record During Ep Study performed by Mary Ayala MD at [facility] CARDIAC CATH LAB    GA COMPRE EP EVAL ABLTJ ATR FIB PULM VEIN ISOLATION N/A 3/15/2021    ABLATION A-FIB  W COMPLETE EP STUDY performed by Mary Ayala MD at [facility] CARDIAC CATH LAB    GA COMPRE EP EVAL ABLTJ ATR FIB PULM VEIN ISOLATION N/A 5/10/2021    ABLATION A-FIB  W COMPLETE EP STUDY performed by Mary Ayala MD at [facility] CARDIAC CATH LAB    GA DRUG-ELUTING STENTS, SINGLE  6/9/2011         GA INS NEW/RPLCMT PRM PM W/TRANSV ELTRD ATRIAL&VENT N/A 3/22/2021    INSERT PPM DUAL performed by Mary Ayala MD at [facility] CARDIAC CATH LAB    GA INTRACARD ECHO, THER/DX INTERVENT N/A 3/15/2021    Intracardiac Echocardiogram performed by Mary Ayala MD at [facility] CARDIAC CATH LAB    GA INTRACARD ECHO, THER/DX INTERVENT N/A 5/10/2021    Intracardiac Echocardiogram performed by Mary Ayala MD at [facility] CARDIAC CATH LAB    GA INTRACARDIAC ELECTROPHYSIOLOGIC 3D MAPPING N/A 3/15/2021    Ep 3d Mapping performed by Mary Ayala MD at [facility] CARDIAC CATH LAB    GA INTRACARDIAC ELECTROPHYSIOLOGIC 3D MAPPING N/A 5/10/2021    Ep 3d Mapping performed by Mary Ayala MD at [facility] CARDIAC CATH LAB       Personal factors and/or comorbidities impacting plan of care: below his baseline, bedroom on 2n level with multiple steps to manage, PMH    Home Situation  Home Environment: Private residence  # Steps to Enter: 1  Rails to Enter: No  One/Two Story Residence: Two story  # of Interior Steps: 12  Interior Rails: Right  Living Alone: No  Support Systems: Other Family Member(s),Spouse/Significant Other  Patient Expects to be Discharged to[de-identified] [employer]  Current DME Used/Available at Home: Walker, rolling,Crutches,Cane, straight  Tub or Shower Type: Tub/Shower combination    EXAMINATION/PRESENTATION/DECISION MAKING:   Critical Behavior:  Neurologic State: Alert  Orientation Level: Oriented X4  Cognition: Appropriate decision making,Appropriate for age attention/concentration,Appropriate safety awareness,Follows commands  Safety/Judgement: Good awareness of safety precautions       Skin:  drain intact    Range Of Motion:  AROM: Within functional limits (uninvolved extremities), RLE in knee immobilizer, keep in extension                        Strength:    Strength: Generally decreased, functional                    Tone & Sensation:   Tone: Normal              Sensation: Intact               Coordination:  Coordination: Within functional limits    Functional Mobility:  Bed Mobility:     Supine to Sit: Moderate assistance;Assist x2     Scooting: Minimum assistance;Assist x1 (supported RLE while pt scooted forward to place feet on floor)  Transfers:  Sit to Stand: Minimum assistance;Assist x2; Additional time; Adaptive equipment  Stand to Sit: Minimum assistance;Assist x2; Additional time; Adaptive equipment         Side stepped to bedside chair with rolling walker and minimal assist x 2              Balance:   Sitting: Intact  Standing: Impaired  Standing - Static: Constant support;Good  Standing - Dynamic : Constant support; Fair    Functional Measure:  Tinetti test:    Sitting Balance: 1  Arises: 1  Attempts to Rise: 1  Immediate Standing Balance: 1  Standing Balance: 1  Nudged: 1  Eyes Closed: 1  Turn 360 Degrees - Continuous/Discontinuous: 0  Turn 360 Degrees - Steady/Unsteady: 0  Sitting Down: 1  Balance Score: 8 Balance total score  Indication of Gait: 0  R Step Length/Height: 0  L Step Length/Height: 0  R Foot Clearance: 0  L Foot Clearance: 0  Step Symmetry: 0  Step Continuity: 0  Path: 0  Trunk: 0  Walking Time: 0  Gait Score: 0 Gait total score  Total Score: 8/28 Overall total score         Tinetti Tool Score Risk of Falls  <19 = High Fall Risk  19-24 = Moderate Fall Risk  25-28 = Low Fall Risk  Tinetti ME. Performance-Oriented Assessment of Mobility Problems in Elderly Patients. [facility]; [id number]. (Scoring Description: PT Bulletin Feb. 10, 1993)    Older adults: Syeda Bob et al, 2009; n = 1000 [facility] elderly evaluated with ABC, MORENO, ADL, and IADL)  · Mean MORENO score for males aged 69-68 years = 26.21(3.40)  · Mean MORENO score for females age 69-68 years = 25.16(4.30)  · Mean MORENO score for males over 80 years = 23.29(6.02)  · Mean MORENO score for females over 80 years = 17.20(8.32)        Physical Therapy Evaluation Charge Determination   History Examination Presentation Decision-Making   MEDIUM  Complexity : 1-2 comorbidities / personal factors will impact the outcome/ POC  MEDIUM Complexity : 3 Standardized tests and measures addressing body structure, function, activity limitation and / or participation in recreation  MEDIUM Complexity : Evolving with changing characteristics  MEDIUM Complexity : FOTO score of 26-74      Based on the above components, the patient evaluation is determined to be of the following complexity level: MEDIUM    Pain Rating:  Verbal: 9  Location: R knee    Activity Tolerance:   Fair, limited by severe pain      After treatment patient left in no apparent distress:   Sitting in chair and Call bell within reach    COMMUNICATION/EDUCATION:   The patients plan of care was discussed with: Registered nurse.      Fall prevention education was provided and the patient/caregiver indicated understanding. and Patient/family agree to work toward stated goals and plan of care.     Thank you for this referral.  Rylee Shabazz Pickup   Time Calculation: 29 mins

## 2022-01-04 NOTE — OP NOTES
[address]   OPERATIVE REPORT    Name:  Jam Fermin  MR#:  [id number]  :  1960  ACCOUNT #:  [de-identified]  DATE OF SERVICE:  2022      PREOPERATIVE DIAGNOSIS:  Infected right total knee replacement. 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 PROCEDURES PERFORMED:  Irrigation and debridement, right knee, with exchange of polyethylene insert, removal of lateral femoral plate through a separate incision with placement of antibiotic-impregnated beads. SURGEON:  Tito Hobbs MD    ASSISTANT:  JUS Ndiaye    ANESTHESIA:  General.    COMPLICATIONS:  None. SPECIMENS REMOVED:  Old poly insert and grossly infected tissue and femoral plate and screws. IMPLANTS:  New polyethylene insert and Hemovac drain as well as absorbable antibiotic-impregnated beads. ESTIMATED BLOOD LOSS:  150 mL. INDICATIONS:  This is a 55-year-old gentleman that I did a right total knee replacement on many years ago with excellent results. He suffered a supracondylar femur fracture approximately a year and half to 2 years ago requiring open reduction and internal fixation with lateral femoral plate which has gone on to heal completely with solid callus formation, who presents with a 3 to 4-day history of acute onset pain in his right knee and inability to weight bear. White blood cell count from his blood work was normal, however, he had an elevated sed rate and elevated C-reactive protein. He underwent needle aspiration of the knee last night, producing a large amount of grossly purulent material.  This fluid had a white cell count of 130,000 with a Gram staining showing gram-positive cocci, however, only 35% polys.   Given the presentation and the positive Gram stain and other elevated inflammatory markers, it was felt that this was compatible with a hematogenous seeding of his right total knee replacement which communicated with the lateral femoral plate, requiring urgent surgical irrigation and debridement in the hopes of salvaging the knee replacement. PROCEDURE:  The patient was taken to the operating room and underwent general inhalational anesthesia. Right knee and leg were prepped and draped in the usual fashion. Leg was elevated for several minutes and tourniquet inflated to 300 mmHg. The original incision was utilized, taken down through skin and subcutaneous tissue. A medial parapatellar arthrotomy was performed and extended proximally along the medial border of the quadriceps tendon and distally along the medial border of the insertion of patellar tendon. A large amount of gross purulence was noted. Cultures were obtained. The knee was irrigated out. There was extensive scar tissue and his knee only bent to about 70 degrees. I did extensive releases and excision of scar tissue in an attempt to mobilize the patella so that it could subluxate this laterally. This did compromise the insertion of the patellar tendon, however, there was still some intact. Due to the extensive scar tissue and contracture, it required extensive releases circumferentially around the tibia and excision of heterotopic bone. During this, I noted the lateral femoral plate communicated with the joint space and it was obviously contaminated and involved. We were finally able to get the tibia subluxated anteriorly and removed the polyethylene insert. I then used the previous lateral incision and took this down through skin and subcutaneous tissue. Bovie was used to incise the scar tissue and vastus lateralis down to expose the plate. With the plate finally exposed, we removed all the screws and the plate. I scraped the lateral femoral cortex with the Michaels elevator and irrigated this extensively. No purulence was noted laterally.   I then irrigated out the joint extensively with Irrisept as well as Bactisure over the course of about 20 minutes at multiple intervals letting this sit in the joint. This was then all suctioned and irrigated out. All the grossly infected tissue was sharply excised using the Bovie apparatus. I then placed a new size 5 polyethylene insert with 15 mm thickness which was the same as that which we removed. I then reduced the knee. I then evaluated the patellar tendon. Again, there was a fair amount still attached, however, large amount of this had been compromised, and therefore, I elected to repair this with FiberWire tape using a drill hole to the tibial tubercle. I used a KokoChi suture passer and passed the FiberWire tape through this and then used a free needle to pass it through the patellar tendon to tie this down with good fixation. Bioabsorbable vancomycin-impregnated antibiotic beads were then placed in the joint as well as medium Hemovac drain. The midline arthrotomy was repaired using #1 Stratafix suture in a running fashion. This was supplemented by #2 PDS suture in interrupted figure-of-eight fashion. Lateral wound was also closed by approximating the fascia with #1 PDS suture. Subcutaneous tissue was approximated using 2-0 Monocryl in interrupted fashion. Skin edges were approximated using stapling apparatus. Drains were sutured in. Tourniquet was let down after a total tourniquet time of 99 minutes. Prior to closure, hemostasis was obtained using Bovie apparatus. Bulky sterile dressing was applied. The patient was awakened, extubated, and transferred to the recovery room in stable condition. The physician assistant was critical throughout the case in assisting with retraction as well as positioning of the leg and wound closure.         Jessika Kohler MD      [id number]  D:  01/03/2022 15:02  T:  01/04/2022 1:24  JOB #:  [id number]

## 2022-01-04 NOTE — PROGRESS NOTES
Transition of Care Plan   RUR- Low 13%    DISPOSITION: The disposition plan is home with family assistance and [facility]  o Referral sent to: At [facility]; awaiting approval    F/U with PCP/Specialist     Transport: Spouse     Reason for Admission:        Cellulitis            Plan for utilizing home health:      PT recommending HH  Transition of Care Plan:     The Plan for Transition of Care is related to the following treatment goals: [facility]    The Patient was provided with a choice of provider and agrees  with the discharge plan. Yes [x] No []    A Freedom of choice list was provided with basic dialogue that supports the patient's individualized plan of care/goals and shares the quality data associated with the providers. Yes [x] No []    PCP: First and Last name:  Chris Calderon MD     Name of Practice:    Are you a current patient: Yes/No: Yes    Approximate date of last visit: 6 months ago   Can you participate in a virtual visit with your PCP:                     Current Advanced Directive/Advance Care Plan: Full Code      Healthcare Decision Maker:   Click here to complete [employer] including selection of the Healthcare Decision Maker Relationship (ie \"Primary\")                             Transition of Care Plan: This conducted the initial evaluation with the pt. The pt presented as aox4. The pt confirmed his demographics and insurance provider. The pt reported that he is independent with ADLs and IADls. He reported that he does not use an assistive device at baseline. He reported that he lives in a 2 story home with his wife and two sons. The pt reported that he would transition to the first floor until cleared. The pt reported that he has hx of HH but unable to recall the name of the agency. The pt reported that he is open to [facility] if needed; no preference. This cm will continue to follow the pt for DAVID needs.        Care Management Interventions  PCP Verified by CM: Yes  Mode of Transport at Discharge:  Other (see comment) (Spouse )  Transition of Care Consult (CM Consult): Discharge [address]: No  Reason Outside [place]: Unable to staff case (At [facility])  MyChart Signup: Yes  Discharge Durable Medical Equipment: No  Physical Therapy Consult: Yes  Occupational Therapy Consult: Yes  Speech Therapy Consult: No  Support Systems: Spouse/Significant Other,Other Family Member(s)  Confirm Follow Up Transport: Self  The Patient and/or Patient Representative was Provided with a Choice of Provider and Agrees with the Discharge Plan?: Yes  Freedom of Choice List was Provided with Basic Dialogue that Supports the Patient's Individualized Plan of Care/Goals, Treatment Preferences and Shares the Quality Data Associated with the Providers?: Yes  Discharge Location  Discharge Placement: Home with home health (At [facility])  CM: [address] [place],   [phone]

## 2022-01-04 NOTE — DIABETES MGMT
[facility]    CLINICAL NURSE SPECIALIST CONSULT     Initial Presentation   Kaleigh Trevino is a 59 y.o. male admitted 1/2/22 with right lower leg pain and swelling. Recent COVID booster shot day prior. S/p TKR 2010    HX:   Past Medical History:   Diagnosis Date    Arthritis     knees    Atrial fibrillation with RVR (HCC)     Benign essential hypertension 6/10/2011    CAD (coronary artery disease)     Diabetes (HCC)     DX  AGE 33      Hypercholesterolemia     Hypertriglyceridemia     Obesity     Pacemaker     PAF (paroxysmal atrial fibrillation) ([facility] [address] 12/12/2018    Syncope         INITIAL DX:   Cellulitis [L03.90]     Current Treatment     TX: ortho consult/ Blood cultures/ s/p arthrocentesis on 1/2/22/ s/p ID 1/3/22    Consulted by Provider for advanced diabetes nursing assessment and care for:   [x] Home management assessment      Hospital Course   Clinical progress has been complicated by right LE cellulitis    1/3/21- I & D right knee- hardware removal  1/4/21: ID note stated will need 6 wk course of IV abx    Diabetes History   DM2 since age 26. - A1C 8.5%-PCP -Karina Rodriguez visit 7/12/21    Diabetes-related Medical History  Acute complications  Hyperglycemia at admission  Neurological complications  Peripheral neuropathy  Microvascular disease  NONE  Macrovascular disease  CAD  Other associated conditions     Morbid obesity/ HTN/ hypercholesteremia/high triglycerides    Diabetes Medication History  Key Antihyperglycemic Medications             metFORMIN (GLUCOPHAGE) 1,000 mg tablet TAKE 1 TABLET BY MOUTH TWICE A DAY WITH MEALS    Jardiance 10 mg tablet TAKE 1 TAB BY MOUTH DAILY.  FOR DIABETES    semaglutide (Ozempic) 1 mg/dose (2 mg/1.5 mL) sub-q pen 1 MG BY SUBCUTANEOUS ROUTE EVERY SEVEN (7) DAYS.    glipiZIDE (GLUCOTROL) 10 mg tablet TAKE 1 TABLET BY MOUTH TWICE A DAY           Diabetes self-management practices: deferred - working with PT/OT at time of visit  Eating pattern-\"I dont' eat much - I may have leftover from dinner the night before for breakfast and not eat again until evening\"   [x] Eating a carbohydrate-controlled mealplan    Physical activity pattern-limited      Monitoring pattern-\"I haven't checked my BG in over 10yrs- I hate to prick myself\"  WAnts a CGM      Taking medications pattern  [x] Consistent administration  [x] Affordable  Social determinants of health impacting diabetes self-management practices   Concerned that you need to know more about how to stay healthy with diabetes  Overall evaluation:    [x] striving to achieving A1c target with drug therapy & self-care practices    Subjective   \" I don't eat that much anymore\"    Retired  Objective   Physical exam  General Obese/male in no acute distress Conversant and cooperative  Neuro  Alert, oriented   Vital Signs   Visit Vitals  /65 (BP 1 Location: Right upper arm, BP Patient Position: Sitting)   Pulse 70   Temp 99.4 °F (37.4 °C)   Resp 16   Ht 6' (1.829 m)   Wt 136 kg (299 lb 13.2 oz)   SpO2 98%   BMI 40.66 kg/m²     Skin  Warm and dry. Heart   Regular rate and rhythm. No murmurs, rubs or gallops  Lungs  Clear to auscultation without rales or rhonchi  Extremities No foot wounds' left lower leg wound noted    Right leg: brace on with hemovac drain noted.      Diabetic foot exam:  Documented peripheral neruopathy       Laboratory  Recent Labs     01/04/22  0028 01/03/22  0723 01/02/22  1706   * 136* 132*   AGAP 11 7 7   WBC 10.8 7.7  --    CREA 1.18 0.96 1.23   GFRNA >60 >60 59*   AST  --  9* 12*   ALT  --  12 13       Factors impacting BG management  Factor Dose Comments   Nutrition:  Standard meals     60 grams/meal      Pain Right knee    Infection Right knee cellulitis    Other:   Kidney function  Liver function     WNL      Blood glucose pattern      Significant diabetes-related events over the past 24-72 hours  BG trends in goal since admission  currently on BID glipizide and correctional insulin   S/p I & D 1/3/22      Assessment and Plan   Nursing Diagnosis Risk for unstable blood glucose pattern   Nursing Intervention Domain 5259 Decision-making Support   Nursing Interventions Examined current inpatient diabetes/blood glucose control   Explored factors facilitating and impeding inpatient management  Explored corrective strategies with patient and responsible inpatient provider   Informed patient of rational for insulin strategy while hospitalized     Nursing Diagnosis 25839 Ineffective Health Management   Nursing Intervention Domain 5255 Decision-makingSupport   Nursing Interventions Identified diabetes self-management practices impeding diabetes control  Discussed diabetes survival skills related to  1. Healthy Plate eating plan; given handouts  2. Role of physical activity in improving insulin sensitivity and action  3. Procedure for blood glucose monitoring & options for ow-cost products available from [facility]   4. Medications plan at discharge     Evaluation   This  male , with Type 2 diabetes, did not achieve diabetes control prior to admission, as evidenced by A1c of 8.5%. Compared to previous A1Cs earlier in 2021, current A1C is quite elevated. Currently admitted for right knee cellulitis. S/p right TKR in 2010 -s/p I&D with hardware removal 1/3/21. PTA is on a robust diabetes regimen with orals and weekly injectable medication. BG trends since hospitalized have been in target on Glipizide BID and correctional insulin. PO intake per patient has been less than adequate-\"the food here just doesn't taste well\" . Likely decreased PO intake while hospitlaized is contributing to in target BG trends. Admitted to not checking BG due to not liking to prick self. Is trying to obtain a CGM next time he is at PCP appointment. This patient would benefit from diabetes self-management education and support [facility]) after discharge. Recommendations   1.  If NPO again, HOLD glipizide. [x] Use of Subcutaneous Insulin Order set (1935)  Insulin Dosing Specific recommendation   CONTINUE Corrective                        (Useful in adjusting insulin dosing) [x] Normal sensitivity          2. Diabetes Management Team to sign off at this point as patient's blood glucose remains stable on current regeimen. Please re-consult us if patient needs change. Thank you for including us in their care. Discharge Planning   1. Re-start PTA diabetes medications at current PTA dosing    2. PCP follow up for possible CGM ( Looks like Tier 2 medication on his insurance , so there is some coverage for it.)     Billing Code(s)   [x] 29234 IP subsequent hospital care - 35 minutes    Before making these care recommendations, I personally reviewed the hospitalization record, including notes, laboratory & diagnostic data and current medications, and examined the patient at the bedside (circumstances permitting) before making care recommendations. More than fifty (50) percent of the time was spent in patient counseling and/or care coordination.   Total minutes: [address] CE[place]  Diabetes Clinical Nurse Specialist  [facility]  Access via [facility]

## 2022-01-04 NOTE — PROGRESS NOTES
[facility]                                                                                          Hospitalist Progress Note  Celena Lucas MD  Answering service: [phone] from in house phone        Date of Service:  2022  NAME:  Kaleigh Trevino  :  1960  MRN:  [id number]    This documentation was facilitated by a Voice Recognition software and may contain inadvertent typographical errors. Admission Summary:   Patient presented with right knee pain is admitted with a diagnosis of acute septic arthritis    Interval history / Subjective:        Patient worked with therapy and had some pain after he returned to bed. Assessment & Plan:   Right knee septic arthritis, infected total knee  --Status post right knee polyexchange, hardware removal, incision and drainage, wound irrigation and placement of antibiotic beads  --Synovial fluid culture grew moderate streptococci, beta-hemolytic B, on vancomycin. --Continue ceftriaxone vancomycin  --ID on board, recommending 6 weeks of IV therapy. Type II DM  --Accu-Cheks plus Humalog sliding scale  --Continued on glipizide. CAD.   Paroxysmal atrial fibrillation, history of sinus pauses status post pacemaker  --Continue amiodarone, aspirin, apixaban, metoprolol    Hypertension, stable: Continue home medications  Hyperlipidemia, stable: Continue statin              Code status: Full code  DVT prophylaxis: Apixaban  Care Plan discussed with: Patient/Family and Nurse  Anticipated Disposition: SNF/LTC  Anticipated Discharge: Greater than 48 hours  Hospital Problems  Date Reviewed: 1/3/2022          Codes Class Noted POA    * (Principal) Staphylococcal arthritis of right knee ([facility] [address] ICD-10-CM: M00.061  ICD-9-CM: 711.06, 041.10  1/3/2022 Yes        Cellulitis ICD-10-CM: L03.90  ICD-9-CM: 682.9  2022 Unknown                Review of Systems:   A comprehensive review of systems was negative except for that written in the HPI. Vital Signs:    Last 24hrs VS reviewed since prior progress note. Most recent are:  Visit Vitals  /70 (BP 1 Location: Left lower arm, BP Patient Position: At rest;Lying)   Pulse 71   Temp 98.6 °F (37 °C)   Resp 18   Ht 6' (1.829 m)   Wt 136 kg (299 lb 13.2 oz)   SpO2 96%   BMI 40.66 kg/m²         Intake/Output Summary (Last 24 hours) at 1/4/2022 1717  Last data filed at 1/4/2022 0802  Gross per 24 hour   Intake    Output 415 ml   Net -415 ml        Physical Examination:     I had a face to face encounter with this patient and independently examined them on1/4/2022 as outlined below:        Constitutional:  No acute distress, cooperative, pleasant    HEENT:  Atraumatic. Oral mucosa moist,. Non icteric sclera. No pallor. Resp:  CTA bilaterally. No wheezing/rhonchi/rales. No accessory muscle use   Chest Wall: No deformity   CV:  Regular rhythm, normal rate, no murmurs, gallops, rubs    GI:  Soft, non distended, non tender.  normoactive bowel sounds, no hepatosplenomegaly    :  No CVA or suprapubic tenderness    Musculoskeletal:  Right knee Ace bandage and immobilized    Neurologic:  Mental status:AAOx3,   Cranial nerves II-XII : WNL  Motor exam:Moves all extremities symmetrically             Data Review:    Review and/or order of clinical lab test  Review and/or order of tests in the radiology section of CPT  Review and/or order of tests in the medicine section of CPT      Labs:     Recent Labs     01/04/22 0028 01/03/22  0723   WBC 10.8 7.7   HGB 8.7* 9.6*   HCT 29.2* 30.8*    227     Recent Labs     01/04/22  0028 01/03/22  0723 01/02/22  1706   * 137 138   K 3.8 3.6 3.5    110* 109*   CO2 16* 20* 22   BUN 26* 20 24*   CREA 1.18 0.96 1.23   * 136* 132*   CA 8.2* 8.8 9.1   MG  --  2.2  --    PHOS  --  3.2  --      Recent Labs     01/03/22  0723 01/02/22  1706   ALT 12 13   AP 55 56   TBILI 0.5 0.5   TP 6.6 6.7   ALB 2.3* 2.5*   GLOB 4.3* 4.2*     No results for input(s): INR, PTP, APTT, INREXT, INREXT in the last 72 hours. No results for input(s): FE, TIBC, PSAT, FERR in the last 72 hours. No results found for: FOL, RBCF   No results for input(s): PH, PCO2, PO2 in the last 72 hours. No results for input(s): CPK, CKNDX, TROIQ in the last 72 hours.     No lab exists for component: CPKMB  Lab Results   Component Value Date/Time    Cholesterol, total 130 07/12/2021 10:21 AM    HDL Cholesterol 37 07/12/2021 10:21 AM    LDL, calculated 70 07/12/2021 10:21 AM    Triglyceride 115 07/12/2021 10:21 AM    CHOL/HDL Ratio 3.5 07/12/2021 10:21 AM     Lab Results   Component Value Date/Time    Glucose (POC) 142 (H) 01/04/2022 11:43 AM    Glucose (POC) 112 01/04/2022 06:21 AM    Glucose (POC) 173 (H) 01/03/2022 09:28 PM    Glucose (POC) 121 (H) 01/03/2022 03:32 PM    Glucose (POC) 155 (H) 01/03/2022 07:36 AM     Lab Results   Component Value Date/Time    Color YELLOW/STRAW 05/04/2021 01:22 PM    Appearance CLEAR 05/04/2021 01:22 PM    Specific gravity 1.024 05/04/2021 01:22 PM    pH (UA) 5.5 05/04/2021 01:22 PM    Protein 100 (A) 05/04/2021 01:22 PM    Glucose >1,000 (A) 05/04/2021 01:22 PM    Ketone Negative 05/04/2021 01:22 PM    Bilirubin Negative 05/04/2021 01:22 PM    Urobilinogen 0.2 05/04/2021 01:22 PM    Nitrites Negative 05/04/2021 01:22 PM    Leukocyte Esterase Negative 05/04/2021 01:22 PM    Epithelial cells FEW 05/04/2021 01:22 PM    Bacteria Negative 05/04/2021 01:22 PM    WBC 0-4 05/04/2021 01:22 PM    RBC 0-5 05/04/2021 01:22 PM         Medications Reviewed:     Current Facility-Administered Medications   Medication Dose Route Frequency    [START ON 1/5/2022] apixaban (ELIQUIS) tablet 5 mg  5 mg Oral Q12H    apixaban (ELIQUIS) tablet 2.5 mg  2.5 mg Oral Q12H    [START ON 1/5/2022] Vancomycin Random 1/5 @ 0400   Other ONCE    Vancomycin- Pharmacy to Dose   Other Rx Dosing/Monitoring    vancomycin (VANCOCIN) 1,250 mg in 0.9% sodium chloride 250 mL (VIAL-MATE) 1,250 mg IntraVENous Q12H    sodium chloride 0.9 % bolus infusion 500 mL  500 mL IntraVENous ONCE PRN    sodium chloride (NS) flush 5-40 mL  5-40 mL IntraVENous Q8H    sodium chloride (NS) flush 5-40 mL  5-40 mL IntraVENous PRN    acetaminophen (TYLENOL) tablet 650 mg  650 mg Oral Q6H    oxyCODONE IR (ROXICODONE) tablet 5 mg  5 mg Oral Q3H PRN    oxyCODONE IR (ROXICODONE) tablet 10 mg  10 mg Oral Q3H PRN    naloxone (NARCAN) injection 0.4 mg  0.4 mg IntraVENous PRN    hydrOXYzine HCL (ATARAX) tablet 10 mg  10 mg Oral Q8H PRN    famotidine (PEPCID) tablet 20 mg  20 mg Oral BID PRN    senna-docusate (PERICOLACE) 8.6-50 mg per tablet 1 Tablet  1 Tablet Oral BID    polyethylene glycol (MIRALAX) packet 17 g  17 g Oral DAILY    [START ON 1/5/2022] bisacodyL (DULCOLAX) suppository 10 mg  10 mg Rectal DAILY PRN    cefTRIAXone (ROCEPHIN) 2 g in sodium chloride (NS) 20 mL IV syringe  2 g IntraVENous Q24H    sodium chloride (NS) flush 5-40 mL  5-40 mL IntraVENous Q8H    sodium chloride (NS) flush 5-40 mL  5-40 mL IntraVENous PRN    acetaminophen (TYLENOL) tablet 650 mg  650 mg Oral Q6H PRN    Or    acetaminophen (TYLENOL) suppository 650 mg  650 mg Rectal Q6H PRN    polyethylene glycol (MIRALAX) packet 17 g  17 g Oral DAILY PRN    ondansetron (ZOFRAN ODT) tablet 4 mg  4 mg Oral Q8H PRN    Or    ondansetron (ZOFRAN) injection 4 mg  4 mg IntraVENous Q6H PRN    amiodarone (CORDARONE) tablet 100 mg  100 mg Oral DAILY    aspirin delayed-release tablet 81 mg  81 mg Oral DAILY    furosemide (LASIX) tablet 40 mg  40 mg Oral DAILY    gemfibroziL (LOPID) tablet 600 mg  600 mg Oral BID    glipiZIDE (GLUCOTROL) tablet 10 mg  10 mg Oral ACB&D    metoprolol tartrate (LOPRESSOR) tablet 25 mg  25 mg Oral BID    rosuvastatin (CRESTOR) tablet 20 mg  20 mg Oral DAILY    senna-docusate (PERICOLACE) 8.6-50 mg per tablet 1 Tablet  1 Tablet Oral BID PRN    glucose chewable tablet 16 g  4 Tablet Oral PRN    dextrose (D50W) injection syrg 12.5-25 g  25-50 mL IntraVENous PRN    glucagon (GLUCAGEN) injection 1 mg  1 mg IntraMUSCular PRN    insulin lispro (HUMALOG) injection   SubCUTAneous AC&HS     ______________________________________________________________________  EXPECTED LENGTH OF STAY: - - -  ACTUAL LENGTH OF STAY:          2                 Doyle Blackman MD

## 2022-01-04 NOTE — PROGRESS NOTES
Problem: Self Care Deficits Care Plan (Adult)  Goal: *Acute Goals and Plan of Care (Insert Text)  Description: Description: FUNCTIONAL STATUS PRIOR TO ADMISSION: Patient was independent and active without use of DME. Pt reports he used crutches for a few days PTA due to R knee pain. HOME SUPPORT PRIOR TO ADMISSION: The patient lived with wife but did not require assist.    Occupational Therapy Goals  Initiated 1/4/2022  1. Patient will perform grooming standing at sink with modified independence within 7 day(s). 2.  Patient will perform bathing with minimal assistance/contact guard assist within 7 day(s). 3.  Patient will perform lower body dressing using AE with modified independence within 7 day(s). 4.  Patient will perform toilet transfers with modified independence within 7 day(s). 5.  Patient will perform all aspects of toileting with modified independence within 7 day(s). 6.  Patient will utilize energy conservation techniques during functional activities with verbal cues within 7  day(s). Outcome: Progressing Towards Goal   OCCUPATIONAL THERAPY EVALUATION  Patient: Bradford Billings (60 y.o. male)  Date: 1/4/2022  Primary Diagnosis: Cellulitis [L03.90]  Procedure(s) (LRB):  RIGHT KNEE POLY EXCHANGE ; HARDWARE REMOVAL RIGHT LATERAL LEG; INCISION AND DRAINAGE; WOUND IRRIGATION; PLACEMENT OF ANTIBIOTIC BEADS (Right) 1 Day Post-Op   Precautions:   WBAT (knee immobilizer, keep knee in full extension)    ASSESSMENT  Based on the objective data described below, the patient presents with decrease mobility, increase pain, decrease activity tolerance, and increase assistance with LB self-care. Pt received seated in chair after working with PT approx 1- 2 hours prior. Explained OTs role, verbalized understanding. Pt willing to participate. Pt performed sit to stand from lower surface level and required mod assist x 2 with sit to stand using RW.   Pt willing to increase distance from this am. Pt was able to access door and walk around bed with CGA x 1 and assistance from tech to manage IV ple. Pt reported less pain. Stand to sit, min assist to manage R LE. Returned to supine with min assist.  Educated pt to wear clothes that are easy to don/doff now that he has leg brace to keep knee fully extended. Verbalized understanding. Pt plans to reside on first floor of his 2-story home and sleep in recliner chair. Will con't to follow for skilled acute OT. Current Level of Function Impacting Discharge (ADLs/self-care): mod assist with sit to standing pending height of bed or chair    Functional Outcome Measure: The patient scored 60/100 on the Barthel outcome measure which is indicative of impairment. Other factors to consider for discharge: pain     Patient will benefit from skilled therapy intervention to address the above noted impairments. PLAN :  Recommendations and Planned Interventions: self care training, functional mobility training, balance training, therapeutic activities, endurance activities, patient education, home safety training, and family training/education    Frequency/Duration: Patient will be followed by occupational therapy 5 times a week to address goals. Recommendation for discharge: (in order for the patient to meet his/her long term goals)  Occupational therapy at least 2 days/week in the home     This discharge recommendation:  A follow-up discussion with the attending provider and/or case management is planned    IF patient discharges home will need the following DME: none       SUBJECTIVE:   Patient stated \"I need to see if I still have one (RTS).     OBJECTIVE DATA SUMMARY:   HISTORY:   Past Medical History:   Diagnosis Date    Arthritis     knees    [facility]rial [facility] RVR [facility] [address]     Benign essential hypertension 6/10/2011    CAD (coronary artery disease)     Diabetes (HCC)     DX  AGE 33      Hypercholesterolemia     Hypertriglyceridemia     Obesity     Pacemaker     PAF (paroxysmal atrial fibrillation) ([facility] [address] 12/12/2018    Syncope      Past Surgical History:   Procedure Laterality Date    CARDIAC CATHETERIZATION  6/9/2011         CARDIAC CATHETERIZATION  11/1/2012         HC ANGIOSEAL VIP 6FR  6/9/2011         HX AFIB ABLATION  03/15/2021    HX COLONOSCOPY  05/2009    Dr. Del Angel Degree- normal    HX ORTHOPAEDIC      left knee replacement    HX ORTHOPAEDIC      R knee replacement  2010 -Fabricio Ruiz.     NH ABLATE L/R ATRIAL FIBRIL W/ISOLATED PULM VEIN N/A 5/10/2021    Ablation Following A-Fib  Addl performed by Stevan [facility]Lpes [facility] at [facility] CARDIAC CATH LAB    NH CARDIAC SURG PROCEDURE UNLIST      STENT RCA  6/11    NH COMPRE ELECTROPHYSIOL XM W/LEFT ATRIAL PACNG/REC N/A 5/10/2021    Lt Atrial Pace & Record During Ep Study performed by Stevan Lopes MD at [facility] CARDIAC CATH LAB    NH COMPRE EP EVAL ABLTJ ATR FIB PULM VEIN ISOLATION N/A 3/15/2021    ABLATION A-FIB  W COMPLETE EP STUDY performed by Stevan Lopes MD at [facility] CARDIAC CATH LAB    NH COMPRE EP EVAL ABLTJ ATR FIB PULM VEIN ISOLATION N/A 5/10/2021    ABLATION A-FIB  W COMPLETE EP STUDY performed by Stevan Lopes MD at [facility] CARDIAC CATH LAB    NH DRUG-ELUTING STENTS, SINGLE  6/9/2011         NH INS NEW/RPLCMT PRM PM W/TRANSV ELTRD ATRIAL&VENT N/A 3/22/2021    INSERT PPM DUAL performed by Stevan Lopes MD at [facility] CARDIAC CATH LAB    NH INTRACARD ECHO, THER/DX INTERVENT N/A 3/15/2021    Intracardiac Echocardiogram performed by Stevan Lopes MD at [facility] CARDIAC CATH LAB    NH INTRACARD ECHO, THER/DX INTERVENT N/A 5/10/2021    Intracardiac Echocardiogram performed by Stevan Lopes MD at [facility] CARDIAC CATH LAB    NH INTRACARDIAC ELECTROPHYSIOLOGIC 3D MAPPING N/A 3/15/2021    Ep 3d Mapping performed by Stevan Lopes MD at [facility] CARDIAC CATH LAB    NH INTRACARDIAC ELECTROPHYSIOLOGIC 3D MAPPING N/A 5/10/2021    Ep 3d Mapping performed by Stevan Lopes MD at [address] [place] [address] 28 CATH LAB       Expanded or extensive additional review of patient history:     Home Situation  Home Environment: Private residence  # Steps to Enter: 1  Rails to [employer]: No  One/Two Story Residence: Two story  # of Interior Steps: 12  Interior Rails: Right  Living Alone: No  Support Systems: Other Family Member(s),Spouse/Significant Other  Patient Expects to be Discharged to[de-identified] [employer]  Current DME Used/Available at Home: Walker, rolling,Crutches,Cane, straight  Tub or Shower Type: Tub/Shower combination        EXAMINATION OF PERFORMANCE DEFICITS:  Cognitive/Behavioral Status:  Neurologic State: Alert  Orientation Level: Oriented X4  Cognition: Appropriate decision making        Safety/Judgement: Awareness of environment    Skin: intact    Edema: none noted    Hearing:       Vision/Perceptual:                                     Range of Motion:    AROM: Within functional limits                         Strength:    Strength: Generally decreased, functional                Coordination:  Coordination: Within functional limits  Fine Motor Skills-Upper: Left Intact; Right Intact    Gross Motor Skills-Upper: Left Intact; Right Intact    Tone & Sensation:    Tone: Normal  Sensation: Intact                      Balance:  Sitting: Intact  Standing: Impaired; With support  Standing - Static: Constant support;Good  Standing - Dynamic : Constant support; Fair    Functional Mobility and Transfers for ADLs:  Bed Mobility:  Supine to Sit: Moderate assistance;Assist x2  Sit to Supine: Minimum assistance  Scooting: Minimum assistance;Assist x1 (supported RLE while pt scooted forward to place feet on floor)    Transfers:  Sit to Stand: Minimum assistance; Moderate assistance;Assist x2; Other (comment) (from recliner chair)  Stand to Sit: Minimum assistance  Bed to Chair: Contact guard assistance;Assist x1  Bathroom Mobility: Contact guard assistance  Toilet Transfer : Minimum assistance; Moderate assistance;Assist x2    ADL Assessment:  Feeding: Independent    Oral Facial Hygiene/Grooming: Setup    Bathing: Moderate assistance    Upper Body Dressing: Setup    Lower Body Dressing: Total assistance    Toileting: Maximum assistance                ADL Intervention and task modifications:     Patient instructed and indicated understanding the benefits of maintaining activity tolerance, functional mobility*, and independence with self care tasks during acute stay to ensure safe return home and to baseline. Encouraged patient to increase frequency and duration at EOB, be EOB for all meals, perform daily ADLs (as approved by RN/MD regarding bathing etc), and performing functional mobility to/from [facility]. Pt educated on safe transfer techniques, with specific emphasis on proper hand placement to push up from seated surface rather than attempt to pull self up, fully positioning self in-front of desired seated location, feeling chair on back of legs and reaching back with 1-2 UE to slowly lower self to seated position. Cognitive Retraining  Safety/Judgement: Awareness of environment      Functional Measure:    Barthel Index:  Bathin  Bladder: 10  Bowels: 10  Groomin  Dressin  Feeding: 10  Mobility: 10  Stairs: 0  Toilet Use: 5  Transfer (Bed to Chair and Back): 5  Total: 60/100      The Barthel ADL Index: Guidelines  1. The index should be used as a record of what a patient does, not as a record of what a patient could do. 2. The main aim is to establish degree of independence from any help, physical or verbal, however minor and for whatever reason. 3. The need for supervision renders the patient not independent. 4. A patient's performance should be established using the best available evidence. Asking the patient, friends/relatives and nurses are the usual sources, but direct observation and common sense are also important. However direct testing is not needed.   5. Usually the patient's performance over the preceding 24-48 hours is important, but occasionally longer periods will be relevant. 6. Middle categories imply that the patient supplies over 50 per cent of the effort. 7. Use of aids to be independent is allowed. Score Interpretation (from [facility])    Independent   60-79 Minimally independent   40-59 Partially dependent   20-39 Very dependent   <20 Totally dependent     -Chicho Kurtz., Barthel, D.W. (1965). Functional evaluation: the Barthel Index. [address] ([address] (1997). The Barthel activities of daily living index: self-reporting versus actual performance in the old (> or = 75 years). Journal of [address] [place] [address] 45(7), [facility], GUCCI, Kasey Stewart, Radha [facility]. (1999). Measuring the change in disability after inpatient rehabilitation; comparison of the responsiveness of the Barthel Index and Functional [place] Measure. Journal of Neurology, Neurosurgery, and Psychiatry, 66(4), 095-149. LULU Macias, TROY Yao, & Mackenzie Newell MYADY. (2004) Assessment of post-stroke quality of life in cost-effectiveness studies: The usefulness of the Barthel Index and the EuroQoL-5D.  Quality of Life Research, 15, 307-46        Occupational Therapy Evaluation Charge Determination   History Examination Decision-Making   LOW Complexity : Brief history review  LOW Complexity : 1-3 performance deficits relating to physical, cognitive , or psychosocial skils that result in activity limitations and / or participation restrictions  LOW Complexity : No comorbidities that affect functional and no verbal or physical assistance needed to complete eval tasks       Based on the above components, the patient evaluation is determined to be of the following complexity level: LOW   Pain Rating:  3/10 pain with movement    Activity Tolerance:   Fair    After treatment patient left in no apparent distress:    Supine in bed and Call bell within reach    COMMUNICATION/EDUCATION:   The patients plan of care was discussed with: Rehabilitation technician. Home safety education was provided and the patient/caregiver indicated understanding., Patient/family have participated as able in goal setting and plan of care. and Patient/family agree to work toward stated goals and plan of care. This patients plan of care is appropriate for delegation to [facility].     Thank you for this referral.  Sarah Tsang, OTR/L  Time Calculation: 26 mins

## 2022-01-04 NOTE — WOUND CARE
WOCN Note:     New consult for left lower leg. Chart shows:  Admitted for right knee debridement 1/3/22    Assessment:   A&O x 4  reports no pain. Sitting up in recliner. Bilateral heels intact and without erythema. 1. POA left lower mediolateral leg ruptured bullae  2 x 4 x 0.1 cm  100% pale pink  no exudate; slight edema in leg. Tx: applied Xeroform and foam dressing    Wound Recommendations:    Left leg: xeroform and foam cover dressing.   Change every other day    Transition of Care: Plan to follow weekly and as needed while admitted to hospital.     FABRICE SargentN, RN, [facility]  Certified Wound, Ostomy, Continence Nurse  office [phone]  Available via [facility]

## 2022-01-04 NOTE — CONSULTS
Infectious Disease Consult    Today's Date: 1/4/2022   Admit Date: 1/2/2022    Impression:   Right prosthetic knee infection  S/p I & D of right knee with exchange of polyethylene insert, removal of lateral femoral plate through a seperate incision with placement of antibiotic beads (1/3)  S/p ORIF of right supracondylar femur fracture with synthes plate placement (7/47/6327)  - afebrile, WBC 10.8    CRP 28 (1/2)    Fluid cx (1/2) beta hemolytic group B strep    Intra-op cx (1/3) occasional GPC in pairs    Type II DM  - A1C 8.5    Plan:   ·  continue with IV rocephin and vancomycin  ·  will adjust ABX prn  ·  pt will need at least 6 weeks of IV ABX therapy; final recommendation will be provided once review the final culture results  ·  fever work up if temp >= 100.4    Above plan of care discussed and agreed with Dr. Sasha Gutierrez:   · IV rocephin 1/4-  · IV ancef 1/3  · IV vancomycin 1/3-    Subjective:   Date of Consultation:  January 4, 2022  Referring Physician: Dr. Hieu Anders    Patient is a 59 y.o. male who had original right knee replacement in 2010 and ORIF of right supracondylar femur fracture with synthes plate placement on 1/1977, was admitted to the hospital on 1/2/2022 with ncrease right leg pain and swelling. Right knee XR revealed suprapatellar joint effusion, arthrocentesis was done, synovial fluid cx now growing Beta hemolytic group B strep. Pt underwent for Ii & D of right knee with poly exchange, removal of lateral femoral plate with ABX bead placement on 1/3. Intra-op cx is growing GPC in pairs so far. Pt received pre-op ancef, and placed on IV rocephin and vancomycin. During visit, pt is c/o soar spot in left mediolateral leg which appeared as ruptured bulla. Pt reports having these episodes on and off for the past a few months, gets better with application neosporin ointment. No fever, chills, nausea, vomiting, diarrhea, chest pain, or abd pain.     Pt's other medical hx include CAD, type II DM, hypertension, hyperlipidemia, and obesity. S/p COVID 19 vaccine. No hx of smoking or alcohol intake. No antibiotic medication allergies    ID team was consulted for ABX management to treat right prosthetic knee infection.     Patient Active Problem List   Diagnosis Code    Hypertriglyceridemia E78.1    Arthritis M19.90    Coronary artery disease I25.10    S/P coronary artery stent placement Z95.5    Type 2 diabetes mellitus (HCC) E11.9    Benign essential hypertension I10    Mitral valve disorders(424.0) I05.9    Mixed hyperlipidemia E78.2    Type 2 diabetes with nephropathy ([address] E11.21    Severe obesity ([address] E66.01    Controlled type 2 diabetes mellitus with complication, without long-term current use of insulin ([facility]) E11.8    PAF (paroxysmal atrial fibrillation) ([facility]) I48.0    Femur fracture ([facility]) S72.90XA    Supracondylar fracture of distal end of femur without intracondylar extension ([address] S72.453A    Tachy-amina syndrome ([address] I49.5    A-fib ([address] I48.91    Cellulitis L03.90    Staphylococcal arthritis of right knee ([facility]) M00.061     Past Medical History:   Diagnosis Date    Arthritis     knees    Atrial fibrillation with RVR ([address]     Benign essential hypertension 6/10/2011    CAD (coronary artery disease)     Diabetes ([address]     DX  AGE 26      Hypercholesterolemia     Hypertriglyceridemia     Obesity     Pacemaker     PAF (paroxysmal atrial fibrillation) ([address] 2018    Syncope       Family History   Problem Relation Age of Onset    Diabetes Mother     Hypertension Mother    [de-identified] Elevated Lipids Mother     Cancer Mother         unsure    Diabetes Father     Heart Disease Father     Hypertension Father     Elevated Lipids Father     Diabetes Brother     Suicide Brother     Diabetes Brother       Social History     Tobacco Use    Smoking status: Former Smoker     Types: Cigarettes     Quit date: 11/10/1989     Years since quittin.1    Smokeless tobacco: Never Used   Substance Use Topics    Alcohol use: Yes     Comment: social  DRINKS ONE WEEKEND A MONTH/\"BIG IMPROVEMENT\"     Past Surgical History:   Procedure Laterality Date    CARDIAC CATHETERIZATION  6/9/2011         CARDIAC CATHETERIZATION  11/1/2012         HC ANGIOSEAL VIP 6FR  6/9/2011         HX AFIB ABLATION  03/15/2021    HX COLONOSCOPY  05/2009    Dr. Edie Olvera- normal    HX ORTHOPAEDIC      left knee replacement    HX ORTHOPAEDIC      R knee replacement  2010 -Cindia Sartorius.     NV ABLATE L/R ATRIAL FIBRIL W/ISOLATED PULM VEIN N/A 5/10/2021    Ablation Following A-Fib  Addl performed by Petey Raymond MD at \[facility]\"" CARDIAC CATH LAB    NV CARDIAC SURG PROCEDURE UNLIST      STENT RCA  6/11    NV COMPRE ELECTROPHYSIOL XM W/LEFT ATRIAL PACNG/REC N/A 5/10/2021    Lt Atrial Pace & Record During Ep Study performed by Petey Raymond MD at \[facility]\"" CARDIAC CATH LAB    NV COMPRE EP EVAL ABLTJ ATR FIB PULM VEIN ISOLATION N/A 3/15/2021    ABLATION A-FIB  W COMPLETE EP STUDY performed by Petey Raymond MD at \[facility]\"" CARDIAC CATH LAB    NV COMPRE EP EVAL ABLTJ ATR FIB PULM VEIN ISOLATION N/A 5/10/2021    ABLATION A-FIB  W COMPLETE EP STUDY performed by Petey Raymond MD at [facility] CARDIAC CATH LAB    NV DRUG-ELUTING STENTS, SINGLE  6/9/2011         NV INS NEW/RPLCMT PRM PM W/TRANSV ELTRD ATRIAL&VENT N/A 3/22/2021    INSERT PPM DUAL performed by Petey Raymond MD at [facility] CARDIAC CATH LAB    NV INTRACARD ECHO, THER/DX INTERVENT N/A 3/15/2021    Intracardiac Echocardiogram performed by Petey Raymond MD at [facility] CARDIAC CATH LAB    NV INTRACARD ECHO, THER/DX INTERVENT N/A 5/10/2021    Intracardiac Echocardiogram performed by Petey Raymond MD at [address] [place] [address] CATH LAB    NV INTRACARDIAC ELECTROPHYSIOLOGIC 3D MAPPING N/A 3/15/2021    Ep 3d Mapping performed by Petey Raymond MD at [facility] CARDIAC CATH LAB    NV INTRACARDIAC ELECTROPHYSIOLOGIC 3D MAPPING N/A 5/10/2021    Ep 3d Mapping performed by Emma Culver MD at [facility] CARDIAC CATH LAB      Prior to Admission medications    Medication Sig Start Date End Date Taking? Authorizing Provider   metoprolol tartrate (LOPRESSOR) 25 mg tablet Take 1 Tablet by mouth two (2) times a day for 90 days. 12/9/21 3/9/22  Rylee Melendez ANP   magnesium oxide (MAG-OX) 400 mg tablet TAKE 2 TABLETS BY MOUTH EVERY DAY 12/6/21   Chico Salinas MD   metFORMIN (GLUCOPHAGE) 1,000 mg tablet TAKE 1 TABLET BY MOUTH TWICE A DAY WITH MEALS 11/28/21   Chico Salinas MD   Jardiance 10 mg tablet TAKE 1 TAB BY MOUTH DAILY. FOR DIABETES 10/18/21   Chico Salinas MD   amiodarone (CORDARONE) 200 mg tablet Take 0.5 Tablets by mouth daily. Lowered 09/21/21 9/21/21   Rylee Melendez ANP   gemfibroziL (LOPID) 600 mg tablet Take 1 Tablet by mouth two (2) times a day. 9/17/21   Chico Salinas MD   Eliquis 5 mg tablet TAKE 1 TABLET BY MOUTH TWICE A DAY 9/3/21   Bhupendra Chinchilla MD   semaglutide (Ozempic) 1 mg/dose (2 mg/1.5 mL) sub-q pen 1 MG BY SUBCUTANEOUS ROUTE EVERY SEVEN (7) DAYS. 7/12/21   Chico Salinas MD   nitroglycerin (NITROSTAT) 0.4 mg SL tablet 1 Tablet by SubLINGual route every five (5) minutes as needed for Chest Pain (call 911 if CP/ SOB not relieved by 3 tabs). 7/12/21   Chico Salinas MD   furosemide (LASIX) 40 mg tablet TAKE 1-2 TABLET BY MOUTH DAILY AS NEEDED FOR FLUID 7/6/21   Chico Salinas MD   glipiZIDE (GLUCOTROL) 10 mg tablet TAKE 1 TABLET BY MOUTH TWICE A DAY 6/28/21   Chico Salinas MD   senna-docusate (PERICOLACE) 8.6-50 mg per tablet Take 1 Tab by mouth two (2) times daily as needed for Constipation. 5/11/21   Alka Witt NP   rosuvastatin (CRESTOR) 20 mg tablet TAKE 1 TABLET BY MOUTH EVERY DAY FOR CHOLESTEROL 4/16/21   Chico Salinas MD   ACETAMINOPHEN PO Take 500 mg by mouth as needed.  1-2 tabs prn as needed    Provider, Felix   ibuprofen (MOTRIN) 800 mg tablet Take 1 Tab by mouth every eight (8) hours as needed for Pain. 1/18/21   Bridgette Bowie MD   aspirin delayed-release 81 mg tablet Take  by mouth daily. Provider, Historical   omega-3 fatty acids-vitamin e (FISH OIL) 1,000 mg Cap Take 2 Caps by mouth two (2) times a day. 7/13/11   Bridgette Bowie MD     a  Allergies   Allergen Reactions    Lisinopril Cough    Losartan Other (comments)     Hypotension          REVIEW OF SYSTEMS:     Total of 12 systems reviewed as follows:   I am not able to complete the review of systems because:    The patient is intubated and sedated    The patient has altered mental status due to his acute medical problems    The patient has baseline aphasia from prior stroke(s)    The patient has baseline dementia and is not reliable historian                 POSITIVE= underlined text  Negative = text not underlined  General:  fever, chills, sweats, generalized weakness, weight loss/gain,      loss of appetite   Eyes:    blurred vision, eye pain, loss of vision, double vision  ENT:    rhinorrhea, pharyngitis   Respiratory:   cough, sputum production, SOB, wheezing, KRAMER, pleuritic pain   Cardiology:   chest pain, palpitations, orthopnea, PND, edema, syncope   Gastrointestinal:  abdominal pain , N/V, dysphagia, diarrhea, constipation, bleeding   Genitourinary:  frequency, urgency, dysuria, hematuria, incontinence   Muskuloskeletal :  arthralgia, myalgia   Hematology:  easy bruising, nose or gum bleeding, lymphadenopathy   Dermatological: rash, ulceration, pruritis   Endocrine:   hot flashes or polydipsia   Neurological:  headache, dizziness, confusion, focal weakness, paresthesia,     Speech difficulties, memory loss, gait disturbance  Psychological: Feelings of anxiety, depression, agitation    Objective:     Visit Vitals  /61 (BP 1 Location: Left upper arm, BP Patient Position: At rest)   Pulse 73   Temp 98.9 °F (37.2 °C)   Resp 16   Ht 6' (1.829 m)   Wt 136 kg (299 lb 13.2 oz)   SpO2 97%   BMI 40.66 kg/m²     Temp (24hrs), Av.4 °F (36.9 °C), Min:97.9 °F (36.6 °C), Max:98.9 °F (37.2 °C)       Lines:  Peripheral line    PHYSICAL EXAM:   General:    Alert, cooperative, no distress, appears stated age. HEENT: Atraumatic, anicteric sclerae, pink conjunctivae     No oral ulcers, mucosa moist, throat clear  Neck:  Supple, symmetrical  Lungs:   Clear to auscultation bilaterally. No Wheezing or Rhonchi. No rales. Chest wall:  No tenderness  No Accessory muscle use. Heart:   Regular  rhythm,  No  murmur   No edema  Abdomen:   Soft, non-tender. Not distended. Bowel sounds normal  Extremities: No cyanosis. No clubbing  Skin:     Not pale. Not Jaundiced      RLE; leg brace in place, dressing dry and intact, hemovac in place    Left mediolateral lower leg; ruptured bulla, doesn't appear infected    Psych:  Good insight. Not depressed. Not anxious or agitated. Neurologic: EOMs intact. No facial asymmetry. No aphasia or slurred speech. Alert and oriented X 4. Data Review:     CBC:  Recent Labs     22  0028 22  0723   WBC 10.8 7.7   GRANS 80* 78*   MONOS 11 12   EOS 0 0   ANEU 8.7* 6.0   ABL 0.8 0.7*   HGB 8.7* 9.6*   HCT 29.2* 30.8*    227       BMP:  Recent Labs     22  0028 22  0723 22  1706   CREA 1.18 0.96 1.23   BUN 26* 20 24*   * 137 138   K 3.8 3.6 3.5    110* 109*   CO2 16* 20* 22   AGAP 11 7 7   * 136* 132*       LFTS:  Recent Labs     22  0723 22  1706   TBILI 0.5 0.5   ALT 12 13   AP 55 56   TP 6.6 6.7   ALB 2.3* 2.5*       Microbiology:     All Micro Results     Procedure Component Value Units Date/Time    CULTURE, Allen Oma STAIN [[id number]] Collected: 22 1300    Order Status: Completed Specimen: Misc.  Wound Updated: 22     Special Requests: RIGHT KNEE WOUND     GRAM STAIN FEW WBCS SEEN               OCCASIONAL GRAM POSITIVE COCCI IN PAIRS           Culture result: PENDING    CULTURE, ANAEROBIC [[id number]] Collected: 22 1300    Order Status: Completed Updated: 01/03/22 1804    CULTURE, BODY FLUID Nikole Hacking STAIN [[id number]]  (Abnormal) Collected: 01/02/22 2025    Order Status: Completed Specimen: Body Fluid from Synovial Fluid Updated: 01/03/22 1441     Special Requests: NO SPECIAL REQUESTS        GRAM STAIN 4+ WBCS SEEN               OCCASIONAL GRAM POSITIVE COCCI            (NOTE) PRELIMINARY REPORT OF 4+ WBCS, OCCASIONAL GRAM POSITIVE COCCI. CALLED TO AND READ BACK BY Merlin Ferrer RN ON 1/2/22 AT 2338. MS     Culture result:       MODERATE STREPTOCOCCI, BETA HEMOLYTIC GROUP B          COVID-19 RAPID TEST [[id number]] Collected: 01/02/22 2138    Order Status: Completed Specimen: Nasopharyngeal Updated: 01/02/22 2202     Specimen source Nasopharyngeal        COVID-19 rapid test Not detected        Comment: Rapid Abbott ID Now       Rapid NAAT:  The specimen is NEGATIVE for SARS-CoV-2, the novel coronavirus associated with COVID-19. Negative results should be treated as presumptive and, if inconsistent with clinical signs and symptoms or necessary for patient management, should be tested with an alternative molecular assay. Negative results do not preclude SARS-CoV-2 infection and should not be used as the sole basis for patient management decisions. This test has been authorized by the FDA under an Emergency Use Authorization (EUA) for use by authorized laboratories.    Fact sheet for Healthcare Providers: ConventionUpdate.co.nz  Fact sheet for Patients: ConventionUpdate.co.nz       Methodology: Isothermal Nucleic Acid Amplification                 Signed By: Jose Modi NP     January 4, 2022

## 2022-01-04 NOTE — ANESTHESIA POSTPROCEDURE EVALUATION
Procedure(s):  RIGHT KNEE POLY EXCHANGE ; HARDWARE REMOVAL RIGHT LATERAL LEG; INCISION AND DRAINAGE; WOUND IRRIGATION; PLACEMENT OF ANTIBIOTIC BEADS.    general    Anesthesia Post Evaluation        Patient location during evaluation: PACU  Note status: Adequate. Level of consciousness: responsive to verbal stimuli and sleepy but conscious  Pain management: satisfactory to patient  Airway patency: patent  Anesthetic complications: no  Cardiovascular status: acceptable  Respiratory status: acceptable  Hydration status: acceptable  Comments: +Post-Anesthesia Evaluation and Assessment    Patient: Rosaura Drake MRN: [id number]  SSN: [id number]   YOB: 1960  Age: 59 y.o. Sex: male          Cardiovascular Function/Vital Signs    /65 (BP 1 Location: Right upper arm, BP Patient Position: Sitting)   Pulse 76   Temp 37.4 °C (99.4 °F)   Resp 16   Ht 6' (1.829 m)   Wt 136 kg (299 lb 13.2 oz)   SpO2 98%   BMI 40.66 kg/m²     Patient is status post Procedure(s):  RIGHT KNEE POLY EXCHANGE ; HARDWARE REMOVAL RIGHT LATERAL LEG; INCISION AND DRAINAGE; WOUND IRRIGATION; PLACEMENT OF ANTIBIOTIC BEADS. Nausea/Vomiting: Controlled. Postoperative hydration reviewed and adequate. Pain:  Pain Scale 1: Numeric (0 - 10) (01/04/22 0837)  Pain Intensity 1: 8 (01/04/22 0837)   Managed. Neurological Status:   Neuro (WDL): Within Defined Limits (01/03/22 1600)   At baseline. Mental Status and Level of Consciousness: Arousable. Pulmonary Status:   O2 Device: None (Room air) (01/03/22 2024)   Adequate oxygenation and airway patent. Complications related to anesthesia: None    Post-anesthesia assessment completed. No concerns. I have evaluated the patient and the patient is stable and ready to be discharged from PACU .     Signed By: Re Flores MD    1/4/2022        INITIAL Post-op Vital signs:   Vitals Value Taken Time   /54 01/03/22 1645   Temp 36.8 °C (98.3 °F) 01/03/22 1645   Pulse 70 01/03/22 1645   Resp 15 01/03/22 1645   SpO2 98 % 01/03/22 1642

## 2022-01-04 NOTE — PROGRESS NOTES
Ortho Daily Progress Note      Patient: Shelli Guzman                   MRN: [id number]  Sex: male  YOB: 1960           Age: 59 y.o.     1 Day Post-Op     Procedure(s):  RIGHT KNEE POLY EXCHANGE ; HARDWARE REMOVAL RIGHT LATERAL LEG; INCISION AND DRAINAGE; WOUND IRRIGATION; PLACEMENT OF ANTIBIOTIC BEADS    Subjective:    -Pain:  7/10, has not had any narcotic pain meds since last night  -Had chills last night but not today. Patient to start working with PT today. -Reporting he normally has swelling in his bilateral lower extremities  -Afebrile, vital stable  -Denies fever, chills, SOB, CP, cough, dysuria, urinary frequency but reports having a chronic shallow ulceration on his left medial calf that he had been putting neosporin on \"for quite some time\". Visit Vitals  /61 (BP 1 Location: Left upper arm, BP Patient Position: At rest)   Pulse 73   Temp 98.9 °F (37.2 °C)   Resp 16   Ht 6' (1.829 m)   Wt 136 kg (299 lb 13.2 oz)   SpO2 97%   BMI 40.66 kg/m²        Recent Labs     01/04/22  0028 01/03/22  0723 01/03/22  0723 01/02/22  1706 05/11/21  0418 05/11/21  0418 05/10/21  1355 05/10/21  1355 05/04/21  1322 05/04/21  1322   HGB 8.7*   < > 9.6*  --    < > 12.4   < > 11.8*   < > 11.1*   CREA 1.18   < > 0.96 1.23   < > 1.11   < > 0.91   < > 0.94   BUN 26*  --  20 24*  --  28*  --  18  --  19    < > = values in this interval not displayed. Physical Exam:    GENERAL: alert, no acute distress, pale  NEURO:  Sensation grossly intact along sural, common fibular, and saphenous nerve distributions. VASCULAR: Trace DP pulses bilat. Extremity warm. moderate edema of BLE. DRESSING:  Dressing in place no breakthru drainage, ACE wrap and knee immobilizer over this. INCISIONS: Exam deferred until tomorrow  Left calf WOUND: Shallow epidermal erosion on the medial left calf with small amount of old serous drainage. MSK:  5/5 DF/PF.   Right knee flexion deferred due to patellar tendon repair. DVT EXAM: No posterior calf tenderness, tightness, erythema, palpable cords, or pain upon active dorsi/plantar flexion of the foot. Plan:    DVT ppx: Eliquis 2.5mg BID today. Will restart his PTA 5mg BID starting tomorrow. Activity: WBAT in knee immobilizer with knee in full extension due to partial patellar tendon repair. Pain Control: Having significant pain. Patient to get oxycodone this morning. Left calf wound:  Wound care consulted. Incisional Dressings: Change daily with ABD pads starting PO day #2. Hemovac: 115mL of sanguinous drainage overnight. Keep hemovac charged and empty Q shift. Hgb:  Acute blood loss anemia: expected post-op finding, pt stable, repeat labs in AM  DM: Managed per hospitalist    Right knee periprosthetic infection: PO day #1 following Irrigation and debridement, right knee, with exchange of polyethylene insert, removal of lateral femoral plate through a separate incision with placement of antibiotic-impregnated beads. Currently on vancomycin and ceftriaxone; ID consulted. Intra-op cultures taken 1/3/21 pending. Cultures from joint arthrocentesis 1/2/21 are growing beta hemolytic group B strep.     Discharge: Pending     Lissa Theodore [place]  1/4/2022   8:19 AM

## 2022-01-05 LAB
ANION GAP SERPL CALC-SCNC: 12 MMOL/L (ref 5–15)
BACTERIA SPEC CULT: ABNORMAL
BACTERIA SPEC CULT: NORMAL
BASOPHILS # BLD: 0 K/UL (ref 0–0.1)
BASOPHILS NFR BLD: 0 % (ref 0–1)
BUN SERPL-MCNC: 24 MG/DL (ref 6–20)
BUN/CREAT SERPL: 29 (ref 12–20)
CALCIUM SERPL-MCNC: 8.4 MG/DL (ref 8.5–10.1)
CHLORIDE SERPL-SCNC: 107 MMOL/L (ref 97–108)
CO2 SERPL-SCNC: 17 MMOL/L (ref 21–32)
CREAT SERPL-MCNC: 0.84 MG/DL (ref 0.7–1.3)
DIFFERENTIAL METHOD BLD: ABNORMAL
EOSINOPHIL # BLD: 0.1 K/UL (ref 0–0.4)
EOSINOPHIL NFR BLD: 2 % (ref 0–7)
ERYTHROCYTE [DISTWIDTH] IN BLOOD BY AUTOMATED COUNT: 15.5 % (ref 11.5–14.5)
GLUCOSE BLD STRIP.AUTO-MCNC: 148 MG/DL (ref 65–117)
GLUCOSE BLD STRIP.AUTO-MCNC: 150 MG/DL (ref 65–117)
GLUCOSE BLD STRIP.AUTO-MCNC: 151 MG/DL (ref 65–117)
GLUCOSE BLD STRIP.AUTO-MCNC: 170 MG/DL (ref 65–117)
GLUCOSE SERPL-MCNC: 136 MG/DL (ref 65–100)
GRAM STN SPEC: ABNORMAL
HCT VFR BLD AUTO: 25.6 % (ref 36.6–50.3)
HGB BLD-MCNC: 7.9 G/DL (ref 12.1–17)
IMM GRANULOCYTES # BLD AUTO: 0.1 K/UL (ref 0–0.04)
IMM GRANULOCYTES NFR BLD AUTO: 1 % (ref 0–0.5)
LYMPHOCYTES # BLD: 0.9 K/UL (ref 0.8–3.5)
LYMPHOCYTES NFR BLD: 12 % (ref 12–49)
MCH RBC QN AUTO: 25.2 PG (ref 26–34)
MCHC RBC AUTO-ENTMCNC: 30.9 G/DL (ref 30–36.5)
MCV RBC AUTO: 81.5 FL (ref 80–99)
MONOCYTES # BLD: 0.9 K/UL (ref 0–1)
MONOCYTES NFR BLD: 12 % (ref 5–13)
NEUTS SEG # BLD: 5.6 K/UL (ref 1.8–8)
NEUTS SEG NFR BLD: 73 % (ref 32–75)
NRBC # BLD: 0 K/UL (ref 0–0.01)
NRBC BLD-RTO: 0 PER 100 WBC
PLATELET # BLD AUTO: 250 K/UL (ref 150–400)
PMV BLD AUTO: 10 FL (ref 8.9–12.9)
POTASSIUM SERPL-SCNC: 3.8 MMOL/L (ref 3.5–5.1)
RBC # BLD AUTO: 3.14 M/UL (ref 4.1–5.7)
SERVICE CMNT-IMP: ABNORMAL
SERVICE CMNT-IMP: NORMAL
SODIUM SERPL-SCNC: 136 MMOL/L (ref 136–145)
VANCOMYCIN SERPL-MCNC: 16.3 UG/ML
WBC # BLD AUTO: 7.6 K/UL (ref 4.1–11.1)

## 2022-01-05 PROCEDURE — 82962 GLUCOSE BLOOD TEST: CPT

## 2022-01-05 PROCEDURE — 97530 THERAPEUTIC ACTIVITIES: CPT

## 2022-01-05 PROCEDURE — 74011000250 HC RX REV CODE- 250: Performed by: PHYSICIAN ASSISTANT

## 2022-01-05 PROCEDURE — 85025 COMPLETE CBC W/AUTO DIFF WBC: CPT

## 2022-01-05 PROCEDURE — 74011000250 HC RX REV CODE- 250: Performed by: HOSPITALIST

## 2022-01-05 PROCEDURE — 74011250636 HC RX REV CODE- 250/636: Performed by: PHYSICIAN ASSISTANT

## 2022-01-05 PROCEDURE — 65660000000 HC RM CCU STEPDOWN

## 2022-01-05 PROCEDURE — 36415 COLL VENOUS BLD VENIPUNCTURE: CPT

## 2022-01-05 PROCEDURE — 97116 GAIT TRAINING THERAPY: CPT

## 2022-01-05 PROCEDURE — 74011250636 HC RX REV CODE- 250/636: Performed by: HOSPITALIST

## 2022-01-05 PROCEDURE — 74011250637 HC RX REV CODE- 250/637: Performed by: PHYSICIAN ASSISTANT

## 2022-01-05 PROCEDURE — 80048 BASIC METABOLIC PNL TOTAL CA: CPT

## 2022-01-05 PROCEDURE — 80202 ASSAY OF VANCOMYCIN: CPT

## 2022-01-05 RX ADMIN — METOPROLOL TARTRATE 25 MG: 25 TABLET, FILM COATED ORAL at 17:29

## 2022-01-05 RX ADMIN — GEMFIBROZIL 600 MG: 600 TABLET ORAL at 17:29

## 2022-01-05 RX ADMIN — ACETAMINOPHEN 650 MG: 325 TABLET ORAL at 12:25

## 2022-01-05 RX ADMIN — Medication 10 ML: at 06:00

## 2022-01-05 RX ADMIN — APIXABAN 5 MG: 5 TABLET, FILM COATED ORAL at 20:27

## 2022-01-05 RX ADMIN — POLYETHYLENE GLYCOL 3350 17 G: 17 POWDER, FOR SOLUTION ORAL at 09:13

## 2022-01-05 RX ADMIN — OXYCODONE 10 MG: 5 TABLET ORAL at 07:40

## 2022-01-05 RX ADMIN — AMIODARONE HYDROCHLORIDE 100 MG: 200 TABLET ORAL at 09:12

## 2022-01-05 RX ADMIN — APIXABAN 5 MG: 5 TABLET, FILM COATED ORAL at 09:13

## 2022-01-05 RX ADMIN — DOCUSATE SODIUM 50 MG AND SENNOSIDES 8.6 MG 1 TABLET: 8.6; 5 TABLET, FILM COATED ORAL at 09:12

## 2022-01-05 RX ADMIN — SODIUM CHLORIDE, PRESERVATIVE FREE 10 ML: 5 INJECTION INTRAVENOUS at 01:08

## 2022-01-05 RX ADMIN — GLIPIZIDE 10 MG: 5 TABLET ORAL at 07:31

## 2022-01-05 RX ADMIN — SODIUM CHLORIDE, PRESERVATIVE FREE 10 ML: 5 INJECTION INTRAVENOUS at 22:42

## 2022-01-05 RX ADMIN — DOCUSATE SODIUM 50 MG AND SENNOSIDES 8.6 MG 1 TABLET: 8.6; 5 TABLET, FILM COATED ORAL at 17:29

## 2022-01-05 RX ADMIN — ACETAMINOPHEN 650 MG: 325 TABLET ORAL at 17:28

## 2022-01-05 RX ADMIN — ASPIRIN 81 MG: 81 TABLET, COATED ORAL at 09:12

## 2022-01-05 RX ADMIN — OXYCODONE 10 MG: 5 TABLET ORAL at 22:38

## 2022-01-05 RX ADMIN — ACETAMINOPHEN 650 MG: 325 TABLET ORAL at 07:31

## 2022-01-05 RX ADMIN — SODIUM CHLORIDE, PRESERVATIVE FREE 2 G: 5 INJECTION INTRAVENOUS at 07:31

## 2022-01-05 RX ADMIN — METOPROLOL TARTRATE 25 MG: 25 TABLET, FILM COATED ORAL at 09:12

## 2022-01-05 RX ADMIN — OXYCODONE 10 MG: 5 TABLET ORAL at 12:25

## 2022-01-05 RX ADMIN — SODIUM CHLORIDE, PRESERVATIVE FREE 10 ML: 5 INJECTION INTRAVENOUS at 06:00

## 2022-01-05 RX ADMIN — VANCOMYCIN HYDROCHLORIDE 1250 MG: 1.25 INJECTION, POWDER, LYOPHILIZED, FOR SOLUTION INTRAVENOUS at 00:59

## 2022-01-05 RX ADMIN — GLIPIZIDE 10 MG: 5 TABLET ORAL at 17:29

## 2022-01-05 RX ADMIN — FUROSEMIDE 40 MG: 40 TABLET ORAL at 09:13

## 2022-01-05 RX ADMIN — GEMFIBROZIL 600 MG: 600 TABLET ORAL at 09:13

## 2022-01-05 RX ADMIN — ROSUVASTATIN 20 MG: 10 TABLET, FILM COATED ORAL at 09:12

## 2022-01-05 RX ADMIN — ACETAMINOPHEN 650 MG: 325 TABLET ORAL at 00:59

## 2022-01-05 NOTE — PROGRESS NOTES
[address] [facility] Adult  Hospitalist Group                                                                                          Hospitalist Progress Note  Heather Almanza MD  Answering service: [phone] from in house phone        Date of Service:  2022  NAME:  Kaleigh Monae  :  1960  MRN:  [id number]    This documentation was facilitated by a Voice Recognition software and may contain inadvertent typographical errors. Admission Summary:   Patient presented with right knee pain is admitted with a diagnosis of acute septic arthritis    Interval history / Subjective:        No complaints. Discussed about plans for PICC line for home antibiotics and discharge home tomorrow. Assessment & Plan:   Right knee septic arthritis, infected total knee  --Status post right knee polyexchange, hardware removal, incision and drainage, wound irrigation and placement of antibiotic beads  --Synovial fluid culture grew moderate streptococci, beta-hemolytic B  --Continue ceftriaxone, vancomycin discontinued. --ID on board, recommending 6 weeks of IV therapy. Type II DM  --Accu-Cheks plus Humalog sliding scale  --Continued on glipizide. CAD. Paroxysmal atrial fibrillation, history of sinus pauses status post pacemaker  --Continue amiodarone, aspirin, apixaban, metoprolol    Hypertension, stable: Continue home medications  Hyperlipidemia, stable: Continue statin      Plan  --PICC line in a.m. and discharge home with home IV antibiotics. Home IV antibiotic orders in place        Code status: Full code  DVT prophylaxis: Apixaban  Care Plan discussed with: Patient/Family and Nurse  Anticipated Disposition: Home with home health.   Anticipated Discharge: Greater than 48 hours  Hospital Problems  Date Reviewed: 1/3/2022          Codes Class Noted POA    * (Principal) Staphylococcal arthritis of right knee ([facility] [address] ICD-10-CM: M00.061  ICD-9-CM: 711.06, 041.10  1/3/2022 Yes        Cellulitis ICD-10-CM: L03.90  ICD-9-CM: 682.9  1/2/2022 Unknown                Review of Systems:   A comprehensive review of systems was negative except for that written in the HPI. Vital Signs:    Last 24hrs VS reviewed since prior progress note. Most recent are:  Visit Vitals  /65   Pulse 76   Temp 98 °F (36.7 °C)   Resp 18   Ht 6' (1.829 m)   Wt 136 kg (299 lb 13.2 oz)   SpO2 99%   BMI 40.66 kg/m²         Intake/Output Summary (Last 24 hours) at 1/5/2022 1848  Last data filed at 1/5/2022 0744  Gross per 24 hour   Intake    Output 815 ml   Net -815 ml        Physical Examination:     I had a face to face encounter with this patient and independently examined them on1/5/2022 as outlined below:        Constitutional:  No acute distress, cooperative, pleasant    HEENT:  Atraumatic. Oral mucosa moist,. Non icteric sclera. No pallor. Resp:  CTA bilaterally. No wheezing/rhonchi/rales. No accessory muscle use   Chest Wall: No deformity   CV:  Regular rhythm, normal rate, no murmurs, gallops, rubs    GI:  Soft, non distended, non tender.  normoactive bowel sounds, no hepatosplenomegaly    :  No CVA or suprapubic tenderness    Musculoskeletal:  Right knee Ace bandage and immobilized    Neurologic:  Mental status:AAOx3,   Cranial nerves II-XII : WNL  Motor exam:Moves all extremities symmetrically             Data Review:    Review and/or order of clinical lab test  Review and/or order of tests in the radiology section of CPT  Review and/or order of tests in the medicine section of CPT      Labs:     Recent Labs     01/05/22  0703 01/04/22 0028   WBC 7.6 10.8   HGB 7.9* 8.7*   HCT 25.6* 29.2*    253     Recent Labs     01/05/22  0703 01/04/22 0028 01/03/22 0723    134* 137   K 3.8 3.8 3.6    107 110*   CO2 17* 16* 20*   BUN 24* 26* 20   CREA 0.84 1.18 0.96   * 146* 136*   CA 8.4* 8.2* 8.8   MG  --   --  2.2   PHOS  --   --  3.2     Recent Labs     01/03/22  0723   ALT 12   AP 55   TBILI 0.5 TP 6.6   ALB 2.3*   GLOB 4.3*     No results for input(s): INR, PTP, APTT, INREXT, INREXT in the last 72 hours. No results for input(s): FE, TIBC, PSAT, FERR in the last 72 hours. No results found for: FOL, RBCF   No results for input(s): PH, PCO2, PO2 in the last 72 hours. No results for input(s): CPK, CKNDX, TROIQ in the last 72 hours.     No lab exists for component: CPKMB  Lab Results   Component Value Date/Time    Cholesterol, total 130 07/12/2021 10:21 AM    HDL Cholesterol 37 07/12/2021 10:21 AM    LDL, calculated 70 07/12/2021 10:21 AM    Triglyceride 115 07/12/2021 10:21 AM    CHOL/HDL Ratio 3.5 07/12/2021 10:21 AM     Lab Results   Component Value Date/Time    Glucose (POC) 148 (H) 01/05/2022 05:01 PM    Glucose (POC) 151 (H) 01/05/2022 11:22 AM    Glucose (POC) 150 (H) 01/05/2022 06:53 AM    Glucose (POC) 156 (H) 01/04/2022 10:07 PM    Glucose (POC) 176 (H) 01/04/2022 09:12 PM     Lab Results   Component Value Date/Time    Color YELLOW/STRAW 05/04/2021 01:22 PM    Appearance CLEAR 05/04/2021 01:22 PM    Specific gravity 1.024 05/04/2021 01:22 PM    pH (UA) 5.5 05/04/2021 01:22 PM    Protein 100 (A) 05/04/2021 01:22 PM    Glucose >1,000 (A) 05/04/2021 01:22 PM    Ketone Negative 05/04/2021 01:22 PM    Bilirubin Negative 05/04/2021 01:22 PM    Urobilinogen 0.2 05/04/2021 01:22 PM    Nitrites Negative 05/04/2021 01:22 PM    Leukocyte Esterase Negative 05/04/2021 01:22 PM    Epithelial cells FEW 05/04/2021 01:22 PM    Bacteria Negative 05/04/2021 01:22 PM    WBC 0-4 05/04/2021 01:22 PM    RBC 0-5 05/04/2021 01:22 PM         Medications Reviewed:     Current Facility-Administered Medications   Medication Dose Route Frequency    apixaban (ELIQUIS) tablet 5 mg  5 mg Oral Q12H    sodium chloride (NS) flush 5-40 mL  5-40 mL IntraVENous Q8H    sodium chloride (NS) flush 5-40 mL  5-40 mL IntraVENous PRN    acetaminophen (TYLENOL) tablet 650 mg  650 mg Oral Q6H    oxyCODONE IR (ROXICODONE) tablet 5 mg  5 mg Oral Q3H PRN    oxyCODONE IR (ROXICODONE) tablet 10 mg  10 mg Oral Q3H PRN    naloxone (NARCAN) injection 0.4 mg  0.4 mg IntraVENous PRN    hydrOXYzine HCL (ATARAX) tablet 10 mg  10 mg Oral Q8H PRN    famotidine (PEPCID) tablet 20 mg  20 mg Oral BID PRN    senna-docusate (PERICOLACE) 8.6-50 mg per tablet 1 Tablet  1 Tablet Oral BID    polyethylene glycol (MIRALAX) packet 17 g  17 g Oral DAILY    bisacodyL (DULCOLAX) suppository 10 mg  10 mg Rectal DAILY PRN    cefTRIAXone (ROCEPHIN) 2 g in sodium chloride (NS) 20 mL IV syringe  2 g IntraVENous Q24H    sodium chloride (NS) flush 5-40 mL  5-40 mL IntraVENous Q8H    sodium chloride (NS) flush 5-40 mL  5-40 mL IntraVENous PRN    acetaminophen (TYLENOL) tablet 650 mg  650 mg Oral Q6H PRN    Or    acetaminophen (TYLENOL) suppository 650 mg  650 mg Rectal Q6H PRN    polyethylene glycol (MIRALAX) packet 17 g  17 g Oral DAILY PRN    ondansetron (ZOFRAN ODT) tablet 4 mg  4 mg Oral Q8H PRN    Or    ondansetron (ZOFRAN) injection 4 mg  4 mg IntraVENous Q6H PRN    amiodarone (CORDARONE) tablet 100 mg  100 mg Oral DAILY    aspirin delayed-release tablet 81 mg  81 mg Oral DAILY    furosemide (LASIX) tablet 40 mg  40 mg Oral DAILY    gemfibroziL (LOPID) tablet 600 mg  600 mg Oral BID    glipiZIDE (GLUCOTROL) tablet 10 mg  10 mg Oral ACB&D    metoprolol tartrate (LOPRESSOR) tablet 25 mg  25 mg Oral BID    rosuvastatin (CRESTOR) tablet 20 mg  20 mg Oral DAILY    senna-docusate (PERICOLACE) 8.6-50 mg per tablet 1 Tablet  1 Tablet Oral BID PRN    glucose chewable tablet 16 g  4 Tablet Oral PRN    dextrose (D50W) injection syrg 12.5-25 g  25-50 mL IntraVENous PRN    glucagon (GLUCAGEN) injection 1 mg  1 mg IntraMUSCular PRN    insulin lispro (HUMALOG) injection   SubCUTAneous AC&HS     ______________________________________________________________________  EXPECTED LENGTH OF STAY: 5d 4h  ACTUAL LENGTH OF STAY:          3                 Homar Tomlin MD

## 2022-01-05 NOTE — PROGRESS NOTES
Problem: Mobility Impaired (Adult and Pediatric)  Goal: *Acute Goals and Plan of Care (Insert Text)  Description: FUNCTIONAL STATUS PRIOR TO ADMISSION: Patient was independent and active without use of DME. Pt reports he used crutches for a few days PTA due to R knee pain. HOME SUPPORT PRIOR TO ADMISSION: The patient lived with wife but did not require assist.    Physical Therapy Goals  Initiated 1/4/2022  1. Patient will move from supine to sit and sit to supine  in bed with independence within 7 day(s). 2.  Patient will transfer from bed to chair and chair to bed with modified independence using the least restrictive device within 7 day(s). 3.  Patient will perform sit to stand with modified independence within 7 day(s). 4.  Patient will ambulate with modified independence for 150 feet with the least restrictive device within 7 day(s). 5.  Patient will ascend/descend full flight of stairs with 1 handrail(s) and 1 crutch with modified independence within 7 day(s). Outcome: Progressing Towards Goal     PHYSICAL THERAPY TREATMENT  Patient: Erika Agee (26 y.o. male)  Date: 1/5/2022  Diagnosis: Cellulitis [L03.90] Staphylococcal arthritis of right knee (HCC)  Procedure(s) (LRB):  RIGHT KNEE POLY EXCHANGE ; HARDWARE REMOVAL RIGHT LATERAL LEG; INCISION AND DRAINAGE; WOUND IRRIGATION; PLACEMENT OF ANTIBIOTIC BEADS (Right) 2 Days Post-Op  Precautions: WBAT (knee immobilizer, keep knee in full extension)  Chart, physical therapy assessment, plan of care and goals were reviewed. ASSESSMENT  Patient continues with skilled PT services and is progressing towards goals. Medicated prior to session and agreeable to mobilize. Challenged with RLE management for bed mobility and transfers requiring moderate assist for supine to sit and 2 attempts for standing from bedside. Gait  training completed x60' with a antalgic gait pattern and generally flexed posture.  Returned to a bedside chair and encouraged exercises while resting to improve strength. Will continue to progress towards discharge home with family and HHPT. Current Level of Function Impacting Discharge (mobility/balance): moderate assist supine to sit             PLAN :  Patient continues to benefit from skilled intervention to address the above impairments. Continue treatment per established plan of care. to address goals. Recommendation for discharge: (in order for the patient to meet his/her long term goals)  Physical therapy at least 2 days/week in the home AND ensure assist and/or supervision for safety with transerx    This discharge recommendation:  Has been made in collaboration with the attending provider and/or case management    IF patient discharges home will need the following DME: to be determined (TBD)       SUBJECTIVE:   Patient stated I'm sweaty now. That was work.     OBJECTIVE DATA SUMMARY:   Critical Behavior:  Neurologic State: Alert  Orientation Level: Oriented X4  Cognition: Appropriate decision making,Appropriate for age attention/concentration,Appropriate safety awareness  Safety/Judgement: Awareness of environment  Functional Mobility Training:  Bed Mobility:     Supine to Sit: Moderate assistance (heavy assist for RLE management)     Scooting: Minimum assistance (with RLE support)        Transfers:  Sit to Stand: Moderate assistance; Additional time (from EOB)  Stand to Sit: Minimum assistance                             Balance:  Sitting: Intact  Standing: Impaired; With support  Standing - Static: Good;Constant support  Standing - Dynamic : Good;Constant support  Ambulation/Gait Training:  Distance (ft): 60 Feet (ft)  Assistive Device: Gait belt;Walker, rolling  Ambulation - Level of Assistance: Contact guard assistance        Gait Abnormalities: Antalgic (flexed posture)        Base of Support: Widened  Stance: Right decreased  Speed/Lexi: Slow                       Stairs:               Therapeutic Exercises: Encouraged ankle pumps and quad sets while RLE was supported in immobilizer  Pain Ratin/10 right knee throughout      Activity Tolerance:   Good    After treatment patient left in no apparent distress:   Sitting in chair and Call bell within reach    COMMUNICATION/COLLABORATION:   The patients plan of care was discussed with: Registered nurse.      Moni Boyd, PT, DPT   Time Calculation: 27 mins

## 2022-01-05 NOTE — PROGRESS NOTES
Transition of Care Plan   RUR- Low 13%    DISPOSITION: The disposition plan is home with family assistance, HH, and Home Infusion   o HH: At [address]; AVS updated  o Home infusion referral sent to:   - [employer];  Checking benefits   - Final ABX order received   F/U with PCP/Specialist     Transport: Spouse           CM: [address]. MSW,   [phone]

## 2022-01-05 NOTE — PROGRESS NOTES
Attempted to see pt for OT. Pt just finished working with Physical Therapy and apologetically declined. Reviewed AE and pt familiar from previous knee surgeries. Will f/u tomorrow. Thanks!

## 2022-01-05 NOTE — PROGRESS NOTES
ID Progress Note  2022    Subjective:      No new issues  Review of Systems:            Symptom Y/N Comments   Symptom Y/N Comments   Fever/Chills  n     Chest Pain  n      Poor Appetite       Edema        Cough       Abdominal Pain        Sputum       Joint Pain  y      SOB/KRAMER  n     Pruritis/Rash        Nausea/vomit  n     Tolerating PT/OT        Diarrhea  n     Tolerating Diet        Constipation  n     Other           Could NOT obtain due to:       Objective:     Vitals:   Visit Vitals  BP (!) 164/66 (BP 1 Location: Left upper arm, BP Patient Position: At rest)   Pulse 75   Temp 98.4 °F (36.9 °C)   Resp 18   Ht 6' (1.829 m)   Wt 136 kg (299 lb 13.2 oz)   SpO2 98%   BMI 40.66 kg/m²        Tmax:  Temp (24hrs), Av.8 °F (37.1 °C), Min:98.4 °F (36.9 °C), Max:99.1 °F (37.3 °C)      PHYSICAL EXAM:  General: WD, WN. Alert, cooperative, no acute distress    EENT:  EOMI. Anicteric sclerae. MMM  Resp:  CTA bilaterally, no wheezing or rales. No accessory muscle use  CV:  Regular  rhythm,  No edema  GI:  Soft, Non distended, Non tender. +Bowel sounds  Neurologic:  Alert and oriented X 3, normal speech,   Psych:   Good insight. Not anxious nor agitated  Skin:  No rashes. No jaundice.  RLE; leg brace and hemovac in place    Labs:   Lab Results   Component Value Date/Time    WBC 7.6 2022 07:03 AM    HGB 7.9 (L) 2022 07:03 AM    HCT 25.6 (L) 2022 07:03 AM    PLATELET 692  07:03 AM    MCV 81.5 2022 07:03 AM     Lab Results   Component Value Date/Time    Sodium 134 (L) 2022 12:28 AM    Potassium 3.8 2022 12:28 AM    Chloride 107 2022 12:28 AM    CO2 16 (L) 2022 12:28 AM    Anion gap 11 2022 12:28 AM    Glucose 146 (H) 2022 12:28 AM    BUN 26 (H) 2022 12:28 AM    Creatinine 1.18 2022 12:28 AM    BUN/Creatinine ratio 22 (H) 2022 12:28 AM    GFR est AA >60 2022 12:28 AM    GFR est non-AA >60 2022 12:28 AM    Calcium 8.2 (L) 01/04/2022 12:28 AM    Bilirubin, total 0.5 01/03/2022 07:23 AM    Alk. phosphatase 55 01/03/2022 07:23 AM    Protein, total 6.6 01/03/2022 07:23 AM    Albumin 2.3 (L) 01/03/2022 07:23 AM    Globulin 4.3 (H) 01/03/2022 07:23 AM    A-G Ratio 0.5 (L) 01/03/2022 07:23 AM    ALT (SGPT) 12 01/03/2022 07:23 AM         Assessment and Plan   Right prosthetic knee infection  S/p I & D of right knee with exchange of polyethylene insert, removal of lateral femoral plate through a seperate incision with placement of antibiotic beads (1/3)  S/p ORIF of right supracondylar femur fracture with synthes plate placement (2/67/5389)  - afebrile, WBC 10.8->7.6    CRP 28 (1/2)    Fluid cx (1/2) beta hemolytic group B strep    Intra-op cx (1/3) beta hemolytic group B strep      continue with IV rocephin, last dose 2/13/2022.    -> pt will need suppressive therapy with PO keflex upon completion of IV ABX. PICC placement has requested 1/5    Discharge IV ABX order in place 1/5       fever work up if temp >= 100.4    Type II DM  - A1C 8.5      Above plan of care discussed and agreed with Dr. Castro Mention     Pt is clear to discharge in ID stand point. Will continue to follow.     Dee Mcbride NP

## 2022-01-05 NOTE — PROGRESS NOTES
Ortho Daily Progress Note      Patient: Kaleigh Monae                   MRN: [id number]  Sex: male  YOB: 1960           Age: 59 y.o.     2 Days Post-Op     Procedure(s):  RIGHT KNEE POLY EXCHANGE ; HARDWARE REMOVAL RIGHT LATERAL LEG; INCISION AND DRAINAGE; WOUND IRRIGATION; PLACEMENT OF ANTIBIOTIC BEADS    Subjective:    -Pain:  Better controlled when the oxycodone was started. Had some pain after PT yesterday  -Patient ambulating with PT  -Patient tolerating PO diet, no nausea. -Patient voiding appropriately and w/ flatus but notes feeling a bowel movement coming.  -Vitals stable, afebrile. No fever/chills. Visit Vitals  BP (!) 164/66 (BP 1 Location: Left upper arm, BP Patient Position: At rest)   Pulse 75   Temp 98.4 °F (36.9 °C)   Resp 18   Ht 6' (1.829 m)   Wt 136 kg (299 lb 13.2 oz)   SpO2 98%   BMI 40.66 kg/m²        Recent Labs     01/05/22  0703 01/04/22  0028 01/04/22  0028 01/03/22  0723 01/03/22  0723 01/02/22  1706 05/11/21  0418 05/11/21  0418 05/10/21  1355 05/10/21  1355 05/04/21  1322 05/04/21  1322   HGB 7.9*   < > 8.7*   < > 9.6*  --    < > 12.4   < > 11.8*   < > 11.1*   CREA  --   --  1.18   < > 0.96 1.23   < > 1.11   < > 0.91   < > 0.94   BUN  --   --  26*  --  20 24*  --  28*  --  18  --  19    < > = values in this interval not displayed. Physical Exam:    GENERAL:      alert, no acute distress, pale  NEURO:          Sensation grossly intact along sural, common fibular, and saphenous nerve distributions. VASCULAR:    Trace DP pulses bilat. Extremity warm. moderate edema of BLE. DRESSING:    Old sanguinous drainage. INCISIONS:     Midline knee and lateral thigh incisions well approximated with old sanguinous drainage. No cris-incisional erythema or purulence. Left calf WOUND:       Shallow epidermal erosion on the medial left calf with small amount of old serous drainage. MSK:               5/5 DF/PF.   Right knee flexion deferred due to patellar tendon repair. DVT EXAM:     No posterior calf tenderness, tightness, erythema, palpable cords, or pain upon active dorsi/plantar flexion of the foot. Plan:    DVT ppx: Will restart his PTA 5mg BID starting today. Activity:            WBAT in knee immobilizer with knee in full extension due to partial patellar tendon repair. Pain Control:   Having moderate pain. Patient to get oxycodone this morning. Left calf wound:           Wound care consulted. Incisional Dressings:   Changed today. Change daily with ABD pads starting tomorrow. Hemovac:        90mL of sanguinous drainage yesterday. Keep hemovac charged and empty Q shift. Hgb:                 Awaiting AM labs  DM: Managed per hospitalist     Right knee periprosthetic infection: PO day #2 following Irrigation and debridement, right knee, with exchange of polyethylene insert, removal of lateral femoral plate through a separate incision with placement of antibiotic-impregnated beads. Currently on vancomycin and ceftriaxone; ID following. Intra-op cultures taken 1/3/21 and cultures from joint arthrocentesis on 1/2/21 are growing beta hemolytic group B strep.     Discharge:       Pending PICC placement and home ABX regimen.     John Escobar PA-C  1/5/2022   8:08 AM

## 2022-01-05 NOTE — PROGRESS NOTES
Home IV Antibiotic Orders     1. Diagnosis:  Right prosthetic knee infection (beta hemolytic group B strep)    2. Routine PICC care per protocol     3. Antibiotic:   IV rocephin 2 gm every 24 hours    4. Lab each Monday & Thursdays             CBC/diff/platelets             BMP             CRP (every Mondays)    5. Fax lab to Dr. Matt Cuellar @ [phone]. 6.  Call Dr. Matt Cuellar @ [phone] for WBC <4, PLT <100, and/or Creat > 1.5    7. Duration of therapy: 2/13/2022       Please call Dr. Matt Cuellar @ [phone] before stopping therapy. 8.  Allergies: ACEI, ARB    9. Case management has been instructed to Call [phone] with name of [address] [place] [address] Way       Follow up with Dr. Matt Cuellar in 2-3 weeks    10.  Plan for suppressive therapy with PO keflex upon completion of IV ABX     Dee Castillo NP

## 2022-01-05 NOTE — PROGRESS NOTES
Problem: Falls - Risk of  Goal: *Absence of Falls  Description: Document Sravanthi Yaz Fall Risk and appropriate interventions in the flowsheet.   Outcome: Progressing Towards Goal  Note: Fall Risk Interventions:  Mobility Interventions: Patient to call before getting OOB

## 2022-01-06 VITALS
WEIGHT: 299.83 LBS | DIASTOLIC BLOOD PRESSURE: 71 MMHG | RESPIRATION RATE: 18 BRPM | BODY MASS INDEX: 40.61 KG/M2 | HEART RATE: 70 BPM | HEIGHT: 72 IN | OXYGEN SATURATION: 97 % | TEMPERATURE: 98.9 F | SYSTOLIC BLOOD PRESSURE: 165 MMHG

## 2022-01-06 LAB
ANION GAP SERPL CALC-SCNC: 9 MMOL/L (ref 5–15)
BASOPHILS # BLD: 0 K/UL (ref 0–0.1)
BASOPHILS NFR BLD: 0 % (ref 0–1)
BUN SERPL-MCNC: 17 MG/DL (ref 6–20)
BUN/CREAT SERPL: 25 (ref 12–20)
CALCIUM SERPL-MCNC: 8.4 MG/DL (ref 8.5–10.1)
CHLORIDE SERPL-SCNC: 108 MMOL/L (ref 97–108)
CO2 SERPL-SCNC: 19 MMOL/L (ref 21–32)
CREAT SERPL-MCNC: 0.67 MG/DL (ref 0.7–1.3)
DIFFERENTIAL METHOD BLD: ABNORMAL
EOSINOPHIL # BLD: 0.3 K/UL (ref 0–0.4)
EOSINOPHIL NFR BLD: 4 % (ref 0–7)
ERYTHROCYTE [DISTWIDTH] IN BLOOD BY AUTOMATED COUNT: 15.6 % (ref 11.5–14.5)
GLUCOSE BLD STRIP.AUTO-MCNC: 135 MG/DL (ref 65–117)
GLUCOSE BLD STRIP.AUTO-MCNC: 144 MG/DL (ref 65–117)
GLUCOSE SERPL-MCNC: 123 MG/DL (ref 65–100)
HCT VFR BLD AUTO: 25.3 % (ref 36.6–50.3)
HGB BLD-MCNC: 7.9 G/DL (ref 12.1–17)
IMM GRANULOCYTES # BLD AUTO: 0.1 K/UL (ref 0–0.04)
IMM GRANULOCYTES NFR BLD AUTO: 1 % (ref 0–0.5)
LYMPHOCYTES # BLD: 0.9 K/UL (ref 0.8–3.5)
LYMPHOCYTES NFR BLD: 14 % (ref 12–49)
MCH RBC QN AUTO: 25.4 PG (ref 26–34)
MCHC RBC AUTO-ENTMCNC: 31.2 G/DL (ref 30–36.5)
MCV RBC AUTO: 81.4 FL (ref 80–99)
MONOCYTES # BLD: 0.8 K/UL (ref 0–1)
MONOCYTES NFR BLD: 12 % (ref 5–13)
NEUTS SEG # BLD: 4.8 K/UL (ref 1.8–8)
NEUTS SEG NFR BLD: 69 % (ref 32–75)
NRBC # BLD: 0 K/UL (ref 0–0.01)
NRBC BLD-RTO: 0 PER 100 WBC
PLATELET # BLD AUTO: 300 K/UL (ref 150–400)
PMV BLD AUTO: 9.6 FL (ref 8.9–12.9)
POTASSIUM SERPL-SCNC: 3.7 MMOL/L (ref 3.5–5.1)
RBC # BLD AUTO: 3.11 M/UL (ref 4.1–5.7)
SERVICE CMNT-IMP: ABNORMAL
SERVICE CMNT-IMP: ABNORMAL
SODIUM SERPL-SCNC: 136 MMOL/L (ref 136–145)
WBC # BLD AUTO: 6.9 K/UL (ref 4.1–11.1)

## 2022-01-06 PROCEDURE — 05HY33Z INSERTION OF INFUSION DEVICE INTO UPPER VEIN, PERCUTANEOUS APPROACH: ICD-10-PCS | Performed by: HOSPITALIST

## 2022-01-06 PROCEDURE — C1751 CATH, INF, PER/CENT/MIDLINE: HCPCS

## 2022-01-06 PROCEDURE — 76937 US GUIDE VASCULAR ACCESS: CPT

## 2022-01-06 PROCEDURE — 74011250637 HC RX REV CODE- 250/637: Performed by: PHYSICIAN ASSISTANT

## 2022-01-06 PROCEDURE — 36573 INSJ PICC RS&I 5 YR+: CPT | Performed by: HOSPITALIST

## 2022-01-06 PROCEDURE — 85025 COMPLETE CBC W/AUTO DIFF WBC: CPT

## 2022-01-06 PROCEDURE — 97110 THERAPEUTIC EXERCISES: CPT

## 2022-01-06 PROCEDURE — 36415 COLL VENOUS BLD VENIPUNCTURE: CPT

## 2022-01-06 PROCEDURE — 74011000250 HC RX REV CODE- 250: Performed by: PHYSICIAN ASSISTANT

## 2022-01-06 PROCEDURE — 82962 GLUCOSE BLOOD TEST: CPT

## 2022-01-06 PROCEDURE — 74011250636 HC RX REV CODE- 250/636: Performed by: HOSPITALIST

## 2022-01-06 PROCEDURE — 80048 BASIC METABOLIC PNL TOTAL CA: CPT

## 2022-01-06 PROCEDURE — 97530 THERAPEUTIC ACTIVITIES: CPT

## 2022-01-06 PROCEDURE — 77030020365 HC SOL INJ SOD CL 0.9% 50ML

## 2022-01-06 PROCEDURE — 97116 GAIT TRAINING THERAPY: CPT

## 2022-01-06 PROCEDURE — 97535 SELF CARE MNGMENT TRAINING: CPT

## 2022-01-06 PROCEDURE — 74011000250 HC RX REV CODE- 250: Performed by: HOSPITALIST

## 2022-01-06 RX ORDER — POLYETHYLENE GLYCOL 3350 17 G/17G
17 POWDER, FOR SOLUTION ORAL
Qty: 5 PACKET | Refills: 0 | Status: SHIPPED | OUTPATIENT
Start: 2022-01-06

## 2022-01-06 RX ORDER — OXYCODONE HYDROCHLORIDE 5 MG/1
5-10 TABLET ORAL
Qty: 40 TABLET | Refills: 0 | Status: SHIPPED | OUTPATIENT
Start: 2022-01-06 | End: 2022-01-20

## 2022-01-06 RX ORDER — FAMOTIDINE 20 MG/1
20 TABLET, FILM COATED ORAL
Qty: 20 TABLET | Refills: 0 | Status: SHIPPED | OUTPATIENT
Start: 2022-01-06

## 2022-01-06 RX ADMIN — FUROSEMIDE 40 MG: 40 TABLET ORAL at 09:52

## 2022-01-06 RX ADMIN — GEMFIBROZIL 600 MG: 600 TABLET ORAL at 09:51

## 2022-01-06 RX ADMIN — SODIUM CHLORIDE, PRESERVATIVE FREE 2 G: 5 INJECTION INTRAVENOUS at 06:59

## 2022-01-06 RX ADMIN — Medication 10 ML: at 06:00

## 2022-01-06 RX ADMIN — APIXABAN 5 MG: 5 TABLET, FILM COATED ORAL at 09:52

## 2022-01-06 RX ADMIN — SODIUM CHLORIDE, PRESERVATIVE FREE 10 ML: 5 INJECTION INTRAVENOUS at 07:05

## 2022-01-06 RX ADMIN — POLYETHYLENE GLYCOL 3350 17 G: 17 POWDER, FOR SOLUTION ORAL at 09:52

## 2022-01-06 RX ADMIN — ACETAMINOPHEN 650 MG: 325 TABLET ORAL at 09:52

## 2022-01-06 RX ADMIN — DOCUSATE SODIUM 50 MG AND SENNOSIDES 8.6 MG 1 TABLET: 8.6; 5 TABLET, FILM COATED ORAL at 09:52

## 2022-01-06 RX ADMIN — ASPIRIN 81 MG: 81 TABLET, COATED ORAL at 09:51

## 2022-01-06 RX ADMIN — AMIODARONE HYDROCHLORIDE 100 MG: 200 TABLET ORAL at 09:52

## 2022-01-06 RX ADMIN — ACETAMINOPHEN 650 MG: 325 TABLET ORAL at 04:12

## 2022-01-06 RX ADMIN — METOPROLOL TARTRATE 25 MG: 25 TABLET, FILM COATED ORAL at 09:52

## 2022-01-06 RX ADMIN — ROSUVASTATIN 20 MG: 10 TABLET, FILM COATED ORAL at 09:51

## 2022-01-06 RX ADMIN — GLIPIZIDE 10 MG: 5 TABLET ORAL at 07:00

## 2022-01-06 NOTE — PROGRESS NOTES
Transition of Care Plan   RUR- Low  11%   DISPOSITION: The disposition plan is home with family assistance, HH, and Home Infusion, and DME: Bariatric RW  o HH: At [facility]   - AVS Updated   o Home Infusion; [email or web address]; Teaching to be completed at the hospital   - PICC Report and Orders Uploaded to [employer] [facility]   o DME: Bariatric RW; referral sent to [email or web address];  The pt reported that he is willing to pick the RW up from the site   - Awaiting acceptance     F/U with PCP/Specialist     Transport: Spouse     CM: [address]. MSW,   [phone]

## 2022-01-06 NOTE — PROGRESS NOTES
PICC Placement Note    PRE-PROCEDURE VERIFICATION  Correct Procedure: yes  Correct Site:  yes  Temperature: Temp: 98.9 °F (37.2 °C), Temperature Source: Temp Source: Oral  Recent Labs     01/06/22  0424   BUN 17   CREA 0.67*      WBC 6.9     Allergies: Lisinopril and Losartan  Education materials for PICC Care given: yes. See Patient Education activity for further details. PICC Booklet placed at bedside: yes    PROCEDURE DETAIL  Consent was obtained and all questions were answered related to risks and benefits. A double lumen PICC line was inserted, as a sterile procedure using ultrasound and modified Seldinger technique for Home IV Therapy. The following documentation is in addition to the PICC properties in the lines/airways flowsheet :  Lot #: [id number]  Lidocaine 1% administered intradermally :yes  Internal Catheter Total Length: 48 (cm) 1 cm out  Vein Selection for PICC:right basilic  Central Line Bundle followed yes  Complication Related to Insertion:no    The placement was verified by EKG, MAX P WAVE @ 48 (cm). PER EKG PICC TIP @ C/A junction.       Line is okay to use: yes    Lena Resendiz, TEN

## 2022-01-06 NOTE — PROGRESS NOTES
Ortho Daily Progress Note      Patient: Shelli Guzman                   MRN: [id number]  Sex: male  YOB: 1960           Age: 59 y.o.     3 Days Post-Op     Procedure(s):  RIGHT KNEE POLY EXCHANGE ; HARDWARE REMOVAL RIGHT LATERAL LEG; INCISION AND DRAINAGE; WOUND IRRIGATION; PLACEMENT OF ANTIBIOTIC BEADS    Subjective:    -Pain:  pt in moderate pain  -No complaints overnight. Patient ambulating with PT  -Patient tolerating PO diet, no nausea. Pain meds tolerated. -Patient voiding appropriately and w/ flatus. -Vitals stable, afebrile. Denies fever/chills. Visit Vitals  BP (!) 145/70 (BP 1 Location: Right arm, BP Patient Position: At rest)   Pulse 71   Temp 98.5 °F (36.9 °C)   Resp 18   Ht 6' (1.829 m)   Wt 136 kg (299 lb 13.2 oz)   SpO2 96%   BMI 40.66 kg/m²        Recent Labs     01/06/22  0424 01/05/22  0703 01/05/22  0703 01/04/22  0028 01/04/22  0028 01/03/22  0723 01/03/22  0723 01/02/22  1706 05/11/21  0418 05/11/21  0418 05/10/21  1355 05/10/21  1355 05/04/21  1322 05/04/21  1322   HGB 7.9*   < > 7.9*   < > 8.7*   < > 9.6*  --    < > 12.4   < > 11.8*   < > 11.1*   CREA 0.67*   < > 0.84   < > 1.18   < > 0.96 1.23   < > 1.11   < > 0.91   < > 0.94   BUN 17  --  24*  --  26*  --  20 24*  --  28*  --  18  --  19    < > = values in this interval not displayed. Physical Exam:    GENERAL:      alert, no acute distress, pale  NEURO:          Sensation grossly intact along sural, common fibular, and saphenous nerve distributions. VASCULAR:    Trace DP pulses bilat.  Extremity warm. moderate edema of BLE. DRESSING:    Old sanguinous drainage. INCISIONS:     Midline knee and lateral thigh incisions well approximated with old sanguinous drainage. No cris-incisional erythema or purulence. Left calf WOUND:       Shallow epidermal erosion on the medial left calf with small amount of old serous drainage.   KBK:               6/3 DF/PF. Anand Crocker knee flexion deferred due to patellar tendon repair. DVT EXAM:     No posterior calf tenderness, tightness, erythema, palpable cords, or pain upon active dorsi/plantar flexion of the foot. Plan:    DVT ppx:         Restarted his PTA 5mg BID Eliquis  Activity:            WBAT in knee immobilizer with knee in full extension due to partial patellar tendon repair. Pain Control:   Having moderate pain.  Patient to get oxycodone this morning. Incisional Dressings:   Changed today. Change daily with ABD pads. Hemovac:        Hemovac drain pulled, 40mL of sanguinous drainage yesterday. Hgb:                 stabilized around 7.9  DM:                  Managed per hospitalist     Right knee periprosthetic infection: PO day #3 following Irrigation and debridement, right knee, with exchange of polyethylene insert, removal of lateral femoral plate through a separate incision with placement of antibiotic-impregnated beads.           Currently on ceftriaxone and will d/c home with 2g Q24 hrs; ID following.  Intra-op cultures taken 1/3/21 and cultures from joint arthrocentesis on 1/2/21 are growing beta hemolytic group B strep.     Discharge:       Cleared by ortho for discharge once PICC placed, will follow-up with Dr. Yao Pacheco in 2 weeks.     Nathan White PA-C  1/6/2022   8:21 AM

## 2022-01-06 NOTE — PROGRESS NOTES
Problem: Mobility Impaired (Adult and Pediatric)  Goal: *Acute Goals and Plan of Care (Insert Text)  Description: FUNCTIONAL STATUS PRIOR TO ADMISSION: Patient was independent and active without use of DME. Pt reports he used crutches for a few days PTA due to R knee pain. HOME SUPPORT PRIOR TO ADMISSION: The patient lived with wife but did not require assist.    Physical Therapy Goals  Initiated 1/4/2022  1. Patient will move from supine to sit and sit to supine  in bed with independence within 7 day(s). 2.  Patient will transfer from bed to chair and chair to bed with modified independence using the least restrictive device within 7 day(s). 3.  Patient will perform sit to stand with modified independence within 7 day(s). 4.  Patient will ambulate with modified independence for 150 feet with the least restrictive device within 7 day(s). 5.  Patient will ascend/descend full flight of stairs with 1 handrail(s) and 1 crutch with modified independence within 7 day(s). Outcome: Progressing Towards Goal    PHYSICAL THERAPY TREATMENT  Patient: Mauro Noel (17 y.o. male)  Date: 1/6/2022  Diagnosis: Cellulitis [L03.90] Staphylococcal arthritis of right knee (HCC)  Procedure(s) (LRB):  RIGHT KNEE POLY EXCHANGE ; HARDWARE REMOVAL RIGHT LATERAL LEG; INCISION AND DRAINAGE; WOUND IRRIGATION; PLACEMENT OF ANTIBIOTIC BEADS (Right) 3 Days Post-Op  Precautions: WBAT (knee immobilizer, keep knee in full extension)  Chart, physical therapy assessment, plan of care and goals were reviewed. ASSESSMENT  Patient continues with skilled PT services and is progressing towards goals. Pt received seated in chair with knee immobilizer on and willing to work with therapy. Pt limited with increased groin pain with STS only, decreased ROM and strength along with decreased independence. Pt able tolerate STS to BRW with VC for LLE placement and hand placement for support.  Pt continued with gait training in hallway up to 125' with no LOB and 2 brief standing rest breaks. Pt reported having little to no pain with amb, noted UE assist with amb. Upon return to the room, pt able to return tolerate seated isometric LE ex to help maintain strength with VC for form. Pt completed session seated in chair with call bell within reach and all needs met at the time. RN notified of session. Order placed by CM for BRW  With d/c with HHPT. .     Current Level of Function Impacting Discharge (mobility/balance): CGA for all mobility, STS, amb up to 125' with BRW. Other factors to consider for discharge: fall risk, WBAT with immobilizer          PLAN :  Patient continues to benefit from skilled intervention to address the above impairments. Continue treatment per established plan of care. to address goals. Recommendation for discharge: (in order for the patient to meet his/her long term goals)  Physical therapy at least 2 days/week in the home     This discharge recommendation:  Has not yet been discussed the attending provider and/or case management    IF patient discharges home will need the following DME: Bariatric Rolling Walker       SUBJECTIVE:   Patient stated Almas Jade will get in the back seat and slide over. Jakub Andres    OBJECTIVE DATA SUMMARY:   Critical Behavior:  Neurologic State: Alert  Orientation Level: Oriented X4  Cognition: Appropriate decision making,Appropriate for age attention/concentration,Appropriate safety awareness  Safety/Judgement: Awareness of environment    Functional Mobility Training:  Transfers:  Sit to Stand: Contact guard assistance; Additional time  Stand to Sit: Contact guard assistance; Additional time     Balance:  Sitting: Intact  Standing: Impaired; Without support  Standing - Static: Constant support;Good  Standing - Dynamic : Constant support;Good    Ambulation/Gait Training:  Distance (ft): 125 Feet (ft)  Assistive Device: Gait belt;Walker, rolling (Robbie due to width needs)  Ambulation - Level of Assistance: Contact guard assistance  Gait Abnormalities: Antalgic;Decreased step clearance  Base of Support: Widened  Stance: Right decreased  Speed/Lexi: Pace decreased (<100 feet/min)    Therapeutic Exercises:     EXERCISE   Sets   Reps   Active Active Assist   Passive Self ROM   Comments   Ankle Pumps   []                                        []                                        []                                        []                                           Quad Sets 1 10 [x]                                        []                                        []                                        []                                        Isometric   Hamstring Sets 1 10 [x]                                        []                                        []                                        []                                        Isometric   Short Arc Quads   []                                        []                                        []                                        []                                           Knee Extension Stretch     []                                          []                                          []                                          []                                           Heel Slides   []                                        []                                        []                                        []                                           Long Arc Quads   []                                        []                                        []                                        []                                           Knee Flexion Stretch   []                                        []                                        []                                        []                                           Straight Leg Raises 1 10 []                                        [x]                                        [] []                                        With ABD/ADD       Pain Rating:  Pain only with STS with quick recovery    Activity Tolerance:   Good    After treatment patient left in no apparent distress:   Sitting in chair and Call bell within reach    COMMUNICATION/COLLABORATION:   The patients plan of care was discussed with: Registered nurse.      Stacie Lawson PTA   Time Calculation: 38 mins

## 2022-01-06 NOTE — PROGRESS NOTES
Problem: Falls - Risk of  Goal: *Absence of Falls  Description: Document Dennie Oak Fall Risk and appropriate interventions in the flowsheet. Outcome: Resolved/Met  Note: Fall Risk Interventions:  Mobility Interventions: Patient to call before getting OOB                             Problem: Patient Education: Go to Patient Education Activity  Goal: Patient/Family Education  Outcome: Resolved/Met     Problem: Diabetes Self-Management  Goal: *Disease process and treatment process  Description: Define diabetes and identify own type of diabetes; list 3 options for treating diabetes. Outcome: Resolved/Met  Goal: *Incorporating nutritional management into lifestyle  Description: Describe effect of type, amount and timing of food on blood glucose; list 3 methods for planning meals. Outcome: Resolved/Met  Goal: *Incorporating physical activity into lifestyle  Description: State effect of exercise on blood glucose levels. Outcome: Resolved/Met  Goal: *Developing strategies to promote health/change behavior  Description: Define the ABC's of diabetes; identify appropriate screenings, schedule and personal plan for screenings. Outcome: Resolved/Met  Goal: *Using medications safely  Description: State effect of diabetes medications on diabetes; name diabetes medication taking, action and side effects. Outcome: Resolved/Met  Goal: *Monitoring blood glucose, interpreting and using results  Description: Identify recommended blood glucose targets  and personal targets. Outcome: Resolved/Met  Goal: *Prevention, detection, treatment of acute complications  Description: List symptoms of hyper- and hypoglycemia; describe how to treat low blood sugar and actions for lowering  high blood glucose level.   Outcome: Resolved/Met  Goal: *Prevention, detection and treatment of chronic complications  Description: Define the natural course of diabetes and describe the relationship of blood glucose levels to long term complications of diabetes.   Outcome: Resolved/Met  Goal: *Developing strategies to address psychosocial issues  Description: Describe feelings about living with diabetes; identify support needed and support network  Outcome: Resolved/Met  Goal: *Insulin pump training  Outcome: Resolved/Met  Goal: *Sick day guidelines  Outcome: Resolved/Met  Goal: *Patient Specific Goal (EDIT GOAL, INSERT TEXT)  Outcome: Resolved/Met     Problem: Patient Education: Go to Patient Education Activity  Goal: Patient/Family Education  Outcome: Resolved/Met     Problem: Patient Education: Go to Patient Education Activity  Goal: Patient/Family Education  Outcome: Resolved/Met     Problem: Patient Education: Go to Patient Education Activity  Goal: Patient/Family Education  Outcome: Resolved/Met

## 2022-01-06 NOTE — PROGRESS NOTES
ID Progress Note  2022    Subjective:      No new issues, ready to be discharged  Review of Systems:            Symptom Y/N Comments   Symptom Y/N Comments   Fever/Chills  n     Chest Pain  n      Poor Appetite       Edema        Cough       Abdominal Pain        Sputum       Joint Pain  y      SOB/KRAMER  n     Pruritis/Rash        Nausea/vomit  n     Tolerating PT/OT        Diarrhea  n     Tolerating Diet        Constipation  n     Other           Could NOT obtain due to:       Objective:     Vitals:   Visit Vitals  BP (!) 145/70 (BP 1 Location: Right arm, BP Patient Position: At rest)   Pulse 71   Temp 98.5 °F (36.9 °C)   Resp 18   Ht 6' (1.829 m)   Wt 136 kg (299 lb 13.2 oz)   SpO2 96%   BMI 40.66 kg/m²        Tmax:  Temp (24hrs), Av.2 °F (36.8 °C), Min:98 °F (36.7 °C), Max:98.5 °F (36.9 °C)      PHYSICAL EXAM:  General: WD, WN. Alert, cooperative, no acute distress    EENT:  EOMI. Anicteric sclerae. MMM  Resp:  CTA bilaterally, no wheezing or rales. No accessory muscle use  CV:  Regular  rhythm,  No edema  GI:  Soft, Non distended, Non tender. +Bowel sounds  Neurologic:  Alert and oriented X 3, normal speech,   Psych:   Good insight. Not anxious nor agitated  Skin:  No rashes. No jaundice.  RLE; leg brace in place    Labs:   Lab Results   Component Value Date/Time    WBC 6.9 2022 04:24 AM    HGB 7.9 (L) 2022 04:24 AM    HCT 25.3 (L) 2022 04:24 AM    PLATELET 669  04:24 AM    MCV 81.4 2022 04:24 AM     Lab Results   Component Value Date/Time    Sodium 136 2022 04:24 AM    Potassium 3.7 2022 04:24 AM    Chloride 108 2022 04:24 AM    CO2 19 (L) 2022 04:24 AM    Anion gap 9 2022 04:24 AM    Glucose 123 (H) 2022 04:24 AM    BUN 17 2022 04:24 AM    Creatinine 0.67 (L) 2022 04:24 AM    BUN/Creatinine ratio 25 (H) 2022 04:24 AM    GFR est AA >60 2022 04:24 AM    GFR est non-AA >60 2022 04:24 AM    Calcium 8.4 (L) 01/06/2022 04:24 AM    Bilirubin, total 0.5 01/03/2022 07:23 AM    Alk. phosphatase 55 01/03/2022 07:23 AM    Protein, total 6.6 01/03/2022 07:23 AM    Albumin 2.3 (L) 01/03/2022 07:23 AM    Globulin 4.3 (H) 01/03/2022 07:23 AM    A-G Ratio 0.5 (L) 01/03/2022 07:23 AM    ALT (SGPT) 12 01/03/2022 07:23 AM         Assessment and Plan   Right prosthetic knee infection  S/p I & D of right knee with exchange of polyethylene insert, removal of lateral femoral plate through a seperate incision with placement of antibiotic beads (1/3)  S/p ORIF of right supracondylar femur fracture with synthes plate placement (8/44/7874)  - afebrile, WBC 6.9    CRP 28 (1/2)    Fluid cx (1/2) beta hemolytic group B strep    Intra-op cx (1/3) beta hemolytic group B strep      continue with IV rocephin, last dose 2/13/2022.    -> pt will need suppressive therapy with PO keflex upon completion of IV ABX. PICC placed 1/5    Discharge IV ABX order in place 1/5       fever work up if temp >= 100.4    Type II DM  - A1C 8.5      Above plan of care discussed and agreed with Dr. Sherri Townsend     ID team signing off. Please contact us with any questions.     Dee Hess NP

## 2022-01-06 NOTE — PROGRESS NOTES
OCCUPATIONAL THERAPY TREATMENT/DISCHARGE  Patient: Rosa Jameson (32 y.o. male)  Date: 1/6/2022  Diagnosis: Cellulitis [L03.90] Staphylococcal arthritis of right knee (HCC)  Procedure(s) (LRB):  RIGHT KNEE POLY EXCHANGE ; HARDWARE REMOVAL RIGHT LATERAL LEG; INCISION AND DRAINAGE; WOUND IRRIGATION; PLACEMENT OF ANTIBIOTIC BEADS (Right) 3 Days Post-Op  Precautions: WBAT (knee immobilizer, keep knee in full extension)  Chart, occupational therapy assessment, plan of care, and goals were reviewed. ASSESSMENT  Patient continues with skilled OT services and has progressed towards goals. Pt received supine in bed with HOB elevated, agreeable to work with OT. Pt performed bed mobility at mod I using gait belt to safely maneuver R LE  touch floor and keeping knee fully extended. .  Set-up UB dressing/bathing, mod I dressing underwear/shorts (no reacher needed, dressing R LE first) and min assist with R sock using sock aide. Per pt, wife will be home to assist.  Overall, pt [facility] to supervision with mobility using RW. Pt plans on sleeping in recliner chair on first floor and has access to a first floor bathroom. Feel pt is safe for discharge once medically cleared. Current Level of Function (ADLs/self-care): CGA with mobility and min assist with LB dressing (shoes and socks)    Other factors to consider for discharge: knee immobilizer for full knee ext. PLAN :  Rationale for discharge: Goals achieved  Recommend with staff:   Recommendation for discharge: (in order for the patient to meet his/her long term goals)  No skilled occupational therapy/ follow up rehabilitation needs identified at this time. This discharge recommendation:  A follow-up discussion with the attending provider and/or case management is planned    IF patient discharges home will need the following DME:none       SUBJECTIVE:   Patient stated I plan on staying on first floor.     OBJECTIVE DATA SUMMARY: Cognitive/Behavioral Status:  Neurologic State: Alert  Orientation Level: Oriented X4  Cognition: Appropriate decision making        Safety/Judgement: Awareness of environment    Functional Mobility and Transfers for ADLs:  Bed Mobility:  Supine to Sit: Modified independent; Additional time; Other (comment) (using gait belt to manuever R LE)    Transfers:  Sit to Stand: Contact guard assistance  Functional Transfers  Bathroom Mobility: Contact guard assistance  Adaptive Equipment: Walker (comment)       Balance:  Sitting: Intact  Standing: Intact; With support  Standing - Static: Constant support;Good  Standing - Dynamic : Constant support;Good    ADL Intervention:            Upper Body Bathing  Bathing Assistance: Set-up    Lower Body Bathing  Lower Body : Minimum assistance  Position Performed: Seated edge of bed    Upper Body Dressing Assistance  Dressing Assistance: Set-up    Lower Body Dressing Assistance  Underpants: Modified independent  Pants With Elastic Waist: Modified independent  Socks: Minimum assistance  Position Performed: Seated edge of bed  Adaptive Equipment Used: Reacher    Toileting  Toileting Assistance: Stand-by assistance    Cognitive Retraining  Safety/Judgement: Awareness of environment        Pain:  No c/o pain    Activity Tolerance:   Good    After treatment patient left in no apparent distress:   Sitting in chair and Call bell within reach    COMMUNICATION/COLLABORATION:   The patients plan of care was discussed with: Physical therapy assistant and Registered nurse.      Junito Knott OTR/L  Time Calculation: 28 mins

## 2022-01-06 NOTE — PROGRESS NOTES
Problem: Falls - Risk of  Goal: *Absence of Falls  Description: Document Lettie Duverney Fall Risk and appropriate interventions in the flowsheet. Outcome: Resolved/Met  Note: Fall Risk Interventions:  Mobility Interventions: Patient to call before getting OOB                             Problem: Patient Education: Go to Patient Education Activity  Goal: Patient/Family Education  Outcome: Resolved/Met     Problem: Diabetes Self-Management  Goal: *Disease process and treatment process  Description: Define diabetes and identify own type of diabetes; list 3 options for treating diabetes. Outcome: Resolved/Met  Goal: *Incorporating nutritional management into lifestyle  Description: Describe effect of type, amount and timing of food on blood glucose; list 3 methods for planning meals. Outcome: Resolved/Met  Goal: *Incorporating physical activity into lifestyle  Description: State effect of exercise on blood glucose levels. Outcome: Resolved/Met  Goal: *Developing strategies to promote health/change behavior  Description: Define the ABC's of diabetes; identify appropriate screenings, schedule and personal plan for screenings. Outcome: Resolved/Met  Goal: *Using medications safely  Description: State effect of diabetes medications on diabetes; name diabetes medication taking, action and side effects. Outcome: Resolved/Met  Goal: *Monitoring blood glucose, interpreting and using results  Description: Identify recommended blood glucose targets  and personal targets. Outcome: Resolved/Met  Goal: *Prevention, detection, treatment of acute complications  Description: List symptoms of hyper- and hypoglycemia; describe how to treat low blood sugar and actions for lowering  high blood glucose level.   Outcome: Resolved/Met  Goal: *Prevention, detection and treatment of chronic complications  Description: Define the natural course of diabetes and describe the relationship of blood glucose levels to long term complications of diabetes.   Outcome: Resolved/Met  Goal: *Developing strategies to address psychosocial issues  Description: Describe feelings about living with diabetes; identify support needed and support network  Outcome: Resolved/Met  Goal: *Insulin pump training  Outcome: Resolved/Met  Goal: *Sick day guidelines  Outcome: Resolved/Met  Goal: *Patient Specific Goal (EDIT GOAL, INSERT TEXT)  Outcome: Resolved/Met     Problem: Patient Education: Go to Patient Education Activity  Goal: Patient/Family Education  Outcome: Resolved/Met     Problem: Patient Education: Go to Patient Education Activity  Goal: Patient/Family Education  Outcome: Resolved/Met     Problem: Patient Education: Go to Patient Education Activity  Goal: Patient/Family Education  Outcome: Resolved/Met

## 2022-01-06 NOTE — PROGRESS NOTES
Hospital follow-up PCP transitional care appointment has been scheduled with Dr. Riana Leonardo for Friday, 1/14/22 at 11:00 a.m. Pending patient discharge.   Daisy Bryant, Care Management Specialist.

## 2022-01-07 ENCOUNTER — PATIENT OUTREACH (OUTPATIENT)
Dept: CASE MANAGEMENT | Age: 62
End: 2022-01-07

## 2022-01-07 NOTE — DISCHARGE SUMMARY
Discharge Summary       PATIENT ID: Kianna Alfonso  MRN: [id number]   YOB: 1960    DATE OF ADMISSION: 1/2/2022  2:44 PM    DATE OF DISCHARGE:1/6/2022   PRIMARY CARE PROVIDER: Carmen Hankins MD     ATTENDING PHYSICIAN: Manoj Valentin MD    DISCHARGING PROVIDER: Pal Lyn MD    To contact this individual call [phone] and ask the  to page. If unavailable ask to be transferred the Adult Hospitalist Department. CONSULTATIONS: IP CONSULT TO INFECTIOUS DISEASES    PROCEDURES/SURGERIES: Procedure(s):  RIGHT KNEE POLY EXCHANGE ; HARDWARE REMOVAL RIGHT LATERAL LEG; INCISION AND DRAINAGE; WOUND IRRIGATION; PLACEMENT OF ANTIBIOTIC BEADS    ADMITTING DIAGNOSES & HOSPITAL COURSE:     DISCHARGE DIAGNOSES / PLAN:      Patient presented with right knee pain is admitted with a diagnosis of acute septic arthritis    Right knee septic arthritis, infected total knee  --Status post right knee polyexchange, hardware removal, incision and drainage, wound irrigation and placement of antibiotic beads  --Synovial fluid culture grew moderate streptococci, beta-hemolytic B  --Continue ceftriaxone, vancomycin discontinued. --ID on board, recommending 6 weeks of IV therapy. PICC placed , home health arranged      Home IV Antibiotic Orders     1.  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  Antibiotic:   IV rocephin 2 gm every 24 hours     3.  Duration of therapy: 2/13/2022    4. Please call Dr. Liam Urena @ [phone] for follow up in 2-3 weeks    5. Plan for suppressive therapy with PO keflex upon completion of IV ABX       Type II DM  --Accu-Cheks plus Humalog sliding scale  --Continued on glipizide.     CAD.   Paroxysmal atrial fibrillation, history of sinus pauses status post pacemaker  --Continue amiodarone, aspirin, apixaban, metoprolol     Hypertension, stable: Continue home medications  Hyperlipidemia, stable: Continue statin           XR CHEST PORT    Result Date: 1/2/2022  1. Vague opacities in the bilateral lungs may represent multifocal pneumonia. XR KNEE RT 3 V    Result Date: 1/2/2022  Moderate suprapatellar joint effusion. Healed distal femoral fracture and total knee replacement. Yeimy Machado PENDING TEST RESULTS:   At the time of discharge the following test results are still pending: none    FOLLOW UP APPOINTMENTS:    Follow-up Information     Follow up With Specialties Details Why Contact Info    AT [address] [facility],[address]; PT/OT/Picc line care 31 [facility] 27[address] [facility]  [facility] [address] 6    Abimbola Wylie MD Family Medicine On 1/14/2022 Hospital follow up PCP appointment Friday, 1/14/22 at 11:00 a.m.  Emily LAGOS [address].  [phone]      Elisa Davalos MD Infectious Disease In 2 weeks  [address] 37[address] [facility] [address] [place] [address]      Ronnell Mcqueen MD Orthopedic Surgery In 2 weeks  [address]  [phone]               ADDITIONAL CARE RECOMMENDATIONS:   -Follow up with Infectious disease,PCP and ortho    DIET: Regular Diet      ACTIVITY: Activity as tolerated      DISCHARGE MEDICATIONS:  Discharge Medication List as of 1/6/2022  3:46 PM      START taking these medications    Details   oxyCODONE IR (ROXICODONE) 5 mg immediate release tablet Take 1-2 Tablets by mouth every four (4) hours as needed for Pain for up to 14 days. Max Daily Amount: 60 mg., Normal, Disp-40 Tablet, R-0      cefTRIAXone 2 gram 2 g IV syringe 2 g by IntraVENous route every twenty-four (24) hours. , No Print, Disp-1 Dose, R-0      polyethylene glycol (MIRALAX) 17 gram packet Take 1 Packet by mouth daily as needed for Constipation. , Normal, Disp-5 Packet, R-0      famotidine (PEPCID) 20 mg tablet Take 1 Tablet by mouth two (2) times daily as needed for Gastroesophageal Reflux Disease (GERD). , Normal, Disp-20 Tablet, R-0         CONTINUE these medications which have NOT CHANGED    Details   metoprolol tartrate (LOPRESSOR) 25 mg tablet Take 1 Tablet by mouth two (2) times a day for 90 days. , Normal, Disp-180 Tablet, R-1      magnesium oxide (MAG-OX) 400 mg tablet TAKE 2 TABLETS BY MOUTH EVERY DAY, Normal, Disp-180 Tablet, R-3      metFORMIN (GLUCOPHAGE) 1,000 mg tablet TAKE 1 TABLET BY MOUTH TWICE A DAY WITH MEALS, Normal, Disp-180 Tablet, R-3      Jardiance 10 mg tablet TAKE 1 TAB BY MOUTH DAILY. FOR DIABETES, Normal, Disp-90 Tablet, R-3      amiodarone (CORDARONE) 200 mg tablet Take 0.5 Tablets by mouth daily. Lowered 09/21/21, No Print, Disp-90 Tablet, R-5      gemfibroziL (LOPID) 600 mg tablet Take 1 Tablet by mouth two (2) times a day., Normal, Disp-180 Tablet, R-3      Eliquis 5 mg tablet TAKE 1 TABLET BY MOUTH TWICE A DAY, Normal, Disp-180 Tablet, R-1Follow up needed prior to further refill      semaglutide (Ozempic) 1 mg/dose (2 mg/1.5 mL) sub-q pen 1 MG BY SUBCUTANEOUS ROUTE EVERY SEVEN (7) DAYS., Normal, Disp-6 Pen, R-12      nitroglycerin (NITROSTAT) 0.4 mg SL tablet 1 Tablet by SubLINGual route every five (5) minutes as needed for Chest Pain (call 911 if CP/ SOB not relieved by 3 tabs). , Normal, Disp-25 Tablet, R-1      furosemide (LASIX) 40 mg tablet TAKE 1-2 TABLET BY MOUTH DAILY AS NEEDED FOR FLUID, Normal, Disp-180 Tablet, R-1      glipiZIDE (GLUCOTROL) 10 mg tablet TAKE 1 TABLET BY MOUTH TWICE A DAY, Normal, Disp-180 Tablet, R-1      senna-docusate (PERICOLACE) 8.6-50 mg per tablet Take 1 Tab by mouth two (2) times daily as needed for Constipation. , Normal, Disp-60 Tab, R-0      rosuvastatin (CRESTOR) 20 mg tablet TAKE 1 TABLET BY MOUTH EVERY DAY FOR CHOLESTEROL, Normal, Disp-90 Tab, R-3      ACETAMINOPHEN PO Take 500 mg by mouth as needed. 1-2 tabs prn as needed, Historical Med      aspirin delayed-release 81 mg tablet Take  by mouth daily.   , Historical Med      omega-3 fatty acids-vitamin e (FISH OIL) 1,000 mg Cap Take 2 Caps by mouth two (2) times a day., Historical Med         STOP taking these medications       ibuprofen (MOTRIN) 800 mg tablet Comments:   Reason for Stopping:                 NOTIFY YOUR PHYSICIAN FOR ANY OF THE FOLLOWING:   Fever over 101 degrees for 24 hours. Chest pain, shortness of breath, fever, chills, nausea, vomiting, diarrhea, change in mentation, falling, weakness, bleeding. Severe pain or pain not relieved by medications. Or, any other signs or symptoms that you may have questions about. DISPOSITION:  x  Home With:   OT  PT  HH  RN       Long term SNF/Inpatient Rehab    Independent/assisted living    Hospice    Other:       PATIENT CONDITION AT DISCHARGE:     Functional status    Poor     Deconditioned    x Independent      Cognition   x  Lucid     Forgetful     Dementia      Catheters/lines (plus indication)    Tyler     PICC     PEG    x None      Code status    x Full code     DNR      PHYSICAL EXAMINATION AT DISCHARGE:  General:          Alert,  no distress     HEENT:           Atraumatic, anicteric sclerae, pink conjunctivae                       Lungs:             Clear to auscultation bilaterally. No Wheezing or Rhonchi. No rales. Heart:              Regular  rhythm,  No  murmur   No edema  Abdomen:        Soft, non-tender. Not distended. Bowel sounds normal  Extremities:     rt LE in brace  Psych:             Not anxious or agitated.   Neurologic:      Alert, moves all extremities, answers questions appropriately and responds to commands       CHRONIC MEDICAL DIAGNOSES:  Problem List as of 1/6/2022 Date Reviewed: 1/3/2022          Codes Class Noted - Resolved    * (Principal) Staphylococcal arthritis of right knee ([facility] ICD-10-CM: M00.061  ICD-9-CM: 711.06, 041.10  1/3/2022 - Present        Cellulitis ICD-10-CM: L03.90  ICD-9-CM: 682.9  1/2/2022 - Present        A-fib ([facility] ICD-10-CM: I48.91  ICD-9-CM: 427.31  5/10/2021 - Present        Tachy-amina syndrome (HCC) (Chronic) ICD-10-CM: I49.5  ICD-9-CM: 427.81  3/15/2021 - Present        Supracondylar fracture of distal end of femur without intracondylar extension ([facility] ICD-10-CM: Q14.212H  ICD-9-CM: 821.23  8/17/2020 - Present        Femur fracture ([facility].) ICD-10-CM: S72.90XA  ICD-9-CM: 821.00  8/16/2020 - Present        Type 2 diabetes with nephropathy ([facility].) ICD-10-CM: E11.21  ICD-9-CM: 250.40, 583.81  12/12/2018 - Present        Severe obesity ([facility].) ICD-10-CM: E66.01  ICD-9-CM: 278.01  12/12/2018 - Present        Controlled type 2 diabetes mellitus with complication, without long-term current use of insulin (HCC) (Chronic) ICD-10-CM: E11.8  ICD-9-CM: 250.90  12/12/2018 - Present        PAF (paroxysmal atrial fibrillation) (HCC) (Chronic) ICD-10-CM: I48.0  ICD-9-CM: 427.31  12/12/2018 - Present        Mixed hyperlipidemia ICD-10-CM: E78.2  ICD-9-CM: 272.2  4/18/2012 - Present        Mitral valve disorders(424.0) ICD-10-CM: I05.9  ICD-9-CM: 424.0  3/27/2012 - Present        Coronary artery disease ICD-10-CM: I25.10  ICD-9-CM: 414.00  6/10/2011 - Present        S/P coronary artery stent placement ICD-10-CM: Z95.5  ICD-9-CM: V45.82  6/10/2011 - Present    Overview Addendum 6/10/2011  8:45 AM by Naomie Mendoza NP     6/9/11 PCI/JENNIFER to RCA             Type 2 diabetes mellitus ([facility].) ICD-10-CM: E11.9  ICD-9-CM: 250.00  6/10/2011 - Present        Benign essential hypertension (Chronic) ICD-10-CM: I10  ICD-9-CM: 401.1  6/10/2011 - Present        Hypertriglyceridemia ICD-10-CM: E78.1  ICD-9-CM: 272.1  Unknown - Present        Arthritis ICD-10-CM: M19.90  ICD-9-CM: 716.90  Unknown - Present    Overview Signed 1/3/2011  4:28 PM by Melonie Mays MD     knees             RESOLVED: Atrial fibrillation ([facility] ICD-10-CM: I48.91  ICD-9-CM: 427.31  3/15/2021 - 8/3/2021        RESOLVED: Essential hypertension, benign ICD-10-CM: I10  ICD-9-CM: 401.1  4/18/2012 - 10/4/2012 RESOLVED: Coronary atherosclerosis of native coronary artery ICD-10-CM: I25.10  ICD-9-CM: 414.01  4/18/2012 - 10/4/2012        RESOLVED: Type II or unspecified type diabetes mellitus without mention of complication, not stated as uncontrolled ICD-10-CM: E11.9  ICD-9-CM: 250.00  4/18/2012 - 10/4/2012        RESOLVED: Pre-op evaluation ICD-10-CM: D63.652  ICD-9-CM: V72.84  4/2/2012 - 10/4/2012        RESOLVED: SOB (shortness of breath) on exertion ICD-10-CM: R06.02  ICD-9-CM: 786.05  5/16/2011 - 10/4/2012        RESOLVED: Acute bronchitis ICD-10-CM: J20.9  ICD-9-CM: 466.0  5/16/2011 - 10/4/2012        RESOLVED: Diabetes ([address] ICD-10-CM: E11.9  ICD-9-CM: 250.00  Unknown - 10/4/2012              45 minutes were spent with the patient on counseling and coordination of care    Signed:   Raphael Pendleton MD  1/7/2022  5:35 PM    Murphy Wise MD

## 2022-01-07 NOTE — PROGRESS NOTES
Physician Progress Note      PATIENTLisa Jade  CSN #:                  [id number]  :                       1960  ADMIT DATE:       2022 2:44 PM  [address] DATE:        2022 6:31 PM  RESPONDING  PROVIDER #:        Michael Martinez MD          QUERY TEXT:    Patient admitted with cellulitis   Pt noted to have documented diabiabetes . Please document in progress notes and discharge summary further specificity regarding the control status of DM: The medical record reflects the following:  Risk Factors: DM  Clinical Indicators:  1/3/2022 07:23  Hemoglobin A1c, (calculated): 8.5 (H)  Est. average glucose: 197    1/3/2022 07:23  Glucose: 136 (H)    2022 00:28  Glucose: 146 (H)    2022 21:12  GLUCOSE,FAST - POC: 176 (H)    2022 22:07  GLUCOSE,FAST - POC: 156 (H)    2022 06:53  GLUCOSE,FAST - POC: 150 (H)    Treatment: POC glucose monitoring, lispro sliding scale insulin    Thank you,  Lissett Schrader RN, [facility], [place], [address] [place] [address]  Certified Clinical   [phone]  Options provided:  -- Hyperglycemia due to DM type II  -- Hyperglycemia not related to DM type II  -- Other - I will add my own diagnosis  -- Disagree - Not applicable / Not valid  -- Disagree - Clinically unable to determine / Unknown  -- Refer to Clinical Documentation Reviewer    PROVIDER RESPONSE TEXT:    This patient has Hyperglycemia due to DM type II. Query created by: Farida Mendoza on 2022 3:42 PM      QUERY TEXT:    Patient admitted with BMI 40.66 kg/m 2 If possible, please document in progress notes and discharge summary if you are evaluating and /or treating any of the following: The medical record reflects the following:  Risk Factors: elevated  BMI  Clinical Indicators:  BMI: 40.66 kg/m 2  Ht: 6' (1.829 m)  Wt: 136 kg (299 lb 13.2 oz)  Treatment: ADULT DIET Regular; 4 carb choices (60 gm/meal);  Low Fat/Low Chol/High Fiber/2 gm Na [[id number]]    Thank you,  Vandana Brady RN, [place], [place], [address] [place] [address]  Certified Clinical   [phone]  Options provided:  -- Obesity with BMI 40.66 kg/m 2  -- Morbid obesity with BMI 40.66 kg/m 2  -- Severe obesity  with BMI 40.66 kg/m 2  -- BMI not clinically significant  -- Other - I will add my own diagnosis  -- Disagree - Not applicable / Not valid  -- Disagree - Clinically unable to determine / Unknown  -- Refer to Clinical Documentation Reviewer    PROVIDER RESPONSE TEXT:    This patient has obesity with BMI 40.66 kg/m 2 . Query created by: Ruthy Running on 1/5/2022 3:48 PM      QUERY TEXT:    Pt admitted with infected knee joint  . Pt noted to have abnormal serum CO2 levels . If possible, please document in the progress notes and discharge summary if you are evaluating and/or treating any of the following: The medical record reflects the following:  Risk Factors: cellulitis/septic joint  Clinical Indicators:  1/2/2022 17:06  CO2: 22    1/3/2022 07:23  CO2: 20 (L)    1/4/2022 00:28  CO2: 16 (L)    1/5/2022 07:03  CO2: 17 (L)    Treatment: NS @ 125 cc/hr, lab monitoring      Thank you,  Vandana Brady RN, SEBASTIAN, [place], [facility]  Certified Clinical   [phone]  Options provided:  -- Metabolic Acidosis  -- Metabolic Alkalosis  -- Other - I will add my own diagnosis  -- Disagree - Not applicable / Not valid  -- Disagree - Clinically unable to determine / Unknown  -- Refer to Clinical Documentation Reviewer    PROVIDER RESPONSE TEXT:    This patient has metabolic acidosis.     Query created by: Ruthy Running on 1/5/2022 3:53 PM      Electronically signed by:  Brodie Baumgarten MD 1/7/2022 8:02 AM

## 2022-01-07 NOTE — PROGRESS NOTES
Care Transitions Initial Call    Call within 2 business days of discharge: Yes     Patient: Kaleigh Monae Patient : 1960 MRN: [id number]    Last Discharge [address]       Complaint Diagnosis Description Type Department Provider    22 Transfer Of Care Cellulitis of right lower extremity . .. ED to Hosp-Admission (Discharged) (ADMIT) Dior Griffin MD; Carla Arevalo .. Was this an external facility discharge? No     Challenges to be reviewed by the provider   Additional needs identified to be addressed with provider: yes    Rt knee acute septic- ortho consulted- s/p I&D exchange polyethylene insert, removal of left femoral plate, insertion of antibiotic beads. Culture- Strep- ID consulted. Will follow with twice weekly lab work-M and . PICC inserted 22. New meds: roxicodone 5 mg PRN. ( use miralax daily if using consistently)  pepcid 20 BID- PRN (advised to take daily- if s/sx add second dose while on abx). Stop ibuprofen-NSAIDS. HH thru [facility]. Discharged with bariatric Rw- [facility]., therapy to start in two weeks. Follow up with ortho - Dr. Yari Gonzalez in two weeks. Has PCP appointment scheduled for  at 11 am. Clarify with provider necessity per patient's request.     Labs:  22- H&H 7.9 / 25.3- decreased. 1/3/22- A1C- 8.5. states he does not check BG at home. Method of communication with provider : chart routing    Discussed COVID-19 related testing which was available at this time. Test results were negative. Patient informed of results, if available? aware     Advance Care Planning:   Does patient have an Advance Directive:  not on file    Inpatient Readmission Risk score: Unplanned Readmit Risk Score: 11.2 ( )    Was this a readmission?  no     Patients top risk factors for readmission: level of motivation, medical condition-  and to be further assessed   Interventions to address risk factors: Communication with home health agencies or other [facility] services the patient is currently [place] [address] , Education of patient/family/caregiver/guardian to support self-management- , Assessment and support for treatment adherence and medication management-  and communication with PCP    Care Transition Nurse (CTN) contacted the patient by telephone to perform post hospital discharge assessment. Verified name and  with patient as identifiers. Provided introduction to self, and explanation of the CTN role. CTN reviewed discharge instructions, medical action plan and red flags with patient who verbalized understanding. Were discharge instructions available to patient? yes. Reviewed appropriate site of care based on symptoms and resources available to patient including: Specialist. Patient given an opportunity to ask questions and does not have any further questions or concerns at this time. The patient agrees to contact the PCP office for questions related to their healthcare. Medication reconciliation was performed with patient, who verbalizes understanding of administration of home medications. Advised obtaining a 90-day supply of all daily and as-needed medications. Referral to Pharm D needed: no     Home Health/Outpatient orders at discharge: [address]: At [address]  Date of initial visit: per office- SN is scheduled to see him today. Asked patient to let CTN know if he has not heard by 2 pm.      Durable Medical Equipment ordered at discharge: [address]: [address] Equipment received: yes    Covid Risk Education    Educated patient about risk for severe COVID-19 due to risk factors according to CDC guidelines. CTN reviewed discharge instructions, medical action plan and red flag symptoms with the patient who verbalized understanding. Discussed COVID vaccination status: no. Education provided on COVID-19 vaccination as appropriate.  Discussed exposure protocols and quarantine with CDC Guidelines. Patient was given an opportunity to verbalize any questions and concerns and agrees to contact CTN or health care provider for questions related to their healthcare. Was patient discharged with a pulse oximeter? NA. Discussed and confirmed pulse oximeter discharge instructions and when to notify provider or seek emergency care. Discussed follow-up appointments. If no appointment was previously scheduled, appointment scheduling offered: not needed- he is calling Ortho to schedule 2 week follow up from day of surgery done- around 1/17. . Is follow up appointment scheduled within 7 days of discharge? no.   [address] follow up appointment(s):   Future Appointments   Date Time Provider Jeff Henry   1/14/2022 11:00 AM [place], MD [facility] BS AMB   3/24/2022 12:00 PM REMOTE[facility] BS AMB   10[facility]2022 10[facility]30 [facility]   10/6/2022 10:40 AM Rylee Melendez, ANP [facility]     Non-[facility] follow up appointment(s):   Ortho- Dr. Сергей Silva two weeks from 1/3- @ 1/17    Plan for follow-up call in 5-7 days based on severity of symptoms and risk factors. Plan for next call:   · Assess current symptoms- temperature, etc.   · Confirm lab work being done  · Appointment in place with ortho for follow up  · Clarify PCP scheduled appointment needed with provider  CTN provided contact information for future needs. Goals Addressed                 This Visit's Progress       General     Reduce risk for hospitalization        1/7/22- spoke with Mr. Brent Jaffe Has good pain control- not taking roxicodone, slept well. Will add in miralax daily if he starts to use. No previous concerns of constipation in past.    Complete full course of daily IVAB- last dose 2/13/22. PICC placed 1/5- HH will monitor, care for and remove. [facility]- they have orders for lab work on M and Th.  Last done 1/6/22.   Note- CTN will update PCP about lab work orders for monitoring H&H.     Mr. Rosy Tijerina was given instruction and supplies for changing dressing. Wife working from home next week so she is available to assist.  Advised to monitor temperature- report to provider >100.4 and/or chills. He does not monitor BG values at home.  WBAT- has home therapies. Has bariatric RW. HH therapies start in two weeks. Will follow up with ortho in 2 weeks.  Pt will remain out of the hospital or ER for remainder of DAVID period.    LLC

## 2022-01-07 NOTE — ADT AUTH CERT NOTES
Utilization Reviews         Cellulitis - Care Day 4 (1/5/2022) by Reji Alves RN       Review Status Review Entered   Completed 1/6/2022 17:00      Criteria Review      Care Day: 4 Care Date: 1/5/2022 Level of Care: Inpatient Floor    Guideline Day 3    Clinical Status    (X) * Hemodynamic stability    1/6/2022 17:00:13 EST by Anamaria Jenkins      VS 98.2 69 138/73 18 98% room air    (X) * Afebrile or fever improved    1/6/2022 17:00:13 EST by Anamaria Jenkins      TEMP 98.2    (X) * Skin exam stable or improved    (X) * Mental status at baseline    1/6/2022 17:00:13 EST by Anamaria Jenkins      ALERT,ORIENTED    ( ) * Antibiotic treatment needs appropriate for next level of care    1/6/2022 17:00:13 EST by Maicol Bello      CASE MANAGMENT WORKING ON IV ABX    (X) * Pain absent or manageable at next level of care    1/6/2022 17:00:13 EST by Anamaria Jenkins      PO PAIN CONTROL    ( ) * Discharge plans and education understood    Activity    (X) * Ambulatory or acceptable for next level of care    1/6/2022 17:00:13 EST by Anamaria Jenkins      UP AD MARC WITH PT    Routes    (X) * Oral hydration    1/6/2022 17:00:13 EST by Anamaria Jenkins      ADULT REG DIET    (X) * Oral medications or regimen acceptable for next level of care    1/6/2022 17:00:13 EST by Anamaria Jenkins      Tylenol 650mg po q6   cordarone 100mg po qday  aspirin 81mg po qday  Lasix 40mg po qday  lopid 600mg po bid  glucotrol 10mg po bid  SSI x1  Lopressor 25mg po bid  Roxicodone 10mg po q3 prn x3  crestor 20mg po qday  Eliquis 2.5mg po q12    (X) * Oral diet or acceptable for next level of care    Interventions    (X) WBC    1/6/2022 17:00:13 EST by Anamaria Jenkins      WBC 7.6    Medications    (X) Parenteral or oral antibiotics    1/6/2022 17:00:13 EST by Anamaria Jenkins      vancomycin 1,250mg iv q12 ancef 3g iv x1    * Milestone   Additional Notes   1/5/2022   LOC IP ORTHO   LABS   HGB: 7.9 (L)   HCT: 25.6 (L)   CO2: 17 (L)   Anion gap: 12   Glucose: 136 (H)   BUN: 24 (H)   Creatinine: 0.84   BUN/Creatinine ratio: 29 (H)   Calcium: 8.4 (L)          ATTENDING NOTE    2 Days Post-Op      Procedure(s):   RIGHT KNEE POLY EXCHANGE ; HARDWARE REMOVAL RIGHT LATERAL LEG; INCISION AND DRAINAGE; WOUND IRRIGATION; PLACEMENT OF ANTIBIOTIC BEADS       Subjective:       -Pain:  Better controlled when the oxycodone was started. Had some pain after PT yesterday   -Patient ambulating with PT   -Patient tolerating PO diet, no nausea. -Patient voiding appropriately and w/ flatus but notes feeling a bowel movement coming.   -Vitals stable, afebrile.  No fever/chills. Right knee periprosthetic infection: PO day #2 following Irrigation and debridement, right knee, with exchange of polyethylene insert, removal of lateral femoral plate through a separate incision with placement of antibiotic-impregnated beads.           Currently on vancomycin and ceftriaxone; ID following.  Intra-op cultures taken 1/3/21 and cultures from joint arthrocentesis on 1/2/21 are growing beta hemolytic group B strep. INFECTIOUS DISEASE NOTE   Assessment and Plan   Right prosthetic knee infection   S/p I & D of right knee with exchange of polyethylene insert, removal of lateral femoral plate through a seperate incision with placement of antibiotic beads (1/3)   S/p ORIF of right supracondylar femur fracture with synthes plate placement (1/11/9051)   - afebrile, WBC 10.8->7. 6     CRP 28 (1/2)     Fluid cx (1/2) beta hemolytic group B strep     Intra-op cx (1/3) beta hemolytic group B strep         continue with IV rocephin, last dose 2/13/2022.     -> pt will need suppressive therapy with PO keflex upon completion of IV ABX.     PICC placement has requested 1/5     Discharge IV ABX order in place 1/5          fever work up if temp >= 100.4       Type II DM   - A1C 8.5         Above plan of care discussed and agreed with Dr. Franky Billy   Pt is clear to discharge in ID stand point. Will continue to follow.           Cellulitis - Care Day 3 (1/4/2022) by Carlos Alberto Cabral RN       Review Status Review Entered   Completed 1/6/2022 16:55      Criteria Review      Care Day: 3 Care Date: 1/4/2022 Level of Care: Inpatient Floor    Guideline Day 2    Level Of Care    (X) Floor    1/6/2022 16:55:24 EST by Dimas Sosa      IP ORTHO    Clinical Status    (X) * Dehydration absent    (X) * Mental status at baseline    1/6/2022 16:55:24 EST by Anamaria Jenkins      ALERT,ORIENTED    (X) * Hemodynamic stability    1/6/2022 16:55:24 EST by Anamaria Jenkins      VS 98.6 71 126/65 16 97% ROOM AIR    (X) * Afebrile or fever improved    1/6/2022 16:55:24 EST by Anamaria Jenkins      TEMP 98.6    Activity    (X) Activity as tolerated    1/6/2022 16:55:24 EST by Anamaria Jenkins      up with assistance    Routes    (X) * Oral hydration    1/6/2022 16:55:24 EST by Anamaria Jenkins      TOLERATING PO    (X) * Oral medications    (X) Diet as tolerated    1/6/2022 16:55:24 EST by Anamaria Jenkins      ADULT REG 4 CARB CHOICES, LOW FAT, LOW CHOL.  HIGH FIBER/ 2GM NA    Medications    (X) IV antibiotics    1/6/2022 16:55:24 EST by Anamaria Jenkins      Rocephin 2g iv qday ancef 3g iv x1 vancomycin 1,250mg iv q12    * Milestone   Additional Notes   1/4/2022   LOC IP ORTHO   VS 98.6 71 126/65 16 97% ROOM AIR   LABS     HGB: 8.7 (L)   HCT: 29.2 (L)   Sodium: 134 (L)   Potassium: 3.8   Chloride: 107   CO2: 16 (L)   Anion gap: 11   Glucose: 146 (H)   BUN: 26 (H)   Creatinine: 1.18   BUN/Creatinine ratio: 22 (H)   Calcium: 8.2 (L)      MEDS   Tylenol 650mg po q6    cordarone 100mg po qday   aspirin 81mg po qday   Lasix 40mg po qday   lopid 600mg po bid   glucotrol 10mg po bid   SSI x1   Lopressor 25mg po bid   Roxicodone 10mg po q3 prn x3   crestor 20mg po qday   Eliquis 2.5mg po q12   ancef 3g iv x1   NS @ 125 cc/hr   vancomycin 1,250mg iv q12      ATTENDING NOTE   Patient worked with therapy and had some pain after he returned to bed. Assessment & Plan:   Right knee septic arthritis, infected total knee   --Status post right knee polyexchange, hardware removal, incision and drainage, wound irrigation and placement of antibiotic beads   --Synovial fluid culture grew moderate streptococci, beta-hemolytic B, on vancomycin. --Continue ceftriaxone vancomycin   --ID on board, recommending 6 weeks of IV therapy.       Type II DM   --Accu-Cheks plus Humalog sliding scale   --Continued on glipizide.       CAD.  Paroxysmal atrial fibrillation, history of sinus pauses status post pacemaker   --Continue amiodarone, aspirin, apixaban, metoprolol       Hypertension, stable: Continue home medications   Hyperlipidemia, stable: Continue statin   Constitutional: No acute distress, cooperative, pleasant    HEENT: Atraumatic. Oral mucosa moist,. Non icteric sclera. No pallor. Resp: CTA bilaterally. No wheezing/rhonchi/rales. No accessory muscle use   Chest Wall: No deformity   CV: Regular rhythm, normal rate, no murmurs, gallops, rubs    GI: Soft, non distended, non tender.  normoactive bowel sounds, no hepatosplenomegaly    : No CVA or suprapubic tenderness    Musculoskeletal: Right knee Ace bandage and immobilized    Neurologic: Mental status:AAOx3,    Cranial nerves II-XII : WNL   Motor exam:Moves all extremities symmetrically         INFECTIOUS DISEASE NOTE   Impression:   Right prosthetic knee infection   S/p I & D of right knee with exchange of polyethylene insert, removal of lateral femoral plate through a seperate incision with placement of antibiotic beads (1/3)   S/p ORIF of right supracondylar femur fracture with synthes plate placement (6/77/5582)   - afebrile, WBC 10.8     CRP 28 (1/2)     Fluid cx (1/2) beta hemolytic group B strep     Intra-op cx (1/3) occasional GPC in pairs       Type II DM   - A1C 8.5       Plan:   · continue with IV rocephin and vancomycin   · will adjust ABX prn   · pt will need at least 6 weeks of IV ABX therapy; final recommendation will be provided once review the final culture results   · fever work up if temp >= 100. 4     Above plan of care discussed and agreed with Dr. Tito Thomson        Anti-infectives:   · IV rocephin 1/4-   · IV ancef 1/3   · IV vancomycin 1/3-      ORTHOPEDIC NOTE    1 Day Post-Op      Procedure(s):   RIGHT KNEE POLY EXCHANGE ; HARDWARE REMOVAL RIGHT LATERAL LEG; INCISION AND DRAINAGE; WOUND IRRIGATION; PLACEMENT OF ANTIBIOTIC BEADS       Subjective:       -Pain:  7/10, has not had any narcotic pain meds since last night   -Had chills last night but not today.  Patient to start working with PT today. -Reporting he normally has swelling in his bilateral lower extremities   -Afebrile, vital stable   -Denies fever, chills, SOB, CP, cough, dysuria, urinary frequency but reports having a chronic shallow ulceration on his left medial calf that he had been putting neosporin on \"for quite some time\".    Right knee periprosthetic infection: PO day #1 following Irrigation and debridement, right knee, with exchange of polyethylene insert, removal of lateral femoral plate through a separate incision with placement of antibiotic-impregnated beads.           Currently on vancomycin and ceftriaxone; ID consulted.  Intra-op cultures taken 1/3/21 pending.  Cultures from joint arthrocentesis 1/2/21 are growing beta hemolytic group B strep.

## 2022-01-08 NOTE — DISCHARGE INSTRUCTIONS
Home IV Antibiotic Orders     1.  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  Antibiotic:   IV rocephin 2 gm every 24 hours     3.  Duration of therapy: 2/13/2022    4. Please call Dr. Deny Allen @ [phone] for follow up in 2-3 weeks    5. Plan for suppressive therapy with PO keflex upon completion of IV ABX        Discharge Instructions Knee  Dr. Melanie Steel      Patient Name  Jessika Bernstein  Date of procedure  1/3/2022    Procedure  Procedure(s):  RIGHT KNEE POLY EXCHANGE ; HARDWARE REMOVAL RIGHT LATERAL LEG; INCISION AND DRAINAGE; WOUND IRRIGATION; PLACEMENT OF ANTIBIOTIC BEADS  Surgeon  Surgeon(s) and Role:     * Leonides Patel MD - Primary  Date of discharge: No discharge date for patient encounter. PCP: Cali Benites MD    Follow up care   Follow up visit with Dr. Melanie Steel in 2 weeks. Call [phone] to make an appointment   The staples in your knee incision will be taken out at this follow up appointment    Activity at home   You may weight bear as tolerated.  Keep the knee in full extension in the knee immobilizer. You may come out of it to shower/change clothes but keep your knee straight. Bathing and caring for your incision   Change your knee dressing daily for one week. You may then leave your incision open to air unless you see drainage from your knee. o Use a dry dressing which consists of either sterile gauze or ABD pads. You can tape it on to your knee or use a wrap to secure it to the incision. o Do not apply lotions, ointments, or other products over the incision until it is completely healed.  You may take a shower and wash your incision with soap and water starting on the 3rd day after surgery.    Do not submerge the incision under water until your incision is completely healed (bath tub, hot tub, swimming pool, etc.)     Preventing blood clots   Take your Eliquis 5mg twice a day as prescribed   Call Dr. Melanie Steel if you have side effects of blood thinning medication: bleeding, bruising, upset stomach, or diarrhea.  Call Dr. Sneha Cordero for signs of a blood clot in your leg: calf pain, tenderness, redness, swelling of lower leg       Pain Management   Get up and walk a short distance to relieve pain and stiffness.  Place ice wrap on your knee except when you are walking. The gel ice packs should be changed about every 4 hours   Elevate your leg on pillows for 15-30 minutes    Take Tylenol 1000mg (take two 500mg tablets) every 6-8 hours for the next 2 weeks   If needed, take a narcotic pain pill every 4 hours as prescribed. Take with a small amount of food.  As your pain decreases, take the narcotics less often or take ½ of a pill   Call Dr. Sneha Cordero if you have side effects from your narcotic pain medication: itching, drowsiness, dizziness, upset stomach, dry mouth, constipation or if you medication is not relieving your pain. Avoid after surgery   Do not take any over-the-counter medication for pain except Tylenol     Do not take more than 4000 mg (4 Grams) of Tylenol in 24 hours     Do not drink alcoholic beverages   Do not smoke   Do not drive until seen for follow up appointment   Do not place frozen gel pack directly on your skin. It can cause frostbite. Prevention of falls and safety at home   Set up an area where you can rest comfortably leaving space around furniture to allow you to walk with your walker   Keep stairs, hallways and bathrooms well lit; especially at night   Arrange for care for your pets   Keep your home free of clutter   Call Dr. Sneha Cordero at [phone] for:   Pain that is not relieved by pain medication, ice and activity   Side effects of medications   Increased/spread of bruising   Warning signs of infection:  ? persistent fever greater than 100 degrees  ?  shaking or chills  ? increased redness, tenderness, swelling or drainage from incision  ? increased pain during activity or rest   Warning signs of a blood clot in your leg:  ? increased pain in your calf  ? tenderness or redness  ? increased swelling of knee, calf, ankle, or foot    If you call after 5pm or on a weekend, the on call physician will return your phone call  Call your Primary Care Doctor for:    Concerns about your medical conditions such as diabetes, high blood pressure, asthma, congestive heart failure.  Blood sugars greater than 180   Persistent headache or dizziness   Coughing or congestion   Constipation or diarrhea   Burning when you go to the bathroom   Abnormal heart rate (fast or slow)               Call 911 and go to the nearest hospital for:    Sudden increased shortness of breath   Sudden onset of chest pain   Difficulty breathing   Localized chest pain with coughing or taking a deep breath          Discharge Instructions       PATIENT ID: Aiden Wilcox  MRN: [id number]   YOB: 1960    DATE OF ADMISSION: 1/2/2022  2:44 PM    DATE OF DISCHARGE: 1/6/2022    PRIMARY CARE PROVIDER: Abimbola Wylie MD     ATTENDING PHYSICIAN: Pao Pollock MD  DISCHARGING PROVIDER: Deysi Nath MD    To contact this individual call [phone] and ask the  to page. If unavailable ask to be transferred the Adult Hospitalist Department.     DISCHARGE DIAGNOSES- Septic rt knee, Atrial fib    CONSULTATIONS: IP CONSULT TO INFECTIOUS DISEASES    PROCEDURES/SURGERIES: Procedure(s):  RIGHT KNEE POLY EXCHANGE ; HARDWARE REMOVAL RIGHT LATERAL LEG; INCISION AND DRAINAGE; WOUND IRRIGATION; PLACEMENT OF ANTIBIOTIC BEADS    PENDING TEST RESULTS:   At the time of discharge the following test results are still pending:none    FOLLOW UP APPOINTMENTS:   Follow-up Information     Follow up With Specialties Details Why Contact Info    AT [address] [facility],[address]; PT/OT/[facility] care [address] [place] [address] [address] [place] [address] Services [address]    Everrett Boeck, [address] In 1 week  [address] [facility] [address]  [phone]      Nikunj Arthur MD Infectious Disease In 2 weeks  [address] [facility]      Westly Apgar, MD Orthopedic Surgery In 2 weeks  [address]  [phone]             ADDITIONAL CARE RECOMMENDATIONS:   -Follow up with Infectious disease,PCP and ortho    DIET: Regular Diet      ACTIVITY: Activity as tolerated                DISCHARGE MEDICATIONS:   See Medication Reconciliation Form    · It is important that you take the medication exactly as they are prescribed. · Keep your medication in the bottles provided by the pharmacist and keep a list of the medication names, dosages, and times to be taken in your wallet. · Do not take other medications without consulting your doctor. NOTIFY YOUR PHYSICIAN FOR ANY OF THE FOLLOWING:   Fever over 101 degrees for 24 hours. Chest pain, shortness of breath, fever, chills, nausea, vomiting, diarrhea, change in mentation, falling, weakness, bleeding. Severe pain or pain not relieved by medications. Or, any other signs or symptoms that you may have questions about.       DISPOSITION:  z  Home With:   OT  PT  [facility]  RN       SNF/Inpatient Rehab/LTAC    Independent/assisted living    Hospice    Other:           Signed:   Cesar Crandall MD  2022  3:08 PM

## 2022-01-26 ENCOUNTER — PATIENT OUTREACH (OUTPATIENT)
Dept: CASE MANAGEMENT | Age: 62
End: 2022-01-26

## 2022-01-26 NOTE — PROGRESS NOTES
Follow Up Call    Challenges to be reviewed by the provider   Additional needs identified to be addressed with provider: no           Encounter was not routed to provider for escalation. Method of communication with provider: none. Contacted the patient by telephone to follow up after hospital visit. Status: improved     Goals        General     Reduce risk for hospitalization      1/25/22- spoke with Mr. Brent Cortes  He has followed up with ortho and ID provider last week. PT still on hold for now- limited pain. Reports LE edema- R>L  He is elevating and using ice- CTN suggested he look into securing [facility]-care ice cooler. Ortho office may have to give. [facility]     1/7/22- spoke with Mr. Luis Wylie Has good pain control- not taking roxicodone, slept well. Will add in miralax daily if he starts to use. No previous concerns of constipation in past.    Complete full course of daily IVAB- last dose 2/13/22. PICC placed 1/5- HH will monitor, care for and remove. [facility]- they have orders for lab work on M and Th.  Last done 1/6/22. Note- CTN will update PCP about lab work orders for monitoring H&H.     . Brent Cortes was given instruction and supplies for changing dressing. Wife working from home next week so she is available to assist.  Advised to monitor temperature- report to provider >100.4 and/or chills. He does not monitor BG values at home.  [facility]- has home therapies. Has bariatric RW. HH therapies start in two weeks. Will follow up with ortho in 2 weeks.  Pt will remain out of the hospital or ER for remainder of DAVID period.    LL[facility]               Interventions to address identified needs: Education of patient/family/caregiver/guardian to support self-management-  and Assessment and support for treatment adherence and medication management-     [facility] follow up appointment(s):   Future Appointments   Date Time Provider Jeff Henry   2/15/2022 11:00 AM Cleo Bowie MD [facility] BS AMB 3/24/2022 12:00 PM REMOTE_RCAM [facility] BS AMB   10/6/2022 10:30 AM PACEMAKER, RCAM [facility] BS AMB   10/6/2022 10:40 AM Rylee Melendez, ANP [facility] BS AMB     Non-BS follow up appointment(s):   Ortho- Dr. Jennifer Olea- attended follow up last week. 1/19- next is on 2/17  ID- Dr. Nithya Ochoa- attended follow up last week. Follow up appointment completed? yes. Provided contact information for future needs. Plan for follow-up call in 7-10 days based on severity of symptoms and risk factors. Plan for next call: assess current symptoms.       Prem Redding RN

## 2022-02-09 RX ORDER — GLIPIZIDE 10 MG/1
TABLET ORAL
Qty: 180 TABLET | Refills: 1 | Status: SHIPPED | OUTPATIENT
Start: 2022-02-09 | End: 2022-06-15

## 2022-02-15 ENCOUNTER — OFFICE VISIT (OUTPATIENT)
Dept: FAMILY MEDICINE CLINIC | Age: 62
End: 2022-02-15
Payer: COMMERCIAL

## 2022-02-15 VITALS
BODY MASS INDEX: 39.55 KG/M2 | SYSTOLIC BLOOD PRESSURE: 100 MMHG | DIASTOLIC BLOOD PRESSURE: 58 MMHG | HEIGHT: 72 IN | WEIGHT: 292 LBS | RESPIRATION RATE: 16 BRPM | TEMPERATURE: 97.7 F | HEART RATE: 70 BPM | OXYGEN SATURATION: 98 %

## 2022-02-15 DIAGNOSIS — E78.00 HYPERCHOLESTEROLEMIA: ICD-10-CM

## 2022-02-15 DIAGNOSIS — D50.9 IRON DEFICIENCY ANEMIA, UNSPECIFIED IRON DEFICIENCY ANEMIA TYPE: Primary | ICD-10-CM

## 2022-02-15 DIAGNOSIS — E66.01 SEVERE OBESITY (BMI 35.0-39.9) WITH COMORBIDITY (HCC): ICD-10-CM

## 2022-02-15 DIAGNOSIS — E11.9 TYPE 2 DIABETES MELLITUS WITHOUT COMPLICATION, WITHOUT LONG-TERM CURRENT USE OF INSULIN (HCC): ICD-10-CM

## 2022-02-15 PROBLEM — E11.22 TYPE 2 DIABETES MELLITUS WITH CHRONIC KIDNEY DISEASE (HCC): Status: ACTIVE | Noted: 2022-02-15

## 2022-02-15 PROCEDURE — 99214 OFFICE O/P EST MOD 30 MIN: CPT | Performed by: FAMILY MEDICINE

## 2022-02-15 PROCEDURE — 3052F HG A1C>EQUAL 8.0%<EQUAL 9.0%: CPT | Performed by: FAMILY MEDICINE

## 2022-02-15 RX ORDER — AMOXICILLIN 250 MG
1 CAPSULE ORAL
Qty: 60 TABLET | Refills: 5 | Status: SHIPPED | OUTPATIENT
Start: 2022-02-15

## 2022-02-15 RX ORDER — OXYCODONE HYDROCHLORIDE 5 MG/1
5 TABLET ORAL
COMMUNITY
Start: 2022-01-06

## 2022-02-15 NOTE — PROGRESS NOTES
Chief Complaint   Patient presents with   [facility] Follow Up     1. Have you been to the ER, urgent care clinic since your last visit? Hospitalized since your last visit? Yes ED [facility] 1/2/22    2. Have you seen or consulted any other health care providers outside of the [address] [place] [address] since your last visit? Include any pap smears or colon screening.  Yes Ortho

## 2022-02-15 NOTE — PROGRESS NOTES
HISTORY OF PRESENT ILLNESS  Quintin Bautista is a 59 y.o. male. HPIKnee replacement in 2010. Got infected shortly after Xmas. Went to [employer], had it opened and drained, repalced a few parts. Was in hospital for 3-4 days. Has been on iv antibiotics for 6 weeks . hgb and iron levels were low. NO abdominal pains, diarrhea. Some constipation. Has seen some light blood on toilet paper, none in stool. No melena. Had colonoscopy in 2009 (Dr. Rhona Dior). Also needs diabetes and cholesterol checked. ROS    Physical Exam  Vitals and nursing note reviewed. Constitutional:       Appearance: He is well-developed. HENT:      Right Ear: External ear normal.      Left Ear: External ear normal.   Neck:      Thyroid: No thyromegaly. Cardiovascular:      Rate and Rhythm: Normal rate and regular rhythm. Heart sounds: Normal heart sounds. Pulmonary:      Effort: Pulmonary effort is normal. No respiratory distress. Breath sounds: Normal breath sounds. No wheezing. Abdominal:      General: Bowel sounds are normal. There is no distension. Palpations: Abdomen is soft. There is no mass. Tenderness: There is no abdominal tenderness. There is no guarding. Musculoskeletal:         General: Normal range of motion. Lymphadenopathy:      Cervical: No cervical adenopathy. ASSESSMENT and PLAN  Orders Placed This Encounter    CBC WITH AUTOMATED DIFF    IRON PROFILE    LIPID PANEL    HEMOGLOBIN A1C WITH EAG    [facility] [address]    senna-docusate (PERICOLACE) 8.6-50 mg per tablet     Diagnoses and all orders for this visit:    1. Iron deficiency anemia, unspecified iron deficiency anemia type  -     CBC WITH AUTOMATED DIFF; Future  -     IRON PROFILE; Future  -     REFERRAL TO GASTROENTEROLOGY    2. Severe obesity (BMI 35.0-39. 9) with comorbidity ([address]    3.  Type 2 diabetes mellitus without complication, without long-term current use of insulin (HCC)  -     HEMOGLOBIN A1C WITH EAG; Future    4. Hypercholesterolemia  -     LIPID PANEL; Future    Other orders  -     senna-docusate (PERICOLACE) 8.6-50 mg per tablet; Take 1 Tablet by mouth two (2) times daily as needed for Constipation. Follow-up and Dispositions    · Return in about 6 months (around 8/15/2022).

## 2022-02-16 LAB
BASOPHILS # BLD: 0.1 K/UL (ref 0–0.1)
BASOPHILS NFR BLD: 1 % (ref 0–1)
CHOLEST SERPL-MCNC: 100 MG/DL
DIFFERENTIAL METHOD BLD: ABNORMAL
EOSINOPHIL # BLD: 0.4 K/UL (ref 0–0.4)
EOSINOPHIL NFR BLD: 6 % (ref 0–7)
ERYTHROCYTE [DISTWIDTH] IN BLOOD BY AUTOMATED COUNT: 16.4 % (ref 11.5–14.5)
EST. AVERAGE GLUCOSE BLD GHB EST-MCNC: 171 MG/DL
HBA1C MFR BLD: 7.6 % (ref 4–5.6)
HCT VFR BLD AUTO: 26.8 % (ref 36.6–50.3)
HDLC SERPL-MCNC: 31 MG/DL
HDLC SERPL: 3.2 {RATIO} (ref 0–5)
HGB BLD-MCNC: 7.5 G/DL (ref 12.1–17)
IMM GRANULOCYTES # BLD AUTO: 0 K/UL (ref 0–0.04)
IMM GRANULOCYTES NFR BLD AUTO: 0 % (ref 0–0.5)
IRON SATN MFR SERPL: 6 % (ref 20–50)
IRON SERPL-MCNC: 22 UG/DL (ref 35–150)
LDLC SERPL CALC-MCNC: 49.2 MG/DL (ref 0–100)
LYMPHOCYTES # BLD: 1.4 K/UL (ref 0.8–3.5)
LYMPHOCYTES NFR BLD: 21 % (ref 12–49)
MCH RBC QN AUTO: 22.9 PG (ref 26–34)
MCHC RBC AUTO-ENTMCNC: 28 G/DL (ref 30–36.5)
MCV RBC AUTO: 82 FL (ref 80–99)
MONOCYTES # BLD: 0.5 K/UL (ref 0–1)
MONOCYTES NFR BLD: 7 % (ref 5–13)
NEUTS SEG # BLD: 4.4 K/UL (ref 1.8–8)
NEUTS SEG NFR BLD: 65 % (ref 32–75)
NRBC # BLD: 0 K/UL (ref 0–0.01)
NRBC BLD-RTO: 0 PER 100 WBC
PLATELET # BLD AUTO: 502 K/UL (ref 150–400)
PLATELET COMMENTS,PCOM: ABNORMAL
PMV BLD AUTO: 9.5 FL (ref 8.9–12.9)
RBC # BLD AUTO: 3.27 M/UL (ref 4.1–5.7)
RBC MORPH BLD: ABNORMAL
RBC MORPH BLD: ABNORMAL
TIBC SERPL-MCNC: 359 UG/DL (ref 250–450)
TRIGL SERPL-MCNC: 99 MG/DL (ref ?–150)
VLDLC SERPL CALC-MCNC: 19.8 MG/DL
WBC # BLD AUTO: 6.8 K/UL (ref 4.1–11.1)

## 2022-03-18 PROBLEM — I49.5 TACHY-BRADY SYNDROME (HCC): Status: ACTIVE | Noted: 2021-03-15

## 2022-03-18 PROBLEM — S72.90XA FEMUR FRACTURE (HCC): Status: ACTIVE | Noted: 2020-08-16

## 2022-03-19 PROBLEM — I48.91 A-FIB (HCC): Status: ACTIVE | Noted: 2021-05-10

## 2022-03-19 PROBLEM — I48.0 PAF (PAROXYSMAL ATRIAL FIBRILLATION) (HCC): Status: ACTIVE | Noted: 2018-12-12

## 2022-03-19 PROBLEM — S72.453A SUPRACONDYLAR FRACTURE OF DISTAL END OF FEMUR WITHOUT INTRACONDYLAR EXTENSION (HCC): Status: ACTIVE | Noted: 2020-08-17

## 2022-03-19 PROBLEM — M00.061 STAPHYLOCOCCAL ARTHRITIS OF RIGHT KNEE (HCC): Status: ACTIVE | Noted: 2022-01-03

## 2022-03-19 PROBLEM — E66.01 SEVERE OBESITY (HCC): Status: ACTIVE | Noted: 2018-12-12

## 2022-03-19 PROBLEM — E11.21 TYPE 2 DIABETES WITH NEPHROPATHY (HCC): Status: ACTIVE | Noted: 2018-12-12

## 2022-03-20 PROBLEM — E11.22 TYPE 2 DIABETES MELLITUS WITH CHRONIC KIDNEY DISEASE (HCC): Status: ACTIVE | Noted: 2022-02-15

## 2022-03-20 PROBLEM — E11.8 CONTROLLED TYPE 2 DIABETES MELLITUS WITH COMPLICATION, WITHOUT LONG-TERM CURRENT USE OF INSULIN (HCC): Status: ACTIVE | Noted: 2018-12-12

## 2022-03-20 PROBLEM — L03.90 CELLULITIS: Status: ACTIVE | Noted: 2022-01-02

## 2022-03-21 ENCOUNTER — TELEPHONE (OUTPATIENT)
Dept: CARDIOLOGY CLINIC | Age: 62
End: 2022-03-21

## 2022-03-24 ENCOUNTER — OFFICE VISIT (OUTPATIENT)
Dept: CARDIOLOGY CLINIC | Age: 62
End: 2022-03-24
Payer: COMMERCIAL

## 2022-03-24 DIAGNOSIS — I48.0 PAF (PAROXYSMAL ATRIAL FIBRILLATION) (HCC): ICD-10-CM

## 2022-03-24 DIAGNOSIS — I49.5 SSS (SICK SINUS SYNDROME) (HCC): ICD-10-CM

## 2022-03-24 DIAGNOSIS — Z95.0 CARDIAC PACEMAKER IN SITU: Primary | ICD-10-CM

## 2022-03-24 PROCEDURE — 93296 REM INTERROG EVL PM/IDS: CPT | Performed by: INTERNAL MEDICINE

## 2022-03-24 PROCEDURE — 93294 REM INTERROG EVL PM/LDLS PM: CPT | Performed by: INTERNAL MEDICINE

## 2022-04-08 ENCOUNTER — TELEPHONE (OUTPATIENT)
Dept: FAMILY MEDICINE CLINIC | Age: 62
End: 2022-04-08

## 2022-04-08 NOTE — TELEPHONE ENCOUNTER
Patient states that he will be having a procedure done on Tuesday  wants him to stop taking apixaban (ELIQUIS) tablet 2.5 mg he wants to know what about the baby aspirin he can be reached @ 21 [phone]

## 2022-04-08 NOTE — TELEPHONE ENCOUNTER
pc with pt. hgb at Dr. Judah Garcia office today was 6. 6. he is not terribly symptomatic. Will tentatively plan on seeing pt on T\uesday am, and admit him for transfusion and gi  Workup  . . to hold his eliquis starting Sunday am.

## 2022-04-12 ENCOUNTER — HOSPITAL ENCOUNTER (OUTPATIENT)
Age: 62
Setting detail: OBSERVATION
Discharge: HOME | End: 2022-04-13
Attending: STUDENT IN AN ORGANIZED HEALTH CARE EDUCATION/TRAINING PROGRAM | Admitting: FAMILY MEDICINE
Payer: COMMERCIAL

## 2022-04-12 ENCOUNTER — OFFICE VISIT (OUTPATIENT)
Dept: FAMILY MEDICINE CLINIC | Age: 62
End: 2022-04-12

## 2022-04-12 ENCOUNTER — APPOINTMENT (OUTPATIENT)
Dept: GENERAL RADIOLOGY | Age: 62
End: 2022-04-12
Attending: FAMILY MEDICINE
Payer: COMMERCIAL

## 2022-04-12 VITALS
HEART RATE: 70 BPM | BODY MASS INDEX: 39.98 KG/M2 | DIASTOLIC BLOOD PRESSURE: 65 MMHG | TEMPERATURE: 97.8 F | RESPIRATION RATE: 16 BRPM | HEIGHT: 72 IN | WEIGHT: 295.2 LBS | OXYGEN SATURATION: 98 % | SYSTOLIC BLOOD PRESSURE: 127 MMHG

## 2022-04-12 DIAGNOSIS — D50.0 IRON DEFICIENCY ANEMIA DUE TO CHRONIC BLOOD LOSS: ICD-10-CM

## 2022-04-12 DIAGNOSIS — I48.11 LONGSTANDING PERSISTENT ATRIAL FIBRILLATION (HCC): ICD-10-CM

## 2022-04-12 DIAGNOSIS — E11.8 CONTROLLED TYPE 2 DIABETES MELLITUS WITH COMPLICATION, WITHOUT LONG-TERM CURRENT USE OF INSULIN (HCC): Chronic | ICD-10-CM

## 2022-04-12 DIAGNOSIS — D64.9 ANEMIA, UNSPECIFIED TYPE: Primary | ICD-10-CM

## 2022-04-12 DIAGNOSIS — D50.9 IRON DEFICIENCY ANEMIA, UNSPECIFIED IRON DEFICIENCY ANEMIA TYPE: Primary | ICD-10-CM

## 2022-04-12 LAB
ALBUMIN SERPL-MCNC: 3.1 G/DL (ref 3.5–5)
ALBUMIN/GLOB SERPL: 0.8 {RATIO} (ref 1.1–2.2)
ALP SERPL-CCNC: 36 U/L (ref 45–117)
ALT SERPL-CCNC: 10 U/L (ref 12–78)
ANION GAP SERPL CALC-SCNC: 5 MMOL/L (ref 5–15)
APTT PPP: 24.7 SEC (ref 22.1–31)
AST SERPL-CCNC: 4 U/L (ref 15–37)
BASOPHILS # BLD: 0.1 K/UL (ref 0–0.1)
BASOPHILS NFR BLD: 1 % (ref 0–1)
BILIRUB SERPL-MCNC: 0.4 MG/DL (ref 0.2–1)
BUN SERPL-MCNC: 21 MG/DL (ref 6–20)
BUN/CREAT SERPL: 21 (ref 12–20)
CALCIUM SERPL-MCNC: 8.7 MG/DL (ref 8.5–10.1)
CHLORIDE SERPL-SCNC: 110 MMOL/L (ref 97–108)
CO2 SERPL-SCNC: 23 MMOL/L (ref 21–32)
CREAT SERPL-MCNC: 1.02 MG/DL (ref 0.7–1.3)
DIFFERENTIAL METHOD BLD: ABNORMAL
EOSINOPHIL # BLD: 0.2 K/UL (ref 0–0.4)
EOSINOPHIL NFR BLD: 3 % (ref 0–7)
ERYTHROCYTE [DISTWIDTH] IN BLOOD BY AUTOMATED COUNT: 19.2 % (ref 11.5–14.5)
FERRITIN SERPL-MCNC: 7 NG/ML (ref 26–388)
GLOBULIN SER CALC-MCNC: 3.8 G/DL (ref 2–4)
GLUCOSE SERPL-MCNC: 175 MG/DL (ref 65–100)
HCT VFR BLD AUTO: 26.2 % (ref 36.6–50.3)
HGB BLD-MCNC: 7 G/DL (ref 12.1–17)
HISTORY CHECKED?,CKHIST: NORMAL
IMM GRANULOCYTES # BLD AUTO: 0 K/UL (ref 0–0.04)
IMM GRANULOCYTES NFR BLD AUTO: 0 % (ref 0–0.5)
INR PPP: 1.1 (ref 0.9–1.1)
IRON SATN MFR SERPL: 4 % (ref 20–50)
IRON SERPL-MCNC: 16 UG/DL (ref 35–150)
LYMPHOCYTES # BLD: 1.1 K/UL (ref 0.8–3.5)
LYMPHOCYTES NFR BLD: 21 % (ref 12–49)
MCH RBC QN AUTO: 19.9 PG (ref 26–34)
MCHC RBC AUTO-ENTMCNC: 26.7 G/DL (ref 30–36.5)
MCV RBC AUTO: 74.6 FL (ref 80–99)
MONOCYTES # BLD: 0.4 K/UL (ref 0–1)
MONOCYTES NFR BLD: 7 % (ref 5–13)
NEUTS SEG # BLD: 3.3 K/UL (ref 1.8–8)
NEUTS SEG NFR BLD: 68 % (ref 32–75)
NRBC # BLD: 0 K/UL (ref 0–0.01)
NRBC BLD-RTO: 0 PER 100 WBC
PLATELET # BLD AUTO: 355 K/UL (ref 150–400)
PMV BLD AUTO: 10 FL (ref 8.9–12.9)
POTASSIUM SERPL-SCNC: 4 MMOL/L (ref 3.5–5.1)
PROT SERPL-MCNC: 6.9 G/DL (ref 6.4–8.2)
PROTHROMBIN TIME: 11.1 SEC (ref 9–11.1)
RBC # BLD AUTO: 3.51 M/UL (ref 4.1–5.7)
RBC MORPH BLD: ABNORMAL
RBC MORPH BLD: ABNORMAL
RETICS # AUTO: 0.06 M/UL (ref 0.03–0.1)
RETICS/RBC NFR AUTO: 1.7 % (ref 0.7–2.1)
SODIUM SERPL-SCNC: 138 MMOL/L (ref 136–145)
THERAPEUTIC RANGE,PTTT: NORMAL SECS (ref 58–77)
TIBC SERPL-MCNC: 419 UG/DL (ref 250–450)
VIT B12 SERPL-MCNC: 166 PG/ML (ref 193–986)
WBC # BLD AUTO: 5.1 K/UL (ref 4.1–11.1)

## 2022-04-12 PROCEDURE — 80053 COMPREHEN METABOLIC PANEL: CPT

## 2022-04-12 PROCEDURE — C9113 INJ PANTOPRAZOLE SODIUM, VIA: HCPCS | Performed by: FAMILY MEDICINE

## 2022-04-12 PROCEDURE — P9016 RBC LEUKOCYTES REDUCED: HCPCS

## 2022-04-12 PROCEDURE — G0378 HOSPITAL OBSERVATION PER HR: HCPCS

## 2022-04-12 PROCEDURE — 83540 ASSAY OF IRON: CPT

## 2022-04-12 PROCEDURE — 74011250637 HC RX REV CODE- 250/637: Performed by: FAMILY MEDICINE

## 2022-04-12 PROCEDURE — 71045 X-RAY EXAM CHEST 1 VIEW: CPT

## 2022-04-12 PROCEDURE — 65270000029 HC RM PRIVATE

## 2022-04-12 PROCEDURE — 85610 PROTHROMBIN TIME: CPT

## 2022-04-12 PROCEDURE — 36430 TRANSFUSION BLD/BLD COMPNT: CPT

## 2022-04-12 PROCEDURE — 85025 COMPLETE CBC W/AUTO DIFF WBC: CPT

## 2022-04-12 PROCEDURE — 82728 ASSAY OF FERRITIN: CPT

## 2022-04-12 PROCEDURE — 96374 THER/PROPH/DIAG INJ IV PUSH: CPT

## 2022-04-12 PROCEDURE — 86900 BLOOD TYPING SEROLOGIC ABO: CPT

## 2022-04-12 PROCEDURE — 85045 AUTOMATED RETICULOCYTE COUNT: CPT

## 2022-04-12 PROCEDURE — 99285 EMERGENCY DEPT VISIT HI MDM: CPT

## 2022-04-12 PROCEDURE — 74011000250 HC RX REV CODE- 250: Performed by: FAMILY MEDICINE

## 2022-04-12 PROCEDURE — 74011250636 HC RX REV CODE- 250/636: Performed by: FAMILY MEDICINE

## 2022-04-12 PROCEDURE — 99222 1ST HOSP IP/OBS MODERATE 55: CPT | Performed by: FAMILY MEDICINE

## 2022-04-12 PROCEDURE — 85730 THROMBOPLASTIN TIME PARTIAL: CPT

## 2022-04-12 PROCEDURE — 82607 VITAMIN B-12: CPT

## 2022-04-12 PROCEDURE — 86923 COMPATIBILITY TEST ELECTRIC: CPT

## 2022-04-12 PROCEDURE — 36415 COLL VENOUS BLD VENIPUNCTURE: CPT

## 2022-04-12 RX ORDER — INSULIN LISPRO 100 [IU]/ML
INJECTION, SOLUTION INTRAVENOUS; SUBCUTANEOUS
Status: DISCONTINUED | OUTPATIENT
Start: 2022-04-13 | End: 2022-04-13 | Stop reason: HOSPADM

## 2022-04-12 RX ORDER — FUROSEMIDE 40 MG/1
40 TABLET ORAL DAILY
Status: DISCONTINUED | OUTPATIENT
Start: 2022-04-13 | End: 2022-04-13 | Stop reason: HOSPADM

## 2022-04-12 RX ORDER — PANTOPRAZOLE SODIUM 40 MG/10ML
40 INJECTION, POWDER, LYOPHILIZED, FOR SOLUTION INTRAVENOUS EVERY 24 HOURS
Status: DISCONTINUED | OUTPATIENT
Start: 2022-04-12 | End: 2022-04-12

## 2022-04-12 RX ORDER — AMOXICILLIN 250 MG
1 CAPSULE ORAL
Status: DISCONTINUED | OUTPATIENT
Start: 2022-04-12 | End: 2022-04-13 | Stop reason: HOSPADM

## 2022-04-12 RX ORDER — SODIUM CHLORIDE 0.9 % (FLUSH) 0.9 %
5-40 SYRINGE (ML) INJECTION AS NEEDED
Status: DISCONTINUED | OUTPATIENT
Start: 2022-04-12 | End: 2022-04-13 | Stop reason: HOSPADM

## 2022-04-12 RX ORDER — METOPROLOL TARTRATE 25 MG/1
25 TABLET, FILM COATED ORAL 2 TIMES DAILY
Status: DISCONTINUED | OUTPATIENT
Start: 2022-04-12 | End: 2022-04-13 | Stop reason: HOSPADM

## 2022-04-12 RX ORDER — PENICILLIN V POTASSIUM 500 MG/1
TABLET, FILM COATED ORAL 4 TIMES DAILY
COMMUNITY

## 2022-04-12 RX ORDER — ROSUVASTATIN CALCIUM 20 MG/1
20 TABLET, COATED ORAL DAILY
Status: DISCONTINUED | OUTPATIENT
Start: 2022-04-13 | End: 2022-04-13 | Stop reason: HOSPADM

## 2022-04-12 RX ORDER — AMIODARONE HYDROCHLORIDE 200 MG/1
100 TABLET ORAL DAILY
Status: DISCONTINUED | OUTPATIENT
Start: 2022-04-13 | End: 2022-04-13 | Stop reason: HOSPADM

## 2022-04-12 RX ORDER — LANOLIN ALCOHOL/MO/W.PET/CERES
800 CREAM (GRAM) TOPICAL DAILY
Status: DISCONTINUED | OUTPATIENT
Start: 2022-04-13 | End: 2022-04-13 | Stop reason: HOSPADM

## 2022-04-12 RX ORDER — GEMFIBROZIL 600 MG/1
600 TABLET, FILM COATED ORAL 2 TIMES DAILY
Status: DISCONTINUED | OUTPATIENT
Start: 2022-04-12 | End: 2022-04-13 | Stop reason: HOSPADM

## 2022-04-12 RX ORDER — MAGNESIUM SULFATE 100 %
4 CRYSTALS MISCELLANEOUS AS NEEDED
Status: DISCONTINUED | OUTPATIENT
Start: 2022-04-12 | End: 2022-04-13 | Stop reason: HOSPADM

## 2022-04-12 RX ORDER — ACETAMINOPHEN 500 MG
500 TABLET ORAL
Status: DISCONTINUED | OUTPATIENT
Start: 2022-04-12 | End: 2022-04-13 | Stop reason: HOSPADM

## 2022-04-12 RX ORDER — SODIUM CHLORIDE 0.9 % (FLUSH) 0.9 %
5-40 SYRINGE (ML) INJECTION EVERY 8 HOURS
Status: DISCONTINUED | OUTPATIENT
Start: 2022-04-12 | End: 2022-04-13 | Stop reason: HOSPADM

## 2022-04-12 RX ORDER — DEXTROSE MONOHYDRATE 100 MG/ML
0-250 INJECTION, SOLUTION INTRAVENOUS AS NEEDED
Status: DISCONTINUED | OUTPATIENT
Start: 2022-04-12 | End: 2022-04-13 | Stop reason: HOSPADM

## 2022-04-12 RX ORDER — NITROGLYCERIN 0.4 MG/1
0.4 TABLET SUBLINGUAL
Status: DISCONTINUED | OUTPATIENT
Start: 2022-04-12 | End: 2022-04-13 | Stop reason: HOSPADM

## 2022-04-12 RX ORDER — SODIUM CHLORIDE 9 MG/ML
250 INJECTION, SOLUTION INTRAVENOUS AS NEEDED
Status: DISCONTINUED | OUTPATIENT
Start: 2022-04-12 | End: 2022-04-13 | Stop reason: HOSPADM

## 2022-04-12 RX ORDER — PENICILLIN V POTASSIUM 250 MG/1
500 TABLET, FILM COATED ORAL 4 TIMES DAILY
Status: DISCONTINUED | OUTPATIENT
Start: 2022-04-12 | End: 2022-04-13 | Stop reason: HOSPADM

## 2022-04-12 RX ADMIN — SODIUM CHLORIDE 40 MG: 9 INJECTION, SOLUTION INTRAMUSCULAR; INTRAVENOUS; SUBCUTANEOUS at 19:59

## 2022-04-12 RX ADMIN — PENICILLIN V POTASSIUM 500 MG: 250 TABLET, FILM COATED ORAL at 22:00

## 2022-04-12 RX ADMIN — GEMFIBROZIL 600 MG: 600 TABLET ORAL at 20:22

## 2022-04-12 RX ADMIN — SODIUM CHLORIDE, PRESERVATIVE FREE 10 ML: 5 INJECTION INTRAVENOUS at 20:01

## 2022-04-12 NOTE — PROGRESS NOTES
HISTORY OF PRESENT ILLNESS  Harrison Chao is a 59 y.o. male. HPI Has had an infected R knee replacement, had surgery 3 months ago, has been on iv antibiotics for 6 weeks. Then had po pills. May be getting an abscess on his gums. Gets some fatigue and dyspnea easily for the last few months. No abdominal pains. No blood in stools. bms are somewhat darker than usual at times. No weight loss. Takes asa 81 mg a day. Had a cardiac stent put in 10 years ago by Dr. Malcolm Johnson, , and had a pacemaker put in last year by Dr. Kadi Lara.  Has been off eliquis since Saturday am.     ROS    Physical Exam  Vitals and nursing note reviewed. Constitutional:       Appearance: He is well-developed. HENT:      Right Ear: External ear normal.      Left Ear: External ear normal.   Neck:      Thyroid: No thyromegaly. Cardiovascular:      Rate and Rhythm: Normal rate and regular rhythm. Heart sounds: Normal heart sounds. Pulmonary:      Effort: Pulmonary effort is normal. No respiratory distress. Breath sounds: Normal breath sounds. No wheezing. Abdominal:      General: Bowel sounds are normal. There is no distension. Palpations: Abdomen is soft. There is no mass. Tenderness: There is no abdominal tenderness. There is no guarding. Musculoskeletal:         General: Normal range of motion. Lymphadenopathy:      Cervical: No cervical adenopathy. ASSESSMENT and PLAN  No orders of the defined types were placed in this encounter. Diagnoses and all orders for this visit:    1.  Anemia, unspecified type          Pt sent to ER for admission

## 2022-04-12 NOTE — PROGRESS NOTES
See H and P for details of admission. We discussed the need for transfusion. . I recommended  This for his dyspnea and fatigue. He  Is  Agreeable to admission for transfusion and gi workup.

## 2022-04-12 NOTE — PROGRESS NOTES
Report received from Olimpia RESTREPO at this time. 1730  Type and screen sent. 2000  Blood transfusion consent obtained. 2012  PRBC transfusion started at this time. 2020  Patient is being transferred to [address], Room # 2107. Report given to Eunice Reddy on Iveth Mondragon for routine progression of care. Report consisted of the following information SBAR, Kardex, MAR, Recent Results and Cardiac Rhythm atrial paced. Patient transferred to receiving unit by: Mari Crockett (RN or tech name). Outstanding consults needed: Yes; patient to see GI tomorrow. Next labs due: No     The following personal items will be sent with the patient during transfer to the floor:     All valuables:    Cardiac monitoring ordered: Yes; atrial paced    The following CURRENT information was reported to the receiving RN:    Code status: Full Code at time of transfer    Last set of vital signs:  Vital Signs  Level of Consciousness: Alert (0) (04/12/22 2003)  Temp: 98.3 °F (36.8 °C) (Pre-transfusion vitals  ) (04/12/22 2003)  Temp Source: Oral (04/12/22 2003)  Pulse (Heart Rate): 70 (Pre-transfusion vitals) (04/12/22 2003)  Cardiac Rhythm: Atrial Paced (04/12/22 1920)  Resp Rate: 14 (Pre-transfusion vitals) (04/12/22 2003)  BP: (!) 153/72 (Pre-transfusion vitals) (04/12/22 2003)  MAP (Calculated): 99 (04/12/22 2003)  BP 1 Location: Right upper arm (04/12/22 2003)  BP 1 Method: Automatic (04/12/22 2003)  MEWS Score: 0 (04/12/22 2003)         Oxygen Therapy  O2 Sat (%): 100 % (04/12/22 2003)  O2 Device: None (Room air) (04/12/22 1920)      Last pain assessment:  Pain 1  Pain Scale 1: Numeric (0 - 10)  Pain Intensity 1: 0      Wounds: Yes; blister right inner ankle    Urinary catheter: voiding  No FABIAN  Is there a fabian order: No     LDAs:  PICC Double Lumen 18/34/96 Right;Basilic (Active)       Peripheral IV 04/12/22 Left Antecubital (Active)   Site Assessment Clean, dry, & intact 04/12/22 1412   Phlebitis Assessment 0 04/12/22 1412   Infiltration Assessment 0 04/12/22 1412   Dressing Status Clean, dry, & intact 04/12/22 1412   Hub Color/Line Status Pink 04/12/22 1412         Opportunity for questions and clarification was provided. James Harris RN     Patient to go to the floor after the 1st 15 minutes are completed of the blood transfusion. Night shift traveler to take patient to inpatient unit.

## 2022-04-12 NOTE — PROGRESS NOTES
Transition of Care Plan:    RUR: 15% moderate risk for readmission  Disposition: Home with spouse  Follow up appointments: PCP, GI?  DME needed: None anticipated  Transportation at Discharge: Spouse  Keys or means to access home:  Has access      IM Medicare Letter: N/A; Commercial coverage  Is patient a BCPI-A Bundle: N/A          If yes, was Bundle Letter given?:    Is patient a [place] and connected with the [place]? N/A               If yes, was [place] transfer form completed and [facility] notified? Caregiver Contact: Pt's spouse, Marcelino Mills [phone]  Discharge Caregiver contacted prior to discharge? To be contacted if pt elects prior to d/c  Care Conference needed?:  No                   Reason for Admission:   Anemia                    RUR Score:   15% moderate risk for readmission               PCP: First and Last name:   Pernell Carbajal MD     Name of Practice: [facility]    Are you a current patient: Yes/No: Yes   Approximate date of last visit: Today, sent to ED by PCP for abnormal labs    Can you participate in a virtual visit if needed: No    Do you (patient/family) have any concerns for transition/discharge? Denies concerns at this time, is eager to return home. Plan for utilizing home health:  No home health needs identified. Current Advanced Directive/Advance Care Plan:  Full Code  Advance Care Planning   Healthcare Decision Maker:       Primary Decision Maker: Tato Fitzpatrick Spouse - [phone]  Today we documented Decision Maker(s) consistent with Legal Next of Kin hierarchy. Healthcare Decision Maker:             Primary Decision Maker: Jillian Mckinney - Spouse - [phone]    Transition of Care Plan: Home with spouse, outpatient follow up appts    CM reviewed chart. CM completed assessment with pt at bedside. CM introduced self, role of CM, verified demographics, and discussed transition of care planning.  Pt resides with spouse, is independent at baseline to include driving, uses no DME. Spouse will transport home at d/c. Pt denies concerns with transition of care plans. Sent to hospital today by PCP, Dr. Gisselle Patel, and is being followed by that doctor during stay. GI following with plan for EGD. Pharmacy preference of [facility] on [address]     Unit care management will continue to follow for transition of care planning needs. Care Management Interventions  PCP Verified by CM: Yes  Palliative Care Criteria Met (RRAT>21 & CHF Dx)?: No  Mode of Transport at Discharge:  Other (see comment) (Spouse)  Transition of Care Consult (CM Consult): Discharge Planning  Discharge Durable Medical Equipment: No (None used at baseline)  Physical Therapy Consult: No  Occupational Therapy Consult: No  Speech Therapy Consult: No  Support Systems: Spouse/Significant Other  Confirm Follow Up Transport: Self  Discharge Location  Patient Expects to be Discharged to[de-identified] [address] [place] [address], [address]

## 2022-04-12 NOTE — H&P
[address]  HISTORY AND PHYSICAL    Name:  hSeri Thomas  MR#:  [id number]  :  1960  ACCOUNT #:  [de-identified]  ADMIT DATE:  2022    HISTORY OF PRESENT ILLNESS:  The patient is a very pleasant 57-year-old [place] [facility]) male admitted for workup and treatment of anemia. The patient has earlier this year had a right total knee replacement become infected. He was admitted to [facility]. Apparently, he had some hardware removed and then was followed by Dr. Noemi Rushing and received a prolonged course of IV antibiotics and n.p.o. penicillin afterwards. Since then he has been followed by Dr. Noemi Rushing. He called me on Friday saying the patient's hemoglobin was 6.6 and he is complaining of some dyspnea. I know the patient who probably needed a GI workup. We elected not to admit him on Friday as once he was transfused, he would not do very well and sit around the hospital for the weekend. We talked on the following Friday afternoon. He did want to go on that day anyway, said the earliest he could go in was on Tuesday. He was taking Eliquis as an outpatient which we decided to stop on Saturday morning. The patient has had some mild dyspnea and fatigue for the last several months. He denies any exertional chest pain. Denies any abdominal pain, rectal bleeding, has noted the stools at times seems somewhat dark than usual and takes a baby aspirin a day and Eliquis, but is not on any type of NSAIDs. Had a colonoscopy by Dr. Alberto Zhao some 11 years ago. He has agreeable to be admitted today, receiving a transfusion and GI workup. PAST MEDICAL HISTORY:  Significant for paroxysmal atrial fibrillation, for which he takes Eliquis. He has had a pacemaker implanted 1 year ago by Dr. Rosa Fleming, has problems with obesity, hypertriglyceridemia, hypercholesterolemia, diabetes, coronary artery disease with remote stent placed some 10 years ago. Also has a history of hypertension and arthritis.     PAST SURGERY HISTORY:  A stent in 2011 in the right coronary artery, atrial fib ablation, bilateral knee replacements, colonoscopy in 2009 by Dr. Brenda Finnegan that was normal.    CURRENT MEDICATIONS:  1. Penicillin 500 mg q.i.d.  2.  Oxycodone 5 mg q.4 h. p.r.n.  3.  Clarisa-Colace 1 tablet b.i.d.  4.  Glipizide 10 mg b.i.d.  5.  MiraLax 17 g daily. 6.  Pepcid 20 mg b.i.d.  7.  Metoprolol 25 mg twice daily. 8.  Magnesium 400 mg 2 tablets daily. 9.  Metformin 1000 mg twice daily. 10.  Jardiance 10 mg once daily. 11.  Amiodarone 200 mg  daily. 12.  Gemfibrozil 600 mg b.i.d.  13.  Eliquis 5 mg b.i.d.  14.  Ozempic 1 mg every 7 days. 15.  Nitroglycerin sublingually p.r.n. 16.  Crestor 20 mg daily. 17.  Furosemide 40 mg 1-2 tablets daily. 18.  Aspirin 81 mg daily. ALLERGIES:  LISINOPRIL CAUSING COUGH AND LOSARTAN CAUSING HYPOTENSION. SOCIAL HISTORY:  The patient is . He has three children, all sons. He had been working for the most, but he is retired now. Smoking, the patient is a former smoker, quit 1989. Alcohol use is occasional social drinking, 1 week in a month. REVIEW OF SYSTEMS:  GI:  As noted above. CARDIOVASCULAR:  No complaints of chest tightness with activity. Again had a remote cardiac stent 2011. Also has a history of AFib and AFib ablation 1 year ago. Also had a pacemaker inserted. RESPIRATORY:  No cough. No smoking and has been having some mild dyspnea since his knee surgery earlier this year. :  No difficulty voiding. No hematuria. GENERAL:  No fever, chills or weight loss. NEURO:  No past history of stroke or TIA. PSYCH:  No complaints of depression or insomnia. PHYSICAL EXAMINATION:  GENERAL:  The patient alert, oriented, pleasant, cooperative. VITAL SIGNS:  Blood pressure was 130/78, pulse was 90 and regular. CARDIOVASCULAR:  Regular rhythm and rate. No murmurs, thrills, rubs, or gallops. ABDOMEN:  Soft without mass, tenderness or organomegaly.   LUNGS:  Clear to auscultation and percussion. EXTREMITIES:  2+ distal pulses. No edema. ASSESSMENT:  1. Symptomatic anemia. 2.  Prolonged course of antibiotics for a septic right knee joint. 3.  History of diabetes. 4.  Remote history of cardiac stent. 5.  Chronic atrial fibrillation. PLAN:  The plan is to admit the patient to the hospital, transfuse as needed. We will obtain iron studies and other studies for workup of anemia. GI has been consulted and his Eliquis was stopped 3 days ago, hopefully he can get his GI workup done while in the hospital and they determine that to be iron deficient.       Lucy Machado MD [id number]  D:  04/12/2022 14:16  T:  04/12/2022 19:14  JOB #:  [id number]

## 2022-04-12 NOTE — ED PROVIDER NOTES
EMERGENCY DEPARTMENT HISTORY AND PHYSICAL EXAM      Date: 4/12/2022  Patient Name: Klever Anders    History of Presenting Illness     Chief Complaint   Patient presents with    Abnormal Lab Results     pt sent over by his pcp for a hgb of 6.6         HPI: Klever Anders, 59 y.o. male presents to the ED with cc of abnormal laboratory work. Sent in by his primary care doctor, Dr. Mushtaq Neville. Was noted to have a low hemoglobin of 6.6 so sent here for blood transfusion and admission. He denies any black or bloody stools that he is seen. He denies any chest pain or lightheadedness. Reports chronic swelling in both of his legs without any significant recent change in this. Has felt slightly short of breath. He was previously taking Eliquis or aspirin, however stopped on Saturday at the request of his doctor. There are no other complaints, changes, or physical findings at this time. PCP: Akash Mackay MD    No current facility-administered medications on file prior to encounter. Current Outpatient Medications on File Prior to Encounter   Medication Sig Dispense Refill    penicillin v potassium (VEETID) 500 mg tablet Take  by mouth four (4) times daily.  oxyCODONE IR (ROXICODONE) 5 mg immediate release tablet Take 5 mg by mouth every four (4) hours as needed. (Patient not taking: Reported on 4/12/2022)      senna-docusate (PERICOLACE) 8.6-50 mg per tablet Take 1 Tablet by mouth two (2) times daily as needed for Constipation. (Patient not taking: Reported on 4/12/2022) 60 Tablet 5    glipiZIDE (GLUCOTROL) 10 mg tablet TAKE 1 TABLET BY MOUTH TWICE A  Tablet 1    cefTRIAXone 2 gram 2 g IV syringe 2 g by IntraVENous route every twenty-four (24) hours. (Patient not taking: Reported on 4/12/2022) 1 Dose 0    polyethylene glycol (MIRALAX) 17 gram packet Take 1 Packet by mouth daily as needed for Constipation.  (Patient not taking: Reported on 4/12/2022) 5 Packet 0    famotidine (PEPCID) 20 mg tablet Take 1 Tablet by mouth two (2) times daily as needed for Gastroesophageal Reflux Disease (GERD). (Patient not taking: Reported on 4/12/2022) 20 Tablet 0    metoprolol tartrate (LOPRESSOR) 25 mg tablet Take 1 Tablet by mouth two (2) times a day for 90 days. 180 Tablet 1    magnesium oxide (MAG-OX) 400 mg tablet TAKE 2 TABLETS BY MOUTH EVERY  Tablet 3    metFORMIN (GLUCOPHAGE) 1,000 mg tablet TAKE 1 TABLET BY MOUTH TWICE A DAY WITH MEALS 180 Tablet 3    Jardiance 10 mg tablet TAKE 1 TAB BY MOUTH DAILY. FOR DIABETES 90 Tablet 3    amiodarone (CORDARONE) 200 mg tablet Take 0.5 Tablets by mouth daily. Lowered 09/21/21 (Patient taking differently: Take 200 mg by mouth daily.) 90 Tablet 5    gemfibroziL (LOPID) 600 mg tablet Take 1 Tablet by mouth two (2) times a day. 180 Tablet 3    Eliquis 5 mg tablet TAKE 1 TABLET BY MOUTH TWICE A DAY (Patient not taking: Reported on 4/12/2022) 180 Tablet 1    semaglutide (Ozempic) 1 mg/dose (2 mg/1.5 mL) sub-q pen 1 MG BY SUBCUTANEOUS ROUTE EVERY SEVEN (7) DAYS. 6 Pen 12    nitroglycerin (NITROSTAT) 0.4 mg SL tablet 1 Tablet by SubLINGual route every five (5) minutes as needed for Chest Pain (call 911 if CP/ SOB not relieved by 3 tabs). (Patient not taking: Reported on 4/12/2022) 25 Tablet 1    furosemide (LASIX) 40 mg tablet TAKE 1-2 TABLET BY MOUTH DAILY AS NEEDED FOR FLUID 180 Tablet 1    rosuvastatin (CRESTOR) 20 mg tablet TAKE 1 TABLET BY MOUTH EVERY DAY FOR CHOLESTEROL 90 Tab 3    ACETAMINOPHEN PO Take 500 mg by mouth as needed. 1-2 tabs prn as needed      aspirin delayed-release 81 mg tablet Take  by mouth daily.  omega-3 fatty acids-vitamin e (FISH OIL) 1,000 mg Cap Take 2 Caps by mouth two (2) times a day.          Past History     Past Medical History:  Past Medical History:   Diagnosis Date    Arthritis     knees    [facility] RVR [facility] [address]     Benign essential hypertension 6/10/2011    CAD (coronary artery disease)     Diabetes ([facility] [address]     DX  AGE 26      Hypercholesterolemia     Hypertriglyceridemia     Obesity     Pacemaker     PAF (paroxysmal atrial fibrillation) ([facility] [address] 12/12/2018    Syncope        Past Surgical History:  Past Surgical History:   Procedure Laterality Date    CARDIAC CATHETERIZATION  6/9/2011         CARDIAC CATHETERIZATION  11/1/2012         HC ANGIOSEAL VIP 6FR  6/9/2011         HX AFIB ABLATION  03/15/2021    HX COLONOSCOPY  05/2009    Dr. Schaefer Doctor- normal    HX ORTHOPAEDIC      left knee replacement    HX ORTHOPAEDIC      R knee replacement  2010 -Ellie Rios.     ME ABLATE L/R ATRIAL FIBRIL W/ISOLATED PULM VEIN N/A 5/10/2021    Ablation Following A-Fib  Addl performed by Veronica Stevens MD at [facility] CARDIAC CATH LAB    ME CARDIAC SURG PROCEDURE UNLIST      STENT RCA  6/11    ME COMPRE ELECTROPHYSIOL XM W/LEFT ATRIAL PACNG/REC N/A 5/10/2021    Lt Atrial Pace & Record During Ep Study performed by Veronica Stveens MD at [facility] CARDIAC CATH LAB    ME COMPRE EP EVAL ABLTJ ATR FIB PULM VEIN ISOLATION N/A 3/15/2021    ABLATION A-FIB  W COMPLETE EP STUDY performed by Veronica Stevens MD at [facility] CARDIAC CATH LAB    ME COMPRE EP EVAL ABLTJ ATR FIB PULM VEIN ISOLATION N/A 5/10/2021    ABLATION A-FIB  W COMPLETE EP STUDY performed by [facility]onica Stevens MD at [facility] CARDIAC CATH LAB    ME DRUG-ELUTING STENTS, SINGLE  6/9/2011         ME INS NEW/RPLCMT PRM PM W/TRANSV ELTRD ATRIAL&VENT N/A 3/22/2021    INSERT PPM DUAL performed by Veronica Stevens MD at [facility] CARDIAC CATH LAB    ME INTRACARD ECHO, THER/DX INTERVENT N/A 3/15/2021    Intracardiac Echocardiogram performed by Veronica Stevens MD at [facility] CARDIAC CATH LAB    ME INTRACARD ECHO, THER/DX INTERVENT N/A 5/10/2021    Intracardiac Echocardiogram performed by Veronica Stevens MD at [facility] CARDIAC CATH LAB    ME INTRACARDIAC ELECTROPHYSIOLOGIC 3D MAPPING N/A 3/15/2021    Ep 3d Mapping performed by Veronica Stevens MD at [address] [place] [address] CATH LAB    ME INTRACARDIAC ELECTROPHYSIOLOGIC 3D MAPPING N/A 5/10/2021    Ep 3d Mapping performed by Luis Angel Rice MD at [facility] CARDIAC CATH LAB       Family History:  Family History   Problem Relation Age of Onset    Diabetes Mother     Hypertension Mother    [employer] Elevated Lipids Mother     Cancer Mother         unsure    Diabetes Father     Heart Disease Father     Hypertension Father     Elevated Lipids Father     Diabetes Brother     Suicide Brother     Diabetes Brother        Social History:  Social History     Tobacco Use    Smoking status: Former Smoker     Types: Cigarettes     Quit date: 11/10/1989     Years since quittin.4    Smokeless tobacco: Never Used   Vaping Use    Vaping Use: Never used   Substance Use Topics    Alcohol use: Yes     Comment: social  DRINKS ONE WEEKEND A MONTH/\"BIG IMPROVEMENT\"    Drug use: No       Allergies: Allergies   Allergen Reactions    Lisinopril Cough    Losartan Other (comments)     Hypotension           Review of Systems   no fever  No ear pain  No eye pain  Reports shortness of breath  no chest pain  no abdominal pain  no dysuria  no leg pain  No rash  No lymphadenopathy  No weight loss    Physical Exam   Physical Exam  Constitutional:       General: He is not in acute distress. Appearance: He is not toxic-appearing. HENT:      Head: Normocephalic and atraumatic. Mouth/Throat:      Mouth: Mucous membranes are moist.   Eyes:      Extraocular Movements: Extraocular movements intact. Cardiovascular:      Rate and Rhythm: Normal rate and regular rhythm. Pulmonary:      Effort: Pulmonary effort is normal.      Breath sounds: Normal breath sounds. Abdominal:      Palpations: Abdomen is soft. Tenderness: There is no abdominal tenderness. Musculoskeletal:         General: No deformity. Cervical back: Neck supple. Right lower leg: Edema present. Left lower leg: Edema present. Skin:     General: Skin is warm and dry.    Neurological: General: No focal deficit present. Mental Status: He is alert and oriented to person, place, and time. Psychiatric:         Mood and Affect: Mood normal.         Diagnostic Study Results     Labs -     Recent Results (from the past 24 hour(s))   CBC WITH AUTOMATED DIFF    Collection Time: 04/12/22  2:17 PM   Result Value Ref Range    WBC 5.1 4.1 - 11.1 K/uL    RBC 3.51 (L) 4.10 - 5.70 M/uL    HGB 7.0 (L) 12.1 - 17.0 g/dL    HCT 26.2 (L) 36.6 - 50.3 %    MCV 74.6 (L) 80.0 - 99.0 FL    MCH 19.9 (L) 26.0 - 34.0 PG    MCHC 26.7 (L) 30.0 - 36.5 g/dL    RDW 19.2 (H) 11.5 - 14.5 %    PLATELET 523 270 - 761 K/uL    MPV 10.0 8.9 - 12.9 FL    NRBC 0.0 0  WBC    ABSOLUTE NRBC 0.00 0.00 - 0.01 K/uL    NEUTROPHILS 68 32 - 75 %    LYMPHOCYTES 21 12 - 49 %    MONOCYTES 7 5 - 13 %    EOSINOPHILS 3 0 - 7 %    BASOPHILS 1 0 - 1 %    IMMATURE GRANULOCYTES 0 0.0 - 0.5 %    ABS. NEUTROPHILS 3.3 1.8 - 8.0 K/UL    ABS. LYMPHOCYTES 1.1 0.8 - 3.5 K/UL    ABS. MONOCYTES 0.4 0.0 - 1.0 K/UL    ABS. EOSINOPHILS 0.2 0.0 - 0.4 K/UL    ABS. BASOPHILS 0.1 0.0 - 0.1 K/UL    ABS. IMM. GRANS. 0.0 0.00 - 0.04 K/UL    DF SMEAR SCANNED      RBC COMMENTS ANISOCYTOSIS  1+        RBC COMMENTS HYPOCHROMIA  1+       METABOLIC PANEL, COMPREHENSIVE    Collection Time: 04/12/22  2:17 PM   Result Value Ref Range    Sodium 138 136 - 145 mmol/L    Potassium 4.0 3.5 - 5.1 mmol/L    Chloride 110 (H) 97 - 108 mmol/L    CO2 23 21 - 32 mmol/L    Anion gap 5 5 - 15 mmol/L    Glucose 175 (H) 65 - 100 mg/dL    BUN 21 (H) 6 - 20 MG/DL    Creatinine 1.02 0.70 - 1.30 MG/DL    BUN/Creatinine ratio 21 (H) 12 - 20      GFR est AA >60 >60 ml/min/1.73m2    GFR est non-AA >60 >60 ml/min/1.73m2    Calcium 8.7 8.5 - 10.1 MG/DL    Bilirubin, total 0.4 0.2 - 1.0 MG/DL    ALT (SGPT) 10 (L) 12 - 78 U/L    AST (SGOT) 4 (L) 15 - 37 U/L    Alk.  phosphatase 36 (L) 45 - 117 U/L    Protein, total 6.9 6.4 - 8.2 g/dL    Albumin 3.1 (L) 3.5 - 5.0 g/dL    Globulin 3.8 2.0 - 4.0 g/dL    A-G Ratio 0.8 (L) 1.1 - 2.2     RETICULOCYTE COUNT    Collection Time: 04/12/22  2:17 PM   Result Value Ref Range    Reticulocyte count 1.7 0.7 - 2.1 %    Absolute Retic Cnt. 0.0551 0.0260 - 0.0950 M/ul   PROTHROMBIN TIME + INR    Collection Time: 04/12/22  2:17 PM   Result Value Ref Range    INR 1.1 0.9 - 1.1      Prothrombin time 11.1 9.0 - 11.1 sec   PTT    Collection Time: 04/12/22  2:17 PM   Result Value Ref Range    aPTT 24.7 22.1 - 31.0 sec    aPTT, therapeutic range     58.0 - 77.0 SECS       Radiologic Studies -   XR CHEST PORT   Final Result   No acute process        CT Results  (Last 48 hours)    None        CXR Results  (Last 48 hours)               04/12/22 1452  XR CHEST PORT Final result    Impression:  No acute process       Narrative:  EXAM: XR CHEST PORT       INDICATION: dyspnea       COMPARISON: 1/2/2022       FINDINGS: A portable AP radiograph of the chest was obtained at 1435 hours. The   patient is on a cardiac monitor. The lungs are clear. The cardiac and   mediastinal contours and pulmonary vascularity are normal.  The bones and soft   tissues are grossly within normal limits. Dual-lead pacemaker is present. Medical Decision Making   I am the first provider for this patient. I reviewed the vital signs, available nursing notes, past medical history, past surgical history, family history and social history. Vital Signs-Reviewed the patient's vital signs. Patient Vitals for the past 24 hrs:   Temp Pulse Resp BP SpO2   04/12/22 1313 97.9 °F (36.6 °C) 72 16 (!) 128/54 100 %         Provider Notes (Medical Decision Making):   77-year-old male presenting with abnormal laboratory work. Concern for anemia, electrolyte or metabolic abnormalities, GI bleed. Vitals are reassuring here, he is hemodynamically stable with benign abdominal exam.    ED Course:     Initial assessment performed.  The patients presenting problems have been discussed, and they are in agreement with the care plan formulated and outlined with them. I have encouraged them to ask questions as they arise throughout their visit. CBC shows anemia hemoglobin of 7, negative for leukocytosis, basic metabolic panel with normal renal function, no worrisome electrolyte abnormalities. Spoke with Dr. Freda Caballero, who admit the patient to his service. Critical Care Time:         Disposition:  Admit    PLAN:  1. Current Discharge Medication List        2.    Follow-up Information    None       Return to ED if worse     Diagnosis     Clinical Impression: Acute on chronic anemia

## 2022-04-12 NOTE — PROGRESS NOTES
Chief Complaint   Patient presents with    Other     blood transfusion       Visit Vitals  /65 (BP 1 Location: Right arm, BP Patient Position: Sitting, BP Cuff Size: Large adult)   Pulse 70   Temp 97.8 °F (36.6 °C) (Oral)   Resp 16   Ht 6' (1.829 m)   Wt 295 lb 3.2 oz (133.9 kg)   SpO2 98%   BMI 40.04 kg/m²     1. \"Have you been to the ER, urgent care clinic since your last visit? Hospitalized since your last visit? \" no    2. \"Have you seen or consulted any other health care providers outside of the [address] [place] [address] since your last visit? \" Infectious Disease

## 2022-04-13 ENCOUNTER — ANESTHESIA (OUTPATIENT)
Dept: ENDOSCOPY | Age: 62
End: 2022-04-13
Payer: COMMERCIAL

## 2022-04-13 ENCOUNTER — ANESTHESIA EVENT (OUTPATIENT)
Dept: ENDOSCOPY | Age: 62
End: 2022-04-13
Payer: COMMERCIAL

## 2022-04-13 VITALS
SYSTOLIC BLOOD PRESSURE: 140 MMHG | BODY MASS INDEX: 37.94 KG/M2 | RESPIRATION RATE: 16 BRPM | HEART RATE: 73 BPM | DIASTOLIC BLOOD PRESSURE: 77 MMHG | OXYGEN SATURATION: 100 % | TEMPERATURE: 97.8 F | WEIGHT: 295.64 LBS | HEIGHT: 74 IN

## 2022-04-13 LAB
BASOPHILS # BLD: 0 K/UL (ref 0–0.1)
BASOPHILS NFR BLD: 1 % (ref 0–1)
DIFFERENTIAL METHOD BLD: ABNORMAL
EOSINOPHIL # BLD: 0.2 K/UL (ref 0–0.4)
EOSINOPHIL NFR BLD: 5 % (ref 0–7)
ERYTHROCYTE [DISTWIDTH] IN BLOOD BY AUTOMATED COUNT: 19 % (ref 11.5–14.5)
GLUCOSE BLD STRIP.AUTO-MCNC: 111 MG/DL (ref 65–117)
GLUCOSE BLD STRIP.AUTO-MCNC: 113 MG/DL (ref 65–117)
HCT VFR BLD AUTO: 26.3 % (ref 36.6–50.3)
HGB BLD-MCNC: 7.2 G/DL (ref 12.1–17)
HISTORY CHECKED?,CKHIST: NORMAL
IMM GRANULOCYTES # BLD AUTO: 0 K/UL (ref 0–0.04)
IMM GRANULOCYTES NFR BLD AUTO: 1 % (ref 0–0.5)
LYMPHOCYTES # BLD: 1.3 K/UL (ref 0.8–3.5)
LYMPHOCYTES NFR BLD: 31 % (ref 12–49)
MCH RBC QN AUTO: 20.5 PG (ref 26–34)
MCHC RBC AUTO-ENTMCNC: 27.4 G/DL (ref 30–36.5)
MCV RBC AUTO: 74.7 FL (ref 80–99)
MONOCYTES # BLD: 0.6 K/UL (ref 0–1)
MONOCYTES NFR BLD: 13 % (ref 5–13)
NEUTS SEG # BLD: 2.2 K/UL (ref 1.8–8)
NEUTS SEG NFR BLD: 49 % (ref 32–75)
NRBC # BLD: 0 K/UL (ref 0–0.01)
NRBC BLD-RTO: 0 PER 100 WBC
PLATELET # BLD AUTO: 326 K/UL (ref 150–400)
PMV BLD AUTO: 9.4 FL (ref 8.9–12.9)
RBC # BLD AUTO: 3.52 M/UL (ref 4.1–5.7)
RBC MORPH BLD: ABNORMAL
SERVICE CMNT-IMP: NORMAL
SERVICE CMNT-IMP: NORMAL
WBC # BLD AUTO: 4.3 K/UL (ref 4.1–11.1)

## 2022-04-13 PROCEDURE — 76060000031 HC ANESTHESIA FIRST 0.5 HR: Performed by: INTERNAL MEDICINE

## 2022-04-13 PROCEDURE — 74011250636 HC RX REV CODE- 250/636: Performed by: FAMILY MEDICINE

## 2022-04-13 PROCEDURE — 85025 COMPLETE CBC W/AUTO DIFF WBC: CPT

## 2022-04-13 PROCEDURE — G0378 HOSPITAL OBSERVATION PER HR: HCPCS

## 2022-04-13 PROCEDURE — 96375 TX/PRO/DX INJ NEW DRUG ADDON: CPT

## 2022-04-13 PROCEDURE — 74011000250 HC RX REV CODE- 250: Performed by: INTERNAL MEDICINE

## 2022-04-13 PROCEDURE — 76040000019: Performed by: INTERNAL MEDICINE

## 2022-04-13 PROCEDURE — 74011250636 HC RX REV CODE- 250/636: Performed by: NURSE ANESTHETIST, CERTIFIED REGISTERED

## 2022-04-13 PROCEDURE — P9016 RBC LEUKOCYTES REDUCED: HCPCS

## 2022-04-13 PROCEDURE — 82962 GLUCOSE BLOOD TEST: CPT

## 2022-04-13 PROCEDURE — 88305 TISSUE EXAM BY PATHOLOGIST: CPT

## 2022-04-13 PROCEDURE — 77030019988 HC FCPS ENDOSC DISP BSC -B: Performed by: INTERNAL MEDICINE

## 2022-04-13 PROCEDURE — 36415 COLL VENOUS BLD VENIPUNCTURE: CPT

## 2022-04-13 PROCEDURE — P9040 RBC LEUKOREDUCED IRRADIATED: HCPCS

## 2022-04-13 PROCEDURE — 74011000250 HC RX REV CODE- 250: Performed by: FAMILY MEDICINE

## 2022-04-13 PROCEDURE — 2709999900 HC NON-CHARGEABLE SUPPLY: Performed by: INTERNAL MEDICINE

## 2022-04-13 PROCEDURE — 74011250637 HC RX REV CODE- 250/637: Performed by: FAMILY MEDICINE

## 2022-04-13 PROCEDURE — 74011250636 HC RX REV CODE- 250/636: Performed by: INTERNAL MEDICINE

## 2022-04-13 PROCEDURE — 99232 SBSQ HOSP IP/OBS MODERATE 35: CPT | Performed by: FAMILY MEDICINE

## 2022-04-13 PROCEDURE — 36430 TRANSFUSION BLD/BLD COMPNT: CPT

## 2022-04-13 PROCEDURE — 74011000250 HC RX REV CODE- 250: Performed by: NURSE ANESTHETIST, CERTIFIED REGISTERED

## 2022-04-13 RX ORDER — EPINEPHRINE 0.1 MG/ML
1 INJECTION INTRACARDIAC; INTRAVENOUS
Status: DISCONTINUED | OUTPATIENT
Start: 2022-04-13 | End: 2022-04-13 | Stop reason: HOSPADM

## 2022-04-13 RX ORDER — SODIUM CHLORIDE 9 MG/ML
75 INJECTION, SOLUTION INTRAVENOUS CONTINUOUS
Status: DISPENSED | OUTPATIENT
Start: 2022-04-13 | End: 2022-04-13

## 2022-04-13 RX ORDER — NALOXONE HYDROCHLORIDE 0.4 MG/ML
0.4 INJECTION, SOLUTION INTRAMUSCULAR; INTRAVENOUS; SUBCUTANEOUS
Status: DISCONTINUED | OUTPATIENT
Start: 2022-04-13 | End: 2022-04-13 | Stop reason: HOSPADM

## 2022-04-13 RX ORDER — LANOLIN ALCOHOL/MO/W.PET/CERES
1000 CREAM (GRAM) TOPICAL DAILY
Status: DISCONTINUED | OUTPATIENT
Start: 2022-04-13 | End: 2022-04-13 | Stop reason: HOSPADM

## 2022-04-13 RX ORDER — ATROPINE SULFATE 0.1 MG/ML
0.5 INJECTION INTRAVENOUS
Status: DISCONTINUED | OUTPATIENT
Start: 2022-04-13 | End: 2022-04-13 | Stop reason: HOSPADM

## 2022-04-13 RX ORDER — SODIUM CHLORIDE 9 MG/ML
250 INJECTION, SOLUTION INTRAVENOUS AS NEEDED
Status: DISCONTINUED | OUTPATIENT
Start: 2022-04-13 | End: 2022-04-13 | Stop reason: HOSPADM

## 2022-04-13 RX ORDER — FENTANYL CITRATE 50 UG/ML
25 INJECTION, SOLUTION INTRAMUSCULAR; INTRAVENOUS
Status: DISCONTINUED | OUTPATIENT
Start: 2022-04-13 | End: 2022-04-13 | Stop reason: HOSPADM

## 2022-04-13 RX ORDER — MIDAZOLAM HYDROCHLORIDE 1 MG/ML
.25-5 INJECTION, SOLUTION INTRAMUSCULAR; INTRAVENOUS
Status: DISCONTINUED | OUTPATIENT
Start: 2022-04-13 | End: 2022-04-13 | Stop reason: HOSPADM

## 2022-04-13 RX ORDER — FLUMAZENIL 0.1 MG/ML
0.2 INJECTION INTRAVENOUS
Status: DISCONTINUED | OUTPATIENT
Start: 2022-04-13 | End: 2022-04-13 | Stop reason: HOSPADM

## 2022-04-13 RX ORDER — SODIUM CHLORIDE 0.9 % (FLUSH) 0.9 %
5-40 SYRINGE (ML) INJECTION AS NEEDED
Status: DISCONTINUED | OUTPATIENT
Start: 2022-04-13 | End: 2022-04-13 | Stop reason: HOSPADM

## 2022-04-13 RX ORDER — SODIUM CHLORIDE 0.9 % (FLUSH) 0.9 %
5-40 SYRINGE (ML) INJECTION EVERY 8 HOURS
Status: DISCONTINUED | OUTPATIENT
Start: 2022-04-13 | End: 2022-04-13 | Stop reason: HOSPADM

## 2022-04-13 RX ORDER — DEXTROMETHORPHAN/PSEUDOEPHED 2.5-7.5/.8
1.2 DROPS ORAL
Status: DISCONTINUED | OUTPATIENT
Start: 2022-04-13 | End: 2022-04-13 | Stop reason: HOSPADM

## 2022-04-13 RX ORDER — FUROSEMIDE 10 MG/ML
40 INJECTION INTRAMUSCULAR; INTRAVENOUS ONCE
Status: COMPLETED | OUTPATIENT
Start: 2022-04-13 | End: 2022-04-13

## 2022-04-13 RX ORDER — DEXMEDETOMIDINE HYDROCHLORIDE 100 UG/ML
INJECTION, SOLUTION INTRAVENOUS AS NEEDED
Status: DISCONTINUED | OUTPATIENT
Start: 2022-04-13 | End: 2022-04-13 | Stop reason: HOSPADM

## 2022-04-13 RX ORDER — LANOLIN ALCOHOL/MO/W.PET/CERES
1000 CREAM (GRAM) TOPICAL DAILY
Qty: 90 TABLET | Refills: 3 | Status: SHIPPED
Start: 2022-04-14

## 2022-04-13 RX ORDER — LIDOCAINE HYDROCHLORIDE 20 MG/ML
INJECTION, SOLUTION EPIDURAL; INFILTRATION; INTRACAUDAL; PERINEURAL AS NEEDED
Status: DISCONTINUED | OUTPATIENT
Start: 2022-04-13 | End: 2022-04-13 | Stop reason: HOSPADM

## 2022-04-13 RX ORDER — PROPOFOL 10 MG/ML
INJECTION, EMULSION INTRAVENOUS AS NEEDED
Status: DISCONTINUED | OUTPATIENT
Start: 2022-04-13 | End: 2022-04-13 | Stop reason: HOSPADM

## 2022-04-13 RX ADMIN — SODIUM CHLORIDE, PRESERVATIVE FREE 10 ML: 5 INJECTION INTRAVENOUS at 00:02

## 2022-04-13 RX ADMIN — PROPOFOL 20 MG: 10 INJECTION, EMULSION INTRAVENOUS at 08:47

## 2022-04-13 RX ADMIN — ROSUVASTATIN CALCIUM 20 MG: 20 TABLET, COATED ORAL at 09:58

## 2022-04-13 RX ADMIN — FUROSEMIDE 40 MG: 40 TABLET ORAL at 09:58

## 2022-04-13 RX ADMIN — PENICILLIN V POTASSIUM 500 MG: 250 TABLET, FILM COATED ORAL at 09:59

## 2022-04-13 RX ADMIN — AMIODARONE HYDROCHLORIDE 100 MG: 200 TABLET ORAL at 09:59

## 2022-04-13 RX ADMIN — SODIUM CHLORIDE, PRESERVATIVE FREE 10 ML: 5 INJECTION INTRAVENOUS at 10:00

## 2022-04-13 RX ADMIN — SODIUM CHLORIDE, PRESERVATIVE FREE 10 ML: 5 INJECTION INTRAVENOUS at 05:24

## 2022-04-13 RX ADMIN — PENICILLIN V POTASSIUM 500 MG: 250 TABLET, FILM COATED ORAL at 13:07

## 2022-04-13 RX ADMIN — PROPOFOL 20 MG: 10 INJECTION, EMULSION INTRAVENOUS at 08:49

## 2022-04-13 RX ADMIN — SODIUM CHLORIDE, PRESERVATIVE FREE 10 ML: 5 INJECTION INTRAVENOUS at 13:07

## 2022-04-13 RX ADMIN — DEXMEDETOMIDINE HYDROCHLORIDE 4 MCG: 100 INJECTION, SOLUTION, CONCENTRATE INTRAVENOUS at 08:50

## 2022-04-13 RX ADMIN — GEMFIBROZIL 600 MG: 600 TABLET ORAL at 09:58

## 2022-04-13 RX ADMIN — DEXMEDETOMIDINE HYDROCHLORIDE 6 MCG: 100 INJECTION, SOLUTION, CONCENTRATE INTRAVENOUS at 08:45

## 2022-04-13 RX ADMIN — METOPROLOL TARTRATE 25 MG: 25 TABLET, FILM COATED ORAL at 00:01

## 2022-04-13 RX ADMIN — CYANOCOBALAMIN TAB 500 MCG 1000 MCG: 500 TAB at 09:59

## 2022-04-13 RX ADMIN — SODIUM CHLORIDE 75 ML/HR: 9 INJECTION, SOLUTION INTRAVENOUS at 08:43

## 2022-04-13 RX ADMIN — LIDOCAINE HYDROCHLORIDE 80 MG: 20 INJECTION, SOLUTION EPIDURAL; INFILTRATION; INTRACAUDAL; PERINEURAL at 08:45

## 2022-04-13 RX ADMIN — Medication 800 MG: at 09:59

## 2022-04-13 RX ADMIN — PROPOFOL 80 MG: 10 INJECTION, EMULSION INTRAVENOUS at 08:45

## 2022-04-13 RX ADMIN — METOPROLOL TARTRATE 25 MG: 25 TABLET, FILM COATED ORAL at 09:59

## 2022-04-13 RX ADMIN — PROPOFOL 20 MG: 10 INJECTION, EMULSION INTRAVENOUS at 08:48

## 2022-04-13 RX ADMIN — SODIUM CHLORIDE: 9 INJECTION, SOLUTION INTRAVENOUS at 08:36

## 2022-04-13 RX ADMIN — FUROSEMIDE 40 MG: 10 INJECTION, SOLUTION INTRAMUSCULAR; INTRAVENOUS at 15:06

## 2022-04-13 NOTE — PROGRESS NOTES
Bedside shift change report given to Meron Ann RN by Elma Beaulieu. Report included the following information SBAR, ED Summary, Intake/Output, MAR and Recent Results. 1510 I have reviewed discharge instructions, medications and follow up appts with the patient. The patient verbalized understanding. PIV x1 removed without difficulty. Patient's son providing transportation home.

## 2022-04-13 NOTE — PROCEDURES
NAME:  Brittni Martinez   :   1960   MRN:   [id number]     Date/Time:  2022 9:36 AM    Esophagogastroduodenoscopy (EGD) Procedure Note    Procedure: Esophagogastroduodenoscopy with biopsy    Indication:  Iron deficiency anemia  Pre-operative Diagnosis: see indication above  Post-operative Diagnosis: see findings below  :  Leonides Damico MD  Referring Provider:   Isis Duncan MD    Exam:  Airway: clear, no airway problems anticipated  Heart: RRR, without gallops or rubs  Lungs: clear bilaterally without wheezes, crackles, or rhonchi  Abdomen: soft, nontender, nondistended, bowel sounds present  Mental Status: awake, alert and oriented to person, place and time     Anethesia/Sedation:  MAC anesthesia Propofol 160mg IV and Precedex 10mcg IV  Procedure Details   After informed consent was obtained for the procedure, with all risks and benefits of procedure explained the patient was taken to the endoscopy suite and placed in the left lateral decubitus position. Following sequential administration of sedation as per above, the [id number] gastroscope was inserted into the mouth and advanced under direct vision to second portion of the duodenum. A careful inspection was made as the gastroscope was withdrawn, including a retroflexed view of the proximal stomach; findings and interventions are described below. Findings:    -Normal esophageal mucosa; biopsied to exclude inflammation  -Minimal gastric erythema to suggest gastritis; biopsied  -Normal duodenal mucosa  -No hematin noted    Therapies:  biopsy of esophagus; biopsy of stomach   Specimens: #1 gastric; #2 g-e junction  EBL:  None. Complications:   None; patient tolerated the procedure well.            Impression:    -Normal esophageal mucosa; biopsied to exclude inflammation  -Minimal gastric erythema to suggest gastritis; biopsied  -Normal duodenal mucosa  -No hematin noted    Recommendations:  -Continue acid suppression. ,  -Check for biopsy results  -Resume diet  -Offer pt discharge home today.     -My office will coordinate OP colonoscopy  -I d/w pt and  today  -Will sign off    Discharge disposition:  Home in the company of  later today when able to ambulate    Edward Bullock MD

## 2022-04-13 NOTE — PROGRESS NOTES
0912: TRANSFER - OUT REPORT:    Verbal report given to Derik Pelayo RN(name) on Dimitry Junior  being transferred back to room 2107 for routine progression of care       Report consisted of patients Situation, Background, Assessment and   Recommendations(SBAR). Information from the following report(s) SBAR, Procedure Summary, Intake/Output and MAR was reviewed with the receiving nurse. Opportunity for questions and clarification was provided.       Patient transported with:  Patient chart

## 2022-04-13 NOTE — ANESTHESIA PREPROCEDURE EVALUATION
Relevant Problems   CARDIOVASCULAR   (+) A-fib (HCC)   (+) Benign essential hypertension   (+) Coronary artery disease   (+) PAF [facility]oxysmal [facility] fibrillation) [facility]   (+) Tachy-amina syndrome (HCC)      ENDOCRINE   (+) Arthritis   (+) Controlled type 2 diabetes mellitus with complication, without long-term current use of insulin (HCC)   (+) Severe obesity (HCC)   (+) Staphylococcal arthritis of right knee ([facility])   (+) Type 2 diabetes mellitus ([facility])   (+) Type 2 diabetes mellitus with chronic kidney disease (HCC)   (+) Type 2 diabetes with nephropathy (HCC)      HEMATOLOGY   (+) Anemia       Anesthetic History   No history of anesthetic complications            Review of Systems / Medical History  Patient summary reviewed, nursing notes reviewed and pertinent labs reviewed    Pulmonary  Within defined limits                 Neuro/Psych   Within defined limits           Cardiovascular    Hypertension  Valvular problems/murmurs: mitral insufficiency      Dysrhythmias : atrial fibrillation  CAD, cardiac stents and hyperlipidemia    Exercise tolerance: >4 METS  Comments: PAF s/p ablation (3/15/21)    BS- DCPM- DDDR    Patient taking Apixaban (Eliquis) - last taken on 4/8/22    ECG (5/25/21):  Electronic atrial pacemaker   -  Diffuse nonspecific T-abnormality. ABNORMAL     TTE (3/19/21):  ·LV: Estimated LVEF is 55 - 60%. Normal cavity size, wall thickness, systolic function (ejection fraction normal) and diastolic function. Wall motion: normal.  ·LA: Severely dilated left atrium. ·MV: Mild mitral valve regurgitation is present. ·AO: Mild ascending aorta dilatation. Ascending aorta diameter = 4.2 cm.    GI/Hepatic/Renal               Comments: Melena Endo/Other    Diabetes: type 2    Obesity, morbid obesity, arthritis and anemia (Hgb = 7.2 on 4/13/22)    Comments: S/P Bilateral Knee Replacements Other Findings              Physical Exam    Airway  Mallampati: II  TM Distance: > 6 cm  Neck ROM: normal range of motion Mouth opening: Normal     Cardiovascular    Rhythm: regular  Rate: normal         Dental      Comments: Several missing teeth, none loose.    Pulmonary  Breath sounds clear to auscultation               Abdominal  GI exam deferred       Other Findings            Anesthetic Plan    ASA: 3  Anesthesia type: MAC          Induction: Intravenous  Anesthetic plan and risks discussed with: Patient

## 2022-04-13 NOTE — PROGRESS NOTES
Problem: Falls - Risk of  Goal: *Absence of Falls  Description: Document Winters Fall Risk and appropriate interventions in the flowsheet.   Outcome: Progressing Towards Goal  Note: Fall Risk Interventions:            Medication Interventions: Bed/chair exit alarm                   Problem: Patient Education: Go to Patient Education Activity  Goal: Patient/Family Education  Outcome: Progressing Towards Goal     Problem: Anemia Care Plan (Adult and Pediatric)  Goal: *Tolerates increased activity  Outcome: Progressing Towards Goal

## 2022-04-13 NOTE — ANESTHESIA POSTPROCEDURE EVALUATION
Procedure(s):  ESOPHAGOGASTRODUODENOSCOPY (EGD)  ESOPHAGOGASTRODUODENAL (EGD) BIOPSY. MAC    Anesthesia Post Evaluation        Patient location during evaluation: PACU  Note status: Adequate. Level of consciousness: responsive to verbal stimuli and sleepy but conscious  Pain management: satisfactory to patient  Airway patency: patent  Anesthetic complications: no  Cardiovascular status: acceptable  Respiratory status: acceptable  Hydration status: acceptable  Comments: +Post-Anesthesia Evaluation and Assessment    Patient: Candida Kaur MRN: [id number]  SSN: [id number]   YOB: 1960  Age: 59 y.o. Sex: male      Cardiovascular Function/Vital Signs    BP (!) 114/53   Pulse 79   Temp 36.7 °C (98 °F)   Resp 18   Ht 6' 2\" (1.88 m)   Wt 134.1 kg (295 lb 10.2 oz)   SpO2 98%   BMI 37.96 kg/m²     Patient is status post Procedure(s):  ESOPHAGOGASTRODUODENOSCOPY (EGD)  ESOPHAGOGASTRODUODENAL (EGD) BIOPSY. Nausea/Vomiting: Controlled. Postoperative hydration reviewed and adequate. Pain:  Pain Scale 1: Numeric (0 - 10) (04/13/22 9378)  Pain Intensity 1: 0 (04/13/22 4474)   Managed. Neurological Status: At baseline. Mental Status and Level of Consciousness: Arousable. Pulmonary Status:   O2 Device: Nasal cannula (04/13/22 0847)   Adequate oxygenation and airway patent. Complications related to anesthesia: None    Post-anesthesia assessment completed. No concerns. Signed By: Karyle Spires, MD    4/13/2022  Post anesthesia nausea and vomiting:  controlled      INITIAL Post-op Vital signs:   Vitals Value Taken Time   /59 04/13/22 0909   Temp     Pulse 73 04/13/22 0911   Resp 20 04/13/22 0911   SpO2 94 % 04/13/22 0911   Vitals shown include unvalidated device data.

## 2022-04-13 NOTE — PERIOP NOTES
See anesthesia note and MAR for medications given during procedure. Received report from anesthesia staff on vital signs and status of patient.     Endoscope was pre-cleaned at the bedside immediately following procedure by KOBE Nava

## 2022-04-13 NOTE — PROGRESS NOTES
4/14/22 1510 - Faxed clinicals to PCP office. Marshal Mcclure    3:34 p.m- Spoke with patient at beside to give PCP hospital follow up appt information. Patient is aware of appt. Date and time. 3:20 p.m. [facility] follow-up PCP transitional care appointment has been scheduled with Dr. Nate Blevins on 4/ 20/22 at 11:00 a.m. Pending patient discharge. Marshal Mcclure Care Management Assistant     Attempted to schedule hospital follow up PCP appointment. Unable to reach anyone, unable to leave voicemail. Pending patient discharge.  Walker Ledezma

## 2022-04-13 NOTE — PROGRESS NOTES
General Daily Progress Note    Admit Date: 4/12/2022  Hospital day 2    Subjective:     Patient has no complaint of chest tightness, rectal bleeding. Anxious to get EGD and go home. Still has some mild dyspnea and fatigue. ..   Medication side effects: none    Current Facility-Administered Medications   Medication Dose Route Frequency    0.9% sodium chloride infusion 250 mL  250 mL IntraVENous PRN    cyanocobalamin (VITAMIN B12) tablet 1,000 mcg  1,000 mcg Oral DAILY    sodium chloride (NS) flush 5-40 mL  5-40 mL IntraVENous Q8H    sodium chloride (NS) flush 5-40 mL  5-40 mL IntraVENous PRN    0.9% sodium chloride infusion 250 mL  250 mL IntraVENous PRN    acetaminophen (TYLENOL) tablet 500 mg  500 mg Oral Q6H PRN    amiodarone (CORDARONE) tablet 100 mg  100 mg Oral DAILY    furosemide (LASIX) tablet 40 mg  40 mg Oral DAILY    gemfibroziL (LOPID) tablet 600 mg  600 mg Oral BID    magnesium oxide (MAG-OX) tablet 800 mg  800 mg Oral DAILY    metoprolol tartrate (LOPRESSOR) tablet 25 mg  25 mg Oral BID    nitroglycerin (NITROSTAT) tablet 0.4 mg  0.4 mg SubLINGual Q5MIN PRN    penicillin v potassium (VEETID) tablet 500 mg  500 mg Oral QID    rosuvastatin (CRESTOR) tablet 20 mg  20 mg Oral DAILY    senna-docusate (PERICOLACE) 8.6-50 mg per tablet 1 Tablet  1 Tablet Oral BID PRN    insulin lispro (HUMALOG) injection   SubCUTAneous TIDAC    glucose chewable tablet 16 g  4 Tablet Oral PRN    glucagon (GLUCAGEN) injection 1 mg  1 mg IntraMUSCular PRN    dextrose 10% infusion 0-250 mL  0-250 mL IntraVENous PRN    pantoprazole (PROTONIX) 40 mg in 0.9% sodium chloride 10 mL injection  40 mg IntraVENous Q24H        Review of Systems  Pertinent items are noted in HPI. Objective:     Patient Vitals for the past 8 hrs:   BP Temp Pulse Resp SpO2   04/12/22 2341 (!) 150/77 98.1 °F (36.7 °C) 70 16 97 %     No intake/output data recorded.   04/11 1901 - 04/13 0700  In: 580.4 [P.O.:240]  Out: -     Physical Exam: Visit Vitals  BP (!) 150/77 (BP 1 Location: Right upper arm)   Pulse 70   Temp 98.1 °F (36.7 °C)   Resp 16   Ht 6' 2\" (1.88 m)   Wt 295 lb 10.2 oz (134.1 kg)   SpO2 97%   BMI 37.96 kg/m²     Lungs: clear to auscultation bilaterally  Heart: regular rate and rhythm, S1, S2 normal, no murmur, click, rub or gallop  Abdomen: soft, non-tender. Bowel sounds normal. No masses,  no organomegaly  Extremities: edema , 1+ bilaterally      ECG: normal sinus rhythm     Data Review   Recent Results (from the past 24 hour(s))   CBC WITH AUTOMATED DIFF    Collection Time: 04/12/22  2:17 PM   Result Value Ref Range    WBC 5.1 4.1 - 11.1 K/uL    RBC 3.51 (L) 4.10 - 5.70 M/uL    HGB 7.0 (L) 12.1 - 17.0 g/dL    HCT 26.2 (L) 36.6 - 50.3 %    MCV 74.6 (L) 80.0 - 99.0 FL    MCH 19.9 (L) 26.0 - 34.0 PG    MCHC 26.7 (L) 30.0 - 36.5 g/dL    RDW 19.2 (H) 11.5 - 14.5 %    PLATELET 925 123 - 819 K/uL    MPV 10.0 8.9 - 12.9 FL    NRBC 0.0 0  WBC    ABSOLUTE NRBC 0.00 0.00 - 0.01 K/uL    NEUTROPHILS 68 32 - 75 %    LYMPHOCYTES 21 12 - 49 %    MONOCYTES 7 5 - 13 %    EOSINOPHILS 3 0 - 7 %    BASOPHILS 1 0 - 1 %    IMMATURE GRANULOCYTES 0 0.0 - 0.5 %    ABS. NEUTROPHILS 3.3 1.8 - 8.0 K/UL    ABS. LYMPHOCYTES 1.1 0.8 - 3.5 K/UL    ABS. MONOCYTES 0.4 0.0 - 1.0 K/UL    ABS. EOSINOPHILS 0.2 0.0 - 0.4 K/UL    ABS. BASOPHILS 0.1 0.0 - 0.1 K/UL    ABS. IMM.  GRANS. 0.0 0.00 - 0.04 K/UL    DF SMEAR SCANNED      RBC COMMENTS ANISOCYTOSIS  1+        RBC COMMENTS HYPOCHROMIA  1+       METABOLIC PANEL, COMPREHENSIVE    Collection Time: 04/12/22  2:17 PM   Result Value Ref Range    Sodium 138 136 - 145 mmol/L    Potassium 4.0 3.5 - 5.1 mmol/L    Chloride 110 (H) 97 - 108 mmol/L    CO2 23 21 - 32 mmol/L    Anion gap 5 5 - 15 mmol/L    Glucose 175 (H) 65 - 100 mg/dL    BUN 21 (H) 6 - 20 MG/DL    Creatinine 1.02 0.70 - 1.30 MG/DL    BUN/Creatinine ratio 21 (H) 12 - 20      GFR est AA >60 >60 ml/min/1.73m2    GFR est non-AA >60 >60 ml/min/1.73m2    Calcium 8.7 8.5 - 10.1 MG/DL    Bilirubin, total 0.4 0.2 - 1.0 MG/DL    ALT (SGPT) 10 (L) 12 - 78 U/L    AST (SGOT) 4 (L) 15 - 37 U/L    Alk. phosphatase 36 (L) 45 - 117 U/L    Protein, total 6.9 6.4 - 8.2 g/dL    Albumin 3.1 (L) 3.5 - 5.0 g/dL    Globulin 3.8 2.0 - 4.0 g/dL    A-G Ratio 0.8 (L) 1.1 - 2.2     IRON PROFILE    Collection Time: 04/12/22  2:17 PM   Result Value Ref Range    Iron 16 (L) 35 - 150 ug/dL    TIBC 419 250 - 450 ug/dL    Iron % saturation 4 (L) 20 - 50 %   RETICULOCYTE COUNT    Collection Time: 04/12/22  2:17 PM   Result Value Ref Range    Reticulocyte count 1.7 0.7 - 2.1 %    Absolute Retic Cnt. 0.0551 0.0260 - 0.0950 M/ul   PROTHROMBIN TIME + INR    Collection Time: 04/12/22  2:17 PM   Result Value Ref Range    INR 1.1 0.9 - 1.1      Prothrombin time 11.1 9.0 - 11.1 sec   PTT    Collection Time: 04/12/22  2:17 PM   Result Value Ref Range    aPTT 24.7 22.1 - 31.0 sec    aPTT, therapeutic range     58.0 - 77.0 SECS   TYPE & SCREEN    Collection Time: 04/12/22  2:17 PM   Result Value Ref Range    Crossmatch Expiration 04/15/2022,2359     ABO/Rh(D) B POSITIVE     Antibody screen NEG     Unit number [id number]     Blood component type [facility]     Unit division 00     Status of unit ISSUED     Crossmatch result Compatible    FERRITIN    Collection Time: 04/12/22  3:15 PM   Result Value Ref Range    Ferritin 7 (L) 26 - 388 NG/ML   VITAMIN B12    Collection Time: 04/12/22  3:15 PM   Result Value Ref Range    Vitamin B12 166 (L) 193 - 986 pg/mL   RBC, ALLOCATE    Collection Time: 04/12/22  5:30 PM   Result Value Ref Range    HISTORY CHECKED?  Historical check performed    CBC WITH AUTOMATED DIFF    Collection Time: 04/13/22  4:17 AM   Result Value Ref Range    WBC 4.3 4.1 - 11.1 K/uL    RBC 3.52 (L) 4.10 - 5.70 M/uL    HGB 7.2 (L) 12.1 - 17.0 g/dL    HCT 26.3 (L) 36.6 - 50.3 %    MCV 74.7 (L) 80.0 - 99.0 FL    MCH 20.5 (L) 26.0 - 34.0 PG    MCHC 27.4 (L) 30.0 - 36.5 g/dL    RDW 19.0 (H) 11.5 - 14.5 % PLATELET 085 718 - 632 K/uL    MPV 9.4 8.9 - 12.9 FL    NRBC 0.0 0  WBC    ABSOLUTE NRBC 0.00 0.00 - 0.01 K/uL    NEUTROPHILS 49 32 - 75 %    LYMPHOCYTES 31 12 - 49 %    MONOCYTES 13 5 - 13 %    EOSINOPHILS 5 0 - 7 %    BASOPHILS 1 0 - 1 %    IMMATURE GRANULOCYTES 1 (H) 0.0 - 0.5 %    ABS. NEUTROPHILS 2.2 1.8 - 8.0 K/UL    ABS. LYMPHOCYTES 1.3 0.8 - 3.5 K/UL    ABS. MONOCYTES 0.6 0.0 - 1.0 K/UL    ABS. EOSINOPHILS 0.2 0.0 - 0.4 K/UL    ABS. BASOPHILS 0.0 0.0 - 0.1 K/UL    ABS. IMM. GRANS. 0.0 0.00 - 0.04 K/UL    DF SMEAR SCANNED      RBC COMMENTS ANISOCYTOSIS  1+        RBC COMMENTS HYPOCHROMIA  1+        RBC COMMENTS OVALOCYTES  PRESENT               Assessment:     Active Problems:    Anemia (4/12/2022)        Plan:     1. Iron deficiency anemia- continue PPI. hgb only came up from 7.0 to 7.2 with transfusion- will give one more unit. Hopefully EGD can be done today. Pt anxious to go home already. Also check celiac profile. 2. Low vitamin B!@ also noted. Check MMA, start replacement. 3. Recent course of iv antibiotics for septic knee  4 diabetes- continue sliding scale for now.

## 2022-04-13 NOTE — PROGRESS NOTES
Report called to room 2107's primary nurse by Alexus Diallo RN. Patient transported via stretcher by transporter and this writer to Room 2107 in stable. Blood transfusion in progress. Patient remains AAOx4 and denies  blood transfusion adverse reactions, as well as discomfort. All vital signs WNL.

## 2022-04-13 NOTE — PROGRESS NOTES
Transition of Care Plan:     RUR: 15% moderate risk for readmission  Disposition: Home with spouse  Follow up appointments: PCP, GI?  DME needed: None anticipated  Transportation at Discharge: Spouse  Keys or means to access home:  Has access      IM Medicare Letter: N/A; Commercial coverage  Is patient a BCPI-A Bundle: N/A                     If yes, was Bundle Letter given?:    Is patient a [place] and connected with the [facility]? N/A               If yes, was [facility] transfer form completed and [facility] notified? Caregiver Contact: Pt's spouse, Keon Hanley) [phone]  Discharge Caregiver contacted prior to discharge? To be contacted if pt elects prior to d/c  Care Conference needed?:  No                  CM acknowledged d/c orders. Pt's son will transport him home at d/c. He reports no questions or concerns. No further needs identified at this time. Patient is ready to d/c on a CM standpoint. RN aware. Care Management Interventions  PCP Verified by CM: Yes  Palliative Care Criteria Met (RRAT>21 & CHF Dx)?: No  Mode of Transport at Discharge:  Other (see comment) (Spouse)  Transition of Care Consult (CM Consult): Discharge Planning  Discharge Durable Medical Equipment: No (None used at baseline)  Physical Therapy Consult: No  Occupational Therapy Consult: No  Speech Therapy Consult: No  Support Systems: Spouse/Significant Other,Child(chance)  Confirm Follow Up Transport: Self  Discharge Location  Patient Expects to be Discharged to[de-identified] [address] Yoss[address] Alvarez, MSW  Care Manager [facility]  [phone]

## 2022-04-13 NOTE — ROUTINE PROCESS
Pamelahonorio Zepeda  1960  [id number]    Situation:  Verbal report received from: Carlos Hilliard  Procedure: Procedure(s):  ESOPHAGOGASTRODUODENOSCOPY (EGD)  ESOPHAGOGASTRODUODENAL (EGD) BIOPSY    Background:    Preoperative diagnosis: melena  Postoperative diagnosis: Gastritis     :  Dr. Marvin Esparza  Assistant(s): Endoscopy Technician-1: Caroline Huston  Endoscopy RN-1: Bola Houser    Specimens:   ID Type Source Tests Collected by Time Destination   1 : Gastric BX Preservative Gastric  Christian Weiss MD 4/13/2022 0535 Pathology   2 : [address]   Christian Weiss MD 4/13/2022 6843 Pathology     H. Pylori  no    Assessment:  Intra-procedure medications     Anesthesia gave intra-procedure sedation and medications, see anesthesia flow sheet yes    Intravenous fluids: NS@ KVO     Vital signs stable     Abdominal assessment: round and soft     Recommendation:  Discharge patient per MD order.   Return to floor  Family or Friend   Permission to share finding with family or friend yes

## 2022-04-14 ENCOUNTER — PATIENT OUTREACH (OUTPATIENT)
Dept: CASE MANAGEMENT | Age: 62
End: 2022-04-14

## 2022-04-14 NOTE — PROGRESS NOTES
Care Transitions Outreach Attempt    Call within 2 business days of discharge: Yes   Attempted to reach patient for transitions of care follow up. Unable to reach patient. Patient: Kris Shaikh Patient : 1960 MRN: [id number]    [facility]       Complaint Diagnosis Description Type Department Provider    22 Abnormal Lab Results Iron deficiency anemia, unspecified iron deficiency anemia type . .. ED to Hosp-Admission (Discharged) (ADMIT) Hernando Tenorio MD; Man. .. 2022  Initial outreach attempt unsuccessful. Emma    4/15/2022  Second outreach attempt unsuccessful. LMTCB    [employer] message sent    Was this an external facility discharge?  No     Challenges to be reviewed by the provider   Additional needs identified to be addressed with provider:   Biopsy results  OP colonoscopy with Dr. Vani Rich

## 2022-04-15 ENCOUNTER — PATIENT MESSAGE (OUTPATIENT)
Dept: CASE MANAGEMENT | Age: 62
End: 2022-04-15

## 2022-04-20 ENCOUNTER — OFFICE VISIT (OUTPATIENT)
Dept: FAMILY MEDICINE CLINIC | Age: 62
End: 2022-04-20
Payer: COMMERCIAL

## 2022-04-20 VITALS
WEIGHT: 292.4 LBS | DIASTOLIC BLOOD PRESSURE: 70 MMHG | HEIGHT: 74 IN | TEMPERATURE: 97.8 F | HEART RATE: 70 BPM | OXYGEN SATURATION: 100 % | SYSTOLIC BLOOD PRESSURE: 141 MMHG | RESPIRATION RATE: 16 BRPM | BODY MASS INDEX: 37.53 KG/M2

## 2022-04-20 DIAGNOSIS — D50.0 IRON DEFICIENCY ANEMIA DUE TO CHRONIC BLOOD LOSS: Primary | ICD-10-CM

## 2022-04-20 PROCEDURE — 99213 OFFICE O/P EST LOW 20 MIN: CPT | Performed by: FAMILY MEDICINE

## 2022-04-20 RX ORDER — SODIUM, POTASSIUM,MAG SULFATES 17.5-3.13G
SOLUTION, RECONSTITUTED, ORAL ORAL
COMMUNITY
Start: 2022-04-13 | End: 2022-08-29 | Stop reason: ALTCHOICE

## 2022-04-20 RX ORDER — APIXABAN 5 MG/1
TABLET, FILM COATED ORAL
Qty: 60 TABLET | Refills: 5 | Status: SHIPPED | OUTPATIENT
Start: 2022-04-20

## 2022-04-20 RX ORDER — AMOXICILLIN 500 MG/1
CAPSULE ORAL
COMMUNITY
Start: 2022-04-19

## 2022-04-20 RX ORDER — LANOLIN ALCOHOL/MO/W.PET/CERES
1 CREAM (GRAM) TOPICAL DAILY
COMMUNITY
Start: 2022-03-02

## 2022-04-20 NOTE — PROGRESS NOTES
HISTORY OF PRESENT ILLNESS  Nasima Purvis is a 59 y.o. male. HPI In for Hospital followup. Still feels weak. Goes for colonoscopy [address]. No melena, abdominal pain or hemtochezia     ROS    Physical Exam  Vitals and nursing note reviewed. Constitutional:       Appearance: He is well-developed. HENT:      Right Ear: External ear normal.      Left Ear: External ear normal.   Neck:      Thyroid: No thyromegaly. Cardiovascular:      Rate and Rhythm: Normal rate and regular rhythm. Heart sounds: Normal heart sounds. Pulmonary:      Effort: Pulmonary effort is normal. No respiratory distress. Breath sounds: Normal breath sounds. No wheezing. Abdominal:      General: Bowel sounds are normal. There is no distension. Palpations: Abdomen is soft. There is no mass. Tenderness: There is no abdominal tenderness. There is no guarding. Musculoskeletal:         General: Normal range of motion. Lymphadenopathy:      Cervical: No cervical adenopathy. ASSESSMENT and PLAN  Orders Placed This Encounter    CBC WITH AUTOMATED DIFF    apixaban (Eliquis) 5 mg tablet     Diagnoses and all orders for this visit:    1. Iron deficiency anemia due to chronic blood loss  -     CBC WITH AUTOMATED DIFF; Future    Other orders  -     apixaban (Eliquis) 5 mg tablet; Take 1 Tablet by mouth two (2) times a day.

## 2022-04-21 ENCOUNTER — OFFICE VISIT (OUTPATIENT)
Dept: CARDIOLOGY CLINIC | Age: 62
End: 2022-04-21
Payer: COMMERCIAL

## 2022-04-21 VITALS
OXYGEN SATURATION: 98 % | BODY MASS INDEX: 37.92 KG/M2 | DIASTOLIC BLOOD PRESSURE: 56 MMHG | HEART RATE: 70 BPM | HEIGHT: 74 IN | SYSTOLIC BLOOD PRESSURE: 112 MMHG | WEIGHT: 295.5 LBS | RESPIRATION RATE: 18 BRPM

## 2022-04-21 DIAGNOSIS — R06.02 SOB (SHORTNESS OF BREATH) ON EXERTION: ICD-10-CM

## 2022-04-21 DIAGNOSIS — I49.5 SSS (SICK SINUS SYNDROME) (HCC): ICD-10-CM

## 2022-04-21 DIAGNOSIS — E78.2 MIXED HYPERLIPIDEMIA: ICD-10-CM

## 2022-04-21 DIAGNOSIS — Z95.5 S/P CORONARY ARTERY STENT PLACEMENT: ICD-10-CM

## 2022-04-21 DIAGNOSIS — I48.0 PAF (PAROXYSMAL ATRIAL FIBRILLATION) (HCC): Primary | ICD-10-CM

## 2022-04-21 DIAGNOSIS — I10 BENIGN ESSENTIAL HYPERTENSION: ICD-10-CM

## 2022-04-21 LAB
BASOPHILS # BLD: 0.1 K/UL (ref 0–0.1)
BASOPHILS NFR BLD: 2 % (ref 0–1)
DIFFERENTIAL METHOD BLD: ABNORMAL
EOSINOPHIL # BLD: 0.1 K/UL (ref 0–0.4)
EOSINOPHIL NFR BLD: 3 % (ref 0–7)
ERYTHROCYTE [DISTWIDTH] IN BLOOD BY AUTOMATED COUNT: 21.2 % (ref 11.5–14.5)
HCT VFR BLD AUTO: 34.4 % (ref 36.6–50.3)
HGB BLD-MCNC: 9 G/DL (ref 12.1–17)
IMM GRANULOCYTES # BLD AUTO: 0 K/UL (ref 0–0.04)
IMM GRANULOCYTES NFR BLD AUTO: 0 % (ref 0–0.5)
LYMPHOCYTES # BLD: 1.1 K/UL (ref 0.8–3.5)
LYMPHOCYTES NFR BLD: 23 % (ref 12–49)
MCH RBC QN AUTO: 20.8 PG (ref 26–34)
MCHC RBC AUTO-ENTMCNC: 26.2 G/DL (ref 30–36.5)
MCV RBC AUTO: 79.6 FL (ref 80–99)
MONOCYTES # BLD: 0.4 K/UL (ref 0–1)
MONOCYTES NFR BLD: 8 % (ref 5–13)
NEUTS SEG # BLD: 2.9 K/UL (ref 1.8–8)
NEUTS SEG NFR BLD: 64 % (ref 32–75)
NRBC # BLD: 0 K/UL (ref 0–0.01)
NRBC BLD-RTO: 0 PER 100 WBC
PLATELET # BLD AUTO: 425 K/UL (ref 150–400)
PMV BLD AUTO: 9.5 FL (ref 8.9–12.9)
RBC # BLD AUTO: 4.32 M/UL (ref 4.1–5.7)
RBC MORPH BLD: ABNORMAL
RBC MORPH BLD: ABNORMAL
WBC # BLD AUTO: 4.6 K/UL (ref 4.1–11.1)

## 2022-04-21 PROCEDURE — 99214 OFFICE O/P EST MOD 30 MIN: CPT | Performed by: INTERNAL MEDICINE

## 2022-04-21 PROCEDURE — 93000 ELECTROCARDIOGRAM COMPLETE: CPT | Performed by: INTERNAL MEDICINE

## 2022-04-21 NOTE — PROGRESS NOTES
Chief Complaint   Patient presents with    Shortness of Breath     Referred back by Dr Maryanne Weinberg for sob upon exertion     Fatigue     patient feels like he has no strength      1. Have you been to the ER, urgent care clinic since your last visit? Hospitalized since your last visit? Yes [facility] Kuhn - [facility] for leg infection - 12/2021 and ED [facility] for blood transfusion due to low Hgb     2. Have you seen or consulted any other health care providers outside of the [address] [place] [address] since your last visit? Include any pap smears or colon screening.   Yes see above

## 2022-04-21 NOTE — LETTER
4/21/2022    Patient: Mendoza Klein   YOB: 1960   Date of Visit: 4/21/2022     Js Cody MD  [address] [place] [address]  Via In [facility] [address]    Dear Js Cody MD,      Thank you for referring Mr. Alea Webster to [address] [place] [address] for evaluation. My notes for this consultation are attached. If you have questions, please do not hesitate to call me. I look forward to following your patient along with you.       Sincerely,    Abisai Solis MD

## 2022-04-21 NOTE — PROGRESS NOTES
Subjective:      Nasima Purvis is a 59 y.o. male is here for EP consult. He has sob with exertion. Patient Active Problem List    Diagnosis Date Noted    Anemia 04/12/2022    Type 2 diabetes mellitus with chronic kidney disease ([address] 02/15/2022    Staphylococcal arthritis of right knee ([address] 01/03/2022    Cellulitis 01/02/2022    A-fib ([address] 05/10/2021    Tachy-amina syndrome ([address] 03/15/2021    Supracondylar fracture of distal end of femur without intracondylar extension ([address] 08/17/2020    Femur fracture ([address] 08/16/2020    Type 2 diabetes with nephropathy ([address] 12/12/2018    Severe obesity ([address] 12/12/2018    Controlled type 2 diabetes mellitus with complication, without long-term current use of insulin ([address] 12/12/2018    PAF (paroxysmal atrial fibrillation) ([address] 12/12/2018    Mixed hyperlipidemia 04/18/2012    Mitral valve disorders(424.0) 03/27/2012    Coronary artery disease 06/10/2011    S/P coronary artery stent placement 06/10/2011    Type 2 diabetes mellitus ([address] 06/10/2011    Benign essential hypertension 06/10/2011    Hypertriglyceridemia     Arthritis       Mario Pettit MD  Past Medical History:   Diagnosis Date    Arthritis     knees    Atrial fibrillation with RVR ([address]     Benign essential hypertension 6/10/2011    CAD (coronary artery disease)     Diabetes ([address]     DX  AGE 33      Hypercholesterolemia     Hypertriglyceridemia     Obesity     Pacemaker     PAF (paroxysmal atrial fibrillation) ([address] 12/12/2018    Syncope       Past Surgical History:   Procedure Laterality Date    CARDIAC CATHETERIZATION  6/9/2011         CARDIAC CATHETERIZATION  11/1/2012         HC ANGIOSEAL VIP 6FR  6/9/2011         HX AFIB ABLATION  03/15/2021    HX COLONOSCOPY  05/2009    Dr. Geri Chavez- normal    HX ORTHOPAEDIC      left knee replacement    HX ORTHOPAEDIC      R knee replacement  2010 -Shawna Weller.     OH ABLATE L/R ATRIAL FIBRIL W/ISOLATED PULM VEIN N/A 5/10/2021    Ablation Following A-Fib  Addl performed by Josh Leonardo MD at [facility] CARDIAC CATH LAB    MN CARDIAC SURG PROCEDURE UNLIST      STENT RCA  6/11    MN COMPRE ELECTROPHYSIOL XM W/LEFT ATRIAL PACNG/REC N/A 5/10/2021    Lt Atrial Pace & Record During Ep Study performed by Josh Leonardo MD at [facility] CARDIAC CATH LAB   [place] FIB PULM VEIN ISOLATION N/A 3/15/2021    ABLATION A-FIB  W COMPLETE EP STUDY performed by Josh Leonardo MD at [facility] CARDIAC CATH LAB   [place] FIB PULM VEIN ISOLATION N/A 5/10/2021    ABLATION A-FIB  W COMPLETE EP STUDY performed by Josh Leonardo MD at [facility] CARDIAC CATH LAB    MN DRUG-ELUTING STENTS, SINGLE  6/9/2011         MN INS NEW/RPLCMT PRM PM W/TRANSV ELTRD ATRIAL&VENT N/A 3/22/2021    INSERT PPM DUAL performed by Josh Leonardo MD at [facility] CARDIAC CATH LAB    MN INTRACARD ECHO, THER/DX INTERVENT N/A 3/15/2021    Intracardiac Echocardiogram performed by Josh Leonardo MD at [facility] CARDIAC CATH LAB    MN INTRACARD ECHO, THER/DX INTERVENT N/A 5/10/2021    Intracardiac Echocardiogram performed by Josh Leonardo MD at [facility] CARDIAC CATH LAB    MN INTRACARDIAC ELECTROPHYSIOLOGIC 3D MAPPING N/A 3/15/2021    Ep 3d Mapping performed by Josh Leonardo MD at [facility] CARDIAC CATH LAB    MN INTRACARDIAC ELECTROPHYSIOLOGIC 3D MAPPING N/A 5/10/2021    Ep 3d Mapping performed by Josh Leonardo MD at [facility] CARDIAC CATH LAB    UPPER GI ENDOSCOPY,BIOPSY  4/13/2022          Allergies   Allergen Reactions    Lisinopril Cough    Losartan Other (comments)     Hypotension        Family History   Problem Relation Age of Onset    Diabetes Mother     Hypertension Mother    [place] Elevated Lipids Mother     Cancer Mother         unsure    Diabetes Father     Heart Disease Father     Hypertension Father     Elevated Lipids Father     Diabetes Brother     Suicide Brother     Diabetes Brother     negative for cardiac disease  Social History     Socioeconomic History    Marital status:    Tobacco Use    Smoking status: Former Smoker     Types: Cigarettes     Quit date: 11/10/1989     Years since quittin.4    Smokeless tobacco: Never Used   Vaping Use    Vaping Use: Never used   Substance and Sexual Activity    Alcohol use: Yes     Comment: social  DRINKS ONE WEEKEND A MONTH/\"BIG IMPROVEMENT\"    Drug use: No    Sexual activity: Yes     Partners: Female     Birth control/protection: None   Social History Narrative    , 3 children, twin sons 25 and one son 15. Works at [employer] for the last 9 years, used to work a [email or web address]     Current Outpatient Medications   Medication Sig    Eliquis 5 mg tablet TAKE 1 TABLET BY MOUTH TWICE A DAY    ferrous sulfate 325 mg (65 mg iron) tablet Take 1 Tablet by mouth daily.  amoxicillin (AMOXIL) 500 mg capsule DENTAL APPOINTMENTS ONLY    apixaban (Eliquis) 5 mg tablet Take 1 Tablet by mouth two (2) times a day.  cyanocobalamin 1,000 mcg tablet Take 1 Tablet by mouth daily.  penicillin v potassium (VEETID) 500 mg tablet Take  by mouth four (4) times daily.  glipiZIDE (GLUCOTROL) 10 mg tablet TAKE 1 TABLET BY MOUTH TWICE A DAY    magnesium oxide (MAG-OX) 400 mg tablet TAKE 2 TABLETS BY MOUTH EVERY DAY    metFORMIN (GLUCOPHAGE) 1,000 mg tablet TAKE 1 TABLET BY MOUTH TWICE A DAY WITH MEALS    Jardiance 10 mg tablet TAKE 1 TAB BY MOUTH DAILY. FOR DIABETES    gemfibroziL (LOPID) 600 mg tablet Take 1 Tablet by mouth two (2) times a day.  semaglutide (Ozempic) 1 mg/dose (2 mg/1.5 mL) sub-q pen 1 MG BY SUBCUTANEOUS ROUTE EVERY SEVEN (7) DAYS.  nitroglycerin (NITROSTAT) 0.4 mg SL tablet 1 Tablet by SubLINGual route every five (5) minutes as needed for Chest Pain (call 911 if CP/ SOB not relieved by 3 tabs).     furosemide (LASIX) 40 mg tablet TAKE 1-2 TABLET BY MOUTH DAILY AS NEEDED FOR FLUID    rosuvastatin (CRESTOR) 20 mg tablet TAKE 1 TABLET BY MOUTH EVERY DAY FOR CHOLESTEROL    ACETAMINOPHEN PO Take 500 mg by mouth as needed. 1-2 tabs prn as needed    aspirin delayed-release 81 mg tablet Take  by mouth daily.  omega-3 fatty acids-vitamin e (FISH OIL) 1,000 mg Cap Take 2 Caps by mouth two (2) times a day.  sodium-potassium-mag sulfate (Suprep Bowel Prep Kit) 17.5-3.13-1.6 gram solr oral solution TAKE AS DIRECTED BEFORE COLONOSCOPY (Patient not taking: Reported on 4/21/2022)    oxyCODONE IR (ROXICODONE) 5 mg immediate release tablet Take 5 mg by mouth every four (4) hours as needed. (Patient not taking: Reported on 4/12/2022)    senna-docusate (PERICOLACE) 8.6-50 mg per tablet Take 1 Tablet by mouth two (2) times daily as needed for Constipation. (Patient not taking: Reported on 4/12/2022)    polyethylene glycol (MIRALAX) 17 gram packet Take 1 Packet by mouth daily as needed for Constipation. (Patient not taking: Reported on 4/12/2022)    famotidine (PEPCID) 20 mg tablet Take 1 Tablet by mouth two (2) times daily as needed for Gastroesophageal Reflux Disease (GERD). (Patient not taking: Reported on 4/12/2022)    metoprolol tartrate (LOPRESSOR) 25 mg tablet Take 1 Tablet by mouth two (2) times a day for 90 days. No current facility-administered medications for this visit. Vitals:    04/21/22 0940   BP: (!) 112/56   Pulse: 70   Resp: 18   SpO2: 98%   Weight: 295 lb 8 oz (134 kg)   Height: 6' 2\" (1.88 m)       I have reviewed the nurses notes, vitals, problem list, allergy list, medical history, family, social history and medications. Review of Symptoms:    General: Pt denies excessive weight gain or loss. Pt is able to conduct ADL's  HEENT: Denies blurred vision, headaches, hearing loss, epistaxis and difficulty swallowing. Respiratory: +sob/hall, Denies cough, congestion, wheezing or stridor.   Cardiovascular: Denies precordial pain, palpitations, edema or PND  Gastrointestinal: Denies poor appetite, indigestion, abdominal pain or blood in stool  Genitourinary: Denies hematuria, dysuria, increased urinary frequency  Musculoskeletal: Denies joint pain or swelling from muscles or joints  Neurologic: Denies tremor, paresthesias, headache, or sensory motor disturbance  Psychiatric: Denies confusion, insomnia, depression  Integumentray: Denies rash, itching or ulcers. Hematologic: Denies easy bruising, bleeding    Physical Exam:      General: Well developed, in no acute distress. HEENT: Eyes - PERRL, no jvd  Heart:  Normal S1/S2 negative S3 or S4. Regular, no murmur, gallop or rub. Respiratory: Clear bilaterally x 4, no wheezing or rales  Abdomen:   Soft, non-tender, bowel sounds are active. Extremities:  No edema, normal cap refill, no cyanosis. Musculoskeletal: No clubbing  Neuro: A&Ox3, speech clear, gait stable. Skin: Skin color is normal. No rashes or lesions.  Non diaphoretic, no ulcers or subcutaneous nodule  Vascular: 2+ pulses symmetric in all extremities  Psych - judgement intact and orientation is wnl     Cardiographics    Ekg: a paced    Pacer - A paced     Results for orders placed or performed during the hospital encounter of 05/10/21   EKG, 12 LEAD, INITIAL   Result Value Ref Range    Ventricular Rate 75 BPM    Atrial Rate 75 BPM    P-R Interval 210 ms    QRS Duration 94 ms    Q-T Interval 416 ms    QTC Calculation (Bezet) 464 ms    Calculated R Axis -14 degrees    Calculated T Axis 9 degrees    Diagnosis       Atrial-paced rhythm with prolonged AV conduction  Nonspecific T wave abnormality    Confirmed by Jose L Peterson MD, Ai Porter ([id number]) on 5/10/2021 3:35:28 PM           Lab Results   Component Value Date/Time    WBC 4.3 04/13/2022 04:17 AM    HGB 7.2 (L) 04/13/2022 04:17 AM    HCT 26.3 (L) 04/13/2022 04:17 AM    PLATELET 167 94/74/3816 04:17 AM    MCV 74.7 (L) 04/13/2022 04:17 AM      Lab Results   Component Value Date/Time    Sodium 138 04/12/2022 02:17 PM    Potassium 4.0 04/12/2022 02:17 PM    Chloride 110 (H) 04/12/2022 02:17 PM CO2 23 04/12/2022 02:17 PM    Anion gap 5 04/12/2022 02:17 PM    Glucose 175 (H) 04/12/2022 02:17 PM    BUN 21 (H) 04/12/2022 02:17 PM    Creatinine 1.02 04/12/2022 02:17 PM    BUN/Creatinine ratio 21 (H) 04/12/2022 02:17 PM    GFR est AA >60 04/12/2022 02:17 PM    GFR est non-AA >60 04/12/2022 02:17 PM    Calcium 8.7 04/12/2022 02:17 PM    Bilirubin, total 0.4 04/12/2022 02:17 PM    Alk. phosphatase 36 (L) 04/12/2022 02:17 PM    Protein, total 6.9 04/12/2022 02:17 PM    Albumin 3.1 (L) 04/12/2022 02:17 PM    Globulin 3.8 04/12/2022 02:17 PM    A-G Ratio 0.8 (L) 04/12/2022 02:17 PM    ALT (SGPT) 10 (L) 04/12/2022 02:17 PM         Assessment:     Assessment:        ICD-10-CM ICD-9-CM    1. PAF (paroxysmal atrial fibrillation) ([facility])  I48.0 427.31 AMB POC EKG ROUTINE W/ 12 LEADS, INTER & REP      NUCLEAR CARDIAC STRESS TEST   2. Mixed hyperlipidemia  E78.2 272.2 NUCLEAR CARDIAC STRESS TEST   3. Benign essential hypertension  I10 401.1 NUCLEAR CARDIAC STRESS TEST   4. SSS (sick sinus syndrome) ([facility])  I49.5 427.81 NUCLEAR CARDIAC STRESS TEST   5. SOB (shortness of breath) on exertion  R06.02 786.05 NUCLEAR CARDIAC STRESS TEST   6. S/P coronary artery stent placement  Z95.5 V45.82 NUCLEAR CARDIAC STRESS TEST     Orders Placed This Encounter    AMB POC EKG ROUTINE W/ 12 LEADS, INTER & REP     Order Specific Question:   Reason for Exam:     Answer:   routine        Plan:   Stewart Morillo is continuing to have sob with exertion. GI w/u wnl. We will dc amio. Stable and compliant with oac. He has a hx of cad and I will obtain a stress test in this gentleman with dm, known cad and sob with exertion (lvef wnl in 2021 per echo). Cont med rx for htn and hyperlipidemia. He will establish with gen cards at [facility] post stress test. From an ep standpoint, he will f/u in one year. Thank you for allowing me to participate in Stewart Morillo 's care.     Leah Lang MD, [facility]

## 2022-04-22 ENCOUNTER — PATIENT OUTREACH (OUTPATIENT)
Dept: CASE MANAGEMENT | Age: 62
End: 2022-04-22

## 2022-04-22 NOTE — PROGRESS NOTES
Contacted the patient for hospital discharge follow up  Confirmed ID x2  Reports feeling better, although continues with dyspnea with exertion which has not worsened  Denies bleeding  Resumed Kris  Attended follow up appt with cards, pcp, and ortho  States stress test is being scheduled  Med rec was not completed

## 2022-05-13 ENCOUNTER — PATIENT OUTREACH (OUTPATIENT)
Dept: CASE MANAGEMENT | Age: 62
End: 2022-05-13

## 2022-05-13 NOTE — PROGRESS NOTES
Patient has graduated from the Transitions of Care Coordination  program on 5/13/2022. Patient/family has the ability to self-manage at this time Care management goals have been completed. Patient was not referred to the [facility] team for further management. Patient has Care Transition Nurse's contact information for any further questions, concerns, or needs.   Patients upcoming visits:    Future Appointments   Date Time Provider Jeff Henry   6/23/2022  1:45 PM REMOTE_RCAKRZYSZTOF MASONMB BS AMB   8/16/2022  9:40 AM Gen Wellington MD [facility] BS AMB   10/6/2022 10:30 AM PACEMAKER, RCAKRZYSZTOF PRINGLE BS AMB   10/6/2022 10:40 AM Rylee Melendez, ANP [facility] BS AMB

## 2022-05-31 ENCOUNTER — NURSE TRIAGE (OUTPATIENT)
Dept: OTHER | Facility: CLINIC | Age: 62
End: 2022-05-31

## 2022-05-31 NOTE — TELEPHONE ENCOUNTER
Received call from 71 Cindy Patel at [facility] with Red Flag Complaint. Subjective: Caller states \"He had neck pain and headache. \"     Current Symptoms: constant neck stiffness- certain positions cause pain in the back. Woke up Thursday with a stiff neck. No known injury    Laying down aggravated it, slept in recliner okay. Radiates to lower back of head with movement. Is able to almost touch chin to chest, due to pain    Is okay when up walking,    Slightly off balance at times. Denies vision difficulties, Chest pain, dizziness, new numbness/tingling, SOB, weakness, bowel/bladder control    Onset: 5 days ago; worsening    Associated Symptoms: reduced activity    Pain Severity: 10/10; throbbing and stiffness; constant    Temperature: None    What has been tried: oxycodone, flexeril- helped a little with the oxycodone. , heat    LMP: NA Pregnant: NA    Recommended disposition: Go to [facility]    Care advice provided, patient verbalizes understanding; denies any other questions or concerns; instructed to call back for any new or worsening symptoms. Santy Villafuerte at [facility] calling patient to schedule    Attention Provider: Thank you for allowing me to participate in the care of your patient. The patient was connected to triage in response to information provided to the [facility]. Please do not respond through this encounter as the response is not directed to a shared pool.       Reason for Disposition   SEVERE pain (e.g., excruciating, unable to do any normal activities)    Protocols used: NECK PAIN OR STIFFNESS-ADULT-[place]

## 2022-06-02 ENCOUNTER — TELEPHONE (OUTPATIENT)
Dept: FAMILY MEDICINE CLINIC | Age: 62
End: 2022-06-02

## 2022-06-07 ENCOUNTER — OFFICE VISIT (OUTPATIENT)
Dept: FAMILY MEDICINE CLINIC | Age: 62
End: 2022-06-07
Payer: COMMERCIAL

## 2022-06-07 VITALS
DIASTOLIC BLOOD PRESSURE: 64 MMHG | BODY MASS INDEX: 37.99 KG/M2 | RESPIRATION RATE: 16 BRPM | SYSTOLIC BLOOD PRESSURE: 135 MMHG | HEART RATE: 74 BPM | HEIGHT: 74 IN | WEIGHT: 296 LBS | TEMPERATURE: 98.8 F | OXYGEN SATURATION: 97 %

## 2022-06-07 DIAGNOSIS — M54.2 NECK PAIN: Primary | ICD-10-CM

## 2022-06-07 DIAGNOSIS — F11.99 OPIOID USE, UNSPECIFIED WITH UNSPECIFIED OPIOID-INDUCED DISORDER (HCC): ICD-10-CM

## 2022-06-07 PROCEDURE — 99213 OFFICE O/P EST LOW 20 MIN: CPT | Performed by: FAMILY MEDICINE

## 2022-06-07 RX ORDER — DEXTROMETHORPHAN HYDROBROMIDE, GUAIFENESIN 5; 100 MG/5ML; MG/5ML
650 LIQUID ORAL EVERY 8 HOURS
Qty: 30 TABLET | Refills: 2
Start: 2022-06-07

## 2022-06-07 RX ORDER — IBUPROFEN 800 MG/1
800 TABLET ORAL
Qty: 120 TABLET | Refills: 2 | Status: SHIPPED | OUTPATIENT
Start: 2022-06-07

## 2022-06-07 RX ORDER — METHYLPREDNISOLONE 4 MG/1
TABLET ORAL
Qty: 1 DOSE PACK | Refills: 0 | Status: SHIPPED | OUTPATIENT
Start: 2022-06-07 | End: 2022-08-29 | Stop reason: ALTCHOICE

## 2022-06-07 RX ORDER — CYCLOBENZAPRINE HCL 10 MG
10 TABLET ORAL
COMMUNITY
Start: 2022-05-29

## 2022-06-07 RX ORDER — OXYCODONE AND ACETAMINOPHEN 7.5; 325 MG/1; MG/1
1 TABLET ORAL
Qty: 25 TABLET | Refills: 0 | Status: SHIPPED | OUTPATIENT
Start: 2022-06-07 | End: 2022-06-21

## 2022-06-07 RX ORDER — PANTOPRAZOLE SODIUM 40 MG/1
40 TABLET, DELAYED RELEASE ORAL DAILY
Qty: 30 TABLET | Refills: 2 | Status: SHIPPED | OUTPATIENT
Start: 2022-06-07 | End: 2022-06-15

## 2022-06-07 NOTE — PROGRESS NOTES
HISTORY OF PRESENT ILLNESS  Kb Colon is a 59 y.o. male. HPI 2 week hx neck pain, at times radiates to head and to back. [facility], was told that had arthritis. No radiation of pain into arms. No numbness or weakness in arms. Some numbness in feet. Tylenol 500 mg and 650 mg- - mild relief. Also took some oxycodone- mild relief. Ibuprofen 800 mg x 2- worked better than anything else. Has had some mild stiffness in neck in the past, never lasted this long. ROS    Physical Exam  Vitals and nursing note reviewed. Constitutional:       Appearance: He is well-developed. HENT:      Right Ear: External ear normal.      Left Ear: External ear normal.   Neck:      Thyroid: No thyromegaly. Cardiovascular:      Rate and Rhythm: Normal rate and regular rhythm. Heart sounds: Normal heart sounds. Pulmonary:      Effort: Pulmonary effort is normal. No respiratory distress. Breath sounds: Normal breath sounds. No wheezing. Abdominal:      General: Bowel sounds are normal. There is no distension. Palpations: Abdomen is soft. There is no mass. Tenderness: There is no abdominal tenderness. There is no guarding. Musculoskeletal:         General: Normal range of motion. Comments: Neck- decreased rom. Tenderness paravertebral muscles. Ext- gross motor intact. Lymphadenopathy:      Cervical: No cervical adenopathy. ASSESSMENT and PLAN  Orders Placed This Encounter    ibuprofen (MOTRIN) 800 mg tablet    acetaminophen (Tylenol Arthritis Pain) 650 mg TbER    methylPREDNISolone (MEDROL DOSEPACK) 4 mg tablet    pantoprazole (PROTONIX) 40 mg tablet    oxyCODONE-acetaminophen (PERCOCET 7.5) 7.5-325 mg per tablet     Diagnoses and all orders for this visit:    1. Neck pain  -     oxyCODONE-acetaminophen (PERCOCET 7.5) 7.5-325 mg per tablet; Take 1 Tablet by mouth every eight (8) hours as needed for Pain for up to 14 days. Max Daily Amount: 3 Tablets.     2. Opioid use, unspecified with unspecified opioid-induced disorder    Other orders  -     ibuprofen (MOTRIN) 800 mg tablet; Take 1 Tablet by mouth every six (6) hours as needed for Pain. -     acetaminophen (Tylenol Arthritis Pain) 650 mg TbER; Take 1 Tablet by mouth every eight (8) hours. -     methylPREDNISolone (MEDROL DOSEPACK) 4 mg tablet; As directed  -     pantoprazole (PROTONIX) 40 mg tablet; Take 1 Tablet by mouth daily. For stomach          Pt to not take ibuprofen. Is on eliquis. swapna worked up for iron deficiency anemia.

## 2022-06-07 NOTE — PROGRESS NOTES
Chief Complaint   Patient presents with    Back Pain    Neck Pain       1. \"Have you been to the ER, urgent care clinic since your last visit? Hospitalized since your last visit? \" [facility] one week ago    2. \"Have you seen or consulted any other health care providers outside of the [address] [place] [address] since your last visit? \" No     3. For patients aged 39-70: Has the patient had a colonoscopy / FIT/ Cologuard? No      If the patient is female:    4. For patients aged 41-77: Has the patient had a mammogram within the past 2 years? NA - based on age or sex      11. For patients aged 21-65: Has the patient had a pap smear?  NA - based on age or sex    Health Maintenance Due   Topic Date Due    Pneumococcal 0-64 years (1 - PCV) Never done    Shingrix Vaccine Age 50> (1 of 2) Never done    Eye Exam Retinal or Dilated  06/30/2017    Colorectal Cancer Screening Combo  05/28/2019    Foot Exam Q1  07/29/2020    MICROALBUMIN Q1  07/29/2020

## 2022-06-15 RX ORDER — GLIPIZIDE 10 MG/1
TABLET ORAL
Qty: 180 TABLET | Refills: 1 | Status: SHIPPED | OUTPATIENT
Start: 2022-06-15

## 2022-06-15 RX ORDER — PANTOPRAZOLE SODIUM 40 MG/1
40 TABLET, DELAYED RELEASE ORAL DAILY
Qty: 90 TABLET | Refills: 3 | Status: SHIPPED | OUTPATIENT
Start: 2022-06-15

## 2022-06-15 RX ORDER — ROSUVASTATIN CALCIUM 20 MG/1
TABLET, COATED ORAL
Qty: 90 TABLET | Refills: 3 | Status: SHIPPED | OUTPATIENT
Start: 2022-06-15

## 2022-07-15 RX ORDER — SEMAGLUTIDE 1.34 MG/ML
INJECTION, SOLUTION SUBCUTANEOUS
Qty: 3 EACH | Refills: 25 | Status: SHIPPED | OUTPATIENT
Start: 2022-07-15

## 2022-07-26 DIAGNOSIS — U07.1 COVID-19: Primary | ICD-10-CM

## 2022-07-26 RX ORDER — NIRMATRELVIR AND RITONAVIR 300-100 MG
KIT ORAL
Qty: 1 BOX | Refills: 0 | Status: SHIPPED | OUTPATIENT
Start: 2022-07-26 | End: 2022-08-29 | Stop reason: ALTCHOICE

## 2022-07-26 RX ORDER — NIRMATRELVIR AND RITONAVIR 150-100 MG
KIT ORAL
Status: CANCELLED | OUTPATIENT
Start: 2022-07-26

## 2022-08-03 RX ORDER — APIXABAN 5 MG/1
TABLET, FILM COATED ORAL
Qty: 180 TABLET | Refills: 1 | Status: SHIPPED | OUTPATIENT
Start: 2022-08-03 | End: 2022-08-29 | Stop reason: SDUPTHER

## 2022-08-29 ENCOUNTER — OFFICE VISIT (OUTPATIENT)
Dept: FAMILY MEDICINE CLINIC | Age: 62
End: 2022-08-29
Payer: COMMERCIAL

## 2022-08-29 VITALS
RESPIRATION RATE: 16 BRPM | OXYGEN SATURATION: 97 % | TEMPERATURE: 97.7 F | WEIGHT: 297.2 LBS | HEIGHT: 74 IN | SYSTOLIC BLOOD PRESSURE: 131 MMHG | HEART RATE: 72 BPM | DIASTOLIC BLOOD PRESSURE: 73 MMHG | BODY MASS INDEX: 38.14 KG/M2

## 2022-08-29 DIAGNOSIS — R60.0 LOCALIZED EDEMA: ICD-10-CM

## 2022-08-29 DIAGNOSIS — E78.00 HYPERCHOLESTEROLEMIA: ICD-10-CM

## 2022-08-29 DIAGNOSIS — D50.9 IRON DEFICIENCY ANEMIA, UNSPECIFIED IRON DEFICIENCY ANEMIA TYPE: Primary | ICD-10-CM

## 2022-08-29 DIAGNOSIS — R06.02 SHORTNESS OF BREATH: ICD-10-CM

## 2022-08-29 DIAGNOSIS — R53.1 WEAKNESS: ICD-10-CM

## 2022-08-29 DIAGNOSIS — E11.8 CONTROLLED TYPE 2 DIABETES MELLITUS WITH COMPLICATION, WITHOUT LONG-TERM CURRENT USE OF INSULIN (HCC): ICD-10-CM

## 2022-08-29 PROCEDURE — 99214 OFFICE O/P EST MOD 30 MIN: CPT | Performed by: FAMILY MEDICINE

## 2022-08-29 PROCEDURE — 3051F HG A1C>EQUAL 7.0%<8.0%: CPT | Performed by: FAMILY MEDICINE

## 2022-08-29 RX ORDER — FUROSEMIDE 80 MG/1
TABLET ORAL
Qty: 90 TABLET | Refills: 5 | Status: SHIPPED | OUTPATIENT
Start: 2022-08-29

## 2022-08-29 NOTE — PROGRESS NOTES
Chief Complaint   Patient presents with    Diabetes    Hypertension    Cholesterol Problem    Follow-up       1. \"Have you been to the ER, urgent care clinic since your last visit? Hospitalized since your last visit? \" No    2. \"Have you seen or consulted any other health care providers outside of the [address] [place] [address] since your last visit? \" Yes GI - colonoscopy      3. For patients aged 39-70: Has the patient had a colonoscopy / FIT/ Cologuard? Yes - Care Gap present. Rooming MA/LPN to request most recent results      If the patient is female:    4. For patients aged 41-77: Has the patient had a mammogram within the past 2 years? NA - based on age or sex      11. For patients aged 21-65: Has the patient had a pap smear?  NA - based on age or sex    Health Maintenance Due   Topic Date Due    Pneumococcal 0-64 years (1 - PCV) Never done    Shingrix Vaccine Age 49> (1 of 2) Never done    Eye Exam Retinal or Dilated  06/30/2017    Colorectal Cancer Screening Combo  05/28/2019    Foot Exam Q1  07/29/2020    MICROALBUMIN Q1  07/29/2020    COVID-19 Vaccine (3 - Booster for Rebecca series) 04/30/2022

## 2022-08-29 NOTE — PROGRESS NOTES
HISTORY OF PRESENT ILLNESS  Krystyna Viramontes is a 59 y.o. male. HPI Pt. Comes in for blood pressure, cholesterol, and diabetes check. Retired in [address] of last year. Has some swelling in legs. No complaints of chest pain, shortness of breath, TIAs, claudication or edema. Has to rest if walks long distances. Uses a grocery cart in grocery store. Legs feel somewhat weak at times. Not checking blood psugars no hypoglycemic episodes. Gets some blisters and fluid in legs at times. ROS    Physical Exam  Vitals and nursing note reviewed. Constitutional:       Appearance: He is well-developed. HENT:      Right Ear: External ear normal.      Left Ear: External ear normal.   Neck:      Thyroid: No thyromegaly. Cardiovascular:      Rate and Rhythm: Normal rate and regular rhythm. Heart sounds: Normal heart sounds. Pulmonary:      Effort: Pulmonary effort is normal. No respiratory distress. Breath sounds: Normal breath sounds. No wheezing. Abdominal:      General: Bowel sounds are normal. There is no distension. Palpations: Abdomen is soft. There is no mass. Tenderness: There is no abdominal tenderness. There is no guarding. Musculoskeletal:         General: Normal range of motion. Comments: 2+ edema bilaterally   Lymphadenopathy:      Cervical: No cervical adenopathy. ASSESSMENT and PLAN  Orders Placed This Encounter    XR CHEST PA LAT    CBC WITH AUTOMATED DIFF    METABOLIC PANEL, COMPREHENSIVE    TSH 3RD GENERATION    T4, FREE    URINALYSIS W/MICROSCOPIC    HEMOGLOBIN A1C WITH EAG    LIPID PANEL    furosemide (LASIX) 80 mg tablet     Diagnoses and all orders for this visit:    1. Iron deficiency anemia, unspecified iron deficiency anemia type    2. Hypercholesterolemia    3. Controlled type 2 diabetes mellitus with complication, without long-term current use of insulin (HCC)  -     HEMOGLOBIN A1C WITH EAG; Future  -     LIPID PANEL; Future    4.  Localized edema  -     METABOLIC PANEL, COMPREHENSIVE; Future  -     URINALYSIS W/MICROSCOPIC; Future    5. Weakness  -     TSH 3RD GENERATION; Future  -     T4, FREE; Future    6. Shortness of breath  -     CBC WITH AUTOMATED DIFF; Future  -     XR CHEST PA LAT; Future    Other orders  -     furosemide (LASIX) 80 mg tablet; TAKE 1-2 TABLET BY MOUTH DAILY AS NEEDED FOR FLUID      Follow-up and Dispositions    Return in about 6 months (around 2/28/2023).

## 2022-08-30 LAB
ALBUMIN SERPL-MCNC: 3.2 G/DL (ref 3.5–5)
ALBUMIN/GLOB SERPL: 1 {RATIO} (ref 1.1–2.2)
ALP SERPL-CCNC: 49 U/L (ref 45–117)
ALT SERPL-CCNC: 13 U/L (ref 12–78)
ANION GAP SERPL CALC-SCNC: 8 MMOL/L (ref 5–15)
APPEARANCE UR: CLEAR
AST SERPL-CCNC: 9 U/L (ref 15–37)
BACTERIA URNS QL MICRO: NEGATIVE /HPF
BASOPHILS # BLD: 0 K/UL (ref 0–0.1)
BASOPHILS NFR BLD: 1 % (ref 0–1)
BILIRUB SERPL-MCNC: 0.2 MG/DL (ref 0.2–1)
BILIRUB UR QL: NEGATIVE
BUN SERPL-MCNC: 14 MG/DL (ref 6–20)
BUN/CREAT SERPL: 16 (ref 12–20)
CALCIUM SERPL-MCNC: 9.3 MG/DL (ref 8.5–10.1)
CHLORIDE SERPL-SCNC: 110 MMOL/L (ref 97–108)
CHOLEST SERPL-MCNC: 121 MG/DL
CO2 SERPL-SCNC: 24 MMOL/L (ref 21–32)
COLOR UR: ABNORMAL
CREAT SERPL-MCNC: 0.89 MG/DL (ref 0.7–1.3)
DIFFERENTIAL METHOD BLD: ABNORMAL
EOSINOPHIL # BLD: 0.2 K/UL (ref 0–0.4)
EOSINOPHIL NFR BLD: 3 % (ref 0–7)
EPITH CASTS URNS QL MICRO: ABNORMAL /LPF
ERYTHROCYTE [DISTWIDTH] IN BLOOD BY AUTOMATED COUNT: 15.9 % (ref 11.5–14.5)
EST. AVERAGE GLUCOSE BLD GHB EST-MCNC: 171 MG/DL
GLOBULIN SER CALC-MCNC: 3.1 G/DL (ref 2–4)
GLUCOSE SERPL-MCNC: 158 MG/DL (ref 65–100)
GLUCOSE UR STRIP.AUTO-MCNC: >1000 MG/DL
HBA1C MFR BLD: 7.6 % (ref 4–5.6)
HCT VFR BLD AUTO: 36.9 % (ref 36.6–50.3)
HDLC SERPL-MCNC: 32 MG/DL
HDLC SERPL: 3.8 {RATIO} (ref 0–5)
HGB BLD-MCNC: 11 G/DL (ref 12.1–17)
HGB UR QL STRIP: ABNORMAL
HYALINE CASTS URNS QL MICRO: ABNORMAL /LPF (ref 0–5)
IMM GRANULOCYTES # BLD AUTO: 0 K/UL (ref 0–0.04)
IMM GRANULOCYTES NFR BLD AUTO: 0 % (ref 0–0.5)
KETONES UR QL STRIP.AUTO: NEGATIVE MG/DL
LDLC SERPL CALC-MCNC: 61.8 MG/DL (ref 0–100)
LEUKOCYTE ESTERASE UR QL STRIP.AUTO: NEGATIVE
LYMPHOCYTES # BLD: 1.5 K/UL (ref 0.8–3.5)
LYMPHOCYTES NFR BLD: 22 % (ref 12–49)
MCH RBC QN AUTO: 26.3 PG (ref 26–34)
MCHC RBC AUTO-ENTMCNC: 29.8 G/DL (ref 30–36.5)
MCV RBC AUTO: 88.1 FL (ref 80–99)
MONOCYTES # BLD: 0.6 K/UL (ref 0–1)
MONOCYTES NFR BLD: 9 % (ref 5–13)
NEUTS SEG # BLD: 4.3 K/UL (ref 1.8–8)
NEUTS SEG NFR BLD: 65 % (ref 32–75)
NITRITE UR QL STRIP.AUTO: NEGATIVE
NRBC # BLD: 0 K/UL (ref 0–0.01)
NRBC BLD-RTO: 0 PER 100 WBC
PH UR STRIP: 6 [PH] (ref 5–8)
PLATELET # BLD AUTO: 278 K/UL (ref 150–400)
PMV BLD AUTO: 9.5 FL (ref 8.9–12.9)
POTASSIUM SERPL-SCNC: 4.3 MMOL/L (ref 3.5–5.1)
PROT SERPL-MCNC: 6.3 G/DL (ref 6.4–8.2)
PROT UR STRIP-MCNC: 300 MG/DL
RBC # BLD AUTO: 4.19 M/UL (ref 4.1–5.7)
RBC #/AREA URNS HPF: ABNORMAL /HPF (ref 0–5)
SODIUM SERPL-SCNC: 142 MMOL/L (ref 136–145)
SP GR UR REFRACTOMETRY: 1.02 (ref 1–1.03)
T4 FREE SERPL-MCNC: 1.1 NG/DL (ref 0.8–1.5)
TRIGL SERPL-MCNC: 136 MG/DL (ref ?–150)
TSH SERPL DL<=0.05 MIU/L-ACNC: 5.18 UIU/ML (ref 0.36–3.74)
UROBILINOGEN UR QL STRIP.AUTO: 0.2 EU/DL (ref 0.2–1)
VLDLC SERPL CALC-MCNC: 27.2 MG/DL
WBC # BLD AUTO: 6.7 K/UL (ref 4.1–11.1)
WBC URNS QL MICRO: ABNORMAL /HPF (ref 0–4)

## 2022-10-17 RX ORDER — EMPAGLIFLOZIN 10 MG/1
TABLET, FILM COATED ORAL
Qty: 90 TABLET | Refills: 3 | Status: SHIPPED | OUTPATIENT
Start: 2022-10-17

## 2022-10-17 RX ORDER — GEMFIBROZIL 600 MG/1
600 TABLET, FILM COATED ORAL 2 TIMES DAILY
Qty: 180 TABLET | Refills: 3 | Status: SHIPPED | OUTPATIENT
Start: 2022-10-17

## 2022-10-17 RX ORDER — METFORMIN HYDROCHLORIDE 1000 MG/1
TABLET ORAL
Qty: 180 TABLET | Refills: 3 | Status: SHIPPED | OUTPATIENT
Start: 2022-10-17

## 2022-11-26 DIAGNOSIS — I48.0 PAF (PAROXYSMAL ATRIAL FIBRILLATION) (HCC): Chronic | ICD-10-CM

## 2022-11-26 DIAGNOSIS — I10 BENIGN ESSENTIAL HYPERTENSION: Chronic | ICD-10-CM

## 2022-11-26 DIAGNOSIS — E78.2 MIXED HYPERLIPIDEMIA: ICD-10-CM

## 2022-11-26 DIAGNOSIS — I49.5 TACHY-BRADY SYNDROME (HCC): Chronic | ICD-10-CM

## 2022-11-28 RX ORDER — METOPROLOL TARTRATE 25 MG/1
25 TABLET, FILM COATED ORAL 2 TIMES DAILY
Qty: 180 TABLET | Refills: 1 | OUTPATIENT
Start: 2022-11-28 | End: 2023-02-26

## 2022-12-05 NOTE — TELEPHONE ENCOUNTER
This was last given by cardiology. Patient states he has been out for a week and needs this refill. Please sign if appropriate. Last Visit: 8/29/22 with MD Karlie Oconnell  Next Appointment: 2/28/23 with MD Karlie Oconnell  Previous Refill Encounter(s): 4/20/22 #60 with 5 refills    Requested Prescriptions     Pending Prescriptions Disp Refills    apixaban (Eliquis) 5 mg tablet 60 Tablet 5     Sig: Take 1 Tablet by mouth two (2) times a day. For [id number] [facility] in place:   Recommendation Provided To:    Intervention Detail: New Rx: 1, reason: Patient Preference  Gap Closed?:   Intervention Accepted By:   Time Spent (min): 5

## 2022-12-12 NOTE — TELEPHONE ENCOUNTER
Pc with pt.  ([facility] doctor )called andhis hgb was 6.8. I called  pttghanshyamt,  koko well.hasnt seen any bleeding. Had  EGD and colonoscopy this  past April. With Dr. Peyton Michaels. To come by tomorrow , will repeat cbc, iron profile, ferritin. Will call Dr. Leslie - discuss small bowel  capsule study. Pt to start iron 325 mg bid. If hgb less than 7.0, will arrange outpatient transfusion.

## 2022-12-13 ENCOUNTER — OFFICE VISIT (OUTPATIENT)
Dept: FAMILY MEDICINE CLINIC | Age: 62
End: 2022-12-13

## 2022-12-13 DIAGNOSIS — D50.9 IRON DEFICIENCY ANEMIA, UNSPECIFIED IRON DEFICIENCY ANEMIA TYPE: Primary | ICD-10-CM

## 2022-12-13 RX ORDER — LANOLIN ALCOHOL/MO/W.PET/CERES
CREAM (GRAM) TOPICAL
Qty: 180 TABLET | Refills: 3 | Status: SHIPPED | OUTPATIENT
Start: 2022-12-13

## 2022-12-14 LAB
BASOPHILS # BLD: 0.1 K/UL (ref 0–0.1)
BASOPHILS NFR BLD: 1 % (ref 0–1)
DIFFERENTIAL METHOD BLD: ABNORMAL
EOSINOPHIL # BLD: 0.2 K/UL (ref 0–0.4)
EOSINOPHIL NFR BLD: 3 % (ref 0–7)
ERYTHROCYTE [DISTWIDTH] IN BLOOD BY AUTOMATED COUNT: 17.2 % (ref 11.5–14.5)
FERRITIN SERPL-MCNC: 6 NG/ML (ref 26–388)
HCT VFR BLD AUTO: 25 % (ref 36.6–50.3)
HGB BLD-MCNC: 6.8 G/DL (ref 12.1–17)
IMM GRANULOCYTES # BLD AUTO: 0 K/UL (ref 0–0.04)
IMM GRANULOCYTES NFR BLD AUTO: 0 % (ref 0–0.5)
IRON SATN MFR SERPL: 3 % (ref 20–50)
IRON SERPL-MCNC: 14 UG/DL (ref 35–150)
LYMPHOCYTES # BLD: 1.5 K/UL (ref 0.8–3.5)
LYMPHOCYTES NFR BLD: 24 % (ref 12–49)
MCH RBC QN AUTO: 20.3 PG (ref 26–34)
MCHC RBC AUTO-ENTMCNC: 27.2 G/DL (ref 30–36.5)
MCV RBC AUTO: 74.6 FL (ref 80–99)
MONOCYTES # BLD: 0.6 K/UL (ref 0–1)
MONOCYTES NFR BLD: 10 % (ref 5–13)
NEUTS SEG # BLD: 3.7 K/UL (ref 1.8–8)
NEUTS SEG NFR BLD: 62 % (ref 32–75)
NRBC # BLD: 0 K/UL (ref 0–0.01)
NRBC BLD-RTO: 0 PER 100 WBC
PLATELET # BLD AUTO: 313 K/UL (ref 150–400)
PMV BLD AUTO: 10.3 FL (ref 8.9–12.9)
RBC # BLD AUTO: 3.35 M/UL (ref 4.1–5.7)
RBC MORPH BLD: ABNORMAL
TIBC SERPL-MCNC: 512 UG/DL (ref 250–450)
WBC # BLD AUTO: 6.1 K/UL (ref 4.1–11.1)

## 2022-12-20 NOTE — TELEPHONE ENCOUNTER
Pc with pt. Reluctant to take transfusion. Will set up iv iron (LMW  Iron Dextran 1000 mg. Followup hemoglobin in 3 weeks.

## 2023-01-03 ENCOUNTER — TELEPHONE (OUTPATIENT)
Dept: FAMILY MEDICINE CLINIC | Age: 63
End: 2023-01-03

## 2023-01-03 NOTE — TELEPHONE ENCOUNTER
----- Message from Damon Patel sent at 1/3/2023  9:36 AM EST -----  Subject: Message to Provider    QUESTIONS  Information for Provider? Wendy Tapia @ [address] called to let us   know insurance will not cover infusions for Iron. They will fax over   denial.  ---------------------------------------------------------------------------  --------------  [phone]  [phone]; Do not leave any message, patient will call back for answer  ---------------------------------------------------------------------------  --------------  SCRIPT ANSWERS  Relationship to Patient? Third Party  Third Party Type? Hospital?   Representative Name?  Wendy Tapia

## 2023-01-03 NOTE — TELEPHONE ENCOUNTER
Pc with pt. Insurance wouldn't cover iv iron. To come by Thursday for CBC. No co dizziness or dyspnea.

## 2023-01-05 ENCOUNTER — HOSPITAL ENCOUNTER (OUTPATIENT)
Dept: INFUSION THERAPY | Age: 63
End: 2023-01-05

## 2023-01-05 ENCOUNTER — OFFICE VISIT (OUTPATIENT)
Dept: FAMILY MEDICINE CLINIC | Age: 63
End: 2023-01-05

## 2023-01-05 DIAGNOSIS — D50.9 IRON DEFICIENCY ANEMIA, UNSPECIFIED IRON DEFICIENCY ANEMIA TYPE: Primary | ICD-10-CM

## 2023-01-06 LAB
BASOPHILS # BLD: 0.1 K/UL (ref 0–0.1)
BASOPHILS NFR BLD: 1 % (ref 0–1)
DIFFERENTIAL METHOD BLD: ABNORMAL
EOSINOPHIL # BLD: 0.2 K/UL (ref 0–0.4)
EOSINOPHIL NFR BLD: 3 % (ref 0–7)
ERYTHROCYTE [DISTWIDTH] IN BLOOD BY AUTOMATED COUNT: 20.7 % (ref 11.5–14.5)
HCT VFR BLD AUTO: 25.8 % (ref 36.6–50.3)
HGB BLD-MCNC: 6.9 G/DL (ref 12.1–17)
IMM GRANULOCYTES # BLD AUTO: 0 K/UL (ref 0–0.04)
IMM GRANULOCYTES NFR BLD AUTO: 0 % (ref 0–0.5)
LYMPHOCYTES # BLD: 1.3 K/UL (ref 0.8–3.5)
LYMPHOCYTES NFR BLD: 25 % (ref 12–49)
MCH RBC QN AUTO: 19.7 PG (ref 26–34)
MCHC RBC AUTO-ENTMCNC: 26.7 G/DL (ref 30–36.5)
MCV RBC AUTO: 73.5 FL (ref 80–99)
MONOCYTES # BLD: 0.5 K/UL (ref 0–1)
MONOCYTES NFR BLD: 10 % (ref 5–13)
NEUTS SEG # BLD: 2.9 K/UL (ref 1.8–8)
NEUTS SEG NFR BLD: 61 % (ref 32–75)
NRBC # BLD: 0 K/UL (ref 0–0.01)
NRBC BLD-RTO: 0 PER 100 WBC
PLATELET # BLD AUTO: 415 K/UL (ref 150–400)
PMV BLD AUTO: 10.4 FL (ref 8.9–12.9)
RBC # BLD AUTO: 3.51 M/UL (ref 4.1–5.7)
RBC MORPH BLD: ABNORMAL
WBC # BLD AUTO: 5 K/UL (ref 4.1–11.1)

## 2023-01-09 ENCOUNTER — TELEPHONE (OUTPATIENT)
Dept: FAMILY MEDICINE CLINIC | Age: 63
End: 2023-01-09

## 2023-01-09 NOTE — TELEPHONE ENCOUNTER
Pc with pt. Insurance didn't want to cover iv iron. Pilss cause gi upset.  Will try to do prior auth

## 2023-01-10 ENCOUNTER — DOCUMENTATION ONLY (OUTPATIENT)
Dept: FAMILY MEDICINE CLINIC | Age: 63
End: 2023-01-10

## 2023-01-12 ENCOUNTER — DOCUMENTATION ONLY (OUTPATIENT)
Dept: FAMILY MEDICINE CLINIC | Age: 63
End: 2023-01-12

## 2023-01-12 ENCOUNTER — TELEPHONE (OUTPATIENT)
Dept: FAMILY MEDICINE CLINIC | Age: 63
End: 2023-01-12

## 2023-01-12 NOTE — TELEPHONE ENCOUNTER
Message left order for infusion center in media. Message left asking for return call to let us know order received. Fax machines in operative.

## 2023-01-13 ENCOUNTER — TELEPHONE (OUTPATIENT)
Dept: FAMILY MEDICINE CLINIC | Age: 63
End: 2023-01-13

## 2023-01-13 RX ORDER — GLIPIZIDE 10 MG/1
TABLET ORAL
Qty: 180 TABLET | Refills: 1 | Status: SHIPPED | OUTPATIENT
Start: 2023-01-13

## 2023-01-13 NOTE — TELEPHONE ENCOUNTER
Patient wants a return call regarding test results from cardiology.   Please give him a call @ [phone] no

## 2023-01-13 NOTE — TELEPHONE ENCOUNTER
Per infusion center medication needed for iron infusion lmw dextran 1000 mg is now on back order with no ETA per infusion center. Dr. Jannette Lr notified.

## 2023-01-18 ENCOUNTER — TELEPHONE (OUTPATIENT)
Dept: FAMILY MEDICINE CLINIC | Age: 63
End: 2023-01-18

## 2023-01-18 NOTE — TELEPHONE ENCOUNTER
Message left new infusion order scanned today in media. Message also left asking for verification on vm receipt.

## 2023-01-26 ENCOUNTER — TELEPHONE (OUTPATIENT)
Dept: FAMILY MEDICINE CLINIC | Age: 63
End: 2023-01-26

## 2023-01-26 NOTE — TELEPHONE ENCOUNTER
Patient states that he spoke to [address] regarding an infusion he wants him to call him regarding this matter his call back number is [phone]

## 2023-01-30 ENCOUNTER — TELEPHONE (OUTPATIENT)
Dept: FAMILY MEDICINE CLINIC | Age: 63
End: 2023-01-30

## 2023-01-30 NOTE — TELEPHONE ENCOUNTER
Have left two messages on [facility] infusion center regarding order for Iron infusion scanned on 1/18/23

## 2023-01-30 NOTE — TELEPHONE ENCOUNTER
Left message on Infusion center voice mail requesting a call back regarding patient and order update.

## 2023-02-03 ENCOUNTER — TELEPHONE (OUTPATIENT)
Dept: FAMILY MEDICINE CLINIC | Age: 63
End: 2023-02-03

## 2023-02-03 NOTE — TELEPHONE ENCOUNTER
Per Suki with infusion center there is no paperwork needed. ALL that is needed is to schedule him and she will reach out to patient. No further action needed.

## 2023-02-03 NOTE — TELEPHONE ENCOUNTER
Patient states that he is suppose to be going for infusion they told him they have not heard anything from [address] can he come by to pick  paperwork up please give him a call @ [phone]

## 2023-02-08 ENCOUNTER — OFFICE VISIT (OUTPATIENT)
Dept: FAMILY MEDICINE CLINIC | Age: 63
End: 2023-02-08
Payer: COMMERCIAL

## 2023-02-08 ENCOUNTER — DOCUMENTATION ONLY (OUTPATIENT)
Dept: FAMILY MEDICINE CLINIC | Age: 63
End: 2023-02-08

## 2023-02-08 DIAGNOSIS — D50.9 IRON DEFICIENCY ANEMIA, UNSPECIFIED IRON DEFICIENCY ANEMIA TYPE: Primary | ICD-10-CM

## 2023-02-08 DIAGNOSIS — R06.02 SHORTNESS OF BREATH: ICD-10-CM

## 2023-02-08 DIAGNOSIS — E66.01 SEVERE OBESITY (BMI 35.0-39.9) WITH COMORBIDITY (HCC): ICD-10-CM

## 2023-02-08 PROCEDURE — 99213 OFFICE O/P EST LOW 20 MIN: CPT | Performed by: FAMILY MEDICINE

## 2023-02-08 RX ORDER — IRON,CARBONYL/ASCORBIC ACID 65MG-125MG
1 TABLET, DELAYED RELEASE (ENTERIC COATED) ORAL 2 TIMES DAILY
Qty: 60 TABLET | Refills: 5 | Status: SHIPPED | OUTPATIENT
Start: 2023-02-08

## 2023-02-08 NOTE — PROGRESS NOTES
HISTORY OF PRESENT ILLNESS  Tamara Centeno is a 57 y.o. male. HPI In for recheck. Still having dyspnea on exertion. Gets tired very easily. No chest pains. Also feels very tired in general. Is scheduled for a stress test in March. Iron pills caused severe diarrhea. Walking to mailbox (40 feet)- feels tired coming back. Some swelling in feet. Started taking fluid pills again a few days ago, seems to have helped some. Some fatigue in legs when walks also. We have been unable to get iv iron approved despite multiple form compltions and calls to his insurance company. ROS    Physical Exam  Vitals and nursing note reviewed. Constitutional:       Appearance: He is well-developed. HENT:      Right Ear: External ear normal.      Left Ear: External ear normal.   Neck:      Thyroid: No thyromegaly. Cardiovascular:      Rate and Rhythm: Normal rate and regular rhythm. Heart sounds: Normal heart sounds. Comments: Decreased pulses bilaterally  Pulmonary:      Effort: Pulmonary effort is normal. No respiratory distress. Breath sounds: Normal breath sounds. No wheezing. Abdominal:      General: Bowel sounds are normal. There is no distension. Palpations: Abdomen is soft. There is no mass. Tenderness: There is no abdominal tenderness. There is no guarding. Musculoskeletal:         General: Normal range of motion. Lymphadenopathy:      Cervical: No cervical adenopathy. ASSESSMENT and PLAN  Orders Placed This Encounter    XR CHEST PA LAT    CBC WITH AUTOMATED DIFF    FERRITIN    IRON PROFILE    iron,carbonyl-vitamin C (Vitron-C) 65 mg iron- 125 mg TbEC     Diagnoses and all orders for this visit:    1. Iron deficiency anemia, unspecified iron deficiency anemia type  -     CBC WITH AUTOMATED DIFF; Future  -     FERRITIN; Future  -     IRON PROFILE; Future    2. Shortness of breath  -     XR CHEST PA LAT; Future    3. Severe obesity (BMI 35.0-39. 9) with comorbidity ([address]    Other orders  -     iron,carbonyl-vitamin C (Vitron-C) 65 mg iron- 125 mg TbEC; Take 1 Tablet by mouth two (2) times a day.  One po bid

## 2023-02-08 NOTE — PROGRESS NOTES
PA was DENIED for [place], the patient needs take oral meds for 30 days and have recent labs drawn within 30 days for approval. They are asking that Dr. Julian Duty call the patient to tell him that his appointment is cancelled for tomorrow

## 2023-02-09 ENCOUNTER — HOSPITAL ENCOUNTER (OUTPATIENT)
Dept: INFUSION THERAPY | Age: 63
End: 2023-02-09

## 2023-02-09 LAB
BASOPHILS # BLD: 0.1 K/UL (ref 0–0.1)
BASOPHILS NFR BLD: 1 % (ref 0–1)
DIFFERENTIAL METHOD BLD: ABNORMAL
EOSINOPHIL # BLD: 0.3 K/UL (ref 0–0.4)
EOSINOPHIL NFR BLD: 5 % (ref 0–7)
ERYTHROCYTE [DISTWIDTH] IN BLOOD BY AUTOMATED COUNT: 20 % (ref 11.5–14.5)
FERRITIN SERPL-MCNC: 5 NG/ML (ref 26–388)
HCT VFR BLD AUTO: 23.3 % (ref 36.6–50.3)
HGB BLD-MCNC: 6.2 G/DL (ref 12.1–17)
IMM GRANULOCYTES # BLD AUTO: 0 K/UL (ref 0–0.04)
IMM GRANULOCYTES NFR BLD AUTO: 0 % (ref 0–0.5)
IRON SATN MFR SERPL: 3 % (ref 20–50)
IRON SERPL-MCNC: 16 UG/DL (ref 35–150)
LYMPHOCYTES # BLD: 1.4 K/UL (ref 0.8–3.5)
LYMPHOCYTES NFR BLD: 23 % (ref 12–49)
MCH RBC QN AUTO: 18.6 PG (ref 26–34)
MCHC RBC AUTO-ENTMCNC: 26.6 G/DL (ref 30–36.5)
MCV RBC AUTO: 69.8 FL (ref 80–99)
MONOCYTES # BLD: 0.5 K/UL (ref 0–1)
MONOCYTES NFR BLD: 9 % (ref 5–13)
NEUTS SEG # BLD: 3.6 K/UL (ref 1.8–8)
NEUTS SEG NFR BLD: 62 % (ref 32–75)
NRBC # BLD: 0 K/UL (ref 0–0.01)
NRBC BLD-RTO: 0 PER 100 WBC
PLATELET # BLD AUTO: 339 K/UL (ref 150–400)
PMV BLD AUTO: 10.7 FL (ref 8.9–12.9)
RBC # BLD AUTO: 3.34 M/UL (ref 4.1–5.7)
RBC MORPH BLD: ABNORMAL
TIBC SERPL-MCNC: 539 UG/DL (ref 250–450)
WBC # BLD AUTO: 5.9 K/UL (ref 4.1–11.1)

## 2023-02-14 ENCOUNTER — TELEPHONE (OUTPATIENT)
Dept: FAMILY MEDICINE CLINIC | Age: 63
End: 2023-02-14

## 2023-02-14 NOTE — TELEPHONE ENCOUNTER
Message left on patient voice mail requesting a return call regarding message. Marked urgent. Patient to have nurse paged. Needs consents signed.

## 2023-02-14 NOTE — TELEPHONE ENCOUNTER
Patient aware of consents to be signed. Would like to ask  about being admitted to see about getting tests done or just blood infusion.

## 2023-02-15 ENCOUNTER — HOSPITAL ENCOUNTER (OUTPATIENT)
Dept: INFUSION THERAPY | Age: 63
Discharge: HOME | End: 2023-02-15
Payer: COMMERCIAL

## 2023-02-15 ENCOUNTER — TELEPHONE (OUTPATIENT)
Dept: FAMILY MEDICINE CLINIC | Age: 63
End: 2023-02-15

## 2023-02-15 VITALS
HEIGHT: 73 IN | WEIGHT: 288.3 LBS | OXYGEN SATURATION: 100 % | TEMPERATURE: 97.5 F | SYSTOLIC BLOOD PRESSURE: 115 MMHG | RESPIRATION RATE: 18 BRPM | DIASTOLIC BLOOD PRESSURE: 59 MMHG | BODY MASS INDEX: 38.21 KG/M2 | HEART RATE: 77 BPM

## 2023-02-15 DIAGNOSIS — D50.9 IRON DEFICIENCY ANEMIA, UNSPECIFIED IRON DEFICIENCY ANEMIA TYPE: Primary | ICD-10-CM

## 2023-02-15 LAB — HISTORY CHECKED?,CKHIST: NORMAL

## 2023-02-15 PROCEDURE — 86900 BLOOD TYPING SEROLOGIC ABO: CPT

## 2023-02-15 PROCEDURE — 86923 COMPATIBILITY TEST ELECTRIC: CPT

## 2023-02-15 NOTE — TELEPHONE ENCOUNTER
Isamar sky/ [facility] called stating the order has not been signed. She needs it signed and re-faxed to [phone] as soon as possible today.

## 2023-02-15 NOTE — PROGRESS NOTES
[facility] Progress Note    Date: February 15, 2023    Name: Emily Fitzpatrick    MRN: [id number]         : 1960      1300:  Pt arrived ambulatory and in no distress, for lab visit. Labs drawn via RAC, (one pink top) patient tolerated well. The patient was asked the following questions and answered as documented below:    Do you have any symptoms of COVID-19? SOB, coughing, fever, or generally not feeling well? NO  Have you been exposed to COVID-19 recently? NO  Have you had any recent contact with family/friend that has a pending COVID test? NO      Follow Up: Proceed with treatment    Visit Vitals  BP (!) 115/59 (BP 1 Location: Right arm, BP Patient Position: Sitting)   Pulse 77   Temp 97.5 °F (36.4 °C)   Resp 18   Ht 6' 1\" (1.854 m)   Wt 130.8 kg (288 lb 4.8 oz)   SpO2 100%   BMI 38.04 kg/m²       1310: Departed [facility] ambulatory and in no distress.     Future Appointments   Date Time Provider Jeff Henry   2023 10:00  [facility] INFUSION NURSE 2 [address] [facility]   2023  8:00 AM Tali Jenkins MD [facility] BS [address], RN  February 15, 2023

## 2023-02-16 ENCOUNTER — APPOINTMENT (OUTPATIENT)
Dept: INFUSION THERAPY | Age: 63
End: 2023-02-16

## 2023-02-16 ENCOUNTER — HOSPITAL ENCOUNTER (OUTPATIENT)
Dept: INFUSION THERAPY | Age: 63
Discharge: HOME | End: 2023-02-16
Payer: COMMERCIAL

## 2023-02-16 VITALS
TEMPERATURE: 98.2 F | SYSTOLIC BLOOD PRESSURE: 118 MMHG | DIASTOLIC BLOOD PRESSURE: 60 MMHG | OXYGEN SATURATION: 98 % | HEART RATE: 70 BPM | RESPIRATION RATE: 18 BRPM

## 2023-02-16 PROCEDURE — 74011000250 HC RX REV CODE- 250: Performed by: FAMILY MEDICINE

## 2023-02-16 PROCEDURE — 74011250636 HC RX REV CODE- 250/636: Performed by: FAMILY MEDICINE

## 2023-02-16 PROCEDURE — 77030013169 SET IV BLD ICUM -A

## 2023-02-16 PROCEDURE — P9016 RBC LEUKOCYTES REDUCED: HCPCS

## 2023-02-16 PROCEDURE — 36430 TRANSFUSION BLD/BLD COMPNT: CPT

## 2023-02-16 RX ORDER — SODIUM CHLORIDE 0.9 % (FLUSH) 0.9 %
5-10 SYRINGE (ML) INJECTION AS NEEDED
Status: DISCONTINUED | OUTPATIENT
Start: 2023-02-16 | End: 2023-02-17 | Stop reason: HOSPADM

## 2023-02-16 RX ORDER — SODIUM CHLORIDE 9 MG/ML
250 INJECTION, SOLUTION INTRAVENOUS AS NEEDED
Status: DISCONTINUED | OUTPATIENT
Start: 2023-02-16 | End: 2023-02-18 | Stop reason: HOSPADM

## 2023-02-16 RX ADMIN — SODIUM CHLORIDE, PRESERVATIVE FREE 10 ML: 5 INJECTION INTRAVENOUS at 10:22

## 2023-02-16 RX ADMIN — SODIUM CHLORIDE, PRESERVATIVE FREE 10 ML: 5 INJECTION INTRAVENOUS at 13:21

## 2023-02-16 RX ADMIN — SODIUM CHLORIDE 250 ML: 9 INJECTION, SOLUTION INTRAVENOUS at 10:25

## 2023-02-16 NOTE — PROGRESS NOTES
[facility] Progress Note    Date: 2023    Name: Bianca Anders    MRN: [id number]         : 1960      1045: Pt arrived at [facility] ambulatory and in no distress for transfusion of ONE unit PRBCs. [facility] COVID-19 SCREENING      The patient was asked the following questions and answered as documented below:    Do you have any symptoms of COVID-19? SOB, coughing, fever, or generally not feeling well? NO  Have you been exposed to COVID-19 recently? NO  Have you had any recent contact with family/friend that has a pending COVID test? NO      Follow Up: Proceed with treatment    Assessment completed, no new complaints voiced. Lines: 22 gauge IV placed to the LAC  PICC Double Lumen  Right;Basilic (Active)       Peripheral IV 23 Left Antecubital (Active)   Site Assessment Clean, dry, & intact 23 1021   Phlebitis Assessment 0 23 1021   Infiltration Assessment 0 23 1021   Dressing Status Clean, dry, & intact;New;Occlusive 23 1021   Dressing Type Transparent 23 1021   Hub Color/Line Status Blue;Flushed;Patent 23 1021        Education provided regarding reason for blood transfusion and possible reactions. All questions and concerns regarding transfusion answered, patient voiced understanding.       Medications received:  Medications Administered       0.9% sodium chloride infusion 250 mL       Admin Date  2023 Action  New Bag Dose  250 mL Rate  15 mL/hr Route  IntraVENous Administered By  Joseph Bella RN              sodium chloride (NS) flush 5-10 mL       Admin Date  2023 Action  Given Dose  10 mL Route  IntraVENous Administered By  Joseph Bella RN               Admin Date  2023 Action  Given Dose  10 mL Route  IntraVENous Administered By  Joseph Bella RN                      1045:  1st unit PRBCs started and infusing without difficulty, observed x 15 minutes  1300:  1st unit completed without adverse reaction noted, NS flushing line. Patient Vitals for the past 12 hrs:   Temp Pulse Resp BP SpO2   02/16/23 1320 98.2 °F (36.8 °C) 70 18 118/60 --   02/16/23 1100 97.9 °F (36.6 °C) 70 18 (!) 104/44 98 %   02/16/23 1014 98 °F (36.7 °C) 78 18 126/60 100 %       1325 Tolerated transfusion  well, no adverse reaction noted. D/C instructions reviewed, copy to pt, voiced understanding. Patient declined 1 hour post transfusion observation/vital signs. D/Cd from [facility] ambulatory and in no distress accompanied by self.       Patient provided with AVS , which includes future appointment and written educational material.     Future Appointments   Date Time Provider Jeff Henry   2/28/2023  8:00 AM Doris Amezcua MD [facility] BS AMB     Patient to follow up with referring MD.     Suzan Burciaga RN  February 16, 2023

## 2023-02-16 NOTE — DISCHARGE INSTRUCTIONS
OUTPATIENT INFUSION CENTER    DISCHARGE INSTRUCTIONS FOR:  BLOOD TRANSFUSION    We hope you are feeling better after your blood transfusion. Some mild tenderness or slight bruising at your IV site is normal.  Avoid lifting or heavy use of that extremity for the rest of the day. Drink plenty of fluids, eat a normal diet and get some rest.    There are some important signs that you need to watch for in case you experience a delayed reaction to the blood you have received. Call your physician immediately if you develop any of the following symptoms:    1. Severe headache or backache;    2. Fever above 100 degrees;    3. Chills;    4. Difficulty breathing;    5.  Blood or red color in urine;    6. The feeling of weakness or constant fatigue;    7. Yellowing of the whites of your eyes or skin (jaundice). If your physician is not available, call or go to the nearest emergency room, or dial 911.     [facility], Signature: ___________________________ 2/16/2023  Darryle Grooms, RN

## 2023-02-17 LAB
ABO + RH BLD: NORMAL
BLD PROD TYP BPU: NORMAL
BLOOD GROUP ANTIBODIES SERPL: NORMAL
BPU ID: NORMAL
CROSSMATCH RESULT,%XM: NORMAL
SPECIMEN EXP DATE BLD: NORMAL
STATUS OF UNIT,%ST: NORMAL
UNIT DIVISION, %UDIV: 0

## 2023-02-28 ENCOUNTER — OFFICE VISIT (OUTPATIENT)
Dept: FAMILY MEDICINE CLINIC | Age: 63
End: 2023-02-28
Payer: COMMERCIAL

## 2023-02-28 VITALS
SYSTOLIC BLOOD PRESSURE: 112 MMHG | OXYGEN SATURATION: 99 % | TEMPERATURE: 97.7 F | HEIGHT: 74 IN | HEART RATE: 71 BPM | RESPIRATION RATE: 16 BRPM | BODY MASS INDEX: 36.7 KG/M2 | DIASTOLIC BLOOD PRESSURE: 60 MMHG | WEIGHT: 286 LBS

## 2023-02-28 DIAGNOSIS — E78.00 HYPERCHOLESTEROLEMIA: ICD-10-CM

## 2023-02-28 DIAGNOSIS — I49.5 TACHY-BRADY SYNDROME (HCC): Chronic | ICD-10-CM

## 2023-02-28 DIAGNOSIS — E11.8 CONTROLLED TYPE 2 DIABETES MELLITUS WITH COMPLICATION, WITHOUT LONG-TERM CURRENT USE OF INSULIN (HCC): ICD-10-CM

## 2023-02-28 DIAGNOSIS — E78.2 MIXED HYPERLIPIDEMIA: ICD-10-CM

## 2023-02-28 DIAGNOSIS — I10 BENIGN ESSENTIAL HYPERTENSION: Primary | ICD-10-CM

## 2023-02-28 DIAGNOSIS — I48.0 PAF (PAROXYSMAL ATRIAL FIBRILLATION) (HCC): Chronic | ICD-10-CM

## 2023-02-28 DIAGNOSIS — Z12.5 PROSTATE CANCER SCREENING: ICD-10-CM

## 2023-02-28 PROCEDURE — 3078F DIAST BP <80 MM HG: CPT | Performed by: FAMILY MEDICINE

## 2023-02-28 PROCEDURE — 99214 OFFICE O/P EST MOD 30 MIN: CPT | Performed by: FAMILY MEDICINE

## 2023-02-28 PROCEDURE — 3074F SYST BP LT 130 MM HG: CPT | Performed by: FAMILY MEDICINE

## 2023-02-28 RX ORDER — METOPROLOL TARTRATE 25 MG/1
25 TABLET, FILM COATED ORAL 2 TIMES DAILY
Qty: 180 TABLET | Refills: 3 | Status: SHIPPED | OUTPATIENT
Start: 2023-02-28 | End: 2023-05-29

## 2023-02-28 NOTE — PROGRESS NOTES
HISTORY OF PRESENT ILLNESS  Anne Wright is a 57 y.o. male. HPI Pt. Comes in for blood pressure and cholesterol check. No complaints of chest pain, shortness of breath, TIAs, claudication or edema. Has become somewhat more active, has been getting out of the house visits people. Has retired. No complaints of chest pain, shortness of breath, TIAs, claudication or edema. Going to see cardiology (Dr. Andrew Jacome), scheduled for a stress test later this month. Had R knee infection last year, this is doing better now. Having some L knee pain now. Had colonoscopy, EGD, and small bowel capsule study last year. ROS    Physical Exam  Vitals and nursing note reviewed. Constitutional:       Appearance: He is well-developed. HENT:      Right Ear: External ear normal.      Left Ear: External ear normal.   Neck:      Thyroid: No thyromegaly. Cardiovascular:      Rate and Rhythm: Normal rate and regular rhythm. Heart sounds: Normal heart sounds. Pulmonary:      Effort: Pulmonary effort is normal. No respiratory distress. Breath sounds: Normal breath sounds. No wheezing. Abdominal:      General: Bowel sounds are normal. There is no distension. Palpations: Abdomen is soft. There is no mass. Tenderness: There is no abdominal tenderness. There is no guarding. Musculoskeletal:         General: Normal range of motion. Lymphadenopathy:      Cervical: No cervical adenopathy. ASSESSMENT and PLAN  Orders Placed This Encounter    CBC WITH AUTOMATED DIFF    METABOLIC PANEL, COMPREHENSIVE    LIPID PANEL    HEMOGLOBIN A1C WITH EAG    PSA SCREENING (SCREENING)    metoprolol tartrate (LOPRESSOR) 25 mg tablet     Diagnoses and all orders for this visit:    1. Benign essential hypertension  -     CBC WITH AUTOMATED DIFF; Future  -     METABOLIC PANEL, COMPREHENSIVE; Future  -     metoprolol tartrate (LOPRESSOR) 25 mg tablet; Take 1 Tablet by mouth two (2) times a day for 90 days.     2. Prostate cancer screening  -     PSA SCREENING (SCREENING); Future    3. Hypercholesterolemia    4. Controlled type 2 diabetes mellitus with complication, without long-term current use of insulin ([facility]  -     LIPID PANEL; Future  -     HEMOGLOBIN A1C WITH EAG; Future    5. PAF (paroxysmal atrial fibrillation) (HCC)  -     metoprolol tartrate (LOPRESSOR) 25 mg tablet; Take 1 Tablet by mouth two (2) times a day for 90 days. 6. Mixed hyperlipidemia  -     metoprolol tartrate (LOPRESSOR) 25 mg tablet; Take 1 Tablet by mouth two (2) times a day for 90 days. 7. Tachy-amina syndrome (HCC)  -     metoprolol tartrate (LOPRESSOR) 25 mg tablet; Take 1 Tablet by mouth two (2) times a day for 90 days. Follow-up and Dispositions    Return in about 6 months (around 8/28/2023).

## 2023-02-28 NOTE — PROGRESS NOTES
Chief Complaint   Patient presents with    Diabetes    Hypertension    Anemia    Follow Up Chronic Condition       1. \"Have you been to the ER, urgent care clinic since your last visit? Hospitalized since your last visit? \" Yes blood transfusion    2. \"Have you seen or consulted any other health care providers outside of the [address] [place] [address] since your last visit? \" No     3. For patients aged 39-70: Has the patient had a colonoscopy / FIT/ Cologuard? No      If the patient is female:    4. For patients aged 41-77: Has the patient had a mammogram within the past 2 years? NA - based on age or sex      11. For patients aged 21-65: Has the patient had a pap smear?  NA - based on age or sex    Health Maintenance Due   Topic Date Due    Pneumococcal 0-64 years (1 - PCV) Never done    Shingles Vaccine (1 of 2) Never done    Eye Exam Retinal or Dilated  06/30/2017    Colorectal Cancer Screening Combo  05/28/2019    Diabetic Alb to Cr ratio (uACR) test  07/29/2020    Foot Exam Q1  07/29/2020    COVID-19 Vaccine (3 - Booster for Rebecca series) 02/24/2022    Flu Vaccine (1) Never done

## 2023-03-01 LAB
ALBUMIN SERPL-MCNC: 3.4 G/DL (ref 3.5–5)
ALBUMIN/GLOB SERPL: 1.1 (ref 1.1–2.2)
ALP SERPL-CCNC: 35 U/L (ref 45–117)
ALT SERPL-CCNC: 10 U/L (ref 12–78)
ANION GAP SERPL CALC-SCNC: 6 MMOL/L (ref 5–15)
AST SERPL-CCNC: 9 U/L (ref 15–37)
BASOPHILS # BLD: 0.1 K/UL (ref 0–0.1)
BASOPHILS NFR BLD: 1 % (ref 0–1)
BILIRUB SERPL-MCNC: 0.3 MG/DL (ref 0.2–1)
BUN SERPL-MCNC: 22 MG/DL (ref 6–20)
BUN/CREAT SERPL: 20 (ref 12–20)
CALCIUM SERPL-MCNC: 9 MG/DL (ref 8.5–10.1)
CHLORIDE SERPL-SCNC: 110 MMOL/L (ref 97–108)
CHOLEST SERPL-MCNC: 87 MG/DL
CO2 SERPL-SCNC: 26 MMOL/L (ref 21–32)
CREAT SERPL-MCNC: 1.09 MG/DL (ref 0.7–1.3)
DIFFERENTIAL METHOD BLD: ABNORMAL
EOSINOPHIL # BLD: 0.2 K/UL (ref 0–0.4)
EOSINOPHIL NFR BLD: 3 % (ref 0–7)
ERYTHROCYTE [DISTWIDTH] IN BLOOD BY AUTOMATED COUNT: 22.3 % (ref 11.5–14.5)
EST. AVERAGE GLUCOSE BLD GHB EST-MCNC: 128 MG/DL
GLOBULIN SER CALC-MCNC: 3.2 G/DL (ref 2–4)
GLUCOSE SERPL-MCNC: 121 MG/DL (ref 65–100)
HBA1C MFR BLD: 6.1 % (ref 4–5.6)
HCT VFR BLD AUTO: 28.1 % (ref 36.6–50.3)
HDLC SERPL-MCNC: 30 MG/DL
HDLC SERPL: 2.9 (ref 0–5)
HGB BLD-MCNC: 7.2 G/DL (ref 12.1–17)
IMM GRANULOCYTES # BLD AUTO: 0 K/UL (ref 0–0.04)
IMM GRANULOCYTES NFR BLD AUTO: 0 % (ref 0–0.5)
LDLC SERPL CALC-MCNC: 43.4 MG/DL (ref 0–100)
LYMPHOCYTES # BLD: 1.3 K/UL (ref 0.8–3.5)
LYMPHOCYTES NFR BLD: 23 % (ref 12–49)
MCH RBC QN AUTO: 18.8 PG (ref 26–34)
MCHC RBC AUTO-ENTMCNC: 25.6 G/DL (ref 30–36.5)
MCV RBC AUTO: 73.2 FL (ref 80–99)
MONOCYTES # BLD: 0.5 K/UL (ref 0–1)
MONOCYTES NFR BLD: 9 % (ref 5–13)
NEUTS SEG # BLD: 3.6 K/UL (ref 1.8–8)
NEUTS SEG NFR BLD: 64 % (ref 32–75)
NRBC # BLD: 0 K/UL (ref 0–0.01)
NRBC BLD-RTO: 0 PER 100 WBC
PLATELET # BLD AUTO: 484 K/UL (ref 150–400)
PMV BLD AUTO: 9.7 FL (ref 8.9–12.9)
POTASSIUM SERPL-SCNC: 4.2 MMOL/L (ref 3.5–5.1)
PROT SERPL-MCNC: 6.6 G/DL (ref 6.4–8.2)
PSA SERPL-MCNC: 0.3 NG/ML (ref 0.01–4)
RBC # BLD AUTO: 3.84 M/UL (ref 4.1–5.7)
RBC MORPH BLD: ABNORMAL
SODIUM SERPL-SCNC: 142 MMOL/L (ref 136–145)
TRIGL SERPL-MCNC: 68 MG/DL (ref ?–150)
VLDLC SERPL CALC-MCNC: 13.6 MG/DL
WBC # BLD AUTO: 5.7 K/UL (ref 4.1–11.1)

## 2023-03-06 ENCOUNTER — TELEPHONE (OUTPATIENT)
Dept: FAMILY MEDICINE CLINIC | Age: 63
End: 2023-03-06

## 2023-03-07 NOTE — TELEPHONE ENCOUNTER
Pc with pt. Has had EGD, colonoscopy and small bowel capsule study.  Hgb still 7.2, has appt with Dr. Jerman Dean at end of March

## 2023-03-08 ENCOUNTER — TELEPHONE (OUTPATIENT)
Dept: FAMILY MEDICINE CLINIC | Age: 63
End: 2023-03-08

## 2023-03-08 NOTE — TELEPHONE ENCOUNTER
Patient is suppose to contact the practice an schedule appointment when it is scheduled Ita Rodriguez ( Infection Disease ) would like to add on labs prior to his appointment please call Lilliana Patel @ [phone] when appointment has been booked.

## 2023-03-08 NOTE — TELEPHONE ENCOUNTER
----- Message from Brian Duran sent at 3/8/2023  9:53 AM EST -----  Subject: Message to Provider    QUESTIONS  Information for Provider? Patient is calling because he would like to   speak with Dr. Armand Forrest or nurse today because he states his Blood count   has dropped again. I offered an appointment he declined. Please contact   patient today. Please advise  ---------------------------------------------------------------------------  --------------  Freida Edwards INFO  [phone]; OK to leave message on voicemail  ---------------------------------------------------------------------------  --------------  SCRIPT ANSWERS  Relationship to Patient?  Self

## 2023-03-08 NOTE — TELEPHONE ENCOUNTER
Message given to dr. Seema Gonzalez. Noted that he does not have an appointment until august.  He was offered an earlier appointment due to his blood count, but refused, preferred to speak to dr. Seema Gonzalez or nurse Cali Pham directly. Spoke with pt and informed her that her GES is scheduled for 3/19/2020 at 0800 per her request and reminder with instructions will be mailed out. Understanding verbalized.

## 2023-03-09 ENCOUNTER — OFFICE VISIT (OUTPATIENT)
Dept: FAMILY MEDICINE CLINIC | Age: 63
End: 2023-03-09

## 2023-03-09 DIAGNOSIS — D50.9 IRON DEFICIENCY ANEMIA, UNSPECIFIED IRON DEFICIENCY ANEMIA TYPE: Primary | ICD-10-CM

## 2023-03-09 LAB
BASOPHILS # BLD: 0.1 K/UL (ref 0–0.1)
BASOPHILS NFR BLD: 1 % (ref 0–1)
DIFFERENTIAL METHOD BLD: ABNORMAL
EOSINOPHIL # BLD: 0.2 K/UL (ref 0–0.4)
EOSINOPHIL NFR BLD: 3 % (ref 0–7)
ERYTHROCYTE [DISTWIDTH] IN BLOOD BY AUTOMATED COUNT: 22.3 % (ref 11.5–14.5)
HCT VFR BLD AUTO: 25.6 % (ref 36.6–50.3)
HGB BLD-MCNC: 6.7 G/DL (ref 12.1–17)
IMM GRANULOCYTES # BLD AUTO: 0 K/UL (ref 0–0.04)
IMM GRANULOCYTES NFR BLD AUTO: 0 % (ref 0–0.5)
LYMPHOCYTES # BLD: 1.2 K/UL (ref 0.8–3.5)
LYMPHOCYTES NFR BLD: 20 % (ref 12–49)
MCH RBC QN AUTO: 19.1 PG (ref 26–34)
MCHC RBC AUTO-ENTMCNC: 26.2 G/DL (ref 30–36.5)
MCV RBC AUTO: 72.9 FL (ref 80–99)
MONOCYTES # BLD: 0.5 K/UL (ref 0–1)
MONOCYTES NFR BLD: 9 % (ref 5–13)
NEUTS SEG # BLD: 4 K/UL (ref 1.8–8)
NEUTS SEG NFR BLD: 67 % (ref 32–75)
NRBC # BLD: 0 K/UL (ref 0–0.01)
NRBC BLD-RTO: 0 PER 100 WBC
PLATELET # BLD AUTO: 359 K/UL (ref 150–400)
PMV BLD AUTO: 10.1 FL (ref 8.9–12.9)
RBC # BLD AUTO: 3.51 M/UL (ref 4.1–5.7)
RBC MORPH BLD: ABNORMAL
WBC # BLD AUTO: 6 K/UL (ref 4.1–11.1)

## 2023-03-13 ENCOUNTER — TELEPHONE (OUTPATIENT)
Dept: FAMILY MEDICINE CLINIC | Age: 63
End: 2023-03-13

## 2023-03-14 ENCOUNTER — HOSPITAL ENCOUNTER (OUTPATIENT)
Dept: INFUSION THERAPY | Age: 63
Discharge: HOME | End: 2023-03-14
Payer: COMMERCIAL

## 2023-03-14 VITALS
OXYGEN SATURATION: 100 % | SYSTOLIC BLOOD PRESSURE: 132 MMHG | DIASTOLIC BLOOD PRESSURE: 63 MMHG | HEART RATE: 74 BPM | TEMPERATURE: 97.9 F | RESPIRATION RATE: 16 BRPM

## 2023-03-14 LAB — HISTORY CHECKED?,CKHIST: NORMAL

## 2023-03-14 PROCEDURE — 36415 COLL VENOUS BLD VENIPUNCTURE: CPT

## 2023-03-14 PROCEDURE — 86923 COMPATIBILITY TEST ELECTRIC: CPT

## 2023-03-14 PROCEDURE — 86900 BLOOD TYPING SEROLOGIC ABO: CPT

## 2023-03-14 NOTE — PROGRESS NOTES
[facility] Progress Note  Date: March 14, 2023    Name: [address] Short Visit Note:    1216:  Pt arrived ambulatory and in no distress for T&C for 2 units PRBCs. Labs drawn via R A/C and processed. D/Cd from [facility] ambulatory and in no distress.       Patient Vitals for the past 12 hrs:   Temp Pulse Resp BP SpO2   03/14/23 1257 97.9 °F (36.6 °C) 74 16 132/63 100 %     Future Appointments   Date Time Provider Jeff Henry   3/15/2023 10:00 AM [facility] INFUSION NURSE 1 [address] [facility]   8/28/2023  8:20 AM Renee Palacios MD [facility] BS ROBIN Elias RN  March 14, 2023

## 2023-03-15 ENCOUNTER — HOSPITAL ENCOUNTER (OUTPATIENT)
Dept: INFUSION THERAPY | Age: 63
Discharge: HOME | End: 2023-03-15
Payer: COMMERCIAL

## 2023-03-15 VITALS
HEART RATE: 71 BPM | TEMPERATURE: 98.9 F | WEIGHT: 292.5 LBS | HEIGHT: 74 IN | DIASTOLIC BLOOD PRESSURE: 59 MMHG | RESPIRATION RATE: 16 BRPM | OXYGEN SATURATION: 100 % | SYSTOLIC BLOOD PRESSURE: 117 MMHG | BODY MASS INDEX: 37.54 KG/M2

## 2023-03-15 LAB
HCT VFR BLD AUTO: 27.4 % (ref 36.6–50.3)
HGB BLD-MCNC: 7.8 G/DL (ref 12.1–17)

## 2023-03-15 PROCEDURE — 85018 HEMOGLOBIN: CPT

## 2023-03-15 PROCEDURE — 74011250636 HC RX REV CODE- 250/636: Performed by: FAMILY MEDICINE

## 2023-03-15 PROCEDURE — 36430 TRANSFUSION BLD/BLD COMPNT: CPT

## 2023-03-15 PROCEDURE — 77030013169 SET IV BLD ICUM -A

## 2023-03-15 PROCEDURE — P9016 RBC LEUKOCYTES REDUCED: HCPCS

## 2023-03-15 PROCEDURE — 36415 COLL VENOUS BLD VENIPUNCTURE: CPT

## 2023-03-15 RX ORDER — SODIUM CHLORIDE 9 MG/ML
250 INJECTION, SOLUTION INTRAVENOUS AS NEEDED
Status: DISCONTINUED | OUTPATIENT
Start: 2023-03-15 | End: 2023-03-17 | Stop reason: HOSPADM

## 2023-03-15 RX ADMIN — SODIUM CHLORIDE 250 ML: 9 INJECTION, SOLUTION INTRAVENOUS at 10:15

## 2023-03-15 NOTE — PROGRESS NOTES
[facility] Progress Note  March 15, 2023      0950  Pamela Mendoza arrived ambulatory and in no acute distress for 2 units PRBCs. Patient COVID Screening completed:  Do you have any symptoms of COVID-19? SOB, coughing, fever, or generally not feeling well? NO  Have you been exposed to COVID-19 recently? NO  Have you had any recent contact with family/friend that has a pending COVID test? NO    Assessment completed, no new complaints voiced. 22G PIV established in R A/C without difficulty. Education provided regarding reason for blood transfusion and possible reactions. All questions and concerns regarding transfusion answered, patient voiced understanding. Problem: Anemia Care Plan (Adult and Pediatric)  Goal: *Labs within defined limits  Outcome: Progressing Towards Goal     Problem: Patient Education:  Go to Education Activity  Goal: Patient/Family Education  Outcome: Progressing Towards Goal    Medications received:  NS @ [facility]    1020:  1st unit PRBCs started and infusing without difficulty, observed x 15 minutes  1221:  1st unit completed without adverse reaction noted, NS flushing line. Per blood bank protocol, HGB checked after 1st unit completed    Recent Results (from the past 12 hour(s))   HGB & HCT    Collection Time: 03/15/23  1:09 PM   Result Value Ref Range    HGB 7.8 (L) 12.1 - 17.0 g/dL    HCT 27.4 (L) 36.6 - 50.3 %      Patient will not receive 2nd unit due to HGB 7.0 or above. Patient Vitals for the past 12 hrs:   Temp Pulse Resp BP SpO2   03/15/23 1336 98.9 °F (37.2 °C) 71 16 (!) 117/59 --   03/15/23 1200 97.9 °F (36.6 °C) 70 16 131/63 --   03/15/23 1035 98.2 °F (36.8 °C) 70 16 (!) 114/50 --   03/15/23 1012 98.2 °F (36.8 °C) 71 18 (!) 129/58 100 %     1340 Patient tolerated transfusion  well, no adverse reaction noted. D/C instructions reviewed, copy to pt, voiced understanding. Patient declined 1 hour post transfusion observation/vitals signs. PIV flushed and removed.  D/Cd from [facility] ambulatory and in no distress accompanied by self.       Future Appointments   Date Time Provider Jeff Henry   8/28/2023  8:20 AM Duane Promise, MD [facility] BS ROBIN Bella RN  March 15, 2023

## 2023-03-15 NOTE — DISCHARGE INSTRUCTIONS
OUTPATIENT INFUSION CENTER    DISCHARGE INSTRUCTIONS FOR:  BLOOD TRANSFUSION    We hope you are feeling better after your blood transfusion. Some mild tenderness or slight bruising at your IV site is normal.  Avoid lifting or heavy use of that extremity for the rest of the day. Drink plenty of fluids, eat a normal diet and get some rest.    There are some important signs that you need to watch for in case you experience a delayed reaction to the blood you have received. Call your physician immediately if you develop any of the following symptoms:    1. Severe headache or backache;    2. Fever above 100 degrees;    3. Chills;    4. Difficulty breathing;    5.  Blood or red color in urine;    6. The feeling of weakness or constant fatigue;    7. Yellowing of the whites of your eyes or skin (jaundice). If your physician is not available, call or go to the nearest emergency room, or dial 911.     Tyler Savage, Signature: ___________________________ 3/15/2023  Mirtha Guardado RN

## 2023-03-17 ENCOUNTER — TELEPHONE (OUTPATIENT)
Dept: FAMILY MEDICINE CLINIC | Age: 63
End: 2023-03-17

## 2023-03-17 NOTE — TELEPHONE ENCOUNTER
Monica Toro ( Infection Disease ) office would like a return call from [facility] regarding patient [phone]

## 2023-03-17 NOTE — TELEPHONE ENCOUNTER
Last office notes from Dr. Litzy Cervantes and last two infusion notes sent to Dr. Darylene Begun. Also cbc,iron,ferrition and type and screen for blood transfusions for patient all faxed.   Wade Colon

## 2023-04-18 ENCOUNTER — HOSPITAL ENCOUNTER (INPATIENT)
Age: 63
LOS: 6 days | Discharge: NURSING, REHAB OR LONG-TERM CARE | DRG: 233 | End: 2023-04-25
Attending: INTERNAL MEDICINE | Admitting: THORACIC SURGERY (CARDIOTHORACIC VASCULAR SURGERY)
Payer: COMMERCIAL

## 2023-04-18 ENCOUNTER — APPOINTMENT (OUTPATIENT)
Dept: GENERAL RADIOLOGY | Age: 63
DRG: 233 | End: 2023-04-18
Attending: THORACIC SURGERY (CARDIOTHORACIC VASCULAR SURGERY)
Payer: COMMERCIAL

## 2023-04-18 ENCOUNTER — ANESTHESIA EVENT (OUTPATIENT)
Dept: CARDIOTHORACIC SURGERY | Age: 63
End: 2023-04-18
Payer: COMMERCIAL

## 2023-04-18 ENCOUNTER — APPOINTMENT (OUTPATIENT)
Dept: NON INVASIVE DIAGNOSTICS | Age: 63
DRG: 233 | End: 2023-04-18
Attending: INTERNAL MEDICINE
Payer: COMMERCIAL

## 2023-04-18 ENCOUNTER — APPOINTMENT (OUTPATIENT)
Dept: VASCULAR SURGERY | Age: 63
DRG: 233 | End: 2023-04-18
Attending: PHYSICIAN ASSISTANT
Payer: COMMERCIAL

## 2023-04-18 DIAGNOSIS — E11.8 CONTROLLED TYPE 2 DIABETES MELLITUS WITH COMPLICATION, WITHOUT LONG-TERM CURRENT USE OF INSULIN (HCC): Chronic | ICD-10-CM

## 2023-04-18 DIAGNOSIS — R07.9 CHEST PAIN, UNSPECIFIED TYPE: ICD-10-CM

## 2023-04-18 DIAGNOSIS — I25.810 CORONARY ARTERY DISEASE INVOLVING CORONARY BYPASS GRAFT OF NATIVE HEART WITHOUT ANGINA PECTORIS: ICD-10-CM

## 2023-04-18 DIAGNOSIS — Z95.1 S/P CABG X 4: ICD-10-CM

## 2023-04-18 DIAGNOSIS — Z01.810 PREOP CARDIOVASCULAR EXAM: ICD-10-CM

## 2023-04-18 DIAGNOSIS — I65.23 BILATERAL CAROTID ARTERY STENOSIS: ICD-10-CM

## 2023-04-18 DIAGNOSIS — I48.20 CHRONIC ATRIAL FIBRILLATION (HCC): Primary | ICD-10-CM

## 2023-04-18 DIAGNOSIS — Z98.890 S/P LEFT ATRIAL APPENDAGE LIGATION: ICD-10-CM

## 2023-04-18 LAB
ANION GAP SERPL CALC-SCNC: 3 MMOL/L (ref 5–15)
APPEARANCE UR: CLEAR
ARTERIAL PATENCY WRIST A: YES
BACTERIA URNS QL MICRO: NEGATIVE /HPF
BASE EXCESS BLDA CALC-SCNC: 1.5 MMOL/L
BDY SITE: NORMAL
BILIRUB UR QL: NEGATIVE
BUN SERPL-MCNC: 30 MG/DL (ref 6–20)
BUN/CREAT SERPL: 35 (ref 12–20)
CALCIUM SERPL-MCNC: 9 MG/DL (ref 8.5–10.1)
CHLORIDE SERPL-SCNC: 110 MMOL/L (ref 97–108)
CO2 SERPL-SCNC: 27 MMOL/L (ref 21–32)
COLOR UR: ABNORMAL
CREAT SERPL-MCNC: 0.86 MG/DL (ref 0.7–1.3)
ECHO LV EDV A2C: 123 ML
ECHO LV EDV A4C: 156 ML
ECHO LV EDV BP: 143 ML (ref 67–155)
ECHO LV EDV INDEX A4C: 63 ML/M2
ECHO LV EDV INDEX BP: 58 ML/M2
ECHO LV EDV NDEX A2C: 50 ML/M2
ECHO LV EJECTION FRACTION A2C: 37 %
ECHO LV EJECTION FRACTION A4C: 31 %
ECHO LV EJECTION FRACTION BIPLANE: 34 % (ref 55–100)
ECHO LV ESV A2C: 78 ML
ECHO LV ESV A4C: 108 ML
ECHO LV ESV BP: 94 ML (ref 22–58)
ECHO LV ESV INDEX A2C: 31 ML/M2
ECHO LV ESV INDEX A4C: 44 ML/M2
ECHO LV ESV INDEX BP: 38 ML/M2
ECHO LV FRACTIONAL SHORTENING: 12 % (ref 28–44)
ECHO LV INTERNAL DIMENSION DIASTOLE INDEX: 2.34 CM/M2
ECHO LV INTERNAL DIMENSION DIASTOLIC: 5.8 CM (ref 4.2–5.9)
ECHO LV INTERNAL DIMENSION SYSTOLIC INDEX: 2.06 CM/M2
ECHO LV INTERNAL DIMENSION SYSTOLIC: 5.1 CM
ECHO LV IVSD: 1.4 CM (ref 0.6–1)
ECHO LV MASS 2D: 386.1 G (ref 88–224)
ECHO LV MASS INDEX 2D: 155.7 G/M2 (ref 49–115)
ECHO LV POSTERIOR WALL DIASTOLIC: 1.5 CM (ref 0.6–1)
ECHO LV RELATIVE WALL THICKNESS RATIO: 0.52
EPITH CASTS URNS QL MICRO: ABNORMAL /LPF
ERYTHROCYTE [DISTWIDTH] IN BLOOD BY AUTOMATED COUNT: 22.7 % (ref 11.5–14.5)
GLUCOSE BLD STRIP.AUTO-MCNC: 111 MG/DL (ref 65–117)
GLUCOSE BLD STRIP.AUTO-MCNC: 155 MG/DL (ref 65–117)
GLUCOSE SERPL-MCNC: 167 MG/DL (ref 65–100)
GLUCOSE UR STRIP.AUTO-MCNC: >1000 MG/DL
HCO3 BLDA-SCNC: 26 MMOL/L (ref 22–26)
HCT VFR BLD AUTO: 34.5 % (ref 36.6–50.3)
HGB BLD-MCNC: 9.7 G/DL (ref 12.1–17)
HGB UR QL STRIP: ABNORMAL
HISTORY CHECKED?,CKHIST: NORMAL
HYALINE CASTS URNS QL MICRO: ABNORMAL /LPF (ref 0–2)
INR PPP: 1 (ref 0.9–1.1)
KETONES UR QL STRIP.AUTO: NEGATIVE MG/DL
LEFT CCA DIST DIAS: 9.3 CM/S
LEFT CCA DIST SYS: 53.3 CM/S
LEFT CCA PROX DIAS: 10.6 CM/S
LEFT CCA PROX SYS: 62.4 CM/S
LEFT ECA DIAS: 0 CM/S
LEFT ECA SYS: 108.1 CM/S
LEFT ICA DIST DIAS: 19.7 CM/S
LEFT ICA DIST SYS: 65.8 CM/S
LEFT ICA MID DIAS: 14.2 CM/S
LEFT ICA MID SYS: 55.9 CM/S
LEFT ICA PROX DIAS: 10.1 CM/S
LEFT ICA PROX SYS: 44.2 CM/S
LEFT ICA/CCA SYS: 1.23 NO UNITS
LEFT VERTEBRAL DIAS: 16.48 CM/S
LEFT VERTEBRAL SYS: 47.2 CM/S
LEUKOCYTE ESTERASE UR QL STRIP.AUTO: NEGATIVE
MCH RBC QN AUTO: 21.5 PG (ref 26–34)
MCHC RBC AUTO-ENTMCNC: 28.1 G/DL (ref 30–36.5)
MCV RBC AUTO: 76.3 FL (ref 80–99)
NITRITE UR QL STRIP.AUTO: NEGATIVE
NRBC # BLD: 0 K/UL (ref 0–0.01)
NRBC BLD-RTO: 0 PER 100 WBC
PCO2 BLDA: 39 MMHG (ref 35–45)
PH BLDA: 7.44 (ref 7.35–7.45)
PH UR STRIP: 6.5 (ref 5–8)
PLATELET # BLD AUTO: 361 K/UL (ref 150–400)
PMV BLD AUTO: 9.3 FL (ref 8.9–12.9)
PO2 BLDA: 89 MMHG (ref 80–100)
POTASSIUM SERPL-SCNC: 4.1 MMOL/L (ref 3.5–5.1)
PROT UR STRIP-MCNC: 300 MG/DL
PROTHROMBIN TIME: 10.7 SEC (ref 9–11.1)
RBC # BLD AUTO: 4.52 M/UL (ref 4.1–5.7)
RBC #/AREA URNS HPF: ABNORMAL /HPF (ref 0–5)
RIGHT CCA DIST DIAS: 8.9 CM/S
RIGHT CCA DIST SYS: 54.2 CM/S
RIGHT CCA PROX DIAS: 8.9 CM/S
RIGHT CCA PROX SYS: 84 CM/S
RIGHT ECA DIAS: 0 CM/S
RIGHT ECA SYS: 57.5 CM/S
RIGHT ICA DIST DIAS: 17.4 CM/S
RIGHT ICA DIST SYS: 61 CM/S
RIGHT ICA MID DIAS: 13.3 CM/S
RIGHT ICA MID SYS: 49 CM/S
RIGHT ICA PROX DIAS: 8.9 CM/S
RIGHT ICA PROX SYS: 51.6 CM/S
RIGHT ICA/CCA SYS: 1.1 NO UNITS
RIGHT VERTEBRAL DIAS: 9.18 CM/S
RIGHT VERTEBRAL SYS: 41.2 CM/S
SAO2 % BLD: 97 % (ref 92–97)
SAO2% DEVICE SAO2% SENSOR NAME: NORMAL
SERVICE CMNT-IMP: ABNORMAL
SERVICE CMNT-IMP: NORMAL
SODIUM SERPL-SCNC: 140 MMOL/L (ref 136–145)
SP GR UR REFRACTOMETRY: 1.02 (ref 1–1.03)
SPECIMEN SITE: NORMAL
UA: UC IF INDICATED,UAUC: ABNORMAL
UROBILINOGEN UR QL STRIP.AUTO: 0.2 EU/DL (ref 0.2–1)
VAS LEFT SUBCLAVIAN PROX EDV: 0 CM/S
VAS LEFT SUBCLAVIAN PROX PSV: 164.7 CM/S
VAS RIGHT SUBCLAVIAN PROX EDV: 0 CM/S
VAS RIGHT SUBCLAVIAN PROX PSV: 110.2 CM/S
WBC # BLD AUTO: 5.6 K/UL (ref 4.1–11.1)
WBC URNS QL MICRO: ABNORMAL /HPF (ref 0–4)

## 2023-04-18 PROCEDURE — 74011250637 HC RX REV CODE- 250/637: Performed by: PHYSICIAN ASSISTANT

## 2023-04-18 PROCEDURE — 76937 US GUIDE VASCULAR ACCESS: CPT | Performed by: INTERNAL MEDICINE

## 2023-04-18 PROCEDURE — 99153 MOD SED SAME PHYS/QHP EA: CPT | Performed by: INTERNAL MEDICINE

## 2023-04-18 PROCEDURE — 77030008543 HC TBNG MON PRSS MRTM -A: Performed by: INTERNAL MEDICINE

## 2023-04-18 PROCEDURE — C1894 INTRO/SHEATH, NON-LASER: HCPCS | Performed by: INTERNAL MEDICINE

## 2023-04-18 PROCEDURE — 77030019698 HC SYR ANGI MDLON MRTM -A: Performed by: INTERNAL MEDICINE

## 2023-04-18 PROCEDURE — 81001 URINALYSIS AUTO W/SCOPE: CPT

## 2023-04-18 PROCEDURE — 85610 PROTHROMBIN TIME: CPT

## 2023-04-18 PROCEDURE — 93458 L HRT ARTERY/VENTRICLE ANGIO: CPT | Performed by: INTERNAL MEDICINE

## 2023-04-18 PROCEDURE — 74011000250 HC RX REV CODE- 250: Performed by: INTERNAL MEDICINE

## 2023-04-18 PROCEDURE — 74011000250 HC RX REV CODE- 250: Performed by: PHYSICIAN ASSISTANT

## 2023-04-18 PROCEDURE — 74011250636 HC RX REV CODE- 250/636: Performed by: INTERNAL MEDICINE

## 2023-04-18 PROCEDURE — C1769 GUIDE WIRE: HCPCS | Performed by: INTERNAL MEDICINE

## 2023-04-18 PROCEDURE — B2111ZZ FLUOROSCOPY OF MULTIPLE CORONARY ARTERIES USING LOW OSMOLAR CONTRAST: ICD-10-PCS | Performed by: INTERNAL MEDICINE

## 2023-04-18 PROCEDURE — 99223 1ST HOSP IP/OBS HIGH 75: CPT | Performed by: PHYSICIAN ASSISTANT

## 2023-04-18 PROCEDURE — 93005 ELECTROCARDIOGRAM TRACING: CPT

## 2023-04-18 PROCEDURE — 36600 WITHDRAWAL OF ARTERIAL BLOOD: CPT

## 2023-04-18 PROCEDURE — 74011000636 HC RX REV CODE- 636: Performed by: INTERNAL MEDICINE

## 2023-04-18 PROCEDURE — 83036 HEMOGLOBIN GLYCOSYLATED A1C: CPT

## 2023-04-18 PROCEDURE — 77030019569 HC BND COMPR RAD TERU -B: Performed by: INTERNAL MEDICINE

## 2023-04-18 PROCEDURE — 82803 BLOOD GASES ANY COMBINATION: CPT

## 2023-04-18 PROCEDURE — 94375 RESPIRATORY FLOW VOLUME LOOP: CPT

## 2023-04-18 PROCEDURE — 77030015766: Performed by: INTERNAL MEDICINE

## 2023-04-18 PROCEDURE — 93308 TTE F-UP OR LMTD: CPT

## 2023-04-18 PROCEDURE — 93970 EXTREMITY STUDY: CPT

## 2023-04-18 PROCEDURE — 80048 BASIC METABOLIC PNL TOTAL CA: CPT

## 2023-04-18 PROCEDURE — 36415 COLL VENOUS BLD VENIPUNCTURE: CPT

## 2023-04-18 PROCEDURE — 93880 EXTRACRANIAL BILAT STUDY: CPT

## 2023-04-18 PROCEDURE — 77030010221 HC SPLNT WR POS TELE -B: Performed by: INTERNAL MEDICINE

## 2023-04-18 PROCEDURE — 71046 X-RAY EXAM CHEST 2 VIEWS: CPT

## 2023-04-18 PROCEDURE — 77030004549 HC CATH ANGI DX PRF MRTM -A: Performed by: INTERNAL MEDICINE

## 2023-04-18 PROCEDURE — 82962 GLUCOSE BLOOD TEST: CPT

## 2023-04-18 PROCEDURE — 4A023N7 MEASUREMENT OF CARDIAC SAMPLING AND PRESSURE, LEFT HEART, PERCUTANEOUS APPROACH: ICD-10-PCS | Performed by: INTERNAL MEDICINE

## 2023-04-18 PROCEDURE — C1887 CATHETER, GUIDING: HCPCS | Performed by: INTERNAL MEDICINE

## 2023-04-18 PROCEDURE — 86923 COMPATIBILITY TEST ELECTRIC: CPT

## 2023-04-18 PROCEDURE — 86900 BLOOD TYPING SEROLOGIC ABO: CPT

## 2023-04-18 PROCEDURE — 2709999900 HC NON-CHARGEABLE SUPPLY: Performed by: INTERNAL MEDICINE

## 2023-04-18 PROCEDURE — 85027 COMPLETE CBC AUTOMATED: CPT

## 2023-04-18 PROCEDURE — 93880 EXTRACRANIAL BILAT STUDY: CPT | Performed by: PSYCHIATRY & NEUROLOGY

## 2023-04-18 PROCEDURE — 99152 MOD SED SAME PHYS/QHP 5/>YRS: CPT | Performed by: INTERNAL MEDICINE

## 2023-04-18 RX ORDER — METOPROLOL TARTRATE 25 MG/1
25 TABLET, FILM COATED ORAL 2 TIMES DAILY
Status: DISCONTINUED | OUTPATIENT
Start: 2023-04-18 | End: 2023-04-19

## 2023-04-18 RX ORDER — DESMOPRESSIN ACETATE 4 UG/ML
2 INJECTION, SOLUTION INTRAVENOUS; SUBCUTANEOUS ONCE
Status: COMPLETED | OUTPATIENT
Start: 2023-04-19 | End: 2023-04-19

## 2023-04-18 RX ORDER — DOPAMINE HYDROCHLORIDE 320 MG/100ML
0-20 INJECTION, SOLUTION INTRAVENOUS
Status: DISCONTINUED | OUTPATIENT
Start: 2023-04-19 | End: 2023-04-19

## 2023-04-18 RX ORDER — HEPARIN SODIUM 200 [USP'U]/100ML
INJECTION, SOLUTION INTRAVENOUS
Status: COMPLETED | OUTPATIENT
Start: 2023-04-18 | End: 2023-04-18

## 2023-04-18 RX ORDER — NITROGLYCERIN 20 MG/100ML
0-20 INJECTION INTRAVENOUS
Status: DISCONTINUED | OUTPATIENT
Start: 2023-04-19 | End: 2023-04-19

## 2023-04-18 RX ORDER — SODIUM CHLORIDE 0.9 % (FLUSH) 0.9 %
5-40 SYRINGE (ML) INJECTION AS NEEDED
Status: DISCONTINUED | OUTPATIENT
Start: 2023-04-18 | End: 2023-04-19

## 2023-04-18 RX ORDER — SODIUM CHLORIDE 0.9 % (FLUSH) 0.9 %
5-40 SYRINGE (ML) INJECTION EVERY 8 HOURS
Status: DISCONTINUED | OUTPATIENT
Start: 2023-04-18 | End: 2023-04-19

## 2023-04-18 RX ORDER — MAGNESIUM SULFATE HEPTAHYDRATE 40 MG/ML
2 INJECTION, SOLUTION INTRAVENOUS ONCE
Status: DISCONTINUED | OUTPATIENT
Start: 2023-04-19 | End: 2023-04-19

## 2023-04-18 RX ORDER — VERAPAMIL HYDROCHLORIDE 2.5 MG/ML
INJECTION, SOLUTION INTRAVENOUS AS NEEDED
Status: DISCONTINUED | OUTPATIENT
Start: 2023-04-18 | End: 2023-04-18 | Stop reason: HOSPADM

## 2023-04-18 RX ORDER — FENTANYL CITRATE 50 UG/ML
INJECTION, SOLUTION INTRAMUSCULAR; INTRAVENOUS AS NEEDED
Status: DISCONTINUED | OUTPATIENT
Start: 2023-04-18 | End: 2023-04-18 | Stop reason: HOSPADM

## 2023-04-18 RX ORDER — DOBUTAMINE HYDROCHLORIDE 200 MG/100ML
0-10 INJECTION INTRAVENOUS
Status: DISCONTINUED | OUTPATIENT
Start: 2023-04-19 | End: 2023-04-20

## 2023-04-18 RX ORDER — MIDAZOLAM HYDROCHLORIDE 1 MG/ML
INJECTION, SOLUTION INTRAMUSCULAR; INTRAVENOUS AS NEEDED
Status: DISCONTINUED | OUTPATIENT
Start: 2023-04-18 | End: 2023-04-18 | Stop reason: HOSPADM

## 2023-04-18 RX ORDER — MUPIROCIN 20 MG/G
OINTMENT TOPICAL 2 TIMES DAILY
Status: DISCONTINUED | OUTPATIENT
Start: 2023-04-18 | End: 2023-04-19

## 2023-04-18 RX ORDER — METFORMIN HYDROCHLORIDE 1000 MG/1
1000 TABLET ORAL 2 TIMES DAILY WITH MEALS
Qty: 180 TABLET | Refills: 3 | Status: SHIPPED
Start: 2023-04-18

## 2023-04-18 RX ORDER — CHLORHEXIDINE GLUCONATE 1.2 MG/ML
15 RINSE ORAL EVERY 12 HOURS
Status: DISCONTINUED | OUTPATIENT
Start: 2023-04-18 | End: 2023-04-19

## 2023-04-18 RX ORDER — SACUBITRIL AND VALSARTAN 24; 26 MG/1; MG/1
1 TABLET, FILM COATED ORAL 2 TIMES DAILY
COMMUNITY
End: 2023-04-25

## 2023-04-18 RX ORDER — LIDOCAINE HYDROCHLORIDE 10 MG/ML
INJECTION, SOLUTION EPIDURAL; INFILTRATION; INTRACAUDAL; PERINEURAL AS NEEDED
Status: DISCONTINUED | OUTPATIENT
Start: 2023-04-18 | End: 2023-04-18 | Stop reason: HOSPADM

## 2023-04-18 RX ORDER — AMIODARONE HYDROCHLORIDE 200 MG/1
400 TABLET ORAL EVERY 12 HOURS
Status: DISCONTINUED | OUTPATIENT
Start: 2023-04-18 | End: 2023-04-19

## 2023-04-18 RX ORDER — PROTAMINE SULFATE 10 MG/ML
250 INJECTION, SOLUTION INTRAVENOUS
Status: COMPLETED | OUTPATIENT
Start: 2023-04-19 | End: 2023-04-19

## 2023-04-18 RX ORDER — NOREPINEPHRINE BITARTRATE 0.06 MG/ML
.5-3 INJECTION, SOLUTION INTRAVENOUS
Status: DISCONTINUED | OUTPATIENT
Start: 2023-04-19 | End: 2023-04-19

## 2023-04-18 RX ORDER — HEPARIN SODIUM 1000 [USP'U]/ML
INJECTION, SOLUTION INTRAVENOUS; SUBCUTANEOUS AS NEEDED
Status: DISCONTINUED | OUTPATIENT
Start: 2023-04-18 | End: 2023-04-18 | Stop reason: HOSPADM

## 2023-04-18 RX ORDER — DEXMEDETOMIDINE HYDROCHLORIDE 4 UG/ML
.1-1.5 INJECTION, SOLUTION INTRAVENOUS
Status: DISCONTINUED | OUTPATIENT
Start: 2023-04-19 | End: 2023-04-20

## 2023-04-18 RX ORDER — POTASSIUM CHLORIDE 29.8 MG/ML
20 INJECTION INTRAVENOUS ONCE
Status: DISCONTINUED | OUTPATIENT
Start: 2023-04-19 | End: 2023-04-19

## 2023-04-18 RX ORDER — SODIUM CHLORIDE 9 MG/ML
1000 INJECTION, SOLUTION INTRAVENOUS CONTINUOUS
Status: DISCONTINUED | OUTPATIENT
Start: 2023-04-18 | End: 2023-04-19

## 2023-04-18 RX ADMIN — SODIUM CHLORIDE, PRESERVATIVE FREE 10 ML: 5 INJECTION INTRAVENOUS at 18:37

## 2023-04-18 RX ADMIN — METOPROLOL TARTRATE 25 MG: 25 TABLET, FILM COATED ORAL at 21:33

## 2023-04-18 RX ADMIN — AMIODARONE HYDROCHLORIDE 200 MG: 200 TABLET ORAL at 21:33

## 2023-04-18 RX ADMIN — SODIUM CHLORIDE 1000 ML: 9 INJECTION, SOLUTION INTRAVENOUS at 18:44

## 2023-04-18 RX ADMIN — SODIUM CHLORIDE, PRESERVATIVE FREE 10 ML: 5 INJECTION INTRAVENOUS at 21:36

## 2023-04-18 RX ADMIN — MUPIROCIN: 20 OINTMENT TOPICAL at 21:54

## 2023-04-18 RX ADMIN — 0.12% CHLORHEXIDINE GLUCONATE 15 ML: 1.2 RINSE ORAL at 21:33

## 2023-04-19 ENCOUNTER — APPOINTMENT (OUTPATIENT)
Dept: GENERAL RADIOLOGY | Age: 63
DRG: 233 | End: 2023-04-19
Attending: PHYSICIAN ASSISTANT
Payer: COMMERCIAL

## 2023-04-19 ENCOUNTER — ANESTHESIA (OUTPATIENT)
Dept: CARDIOTHORACIC SURGERY | Age: 63
End: 2023-04-19
Payer: COMMERCIAL

## 2023-04-19 ENCOUNTER — HOSPITAL ENCOUNTER (OUTPATIENT)
Dept: NON INVASIVE DIAGNOSTICS | Age: 63
Discharge: HOME | End: 2023-04-19
Attending: THORACIC SURGERY (CARDIOTHORACIC VASCULAR SURGERY)

## 2023-04-19 PROBLEM — I25.10 CAD (CORONARY ARTERY DISEASE): Status: ACTIVE | Noted: 2023-04-19

## 2023-04-19 PROBLEM — Z95.1 S/P CABG X 4: Status: ACTIVE | Noted: 2023-04-19

## 2023-04-19 PROBLEM — Z98.890 S/P LEFT ATRIAL APPENDAGE LIGATION: Status: ACTIVE | Noted: 2023-04-19

## 2023-04-19 LAB
ACUTE KIDNEY INJURY RISK NEPHROCHECK: 0.51 (ref 0–0.3)
ADMINISTERED INITIALS, ADMINIT: NORMAL
ALBUMIN SERPL-MCNC: 3 G/DL (ref 3.5–5)
ALBUMIN SERPL-MCNC: 3.4 G/DL (ref 3.5–5)
ALBUMIN/GLOB SERPL: 1.1 (ref 1.1–2.2)
ALBUMIN/GLOB SERPL: 1.3 (ref 1.1–2.2)
ALP SERPL-CCNC: 25 U/L (ref 45–117)
ALP SERPL-CCNC: 28 U/L (ref 45–117)
ALT SERPL-CCNC: 10 U/L (ref 12–78)
ALT SERPL-CCNC: 12 U/L (ref 12–78)
ANION GAP SERPL CALC-SCNC: 1 MMOL/L (ref 5–15)
ANION GAP SERPL CALC-SCNC: 3 MMOL/L (ref 5–15)
APTT PPP: 21.4 SEC (ref 22.1–31)
ARTERIAL PATENCY WRIST A: ABNORMAL
ARTERIAL PATENCY WRIST A: NORMAL
AST SERPL-CCNC: 18 U/L (ref 15–37)
AST SERPL-CCNC: 21 U/L (ref 15–37)
ATRIAL RATE: 73 BPM
BASE DEFICIT BLDA-SCNC: 3 MMOL/L
BASE DEFICIT BLDA-SCNC: 3.3 MMOL/L
BASOPHILS # BLD: 0 K/UL (ref 0–0.1)
BASOPHILS NFR BLD: 0 % (ref 0–1)
BDY SITE: ABNORMAL
BDY SITE: NORMAL
BILIRUB SERPL-MCNC: 0.2 MG/DL (ref 0.2–1)
BILIRUB SERPL-MCNC: 0.8 MG/DL (ref 0.2–1)
BUN SERPL-MCNC: 22 MG/DL (ref 6–20)
BUN SERPL-MCNC: 25 MG/DL (ref 6–20)
BUN/CREAT SERPL: 23 (ref 12–20)
BUN/CREAT SERPL: 27 (ref 12–20)
CA-I BLD-SCNC: 1.16 MMOL/L (ref 1.13–1.32)
CALCIUM SERPL-MCNC: 8.1 MG/DL (ref 8.5–10.1)
CALCIUM SERPL-MCNC: 8.4 MG/DL (ref 8.5–10.1)
CALCULATED P AXIS, ECG09: 57 DEGREES
CALCULATED R AXIS, ECG10: 11 DEGREES
CALCULATED T AXIS, ECG11: -80 DEGREES
CHLORIDE SERPL-SCNC: 116 MMOL/L (ref 97–108)
CHLORIDE SERPL-SCNC: 119 MMOL/L (ref 97–108)
CO2 SERPL-SCNC: 24 MMOL/L (ref 21–32)
CO2 SERPL-SCNC: 24 MMOL/L (ref 21–32)
CREAT SERPL-MCNC: 0.92 MG/DL (ref 0.7–1.3)
CREAT SERPL-MCNC: 0.94 MG/DL (ref 0.7–1.3)
D50 ADMINISTERED, D50ADM: 0 ML
D50 ORDER, D50ORD: 0 ML
DIAGNOSIS, 93000: NORMAL
DIFFERENTIAL METHOD BLD: ABNORMAL
EOSINOPHIL # BLD: 0 K/UL (ref 0–0.4)
EOSINOPHIL NFR BLD: 0 % (ref 0–7)
ERYTHROCYTE [DISTWIDTH] IN BLOOD BY AUTOMATED COUNT: 22.3 % (ref 11.5–14.5)
EST. AVERAGE GLUCOSE BLD GHB EST-MCNC: 126 MG/DL
FIO2 ON VENT: 40 %
FIO2 ON VENT: 60 %
GAS FLOW.O2 SETTING OXYMISER: 14
GLOBULIN SER CALC-MCNC: 2.6 G/DL (ref 2–4)
GLOBULIN SER CALC-MCNC: 2.8 G/DL (ref 2–4)
GLUCOSE BLD STRIP.AUTO-MCNC: 103 MG/DL (ref 65–117)
GLUCOSE BLD STRIP.AUTO-MCNC: 107 MG/DL (ref 65–117)
GLUCOSE BLD STRIP.AUTO-MCNC: 111 MG/DL (ref 65–117)
GLUCOSE BLD STRIP.AUTO-MCNC: 120 MG/DL (ref 65–117)
GLUCOSE BLD STRIP.AUTO-MCNC: 149 MG/DL (ref 65–117)
GLUCOSE BLD STRIP.AUTO-MCNC: 84 MG/DL (ref 65–117)
GLUCOSE BLD STRIP.AUTO-MCNC: 91 MG/DL (ref 65–117)
GLUCOSE BLD STRIP.AUTO-MCNC: 95 MG/DL (ref 65–117)
GLUCOSE BLD STRIP.AUTO-MCNC: 96 MG/DL (ref 65–117)
GLUCOSE BLD STRIP.AUTO-MCNC: 98 MG/DL (ref 65–117)
GLUCOSE BLD STRIP.AUTO-MCNC: 98 MG/DL (ref 65–117)
GLUCOSE SERPL-MCNC: 123 MG/DL (ref 65–100)
GLUCOSE SERPL-MCNC: 95 MG/DL (ref 65–100)
GLUCOSE, GLC: 103 MG/DL
GLUCOSE, GLC: 107 MG/DL
GLUCOSE, GLC: 111 MG/DL
GLUCOSE, GLC: 120 MG/DL
GLUCOSE, GLC: 129 MG/DL
GLUCOSE, GLC: 142 MG/DL
GLUCOSE, GLC: 150 MG/DL
GLUCOSE, GLC: 154 MG/DL
GLUCOSE, GLC: 161 MG/DL
GLUCOSE, GLC: 84 MG/DL
GLUCOSE, GLC: 91 MG/DL
GLUCOSE, GLC: 95 MG/DL
GLUCOSE, GLC: 96 MG/DL
GLUCOSE, GLC: 98 MG/DL
GLUCOSE, GLC: 98 MG/DL
HBA1C MFR BLD: 6 % (ref 4–5.6)
HCO3 BLDA-SCNC: 21 MMOL/L (ref 22–26)
HCO3 BLDA-SCNC: 22 MMOL/L (ref 22–26)
HCT VFR BLD AUTO: 28 % (ref 36.6–50.3)
HCT VFR BLD AUTO: 29.6 % (ref 36.6–50.3)
HGB BLD-MCNC: 8 G/DL (ref 12.1–17)
HGB BLD-MCNC: 8.2 G/DL (ref 12.1–17)
HIGH TARGET, HITG: 130 MG/DL
HIGH TARGET, HITG: 140 MG/DL
IMM GRANULOCYTES # BLD AUTO: 0.1 K/UL (ref 0–0.04)
IMM GRANULOCYTES NFR BLD AUTO: 1 % (ref 0–0.5)
INR PPP: 1.1 (ref 0.9–1.1)
INSULIN ADMINSTERED, INSADM: 1.2 UNITS/HOUR
INSULIN ADMINSTERED, INSADM: 1.2 UNITS/HOUR
INSULIN ADMINSTERED, INSADM: 1.3 UNITS/HOUR
INSULIN ADMINSTERED, INSADM: 1.4 UNITS/HOUR
INSULIN ADMINSTERED, INSADM: 1.4 UNITS/HOUR
INSULIN ADMINSTERED, INSADM: 1.6 UNITS/HOUR
INSULIN ADMINSTERED, INSADM: 1.8 UNITS/HOUR
INSULIN ADMINSTERED, INSADM: 1.8 UNITS/HOUR
INSULIN ADMINSTERED, INSADM: 3 UNITS/HOUR
INSULIN ADMINSTERED, INSADM: 3.1 UNITS/HOUR
INSULIN ADMINSTERED, INSADM: 3.6 UNITS/HOUR
INSULIN ADMINSTERED, INSADM: 3.6 UNITS/HOUR
INSULIN ADMINSTERED, INSADM: 4.1 UNITS/HOUR
INSULIN ADMINSTERED, INSADM: 4.7 UNITS/HOUR
INSULIN ADMINSTERED, INSADM: 4.9 UNITS/HOUR
INSULIN ORDER, INSORD: 1.2 UNITS/HOUR
INSULIN ORDER, INSORD: 1.2 UNITS/HOUR
INSULIN ORDER, INSORD: 1.3 UNITS/HOUR
INSULIN ORDER, INSORD: 1.4 UNITS/HOUR
INSULIN ORDER, INSORD: 1.4 UNITS/HOUR
INSULIN ORDER, INSORD: 1.6 UNITS/HOUR
INSULIN ORDER, INSORD: 1.8 UNITS/HOUR
INSULIN ORDER, INSORD: 1.8 UNITS/HOUR
INSULIN ORDER, INSORD: 3 UNITS/HOUR
INSULIN ORDER, INSORD: 3.1 UNITS/HOUR
INSULIN ORDER, INSORD: 3.6 UNITS/HOUR
INSULIN ORDER, INSORD: 3.6 UNITS/HOUR
INSULIN ORDER, INSORD: 4.1 UNITS/HOUR
INSULIN ORDER, INSORD: 4.7 UNITS/HOUR
INSULIN ORDER, INSORD: 4.9 UNITS/HOUR
LEFT GSV ANKLE DIAM: 0.18 CM
LEFT GSV AT KNEE DIAM: 0.28 CM
LEFT GSV BK MID DIAM: 0.26 CM
LEFT GSV BK PROX DIAM: 0.32 CM
LEFT GSV THIGH DIST DIAM: 0.42 CM
LEFT GSV THIGH MID DIAM: 0.36 CM
LEFT GSV THIGH PROX DIAM: 0.55 CM
LOW TARGET, LOT: 100 MG/DL
LOW TARGET, LOT: 95 MG/DL
LYMPHOCYTES # BLD: 0.6 K/UL (ref 0.8–3.5)
LYMPHOCYTES NFR BLD: 4 % (ref 12–49)
MAGNESIUM SERPL-MCNC: 2.7 MG/DL (ref 1.6–2.4)
MAGNESIUM SERPL-MCNC: 2.8 MG/DL (ref 1.6–2.4)
MCH RBC QN AUTO: 21.1 PG (ref 26–34)
MCHC RBC AUTO-ENTMCNC: 27.7 G/DL (ref 30–36.5)
MCV RBC AUTO: 76.3 FL (ref 80–99)
MINUTES UNTIL NEXT BG, NBG: 60 MIN
MONOCYTES # BLD: 0.6 K/UL (ref 0–1)
MONOCYTES NFR BLD: 4 % (ref 5–13)
MULTIPLIER, MUL: 0.03
MULTIPLIER, MUL: 0.04
MULTIPLIER, MUL: 0.05
MULTIPLIER, MUL: 0.06
NEUTS SEG # BLD: 13.2 K/UL (ref 1.8–8)
NEUTS SEG NFR BLD: 91 % (ref 32–75)
NRBC # BLD: 0 K/UL (ref 0–0.01)
NRBC BLD-RTO: 0 PER 100 WBC
ORDER INITIALS, ORDINIT: NORMAL
P-R INTERVAL, ECG05: 192 MS
PCO2 BLDA: 36 MMHG (ref 35–45)
PCO2 BLDA: 37 MMHG (ref 35–45)
PEEP RESPIRATORY: 5
PEEP RESPIRATORY: 5
PH BLDA: 7.38 (ref 7.35–7.45)
PH BLDA: 7.39 (ref 7.35–7.45)
PLATELET # BLD AUTO: 279 K/UL (ref 150–400)
PMV BLD AUTO: 9.2 FL (ref 8.9–12.9)
PO2 BLDA: 108 MMHG (ref 80–100)
PO2 BLDA: 91 MMHG (ref 80–100)
POTASSIUM SERPL-SCNC: 3.8 MMOL/L (ref 3.5–5.1)
POTASSIUM SERPL-SCNC: 4.6 MMOL/L (ref 3.5–5.1)
PRESSURE SUPPORT SETTING VENT: 10
PRESSURE SUPPORT SETTING VENT: 5
PROT SERPL-MCNC: 5.8 G/DL (ref 6.4–8.2)
PROT SERPL-MCNC: 6 G/DL (ref 6.4–8.2)
PROTHROMBIN TIME: 11.8 SEC (ref 9–11.1)
Q-T INTERVAL, ECG07: 380 MS
QRS DURATION, ECG06: 106 MS
QTC CALCULATION (BEZET), ECG08: 418 MS
RBC # BLD AUTO: 3.88 M/UL (ref 4.1–5.7)
RBC MORPH BLD: ABNORMAL
RIGHT GSV ANKLE DIAM: 0.3 CM
RIGHT GSV AT KNEE DIAM: 0.32 CM
RIGHT GSV BK MID DIAM: 0.22 CM
RIGHT GSV BK PROX DIAM: 0.3 CM
RIGHT GSV THIGH DIST DIAM: 0.37 CM
RIGHT GSV THIGH MID DIAM: 0.38 CM
RIGHT GSV THIGH PROX DIAM: 0.71 CM
SAO2 % BLD: 97 % (ref 92–97)
SAO2 % BLD: 98 % (ref 92–97)
SAO2% DEVICE SAO2% SENSOR NAME: ABNORMAL
SAO2% DEVICE SAO2% SENSOR NAME: NORMAL
SERVICE CMNT-IMP: ABNORMAL
SERVICE CMNT-IMP: ABNORMAL
SERVICE CMNT-IMP: NORMAL
SODIUM SERPL-SCNC: 143 MMOL/L (ref 136–145)
SODIUM SERPL-SCNC: 144 MMOL/L (ref 136–145)
SPECIMEN SITE: ABNORMAL
SPECIMEN SITE: NORMAL
THERAPEUTIC RANGE,PTTT: ABNORMAL SECS (ref 58–77)
VENTRICULAR RATE, ECG03: 73 BPM
VT SETTING VENT: 550
WBC # BLD AUTO: 14.5 K/UL (ref 4.1–11.1)

## 2023-04-19 PROCEDURE — 33268 EXCL LAA OPN OTH PX ANY METH: CPT | Performed by: THORACIC SURGERY (CARDIOTHORACIC VASCULAR SURGERY)

## 2023-04-19 PROCEDURE — 65620000000 HC RM CCU GENERAL

## 2023-04-19 PROCEDURE — 74011000250 HC RX REV CODE- 250: Performed by: THORACIC SURGERY (CARDIOTHORACIC VASCULAR SURGERY)

## 2023-04-19 PROCEDURE — 77030010813: Performed by: THORACIC SURGERY (CARDIOTHORACIC VASCULAR SURGERY)

## 2023-04-19 PROCEDURE — 85025 COMPLETE CBC W/AUTO DIFF WBC: CPT

## 2023-04-19 PROCEDURE — 74011250636 HC RX REV CODE- 250/636: Performed by: THORACIC SURGERY (CARDIOTHORACIC VASCULAR SURGERY)

## 2023-04-19 PROCEDURE — 33268 EXCL LAA OPN OTH PX ANY METH: CPT | Performed by: PHYSICIAN ASSISTANT

## 2023-04-19 PROCEDURE — 94002 VENT MGMT INPAT INIT DAY: CPT

## 2023-04-19 PROCEDURE — 77030014491 HC PLEDG PTFE BARD -A: Performed by: THORACIC SURGERY (CARDIOTHORACIC VASCULAR SURGERY)

## 2023-04-19 PROCEDURE — 77030008771 HC TU NG SALEM SUMP -A: Performed by: NURSE ANESTHETIST, CERTIFIED REGISTERED

## 2023-04-19 PROCEDURE — 77030014008 HC SPNG HEMSTAT J&J -C: Performed by: THORACIC SURGERY (CARDIOTHORACIC VASCULAR SURGERY)

## 2023-04-19 PROCEDURE — 76010000115 HC CV SURG 4.5 TO 5 HR: Performed by: THORACIC SURGERY (CARDIOTHORACIC VASCULAR SURGERY)

## 2023-04-19 PROCEDURE — 77030010797: Performed by: THORACIC SURGERY (CARDIOTHORACIC VASCULAR SURGERY)

## 2023-04-19 PROCEDURE — 33508 ENDOSCOPIC VEIN HARVEST: CPT | Performed by: THORACIC SURGERY (CARDIOTHORACIC VASCULAR SURGERY)

## 2023-04-19 PROCEDURE — 77030003020 HC SUT TICRN COVD -A: Performed by: THORACIC SURGERY (CARDIOTHORACIC VASCULAR SURGERY)

## 2023-04-19 PROCEDURE — 33533 CABG ARTERIAL SINGLE: CPT | Performed by: THORACIC SURGERY (CARDIOTHORACIC VASCULAR SURGERY)

## 2023-04-19 PROCEDURE — 02L70CK OCCLUSION OF LEFT ATRIAL APPENDAGE WITH EXTRALUMINAL DEVICE, OPEN APPROACH: ICD-10-PCS | Performed by: THORACIC SURGERY (CARDIOTHORACIC VASCULAR SURGERY)

## 2023-04-19 PROCEDURE — 77030003010 HC SUT SURG STL J&J -B: Performed by: THORACIC SURGERY (CARDIOTHORACIC VASCULAR SURGERY)

## 2023-04-19 PROCEDURE — 77030035219 HC ATRICLP PRO GILLINOV ATRC -H1: Performed by: THORACIC SURGERY (CARDIOTHORACIC VASCULAR SURGERY)

## 2023-04-19 PROCEDURE — C1751 CATH, INF, PER/CENT/MIDLINE: HCPCS | Performed by: ANESTHESIOLOGY

## 2023-04-19 PROCEDURE — 77030002933 HC SUT MCRYL J&J -A: Performed by: THORACIC SURGERY (CARDIOTHORACIC VASCULAR SURGERY)

## 2023-04-19 PROCEDURE — 85730 THROMBOPLASTIN TIME PARTIAL: CPT

## 2023-04-19 PROCEDURE — 74011000258 HC RX REV CODE- 258: Performed by: PHYSICIAN ASSISTANT

## 2023-04-19 PROCEDURE — 71045 X-RAY EXAM CHEST 1 VIEW: CPT

## 2023-04-19 PROCEDURE — 93005 ELECTROCARDIOGRAM TRACING: CPT

## 2023-04-19 PROCEDURE — 77030008772 HC TU NG SALEM SUPM COVD -B: Performed by: NURSE ANESTHETIST, CERTIFIED REGISTERED

## 2023-04-19 PROCEDURE — 77030008684 HC TU ET CUF COVD -B: Performed by: NURSE ANESTHETIST, CERTIFIED REGISTERED

## 2023-04-19 PROCEDURE — 77030037878 HC DRSG MEPILEX >48IN BORD MOLN -B: Performed by: THORACIC SURGERY (CARDIOTHORACIC VASCULAR SURGERY)

## 2023-04-19 PROCEDURE — 06BP4ZZ EXCISION OF RIGHT SAPHENOUS VEIN, PERCUTANEOUS ENDOSCOPIC APPROACH: ICD-10-PCS | Performed by: THORACIC SURGERY (CARDIOTHORACIC VASCULAR SURGERY)

## 2023-04-19 PROCEDURE — 33533 CABG ARTERIAL SINGLE: CPT | Performed by: PHYSICIAN ASSISTANT

## 2023-04-19 PROCEDURE — 33508 ENDOSCOPIC VEIN HARVEST: CPT | Performed by: PHYSICIAN ASSISTANT

## 2023-04-19 PROCEDURE — 74011000250 HC RX REV CODE- 250: Performed by: NURSE ANESTHETIST, CERTIFIED REGISTERED

## 2023-04-19 PROCEDURE — 85018 HEMOGLOBIN: CPT

## 2023-04-19 PROCEDURE — 77030006689 HC BLD OPHTH BVR BD -A: Performed by: THORACIC SURGERY (CARDIOTHORACIC VASCULAR SURGERY)

## 2023-04-19 PROCEDURE — 33519 CABG ARTERY-VEIN THREE: CPT | Performed by: PHYSICIAN ASSISTANT

## 2023-04-19 PROCEDURE — 77030018729 HC ELECTRD DEFIB PAD CARD -B: Performed by: THORACIC SURGERY (CARDIOTHORACIC VASCULAR SURGERY)

## 2023-04-19 PROCEDURE — 77030013798 HC KT TRNSDUC PRSSR EDWD -B: Performed by: THORACIC SURGERY (CARDIOTHORACIC VASCULAR SURGERY)

## 2023-04-19 PROCEDURE — 74011000258 HC RX REV CODE- 258: Performed by: THORACIC SURGERY (CARDIOTHORACIC VASCULAR SURGERY)

## 2023-04-19 PROCEDURE — 77030002912 HC SUT ETHBND J&J -A: Performed by: THORACIC SURGERY (CARDIOTHORACIC VASCULAR SURGERY)

## 2023-04-19 PROCEDURE — 77030013861 HC PNCH AORT CLNCUT QUES -B: Performed by: THORACIC SURGERY (CARDIOTHORACIC VASCULAR SURGERY)

## 2023-04-19 PROCEDURE — 77030012390 HC DRN CHST BTL GTNG -B: Performed by: THORACIC SURGERY (CARDIOTHORACIC VASCULAR SURGERY)

## 2023-04-19 PROCEDURE — 74011000250 HC RX REV CODE- 250: Performed by: PHYSICIAN ASSISTANT

## 2023-04-19 PROCEDURE — 74011250636 HC RX REV CODE- 250/636: Performed by: PHYSICIAN ASSISTANT

## 2023-04-19 PROCEDURE — 85610 PROTHROMBIN TIME: CPT

## 2023-04-19 PROCEDURE — 77030008771 HC TU NG SALEM SUMP -A: Performed by: THORACIC SURGERY (CARDIOTHORACIC VASCULAR SURGERY)

## 2023-04-19 PROCEDURE — 77030005401 HC CATH RAD ARRO -A: Performed by: ANESTHESIOLOGY

## 2023-04-19 PROCEDURE — 77030040504 HC DRN WND MDII -B: Performed by: THORACIC SURGERY (CARDIOTHORACIC VASCULAR SURGERY)

## 2023-04-19 PROCEDURE — 77030020167 HC PERF SET MULT MEDT -B: Performed by: THORACIC SURGERY (CARDIOTHORACIC VASCULAR SURGERY)

## 2023-04-19 PROCEDURE — 77030010605 HC BLWR MR MAL MEDT -B: Performed by: THORACIC SURGERY (CARDIOTHORACIC VASCULAR SURGERY)

## 2023-04-19 PROCEDURE — 77030041244 HC CBL PACE EXT TEMP REMG -B: Performed by: THORACIC SURGERY (CARDIOTHORACIC VASCULAR SURGERY)

## 2023-04-19 PROCEDURE — 74011250636 HC RX REV CODE- 250/636

## 2023-04-19 PROCEDURE — B24BZZ4 ULTRASONOGRAPHY OF HEART WITH AORTA, TRANSESOPHAGEAL: ICD-10-PCS | Performed by: THORACIC SURGERY (CARDIOTHORACIC VASCULAR SURGERY)

## 2023-04-19 PROCEDURE — 77030002987 HC SUT PROL J&J -B: Performed by: THORACIC SURGERY (CARDIOTHORACIC VASCULAR SURGERY)

## 2023-04-19 PROCEDURE — C1751 CATH, INF, PER/CENT/MIDLINE: HCPCS | Performed by: THORACIC SURGERY (CARDIOTHORACIC VASCULAR SURGERY)

## 2023-04-19 PROCEDURE — 021209W BYPASS CORONARY ARTERY, THREE ARTERIES FROM AORTA WITH AUTOLOGOUS VENOUS TISSUE, OPEN APPROACH: ICD-10-PCS | Performed by: THORACIC SURGERY (CARDIOTHORACIC VASCULAR SURGERY)

## 2023-04-19 PROCEDURE — 74011250636 HC RX REV CODE- 250/636: Performed by: NURSE ANESTHETIST, CERTIFIED REGISTERED

## 2023-04-19 PROCEDURE — 77030007667 HC INSRT SUT HLD MEDT -B: Performed by: THORACIC SURGERY (CARDIOTHORACIC VASCULAR SURGERY)

## 2023-04-19 PROCEDURE — 77030022199 HC SYS HARV VESL GTNG -G1: Performed by: THORACIC SURGERY (CARDIOTHORACIC VASCULAR SURGERY)

## 2023-04-19 PROCEDURE — P9045 ALBUMIN (HUMAN), 5%, 250 ML: HCPCS

## 2023-04-19 PROCEDURE — P9045 ALBUMIN (HUMAN), 5%, 250 ML: HCPCS | Performed by: PHYSICIAN ASSISTANT

## 2023-04-19 PROCEDURE — 5A1221Z PERFORMANCE OF CARDIAC OUTPUT, CONTINUOUS: ICD-10-PCS | Performed by: THORACIC SURGERY (CARDIOTHORACIC VASCULAR SURGERY)

## 2023-04-19 PROCEDURE — 74011250637 HC RX REV CODE- 250/637: Performed by: PHYSICIAN ASSISTANT

## 2023-04-19 PROCEDURE — 76998 US GUIDE INTRAOP: CPT | Performed by: THORACIC SURGERY (CARDIOTHORACIC VASCULAR SURGERY)

## 2023-04-19 PROCEDURE — 77030002986 HC SUT PROL J&J -A: Performed by: THORACIC SURGERY (CARDIOTHORACIC VASCULAR SURGERY)

## 2023-04-19 PROCEDURE — 82962 GLUCOSE BLOOD TEST: CPT

## 2023-04-19 PROCEDURE — 2709999900 HC NON-CHARGEABLE SUPPLY: Performed by: THORACIC SURGERY (CARDIOTHORACIC VASCULAR SURGERY)

## 2023-04-19 PROCEDURE — 77030019908 HC STETH ESOPH SIMS -A: Performed by: ANESTHESIOLOGY

## 2023-04-19 PROCEDURE — 33519 CABG ARTERY-VEIN THREE: CPT | Performed by: THORACIC SURGERY (CARDIOTHORACIC VASCULAR SURGERY)

## 2023-04-19 PROCEDURE — 77030013079 HC BLNKT BAIR HGGR 3M -A: Performed by: NURSE ANESTHETIST, CERTIFIED REGISTERED

## 2023-04-19 PROCEDURE — 74011636637 HC RX REV CODE- 636/637: Performed by: THORACIC SURGERY (CARDIOTHORACIC VASCULAR SURGERY)

## 2023-04-19 PROCEDURE — 77030013798 HC KT TRNSDUC PRSSR EDWD -B: Performed by: ANESTHESIOLOGY

## 2023-04-19 PROCEDURE — 80053 COMPREHEN METABOLIC PANEL: CPT

## 2023-04-19 PROCEDURE — 76060000040 HC ANESTHESIA 4.5 TO 5 HR: Performed by: THORACIC SURGERY (CARDIOTHORACIC VASCULAR SURGERY)

## 2023-04-19 PROCEDURE — 36415 COLL VENOUS BLD VENIPUNCTURE: CPT

## 2023-04-19 PROCEDURE — 02100Z9 BYPASS CORONARY ARTERY, ONE ARTERY FROM LEFT INTERNAL MAMMARY, OPEN APPROACH: ICD-10-PCS | Performed by: THORACIC SURGERY (CARDIOTHORACIC VASCULAR SURGERY)

## 2023-04-19 PROCEDURE — 83735 ASSAY OF MAGNESIUM: CPT

## 2023-04-19 PROCEDURE — 82803 BLOOD GASES ANY COMBINATION: CPT

## 2023-04-19 PROCEDURE — 77030026438 HC STYL ET INTUB CARD -A: Performed by: NURSE ANESTHETIST, CERTIFIED REGISTERED

## 2023-04-19 PROCEDURE — 77030002973 HC SUT PLEDG CV SFT OVL TELE -B: Performed by: THORACIC SURGERY (CARDIOTHORACIC VASCULAR SURGERY)

## 2023-04-19 PROCEDURE — 77030003029 HC SUT VCRL J&J -B: Performed by: THORACIC SURGERY (CARDIOTHORACIC VASCULAR SURGERY)

## 2023-04-19 DEVICE — IMPLANTABLE DEVICE: Type: IMPLANTABLE DEVICE | Site: HEART | Status: FUNCTIONAL

## 2023-04-19 RX ORDER — SUFENTANIL CITRATE 50 UG/ML
INJECTION EPIDURAL; INTRAVENOUS AS NEEDED
Status: DISCONTINUED | OUTPATIENT
Start: 2023-04-19 | End: 2023-04-19 | Stop reason: HOSPADM

## 2023-04-19 RX ORDER — CHLORHEXIDINE GLUCONATE 1.2 MG/ML
10 RINSE ORAL 2 TIMES DAILY
Status: DISCONTINUED | OUTPATIENT
Start: 2023-04-19 | End: 2023-04-25 | Stop reason: HOSPADM

## 2023-04-19 RX ORDER — SODIUM CHLORIDE 9 MG/ML
INJECTION, SOLUTION INTRAVENOUS
Status: DISCONTINUED | OUTPATIENT
Start: 2023-04-19 | End: 2023-04-19 | Stop reason: HOSPADM

## 2023-04-19 RX ORDER — ALBUMIN HUMAN 50 G/1000ML
12.5 SOLUTION INTRAVENOUS ONCE
Status: COMPLETED | OUTPATIENT
Start: 2023-04-19 | End: 2023-04-20

## 2023-04-19 RX ORDER — SODIUM CHLORIDE 0.9 % (FLUSH) 0.9 %
5-40 SYRINGE (ML) INJECTION AS NEEDED
Status: DISCONTINUED | OUTPATIENT
Start: 2023-04-19 | End: 2023-04-25 | Stop reason: HOSPADM

## 2023-04-19 RX ORDER — SODIUM CHLORIDE, SODIUM LACTATE, POTASSIUM CHLORIDE, CALCIUM CHLORIDE 600; 310; 30; 20 MG/100ML; MG/100ML; MG/100ML; MG/100ML
INJECTION, SOLUTION INTRAVENOUS
Status: DISCONTINUED | OUTPATIENT
Start: 2023-04-19 | End: 2023-04-19 | Stop reason: HOSPADM

## 2023-04-19 RX ORDER — SODIUM CHLORIDE 0.9 % (FLUSH) 0.9 %
5-40 SYRINGE (ML) INJECTION EVERY 8 HOURS
Status: DISCONTINUED | OUTPATIENT
Start: 2023-04-19 | End: 2023-04-25 | Stop reason: HOSPADM

## 2023-04-19 RX ORDER — SODIUM CHLORIDE 9 MG/ML
10 INJECTION, SOLUTION INTRAVENOUS CONTINUOUS
Status: DISCONTINUED | OUTPATIENT
Start: 2023-04-19 | End: 2023-04-22

## 2023-04-19 RX ORDER — INSULIN LISPRO 100 [IU]/ML
INJECTION, SOLUTION INTRAVENOUS; SUBCUTANEOUS
Status: DISCONTINUED | OUTPATIENT
Start: 2023-04-19 | End: 2023-04-25 | Stop reason: HOSPADM

## 2023-04-19 RX ORDER — VECURONIUM BROMIDE FOR INJECTION 1 MG/ML
INJECTION, POWDER, LYOPHILIZED, FOR SOLUTION INTRAVENOUS AS NEEDED
Status: DISCONTINUED | OUTPATIENT
Start: 2023-04-19 | End: 2023-04-19 | Stop reason: HOSPADM

## 2023-04-19 RX ORDER — HEPARIN SODIUM 1000 [USP'U]/ML
2000 INJECTION, SOLUTION INTRAVENOUS; SUBCUTANEOUS ONCE
Status: COMPLETED | OUTPATIENT
Start: 2023-04-19 | End: 2023-04-19

## 2023-04-19 RX ORDER — ALBUTEROL SULFATE 0.83 MG/ML
2.5 SOLUTION RESPIRATORY (INHALATION)
Status: DISCONTINUED | OUTPATIENT
Start: 2023-04-19 | End: 2023-04-25 | Stop reason: HOSPADM

## 2023-04-19 RX ORDER — PAPAVERINE HYDROCHLORIDE 30 MG/ML
30 INJECTION INTRAMUSCULAR; INTRAVENOUS ONCE
Status: DISCONTINUED | OUTPATIENT
Start: 2023-04-19 | End: 2023-04-19

## 2023-04-19 RX ORDER — ALBUMIN HUMAN 50 G/1000ML
250 SOLUTION INTRAVENOUS
Status: COMPLETED | OUTPATIENT
Start: 2023-04-19 | End: 2023-04-19

## 2023-04-19 RX ORDER — BACITRACIN 500 UNIT/G
1 PACKET (EA) TOPICAL AS NEEDED
Status: DISCONTINUED | OUTPATIENT
Start: 2023-04-19 | End: 2023-04-22

## 2023-04-19 RX ORDER — SUFENTANIL CITRATE 50 UG/ML
INJECTION EPIDURAL; INTRAVENOUS
Status: DISCONTINUED | OUTPATIENT
Start: 2023-04-19 | End: 2023-04-19 | Stop reason: HOSPADM

## 2023-04-19 RX ORDER — SODIUM CHLORIDE 450 MG/100ML
10 INJECTION, SOLUTION INTRAVENOUS CONTINUOUS
Status: DISCONTINUED | OUTPATIENT
Start: 2023-04-19 | End: 2023-04-22

## 2023-04-19 RX ORDER — MIDAZOLAM HYDROCHLORIDE 1 MG/ML
INJECTION, SOLUTION INTRAMUSCULAR; INTRAVENOUS AS NEEDED
Status: DISCONTINUED | OUTPATIENT
Start: 2023-04-19 | End: 2023-04-19 | Stop reason: HOSPADM

## 2023-04-19 RX ORDER — HYDROMORPHONE HYDROCHLORIDE 1 MG/ML
0.5 INJECTION, SOLUTION INTRAMUSCULAR; INTRAVENOUS; SUBCUTANEOUS
Status: DISCONTINUED | OUTPATIENT
Start: 2023-04-19 | End: 2023-04-23

## 2023-04-19 RX ORDER — ROCURONIUM BROMIDE 10 MG/ML
INJECTION, SOLUTION INTRAVENOUS AS NEEDED
Status: DISCONTINUED | OUTPATIENT
Start: 2023-04-19 | End: 2023-04-19 | Stop reason: HOSPADM

## 2023-04-19 RX ORDER — MAGNESIUM SULFATE 1 G/100ML
1 INJECTION INTRAVENOUS AS NEEDED
Status: DISCONTINUED | OUTPATIENT
Start: 2023-04-19 | End: 2023-04-25 | Stop reason: HOSPADM

## 2023-04-19 RX ORDER — POLYETHYLENE GLYCOL 3350 17 G/17G
17 POWDER, FOR SOLUTION ORAL DAILY
Status: DISCONTINUED | OUTPATIENT
Start: 2023-04-20 | End: 2023-04-25 | Stop reason: HOSPADM

## 2023-04-19 RX ORDER — MUPIROCIN 20 MG/G
OINTMENT TOPICAL 2 TIMES DAILY
Status: COMPLETED | OUTPATIENT
Start: 2023-04-19 | End: 2023-04-24

## 2023-04-19 RX ORDER — AMIODARONE HYDROCHLORIDE 200 MG/1
400 TABLET ORAL 2 TIMES DAILY
Status: DISCONTINUED | OUTPATIENT
Start: 2023-04-20 | End: 2023-04-25 | Stop reason: HOSPADM

## 2023-04-19 RX ORDER — LANOLIN ALCOHOL/MO/W.PET/CERES
3-6 CREAM (GRAM) TOPICAL
Status: DISCONTINUED | OUTPATIENT
Start: 2023-04-19 | End: 2023-04-25 | Stop reason: HOSPADM

## 2023-04-19 RX ORDER — ALBUMIN HUMAN 50 G/1000ML
SOLUTION INTRAVENOUS
Status: COMPLETED
Start: 2023-04-19 | End: 2023-04-19

## 2023-04-19 RX ORDER — DEXAMETHASONE SODIUM PHOSPHATE 4 MG/ML
INJECTION, SOLUTION INTRA-ARTICULAR; INTRALESIONAL; INTRAMUSCULAR; INTRAVENOUS; SOFT TISSUE AS NEEDED
Status: DISCONTINUED | OUTPATIENT
Start: 2023-04-19 | End: 2023-04-19 | Stop reason: HOSPADM

## 2023-04-19 RX ORDER — ACETAMINOPHEN 500 MG
1000 TABLET ORAL EVERY 6 HOURS
Status: DISPENSED | OUTPATIENT
Start: 2023-04-19 | End: 2023-04-21

## 2023-04-19 RX ORDER — HEPARIN SODIUM 1000 [USP'U]/ML
INJECTION, SOLUTION INTRAVENOUS; SUBCUTANEOUS AS NEEDED
Status: DISCONTINUED | OUTPATIENT
Start: 2023-04-19 | End: 2023-04-20

## 2023-04-19 RX ORDER — ATORVASTATIN CALCIUM 40 MG/1
40 TABLET, FILM COATED ORAL
Status: DISCONTINUED | OUTPATIENT
Start: 2023-04-19 | End: 2023-04-20

## 2023-04-19 RX ORDER — IBUPROFEN 200 MG
4 TABLET ORAL AS NEEDED
Status: DISCONTINUED | OUTPATIENT
Start: 2023-04-19 | End: 2023-04-25 | Stop reason: HOSPADM

## 2023-04-19 RX ORDER — LIDOCAINE HYDROCHLORIDE 20 MG/ML
INJECTION, SOLUTION EPIDURAL; INFILTRATION; INTRACAUDAL; PERINEURAL AS NEEDED
Status: DISCONTINUED | OUTPATIENT
Start: 2023-04-19 | End: 2023-04-19 | Stop reason: HOSPADM

## 2023-04-19 RX ORDER — LANOLIN ALCOHOL/MO/W.PET/CERES
400 CREAM (GRAM) TOPICAL 2 TIMES DAILY
Status: DISCONTINUED | OUTPATIENT
Start: 2023-04-20 | End: 2023-04-25 | Stop reason: HOSPADM

## 2023-04-19 RX ORDER — CEFAZOLIN SODIUM 1 G/3ML
INJECTION, POWDER, FOR SOLUTION INTRAMUSCULAR; INTRAVENOUS AS NEEDED
Status: DISCONTINUED | OUTPATIENT
Start: 2023-04-19 | End: 2023-04-20

## 2023-04-19 RX ORDER — AMOXICILLIN 250 MG
1 CAPSULE ORAL DAILY
Status: DISCONTINUED | OUTPATIENT
Start: 2023-04-20 | End: 2023-04-25 | Stop reason: HOSPADM

## 2023-04-19 RX ORDER — POTASSIUM CHLORIDE 29.8 MG/ML
20 INJECTION INTRAVENOUS ONCE
Status: COMPLETED | OUTPATIENT
Start: 2023-04-19 | End: 2023-04-19

## 2023-04-19 RX ORDER — MIDAZOLAM HYDROCHLORIDE 1 MG/ML
1 INJECTION, SOLUTION INTRAMUSCULAR; INTRAVENOUS
Status: DISCONTINUED | OUTPATIENT
Start: 2023-04-19 | End: 2023-04-20

## 2023-04-19 RX ORDER — GLYCOPYRROLATE 0.2 MG/ML
INJECTION INTRAMUSCULAR; INTRAVENOUS AS NEEDED
Status: DISCONTINUED | OUTPATIENT
Start: 2023-04-19 | End: 2023-04-19 | Stop reason: HOSPADM

## 2023-04-19 RX ORDER — NEOSTIGMINE METHYLSULFATE 1 MG/ML
INJECTION, SOLUTION INTRAVENOUS AS NEEDED
Status: DISCONTINUED | OUTPATIENT
Start: 2023-04-19 | End: 2023-04-19 | Stop reason: HOSPADM

## 2023-04-19 RX ORDER — FAMOTIDINE 20 MG/1
20 TABLET, FILM COATED ORAL EVERY 12 HOURS
Status: DISCONTINUED | OUTPATIENT
Start: 2023-04-20 | End: 2023-04-25 | Stop reason: HOSPADM

## 2023-04-19 RX ORDER — ACETAMINOPHEN 325 MG/1
650 TABLET ORAL
Status: DISCONTINUED | OUTPATIENT
Start: 2023-04-19 | End: 2023-04-25 | Stop reason: HOSPADM

## 2023-04-19 RX ORDER — BUPIVACAINE HYDROCHLORIDE 2.5 MG/ML
30 INJECTION, SOLUTION EPIDURAL; INFILTRATION; INTRACAUDAL ONCE
Status: COMPLETED | OUTPATIENT
Start: 2023-04-19 | End: 2023-04-19

## 2023-04-19 RX ORDER — DEXTROSE MONOHYDRATE 100 MG/ML
0-250 INJECTION, SOLUTION INTRAVENOUS AS NEEDED
Status: DISCONTINUED | OUTPATIENT
Start: 2023-04-19 | End: 2023-04-22

## 2023-04-19 RX ORDER — GUAIFENESIN 100 MG/5ML
81 LIQUID (ML) ORAL DAILY
Status: DISCONTINUED | OUTPATIENT
Start: 2023-04-20 | End: 2023-04-25 | Stop reason: HOSPADM

## 2023-04-19 RX ORDER — INSULIN GLARGINE 100 [IU]/ML
1-50 INJECTION, SOLUTION SUBCUTANEOUS
Status: ACTIVE | OUTPATIENT
Start: 2023-04-19 | End: 2023-04-20

## 2023-04-19 RX ORDER — PROPOFOL 10 MG/ML
INJECTION, EMULSION INTRAVENOUS AS NEEDED
Status: DISCONTINUED | OUTPATIENT
Start: 2023-04-19 | End: 2023-04-19 | Stop reason: HOSPADM

## 2023-04-19 RX ORDER — ONDANSETRON 2 MG/ML
4 INJECTION INTRAMUSCULAR; INTRAVENOUS
Status: DISCONTINUED | OUTPATIENT
Start: 2023-04-19 | End: 2023-04-21

## 2023-04-19 RX ORDER — INSULIN LISPRO 100 [IU]/ML
INJECTION, SOLUTION INTRAVENOUS; SUBCUTANEOUS
Status: DISCONTINUED | OUTPATIENT
Start: 2023-04-19 | End: 2023-04-21

## 2023-04-19 RX ORDER — NALOXONE HYDROCHLORIDE 0.4 MG/ML
0.4 INJECTION, SOLUTION INTRAMUSCULAR; INTRAVENOUS; SUBCUTANEOUS AS NEEDED
Status: DISCONTINUED | OUTPATIENT
Start: 2023-04-19 | End: 2023-04-25 | Stop reason: HOSPADM

## 2023-04-19 RX ORDER — OXYCODONE HYDROCHLORIDE 5 MG/1
5 TABLET ORAL
Status: DISCONTINUED | OUTPATIENT
Start: 2023-04-19 | End: 2023-04-25 | Stop reason: HOSPADM

## 2023-04-19 RX ORDER — OXYCODONE HYDROCHLORIDE 5 MG/1
10 TABLET ORAL
Status: DISCONTINUED | OUTPATIENT
Start: 2023-04-19 | End: 2023-04-25 | Stop reason: HOSPADM

## 2023-04-19 RX ORDER — FENTANYL CITRATE 50 UG/ML
INJECTION, SOLUTION INTRAMUSCULAR; INTRAVENOUS AS NEEDED
Status: DISCONTINUED | OUTPATIENT
Start: 2023-04-19 | End: 2023-04-19 | Stop reason: HOSPADM

## 2023-04-19 RX ORDER — CEFAZOLIN SODIUM 1 G/3ML
1 INJECTION, POWDER, FOR SOLUTION INTRAMUSCULAR; INTRAVENOUS ONCE
Status: COMPLETED | OUTPATIENT
Start: 2023-04-19 | End: 2023-04-19

## 2023-04-19 RX ADMIN — FAMOTIDINE 20 MG: 10 INJECTION, SOLUTION INTRAVENOUS at 21:14

## 2023-04-19 RX ADMIN — DEXAMETHASONE SODIUM PHOSPHATE 4 MG: 4 INJECTION, SOLUTION INTRAMUSCULAR; INTRAVENOUS at 09:48

## 2023-04-19 RX ADMIN — ALBUMIN (HUMAN) 12.5 G: 12.5 INJECTION, SOLUTION INTRAVENOUS at 14:27

## 2023-04-19 RX ADMIN — FENTANYL CITRATE 150 MCG: 50 INJECTION INTRAMUSCULAR; INTRAVENOUS at 09:06

## 2023-04-19 RX ADMIN — PROTAMINE SULFATE 302 MG: 10 INJECTION, SOLUTION INTRAVENOUS at 12:31

## 2023-04-19 RX ADMIN — VECURONIUM BROMIDE 2 MG: 10 INJECTION, POWDER, LYOPHILIZED, FOR SOLUTION INTRAVENOUS at 11:51

## 2023-04-19 RX ADMIN — SODIUM CHLORIDE, POTASSIUM CHLORIDE, SODIUM LACTATE AND CALCIUM CHLORIDE: 600; 310; 30; 20 INJECTION, SOLUTION INTRAVENOUS at 08:45

## 2023-04-19 RX ADMIN — SODIUM CHLORIDE: 9 INJECTION, SOLUTION INTRAVENOUS at 08:53

## 2023-04-19 RX ADMIN — GLYCOPYRROLATE 1 MG: 0.2 INJECTION, SOLUTION INTRAMUSCULAR; INTRAVENOUS at 14:00

## 2023-04-19 RX ADMIN — 0.12% CHLORHEXIDINE GLUCONATE 15 ML: 1.2 RINSE ORAL at 06:11

## 2023-04-19 RX ADMIN — DESMOPRESSIN ACETATE 40 MCG: 4 INJECTION INTRAVENOUS at 12:50

## 2023-04-19 RX ADMIN — EPINEPHRINE 1 MCG/MIN: 1 INJECTION INTRAMUSCULAR; INTRAVENOUS; SUBCUTANEOUS at 12:18

## 2023-04-19 RX ADMIN — HEPARIN SODIUM 49000 UNITS: 1000 INJECTION, SOLUTION INTRAVENOUS; SUBCUTANEOUS at 10:19

## 2023-04-19 RX ADMIN — METOPROLOL TARTRATE 25 MG: 25 TABLET, FILM COATED ORAL at 06:12

## 2023-04-19 RX ADMIN — PHENYLEPHRINE HYDROCHLORIDE 20 MCG/MIN: 10 INJECTION INTRAVENOUS at 09:46

## 2023-04-19 RX ADMIN — SODIUM CHLORIDE, PRESERVATIVE FREE 10 ML: 5 INJECTION INTRAVENOUS at 21:15

## 2023-04-19 RX ADMIN — SODIUM CHLORIDE 0.3 MCG/KG/HR: 9 INJECTION, SOLUTION INTRAVENOUS at 19:59

## 2023-04-19 RX ADMIN — SODIUM CHLORIDE 10 ML/HR: 9 INJECTION, SOLUTION INTRAVENOUS at 14:17

## 2023-04-19 RX ADMIN — VECURONIUM BROMIDE 3 MG: 10 INJECTION, POWDER, LYOPHILIZED, FOR SOLUTION INTRAVENOUS at 09:49

## 2023-04-19 RX ADMIN — SODIUM CHLORIDE 0.3 MCG/KG/HR: 9 INJECTION, SOLUTION INTRAVENOUS at 11:55

## 2023-04-19 RX ADMIN — ONDANSETRON 4 MG: 2 INJECTION INTRAMUSCULAR; INTRAVENOUS at 23:48

## 2023-04-19 RX ADMIN — LIDOCAINE HYDROCHLORIDE 100 MG: 20 INJECTION, SOLUTION EPIDURAL; INFILTRATION; INTRACAUDAL; PERINEURAL at 09:06

## 2023-04-19 RX ADMIN — SODIUM CHLORIDE, PRESERVATIVE FREE 10 ML: 5 INJECTION INTRAVENOUS at 14:12

## 2023-04-19 RX ADMIN — MIDAZOLAM HYDROCHLORIDE 2 MG: 1 INJECTION, SOLUTION INTRAMUSCULAR; INTRAVENOUS at 11:58

## 2023-04-19 RX ADMIN — ALBUMIN (HUMAN) 12.5 G: 12.5 INJECTION, SOLUTION INTRAVENOUS at 17:14

## 2023-04-19 RX ADMIN — CHLORHEXIDINE GLUCONATE 0.12% ORAL RINSE 10 ML: 1.2 LIQUID ORAL at 21:14

## 2023-04-19 RX ADMIN — POTASSIUM CHLORIDE 20 MEQ: 29.8 INJECTION, SOLUTION INTRAVENOUS at 15:40

## 2023-04-19 RX ADMIN — VECURONIUM BROMIDE 1 MG: 10 INJECTION, POWDER, LYOPHILIZED, FOR SOLUTION INTRAVENOUS at 11:47

## 2023-04-19 RX ADMIN — SODIUM CHLORIDE 0.3 MCG/KG/HR: 9 INJECTION, SOLUTION INTRAVENOUS at 15:50

## 2023-04-19 RX ADMIN — MIDAZOLAM HYDROCHLORIDE 2 MG: 1 INJECTION, SOLUTION INTRAMUSCULAR; INTRAVENOUS at 07:38

## 2023-04-19 RX ADMIN — SUFENTANIL CITRATE 10 MCG: 50 INJECTION EPIDURAL; INTRAVENOUS at 09:36

## 2023-04-19 RX ADMIN — SUFENTANIL CITRATE 0.3 MCG/KG/HR: 50 INJECTION, SOLUTION EPIDURAL; INTRAVENOUS at 09:14

## 2023-04-19 RX ADMIN — Medication 5 MG: at 14:00

## 2023-04-19 RX ADMIN — AMINOCAPROIC ACID 10 G/HR: 250 INJECTION, SOLUTION INTRAVENOUS at 09:05

## 2023-04-19 RX ADMIN — CEFAZOLIN 3 G: 1 INJECTION, POWDER, FOR SOLUTION INTRAMUSCULAR; INTRAVENOUS; PARENTERAL at 11:59

## 2023-04-19 RX ADMIN — ACETAMINOPHEN 1000 MG: 500 TABLET ORAL at 14:29

## 2023-04-19 RX ADMIN — PROPOFOL 80 MG: 10 INJECTION, EMULSION INTRAVENOUS at 09:06

## 2023-04-19 RX ADMIN — CEFAZOLIN 3 G: 10 INJECTION, POWDER, FOR SOLUTION INTRAVENOUS at 23:51

## 2023-04-19 RX ADMIN — ALBUMIN HUMAN 12.5 G: 50 SOLUTION INTRAVENOUS at 21:37

## 2023-04-19 RX ADMIN — CEFAZOLIN 3 G: 10 INJECTION, POWDER, FOR SOLUTION INTRAVENOUS at 17:14

## 2023-04-19 RX ADMIN — SODIUM CHLORIDE 3 UNITS/HR: 9 INJECTION, SOLUTION INTRAVENOUS at 09:33

## 2023-04-19 RX ADMIN — VECURONIUM BROMIDE 2 MG: 10 INJECTION, POWDER, LYOPHILIZED, FOR SOLUTION INTRAVENOUS at 13:03

## 2023-04-19 RX ADMIN — HYDROMORPHONE HYDROCHLORIDE 0.5 MG: 1 INJECTION, SOLUTION INTRAMUSCULAR; INTRAVENOUS; SUBCUTANEOUS at 17:58

## 2023-04-19 RX ADMIN — SODIUM CHLORIDE 80 MCG: 900 INJECTION, SOLUTION INTRAVENOUS at 09:25

## 2023-04-19 RX ADMIN — MUPIROCIN: 20 OINTMENT TOPICAL at 06:11

## 2023-04-19 RX ADMIN — SODIUM CHLORIDE 80 MCG: 900 INJECTION, SOLUTION INTRAVENOUS at 09:27

## 2023-04-19 RX ADMIN — SODIUM CHLORIDE: 9 INJECTION, SOLUTION INTRAVENOUS at 08:55

## 2023-04-19 RX ADMIN — SODIUM CHLORIDE 80 MCG: 900 INJECTION, SOLUTION INTRAVENOUS at 09:06

## 2023-04-19 RX ADMIN — FAMOTIDINE 20 MG: 10 INJECTION, SOLUTION INTRAVENOUS at 14:29

## 2023-04-19 RX ADMIN — FENTANYL CITRATE 50 MCG: 50 INJECTION INTRAMUSCULAR; INTRAVENOUS at 07:38

## 2023-04-19 RX ADMIN — HYDROMORPHONE HYDROCHLORIDE 0.5 MG: 1 INJECTION, SOLUTION INTRAMUSCULAR; INTRAVENOUS; SUBCUTANEOUS at 21:38

## 2023-04-19 RX ADMIN — ALBUMIN (HUMAN) 12.5 G: 12.5 INJECTION, SOLUTION INTRAVENOUS at 21:37

## 2023-04-19 RX ADMIN — ROCURONIUM BROMIDE 100 MG: 10 INJECTION INTRAVENOUS at 09:07

## 2023-04-19 RX ADMIN — HEPARIN SODIUM 1000 UNITS: 1000 INJECTION, SOLUTION INTRAVENOUS; SUBCUTANEOUS at 09:33

## 2023-04-19 RX ADMIN — CEFAZOLIN 3 G: 1 INJECTION, POWDER, FOR SOLUTION INTRAMUSCULAR; INTRAVENOUS; PARENTERAL at 09:12

## 2023-04-19 RX ADMIN — EPINEPHRINE 3 MCG/MIN: 1 INJECTION INTRAMUSCULAR; INTRAVENOUS; SUBCUTANEOUS at 12:24

## 2023-04-19 RX ADMIN — FENTANYL CITRATE 50 MCG: 50 INJECTION INTRAMUSCULAR; INTRAVENOUS at 07:57

## 2023-04-19 RX ADMIN — MUPIROCIN: 20 OINTMENT TOPICAL at 21:15

## 2023-04-19 RX ADMIN — ALBUMIN (HUMAN) 12.5 G: 12.5 INJECTION, SOLUTION INTRAVENOUS at 15:38

## 2023-04-19 RX ADMIN — SODIUM CHLORIDE 10 ML/HR: 4.5 INJECTION, SOLUTION INTRAVENOUS at 14:16

## 2023-04-19 NOTE — ANESTHESIA PROCEDURE NOTES
KRISTIAN        Procedure Details: probe placement, image aquisition & interpretation        Procedure Note    Performed by: Anup Galindo MD  Authorized by:  Anup Galindo MD     Indications: assessment of ascending aorta, assessment of surgical repair and suspected pericardial effusion  Modalities: 2D, CF, CWD, PWD  Probe Type: multiplane and epiaortic  Insertion: atraumatic  Patient Status: intubated and sedated  Echocardiographic and Doppler Measurements   Aorta  Size  Diam(cm)  Dissection PlaqueThick(mm)  Plaque Mobile    Ascending dilated 4.2 No 0-3 No    Arch normal  No 0-3 No    Descending normal  No 0-3 No          Valves  Annulus  Stenosis  Area/Grad  Regurg  Leaflet   Morph  Leaflet   Motion    Aortic normal none  0 normal normal    Mitral dilated none  1+ normal normal    Tricuspid normal none  1+ normal normal          Atria  Size  SEC (smoke)  Thrombus  Tumor  Device    Rt Atrium dilated No No No Yes    Lt Atrium dilated No No  No     Interatrial Septum Morphology: normal    Interventricular Septum Morphology: normal  Ventricle  Cavity Size  Cavity Dimension Hypertrophy  Thrombus  Gloal FXN  EF    RV normal  No no      LV dilated   No moderately impaired 35       Regional Function  (1 = normal, 2 = mildly hypokinetic, 3 = severely hypokinetic, 4 = akinetic, 5 = dyskinetic) LAV - [place]   ME LAV = 0  ME LAV = 90  ME LAV = 130   Basal Sept:5 Basal Ant:1 Basal Post:1   Mid Sept:5 Mid Ant:1 Mid Post:1   Apical Sept:3 Apical Ant:2 Basal Ant Sept:5   Basal Lat:1 Basal Inf:1 Mid Ant Sept:5   Mid Lat:1 Mid Inf:1    Apical Lat:2 Apical Inf:2      Diastolic function: Grade 3 diastolic dysfunction  Pericardium: normal    Post Intervention Follow-up Study  Ventricular Global Function: improved  Ventricular Regional Function: unchanged     Valve  Function  Regurgitation  Area    Aortic no change      Mitral no change      Tricuspid no change      Prosthetic        Complications: None  Comments: Epiaortic ultrasound: dilated ascending aorta, no plaques    CABG x3: on Epi 2 mcg/min, AV paced. Interval improvement in LV fxn on inotropes, EF~45-50. No other changes on the post CPB KRISTIAN.

## 2023-04-19 NOTE — ANESTHESIA PROCEDURE NOTES
Arterial Line Placement    Start time: 4/19/2023 7:41 AM  End time: 4/19/2023 7:46 AM  Performed by: Josey Jimenez CRNA  Authorized by:  Derek Rodriguez MD     Pre-Procedure  Indications:  Arterial pressure monitoring and blood sampling  Preanesthetic Checklist: patient identified, risks and benefits discussed, anesthesia consent, site marked, patient being monitored, timeout performed and patient being monitored      Procedure:   Prep:  Chlorhexidine  Seldinger Technique?: Yes    Orientation:  Left  Location:  Radial artery  Catheter size:  20 G  Number of attempts:  1  Cont Cardiac Output Sensor: No      Assessment:   Post-procedure:  Line secured and sterile dressing applied  Patient Tolerance:  Patient tolerated the procedure well with no immediate complications

## 2023-04-19 NOTE — ANESTHESIA PREPROCEDURE EVALUATION
Relevant Problems   ENDOCRINE   (+) Arthritis   (+) Type 2 diabetes mellitus (HCC)      HEMATOLOGY   (+) Anemia       Anesthetic History   No history of anesthetic complications            Review of Systems / Medical History  Patient summary reviewed, nursing notes reviewed and pertinent labs reviewed    Pulmonary  Within defined limits                 Neuro/Psych   Within defined limits           Cardiovascular    Hypertension  Valvular problems/murmurs: mitral insufficiency      Dysrhythmias : atrial fibrillation  Pacemaker, CAD, cardiac stents and hyperlipidemia    Exercise tolerance: <4 METS  Comments: PAF s/p ablation (3/15/21)     TTE 4/23: Left Ventricle: Moderately reduced left ventricular systolic function with a visually estimated EF of 35 - 40%. Left ventricle is mildly dilated. Mildly increased wall thickness. Akinesis of the apex.   Aortic Valve: Tricuspid valve. Mild sclerosis of the aortic valve cusp.   Interatrial Septum: The septum is bowing into the RA.         GI/Hepatic/Renal                Endo/Other    Diabetes: type 2    Obesity, morbid obesity, arthritis and anemia     Other Findings              Physical Exam    Airway  Mallampati: II  TM Distance: > 6 cm  Neck ROM: normal range of motion   Mouth opening: Normal     Cardiovascular    Rhythm: regular  Rate: normal         Dental      Comments: Several missing teeth, none loose.    Pulmonary  Breath sounds clear to auscultation               Abdominal  GI exam deferred       Other Findings            Anesthetic Plan    ASA: 4  Anesthesia type: general    Monitoring Plan: Arterial line, BIS, CVP, [place] and KRISTIAN    Post procedure ventilation   Induction: Intravenous  Anesthetic plan and risks discussed with: Patient

## 2023-04-19 NOTE — ANESTHESIA PROCEDURE NOTES
Central Line and Pulmonary Artery Catheter Placement    Start time: 4/19/2023 7:51 AM  End time: 4/19/2023 8:08 AM  Performed by: Arabella Back MD  Authorized by: Arabella Back MD     Indications: vascular access, central pressure monitoring and need for vasopressors  Preanesthetic Checklist: patient identified, risks and benefits discussed, anesthesia consent, site marked, patient being monitored, timeout performed and fire risk safety assessment completed and verbalized      Pre-procedure: All elements of maximal sterile barrier technique followed?  Yes    2% Chlorhexidine for cutaneous antisepsis, Hand hygiene performed prior to catheter insertion and Ultrasound guidance    Sterile Ultrasound Technique followed?: Yes            Procedure:   Prep:  Chlorhexidine  Location: internal jugular  Orientation:  Right  Patient position:  Trendelenburg  Catheter type:  Double lumen  Catheter size:  9 Fr  Catheter length:  12 cm  Number of attempts:  1  Successful placement: Yes      Assessment:   Post-procedure:  Catheter secured and sterile dressing with CHG applied  Assessment:  Blood return through all ports  Insertion:  Uncomplicated  Patient tolerance:  Patient tolerated the procedure well with no immediate complications  9 Fr MAC and 8 Fr CCO PA Catheter

## 2023-04-20 ENCOUNTER — APPOINTMENT (OUTPATIENT)
Dept: GENERAL RADIOLOGY | Age: 63
DRG: 233 | End: 2023-04-20
Attending: PHYSICIAN ASSISTANT
Payer: COMMERCIAL

## 2023-04-20 LAB
ACUTE KIDNEY INJURY RISK NEPHROCHECK: 1.42 (ref 0–0.3)
ADMINISTERED INITIALS, ADMINIT: NORMAL
ALBUMIN SERPL-MCNC: 3.5 G/DL (ref 3.5–5)
ALBUMIN/GLOB SERPL: 1.3 (ref 1.1–2.2)
ALP SERPL-CCNC: 24 U/L (ref 45–117)
ALT SERPL-CCNC: 11 U/L (ref 12–78)
ANION GAP SERPL CALC-SCNC: 4 MMOL/L (ref 5–15)
AST SERPL-CCNC: 33 U/L (ref 15–37)
BILIRUB SERPL-MCNC: 0.2 MG/DL (ref 0.2–1)
BUN SERPL-MCNC: 29 MG/DL (ref 6–20)
BUN/CREAT SERPL: 30 (ref 12–20)
CALCIUM SERPL-MCNC: 8.2 MG/DL (ref 8.5–10.1)
CHLORIDE SERPL-SCNC: 115 MMOL/L (ref 97–108)
CO2 SERPL-SCNC: 22 MMOL/L (ref 21–32)
CREAT SERPL-MCNC: 0.97 MG/DL (ref 0.7–1.3)
D50 ADMINISTERED, D50ADM: 0 ML
D50 ORDER, D50ORD: 0 ML
ERYTHROCYTE [DISTWIDTH] IN BLOOD BY AUTOMATED COUNT: 22.4 % (ref 11.5–14.5)
GLOBULIN SER CALC-MCNC: 2.7 G/DL (ref 2–4)
GLUCOSE BLD STRIP.AUTO-MCNC: 101 MG/DL (ref 65–117)
GLUCOSE BLD STRIP.AUTO-MCNC: 103 MG/DL (ref 65–117)
GLUCOSE BLD STRIP.AUTO-MCNC: 104 MG/DL (ref 65–117)
GLUCOSE BLD STRIP.AUTO-MCNC: 104 MG/DL (ref 65–117)
GLUCOSE BLD STRIP.AUTO-MCNC: 106 MG/DL (ref 65–117)
GLUCOSE BLD STRIP.AUTO-MCNC: 107 MG/DL (ref 65–117)
GLUCOSE BLD STRIP.AUTO-MCNC: 108 MG/DL (ref 65–117)
GLUCOSE BLD STRIP.AUTO-MCNC: 111 MG/DL (ref 65–117)
GLUCOSE BLD STRIP.AUTO-MCNC: 118 MG/DL (ref 65–117)
GLUCOSE BLD STRIP.AUTO-MCNC: 123 MG/DL (ref 65–117)
GLUCOSE BLD STRIP.AUTO-MCNC: 129 MG/DL (ref 65–117)
GLUCOSE BLD STRIP.AUTO-MCNC: 137 MG/DL (ref 65–117)
GLUCOSE BLD STRIP.AUTO-MCNC: 140 MG/DL (ref 65–117)
GLUCOSE BLD STRIP.AUTO-MCNC: 145 MG/DL (ref 65–117)
GLUCOSE BLD STRIP.AUTO-MCNC: 149 MG/DL (ref 65–117)
GLUCOSE BLD STRIP.AUTO-MCNC: 85 MG/DL (ref 65–117)
GLUCOSE BLD STRIP.AUTO-MCNC: 91 MG/DL (ref 65–117)
GLUCOSE BLD STRIP.AUTO-MCNC: 92 MG/DL (ref 65–117)
GLUCOSE BLD STRIP.AUTO-MCNC: 98 MG/DL (ref 65–117)
GLUCOSE BLD STRIP.AUTO-MCNC: 98 MG/DL (ref 65–117)
GLUCOSE SERPL-MCNC: 96 MG/DL (ref 65–100)
GLUCOSE, GLC: 101 MG/DL
GLUCOSE, GLC: 103 MG/DL
GLUCOSE, GLC: 104 MG/DL
GLUCOSE, GLC: 104 MG/DL
GLUCOSE, GLC: 106 MG/DL
GLUCOSE, GLC: 107 MG/DL
GLUCOSE, GLC: 108 MG/DL
GLUCOSE, GLC: 111 MG/DL
GLUCOSE, GLC: 118 MG/DL
GLUCOSE, GLC: 123 MG/DL
GLUCOSE, GLC: 129 MG/DL
GLUCOSE, GLC: 137 MG/DL
GLUCOSE, GLC: 140 MG/DL
GLUCOSE, GLC: 145 MG/DL
GLUCOSE, GLC: 149 MG/DL
GLUCOSE, GLC: 85 MG/DL
GLUCOSE, GLC: 91 MG/DL
GLUCOSE, GLC: 92 MG/DL
GLUCOSE, GLC: 98 MG/DL
GLUCOSE, GLC: 98 MG/DL
HCT VFR BLD AUTO: 26.5 % (ref 36.6–50.3)
HGB BLD-MCNC: 7.6 G/DL (ref 12.1–17)
HIGH TARGET, HITG: 130 MG/DL
INSULIN ADMINSTERED, INSADM: 0.6 UNITS/HOUR
INSULIN ADMINSTERED, INSADM: 0.7 UNITS/HOUR
INSULIN ADMINSTERED, INSADM: 0.8 UNITS/HOUR
INSULIN ADMINSTERED, INSADM: 0.9 UNITS/HOUR
INSULIN ADMINSTERED, INSADM: 1 UNITS/HOUR
INSULIN ADMINSTERED, INSADM: 1.2 UNITS/HOUR
INSULIN ADMINSTERED, INSADM: 1.4 UNITS/HOUR
INSULIN ADMINSTERED, INSADM: 1.6 UNITS/HOUR
INSULIN ADMINSTERED, INSADM: 1.8 UNITS/HOUR
INSULIN ADMINSTERED, INSADM: 2.1 UNITS/HOUR
INSULIN ADMINSTERED, INSADM: 2.1 UNITS/HOUR
INSULIN ADMINSTERED, INSADM: 2.2 UNITS/HOUR
INSULIN ADMINSTERED, INSADM: 2.2 UNITS/HOUR
INSULIN ADMINSTERED, INSADM: 2.3 UNITS/HOUR
INSULIN ADMINSTERED, INSADM: 2.4 UNITS/HOUR
INSULIN ADMINSTERED, INSADM: 2.6 UNITS/HOUR
INSULIN ADMINSTERED, INSADM: 3 UNITS/HOUR
INSULIN ADMINSTERED, INSADM: 3.4 UNITS/HOUR
INSULIN ADMINSTERED, INSADM: 3.8 UNITS/HOUR
INSULIN ADMINSTERED, INSADM: 4.1 UNITS/HOUR
INSULIN ORDER, INSORD: 0.6 UNITS/HOUR
INSULIN ORDER, INSORD: 0.7 UNITS/HOUR
INSULIN ORDER, INSORD: 0.8 UNITS/HOUR
INSULIN ORDER, INSORD: 0.9 UNITS/HOUR
INSULIN ORDER, INSORD: 1 UNITS/HOUR
INSULIN ORDER, INSORD: 1.2 UNITS/HOUR
INSULIN ORDER, INSORD: 1.4 UNITS/HOUR
INSULIN ORDER, INSORD: 1.6 UNITS/HOUR
INSULIN ORDER, INSORD: 1.8 UNITS/HOUR
INSULIN ORDER, INSORD: 2.1 UNITS/HOUR
INSULIN ORDER, INSORD: 2.1 UNITS/HOUR
INSULIN ORDER, INSORD: 2.2 UNITS/HOUR
INSULIN ORDER, INSORD: 2.2 UNITS/HOUR
INSULIN ORDER, INSORD: 2.3 UNITS/HOUR
INSULIN ORDER, INSORD: 2.4 UNITS/HOUR
INSULIN ORDER, INSORD: 2.6 UNITS/HOUR
INSULIN ORDER, INSORD: 3 UNITS/HOUR
INSULIN ORDER, INSORD: 3.4 UNITS/HOUR
INSULIN ORDER, INSORD: 3.8 UNITS/HOUR
INSULIN ORDER, INSORD: 4.1 UNITS/HOUR
LOW TARGET, LOT: 95 MG/DL
MAGNESIUM SERPL-MCNC: 2.5 MG/DL (ref 1.6–2.4)
MCH RBC QN AUTO: 21.8 PG (ref 26–34)
MCHC RBC AUTO-ENTMCNC: 28.7 G/DL (ref 30–36.5)
MCV RBC AUTO: 76.1 FL (ref 80–99)
MINUTES UNTIL NEXT BG, NBG: 120 MIN
MINUTES UNTIL NEXT BG, NBG: 120 MIN
MINUTES UNTIL NEXT BG, NBG: 60 MIN
MULTIPLIER, MUL: 0.02
MULTIPLIER, MUL: 0.03
MULTIPLIER, MUL: 0.03
MULTIPLIER, MUL: 0.04
MULTIPLIER, MUL: 0.05
NRBC # BLD: 0 K/UL (ref 0–0.01)
NRBC BLD-RTO: 0 PER 100 WBC
ORDER INITIALS, ORDINIT: NORMAL
PLATELET # BLD AUTO: 277 K/UL (ref 150–400)
PMV BLD AUTO: 9.3 FL (ref 8.9–12.9)
POTASSIUM SERPL-SCNC: 4.2 MMOL/L (ref 3.5–5.1)
PROT SERPL-MCNC: 6.2 G/DL (ref 6.4–8.2)
RBC # BLD AUTO: 3.48 M/UL (ref 4.1–5.7)
SERVICE CMNT-IMP: ABNORMAL
SERVICE CMNT-IMP: NORMAL
SODIUM SERPL-SCNC: 141 MMOL/L (ref 136–145)
WBC # BLD AUTO: 14.1 K/UL (ref 4.1–11.1)

## 2023-04-20 PROCEDURE — 77010033678 HC OXYGEN DAILY

## 2023-04-20 PROCEDURE — 74011250636 HC RX REV CODE- 250/636: Performed by: NURSE PRACTITIONER

## 2023-04-20 PROCEDURE — 97110 THERAPEUTIC EXERCISES: CPT

## 2023-04-20 PROCEDURE — 74011250636 HC RX REV CODE- 250/636: Performed by: THORACIC SURGERY (CARDIOTHORACIC VASCULAR SURGERY)

## 2023-04-20 PROCEDURE — 97535 SELF CARE MNGMENT TRAINING: CPT

## 2023-04-20 PROCEDURE — 74011000250 HC RX REV CODE- 250: Performed by: PHYSICIAN ASSISTANT

## 2023-04-20 PROCEDURE — 85027 COMPLETE CBC AUTOMATED: CPT

## 2023-04-20 PROCEDURE — 65620000000 HC RM CCU GENERAL

## 2023-04-20 PROCEDURE — 99221 1ST HOSP IP/OBS SF/LOW 40: CPT | Performed by: CLINICAL NURSE SPECIALIST

## 2023-04-20 PROCEDURE — 97165 OT EVAL LOW COMPLEX 30 MIN: CPT

## 2023-04-20 PROCEDURE — 82962 GLUCOSE BLOOD TEST: CPT

## 2023-04-20 PROCEDURE — 83735 ASSAY OF MAGNESIUM: CPT

## 2023-04-20 PROCEDURE — 74011250636 HC RX REV CODE- 250/636: Performed by: PHYSICIAN ASSISTANT

## 2023-04-20 PROCEDURE — 74011000258 HC RX REV CODE- 258: Performed by: PHYSICIAN ASSISTANT

## 2023-04-20 PROCEDURE — 74011250637 HC RX REV CODE- 250/637: Performed by: THORACIC SURGERY (CARDIOTHORACIC VASCULAR SURGERY)

## 2023-04-20 PROCEDURE — 80053 COMPREHEN METABOLIC PANEL: CPT

## 2023-04-20 PROCEDURE — 74011000250 HC RX REV CODE- 250: Performed by: THORACIC SURGERY (CARDIOTHORACIC VASCULAR SURGERY)

## 2023-04-20 PROCEDURE — 97530 THERAPEUTIC ACTIVITIES: CPT

## 2023-04-20 PROCEDURE — 74011636637 HC RX REV CODE- 636/637: Performed by: PHYSICIAN ASSISTANT

## 2023-04-20 PROCEDURE — 97161 PT EVAL LOW COMPLEX 20 MIN: CPT

## 2023-04-20 PROCEDURE — 74011000250 HC RX REV CODE- 250: Performed by: NURSE PRACTITIONER

## 2023-04-20 PROCEDURE — 93005 ELECTROCARDIOGRAM TRACING: CPT

## 2023-04-20 PROCEDURE — 71045 X-RAY EXAM CHEST 1 VIEW: CPT

## 2023-04-20 PROCEDURE — 74011250637 HC RX REV CODE- 250/637: Performed by: PHYSICIAN ASSISTANT

## 2023-04-20 PROCEDURE — 36415 COLL VENOUS BLD VENIPUNCTURE: CPT

## 2023-04-20 RX ORDER — ROSUVASTATIN CALCIUM 20 MG/1
20 TABLET, COATED ORAL
Status: DISCONTINUED | OUTPATIENT
Start: 2023-04-20 | End: 2023-04-25 | Stop reason: HOSPADM

## 2023-04-20 RX ORDER — PENICILLIN V POTASSIUM 250 MG/1
500 TABLET, FILM COATED ORAL
Status: DISCONTINUED | OUTPATIENT
Start: 2023-04-21 | End: 2023-04-25 | Stop reason: HOSPADM

## 2023-04-20 RX ORDER — LIDOCAINE 4 G/100G
2 PATCH TOPICAL EVERY 24 HOURS
Status: DISCONTINUED | OUTPATIENT
Start: 2023-04-20 | End: 2023-04-25 | Stop reason: HOSPADM

## 2023-04-20 RX ORDER — BALSAM PERU/CASTOR OIL
OINTMENT (GRAM) TOPICAL 2 TIMES DAILY
Status: DISCONTINUED | OUTPATIENT
Start: 2023-04-20 | End: 2023-04-25 | Stop reason: HOSPADM

## 2023-04-20 RX ORDER — UREA 10 %
1 LOTION (ML) TOPICAL 2 TIMES DAILY WITH MEALS
Status: DISCONTINUED | OUTPATIENT
Start: 2023-04-20 | End: 2023-04-25 | Stop reason: HOSPADM

## 2023-04-20 RX ORDER — GEMFIBROZIL 600 MG/1
600 TABLET, FILM COATED ORAL
Status: DISCONTINUED | OUTPATIENT
Start: 2023-04-20 | End: 2023-04-25 | Stop reason: HOSPADM

## 2023-04-20 RX ADMIN — SENNOSIDES AND DOCUSATE SODIUM 1 TABLET: 50; 8.6 TABLET ORAL at 08:51

## 2023-04-20 RX ADMIN — ACETAMINOPHEN 1000 MG: 500 TABLET ORAL at 02:06

## 2023-04-20 RX ADMIN — ASPIRIN 81 MG: 81 TABLET, CHEWABLE ORAL at 08:51

## 2023-04-20 RX ADMIN — OXYCODONE HYDROCHLORIDE 10 MG: 5 TABLET ORAL at 21:02

## 2023-04-20 RX ADMIN — ONDANSETRON 4 MG: 2 INJECTION INTRAMUSCULAR; INTRAVENOUS at 19:12

## 2023-04-20 RX ADMIN — OXYCODONE HYDROCHLORIDE 10 MG: 5 TABLET ORAL at 06:09

## 2023-04-20 RX ADMIN — CEFAZOLIN 3 G: 10 INJECTION, POWDER, FOR SOLUTION INTRAVENOUS at 06:05

## 2023-04-20 RX ADMIN — SODIUM CHLORIDE, PRESERVATIVE FREE 10 ML: 5 INJECTION INTRAVENOUS at 22:06

## 2023-04-20 RX ADMIN — SODIUM CHLORIDE 2.2 UNITS/HR: 9 INJECTION, SOLUTION INTRAVENOUS at 15:25

## 2023-04-20 RX ADMIN — FAMOTIDINE 20 MG: 20 TABLET, FILM COATED ORAL at 08:49

## 2023-04-20 RX ADMIN — Medication 400 MG: at 09:06

## 2023-04-20 RX ADMIN — MUPIROCIN: 20 OINTMENT TOPICAL at 20:16

## 2023-04-20 RX ADMIN — TRIMETHOBENZAMIDE HYDROCHLORIDE 200 MG: 100 INJECTION INTRAMUSCULAR at 20:36

## 2023-04-20 RX ADMIN — CHLORHEXIDINE GLUCONATE 0.12% ORAL RINSE 10 ML: 1.2 LIQUID ORAL at 20:16

## 2023-04-20 RX ADMIN — OXYCODONE HYDROCHLORIDE 10 MG: 5 TABLET ORAL at 12:30

## 2023-04-20 RX ADMIN — CASTOR OIL AND BALSAM, PERU: 788; 87 OINTMENT TOPICAL at 21:02

## 2023-04-20 RX ADMIN — SODIUM CHLORIDE, PRESERVATIVE FREE 10 ML: 5 INJECTION INTRAVENOUS at 06:03

## 2023-04-20 RX ADMIN — FERROUS SULFATE TAB 325 MG (65 MG ELEMENTAL FE) 325 MG: 325 (65 FE) TAB at 16:22

## 2023-04-20 RX ADMIN — MUPIROCIN: 20 OINTMENT TOPICAL at 11:28

## 2023-04-20 RX ADMIN — SODIUM CHLORIDE 2.1 UNITS/HR: 9 INJECTION, SOLUTION INTRAVENOUS at 16:27

## 2023-04-20 RX ADMIN — HYDROMORPHONE HYDROCHLORIDE 0.5 MG: 1 INJECTION, SOLUTION INTRAMUSCULAR; INTRAVENOUS; SUBCUTANEOUS at 05:04

## 2023-04-20 RX ADMIN — SODIUM CHLORIDE 3.4 UNITS/HR: 9 INJECTION, SOLUTION INTRAVENOUS at 11:02

## 2023-04-20 RX ADMIN — ACETAMINOPHEN 1000 MG: 500 TABLET ORAL at 20:15

## 2023-04-20 RX ADMIN — CEFAZOLIN 3 G: 10 INJECTION, POWDER, FOR SOLUTION INTRAVENOUS at 14:28

## 2023-04-20 RX ADMIN — CHLORHEXIDINE GLUCONATE 0.12% ORAL RINSE 10 ML: 1.2 LIQUID ORAL at 08:52

## 2023-04-20 RX ADMIN — SODIUM CHLORIDE 2.3 UNITS/HR: 9 INJECTION, SOLUTION INTRAVENOUS at 14:21

## 2023-04-20 RX ADMIN — ACETAMINOPHEN 1000 MG: 500 TABLET ORAL at 08:49

## 2023-04-20 RX ADMIN — PHENYLEPHRINE HYDROCHLORIDE 70 MCG/MIN: 10 INJECTION INTRAVENOUS at 11:04

## 2023-04-20 RX ADMIN — SODIUM CHLORIDE 4.1 UNITS/HR: 9 INJECTION, SOLUTION INTRAVENOUS at 13:16

## 2023-04-20 RX ADMIN — ACETAMINOPHEN 1000 MG: 500 TABLET ORAL at 14:27

## 2023-04-20 RX ADMIN — SODIUM CHLORIDE, PRESERVATIVE FREE 10 ML: 5 INJECTION INTRAVENOUS at 14:30

## 2023-04-20 RX ADMIN — GEMFIBROZIL 600 MG: 600 TABLET ORAL at 16:22

## 2023-04-20 RX ADMIN — FAMOTIDINE 20 MG: 20 TABLET, FILM COATED ORAL at 21:02

## 2023-04-20 RX ADMIN — OXYCODONE HYDROCHLORIDE 10 MG: 5 TABLET ORAL at 00:07

## 2023-04-20 RX ADMIN — AMIODARONE HYDROCHLORIDE 400 MG: 200 TABLET ORAL at 10:50

## 2023-04-20 RX ADMIN — CEFAZOLIN 3 G: 10 INJECTION, POWDER, FOR SOLUTION INTRAVENOUS at 19:25

## 2023-04-20 RX ADMIN — ROSUVASTATIN 20 MG: 20 TABLET, FILM COATED ORAL at 21:01

## 2023-04-20 RX ADMIN — Medication 400 MG: at 18:58

## 2023-04-20 RX ADMIN — HYDROMORPHONE HYDROCHLORIDE 0.5 MG: 1 INJECTION, SOLUTION INTRAMUSCULAR; INTRAVENOUS; SUBCUTANEOUS at 14:28

## 2023-04-20 RX ADMIN — POLYETHYLENE GLYCOL 3350 17 G: 17 POWDER, FOR SOLUTION ORAL at 08:52

## 2023-04-20 RX ADMIN — AMIODARONE HYDROCHLORIDE 400 MG: 200 TABLET ORAL at 18:58

## 2023-04-20 RX ADMIN — CASTOR OIL AND BALSAM, PERU: 788; 87 OINTMENT TOPICAL at 11:28

## 2023-04-20 NOTE — ANESTHESIA POSTPROCEDURE EVALUATION
Post-Anesthesia Evaluation and Assessment    Patient: Tre Alfredo MRN: [id number]  SSN: [id number]    YOB: 1960  Age: 57 y.o. Sex: male         Cardiovascular Function/Vital Signs  Visit Vitals  BP (!) 133/51   Pulse 70   Temp 37.4 °C (99.3 °F)   Resp 21   Ht 6' 0.99\" (1.854 m)   Wt 119.8 kg (264 lb 3.2 oz)   SpO2 97%   BMI 34.86 kg/m²       Patient is status post General anesthesia for Procedure(s):  CORONARY ARTERY BYPASS GRAFT x FOUR   (CABG) WITH RIGHT SAPHENOUS ENDOSCOPIC VEIN HARVEST, LEFT INTERNAL MAMMARY ARTERY,  AND LEFT ATRIAL APPENDAGE CLIP,  EXTRA CORPOREAL CIRCULATION , KRISTIAN AND EPIAORTIC U/S BY DR Antwon Hamilton. Nausea/Vomiting: None    Postoperative hydration reviewed and adequate. Pain:  Pain Scale 1: Numeric (0 - 10) (04/20/23 0800)  Pain Intensity 1: 3 (04/20/23 0800)   Managed    Neurological Status:   Neuro (WDL): Within Defined Limits (04/19/23 0704)  Neuro  Neurologic State: Alert (04/20/23 1000)  Orientation Level: Oriented X4 (04/20/23 1000)  Cognition: Appropriate for age attention/concentration; Appropriate decision making (04/20/23 0800)  Speech: Clear (04/20/23 0800)  Assessment L Pupil: Round (04/20/23 0800)  Size L Pupil (mm): 3 (04/20/23 0800)  Assessment R Pupil: Round (04/20/23 0800)  Size R Pupil (mm): 3 (04/20/23 0800)  LUE Motor Response: Purposeful (04/20/23 0800)  LLE Motor Response: Purposeful (04/20/23 0800)  RUE Motor Response: Purposeful (04/20/23 0800)  RLE Motor Response: Purposeful (04/20/23 0800)   At baseline    Mental Status, Level of Consciousness: Alert and oriented     Pulmonary Status:   O2 Device: CO2 nasal cannula (04/20/23 0800)   Adequate oxygenation and airway patent    Complications related to anesthesia: None    Post-anesthesia assessment completed.  No concerns    Signed By: Bette Moise MD     April 20, 2023              Procedure(s):  CORONARY ARTERY BYPASS GRAFT x FOUR   (CABG) WITH RIGHT SAPHENOUS ENDOSCOPIC VEIN HARVEST, LEFT INTERNAL MAMMARY ARTERY,  AND LEFT ATRIAL APPENDAGE CLIP,  EXTRA CORPOREAL CIRCULATION , KRISTIAN AND EPIAORTIC U/S BY DR MORGAN. general    <BSHSIANPOST>    INITIAL Post-op Vital signs:   Vitals Value Taken Time   /54 04/20/23 1108   Temp 37.4 °C (99.3 °F) 04/20/23 0800   Pulse 70 04/20/23 1119   Resp 16 04/20/23 1119   SpO2 93 % 04/20/23 1119   Vitals shown include unvalidated device data.

## 2023-04-21 ENCOUNTER — APPOINTMENT (OUTPATIENT)
Dept: GENERAL RADIOLOGY | Age: 63
DRG: 233 | End: 2023-04-21
Attending: PHYSICIAN ASSISTANT
Payer: COMMERCIAL

## 2023-04-21 LAB
ADMINISTERED INITIALS, ADMINIT: NORMAL
ALBUMIN SERPL-MCNC: 3.2 G/DL (ref 3.5–5)
ALBUMIN/GLOB SERPL: 1.1 (ref 1.1–2.2)
ALP SERPL-CCNC: 27 U/L (ref 45–117)
ALT SERPL-CCNC: 9 U/L (ref 12–78)
ANION GAP SERPL CALC-SCNC: 2 MMOL/L (ref 5–15)
AST SERPL-CCNC: 30 U/L (ref 15–37)
ATRIAL RATE: 326 BPM
BILIRUB SERPL-MCNC: 0.2 MG/DL (ref 0.2–1)
BUN SERPL-MCNC: 37 MG/DL (ref 6–20)
BUN/CREAT SERPL: 25 (ref 12–20)
CALCIUM SERPL-MCNC: 8.1 MG/DL (ref 8.5–10.1)
CALCULATED R AXIS, ECG10: 14 DEGREES
CALCULATED T AXIS, ECG11: -43 DEGREES
CHLORIDE SERPL-SCNC: 115 MMOL/L (ref 97–108)
CO2 SERPL-SCNC: 24 MMOL/L (ref 21–32)
CREAT SERPL-MCNC: 1.48 MG/DL (ref 0.7–1.3)
D50 ADMINISTERED, D50ADM: 0 ML
D50 ORDER, D50ORD: 0 ML
DIAGNOSIS, 93000: NORMAL
ERYTHROCYTE [DISTWIDTH] IN BLOOD BY AUTOMATED COUNT: 22.3 % (ref 11.5–14.5)
GLOBULIN SER CALC-MCNC: 2.8 G/DL (ref 2–4)
GLUCOSE BLD STRIP.AUTO-MCNC: 102 MG/DL (ref 65–117)
GLUCOSE BLD STRIP.AUTO-MCNC: 103 MG/DL (ref 65–117)
GLUCOSE BLD STRIP.AUTO-MCNC: 104 MG/DL (ref 65–117)
GLUCOSE BLD STRIP.AUTO-MCNC: 105 MG/DL (ref 65–117)
GLUCOSE BLD STRIP.AUTO-MCNC: 109 MG/DL (ref 65–117)
GLUCOSE BLD STRIP.AUTO-MCNC: 118 MG/DL (ref 65–117)
GLUCOSE BLD STRIP.AUTO-MCNC: 120 MG/DL (ref 65–117)
GLUCOSE BLD STRIP.AUTO-MCNC: 148 MG/DL (ref 65–117)
GLUCOSE BLD STRIP.AUTO-MCNC: 162 MG/DL (ref 65–117)
GLUCOSE BLD STRIP.AUTO-MCNC: 165 MG/DL (ref 65–117)
GLUCOSE BLD STRIP.AUTO-MCNC: 95 MG/DL (ref 65–117)
GLUCOSE BLD STRIP.AUTO-MCNC: 97 MG/DL (ref 65–117)
GLUCOSE SERPL-MCNC: 95 MG/DL (ref 65–100)
GLUCOSE, GLC: 102 MG/DL
GLUCOSE, GLC: 103 MG/DL
GLUCOSE, GLC: 104 MG/DL
GLUCOSE, GLC: 105 MG/DL
GLUCOSE, GLC: 109 MG/DL
GLUCOSE, GLC: 118 MG/DL
GLUCOSE, GLC: 120 MG/DL
GLUCOSE, GLC: 148 MG/DL
GLUCOSE, GLC: 95 MG/DL
GLUCOSE, GLC: 97 MG/DL
HCT VFR BLD AUTO: 24.7 % (ref 36.6–50.3)
HGB BLD-MCNC: 6.8 G/DL (ref 12.1–17)
HIGH TARGET, HITG: 130 MG/DL
HISTORY CHECKED?,CKHIST: NORMAL
INSULIN ADMINSTERED, INSADM: 0 UNITS/HOUR
INSULIN ADMINSTERED, INSADM: 1.7 UNITS/HOUR
INSULIN ADMINSTERED, INSADM: 1.8 UNITS/HOUR
INSULIN ADMINSTERED, INSADM: 2 UNITS/HOUR
INSULIN ADMINSTERED, INSADM: 2.1 UNITS/HOUR
INSULIN ADMINSTERED, INSADM: 2.1 UNITS/HOUR
INSULIN ADMINSTERED, INSADM: 2.2 UNITS/HOUR
INSULIN ADMINSTERED, INSADM: 2.4 UNITS/HOUR
INSULIN ADMINSTERED, INSADM: 2.9 UNITS/HOUR
INSULIN ADMINSTERED, INSADM: 4.2 UNITS/HOUR
INSULIN ORDER, INSORD: 1.7 UNITS/HOUR
INSULIN ORDER, INSORD: 1.8 UNITS/HOUR
INSULIN ORDER, INSORD: 2 UNITS/HOUR
INSULIN ORDER, INSORD: 2.1 UNITS/HOUR
INSULIN ORDER, INSORD: 2.1 UNITS/HOUR
INSULIN ORDER, INSORD: 2.2 UNITS/HOUR
INSULIN ORDER, INSORD: 2.4 UNITS/HOUR
INSULIN ORDER, INSORD: 2.8 UNITS/HOUR
INSULIN ORDER, INSORD: 2.9 UNITS/HOUR
INSULIN ORDER, INSORD: 4.2 UNITS/HOUR
LOW TARGET, LOT: 95 MG/DL
MAGNESIUM SERPL-MCNC: 2.9 MG/DL (ref 1.6–2.4)
MCH RBC QN AUTO: 21.1 PG (ref 26–34)
MCHC RBC AUTO-ENTMCNC: 27.5 G/DL (ref 30–36.5)
MCV RBC AUTO: 76.7 FL (ref 80–99)
MINUTES UNTIL NEXT BG, NBG: 120 MIN
MINUTES UNTIL NEXT BG, NBG: 60 MIN
MULTIPLIER, MUL: 0.05
NRBC # BLD: 0 K/UL (ref 0–0.01)
NRBC BLD-RTO: 0 PER 100 WBC
ORDER INITIALS, ORDINIT: NORMAL
PLATELET # BLD AUTO: 223 K/UL (ref 150–400)
PMV BLD AUTO: 8.8 FL (ref 8.9–12.9)
POTASSIUM SERPL-SCNC: 4.2 MMOL/L (ref 3.5–5.1)
PROT SERPL-MCNC: 6 G/DL (ref 6.4–8.2)
Q-T INTERVAL, ECG07: 416 MS
QRS DURATION, ECG06: 108 MS
QTC CALCULATION (BEZET), ECG08: 455 MS
RBC # BLD AUTO: 3.22 M/UL (ref 4.1–5.7)
SERVICE CMNT-IMP: ABNORMAL
SERVICE CMNT-IMP: NORMAL
SODIUM SERPL-SCNC: 141 MMOL/L (ref 136–145)
VENTRICULAR RATE, ECG03: 72 BPM
WBC # BLD AUTO: 10.3 K/UL (ref 4.1–11.1)

## 2023-04-21 PROCEDURE — 74011250637 HC RX REV CODE- 250/637: Performed by: INTERNAL MEDICINE

## 2023-04-21 PROCEDURE — 83735 ASSAY OF MAGNESIUM: CPT

## 2023-04-21 PROCEDURE — 74011250636 HC RX REV CODE- 250/636: Performed by: THORACIC SURGERY (CARDIOTHORACIC VASCULAR SURGERY)

## 2023-04-21 PROCEDURE — 74011000250 HC RX REV CODE- 250: Performed by: PHYSICIAN ASSISTANT

## 2023-04-21 PROCEDURE — 65620000000 HC RM CCU GENERAL

## 2023-04-21 PROCEDURE — 80053 COMPREHEN METABOLIC PANEL: CPT

## 2023-04-21 PROCEDURE — 74011250636 HC RX REV CODE- 250/636: Performed by: PHYSICIAN ASSISTANT

## 2023-04-21 PROCEDURE — 74011250636 HC RX REV CODE- 250/636

## 2023-04-21 PROCEDURE — 99231 SBSQ HOSP IP/OBS SF/LOW 25: CPT | Performed by: CLINICAL NURSE SPECIALIST

## 2023-04-21 PROCEDURE — 74011250637 HC RX REV CODE- 250/637: Performed by: PHYSICIAN ASSISTANT

## 2023-04-21 PROCEDURE — 74011250637 HC RX REV CODE- 250/637: Performed by: THORACIC SURGERY (CARDIOTHORACIC VASCULAR SURGERY)

## 2023-04-21 PROCEDURE — 36415 COLL VENOUS BLD VENIPUNCTURE: CPT

## 2023-04-21 PROCEDURE — 85027 COMPLETE CBC AUTOMATED: CPT

## 2023-04-21 PROCEDURE — P9016 RBC LEUKOCYTES REDUCED: HCPCS

## 2023-04-21 PROCEDURE — 74011636637 HC RX REV CODE- 636/637: Performed by: PHYSICIAN ASSISTANT

## 2023-04-21 PROCEDURE — 93005 ELECTROCARDIOGRAM TRACING: CPT

## 2023-04-21 PROCEDURE — 82962 GLUCOSE BLOOD TEST: CPT

## 2023-04-21 PROCEDURE — 97530 THERAPEUTIC ACTIVITIES: CPT

## 2023-04-21 PROCEDURE — 74011000250 HC RX REV CODE- 250: Performed by: THORACIC SURGERY (CARDIOTHORACIC VASCULAR SURGERY)

## 2023-04-21 PROCEDURE — 36430 TRANSFUSION BLD/BLD COMPNT: CPT

## 2023-04-21 PROCEDURE — 74011000258 HC RX REV CODE- 258: Performed by: PHYSICIAN ASSISTANT

## 2023-04-21 PROCEDURE — 74011636637 HC RX REV CODE- 636/637: Performed by: THORACIC SURGERY (CARDIOTHORACIC VASCULAR SURGERY)

## 2023-04-21 PROCEDURE — 97110 THERAPEUTIC EXERCISES: CPT

## 2023-04-21 PROCEDURE — 71045 X-RAY EXAM CHEST 1 VIEW: CPT

## 2023-04-21 PROCEDURE — 74011000250 HC RX REV CODE- 250: Performed by: NURSE PRACTITIONER

## 2023-04-21 PROCEDURE — 97535 SELF CARE MNGMENT TRAINING: CPT

## 2023-04-21 PROCEDURE — 97116 GAIT TRAINING THERAPY: CPT

## 2023-04-21 RX ORDER — ONDANSETRON 2 MG/ML
INJECTION INTRAMUSCULAR; INTRAVENOUS
Status: COMPLETED
Start: 2023-04-21 | End: 2023-04-21

## 2023-04-21 RX ORDER — INSULIN GLARGINE 100 [IU]/ML
15 INJECTION, SOLUTION SUBCUTANEOUS ONCE
Status: COMPLETED | OUTPATIENT
Start: 2023-04-21 | End: 2023-04-21

## 2023-04-21 RX ORDER — PROMETHAZINE HYDROCHLORIDE 25 MG/1
25 TABLET ORAL
Status: DISCONTINUED | OUTPATIENT
Start: 2023-04-21 | End: 2023-04-25 | Stop reason: HOSPADM

## 2023-04-21 RX ORDER — ONDANSETRON 2 MG/ML
4 INJECTION INTRAMUSCULAR; INTRAVENOUS EVERY 4 HOURS
Status: DISCONTINUED | OUTPATIENT
Start: 2023-04-21 | End: 2023-04-21

## 2023-04-21 RX ORDER — SODIUM CHLORIDE 9 MG/ML
250 INJECTION, SOLUTION INTRAVENOUS AS NEEDED
Status: DISCONTINUED | OUTPATIENT
Start: 2023-04-21 | End: 2023-04-22

## 2023-04-21 RX ORDER — ONDANSETRON 2 MG/ML
8 INJECTION INTRAMUSCULAR; INTRAVENOUS EVERY 6 HOURS
Status: DISCONTINUED | OUTPATIENT
Start: 2023-04-21 | End: 2023-04-24

## 2023-04-21 RX ADMIN — CHLORHEXIDINE GLUCONATE 0.12% ORAL RINSE 10 ML: 1.2 LIQUID ORAL at 21:08

## 2023-04-21 RX ADMIN — SODIUM CHLORIDE 1.7 UNITS/HR: 9 INJECTION, SOLUTION INTRAVENOUS at 01:11

## 2023-04-21 RX ADMIN — AMIODARONE HYDROCHLORIDE 400 MG: 200 TABLET ORAL at 08:17

## 2023-04-21 RX ADMIN — ONDANSETRON 8 MG: 2 INJECTION INTRAMUSCULAR; INTRAVENOUS at 08:22

## 2023-04-21 RX ADMIN — CASTOR OIL AND BALSAM, PERU: 788; 87 OINTMENT TOPICAL at 09:51

## 2023-04-21 RX ADMIN — POLYETHYLENE GLYCOL 3350 17 G: 17 POWDER, FOR SOLUTION ORAL at 08:20

## 2023-04-21 RX ADMIN — AMIODARONE HYDROCHLORIDE 400 MG: 200 TABLET ORAL at 17:18

## 2023-04-21 RX ADMIN — CASTOR OIL AND BALSAM, PERU: 788; 87 OINTMENT TOPICAL at 21:08

## 2023-04-21 RX ADMIN — ONDANSETRON 8 MG: 2 INJECTION INTRAMUSCULAR; INTRAVENOUS at 11:43

## 2023-04-21 RX ADMIN — SENNOSIDES AND DOCUSATE SODIUM 1 TABLET: 50; 8.6 TABLET ORAL at 08:19

## 2023-04-21 RX ADMIN — INSULIN GLARGINE 15 UNITS: 100 INJECTION, SOLUTION SUBCUTANEOUS at 12:15

## 2023-04-21 RX ADMIN — Medication 2 UNITS: at 17:19

## 2023-04-21 RX ADMIN — ACETAMINOPHEN 1000 MG: 500 TABLET ORAL at 03:00

## 2023-04-21 RX ADMIN — GEMFIBROZIL 600 MG: 600 TABLET ORAL at 08:18

## 2023-04-21 RX ADMIN — PHENYLEPHRINE HYDROCHLORIDE 50 MCG/MIN: 10 INJECTION INTRAVENOUS at 00:18

## 2023-04-21 RX ADMIN — GEMFIBROZIL 600 MG: 600 TABLET ORAL at 17:18

## 2023-04-21 RX ADMIN — MUPIROCIN: 20 OINTMENT TOPICAL at 21:08

## 2023-04-21 RX ADMIN — FAMOTIDINE 20 MG: 20 TABLET, FILM COATED ORAL at 21:07

## 2023-04-21 RX ADMIN — FERROUS SULFATE TAB 325 MG (65 MG ELEMENTAL FE) 325 MG: 325 (65 FE) TAB at 17:17

## 2023-04-21 RX ADMIN — FERROUS SULFATE TAB 325 MG (65 MG ELEMENTAL FE) 325 MG: 325 (65 FE) TAB at 08:19

## 2023-04-21 RX ADMIN — CEFAZOLIN 3 G: 10 INJECTION, POWDER, FOR SOLUTION INTRAVENOUS at 00:11

## 2023-04-21 RX ADMIN — MUPIROCIN: 20 OINTMENT TOPICAL at 09:51

## 2023-04-21 RX ADMIN — OXYCODONE HYDROCHLORIDE 10 MG: 5 TABLET ORAL at 03:09

## 2023-04-21 RX ADMIN — ACETAMINOPHEN 1000 MG: 500 TABLET ORAL at 08:16

## 2023-04-21 RX ADMIN — SODIUM CHLORIDE, PRESERVATIVE FREE 10 ML: 5 INJECTION INTRAVENOUS at 06:00

## 2023-04-21 RX ADMIN — PENICILLIN V POTASSIUM 500 MG: 250 TABLET, FILM COATED ORAL at 08:18

## 2023-04-21 RX ADMIN — FAMOTIDINE 20 MG: 20 TABLET, FILM COATED ORAL at 08:18

## 2023-04-21 RX ADMIN — SODIUM CHLORIDE, PRESERVATIVE FREE 10 ML: 5 INJECTION INTRAVENOUS at 21:07

## 2023-04-21 RX ADMIN — Medication 400 MG: at 08:16

## 2023-04-21 RX ADMIN — SODIUM CHLORIDE, PRESERVATIVE FREE 10 ML: 5 INJECTION INTRAVENOUS at 13:54

## 2023-04-21 RX ADMIN — PROMETHAZINE HYDROCHLORIDE 25 MG: 25 TABLET ORAL at 18:38

## 2023-04-21 RX ADMIN — ONDANSETRON 8 MG: 2 INJECTION INTRAMUSCULAR; INTRAVENOUS at 17:21

## 2023-04-21 RX ADMIN — CHLORHEXIDINE GLUCONATE 0.12% ORAL RINSE 10 ML: 1.2 LIQUID ORAL at 08:20

## 2023-04-21 RX ADMIN — PENICILLIN V POTASSIUM 500 MG: 250 TABLET, FILM COATED ORAL at 11:43

## 2023-04-21 RX ADMIN — PENICILLIN V POTASSIUM 500 MG: 250 TABLET, FILM COATED ORAL at 17:18

## 2023-04-21 RX ADMIN — ROSUVASTATIN 20 MG: 20 TABLET, FILM COATED ORAL at 21:07

## 2023-04-21 RX ADMIN — ASPIRIN 81 MG: 81 TABLET, CHEWABLE ORAL at 08:16

## 2023-04-21 RX ADMIN — PENICILLIN V POTASSIUM 500 MG: 250 TABLET, FILM COATED ORAL at 21:07

## 2023-04-21 RX ADMIN — HYDROMORPHONE HYDROCHLORIDE 0.5 MG: 1 INJECTION, SOLUTION INTRAMUSCULAR; INTRAVENOUS; SUBCUTANEOUS at 01:18

## 2023-04-21 RX ADMIN — Medication 400 MG: at 17:17

## 2023-04-21 RX ADMIN — ONDANSETRON 8 MG: 2 INJECTION INTRAMUSCULAR; INTRAVENOUS at 23:54

## 2023-04-21 RX ADMIN — OXYCODONE 5 MG: 5 TABLET ORAL at 15:17

## 2023-04-22 ENCOUNTER — APPOINTMENT (OUTPATIENT)
Dept: GENERAL RADIOLOGY | Age: 63
DRG: 233 | End: 2023-04-22
Attending: PHYSICIAN ASSISTANT
Payer: COMMERCIAL

## 2023-04-22 LAB
ABO + RH BLD: NORMAL
ALBUMIN SERPL-MCNC: 3 G/DL (ref 3.5–5)
ALBUMIN/GLOB SERPL: 0.9 (ref 1.1–2.2)
ALP SERPL-CCNC: 35 U/L (ref 45–117)
ALT SERPL-CCNC: 7 U/L (ref 12–78)
ANION GAP SERPL CALC-SCNC: 5 MMOL/L (ref 5–15)
AST SERPL-CCNC: 18 U/L (ref 15–37)
ATRIAL RATE: 77 BPM
ATRIAL RATE: 80 BPM
BILIRUB SERPL-MCNC: 0.3 MG/DL (ref 0.2–1)
BLD PROD TYP BPU: NORMAL
BLD PROD TYP BPU: NORMAL
BLOOD GROUP ANTIBODIES SERPL: NORMAL
BPU ID: NORMAL
BPU ID: NORMAL
BUN SERPL-MCNC: 40 MG/DL (ref 6–20)
BUN/CREAT SERPL: 34 (ref 12–20)
CALCIUM SERPL-MCNC: 8.5 MG/DL (ref 8.5–10.1)
CALCULATED R AXIS, ECG10: -29 DEGREES
CALCULATED R AXIS, ECG10: 1 DEGREES
CALCULATED T AXIS, ECG11: -36 DEGREES
CALCULATED T AXIS, ECG11: 39 DEGREES
CHLORIDE SERPL-SCNC: 111 MMOL/L (ref 97–108)
CO2 SERPL-SCNC: 23 MMOL/L (ref 21–32)
CREAT SERPL-MCNC: 1.16 MG/DL (ref 0.7–1.3)
CROSSMATCH RESULT,%XM: NORMAL
CROSSMATCH RESULT,%XM: NORMAL
DIAGNOSIS, 93000: NORMAL
DIAGNOSIS, 93000: NORMAL
ERYTHROCYTE [DISTWIDTH] IN BLOOD BY AUTOMATED COUNT: 21.5 % (ref 11.5–14.5)
GLOBULIN SER CALC-MCNC: 3.2 G/DL (ref 2–4)
GLUCOSE BLD STRIP.AUTO-MCNC: 165 MG/DL (ref 65–117)
GLUCOSE BLD STRIP.AUTO-MCNC: 173 MG/DL (ref 65–117)
GLUCOSE BLD STRIP.AUTO-MCNC: 176 MG/DL (ref 65–117)
GLUCOSE BLD STRIP.AUTO-MCNC: 183 MG/DL (ref 65–117)
GLUCOSE BLD STRIP.AUTO-MCNC: 210 MG/DL (ref 65–117)
GLUCOSE SERPL-MCNC: 156 MG/DL (ref 65–100)
HCT VFR BLD AUTO: 25.5 % (ref 36.6–50.3)
HGB BLD-MCNC: 7.5 G/DL (ref 12.1–17)
MAGNESIUM SERPL-MCNC: 2.8 MG/DL (ref 1.6–2.4)
MCH RBC QN AUTO: 22.6 PG (ref 26–34)
MCHC RBC AUTO-ENTMCNC: 29.4 G/DL (ref 30–36.5)
MCV RBC AUTO: 76.8 FL (ref 80–99)
NRBC # BLD: 0 K/UL (ref 0–0.01)
NRBC BLD-RTO: 0 PER 100 WBC
P-R INTERVAL, ECG05: 206 MS
P-R INTERVAL, ECG05: 222 MS
PLATELET # BLD AUTO: 195 K/UL (ref 150–400)
PMV BLD AUTO: 9.6 FL (ref 8.9–12.9)
POTASSIUM SERPL-SCNC: 4.4 MMOL/L (ref 3.5–5.1)
PROT SERPL-MCNC: 6.2 G/DL (ref 6.4–8.2)
Q-T INTERVAL, ECG07: 378 MS
Q-T INTERVAL, ECG07: 420 MS
QRS DURATION, ECG06: 102 MS
QRS DURATION, ECG06: 148 MS
QTC CALCULATION (BEZET), ECG08: 427 MS
QTC CALCULATION (BEZET), ECG08: 484 MS
RBC # BLD AUTO: 3.32 M/UL (ref 4.1–5.7)
SERVICE CMNT-IMP: ABNORMAL
SODIUM SERPL-SCNC: 139 MMOL/L (ref 136–145)
SPECIMEN EXP DATE BLD: NORMAL
STATUS OF UNIT,%ST: NORMAL
STATUS OF UNIT,%ST: NORMAL
UNIT DIVISION, %UDIV: 0
UNIT DIVISION, %UDIV: 0
VENTRICULAR RATE, ECG03: 77 BPM
VENTRICULAR RATE, ECG03: 80 BPM
WBC # BLD AUTO: 8.9 K/UL (ref 4.1–11.1)

## 2023-04-22 PROCEDURE — 97530 THERAPEUTIC ACTIVITIES: CPT

## 2023-04-22 PROCEDURE — 83735 ASSAY OF MAGNESIUM: CPT

## 2023-04-22 PROCEDURE — 82962 GLUCOSE BLOOD TEST: CPT

## 2023-04-22 PROCEDURE — 74011250637 HC RX REV CODE- 250/637: Performed by: THORACIC SURGERY (CARDIOTHORACIC VASCULAR SURGERY)

## 2023-04-22 PROCEDURE — 74011000250 HC RX REV CODE- 250: Performed by: PHYSICIAN ASSISTANT

## 2023-04-22 PROCEDURE — 65620000000 HC RM CCU GENERAL

## 2023-04-22 PROCEDURE — 74011000250 HC RX REV CODE- 250: Performed by: THORACIC SURGERY (CARDIOTHORACIC VASCULAR SURGERY)

## 2023-04-22 PROCEDURE — 74011000250 HC RX REV CODE- 250: Performed by: NURSE PRACTITIONER

## 2023-04-22 PROCEDURE — 93005 ELECTROCARDIOGRAM TRACING: CPT

## 2023-04-22 PROCEDURE — 97116 GAIT TRAINING THERAPY: CPT

## 2023-04-22 PROCEDURE — 74011250637 HC RX REV CODE- 250/637: Performed by: PHYSICIAN ASSISTANT

## 2023-04-22 PROCEDURE — 77010033678 HC OXYGEN DAILY

## 2023-04-22 PROCEDURE — 74011636637 HC RX REV CODE- 636/637: Performed by: PHYSICIAN ASSISTANT

## 2023-04-22 PROCEDURE — 85027 COMPLETE CBC AUTOMATED: CPT

## 2023-04-22 PROCEDURE — 36415 COLL VENOUS BLD VENIPUNCTURE: CPT

## 2023-04-22 PROCEDURE — 74011250636 HC RX REV CODE- 250/636: Performed by: THORACIC SURGERY (CARDIOTHORACIC VASCULAR SURGERY)

## 2023-04-22 PROCEDURE — 71045 X-RAY EXAM CHEST 1 VIEW: CPT

## 2023-04-22 PROCEDURE — 74011250637 HC RX REV CODE- 250/637: Performed by: NURSE PRACTITIONER

## 2023-04-22 PROCEDURE — 80053 COMPREHEN METABOLIC PANEL: CPT

## 2023-04-22 RX ORDER — BUMETANIDE 0.25 MG/ML
1 INJECTION INTRAMUSCULAR; INTRAVENOUS ONCE
Status: COMPLETED | OUTPATIENT
Start: 2023-04-22 | End: 2023-04-22

## 2023-04-22 RX ORDER — METOPROLOL TARTRATE 25 MG/1
12.5 TABLET, FILM COATED ORAL EVERY 12 HOURS
Status: DISCONTINUED | OUTPATIENT
Start: 2023-04-22 | End: 2023-04-23

## 2023-04-22 RX ORDER — METOPROLOL TARTRATE 5 MG/5ML
5 INJECTION INTRAVENOUS ONCE
Status: COMPLETED | OUTPATIENT
Start: 2023-04-22 | End: 2023-04-22

## 2023-04-22 RX ADMIN — PENICILLIN V POTASSIUM 500 MG: 250 TABLET, FILM COATED ORAL at 09:20

## 2023-04-22 RX ADMIN — MUPIROCIN: 20 OINTMENT TOPICAL at 21:12

## 2023-04-22 RX ADMIN — ONDANSETRON 8 MG: 2 INJECTION INTRAMUSCULAR; INTRAVENOUS at 06:13

## 2023-04-22 RX ADMIN — FAMOTIDINE 20 MG: 20 TABLET, FILM COATED ORAL at 21:38

## 2023-04-22 RX ADMIN — ROSUVASTATIN 20 MG: 20 TABLET, FILM COATED ORAL at 21:39

## 2023-04-22 RX ADMIN — Medication 2 UNITS: at 16:45

## 2023-04-22 RX ADMIN — METOPROLOL TARTRATE 5 MG: 5 INJECTION, SOLUTION INTRAVENOUS at 02:43

## 2023-04-22 RX ADMIN — Medication 2 UNITS: at 12:44

## 2023-04-22 RX ADMIN — ONDANSETRON 8 MG: 2 INJECTION INTRAMUSCULAR; INTRAVENOUS at 17:52

## 2023-04-22 RX ADMIN — GEMFIBROZIL 600 MG: 600 TABLET ORAL at 08:56

## 2023-04-22 RX ADMIN — Medication 2 UNITS: at 22:09

## 2023-04-22 RX ADMIN — BUMETANIDE 1 MG: 0.25 INJECTION INTRAMUSCULAR; INTRAVENOUS at 20:49

## 2023-04-22 RX ADMIN — SODIUM CHLORIDE, PRESERVATIVE FREE 10 ML: 5 INJECTION INTRAVENOUS at 06:14

## 2023-04-22 RX ADMIN — FERROUS SULFATE TAB 325 MG (65 MG ELEMENTAL FE) 325 MG: 325 (65 FE) TAB at 08:57

## 2023-04-22 RX ADMIN — CHLORHEXIDINE GLUCONATE 0.12% ORAL RINSE 10 ML: 1.2 LIQUID ORAL at 08:54

## 2023-04-22 RX ADMIN — FAMOTIDINE 20 MG: 20 TABLET, FILM COATED ORAL at 08:56

## 2023-04-22 RX ADMIN — AMIODARONE HYDROCHLORIDE 400 MG: 200 TABLET ORAL at 08:56

## 2023-04-22 RX ADMIN — SODIUM CHLORIDE, PRESERVATIVE FREE 10 ML: 5 INJECTION INTRAVENOUS at 13:28

## 2023-04-22 RX ADMIN — SENNOSIDES AND DOCUSATE SODIUM 1 TABLET: 50; 8.6 TABLET ORAL at 08:57

## 2023-04-22 RX ADMIN — METOPROLOL TARTRATE 12.5 MG: 25 TABLET, FILM COATED ORAL at 21:38

## 2023-04-22 RX ADMIN — CASTOR OIL AND BALSAM, PERU: 788; 87 OINTMENT TOPICAL at 08:40

## 2023-04-22 RX ADMIN — PENICILLIN V POTASSIUM 500 MG: 250 TABLET, FILM COATED ORAL at 16:46

## 2023-04-22 RX ADMIN — Medication 400 MG: at 08:56

## 2023-04-22 RX ADMIN — AMIODARONE HYDROCHLORIDE 400 MG: 200 TABLET ORAL at 17:51

## 2023-04-22 RX ADMIN — SODIUM CHLORIDE, PRESERVATIVE FREE 10 ML: 5 INJECTION INTRAVENOUS at 21:14

## 2023-04-22 RX ADMIN — GEMFIBROZIL 600 MG: 600 TABLET ORAL at 16:46

## 2023-04-22 RX ADMIN — ONDANSETRON 8 MG: 2 INJECTION INTRAMUSCULAR; INTRAVENOUS at 12:47

## 2023-04-22 RX ADMIN — POLYETHYLENE GLYCOL 3350 17 G: 17 POWDER, FOR SOLUTION ORAL at 08:54

## 2023-04-22 RX ADMIN — MUPIROCIN: 20 OINTMENT TOPICAL at 08:40

## 2023-04-22 RX ADMIN — PENICILLIN V POTASSIUM 500 MG: 250 TABLET, FILM COATED ORAL at 12:45

## 2023-04-22 RX ADMIN — METOPROLOL TARTRATE 12.5 MG: 25 TABLET, FILM COATED ORAL at 09:01

## 2023-04-22 RX ADMIN — Medication 400 MG: at 17:51

## 2023-04-22 RX ADMIN — BUMETANIDE 1 MG: 0.25 INJECTION INTRAMUSCULAR; INTRAVENOUS at 13:27

## 2023-04-22 RX ADMIN — CHLORHEXIDINE GLUCONATE 0.12% ORAL RINSE 10 ML: 1.2 LIQUID ORAL at 21:12

## 2023-04-22 RX ADMIN — CASTOR OIL AND BALSAM, PERU: 788; 87 OINTMENT TOPICAL at 21:11

## 2023-04-22 RX ADMIN — Medication 2 UNITS: at 08:52

## 2023-04-22 RX ADMIN — PENICILLIN V POTASSIUM 500 MG: 250 TABLET, FILM COATED ORAL at 21:39

## 2023-04-22 RX ADMIN — FERROUS SULFATE TAB 325 MG (65 MG ELEMENTAL FE) 325 MG: 325 (65 FE) TAB at 16:46

## 2023-04-22 RX ADMIN — ASPIRIN 81 MG: 81 TABLET, CHEWABLE ORAL at 08:57

## 2023-04-23 ENCOUNTER — APPOINTMENT (OUTPATIENT)
Dept: GENERAL RADIOLOGY | Age: 63
DRG: 233 | End: 2023-04-23
Attending: THORACIC SURGERY (CARDIOTHORACIC VASCULAR SURGERY)
Payer: COMMERCIAL

## 2023-04-23 LAB
ALBUMIN SERPL-MCNC: 2.9 G/DL (ref 3.5–5)
ALBUMIN/GLOB SERPL: 0.8 (ref 1.1–2.2)
ALP SERPL-CCNC: 42 U/L (ref 45–117)
ALT SERPL-CCNC: 9 U/L (ref 12–78)
ANION GAP SERPL CALC-SCNC: 3 MMOL/L (ref 5–15)
AST SERPL-CCNC: 12 U/L (ref 15–37)
BILIRUB SERPL-MCNC: 1 MG/DL (ref 0.2–1)
BUN SERPL-MCNC: 36 MG/DL (ref 6–20)
BUN/CREAT SERPL: 33 (ref 12–20)
CALCIUM SERPL-MCNC: 8.4 MG/DL (ref 8.5–10.1)
CHLORIDE SERPL-SCNC: 112 MMOL/L (ref 97–108)
CO2 SERPL-SCNC: 24 MMOL/L (ref 21–32)
CREAT SERPL-MCNC: 1.1 MG/DL (ref 0.7–1.3)
ERYTHROCYTE [DISTWIDTH] IN BLOOD BY AUTOMATED COUNT: 21.4 % (ref 11.5–14.5)
GLOBULIN SER CALC-MCNC: 3.6 G/DL (ref 2–4)
GLUCOSE BLD STRIP.AUTO-MCNC: 147 MG/DL (ref 65–117)
GLUCOSE BLD STRIP.AUTO-MCNC: 197 MG/DL (ref 65–117)
GLUCOSE BLD STRIP.AUTO-MCNC: 197 MG/DL (ref 65–117)
GLUCOSE BLD STRIP.AUTO-MCNC: 214 MG/DL (ref 65–117)
GLUCOSE SERPL-MCNC: 157 MG/DL (ref 65–100)
HCT VFR BLD AUTO: 27.9 % (ref 36.6–50.3)
HGB BLD-MCNC: 8.2 G/DL (ref 12.1–17)
MAGNESIUM SERPL-MCNC: 2.5 MG/DL (ref 1.6–2.4)
MCH RBC QN AUTO: 22.5 PG (ref 26–34)
MCHC RBC AUTO-ENTMCNC: 29.4 G/DL (ref 30–36.5)
MCV RBC AUTO: 76.6 FL (ref 80–99)
NRBC # BLD: 0 K/UL (ref 0–0.01)
NRBC BLD-RTO: 0 PER 100 WBC
PLATELET # BLD AUTO: 263 K/UL (ref 150–400)
PMV BLD AUTO: 9.6 FL (ref 8.9–12.9)
POTASSIUM SERPL-SCNC: 4.2 MMOL/L (ref 3.5–5.1)
PROT SERPL-MCNC: 6.5 G/DL (ref 6.4–8.2)
RBC # BLD AUTO: 3.64 M/UL (ref 4.1–5.7)
SERVICE CMNT-IMP: ABNORMAL
SODIUM SERPL-SCNC: 139 MMOL/L (ref 136–145)
WBC # BLD AUTO: 8.4 K/UL (ref 4.1–11.1)

## 2023-04-23 PROCEDURE — 65270000046 HC RM TELEMETRY

## 2023-04-23 PROCEDURE — 74011250637 HC RX REV CODE- 250/637: Performed by: PHYSICIAN ASSISTANT

## 2023-04-23 PROCEDURE — 97110 THERAPEUTIC EXERCISES: CPT

## 2023-04-23 PROCEDURE — 97116 GAIT TRAINING THERAPY: CPT

## 2023-04-23 PROCEDURE — 74011636637 HC RX REV CODE- 636/637: Performed by: PHYSICIAN ASSISTANT

## 2023-04-23 PROCEDURE — 74011250637 HC RX REV CODE- 250/637: Performed by: THORACIC SURGERY (CARDIOTHORACIC VASCULAR SURGERY)

## 2023-04-23 PROCEDURE — 83735 ASSAY OF MAGNESIUM: CPT

## 2023-04-23 PROCEDURE — 77010033678 HC OXYGEN DAILY

## 2023-04-23 PROCEDURE — 36415 COLL VENOUS BLD VENIPUNCTURE: CPT

## 2023-04-23 PROCEDURE — 71045 X-RAY EXAM CHEST 1 VIEW: CPT

## 2023-04-23 PROCEDURE — 74011000250 HC RX REV CODE- 250: Performed by: INTERNAL MEDICINE

## 2023-04-23 PROCEDURE — 80053 COMPREHEN METABOLIC PANEL: CPT

## 2023-04-23 PROCEDURE — 74011000250 HC RX REV CODE- 250: Performed by: PHYSICIAN ASSISTANT

## 2023-04-23 PROCEDURE — 65660000001 HC RM ICU INTERMED STEPDOWN

## 2023-04-23 PROCEDURE — 74011250637 HC RX REV CODE- 250/637: Performed by: NURSE PRACTITIONER

## 2023-04-23 PROCEDURE — 74011250636 HC RX REV CODE- 250/636: Performed by: THORACIC SURGERY (CARDIOTHORACIC VASCULAR SURGERY)

## 2023-04-23 PROCEDURE — 85027 COMPLETE CBC AUTOMATED: CPT

## 2023-04-23 PROCEDURE — 82962 GLUCOSE BLOOD TEST: CPT

## 2023-04-23 PROCEDURE — 74011000250 HC RX REV CODE- 250: Performed by: NURSE PRACTITIONER

## 2023-04-23 PROCEDURE — 74011636637 HC RX REV CODE- 636/637: Performed by: NURSE PRACTITIONER

## 2023-04-23 PROCEDURE — 74011250636 HC RX REV CODE- 250/636: Performed by: NURSE PRACTITIONER

## 2023-04-23 PROCEDURE — 74011000250 HC RX REV CODE- 250: Performed by: THORACIC SURGERY (CARDIOTHORACIC VASCULAR SURGERY)

## 2023-04-23 RX ORDER — LIDOCAINE 4 G/100G
1 PATCH TOPICAL EVERY 24 HOURS
Status: DISCONTINUED | OUTPATIENT
Start: 2023-04-23 | End: 2023-04-25 | Stop reason: HOSPADM

## 2023-04-23 RX ORDER — BUMETANIDE 1 MG/1
1 TABLET ORAL DAILY
Status: DISCONTINUED | OUTPATIENT
Start: 2023-04-23 | End: 2023-04-25 | Stop reason: HOSPADM

## 2023-04-23 RX ORDER — METOPROLOL TARTRATE 25 MG/1
25 TABLET, FILM COATED ORAL EVERY 12 HOURS
Status: DISCONTINUED | OUTPATIENT
Start: 2023-04-23 | End: 2023-04-25 | Stop reason: HOSPADM

## 2023-04-23 RX ORDER — INSULIN GLARGINE 100 [IU]/ML
15 INJECTION, SOLUTION SUBCUTANEOUS DAILY
Status: DISCONTINUED | OUTPATIENT
Start: 2023-04-23 | End: 2023-04-25 | Stop reason: HOSPADM

## 2023-04-23 RX ADMIN — ONDANSETRON 8 MG: 2 INJECTION INTRAMUSCULAR; INTRAVENOUS at 23:39

## 2023-04-23 RX ADMIN — ONDANSETRON 8 MG: 2 INJECTION INTRAMUSCULAR; INTRAVENOUS at 05:15

## 2023-04-23 RX ADMIN — ONDANSETRON 8 MG: 2 INJECTION INTRAMUSCULAR; INTRAVENOUS at 17:32

## 2023-04-23 RX ADMIN — PENICILLIN V POTASSIUM 500 MG: 250 TABLET, FILM COATED ORAL at 10:19

## 2023-04-23 RX ADMIN — POLYETHYLENE GLYCOL 3350 17 G: 17 POWDER, FOR SOLUTION ORAL at 10:23

## 2023-04-23 RX ADMIN — METOPROLOL TARTRATE 25 MG: 25 TABLET, FILM COATED ORAL at 20:44

## 2023-04-23 RX ADMIN — ONDANSETRON 8 MG: 2 INJECTION INTRAMUSCULAR; INTRAVENOUS at 11:26

## 2023-04-23 RX ADMIN — SENNOSIDES AND DOCUSATE SODIUM 1 TABLET: 50; 8.6 TABLET ORAL at 10:19

## 2023-04-23 RX ADMIN — FAMOTIDINE 20 MG: 20 TABLET, FILM COATED ORAL at 20:44

## 2023-04-23 RX ADMIN — ASPIRIN 81 MG: 81 TABLET, CHEWABLE ORAL at 10:19

## 2023-04-23 RX ADMIN — ACETAMINOPHEN 650 MG: 325 TABLET ORAL at 21:36

## 2023-04-23 RX ADMIN — FERROUS SULFATE TAB 325 MG (65 MG ELEMENTAL FE) 325 MG: 325 (65 FE) TAB at 17:33

## 2023-04-23 RX ADMIN — Medication 400 MG: at 17:33

## 2023-04-23 RX ADMIN — AMIODARONE HYDROCHLORIDE 400 MG: 200 TABLET ORAL at 10:20

## 2023-04-23 RX ADMIN — MUPIROCIN: 20 OINTMENT TOPICAL at 10:23

## 2023-04-23 RX ADMIN — CHLORHEXIDINE GLUCONATE 0.12% ORAL RINSE 10 ML: 1.2 LIQUID ORAL at 09:00

## 2023-04-23 RX ADMIN — PENICILLIN V POTASSIUM 500 MG: 250 TABLET, FILM COATED ORAL at 21:36

## 2023-04-23 RX ADMIN — METOPROLOL TARTRATE 25 MG: 25 TABLET, FILM COATED ORAL at 10:19

## 2023-04-23 RX ADMIN — Medication 3 UNITS: at 12:59

## 2023-04-23 RX ADMIN — PENICILLIN V POTASSIUM 500 MG: 250 TABLET, FILM COATED ORAL at 16:38

## 2023-04-23 RX ADMIN — GEMFIBROZIL 600 MG: 600 TABLET ORAL at 10:19

## 2023-04-23 RX ADMIN — SODIUM CHLORIDE, PRESERVATIVE FREE 10 ML: 5 INJECTION INTRAVENOUS at 17:33

## 2023-04-23 RX ADMIN — ONDANSETRON 8 MG: 2 INJECTION INTRAMUSCULAR; INTRAVENOUS at 00:12

## 2023-04-23 RX ADMIN — MUPIROCIN: 20 OINTMENT TOPICAL at 20:45

## 2023-04-23 RX ADMIN — CASTOR OIL AND BALSAM, PERU: 788; 87 OINTMENT TOPICAL at 20:45

## 2023-04-23 RX ADMIN — BUMETANIDE 1 MG: 1 TABLET ORAL at 10:19

## 2023-04-23 RX ADMIN — AMIODARONE HYDROCHLORIDE 400 MG: 200 TABLET ORAL at 17:33

## 2023-04-23 RX ADMIN — CASTOR OIL AND BALSAM, PERU: 788; 87 OINTMENT TOPICAL at 10:24

## 2023-04-23 RX ADMIN — SODIUM CHLORIDE, PRESERVATIVE FREE 10 ML: 5 INJECTION INTRAVENOUS at 21:30

## 2023-04-23 RX ADMIN — Medication 400 MG: at 10:19

## 2023-04-23 RX ADMIN — FERROUS SULFATE TAB 325 MG (65 MG ELEMENTAL FE) 325 MG: 325 (65 FE) TAB at 10:19

## 2023-04-23 RX ADMIN — Medication 2 UNITS: at 09:04

## 2023-04-23 RX ADMIN — CHLORHEXIDINE GLUCONATE 0.12% ORAL RINSE 10 ML: 1.2 LIQUID ORAL at 20:45

## 2023-04-23 RX ADMIN — ROSUVASTATIN 20 MG: 20 TABLET, FILM COATED ORAL at 21:36

## 2023-04-23 RX ADMIN — SODIUM CHLORIDE, PRESERVATIVE FREE 10 ML: 5 INJECTION INTRAVENOUS at 05:15

## 2023-04-23 RX ADMIN — GEMFIBROZIL 600 MG: 600 TABLET ORAL at 16:38

## 2023-04-23 RX ADMIN — Medication 2 UNITS: at 17:33

## 2023-04-23 RX ADMIN — INSULIN GLARGINE 15 UNITS: 100 INJECTION, SOLUTION SUBCUTANEOUS at 17:32

## 2023-04-23 RX ADMIN — FAMOTIDINE 20 MG: 20 TABLET, FILM COATED ORAL at 10:20

## 2023-04-24 ENCOUNTER — TRANSCRIBE ORDER (OUTPATIENT)
Dept: CARDIAC REHAB | Age: 63
End: 2023-04-24

## 2023-04-24 DIAGNOSIS — Z95.1 S/P CABG (CORONARY ARTERY BYPASS GRAFT): Primary | ICD-10-CM

## 2023-04-24 LAB
ALBUMIN SERPL-MCNC: 2.7 G/DL (ref 3.5–5)
ALBUMIN/GLOB SERPL: 0.8 (ref 1.1–2.2)
ALP SERPL-CCNC: 41 U/L (ref 45–117)
ALT SERPL-CCNC: 11 U/L (ref 12–78)
ANION GAP SERPL CALC-SCNC: 4 MMOL/L (ref 5–15)
AST SERPL-CCNC: 9 U/L (ref 15–37)
BILIRUB SERPL-MCNC: 0.7 MG/DL (ref 0.2–1)
BUN SERPL-MCNC: 36 MG/DL (ref 6–20)
BUN/CREAT SERPL: 34 (ref 12–20)
CALCIUM SERPL-MCNC: 8.2 MG/DL (ref 8.5–10.1)
CHLORIDE SERPL-SCNC: 110 MMOL/L (ref 97–108)
CO2 SERPL-SCNC: 21 MMOL/L (ref 21–32)
CREAT SERPL-MCNC: 1.05 MG/DL (ref 0.7–1.3)
ERYTHROCYTE [DISTWIDTH] IN BLOOD BY AUTOMATED COUNT: 21.5 % (ref 11.5–14.5)
GLOBULIN SER CALC-MCNC: 3.6 G/DL (ref 2–4)
GLUCOSE BLD STRIP.AUTO-MCNC: 202 MG/DL (ref 65–117)
GLUCOSE BLD STRIP.AUTO-MCNC: 206 MG/DL (ref 65–117)
GLUCOSE BLD STRIP.AUTO-MCNC: 223 MG/DL (ref 65–117)
GLUCOSE BLD STRIP.AUTO-MCNC: 271 MG/DL (ref 65–117)
GLUCOSE BLD STRIP.AUTO-MCNC: NORMAL MG/DL (ref 65–117)
GLUCOSE SERPL-MCNC: 193 MG/DL (ref 65–100)
HCT VFR BLD AUTO: 27.6 % (ref 36.6–50.3)
HGB BLD-MCNC: 7.9 G/DL (ref 12.1–17)
MAGNESIUM SERPL-MCNC: 2.2 MG/DL (ref 1.6–2.4)
MCH RBC QN AUTO: 21.7 PG (ref 26–34)
MCHC RBC AUTO-ENTMCNC: 28.6 G/DL (ref 30–36.5)
MCV RBC AUTO: 75.8 FL (ref 80–99)
NRBC # BLD: 0 K/UL (ref 0–0.01)
NRBC BLD-RTO: 0 PER 100 WBC
PLATELET # BLD AUTO: 297 K/UL (ref 150–400)
PMV BLD AUTO: 9.5 FL (ref 8.9–12.9)
POTASSIUM SERPL-SCNC: 4.3 MMOL/L (ref 3.5–5.1)
PROT SERPL-MCNC: 6.3 G/DL (ref 6.4–8.2)
RBC # BLD AUTO: 3.64 M/UL (ref 4.1–5.7)
SARS-COV-2 RNA RESP QL NAA+PROBE: NOT DETECTED
SERVICE CMNT-IMP: ABNORMAL
SERVICE CMNT-IMP: NORMAL
SODIUM SERPL-SCNC: 135 MMOL/L (ref 136–145)
SOURCE, COVRS: NORMAL
WBC # BLD AUTO: 8.5 K/UL (ref 4.1–11.1)

## 2023-04-24 PROCEDURE — 74011636637 HC RX REV CODE- 636/637: Performed by: NURSE PRACTITIONER

## 2023-04-24 PROCEDURE — 74011250637 HC RX REV CODE- 250/637: Performed by: NURSE PRACTITIONER

## 2023-04-24 PROCEDURE — 36415 COLL VENOUS BLD VENIPUNCTURE: CPT

## 2023-04-24 PROCEDURE — 97535 SELF CARE MNGMENT TRAINING: CPT

## 2023-04-24 PROCEDURE — 97116 GAIT TRAINING THERAPY: CPT

## 2023-04-24 PROCEDURE — 65660000001 HC RM ICU INTERMED STEPDOWN

## 2023-04-24 PROCEDURE — 74011000250 HC RX REV CODE- 250: Performed by: INTERNAL MEDICINE

## 2023-04-24 PROCEDURE — 74011000250 HC RX REV CODE- 250: Performed by: NURSE PRACTITIONER

## 2023-04-24 PROCEDURE — 83735 ASSAY OF MAGNESIUM: CPT

## 2023-04-24 PROCEDURE — 85027 COMPLETE CBC AUTOMATED: CPT

## 2023-04-24 PROCEDURE — 87635 SARS-COV-2 COVID-19 AMP PRB: CPT

## 2023-04-24 PROCEDURE — 82962 GLUCOSE BLOOD TEST: CPT

## 2023-04-24 PROCEDURE — 80053 COMPREHEN METABOLIC PANEL: CPT

## 2023-04-24 PROCEDURE — 74011250636 HC RX REV CODE- 250/636: Performed by: NURSE PRACTITIONER

## 2023-04-24 PROCEDURE — 97110 THERAPEUTIC EXERCISES: CPT

## 2023-04-24 RX ORDER — POLYETHYLENE GLYCOL 3350 17 G/17G
17 POWDER, FOR SOLUTION ORAL DAILY
Qty: 14 PACKET | Refills: 0 | Status: SHIPPED
Start: 2023-04-25

## 2023-04-24 RX ORDER — AMOXICILLIN 250 MG
1 CAPSULE ORAL DAILY
Qty: 14 TABLET | Refills: 0 | Status: SHIPPED
Start: 2023-04-25

## 2023-04-24 RX ORDER — AMIODARONE HYDROCHLORIDE 200 MG/1
TABLET ORAL
Qty: 84 TABLET | Refills: 0 | Status: SHIPPED
Start: 2023-04-24

## 2023-04-24 RX ORDER — ALBUTEROL SULFATE 0.83 MG/ML
2.5 SOLUTION RESPIRATORY (INHALATION)
Qty: 1 EACH | Refills: 0 | Status: SHIPPED
Start: 2023-04-24

## 2023-04-24 RX ORDER — ONDANSETRON 2 MG/ML
4 INJECTION INTRAMUSCULAR; INTRAVENOUS
Status: DISCONTINUED | OUTPATIENT
Start: 2023-04-24 | End: 2023-04-25 | Stop reason: HOSPADM

## 2023-04-24 RX ORDER — OXYCODONE HYDROCHLORIDE 5 MG/1
5 TABLET ORAL
Qty: 10 TABLET | Refills: 0 | Status: SHIPPED | OUTPATIENT
Start: 2023-04-24 | End: 2023-04-27

## 2023-04-24 RX ORDER — ACETAMINOPHEN 325 MG/1
650 TABLET ORAL
Status: SHIPPED | COMMUNITY
Start: 2023-04-24

## 2023-04-24 RX ORDER — LANOLIN ALCOHOL/MO/W.PET/CERES
3-6 CREAM (GRAM) TOPICAL
Status: SHIPPED | COMMUNITY
Start: 2023-04-24

## 2023-04-24 RX ADMIN — FERROUS SULFATE TAB 325 MG (65 MG ELEMENTAL FE) 325 MG: 325 (65 FE) TAB at 08:34

## 2023-04-24 RX ADMIN — ASPIRIN 81 MG: 81 TABLET, CHEWABLE ORAL at 08:34

## 2023-04-24 RX ADMIN — BUMETANIDE 1 MG: 1 TABLET ORAL at 08:34

## 2023-04-24 RX ADMIN — SENNOSIDES AND DOCUSATE SODIUM 1 TABLET: 50; 8.6 TABLET ORAL at 08:34

## 2023-04-24 RX ADMIN — METOPROLOL TARTRATE 25 MG: 25 TABLET, FILM COATED ORAL at 08:34

## 2023-04-24 RX ADMIN — CHLORHEXIDINE GLUCONATE 0.12% ORAL RINSE 10 ML: 1.2 LIQUID ORAL at 08:34

## 2023-04-24 RX ADMIN — PENICILLIN V POTASSIUM 500 MG: 250 TABLET, FILM COATED ORAL at 17:36

## 2023-04-24 RX ADMIN — FAMOTIDINE 20 MG: 20 TABLET, FILM COATED ORAL at 21:32

## 2023-04-24 RX ADMIN — CASTOR OIL AND BALSAM, PERU: 788; 87 OINTMENT TOPICAL at 08:36

## 2023-04-24 RX ADMIN — Medication 400 MG: at 08:34

## 2023-04-24 RX ADMIN — SODIUM CHLORIDE, PRESERVATIVE FREE 10 ML: 5 INJECTION INTRAVENOUS at 15:18

## 2023-04-24 RX ADMIN — MUPIROCIN: 20 OINTMENT TOPICAL at 08:36

## 2023-04-24 RX ADMIN — CASTOR OIL AND BALSAM, PERU: 788; 87 OINTMENT TOPICAL at 21:36

## 2023-04-24 RX ADMIN — FAMOTIDINE 20 MG: 20 TABLET, FILM COATED ORAL at 08:34

## 2023-04-24 RX ADMIN — POLYETHYLENE GLYCOL 3350 17 G: 17 POWDER, FOR SOLUTION ORAL at 08:34

## 2023-04-24 RX ADMIN — SODIUM CHLORIDE, PRESERVATIVE FREE 10 ML: 5 INJECTION INTRAVENOUS at 05:28

## 2023-04-24 RX ADMIN — AMIODARONE HYDROCHLORIDE 400 MG: 200 TABLET ORAL at 08:34

## 2023-04-24 RX ADMIN — METOPROLOL TARTRATE 25 MG: 25 TABLET, FILM COATED ORAL at 21:32

## 2023-04-24 RX ADMIN — Medication 3 UNITS: at 12:29

## 2023-04-24 RX ADMIN — PENICILLIN V POTASSIUM 500 MG: 250 TABLET, FILM COATED ORAL at 21:33

## 2023-04-24 RX ADMIN — ROSUVASTATIN 20 MG: 20 TABLET, FILM COATED ORAL at 21:32

## 2023-04-24 RX ADMIN — AMIODARONE HYDROCHLORIDE 400 MG: 200 TABLET ORAL at 17:36

## 2023-04-24 RX ADMIN — CHLORHEXIDINE GLUCONATE 0.12% ORAL RINSE 10 ML: 1.2 LIQUID ORAL at 21:32

## 2023-04-24 RX ADMIN — PENICILLIN V POTASSIUM 500 MG: 250 TABLET, FILM COATED ORAL at 07:38

## 2023-04-24 RX ADMIN — FERROUS SULFATE TAB 325 MG (65 MG ELEMENTAL FE) 325 MG: 325 (65 FE) TAB at 17:36

## 2023-04-24 RX ADMIN — ONDANSETRON 8 MG: 2 INJECTION INTRAMUSCULAR; INTRAVENOUS at 05:28

## 2023-04-24 RX ADMIN — INSULIN GLARGINE 15 UNITS: 100 INJECTION, SOLUTION SUBCUTANEOUS at 08:50

## 2023-04-24 RX ADMIN — Medication 3 UNITS: at 17:36

## 2023-04-24 RX ADMIN — Medication 3 UNITS: at 21:33

## 2023-04-24 RX ADMIN — GEMFIBROZIL 600 MG: 600 TABLET ORAL at 17:36

## 2023-04-24 RX ADMIN — Medication 3 UNITS: at 08:35

## 2023-04-24 RX ADMIN — SODIUM CHLORIDE, PRESERVATIVE FREE 10 ML: 5 INJECTION INTRAVENOUS at 21:36

## 2023-04-24 RX ADMIN — GEMFIBROZIL 600 MG: 600 TABLET ORAL at 07:38

## 2023-04-24 RX ADMIN — PENICILLIN V POTASSIUM 500 MG: 250 TABLET, FILM COATED ORAL at 12:29

## 2023-04-24 RX ADMIN — Medication 400 MG: at 17:36

## 2023-04-24 NOTE — PROGRESS NOTES
Problem: Falls - Risk of  Goal: *Absence of Falls  Description: Document Rebecca Alex Fall Risk and appropriate interventions in the flowsheet. Outcome: Progressing Towards Goal  Note: Fall Risk Interventions:       Bed alarm       Problem: Delirium Treatment  Goal: *Level of consciousness restored to baseline  Outcome: Progressing Towards Goal  Goal: *Level of environmental perceptions restored to baseline  Outcome: Progressing Towards Goal  Goal: *Sensory perception restored to baseline  Outcome: Progressing Towards Goal  Goal: *Emotional stability restored to baseline  Outcome: Progressing Towards Goal  Goal: *Functional assessment restored to baseline  Outcome: Progressing Towards Goal  Goal: *Absence of falls  Outcome: Progressing Towards Goal  Goal: *Will remain free of delirium, CAM Score negative  Outcome: Progressing Towards Goal  Goal: *Cognitive status will be restored to baseline  Outcome: Progressing Towards Goal  Goal: Interventions  Outcome: Progressing Towards Goal     Problem: Patient Education: Go to Patient Education Activity  Goal: Patient/Family Education  Outcome: Progressing Towards Goal     Problem: Pressure Injury - Risk of  Goal: *Prevention of pressure injury  Description: Document Joseph Scale and appropriate interventions in the flowsheet. Outcome: Progressing Towards Goal  Note: Pressure Injury Interventions:  Sensory Interventions: Keep linens dry and wrinkle-free, Maintain/enhance activity level, Minimize linen layers, Monitor skin under medical devices, Assess changes in LOC, Assess need for specialty bed         Activity Interventions: Increase time out of bed, Pressure redistribution bed/mattress(bed type)    Mobility Interventions: Chair cushion, Assess need for specialty bed, Float heels, HOB 30 degrees or less, Pressure redistribution bed/mattress (bed type), PT/OT evaluation, Turn and reposition approx.  every two hours(pillow and wedges)    Nutrition Interventions: Document food/fluid/supplement intake    Friction and Shear Interventions: Apply protective barrier, creams and emollients, Feet elevated on foot rest, HOB 30 degrees or less, Foam dressings/transparent film/skin sealants, Lift sheet, Lift team/patient mobility team, Minimize layers, Sit at 90-degree angle, Transfer aides:transfer board/Blaine lift/ceiling lift, Transferring/repositioning devices

## 2023-04-24 NOTE — PROGRESS NOTES
2213: TRANSFER - IN REPORT:    Verbal report received from (name) on Maicol Ovalle  being received from CCU(unit) for routine progression of care      Report consisted of patients Situation, Background, Assessment and   Recommendations(SBAR). Information from the following report(s) SBAR, Kardex, and MAR was reviewed with the receiving nurse. Opportunity for questions and clarification was provided. Assessment completed upon patients arrival to unit and care assumed. 0013: Patient awake, watching basketball games. Labs and weight done. 0225: Patient resting with eyes closed. 0402: Patient resting with eyes closed. 1237: Patient was given CHG bath, wires taped and isolated and placed in the chair. End of Shift Note    Bedside shift change report given to  (oncoming nurse) by Federico Hooks RN (offgoing nurse). Report included the following information SBAR, Kardex, and MAR    Shift worked:  [id number]     Shift summary and any significant changes:          Concerns for physician to address:       Zone phone for oncoming shift:          Activity:  Activity Level: Up with Assistance  Number times ambulated in hallways past shift: 0  Number of times OOB to chair past shift: 1    Cardiac:   Cardiac Monitoring: Yes      Cardiac Rhythm: Atrial Paced    Access:  Current line(s): PIV     Genitourinary:   Urinary status: voiding    Respiratory:   O2 Device: None (Room air)  Chronic home O2 use?: NO  Incentive spirometer at bedside: YES  Actual Volume (ml): 1250 ml    GI:  Last Bowel Movement Date: 04/18/23  Current diet:  ADULT DIET Regular; 4 carb choices (60 gm/meal);  Low Fat/Low Chol/High Fiber/PEDRO PABLO; No Concentrated Sweets  Passing flatus: YES  Tolerating current diet: YES       Pain Management:   Patient states pain is manageable on current regimen: YES    Skin:  Joseph Score: 18  Interventions: increase time out of bed and PT/OT consult    Patient Safety:  Fall Score:    Interventions: gripper socks and pt to call before getting OOB       Length of Stay:  Expected LOS: 8d 7h  Actual LOS: 5      Federico Hooks RN

## 2023-04-24 NOTE — PROGRESS NOTES
Problem: Mobility Impaired (Adult and Pediatric)  Goal: *Acute Goals and Plan of Care (Insert Text)  Description: FUNCTIONAL STATUS PRIOR TO ADMISSION: Patient was modified independent inconsistently using a rolling walker and single point cane for functional mobility. He required increased time due to reported BLE weakness that has been going on since his knee replacements. Reports having to pull to stand at times from a lower seat; could climb stairs only by pulling himself up with a rail. HOME SUPPORT PRIOR TO ADMISSION: The patient lived with wife but did not require assist.    Physical Therapy Goals  Initiated 4/20/2023  1. Patient will move from supine to sit and sit to supine , scoot up and down, and roll side to side in bed with minimal assistance/contact guard assist within 5 days. 2.  Patient will perform sit to/from stand with minimal assistance/contact guard assist within 5 days. 3.  Patient will ambulate 150 feet with least restrictive assistive device and contact guard assist within 5 days. 4.  Patient will ascend/descend 12 stairs with handrail(s) with minimal assistance/contact guard assist within 5 days. 5.  Patient will perform cardiac exercises per protocol with independence within 5 days. 6.  Patient will verbally recall and functionally demonstrate mindful-based movements (\"move in the tube\") principles without cues within 5 days.          Outcome: Progressing Towards Goal   PHYSICAL THERAPY TREATMENT  Patient: Antoinette Jose (89 y.o. male)  Date: 4/24/2023  Diagnosis: Chest pain, unspecified type [R07.9]  CAD (coronary artery disease) [I25.10] <principal problem not specified>  Procedure(s) (LRB):  CORONARY ARTERY BYPASS GRAFT x FOUR   (CABG) WITH RIGHT SAPHENOUS ENDOSCOPIC VEIN HARVEST, LEFT INTERNAL MAMMARY ARTERY,  AND LEFT ATRIAL APPENDAGE CLIP,  EXTRA CORPOREAL CIRCULATION , KRISTIAN AND EPIAORTIC U/S BY DR MORGAN (N/A) 5 Days Post-Op  Precautions: Sternal (move in the tube)  Chart, physical therapy assessment, plan of care and goals were reviewed. ASSESSMENT  Patient continues with skilled PT services and is progressing towards goals. Pt was received in bathroom upon arrival and agreeable to participate with therapy services. Pt has good adherence to his move in the tube precautions during sit to stand transfers, however continues to require assistance of 1-2 persons to stand and forward rocking momentum to achieve stand with each trial. Pt performing sit to stands x3-4 and varying in assistance level throughout from CGA-Sherron x2. Pt continues be limited in activity tolerance due to his shortness of breath with ambulation, needing a seated rest break prior to performing his UE cardiac exercises. VSS throughout treatment. Pt continues to be highly motivated and engaged with therapy services, able to tolerate intensive therapy and still appropriate for IPR upon discharge. Patient is verbalizing and is demonstrating understanding of mindful-based movements (\"move in the tube\") principles of keeping UEs proximal to ribcage to prevent lateral pull on the sternum during load-bearing activities with verbal cues required for compliance. Current Level of Function Impacting Discharge (mobility/balance): Sherron x2 to stand, CGA with RW for gait    Other factors to consider for discharge: has supportive wife, bilateral LE weakness from prior knee replacements, very motivated         PLAN :  Patient continues to benefit from skilled intervention to address the above impairments. Continue treatment per established plan of care. to address goals.     Recommendation for discharge: (in order for the patient to meet his/her long term goals)  Therapy 3 hours per day 5-7 days per week    This discharge recommendation:  Has been made in collaboration with the attending provider and/or case management    IF patient discharges home will need the following DME: rolling walker     SUBJECTIVE: Patient stated I don't know if they released me from rehab too early but the legs are just so weak.     OBJECTIVE DATA SUMMARY:   Patient mobilized on continuous portable monitor/telemetry. Critical Behavior:  Neurologic State: Alert  Orientation Level: Oriented X4  Cognition: Follows commands  Safety/Judgement: Awareness of environment    Functional Mobility Training:  Bed Mobility:  Rolling:  (pt seated on toilet upon arrival)     Transfers:  Sit to Stand: Minimum assistance;Assist x2 (requires rocking momentum to stand)  Stand to Sit: Contact guard assistance;Minimum assistance    Balance:  Sitting: Intact  Standing: Impaired  Standing - Static: Good;Constant support  Standing - Dynamic : Fair;Good;Constant support    Ambulation/Gait Training:  Distance (ft): 105 Feet (ft)  Assistive Device: Gait belt;Walker, rolling  Ambulation - Level of Assistance: Contact guard assistance     Gait Abnormalities: Decreased step clearance (slightly flexed forward)     Base of Support: Widened     Speed/Lexi: Pace decreased (<100 feet/min)  Step Length: Left shortened;Right shortened    Cardiac diagnosis intervention:  Patient instructed and educated on mindful movement principles based on Move in The Tube concept to include maintaining bilateral elbows close to rib cage when performing any load-bearing activity such as getting in/out of bed, pushing up from a chair, opening a door, or lifting a box. Patient was given a handout with diagrams of each correct/incorrect method of performing each of the above tasks. Therapeutic Exercises:   Patient instructed on the benefits and demonstrated cardiac exercises while seated with Contact guard assistance. Instructed and indicated understanding on how to progress reps, sets against gravity, pacing through progressive muscle strengthening standing based on surgeon clearance for more weight in prep for functional activity.  Instruction on the use of household items in place of weights as needed. CARDIAC  EXERCISE   Sets   Reps   Active Active Assist   Passive Self ROM   Comments   Shoulder flexion 1 10 [x]                                            []                                            []                                            []                                               Shoulder abduction 1      10 [x]                                            []                                            []                                            []                                               Scapular elevation 1 10 [x]                                            []                                            []                                            []                                               Scapular retraction 1 10 [x]                                            []                                            []                                            []                                               Trunk rotation 1 10 [x]                                            []                                            []                                            [x]                                               Trunk sidebending 1 10 [x]                                            []                                            []                                            []                                                 Pain Rating:  No pain rating received from pt    Activity Tolerance:   Fair, SpO2 stable on RA, and requires rest breaks    After treatment patient left in no apparent distress:   Sitting in chair and Call bell within reach    COMMUNICATION/COLLABORATION:   The patients plan of care was discussed with: Occupational therapist, Registered nurse, and Case management.      Samaria Crockett PTA   Time Calculation: 29 mins

## 2023-04-24 NOTE — PROGRESS NOTES
Cardiac Surgery Care Coordinator- Met with Darrion Parks ,  reviewed plan of care and discussed upcoming discharge date. Reinforced sternal precautions and encouraged continued use of the incentive spirometer. Began discharge teaching and encouraged him to verbalize. Reviewed goals for the day and discussed the  importance of increased activity and continued activity after discharge. Will continue to follow for educational and emotional needs.  Zoey Gan RN

## 2023-04-24 NOTE — DISCHARGE INSTRUCTIONS
[facility]      [address]  [address]                                                           [address], [address] [place] [address]  Office- [phone]  Fax- [phone]        Office- [phone]  Fax- [phone]  _________________________________________________________________  Dr. Joanna Cortez, NP            Edison Newton, [address] [facility]max Patel, NP                   King Pimentel, NP     Dr. Regan Bryant, NP            Evelyn Farrell, JUS Vega, ROSA M Dalton, ROSA M                                                                                                                                            Name:Jermaine Frost     Surgery & Date: Procedure(s):  CORONARY ARTERY BYPASS GRAFT x FOUR   (CABG) WITH RIGHT SAPHENOUS ENDOSCOPIC VEIN HARVEST, LEFT INTERNAL MAMMARY ARTERY,  AND LEFT ATRIAL APPENDAGE CLIP,  EXTRA CORPOREAL CIRCULATION , KRISTIAN AND EPIAORTIC U/S BY DR MORGAN    Discharge Date: 4/25/23    MEDICATIONS:  Please refer to your After Visit Summary for your medication list. If you do not have a prescription for a new medication, you may purchase the medication over the counter. DO NOT TAKE ANY MEDICATIONS THAT ARE NOT ON THIS LIST      INSTRUCTIONS:  NO SMOKING OR TOBACCO PRODUCTS  Follow all the instructions in your discharge book  Shower daily. Wash all incisions twice daily with Dial soap and water. After each wash with soap and water, wash incisions with Dynahex 4 solution and rinse. Do this for 14 days. No lotions, ointments or powder. If unable to wash with soap and water, wash with chlorhexidine wipes (orange pack). Call the office immediately for any redness, swelling, or drainage from your incision.    Take your temperature daily and call for a temperature of 101 degrees or higher or for any symptoms that make you think you have and infection. Weigh yourself each morning. Call if you gain more than 5 pounds in 48 hours. Use the incentive spirometer 6-8 times a day-10 breaths each time. Use a pillow or your bear to splint your breastbone when coughing or sneezing. If you feel your breast bone clicking or popping, notify the office immediately. Walk several hundred feet several times daily. DIET  Eat an American Heart Association diet. If you are having trouble with your appetite, eat what you can. Try eating small, frequent meals throughout the day. Diabetic patients should follow an ADA diet. Check your blood sugars  And keep a log of your results. ACTIVITY  NO DRIVING--you will be evaluated to drive at your follow up visit. Increase your activity by walking several times a day. Stay out of bed most of the day. When sitting, keep your legs elevated. You may ride in a car, but you must get out every hour and walk around. If you ride in a car with an airbag that can not be switched off, put the seat ALL the way back or ride in the back seat. Any load bearing activity can be performed as long as it can be performed \"in the tube\". You can reach \"out of the tube\" when performing activities that don't require heavy lifting. Let pain be your guide, your pain level will keep you from doing anything extreme. Normal Problems After Discharge:  Some fatigue and difficulty sleeping  Poor appetite initially, with temporary change in taste  Temperature variability; feeling hot and cold  Chest wall soreness and paresthesia (numbness)  Some musculoskeletal pain along joint lines in chest and back.    Tylenol or NSAIDs will help unless contraindicated    Patients with EVH (Endoscopic Vein [place]) will have mild swelling in that leg due to temporary venous insufficiency  Elevation & compression stockings will help to reduce the swelling        FOLLOW UP  Our office is located in [address] [place] [address], [address]. Your follow up appointment will be  on 5/18 at 3pm. Please call our office at [phone] if you are unable to make this appointment. Cardiac rehab will call with an appointment. They are located in the [address] [place] [address], [address]. Their phone number is [phone]. Your appointment with Dr. Suha Mccallum will be on 6/14/23 at 10:15am.  Office number is [phone]  Consult you primary care physician regarding your influenza & pneumovax vaccines. Please bring all medications (or a complete list) with you to your appointment. Do not hesitate to contact our office 24/7 with any questions or concerns. Call the number on your red bracelet ([phone])      Signature:___________________________________________________        Kenya Hacking Dressing Instructions:    [place] Hacking is a lightweight mesh thats applied over your incision, then coated with a skin adhesive to create a strong, flexible seal that protects against water and bacteria. Your healthcare team chose to use Dermabond Prineo system on your sternal incision. Please share the instruction sheet in your discharge packet with your home health nurse. They will assist with dressing removal 10-14 days after surgery. Prineo stays on for 10-14 days. If you notice the edge of the dressing peeling up, trim the edge but do not remove the dressing. Dont scratch, rub or pick at it. Do not apply topical lotions, ointments, or liquids  You may shower and let soap and water water run on the dressing, but do not scrub it. Be sure to lightly pat it dry when you exit the shower.

## 2023-04-24 NOTE — CARDIO/PULMONARY
Cardiopulmonary Rehab:    Chart reviewed. Pt is a 57 y.o. M admitted with Chest pain, unspecified type [R07.9]  CAD (coronary artery disease) [I25.10]. S.p CABG on 4/18/23. LVEF 35-40%. Former smoker, quit 1989. CABG education folder at bedside. Met with Lynnda Schlatter to review cardiac surgery post discharge instructions and to discuss participation in the Cardiac Rehab Program.     Educated using teach back method. Reviewed the use of bear for sternal support, daily weight and temperature monitoring, showering restrictions, signs and symptoms of infection at surgery sites, daily walking and arm exercises, and use of incentive spirometer. Lynnda Schlatter was able to demonstrate proper use of incentive spirometer, achieving 1500 ml. Reviewed risk factors for CAD to include the following: family history, elevated BMI, hyperlipidemia, hypertension, diabetes, stress, and smoking. Discussed Cardiac Rehab Program format, benefits, and encouraged participation. Pt expected to be discharged to IP rehab. Will follow up outpatient to scheduled intake appointment. General questions answered. Lynnda Schlatter verbalized understanding.        Will continue to follow for educational needs and enrollment in the [address] [place] [address],[address], RN

## 2023-04-24 NOTE — PROGRESS NOTES
[facility] FLOOR Progress Note    Admit Date: 2023  POD: 5 Days Post-Op      Procedure:  Procedure(s):  CORONARY ARTERY BYPASS GRAFT x FOUR   (CABG) WITH RIGHT SAPHENOUS ENDOSCOPIC VEIN HARVEST, LEFT INTERNAL MAMMARY ARTERY,  AND LEFT ATRIAL APPENDAGE CLIP,  EXTRA CORPOREAL CIRCULATION , KRISTIAN AND EPIAORTIC U/S BY DR Jackie Prince    Subjective/overnight events:   Pt seen with Dr. Alexandria Benson, up in the chair. AF with RVR over the weekend- currently paced on RA, afebrile . VSS. No events overnight. Pain is well controlled. Breathing okay with still, but gets really SOB and fatigues easily with activity. PT notes show x 2 assist. Reports poor po intake as he does not like the food- his wife may bring him something later. Open to supplements. Voiding fine. No BM yet but passing gas- feels like he may have one this AM and would like to hold off on any suppositories.      Objective:     Visit Vitals  /66 (BP 1 Location: Left upper arm, BP Patient Position: At rest)   Pulse 75   Temp 98.8 °F (37.1 °C)   Resp 14   Ht 6' 0.99\" (1.854 m)   Wt 297 lb 2.9 oz (134.8 kg)   SpO2 93%   BMI 39.22 kg/m²     Temp (24hrs), Av.6 °F (37 °C), Min:98.1 °F (36.7 °C), Max:99.1 °F (37.3 °C)      Last 24hr Input/Output:    Intake/Output Summary (Last 24 hours) at 2023 0512  Last data filed at 2023 0055  Gross per 24 hour   Intake 780 ml   Output 2275 ml   Net -1495 ml        Chest tube output: N/A    EKG/Rhythm:   EKG Results       Procedure 720 Value Units Date/Time    EKG, 12 LEAD, SUBSEQUENT [[id number]] Collected: 23 0128    Order Status: Completed Updated: 23 1557     Ventricular Rate 80 BPM      Atrial Rate 80 BPM      P-R Interval 222 ms      QRS Duration 148 ms      Q-T Interval 420 ms      QTC Calculation (Bezet) 484 ms      Calculated R Axis -29 degrees      Calculated T Axis 39 degrees      Diagnosis --     AV dual-paced rhythm with prolonged AV conduction  Abnormal ECG  Confirmed by Olivia Mancini MD, Aron ([id number]) on 4/22/2023 3:57:09 PM      EKG, 12 LEAD, INITIAL [[id number]] Collected: 04/20/23 0449    Order Status: Completed Updated: 04/22/23 1149     Ventricular Rate 77 BPM      Atrial Rate 77 BPM      P-R Interval 206 ms      QRS Duration 102 ms      Q-T Interval 378 ms      QTC Calculation (Bezet) 427 ms      Calculated R Axis 1 degrees      Calculated T Axis -36 degrees      Diagnosis --     Atrial-paced rhythm with occasional ventricular-paced complexes and with   occasional supraventricular complexes  Inferior infarct , old  Poor R-wave Progression (consider lead placement or loss of anterior forces)  Nonspecific ST and T wave abnormality  Abnormal ECG  Confirmed by Aron Tee MD [id number]) on 4/22/2023 11:49:44 AM      EKG, 12 LEAD, INITIAL [[id number]]     Order Status: Sent     EKG, 12 LEAD, INITIAL [[id number]] Collected: 04/19/23 1418    Order Status: Completed Updated: 04/21/23 2203     Ventricular Rate 72 BPM      Atrial Rate 326 BPM      QRS Duration 108 ms      Q-T Interval 416 ms      QTC Calculation (Bezet) 455 ms      Calculated R Axis 14 degrees      Calculated T Axis -43 degrees      Diagnosis --     Sinus rhythm  Ventricular paced rhythm  St&t changes compatible with myocardial ischemia  Confirmed by Aron Tee MD ([id number]) on 4/21/2023 10:03:05 PM      EKG, 12 LEAD, INITIAL [[id number]] Collected: 04/18/23 2148    Order Status: Completed Updated: 04/19/23 2029     Ventricular Rate 73 BPM      Atrial Rate 73 BPM      P-R Interval 192 ms      QRS Duration 106 ms      Q-T Interval 380 ms      QTC Calculation (Bezet) 418 ms      Calculated P Axis 57 degrees      Calculated R Axis 11 degrees      Calculated T Axis -80 degrees      Diagnosis --     Normal sinus rhythm  Inferior infarct , old  Cannot rule out Anterior infarct , age undetermined  Abnormal ECG  Confirmed by Aron Tee MD ([id number]) on 4/19/2023 8:29:02 PM              Oxygen: RA    CXR:   CXR Results  (Last 48 hours)                 04/23/23 3337  XR CHEST PORT Final result    Impression:  No pneumothorax following removal of right IJ sheath. Persistent left basilar   atelectasis/effusion. Narrative:  INDICATION: post op heart       EXAMINATION:  AP CHEST, PORTABLE       COMPARISON: 4/22/2023       FINDINGS: Single AP portable view of the chest demonstrates prior median   sternotomy and left subclavian pacemaker. Interval removal of right IJ sheath. The cardiomediastinal silhouette is unchanged. Left basilar atelectasis and   pleural effusion without significant change. Admission Weight: Last Weight   Weight: 280 lb (127 kg) Weight: 297 lb 2.9 oz (134.8 kg)       EXAM:  General: Awake, alert, oriented  and no acute distress   Lungs:    Diminished to ausculation bilaterally and on RA   Incision:  Incision clean, dry and intact, no erythema, drainage or swelling, and prineo intact   Heart:   Regular rate and rhythm  and no murmurs, rubs or gallops   Abdomen:    Soft, non-distended, non-tender and active bowel sounds   Extremities:  Non-pitting edema BLE and pulses palpable   Neurologic:  Gross motor and sensory apparatus intact         Activity: OOB TID, ambulate     Diet:  Cardiac, diabetic, no concentrated sweets    Lab Data Reviewed:   Recent Labs     04/24/23  0031 04/23/23 2128   WBC 8.5  --    HGB 7.9*  --    HCT 27.6*  --      --    *  --    K 4.3  --    BUN 36*  --    CREA 1.05  --    *  --    GLUCPOC  --  197*         Assessment:     Active Problems:    CAD (coronary artery disease) (4/19/2023)      S/P CABG x 4 (4/19/2023)      Overview: On Pump CORONARY ARTERY BYPASS GRAFT x FOUR   (CABG) WITH SANDERSON to LAD,       sequential RSVG to OM and ramus, RSVG to RCA      RIGHT GREATER SAPHENOUS ENDOSCOPIC VEIN HARVEST      LEFT ATRIAL APPENDAGE CLIP      S/P left atrial appendage ligation (4/19/2023)      Overview:  On Pump CORONARY ARTERY BYPASS GRAFT x FOUR   (CABG) WITH SANDERSON to LAD,       sequential RSVG to OM and ramus, RSVG to RCA      RIGHT GREATER SAPHENOUS ENDOSCOPIC VEIN HARVEST      LEFT ATRIAL APPENDAGE CLIP             Plan/Recommendations/Medical Decision Making:     CAD s/p CABG x4: Overall doing well. On asa, statin, amio, BB, oral diuretic; continue. Pending placement. Cont OOB to chair, ambulation, IS. Afib previous hybrid ablation s/p LAAL: Atrial fibrillation with RVR early am 4/22. Currently paced rhythm, On Eliquis PTA; does not need to resume Eliquis postop per Dr. Yuri Hurd. Acute on chronic HFrEF, stable: On Entresto, Metoprolol, Jardiance, Lasix PTA. On BB and diuretic currently; add additional GDMT as able. Continue PAMELA hose. SSS, tachy-amina syndrome s/p [place] PPM: Interrogated 4/21/23; functioning appropriately, rate on PPM increased by representative. Post-op respiratory insufficiency secondary to atelectasis, stable. On RA. Continue OOB all meals, IS, ambulate. Cont daily diuretic. Anemia following open heart surgery, stable. HGB 7.9 this am, s/p transfusion 4/21/23. Leukocytosis, resolved. WBC 8.5. Afebrile. Likely reactive. Monitor and initiate infectious workup if indicated by patient status. Continue OOB to chair, IS, ambulation, diligent wound care. HTN: On Entresto, Metoprolol, Jardiance, Lasix PTA; On Lasix, BB; continue. HLD: Crestor 20mg and Lopid PTA; continue. T2DM: A1C 6.0. On Ozempic, metformin, glipizide PTA. Transition off drip 4/21, elevated SQBS-  appreciate Diabetes Mangement recs today- anticipate no insulin at discharge once back on home regimen, stress induced elevation from surgery cause for increased levels  LE edema, stable: Cont diuretic, Pamela Hose, elevate LE  Leg wound: Continue home abx (Veetid) per pharmacy. Post-op pain control, stable. Continue scheduled Tylenol, lidocaine patches and prn oxycodone with Dilaudid for breakthrough. DVT ppx- SCDs  Dispo- Pending placement- will discuss with Case Management.      Signed By: Gonzalez Crowe Chelsea Devine, NP

## 2023-04-24 NOTE — PROCEDURES
Pacer wire dressing removed, sites cleaned with CHG, sutures x 2 cut, pacer wires x 3 cut under tension close to the skin without difficulty. Pt tolerated well.

## 2023-04-24 NOTE — DISCHARGE SUMMARY
[facility] Discharge Summary     Patient ID:  Rae Inman  [id number]  40 y.o.  1960    Admit date: 4/18/2023    Discharge date: 4/25/2023     Admitting Physician: Lacey Garcia MD     Referring Cardiologist: Waqas Guillory    PCP:  Ja Fuentes MD    Admitting Diagnoses: CAD  1. Severe multivessel coronary artery disease. 2.  Mild mitral regurgitation. 3.  New York Heart Association Class II heart failure with left ventricular ejection fraction of 30%-35%. 4.  History of status post permanent pacemaker placement. 5.  Morbid obesity with body mass index of 38. Discharge Diagnoses:   1. Severe multivessel coronary artery disease. 2.  Mild mitral regurgitation. 3.  New York Heart Association Class II heart failure with left ventricular ejection fraction of 30%-35%. 4.  History of status post permanent pacemaker placement. 5.  Morbid obesity with body mass index of 38.  6.  Atelectasis as expected post cardiac surgery  7. Atrial fibrillation, resolved.   8.    Hospital Problems  Date Reviewed: 4/21/2022            Codes Class Noted POA    CAD (coronary artery disease) ICD-10-CM: I25.10  ICD-9-CM: 414.00  4/19/2023 Unknown        S/P CABG x 4 ICD-10-CM: Z95.1  ICD-9-CM: V45.81  4/19/2023 Unknown    Overview Signed 4/19/2023  1:34 PM by JUS Mallory     On Pump CORONARY ARTERY BYPASS GRAFT x FOUR   (CABG) WITH SANDERSON to LAD, sequential RSVG to OM and ramus, RSVG to RCA  RIGHT GREATER SAPHENOUS ENDOSCOPIC VEIN HARVEST  LEFT ATRIAL APPENDAGE CLIP             S/P left atrial appendage ligation ICD-10-CM: Z98.890  ICD-9-CM: V45.89  4/19/2023 Unknown    Overview Signed 4/19/2023  1:34 PM by JUS Mallory     On Pump CORONARY ARTERY BYPASS GRAFT x FOUR   (CABG) WITH SANDERSON to LAD, sequential RSVG to OM and ramus, RSVG to RCA  RIGHT GREATER SAPHENOUS ENDOSCOPIC VEIN HARVEST  LEFT ATRIAL APPENDAGE CLIP                Discharged Condition: improved and fair    Disposition:  to [facility] Procedures for this admission:  Procedure(s):  CORONARY ARTERY BYPASS GRAFT x FOUR   (CABG) WITH RIGHT SAPHENOUS ENDOSCOPIC VEIN HARVEST, LEFT INTERNAL MAMMARY ARTERY,  AND LEFT ATRIAL APPENDAGE CLIP,  EXTRA CORPOREAL CIRCULATION , KRISTIAN AND EPIAORTIC U/S BY DR MORGAN    Discharge Medications:      My Medications        START taking these medications        Instructions Each Dose to Equal Morning Noon Evening Bedtime   acetaminophen 325 mg tablet  Commonly known as: TYLENOL  Replaces: ACETAMINOPHEN PO    Your last dose was: Your next dose is: Take 2 Tablets by mouth every four (4) hours as needed. 650 mg                 albuterol 2.5 mg /3 mL (0.083 %) Nebu  Commonly known as: PROVENTIL VENTOLIN    Your last dose was: Your next dose is:         3 mL by Nebulization route every four (4) hours as needed for Wheezing. 2.5 mg                 amiodarone 200 mg tablet  Commonly known as: CORDARONE    Your last dose was: Your next dose is: Take two tabs by mouth twice daily x 2 weeks, then take two tabs once daily x 2 weeks, then STOP  Indications: prevention of ventricular fibrillation                  melatonin 3 mg tablet    Your last dose was: Your next dose is: Take 1-2 Tablets by mouth nightly as needed for Insomnia or Sleep. 3-6 mg                 oxyCODONE IR 5 mg immediate release tablet  Commonly known as: ROXICODONE    Your last dose was: Your next dose is: Take 1 Tablet by mouth every eight (8) hours as needed for Pain (For pain unresponsive to Tylenol) for up to 3 days. Max Daily Amount: 15 mg.   5 mg                 senna-docusate 8.6-50 mg per tablet  Commonly known as: PERICOLACE    Your last dose was: Your next dose is: Take 1 Tablet by mouth daily.    1 Tablet                        CHANGE how you take these medications        Instructions Each Dose to Equal Morning Noon Evening Bedtime   metFORMIN 1,000 mg tablet  Commonly known as: GLUCOPHAGE  What changed: additional instructions    Your last dose was: Your next dose is: Take 1 Tablet by mouth two (2) times daily (with meals). Start on Thursday. 1,000 mg                 polyethylene glycol 17 gram packet  Commonly known as: MIRALAX  What changed:   when to take this  reasons to take this    Your last dose was: Your next dose is: Take 1 Packet by mouth daily. 17 g                        CONTINUE taking these medications        Instructions Each Dose to Equal Morning Noon Evening Bedtime   amoxicillin 500 mg capsule  Commonly known as: AMOXIL    Your last dose was: Your next dose is:         DENTAL APPOINTMENTS ONLY                  aspirin delayed-release 81 mg tablet    Your last dose was: Your next dose is: Take  by mouth daily. cyanocobalamin 1,000 mcg tablet    Your last dose was: Your next dose is: Take 1 Tablet by mouth daily. 1,000 mcg                 furosemide 80 mg tablet  Commonly known as: LASIX    Your last dose was: Your next dose is:         TAKE 1-2 TABLET BY MOUTH DAILY AS NEEDED FOR FLUID                  gemfibroziL 600 mg tablet  Commonly known as: LOPID    Your last dose was: Your next dose is:         TAKE 1 TABLET BY MOUTH TWO (2) TIMES A DAY. 600 mg                 glipiZIDE 10 mg tablet  Commonly known as: GLUCOTROL    Your last dose was: Your next dose is:         TAKE 1 TABLET BY MOUTH TWICE A DAY                  Jardiance 10 mg tablet  Generic drug: empagliflozin    Your last dose was: Your next dose is:         TAKE 1 TAB BY MOUTH DAILY. FOR DIABETES                  magnesium oxide 400 mg tablet  Commonly known as: MAG-OX    Your last dose was: Your next dose is:         TAKE 2 TABLETS BY MOUTH EVERY DAY                  metoprolol tartrate 25 mg tablet  Commonly known as: LOPRESSOR    Your last dose was: Your next dose is:          Take 1 Tablet by mouth two (2) times a day for 90 days. 25 mg                 nitroglycerin 0.4 mg SL tablet  Commonly known as: NITROSTAT    Your last dose was: Your next dose is:         1 Tablet by SubLINGual route every five (5) minutes as needed for Chest Pain (call 911 if CP/ SOB not relieved by 3 tabs). 0.4 mg                 omega-3 fatty acids-vitamin e 1,000 mg Cap    Your last dose was: Your next dose is: Take 2 Capsules by mouth two (2) times a day. 2 Capsule                 Ozempic 1 mg/dose (4 mg/3 mL) Pnij  Generic drug: semaglutide    Your last dose was: Your next dose is:         INJECT 1 MG BY SUBCUTANEOUS ROUTE EVERY SEVEN (7) DAYS. penicillin v potassium 500 mg tablet  Commonly known as: VEETID    Your last dose was: Your next dose is: Take  by mouth four (4) times daily. rosuvastatin 20 mg tablet  Commonly known as: CRESTOR    Your last dose was: Your next dose is:         TAKE 1 TABLET BY MOUTH EVERY DAY FOR CHOLESTEROL                  Vitron-C 65 mg iron- 125 mg Tbec  Generic drug: iron,carbonyl-vitamin C    Your last dose was: Your next dose is: Take 1 Tablet by mouth two (2) times a day. One po bid   1 Tablet                        STOP taking these medications      acetaminophen 650 mg Tber  Commonly known as: Tylenol Arthritis Pain        ACETAMINOPHEN PO  Replaced by: acetaminophen 325 mg tablet        apixaban 5 mg tablet  Commonly known as: Eliquis        Entresto 24-26 mg tablet  Generic drug: sacubitriL-valsartan        ibuprofen 800 mg tablet  Commonly known as: MOTRIN                  Where to Get Your Medications        Information on where to get these meds will be given to you by the nurse or doctor.     Ask your nurse or doctor about these medications  albuterol 2.5 mg /3 mL (0.083 %) Nebu  amiodarone 200 mg tablet  metFORMIN 1,000 mg tablet  oxyCODONE IR 5 mg immediate release tablet  polyethylene glycol 17 gram packet  senna-docusate 8.6-50 mg per tablet          INR TARGET-N/A  Oxygen: Room air      Exerpted HPI by Tona Vieira PA-C :    Lynnda Schlatter is a 57 y.o. man who was referred for cardiac evaluation after Cardiac Catheterization showing multivessel CAD on 4/18/23. Cardiac Testing     Cardiac catheterization:4/18/23  Diagnostic  Dominance: Right  Left Main   Mid LM lesion, 60% stenosed. Dist LM lesion, 80% stenosed. Left Anterior Descending   Mid LAD lesion, 80% stenosed. Ramus Intermedius   The vessel was visualized by angiography. The vessel is angiographically normal.      Left Circumflex   Mid Cx lesion, 70% stenosed. Right Coronary Artery   Mid RCA lesion, 100% stenosed. Diagnostic coronary angiography shows positive for . Distal vessel fills via collaterals from left. Intervention         ECHO: 4-18-23    Left Ventricle: Moderately reduced left ventricular systolic function with a visually estimated EF of 35 - 40%. Left ventricle is mildly dilated. Mildly increased wall thickness. Akinesis of the apex. Aortic Valve: Tricuspid valve. Mild sclerosis of the aortic valve cusp. Interatrial Septum: The septum is bowing into the RA. Echo Findings    Left Ventricle Moderately reduced left ventricular systolic function with a visually estimated EF of 35 - 40%. Left ventricle is mildly dilated. Mildly increased wall thickness. Akinesis of the apex. Interatrial Septum The septum is bowing into the RA. Right Ventricle Right ventricle size is normal. Normal systolic function. Right Atrium Right atrium size is normal.   Aortic Valve Tricuspid valve. Mild sclerosis of the aortic valve cusp. No stenosis. Mitral Valve Valve structure is normal. Trace regurgitation. No stenosis noted. Tricuspid Valve Not assessed due to poor image quality. Pulmonic Valve The pulmonic valve was not assessed. Aorta Normal sized sinus of Valsalva. IVC/Hepatic Veins IVC was not assessed. Pericardium No pericardial effusion. Hospital Course: Patient underwent an On Pump CORONARY ARTERY BYPASS GRAFT x FOUR   (CABG) WITH SANDERSON to LAD, sequential RSVG to OM and ramus, RSVG to RCA, RIGHT GREATER SAPHENOUS ENDOSCOPIC VEIN HARVEST, LEFT ATRIAL APPENDAGE CLIP on 4/19/23. He transferred in ICU in stable condition on the following infusions: Precedex, insulin, neosynephrine. He was extubated later that afternoon. POD#1: On 1-2L NC. Tmax 100. On phenylephrine @ 60 and Insulin infusion. Received 4 albumins postop. Issues with PPM; pending interrogation. POD#2: up in the chair. Paced on tele, on 2L, afebrile . VSS. No events overnight. Tired. Remains on the following infusions: Yonis (40), insulin gtt. Hgb 6.8; given 1 unit of blood- was able to be weaned off of pressors later that day. Chest tubes removed. POD#3: Pt seen with Tsirigotis, atrial fibrillation with RVR around 2am last night, 5mg IV metoprolol, reverted to NSR around 4am, currently paced at 80 by temporary pacemaker, PPM set at 76 when pacemaker turned off. PPM interrogated yesterday, functioning appropriately, rate on PPM increased by representative, epicardial pacemaker turned off to avoid inapropriate pacing. Transitioned off of insulin infusion. POD#4: Tolerating diuresis and BB started yesterday, no BM documented. BB escalated, oral diuretic started. On 0.5 L O2. BG with readings > 200, fasting 150 range, add long acting to decrease fasting glucose level, add meal time coverage, diabetes team to adjust tomorrow based on POC BG readings, anticipate no insulin at discharge once back on home regimen, stress induced elevation from surgery cause for increased levels. Transfer to stepdown ordered. POD#5: VSS. No events overnight. Pain is well controlled. Breathing okay with still, but gets really SOB and fatigues easily with activity.  PT notes show x 2 assist. Reports poor po intake as he does not like the food- his wife may bring him something later. Open to supplements. Voiding fine. No BM yet but passing gas- feels like he may have one this AM and would like to hold off on any suppositories. Epicardial wires cut. POD#6: RA. SR. Discharge to [address] [place] [address]. Continue diuresis. Physical exam on day of discharge:  N: alert, OX3, VASQUEZ to w/c. Pleasant and cooperative. R; few crackles in bases. Diminished in left base c/w atelectasis. Unlabored at rest on room air. CV: RRR, unable to appreciate MGR.  AV paced on the monitor. GI: abd soft, non tender. BM last night. Takes po w/o problem. : Voids  Skin: sternal surgical site w/o dng. Prineo intact (remove Prineo on POD 10-14 : 4/29 to 5/3). Extremities: 2+ pitting edema to the knees. Found stable and ready for dc on POD 6 with the following plan:  CAD s/p CABG x4: Overall doing well. On asa, statin, amio, BB, oral diuretic. Afib previous hybrid ablation s/p LAAL: Atrial fibrillation with RVR early am 4/22. Currently AV paced rhythm. On Eliquis PTA; does not need to resume Eliquis postop per Dr. Suha Mccallum. Acute on chronic HFrEF, stable: On Entresto, Metoprolol, Jardiance, Lasix PTA. On BB and diuretic currently; add additional GDMT as able. Continue PAMELA hose. SSS, tachy-amina syndrome s/p [employer] PPM: Interrogated 4/21/23; functioning appropriately, rate on PPM increased by representative. Post-op respiratory insufficiency secondary to atelectasis, stable. On RA. Continue OOB all meals, IS, ambulate. Cont daily diuretic. Anemia following open heart surgery, stable. HGB 8.1 this am, s/p transfusion 4/21/23. Should normalize in the next few months. Leukocytosis, resolved. WBC 8.3. Afebrile. Likely reactive. HTN: On Entresto, Metoprolol, Jardiance, Lasix PTA; On Lasix, BB; continue. HLD: Crestor 20mg and Lopid PTA; continue. T2DM: A1C 6.0. On Ozempic, metformin, glipizide PTA. Transition off insulin infusion 4/21.   Diabetes Mangement followed pt after surgery. No insulin at discharge once back on home regimen. LE edema, stable: Cont diuretic, Iglesia Hose, elevate LE when in the chair. Post-op pain control, stable. Continue scheduled Tylenol, lidocaine patches and prn oxycodone with Dilaudid for breakthrough. Shower patient daily with Dial soap. Remove Prineo dressing POD 10-14. 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 Referral to outpatient cardiac rehab made. Discharge Vital Signs: Visit Vitals  /63   Pulse 77   Temp 98.1 °F (36.7 °C)   Resp 18   Ht 6' 0.99\" (1.854 m)   Wt 296 lb 11.8 oz (134.6 kg)   SpO2 94%   BMI 39.16 kg/m²       Labs:   Recent Labs     04/25/23  1129 04/25/23  0734 04/25/23  0449   WBC  --   --  8.3   HGB  --   --  8.1*   HCT  --   --  27.6*   PLT  --   --  315   NA  --   --  138   K  --   --  3.9   BUN  --   --  33*   CREA  --   --  0.97   GLU  --   --  175*   GLUCPOC 192*   < >  --     < > = values in this interval not displayed. Diagnostics:   CXR Results  (Last 48 hours)      None              Patient Instructions/Follow Up Care:    Discharge instructions were reviewed with the patient and family present. Questions were also answered at this time. Prescriptions and medications were reviewed. The patient has a follow up appointment with the Nurse Practitioner or Physician's Assistant on 5/18 at 3pm. The patient was also instructed to follow up with his primary care physician as needed. The patient and family were encouraged to call with any questions or concerns.        Signed:  Nj Mckee NP  4/25/2023  9:43 AM

## 2023-04-24 NOTE — PROGRESS NOTES
Spiritual Care Assessment/Progress Note  [facility]      NAME: Carlos Gilbert      MRN: [id number]  AGE: 57 y.o.  SEX: male  Congregational Affiliation: Scientologist   Language: English     4/24/2023     Total Time (in minutes): 15     Spiritual Assessment begun in [facility] PROGRESSIVE CARE through conversation with:         [x]Patient        [] Family    [] Friend(s)        Reason for Consult: Initial/Spiritual assessment, patient floor     Spiritual beliefs: (Please include comment if needed)     [x] Identifies with a david tradition:         [x] Supported by a david community: [facility] Vane Grier           [] Claims no spiritual orientation:           [] Seeking spiritual identity:                [] Adheres to an individual form of spirituality:           [] Not able to assess:                           Identified resources for coping:      [x] Prayer                               [] Music                  [] Guided Imagery     [x] Family/friends                 [] Pet visits     [] Devotional reading                         [] Unknown     [] Other:                                                Interventions offered during this visit: (See comments for more details)    Patient Interventions: Affirmation of emotions/emotional suffering, Affirmation of david, Catharsis/review of pertinent events in supportive environment, Coping skills reviewed/reinforced, Iconic (affirming the presence of God/Higher Power), Initial/Spiritual assessment, patient floor, Prayer (assurance of), Congregational beliefs/image of God discussed           Plan of Care:     [] Support spiritual and/or cultural needs    [] Support AMD and/or advance care planning process      [] Support grieving process   [] Coordinate Rites and/or Rituals    [] Coordination with community clergy   [x] No spiritual needs identified at this time   [] Detailed Plan of Care below (See Comments)  [] Make referral to Music Therapy  [] Make referral to Pet Therapy     [] Make referral to Addiction services  [] Make referral to [facility]  [] Make referral to Spiritual Care Partner  [] No future visits requested        [x] Contact Spiritual Care for further referrals     Comments:  reviewed chart prior to visit on PCU for spiritual assessment. No family/friends present. 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 He shared that he is doing okay today and acknowledges having good family support. He expects to be discharged to rehab tomorrow. Mr. Governor Martin attends [address]. No spiritual needs or concerns were expressed during visit. Offered listening presence and assurance of prayer for continued recovery and healing. Mr Governor Martin expressed appreciation for prayer and visit.        NIGEL Harkins, [facility], Staff [address]    [address] Paging Service  [phone])

## 2023-04-24 NOTE — PROGRESS NOTES
Problem: Self Care Deficits Care Plan (Adult)  Goal: *Acute Goals and Plan of Care (Insert Text)  Description: FUNCTIONAL STATUS PRIOR TO ADMISSION: Patient was modified independent for increased time due to B LE weakness for functional mobility, has SPC and RW but did not use consistently. Patient was modified independent for basic and instrumental ADLs with increased time. Required a pull to stand at times from a lower seat; could climb 1-3 stairs otherwise required rail for pull to support. HOME SUPPORT: The patient lived with wife but did not require assist.    Occupational Therapy Goals  Initiated 4/20/2023  1. Patient will perform ADLs standing 5 mins without fatigue or LOB with supervision/set-up within 7 day(s). 2.  Patient will perform lower body ADLs with supervision/set-up within 7 day(s). 3.  Patient will perform gathering ADL items high and low 2/2 with supervision/set-up within 7 day(s). 4.  Patient will perform toilet transfers with supervision/set-up within 7 day(s). 5.  Patient will perform all aspects of toileting with supervision/set-up within 7 day(s). 6.  Patient will participate in cardiac/sternal upper extremity therapeutic exercise/activities to increase independence with ADLs with supervision/set-up for 5 minutes within 7 day(s). Outcome: Progressing Towards Goal   OCCUPATIONAL THERAPY TREATMENT  Patient: Lisa Ralph (73 y.o. male)  Date: 4/24/2023  Diagnosis: Chest pain, unspecified type [R07.9]  CAD (coronary artery disease) [I25.10] <principal problem not specified>  Procedure(s) (LRB):  CORONARY ARTERY BYPASS GRAFT x FOUR   (CABG) WITH RIGHT SAPHENOUS ENDOSCOPIC VEIN HARVEST, LEFT INTERNAL MAMMARY ARTERY,  AND LEFT ATRIAL APPENDAGE CLIP,  EXTRA CORPOREAL CIRCULATION , KRISTIAN AND EPIAORTIC U/S BY DR MORGAN (N/A) 5 Days Post-Op  Precautions: Sternal (move in the tube)  Chart, occupational therapy assessment, plan of care, and goals were reviewed.     ASSESSMENT  Patient continues with skilled OT services and is progressing towards goals. Pt was in the bathroom sitting on toilet, and demonstrated how he was able to clean buttocks with the twisting tech. He was min of 2 to stand , and CGA to SBA to walk back to chair, worked on sit to stand and leaning forward and pushing off his knees. Educated pt on performing his cardiac ex will help his overall endurance. Patient is verbalizing and is demonstrating understanding of mindful-based movements (\"move in the tube\") principles of keeping UEs proximal to ribcage to prevent lateral pull on the sternum during load-bearing activities with verbal cues required for compliance. Current Level of Function Impacting Discharge (ADLs): decreased ADLs, decreased functional mobility, endurance and sit to stand              PLAN :  Patient continues to benefit from skilled intervention to address the above impairments. Continue treatment per established plan of care to address goals. Recommend with staff: Ojey Camel for meals and walk to bathroom with RW    Recommend next OT session: standing at the sink for UB ADLs and grooming     Recommendation for discharge: (in order for the patient to meet his/her long term goals)  Therapy 3 hours per day 5-7 days per week    This discharge recommendation:  Has been made in collaboration with the attending provider and/or case management    IF patient discharges home will need the following DME: tbd       SUBJECTIVE:   Patient stated My legs are weak since I had my knee replacements .     OBJECTIVE DATA SUMMARY:   Cognitive/Behavioral Status:  Neurologic State: Alert  Orientation Level: Oriented X4  Cognition: Follows commands  Perception: Appears intact  Perseveration: No perseveration noted  Safety/Judgement: Fall prevention    Functional Mobility and Transfers for ADLs:  Bed Mobility:  Rolling:  (pt seated on toilet upon arrival)    Transfers:  Sit to Stand: Minimum assistance;Assist x2 (requires rocking momentum to stand)  Functional Transfers  Bathroom Mobility: Contact guard assistance  Toilet Transfer : Minimum assistance;Assist x2       Balance:  Sitting: Intact  Standing: Impaired  Standing - Static: Good;Constant support  Standing - Dynamic : Fair;Good;Constant support    ADL Intervention:  Feeding  Feeding Assistance: Independent    Grooming  Position Performed: Standing  Washing Face: Stand-by assistance  Washing Hands: Stand-by assistance  Brushing Teeth: Stand-by assistance    Upper Body Bathing  Bathing Assistance: Set-up  Position Performed: Seated in chair    Type of Bath: Chlorhexidine (CHG); Bath Pack; Full       Toileting  Bladder Hygiene: Contact guard assistance;Stand-by assistance  Bowel Hygiene: Contact guard assistance;Stand-by assistance    Cognitive Retraining  Safety/Judgement: Fall prevention    Patient instructed and educated on mindful movement principles based on Move in The Tube concept to include maintaining bilateral elbows close to rib cage when performing any load-bearing activity such as getting in/out of bed, pushing up from a chair, opening a door, or lifting a box. Patient was given a handout with diagrams of each correct/incorrect method of performing each of the above tasks. Patient instructed on the ability to utilize upper extremities outside the tube when doing any non-load bearing activity such as washing hair/body, brushing teeth, retrieving clothing items, or scratching your back. Patient encouraged to also perform upper extremity exercises \"outside of the tube\" to prevent scar tissue formation around sternal incision site. Patient instructed in detail about activities to heed with caution, allowing pain to be the guide. These activities include but are not limited to: mowing the lawn, riding a bike, walking a dog, lifting a child, workshop hobbies, golfing, sexual activity, vacuuming, fishing, scrubbing the floors, and moving furniture.  Patient was given the [employer] [facility] [place] handout to describe each of these activities in detail. Patient instructed no asymmetrical reaching over head to ensure B UEs when shoulders >90* i.e. reaching in cabinets and dressing. Instruction on upper body dressing techniques of over head, then arms through to decrease pain and unilateral shoulder flexion >90*. Instruction on the benefits of utilizing B UEs during functional tasks i.e. opening the fridge, stepping into the tub. Instruction if continued pain at home with shoulder IR for BM hygiene can use wet wipes and toilet tongs PRN. Avoid valsalva maneuvers. Therapeutic Exercises:   Patient instructed on the benefits and demonstrated cardiac exercises while sitting  with Independent. Instructed and indicated understanding on how to progress reps, sets against gravity, pacing through progressive muscle strengthening standing based on surgeon clearance for more weight in prep for basic and instrumental ADLs. Instruction on the use of household items in place of weights as needed.     CARDIAC   EXERCISE    Sets    Reps    Active  Active Assist    Passive  Self ROM    Comments    Shoulder flexion  1  5   [x]                            []                             []                             []                                Shoulder abduction  1  5  [x]                             []                             []                             []                                Scapular elevation  1  5  [x]                             []                              []                             []                                Scapular retraction  1  5  [x]                             []                             []                             []                                Trunk rotation  1  5  [x]                             []                             []                             []                                Trunk sidebending  1  5  [x] []                              []                             []                                          Pain:  None     Activity Tolerance:   Fair    After treatment patient left in no apparent distress:   Sitting in chair and Call bell within reach    COMMUNICATION/COLLABORATION:   The patients plan of care was discussed with: Physical therapist and Registered nurse.      Clara Gardner OT  Time Calculation: 20 mins

## 2023-04-24 NOTE — PROGRESS NOTES
TRANSFER - OUT REPORT:    Verbal report given to Petrona(name) on Ileana Dey  being transferred to PCU(unit) for routine progression of care       Report consisted of patients Situation, Background, Assessment and   Recommendations(SBAR). Information from the following report(s) SBAR, Kardex, OR Summary, Procedure Summary, Intake/Output, MAR, Accordion, Recent Results, and Cardiac Rhythm A Pacing  was reviewed with the receiving nurse. Lines:   Peripheral IV 04/18/23 Right Antecubital (Active)   Site Assessment Clean, dry, & intact 04/23/23 2000   Phlebitis Assessment 0 04/23/23 2000   Infiltration Assessment 0 04/23/23 2000   Dressing Status Clean, dry, & intact 04/23/23 2000   Dressing Type Tape;Transparent 04/23/23 2000   Hub Color/Line Status Pink;Flushed;Patent;Capped 04/23/23 2000   Action Taken Open ports on tubing capped 04/23/23 6848   Alcohol Cap Used Yes 04/23/23 2000        Opportunity for questions and clarification was provided.       Patient transported with:   Monitor  Patient-specific medications from Pharmacy

## 2023-04-24 NOTE — PROGRESS NOTES
Transition of Care Plan:     RUR:16%     Disposition:IPR: Nisa Calzada has been approved      If SNF or IPR: Date FOC offered:4/21     Date [address] received: 4/21     Date authorization started with reference number: 4/21     Date authorization received and expires:N/A     Accepting facility:[facility]      Follow up appointments: To be done by cardiac team     DME needed:None     Transportation at [place] [address] or means to access home:  Spouse has keys        IM Medicare Letter:N/A--Patient has [facility]     Is patient a [place] and connected with the [address] [facility]? No                If yes, was [employer] transfer form completed and VA notified? N/A     Caregiver Contact:Wife     Discharge Caregiver contacted prior to discharge? Caregiver to be contacted prior to discharge. Care Conference needed?:  No           Auth has been approved for [facility]. Facility can admit tomorrow as they do not have any beds available today. Pt will need a PCR COVID test ordered. CM will continue to follow for discharge planning needs.          Edy Abrams, LAY  [facility]

## 2023-04-25 VITALS
BODY MASS INDEX: 39.33 KG/M2 | HEIGHT: 73 IN | TEMPERATURE: 98.2 F | DIASTOLIC BLOOD PRESSURE: 61 MMHG | RESPIRATION RATE: 17 BRPM | WEIGHT: 296.74 LBS | HEART RATE: 75 BPM | SYSTOLIC BLOOD PRESSURE: 122 MMHG | OXYGEN SATURATION: 96 %

## 2023-04-25 LAB
ALBUMIN SERPL-MCNC: 2.6 G/DL (ref 3.5–5)
ALBUMIN/GLOB SERPL: 0.7 (ref 1.1–2.2)
ALP SERPL-CCNC: 43 U/L (ref 45–117)
ALT SERPL-CCNC: 10 U/L (ref 12–78)
ANION GAP SERPL CALC-SCNC: 6 MMOL/L (ref 5–15)
AST SERPL-CCNC: 4 U/L (ref 15–37)
BILIRUB SERPL-MCNC: 0.3 MG/DL (ref 0.2–1)
BUN SERPL-MCNC: 33 MG/DL (ref 6–20)
BUN/CREAT SERPL: 34 (ref 12–20)
CALCIUM SERPL-MCNC: 8.7 MG/DL (ref 8.5–10.1)
CHLORIDE SERPL-SCNC: 108 MMOL/L (ref 97–108)
CO2 SERPL-SCNC: 24 MMOL/L (ref 21–32)
CREAT SERPL-MCNC: 0.97 MG/DL (ref 0.7–1.3)
ERYTHROCYTE [DISTWIDTH] IN BLOOD BY AUTOMATED COUNT: 21.5 % (ref 11.5–14.5)
GLOBULIN SER CALC-MCNC: 3.7 G/DL (ref 2–4)
GLUCOSE BLD STRIP.AUTO-MCNC: 192 MG/DL (ref 65–117)
GLUCOSE BLD STRIP.AUTO-MCNC: 205 MG/DL (ref 65–117)
GLUCOSE SERPL-MCNC: 175 MG/DL (ref 65–100)
HCT VFR BLD AUTO: 27.6 % (ref 36.6–50.3)
HGB BLD-MCNC: 8.1 G/DL (ref 12.1–17)
MAGNESIUM SERPL-MCNC: 2.2 MG/DL (ref 1.6–2.4)
MCH RBC QN AUTO: 22.4 PG (ref 26–34)
MCHC RBC AUTO-ENTMCNC: 29.3 G/DL (ref 30–36.5)
MCV RBC AUTO: 76.2 FL (ref 80–99)
NRBC # BLD: 0 K/UL (ref 0–0.01)
NRBC BLD-RTO: 0 PER 100 WBC
PLATELET # BLD AUTO: 315 K/UL (ref 150–400)
PMV BLD AUTO: 8.9 FL (ref 8.9–12.9)
POTASSIUM SERPL-SCNC: 3.9 MMOL/L (ref 3.5–5.1)
PROT SERPL-MCNC: 6.3 G/DL (ref 6.4–8.2)
RBC # BLD AUTO: 3.62 M/UL (ref 4.1–5.7)
SERVICE CMNT-IMP: ABNORMAL
SERVICE CMNT-IMP: ABNORMAL
SODIUM SERPL-SCNC: 138 MMOL/L (ref 136–145)
WBC # BLD AUTO: 8.3 K/UL (ref 4.1–11.1)

## 2023-04-25 PROCEDURE — 74011636637 HC RX REV CODE- 636/637: Performed by: NURSE PRACTITIONER

## 2023-04-25 PROCEDURE — 74011000250 HC RX REV CODE- 250: Performed by: NURSE PRACTITIONER

## 2023-04-25 PROCEDURE — 36415 COLL VENOUS BLD VENIPUNCTURE: CPT

## 2023-04-25 PROCEDURE — 74011250637 HC RX REV CODE- 250/637: Performed by: NURSE PRACTITIONER

## 2023-04-25 PROCEDURE — 74011250637 HC RX REV CODE- 250/637: Performed by: THORACIC SURGERY (CARDIOTHORACIC VASCULAR SURGERY)

## 2023-04-25 PROCEDURE — 80053 COMPREHEN METABOLIC PANEL: CPT

## 2023-04-25 PROCEDURE — 74011000250 HC RX REV CODE- 250: Performed by: INTERNAL MEDICINE

## 2023-04-25 PROCEDURE — 82962 GLUCOSE BLOOD TEST: CPT

## 2023-04-25 PROCEDURE — 97116 GAIT TRAINING THERAPY: CPT

## 2023-04-25 PROCEDURE — 85027 COMPLETE CBC AUTOMATED: CPT

## 2023-04-25 PROCEDURE — 83735 ASSAY OF MAGNESIUM: CPT

## 2023-04-25 RX ORDER — POTASSIUM CHLORIDE 750 MG/1
40 TABLET, FILM COATED, EXTENDED RELEASE ORAL
Status: COMPLETED | OUTPATIENT
Start: 2023-04-25 | End: 2023-04-25

## 2023-04-25 RX ADMIN — PENICILLIN V POTASSIUM 500 MG: 250 TABLET, FILM COATED ORAL at 12:18

## 2023-04-25 RX ADMIN — FAMOTIDINE 20 MG: 20 TABLET, FILM COATED ORAL at 08:23

## 2023-04-25 RX ADMIN — PENICILLIN V POTASSIUM 500 MG: 250 TABLET, FILM COATED ORAL at 08:23

## 2023-04-25 RX ADMIN — ASPIRIN 81 MG: 81 TABLET, CHEWABLE ORAL at 08:24

## 2023-04-25 RX ADMIN — Medication 3 UNITS: at 08:26

## 2023-04-25 RX ADMIN — Medication 400 MG: at 08:23

## 2023-04-25 RX ADMIN — POTASSIUM CHLORIDE 40 MEQ: 750 TABLET, FILM COATED, EXTENDED RELEASE ORAL at 08:47

## 2023-04-25 RX ADMIN — AMIODARONE HYDROCHLORIDE 400 MG: 200 TABLET ORAL at 16:25

## 2023-04-25 RX ADMIN — BUMETANIDE 1 MG: 1 TABLET ORAL at 08:23

## 2023-04-25 RX ADMIN — AMIODARONE HYDROCHLORIDE 400 MG: 200 TABLET ORAL at 08:24

## 2023-04-25 RX ADMIN — GEMFIBROZIL 600 MG: 600 TABLET ORAL at 16:24

## 2023-04-25 RX ADMIN — GEMFIBROZIL 600 MG: 600 TABLET ORAL at 08:24

## 2023-04-25 RX ADMIN — FERROUS SULFATE TAB 325 MG (65 MG ELEMENTAL FE) 325 MG: 325 (65 FE) TAB at 16:24

## 2023-04-25 RX ADMIN — CHLORHEXIDINE GLUCONATE 0.12% ORAL RINSE 10 ML: 1.2 LIQUID ORAL at 08:32

## 2023-04-25 RX ADMIN — PENICILLIN V POTASSIUM 500 MG: 250 TABLET, FILM COATED ORAL at 16:24

## 2023-04-25 RX ADMIN — INSULIN GLARGINE 15 UNITS: 100 INJECTION, SOLUTION SUBCUTANEOUS at 08:25

## 2023-04-25 RX ADMIN — FERROUS SULFATE TAB 325 MG (65 MG ELEMENTAL FE) 325 MG: 325 (65 FE) TAB at 08:24

## 2023-04-25 RX ADMIN — METOPROLOL TARTRATE 25 MG: 25 TABLET, FILM COATED ORAL at 08:23

## 2023-04-25 RX ADMIN — Medication 2 UNITS: at 12:18

## 2023-04-25 NOTE — PROGRESS NOTES
Bedside and Verbal shift change report given to Tyree Hand (oncoming nurse) by Tr Valladares (offgoing nurse). Report included the following information SBAR, Kardex, Intake/Output, and Recent Results. 1345: Patient up walking in hallway with physical therapy and rolling walker.  Patient tolerated ok

## 2023-04-25 NOTE — PROGRESS NOTES
Bedside and Verbal shift change report given to Tim Callaway (oncoming nurse) by Joselito Carlin (offgoing nurse). Report included the following information SBAR, Kardex, Procedure Summary, Intake/Output, MAR, and Recent Results. 1300: Patient up ambulating in hallway with rolling walker and physical therapy.  Tolerated well    1645: Telemetry and IV removed, patient dressed and bags packed ready for discharge    1700: Report called to [address] [place] [address]

## 2023-04-25 NOTE — PROGRESS NOTES
Problem: Pressure Injury - Risk of  Goal: *Prevention of pressure injury  Description: Document Joseph Scale and appropriate interventions in the flowsheet.   Outcome: Progressing Towards Goal  Note: Pressure Injury Interventions:  Sensory Interventions: Discuss PT/OT consult with provider, Avoid rigorous massage over bony prominences, Assess need for specialty bed, Assess changes in LOC    Moisture Interventions: Assess need for specialty bed, Apply protective barrier, creams and emollients, Absorbent underpads, Internal/External urinary devices    Activity Interventions: PT/OT evaluation, Increase time out of bed, Assess need for specialty bed    Mobility Interventions: HOB 30 degrees or less, PT/OT evaluation, Float heels, Assess need for specialty bed    Nutrition Interventions: Offer support with meals,snacks and hydration, Discuss nutritional consult with provider, Document food/fluid/supplement intake    Friction and Shear Interventions: HOB 30 degrees or less, Foam dressings/transparent film/skin sealants, Apply protective barrier, creams and emollients, Lift sheet

## 2023-04-25 NOTE — PROGRESS NOTES
Problem: Mobility Impaired (Adult and Pediatric)  Goal: *Acute Goals and Plan of Care (Insert Text)  Description: FUNCTIONAL STATUS PRIOR TO ADMISSION: Patient was modified independent inconsistently using a rolling walker and single point cane for functional mobility. He required increased time due to reported BLE weakness that has been going on since his knee replacements. Reports having to pull to stand at times from a lower seat; could climb stairs only by pulling himself up with a rail. HOME SUPPORT PRIOR TO ADMISSION: The patient lived with wife but did not require assist.    Physical Therapy Goals  Initiated 4/20/2023  1. Patient will move from supine to sit and sit to supine , scoot up and down, and roll side to side in bed with minimal assistance/contact guard assist within 5 days. 2.  Patient will perform sit to/from stand with minimal assistance/contact guard assist within 5 days. 3.  Patient will ambulate 150 feet with least restrictive assistive device and contact guard assist within 5 days. 4.  Patient will ascend/descend 12 stairs with handrail(s) with minimal assistance/contact guard assist within 5 days. 5.  Patient will perform cardiac exercises per protocol with independence within 5 days. 6.  Patient will verbally recall and functionally demonstrate mindful-based movements (\"move in the tube\") principles without cues within 5 days.          Outcome: Progressing Towards Goal   PHYSICAL THERAPY TREATMENT  Patient: Fanny Reece (83 y.o. male)  Date: 4/25/2023  Diagnosis: Chest pain, unspecified type [R07.9]  CAD (coronary artery disease) [I25.10] <principal problem not specified>  Procedure(s) (LRB):  CORONARY ARTERY BYPASS GRAFT x FOUR   (CABG) WITH RIGHT SAPHENOUS ENDOSCOPIC VEIN HARVEST, LEFT INTERNAL MAMMARY ARTERY,  AND LEFT ATRIAL APPENDAGE CLIP,  EXTRA CORPOREAL CIRCULATION , KRISTIAN AND EPIAORTIC U/S BY DR MORGAN (N/A) 6 Days Post-Op  Precautions: Sternal (move in the tube)  Chart, physical therapy assessment, plan of care and goals were reviewed. ASSESSMENT  Patient continues with skilled PT services and is progressing towards goals. Pt was received with nursing upon arrival and agreeable to participate with therapy services. Per pt's report, continues to require assist x2 to achieve standing however continues to have good demonstration of move in the tube principles with proper hand placements during transfers. Pt continues to present with decreased activity tolerance and SOB with activity, needing a prolonged standing rest break and with some complaints of back pain with ambulation. Pt fatigued post ambulation with RW and deferring UE cardiac exercises at this time due to his fatigue, reporting doing throughout the day. Pt remains appropriate for IPR upon discharge to maximize his independence. Patient is verbalizing and is demonstrating understanding of mindful-based movements (\"move in the tube\") principles of keeping UEs proximal to ribcage to prevent lateral pull on the sternum during load-bearing activities with verbal cues required for compliance. Current Level of Function Impacting Discharge (mobility/balance): CGA    Other factors to consider for discharge: requires assist x2 with stands, bilateral knee weakness, SOB, below baseline, very motivated         PLAN :  Patient continues to benefit from skilled intervention to address the above impairments. Continue treatment per established plan of care. to address goals. Recommendation for discharge: (in order for the patient to meet his/her long term goals)  Therapy 3 hours per day 5-7 days per week    This discharge recommendation:  Has been made in collaboration with the attending provider and/or case management    IF patient discharges home will need the following DME: to be determined (TBD)     SUBJECTIVE:   Patient stated it still takes some ladies helping me to get up.     OBJECTIVE DATA SUMMARY: Patient mobilized on continuous portable monitor/telemetry. Critical Behavior:  Neurologic State: Alert, Appropriate for age  Orientation Level: Oriented X4  Cognition: Appropriate decision making, Appropriate for age attention/concentration, Appropriate safety awareness, Follows commands  Safety/Judgement: Fall prevention    Functional Mobility Training:  Bed Mobility:  Rolling:  (pt with nursing upon arrival)     Transfers:  Sit to Stand:  (did not test, pt standing upon arrival however needing assist x2 with prior sessions)  Stand to Sit: Contact guard assistance    Balance:  Sitting: Intact  Standing: Impaired  Standing - Static: Good;Constant support  Standing - Dynamic : Fair;Good;Constant support    Ambulation/Gait Training:  Distance (ft): 90 Feet (ft) (extended standing rest break due to SOB/fatigue)  Assistive Device: Gait belt;Walker, rolling  Ambulation - Level of Assistance: Contact guard assistance     Gait Abnormalities: Decreased step clearance (slightly flexed forward)     Base of Support: Widened     Speed/Lexi: Pace decreased (<100 feet/min)  Step Length: Left shortened;Right shortened    Cardiac diagnosis intervention:  Patient instructed and educated on mindful movement principles based on Move in The Tube concept to include maintaining bilateral elbows close to rib cage when performing any load-bearing activity such as getting in/out of bed, pushing up from a chair, opening a door, or lifting a box. Patient was given a handout with diagrams of each correct/incorrect method of performing each of the above tasks. Therapeutic Exercises:   Pt deferring exercises this session, reporting performing throughout day.       Pain Rating:  Reports some chest discomfort after ambulation    Activity Tolerance:   Fair, requires rest breaks, and observed SOB with activity    After treatment patient left in no apparent distress:   Sitting in chair, Call bell within reach, and Bed / chair alarm activated    COMMUNICATION/COLLABORATION:   The patients plan of care was discussed with: Occupational therapist and Registered nurse.      Justina Rogers PTA   Time Calculation: 16 mins

## 2023-04-25 NOTE — PROGRESS NOTES
Cardiac Surgery End of Shift Report PCU    Vitals:  Patient Vitals for the past 4 hrs:   Temp Pulse Resp BP SpO2   04/25/23 0446 98.4 °F (36.9 °C) 75 18 129/69 95 %          EKG/Rhythm:   atrial paced                  External Pacemaker:  NO                  Pacer wires Capped?: NO WIRES    Oxygen therapy:   Oxygen Delivery:   room air    ISS Teaching NO   Use : YES     Volume: 1500Ml      Lines & Drains:  Peripheral Intravenous Line:   Peripheral IV 04/18/23 Right Antecubital (Active)   Site Assessment Clean, dry, & intact 04/25/23 0445   Phlebitis Assessment 0 04/25/23 0445   Infiltration Assessment 0 04/25/23 0445   Dressing Status Clean, dry, & intact 04/25/23 0445   Dressing Type Transparent 04/25/23 0445   Hub Color/Line Status Pink;Patent; Flushed;End cap changed 04/25/23 0445   Action Taken Open ports on tubing capped 04/25/23 0445   Alcohol Cap Used Yes 04/25/23 0445         Cardiac drips?: n/a      12 hour Output:     12 hour Chest Tube Output: n/a     12 Hour Urine Output: 600mL                                  Daily Weight: 134.6kg standing    Skin/Wounds:  Wound Leg Right;Lateral (Active)   Number of days: 981       Wound Leg lower Left; Lower;Medial;Distal 01/03/22 (Active)   Number of days: 477       Wound Pretibial Right (Active)   Number of days: 378       Wound Calf Left (Active)   Number of days: 378       Incision 05/10/21 Chest (Active)   Number of days: 715       Incision 01/03/22 Leg Right (Active)   Number of days: 477       Incision 04/19/23 Chest (Active)   Dressing Status Clean;Dry; Intact 04/24/23 0528   Cleansed Soap and water 04/25/23 0545   Dressing/Treatment Skin glue; Other (Comment) 04/24/23 0528   Margins Approximated 04/25/23 0545   Wound Odor None 04/23/23 2100   Number of days: 6       Incision 04/19/23 Leg Right (Active)   Dressing Status Clean;Dry; Intact 04/25/23 0545   Cleansed Soap and water 04/24/23 0528   Dressing/Treatment Skin glue;Band-Aid/Adhesive bandage 04/25/23 0545 Margins Approximated 04/23/23 2100   Wound Odor None 04/23/23 2100   Number of days: 6           Pain Control:   n/a    Activity:   Up in chair YES ; 1 times,  Duration 120 min         Ambulated YES; Distance 30 ft , 1 times    Hygiene: CHG bath YES SHOWER NO      Concerns/ Communication:

## 2023-04-25 NOTE — PROGRESS NOTES
Problem: Falls - Risk of  Goal: *Absence of Falls  Description: Document Ladona Lucian Fall Risk and appropriate interventions in the flowsheet. Outcome: Progressing Towards Goal  Note: Fall Risk Interventions:                                Problem: Delirium Treatment  Goal: *Level of consciousness restored to baseline  Outcome: Resolved/Met  Goal: *Level of environmental perceptions restored to baseline  Outcome: Resolved/Met  Goal: *Sensory perception restored to baseline  Outcome: Resolved/Met  Goal: *Emotional stability restored to baseline  Outcome: Resolved/Met  Goal: *Functional assessment restored to baseline  Outcome: Resolved/Met  Goal: *Absence of falls  Outcome: Resolved/Met  Goal: *Will remain free of delirium, CAM Score negative  Outcome: Resolved/Met  Goal: *Cognitive status will be restored to baseline  Outcome: Resolved/Met  Goal: Interventions  Outcome: Resolved/Met     Problem: Patient Education: Go to Patient Education Activity  Goal: Patient/Family Education  Outcome: Resolved/Met     Problem: Pressure Injury - Risk of  Goal: *Prevention of pressure injury  Description: Document Joseph Scale and appropriate interventions in the flowsheet.   Outcome: Progressing Towards Goal  Note: Pressure Injury Interventions:  Sensory Interventions: Assess changes in LOC, Avoid rigorous massage over bony prominences, Chair cushion, Monitor skin under medical devices, Minimize linen layers, Float heels         Activity Interventions: Pressure redistribution bed/mattress(bed type), Increase time out of bed, PT/OT evaluation, Chair cushion    Mobility Interventions: Chair cushion, Float heels, HOB 30 degrees or less, Pressure redistribution bed/mattress (bed type), PT/OT evaluation    Nutrition Interventions: Document food/fluid/supplement intake, Offer support with meals,snacks and hydration    Friction and Shear Interventions: Apply protective barrier, creams and emollients, Feet elevated on foot rest, HOB 30 degrees or less                Problem: Patient Education: Go to Patient Education Activity  Goal: Patient/Family Education  Outcome: Progressing Towards Goal     Problem: Patient Education: Go to Patient Education Activity  Goal: Patient/Family Education  Outcome: Progressing Towards Goal     Problem: CABG: Post-Op Day 3  Goal: Activity/Safety  Outcome: Progressing Towards Goal  Goal: Consults, if ordered  Outcome: Progressing Towards Goal  Goal: Diagnostic Test/Procedures  Outcome: Progressing Towards Goal  Goal: Nutrition/Diet  Outcome: Progressing Towards Goal  Goal: Discharge Planning  Outcome: Progressing Towards Goal  Goal: Medications  Outcome: Progressing Towards Goal  Goal: Respiratory  Outcome: Progressing Towards Goal  Goal: Treatments/Interventions/Procedures  Outcome: Progressing Towards Goal  Goal: Psychosocial  Outcome: Progressing Towards Goal  Goal: *Hemodynamically stable  Outcome: Progressing Towards Goal  Goal: *Lungs clear or at baseline  Outcome: Progressing Towards Goal  Goal: *Optimal pain control at patient's stated goal  Outcome: Progressing Towards Goal  Goal: *Incisions without signs and symptoms of wound complication  Outcome: Progressing Towards Goal  Goal: *Demonstrates progressive activity  Outcome: Progressing Towards Goal  Goal: *Tolerating diet  Outcome: Progressing Towards Goal     Problem: CABG: Discharge Outcomes  Goal: *Hemodynamically stable  Outcome: Progressing Towards Goal  Goal: *Stable cardiac rhythm  Outcome: Progressing Towards Goal  Goal: *Lungs clear or at baseline  Outcome: Progressing Towards Goal  Goal: *Demonstrates ability to perform prescribed activity without shortness of breath or discomfort  Outcome: Progressing Towards Goal  Goal: *Verbalizes home exercise program, activity guidelines, cardiac precautions  Outcome: Progressing Towards Goal  Goal: *Optimal pain control at patient's stated goal  Outcome: Progressing Towards Goal  Goal: *Verbalizes understanding and describes prescribed diet  Outcome: Progressing Towards Goal  Goal: *Verbalizes name, dosage, time, side effects, and number of days to continue medications  Outcome: Progressing Towards Goal  Goal: *Weight  is stable  Outcome: Progressing Towards Goal  Goal: *No signs and symptoms of infection or wound complications  Outcome: Progressing Towards Goal  Goal: *Anxiety reduced or absent  Outcome: Progressing Towards Goal  Goal: *Verbalizes and demonstrates incision care  Outcome: Progressing Towards Goal  Goal: *Understands and describes signs and symptoms to report to providers(Stroke Metric)  Outcome: Progressing Towards Goal  Goal: *Describes follow-up/return visits to physicians  Outcome: Progressing Towards Goal  Goal: *Describes available resources and support systems  Outcome: Progressing Towards Goal  Goal: *Expresses feelings about diagnosis and surgery  Outcome: Progressing Towards Goal  Goal: *Influenza immunization  Outcome: Progressing Towards Goal  Goal: *Pneumococcal immunization  Outcome: Progressing Towards Goal     Problem: Patient Education: Go to Patient Education Activity  Goal: Patient/Family Education  Outcome: Progressing Towards Goal     Problem: Patient Education: Go to Patient Education Activity  Goal: Patient/Family Education  Outcome: Progressing Towards Goal

## 2023-04-25 NOTE — PROGRESS NOTES
Problem: Falls - Risk of  Goal: *Absence of Falls  Description: Document Gris Thu Fall Risk and appropriate interventions in the flowsheet.   Outcome: Progressing Towards Goal  Note: Fall Risk Interventions:

## 2023-04-25 NOTE — PROGRESS NOTES
Transition of Care Plan:     RUR:16%     Disposition:IPR: Yamil Acevedo has been approved    If SNF or IPR: Date FOC offered:4/21     Date [address] received: 4/21     Date authorization started with reference number: 4/21     Date authorization received and expires:N/A     Accepting facility:[facility]      Follow up appointments: To be done by cardiac team     DME needed:None     Transportation at [place] [address] or means to access home:  Spouse has keys        IM Medicare Letter:N/A--Patient has [facility]     Is patient a  and connected with the [place]? No                If yes, was [employer] transfer form completed and VA notified? N/A     Caregiver Contact:Wife     Discharge Caregiver contacted prior to discharge? Caregiver to be contacted prior to discharge. Care Conference needed?:  No      CM provided the following information:   Accepting Physician: Dr. Norman Ortiz [id number]  Call report: [phone]       11:15am: CM received a phone call from Pt's wife. Wife stated that she will transport the Pt at 5:20pm today. 10:50am: [facility] has a bed available for Pt today. [facility] is to follow-up on assignment information. CM notified Pt's wife. Wife to follow-up with CM on plans for transportation.            Edy Costa, LAY Raymundo

## 2023-04-25 NOTE — PROGRESS NOTES
OT note:  OT reviewed chart and gave pt handout on cardiac move in the tube precautions, bathing and dressing and bed mobility after having CABG. Pt did not have questions at this time.

## 2023-04-25 NOTE — PROGRESS NOTES
Cardiac Surgery Care Coordinator-  Met with Bibi Zhang, reviewed plan of care and discharge instructions. Reinforced move in the tube, sternal precautions and continued use of the incentive spirometer. Reviewed the importance of daily temp and weight monitoring, discussed incisional care and reviewed signs and symptoms of infection. Red reminder bracelet on right wrist, reviewed purpose of the bracelet and when to call the MD.   Reminded pt of appts and encouraged participation in the Cardiac Wellness and rehab program after discharge. Encouraged Bibi Zhang to verbalize and emotional support given. Bibi Zhang is without questions or concerns at this time.  Andrew Soriano RN

## 2023-04-26 ENCOUNTER — PATIENT OUTREACH (OUTPATIENT)
Dept: CASE MANAGEMENT | Age: 63
End: 2023-04-26

## 2023-04-26 NOTE — PROGRESS NOTES
No transition of care outreach indicated due to patient being managed by [facility] Cardiothoracic Surgery Team for 30 days.

## 2023-05-02 RX ORDER — ALBUTEROL SULFATE 2.5 MG/3ML
2.5 SOLUTION RESPIRATORY (INHALATION) EVERY 4 HOURS PRN
COMMUNITY
Start: 2023-04-24 | End: 2023-05-17

## 2023-05-02 RX ORDER — LANOLIN ALCOHOL/MO/W.PET/CERES
3-6 CREAM (GRAM) TOPICAL
COMMUNITY
Start: 2023-04-24 | End: 2023-05-17 | Stop reason: ALTCHOICE

## 2023-05-02 RX ORDER — AMIODARONE HYDROCHLORIDE 200 MG/1
TABLET ORAL
COMMUNITY
Start: 2023-04-24

## 2023-05-02 RX ORDER — AMOXICILLIN 250 MG
1 CAPSULE ORAL DAILY
COMMUNITY
Start: 2023-04-25 | End: 2023-05-17 | Stop reason: ALTCHOICE

## 2023-05-10 ENCOUNTER — TELEPHONE (OUTPATIENT)
Age: 63
End: 2023-05-10

## 2023-05-16 ENCOUNTER — TELEPHONE (OUTPATIENT)
Age: 63
End: 2023-05-16

## 2023-05-16 NOTE — TELEPHONE ENCOUNTER
----- Message from Ingrid Richelle sent at 5/12/2023 11:35 AM EDT -----  Subject: Hospital Follow Up    QUESTIONS  What hospital was the Patient Discharged from? [facility]/   [facility]  Date of Discharge? 2023-04-25  Discharge Location? Nursing Facility  Reason for hospitalization as patient stated? Heart surgery  What question does the patient have, if applicable? Pt needs Hospital f/u   within 30 days of discharge.  ---------------------------------------------------------------------------  --------------  [address]  What is the best way for the office to contact you? OK to leave message on   voicemail  Preferred Call Back Phone Number? [phone]  ---------------------------------------------------------------------------  --------------  SCRIPT ANSWERS  Relationship to Patient?  Self

## 2023-05-16 NOTE — TELEPHONE ENCOUNTER
Attempted to contact pt w/ no answer. Left message asking pt to call office. Will schedule upon call back.

## 2023-05-17 ENCOUNTER — OFFICE VISIT (OUTPATIENT)
Age: 63
End: 2023-05-17

## 2023-05-17 VITALS
HEART RATE: 75 BPM | OXYGEN SATURATION: 95 % | RESPIRATION RATE: 14 BRPM | DIASTOLIC BLOOD PRESSURE: 72 MMHG | SYSTOLIC BLOOD PRESSURE: 116 MMHG

## 2023-05-17 DIAGNOSIS — Z95.1 S/P CABG X 4: Primary | ICD-10-CM

## 2023-05-17 DIAGNOSIS — Z98.890 S/P LEFT ATRIAL APPENDAGE LIGATION: ICD-10-CM

## 2023-05-17 RX ORDER — CHLORAL HYDRATE 500 MG
CAPSULE ORAL DAILY
COMMUNITY

## 2023-05-17 RX ORDER — TRAZODONE HYDROCHLORIDE 50 MG/1
50 TABLET ORAL NIGHTLY
COMMUNITY

## 2023-05-17 NOTE — PROGRESS NOTES
Patient: Melonie Lu   Age: 57 y.o. Patient Care Team:  Rupinder Adams MD as PCP - General  Rupinder Adams MD as PCP - Empaneled Provider  Tim Jenkins MD as Physician  Torri Tsang MD as Physician    Diagnosis: The primary encounter diagnosis was S/P CABG x 4. A diagnosis of S/P left atrial appendage ligation was also pertinent to this visit. Problem List:   Patient Active Problem List   Diagnosis    Tachy-fernando syndrome (HCC)    Arthritis    Mixed hyperlipidemia    Femur fracture ([facility])    Severe obesity ([facility])    Benign essential hypertension    Coronary artery disease    PAF (paroxysmal atrial fibrillation) ([facility])    Type 2 diabetes with nephropathy ([facility])    S/P coronary artery stent placement    Type 2 diabetes mellitus ([facility])    Hypertriglyceridemia    A-fib ([facility])    Supracondylar fracture of distal end of femur without intracondylar extension ([facility])    Staphylococcal arthritis of right knee ([facility] [address]    Cellulitis    Controlled type 2 diabetes mellitus with complication, without long-term current use of insulin ([facility])    Type 2 diabetes mellitus with chronic kidney disease ([facility])    Anemia    Opioid use, unspecified with unspecified opioid-induced disorder ([facility] [address]    CAD (coronary artery disease)    S/P CABG x 4    S/P left atrial appendage ligation         Date of Surgery: 4/19/23     Surgery: CABG/MAVERICK clip    HPI:  Pt is here for post op follow up, seen with Dr. Alaina Gomes. Pt is ambulating with a cane. He has been feeling well. He has been home from [facility] for about 2 weeks. His appetite is still not great but is improving. He reports his LE edema is improving.      Current Medications:   Current Outpatient Medications   Medication Sig Dispense Refill    traZODone (DESYREL) 50 MG tablet Take 1 tablet by mouth nightly      Omega-3 Fatty Acids (FISH OIL) 1000 MG capsule Take by mouth daily      amiodarone (CORDARONE) 200 MG tablet Take two tabs by mouth twice daily x 2 weeks, then take two

## 2023-05-18 ENCOUNTER — OFFICE VISIT (OUTPATIENT)
Age: 63
End: 2023-05-18
Payer: COMMERCIAL

## 2023-05-18 VITALS
OXYGEN SATURATION: 95 % | WEIGHT: 271.4 LBS | HEIGHT: 72 IN | RESPIRATION RATE: 16 BRPM | SYSTOLIC BLOOD PRESSURE: 104 MMHG | TEMPERATURE: 97.8 F | DIASTOLIC BLOOD PRESSURE: 60 MMHG | BODY MASS INDEX: 36.76 KG/M2 | HEART RATE: 75 BPM

## 2023-05-18 DIAGNOSIS — E78.2 MIXED HYPERLIPIDEMIA: ICD-10-CM

## 2023-05-18 DIAGNOSIS — R05.3 CHRONIC COUGH: ICD-10-CM

## 2023-05-18 DIAGNOSIS — I10 ESSENTIAL (PRIMARY) HYPERTENSION: ICD-10-CM

## 2023-05-18 DIAGNOSIS — E11.8 TYPE 2 DIABETES MELLITUS WITH UNSPECIFIED COMPLICATIONS (HCC): Primary | ICD-10-CM

## 2023-05-18 DIAGNOSIS — J90 PLEURAL EFFUSION, LEFT: ICD-10-CM

## 2023-05-18 DIAGNOSIS — R60.1 GENERALIZED EDEMA: ICD-10-CM

## 2023-05-18 PROCEDURE — 3078F DIAST BP <80 MM HG: CPT | Performed by: FAMILY MEDICINE

## 2023-05-18 PROCEDURE — 3044F HG A1C LEVEL LT 7.0%: CPT | Performed by: FAMILY MEDICINE

## 2023-05-18 PROCEDURE — 99214 OFFICE O/P EST MOD 30 MIN: CPT | Performed by: FAMILY MEDICINE

## 2023-05-18 PROCEDURE — 3074F SYST BP LT 130 MM HG: CPT | Performed by: FAMILY MEDICINE

## 2023-05-18 RX ORDER — FERROUS SULFATE 325(65) MG
TABLET ORAL
COMMUNITY
Start: 2022-03-02

## 2023-05-18 RX ORDER — FLECAINIDE ACETATE 100 MG/1
100 TABLET ORAL 2 TIMES DAILY
COMMUNITY
Start: 2021-03-22

## 2023-05-18 RX ORDER — AMIODARONE HYDROCHLORIDE 400 MG/1
TABLET ORAL
COMMUNITY
Start: 2023-05-14

## 2023-05-18 RX ORDER — FUROSEMIDE 80 MG
129 TABLET ORAL DAILY
Qty: 45 TABLET | Refills: 5 | Status: SHIPPED | OUTPATIENT
Start: 2023-05-18

## 2023-05-18 RX ORDER — DILTIAZEM HYDROCHLORIDE 120 MG/1
CAPSULE, EXTENDED RELEASE ORAL DAILY
COMMUNITY
Start: 2021-02-25

## 2023-05-18 SDOH — ECONOMIC STABILITY: HOUSING INSECURITY
IN THE LAST 12 MONTHS, WAS THERE A TIME WHEN YOU DID NOT HAVE A STEADY PLACE TO SLEEP OR SLEPT IN A SHELTER (INCLUDING NOW)?: NO

## 2023-05-18 SDOH — ECONOMIC STABILITY: FOOD INSECURITY: WITHIN THE PAST 12 MONTHS, THE FOOD YOU BOUGHT JUST DIDN'T LAST AND YOU DIDN'T HAVE MONEY TO GET MORE.: NEVER TRUE

## 2023-05-18 SDOH — ECONOMIC STABILITY: TRANSPORTATION INSECURITY
IN THE PAST 12 MONTHS, HAS LACK OF TRANSPORTATION KEPT YOU FROM MEETINGS, WORK, OR FROM GETTING THINGS NEEDED FOR DAILY LIVING?: NO

## 2023-05-18 SDOH — ECONOMIC STABILITY: INCOME INSECURITY: HOW HARD IS IT FOR YOU TO PAY FOR THE VERY BASICS LIKE FOOD, HOUSING, MEDICAL CARE, AND HEATING?: NOT HARD AT ALL

## 2023-05-18 SDOH — ECONOMIC STABILITY: FOOD INSECURITY: WITHIN THE PAST 12 MONTHS, YOU WORRIED THAT YOUR FOOD WOULD RUN OUT BEFORE YOU GOT MONEY TO BUY MORE.: NEVER TRUE

## 2023-05-18 ASSESSMENT — PATIENT HEALTH QUESTIONNAIRE - PHQ9
SUM OF ALL RESPONSES TO PHQ QUESTIONS 1-9: 0
SUM OF ALL RESPONSES TO PHQ QUESTIONS 1-9: 0
2. FEELING DOWN, DEPRESSED OR HOPELESS: 0
SUM OF ALL RESPONSES TO PHQ QUESTIONS 1-9: 0
SUM OF ALL RESPONSES TO PHQ9 QUESTIONS 1 & 2: 0
1. LITTLE INTEREST OR PLEASURE IN DOING THINGS: 0
SUM OF ALL RESPONSES TO PHQ QUESTIONS 1-9: 0

## 2023-05-18 NOTE — PROGRESS NOTES
Chief Complaint   Patient presents with    Follow-Up from Hospital         1. \"Have you been to the ER, urgent care clinic since your last visit? Hospitalized since your last visit? \" 4/19/23-     2. \"Have you seen or consulted any other health care providers outside of the [address] [place] [address] since your last visit? \" Yes- cardiac surgeon     3. For patients aged 39-70: Has the patient had a colonoscopy / FIT/ Cologuard? yes      If the patient is female:    4. For patients aged 41-77: Has the patient had a mammogram within the past 2 years? na      5.  For patients aged 21-65: Has the patient had a pap smear? na      Health Maintenance Due   Topic Date Due    Pneumococcal 0-64 years Vaccine (1 - PCV) Never done    HIV screen  Never done    Hepatitis C screen  Never done    DTaP/Tdap/Td vaccine (1 - Tdap) Never done    Shingles vaccine (1 of 2) Never done    Diabetic retinal exam  06/30/2016    Diabetic Alb to Cr ratio (uACR) test  07/29/2020

## 2023-05-18 NOTE — PROGRESS NOTES
Miguelina Miller (:  1960) is a 57 y.o. male,Established patient, here for evaluation of the following chief complaint(s):  Follow-Up from Hospital         ASSESSMENT/PLAN:  1. Type 2 diabetes mellitus with unspecified complications (HCC)  -     Hemoglobin A1C; Future  2. Mixed hyperlipidemia  -     Lipid Panel; Future  3. Essential (primary) hypertension  -     Comprehensive Metabolic Panel; Future  -     CBC with Auto Differential; Future  4. Chronic cough  -     XR CHEST (2 VW); Future  5. Generalized edema  6. Pleural effusion, left  Will increase lasix from 80 mg daily to 120 mg daiy    No follow-ups on file. Subjective   SUBJECTIVE/OBJECTIVE:  HPI Has had a CABG in mid April, went to [facility] afterwards, got home 2 weeks ago. Has been doing reasonably well, PT and nursing come to house. Has had a fairly severe cough for the last several weeks. Cough has been mildly productive at times. No fever, chills. Breathing has been doing reasonably well. Is able to walk in house. NO current chest pains. GI workup was negative. Currently taking po iron. Thinks that legs are somewhat more swollen than usual has compression stockings at home. History reviewed. No pertinent past medical history.     Social History     Socioeconomic History    Marital status:      Spouse name: None    Number of children: None    Years of education: None    Highest education level: None   Tobacco Use    Smoking status: Unknown     Social Determinants of Health     Financial Resource Strain: Low Risk     Difficulty of Paying Living Expenses: Not very hard   Food Insecurity: No Food Insecurity    Worried About Running Out of Food in the Last Year: Never true    Ran Out of Food in the Last Year: Never true   Transportation Needs: Unknown    Lack of Transportation (Non-Medical): No   Housing Stability: Unknown    Unstable Housing in the Last Year: No       Current Outpatient Medications   Medication Sig Dispense Refill

## 2023-05-19 ENCOUNTER — TELEPHONE (OUTPATIENT)
Age: 63
End: 2023-05-19

## 2023-05-19 LAB
ALBUMIN SERPL-MCNC: 3.5 G/DL (ref 3.5–5)
ALBUMIN/GLOB SERPL: 1 (ref 1.1–2.2)
ALP SERPL-CCNC: 61 U/L (ref 45–117)
ALT SERPL-CCNC: 8 U/L (ref 12–78)
ANION GAP SERPL CALC-SCNC: 3 MMOL/L (ref 5–15)
AST SERPL-CCNC: 4 U/L (ref 15–37)
BASOPHILS # BLD: 0.1 K/UL (ref 0–0.1)
BASOPHILS NFR BLD: 1 % (ref 0–1)
BILIRUB SERPL-MCNC: 0.3 MG/DL (ref 0.2–1)
BUN SERPL-MCNC: 16 MG/DL (ref 6–20)
BUN/CREAT SERPL: 14 (ref 12–20)
CALCIUM SERPL-MCNC: 9.2 MG/DL (ref 8.5–10.1)
CHLORIDE SERPL-SCNC: 107 MMOL/L (ref 97–108)
CHOLEST SERPL-MCNC: 94 MG/DL
CO2 SERPL-SCNC: 29 MMOL/L (ref 21–32)
CREAT SERPL-MCNC: 1.16 MG/DL (ref 0.7–1.3)
DIFFERENTIAL METHOD BLD: ABNORMAL
EOSINOPHIL # BLD: 0.3 K/UL (ref 0–0.4)
EOSINOPHIL NFR BLD: 5 % (ref 0–7)
ERYTHROCYTE [DISTWIDTH] IN BLOOD BY AUTOMATED COUNT: 20.9 % (ref 11.5–14.5)
EST. AVERAGE GLUCOSE BLD GHB EST-MCNC: 151 MG/DL
GLOBULIN SER CALC-MCNC: 3.6 G/DL (ref 2–4)
GLUCOSE SERPL-MCNC: 162 MG/DL (ref 65–100)
HBA1C MFR BLD: 6.9 % (ref 4–5.6)
HCT VFR BLD AUTO: 33.2 % (ref 36.6–50.3)
HDLC SERPL-MCNC: 29 MG/DL
HDLC SERPL: 3.2 (ref 0–5)
HGB BLD-MCNC: 9.2 G/DL (ref 12.1–17)
IMM GRANULOCYTES # BLD AUTO: 0 K/UL (ref 0–0.04)
IMM GRANULOCYTES NFR BLD AUTO: 0 % (ref 0–0.5)
LDLC SERPL CALC-MCNC: 45.2 MG/DL (ref 0–100)
LYMPHOCYTES # BLD: 1 K/UL (ref 0.8–3.5)
LYMPHOCYTES NFR BLD: 16 % (ref 12–49)
MCH RBC QN AUTO: 21.6 PG (ref 26–34)
MCHC RBC AUTO-ENTMCNC: 27.7 G/DL (ref 30–36.5)
MCV RBC AUTO: 78.1 FL (ref 80–99)
MONOCYTES # BLD: 0.6 K/UL (ref 0–1)
MONOCYTES NFR BLD: 9 % (ref 5–13)
NEUTS SEG # BLD: 4.2 K/UL (ref 1.8–8)
NEUTS SEG NFR BLD: 69 % (ref 32–75)
NRBC # BLD: 0 K/UL (ref 0–0.01)
NRBC BLD-RTO: 0 PER 100 WBC
PLATELET # BLD AUTO: 416 K/UL (ref 150–400)
PMV BLD AUTO: 9.9 FL (ref 8.9–12.9)
POTASSIUM SERPL-SCNC: 4.4 MMOL/L (ref 3.5–5.1)
PROT SERPL-MCNC: 7.1 G/DL (ref 6.4–8.2)
RBC # BLD AUTO: 4.25 M/UL (ref 4.1–5.7)
RBC MORPH BLD: ABNORMAL
SODIUM SERPL-SCNC: 139 MMOL/L (ref 136–145)
TRIGL SERPL-MCNC: 99 MG/DL
VLDLC SERPL CALC-MCNC: 19.8 MG/DL
WBC # BLD AUTO: 6.2 K/UL (ref 4.1–11.1)

## 2023-05-19 NOTE — TELEPHONE ENCOUNTER
Patient states he got a call from our [employer] tech asking if he left something yesterday in xray. Patient states he did not.

## 2023-05-19 NOTE — TELEPHONE ENCOUNTER
----- Message from Yousif Dewey sent at 5/19/2023 10:52 AM EDT -----  Subject: Message to Provider    QUESTIONS  Information for Provider? Patient is returning a missed call from the   office. ---------------------------------------------------------------------------  --------------  [facility]  [phone];  Do not leave any message, patient will call back for answer  ---------------------------------------------------------------------------  --------------  SCRIPT ANSWERS  undefined

## 2023-06-02 ENCOUNTER — TELEPHONE (OUTPATIENT)
Age: 63
End: 2023-06-02

## 2023-06-06 RX ORDER — ROSUVASTATIN CALCIUM 20 MG/1
TABLET, COATED ORAL
Qty: 90 TABLET | Refills: 3 | Status: SHIPPED | OUTPATIENT
Start: 2023-06-06

## 2023-06-06 NOTE — TELEPHONE ENCOUNTER
Last appointment: 5/18/23  Next appointment: 8/28/23  Previous refill encounter(s): 6/15/22 #90 with 3 refills    Requested Prescriptions     Pending Prescriptions Disp Refills    rosuvastatin (CRESTOR) 20 MG tablet [Pharmacy Med Name: ROSUVASTATIN CALCIUM 20 MG TAB] 90 tablet 3     Sig: TAKE 1 TABLET BY MOUTH EVERY DAY FOR CHOLESTEROL         For Pharmacy Admin Tracking Only    Program: Medication Refill  CPA in place:    Recommendation Provided To:    Intervention Detail: New Rx: 1, reason: Patient Preference  Intervention Accepted By:   Melisa Maxwell Closed?:    Time Spent (min): 5

## 2023-06-15 ENCOUNTER — HOSPITAL ENCOUNTER (OUTPATIENT)
Facility: HOSPITAL | Age: 63
Setting detail: RECURRING SERIES
Discharge: HOME | End: 2023-06-18
Payer: COMMERCIAL

## 2023-06-15 ASSESSMENT — LIFESTYLE VARIABLES
ALCOHOL_TYPE: BEER
ALCOHOL_USE: DAILY
SMOKELESS_TOBACCO: NO

## 2023-06-15 ASSESSMENT — PATIENT HEALTH QUESTIONNAIRE - PHQ9
SUM OF ALL RESPONSES TO PHQ QUESTIONS 1-9: 2
10. IF YOU CHECKED OFF ANY PROBLEMS, HOW DIFFICULT HAVE THESE PROBLEMS MADE IT FOR YOU TO DO YOUR WORK, TAKE CARE OF THINGS AT HOME, OR GET ALONG WITH OTHER PEOPLE: 0
6. FEELING BAD ABOUT YOURSELF - OR THAT YOU ARE A FAILURE OR HAVE LET YOURSELF OR YOUR FAMILY DOWN: 1
SUM OF ALL RESPONSES TO PHQ QUESTIONS 1-9: 2
8. MOVING OR SPEAKING SO SLOWLY THAT OTHER PEOPLE COULD HAVE NOTICED. OR THE OPPOSITE, BEING SO FIGETY OR RESTLESS THAT YOU HAVE BEEN MOVING AROUND A LOT MORE THAN USUAL: 0
7. TROUBLE CONCENTRATING ON THINGS, SUCH AS READING THE NEWSPAPER OR WATCHING TELEVISION: 0
SUM OF ALL RESPONSES TO PHQ QUESTIONS 1-9: 2
3. TROUBLE FALLING OR STAYING ASLEEP: 0
9. THOUGHTS THAT YOU WOULD BE BETTER OFF DEAD, OR OF HURTING YOURSELF: 0
4. FEELING TIRED OR HAVING LITTLE ENERGY: 0
SUM OF ALL RESPONSES TO PHQ QUESTIONS 1-9: 2
5. POOR APPETITE OR OVEREATING: 1

## 2023-06-15 ASSESSMENT — EJECTION FRACTION: EF_VALUE: 35

## 2023-06-21 ENCOUNTER — HOSPITAL ENCOUNTER (OUTPATIENT)
Facility: HOSPITAL | Age: 63
Setting detail: RECURRING SERIES
Discharge: HOME | End: 2023-06-24
Payer: COMMERCIAL

## 2023-06-21 VITALS — WEIGHT: 277.4 LBS | BODY MASS INDEX: 37.62 KG/M2

## 2023-06-21 PROCEDURE — 93798 PHYS/QHP OP CAR RHAB W/ECG: CPT

## 2023-06-21 ASSESSMENT — LIFESTYLE VARIABLES
ALCOHOL_TYPE: BEER
ALCOHOL_USE: DAILY
SMOKELESS_TOBACCO: NO

## 2023-06-21 ASSESSMENT — EXERCISE STRESS TEST
PEAK_RPE: 13
PEAK_BP: 176/60
PEAK_BP: 176/60
PEAK_HR: 91
PEAK_RPE: 14
PEAK_METS: 2.6
PEAK_HR: 116
PEAK_BP: 176/60

## 2023-06-21 ASSESSMENT — EJECTION FRACTION: EF_VALUE: 35

## 2023-06-21 NOTE — CARDIO/PULMONARY
Patient completed 6 min walk today. Patient denied chest pain or SOB during 6 minute walk test and was in SR/PVC's. Patient walked 250 meters at a speed of 2.0 mph and a 0% grade for a final 2.6 MET level.

## 2023-06-27 ENCOUNTER — HOSPITAL ENCOUNTER (OUTPATIENT)
Facility: HOSPITAL | Age: 63
Setting detail: RECURRING SERIES
Discharge: HOME | End: 2023-06-30
Payer: COMMERCIAL

## 2023-06-27 VITALS — WEIGHT: 276 LBS | BODY MASS INDEX: 37.43 KG/M2

## 2023-06-27 PROCEDURE — 93798 PHYS/QHP OP CAR RHAB W/ECG: CPT

## 2023-06-27 ASSESSMENT — EXERCISE STRESS TEST
PEAK_RPE: 13
PEAK_METS: 2.4
PEAK_HR: 107

## 2023-06-28 ENCOUNTER — HOSPITAL ENCOUNTER (OUTPATIENT)
Facility: HOSPITAL | Age: 63
Setting detail: RECURRING SERIES
Discharge: HOME | End: 2023-07-01
Payer: COMMERCIAL

## 2023-06-28 VITALS — WEIGHT: 278 LBS | BODY MASS INDEX: 37.7 KG/M2

## 2023-06-28 PROCEDURE — 93797 PHYS/QHP OP CAR RHAB WO ECG: CPT

## 2023-06-28 PROCEDURE — 93798 PHYS/QHP OP CAR RHAB W/ECG: CPT

## 2023-06-28 ASSESSMENT — LIFESTYLE VARIABLES
ALCOHOL_TYPE: BEER
ALCOHOL_USE: DAILY

## 2023-06-28 ASSESSMENT — EXERCISE STRESS TEST
PEAK_METS: 2.4
PEAK_HR: 112
PEAK_RPE: 13

## 2023-07-05 ENCOUNTER — HOSPITAL ENCOUNTER (OUTPATIENT)
Facility: HOSPITAL | Age: 63
Setting detail: RECURRING SERIES
Discharge: HOME | End: 2023-07-08
Payer: COMMERCIAL

## 2023-07-05 VITALS — WEIGHT: 280 LBS | BODY MASS INDEX: 37.97 KG/M2

## 2023-07-05 PROCEDURE — 93798 PHYS/QHP OP CAR RHAB W/ECG: CPT

## 2023-07-05 PROCEDURE — 93797 PHYS/QHP OP CAR RHAB WO ECG: CPT

## 2023-07-05 ASSESSMENT — EXERCISE STRESS TEST
PEAK_METS: 2.4
PEAK_HR: 108
PEAK_RPE: 13

## 2023-07-11 ENCOUNTER — HOSPITAL ENCOUNTER (OUTPATIENT)
Facility: HOSPITAL | Age: 63
Setting detail: RECURRING SERIES
Discharge: HOME | End: 2023-07-14
Payer: COMMERCIAL

## 2023-07-11 VITALS — WEIGHT: 277 LBS | BODY MASS INDEX: 37.57 KG/M2

## 2023-07-11 PROCEDURE — 93798 PHYS/QHP OP CAR RHAB W/ECG: CPT

## 2023-07-11 ASSESSMENT — EXERCISE STRESS TEST
PEAK_METS: 2.4
PEAK_RPE: 13
PEAK_HR: 109

## 2023-07-12 ENCOUNTER — HOSPITAL ENCOUNTER (OUTPATIENT)
Facility: HOSPITAL | Age: 63
Setting detail: RECURRING SERIES
Discharge: HOME | End: 2023-07-15
Payer: COMMERCIAL

## 2023-07-12 ENCOUNTER — APPOINTMENT (OUTPATIENT)
Facility: HOSPITAL | Age: 63
End: 2023-07-12
Payer: COMMERCIAL

## 2023-07-12 VITALS — WEIGHT: 275 LBS | BODY MASS INDEX: 37.3 KG/M2

## 2023-07-12 PROCEDURE — 93798 PHYS/QHP OP CAR RHAB W/ECG: CPT

## 2023-07-12 ASSESSMENT — EXERCISE STRESS TEST
PEAK_METS: 2.4
PEAK_HR: 118
PEAK_RPE: 13

## 2023-07-13 RX ORDER — GLIPIZIDE 10 MG/1
TABLET ORAL
Qty: 180 TABLET | Refills: 1 | Status: SHIPPED | OUTPATIENT
Start: 2023-07-13

## 2023-07-18 ENCOUNTER — HOSPITAL ENCOUNTER (OUTPATIENT)
Facility: HOSPITAL | Age: 63
Setting detail: RECURRING SERIES
Discharge: HOME | End: 2023-07-21
Payer: COMMERCIAL

## 2023-07-18 VITALS — BODY MASS INDEX: 37.57 KG/M2 | WEIGHT: 277 LBS

## 2023-07-18 PROCEDURE — 93798 PHYS/QHP OP CAR RHAB W/ECG: CPT

## 2023-07-18 ASSESSMENT — LIFESTYLE VARIABLES
ALCOHOL_TYPE: BEER
SMOKELESS_TOBACCO: NO
ALCOHOL_USE: DAILY

## 2023-07-18 ASSESSMENT — EXERCISE STRESS TEST
PEAK_HR: 111
PEAK_RPE: 13
PEAK_METS: 2.8

## 2023-07-18 ASSESSMENT — EJECTION FRACTION: EF_VALUE: 35

## 2023-07-19 ENCOUNTER — HOSPITAL ENCOUNTER (OUTPATIENT)
Facility: HOSPITAL | Age: 63
Setting detail: RECURRING SERIES
End: 2023-07-19
Payer: COMMERCIAL

## 2023-07-19 ENCOUNTER — HOSPITAL ENCOUNTER (OUTPATIENT)
Facility: HOSPITAL | Age: 63
Setting detail: RECURRING SERIES
Discharge: HOME | End: 2023-07-22
Payer: COMMERCIAL

## 2023-07-19 VITALS — WEIGHT: 277 LBS | BODY MASS INDEX: 37.57 KG/M2

## 2023-07-19 PROCEDURE — 93798 PHYS/QHP OP CAR RHAB W/ECG: CPT

## 2023-07-19 ASSESSMENT — EXERCISE STRESS TEST
PEAK_RPE: 13
PEAK_HR: 116
PEAK_METS: 2.8

## 2023-07-24 DIAGNOSIS — Z95.1 S/P CABG (CORONARY ARTERY BYPASS GRAFT): ICD-10-CM

## 2023-07-25 ENCOUNTER — APPOINTMENT (OUTPATIENT)
Facility: HOSPITAL | Age: 63
End: 2023-07-25
Payer: COMMERCIAL

## 2023-07-26 ENCOUNTER — APPOINTMENT (OUTPATIENT)
Facility: HOSPITAL | Age: 63
End: 2023-07-26
Payer: COMMERCIAL

## 2023-08-01 ENCOUNTER — HOSPITAL ENCOUNTER (OUTPATIENT)
Facility: HOSPITAL | Age: 63
Setting detail: RECURRING SERIES
Discharge: HOME | End: 2023-08-04
Payer: COMMERCIAL

## 2023-08-01 VITALS — BODY MASS INDEX: 37.3 KG/M2 | WEIGHT: 275 LBS

## 2023-08-01 PROCEDURE — 93798 PHYS/QHP OP CAR RHAB W/ECG: CPT

## 2023-08-01 ASSESSMENT — EXERCISE STRESS TEST
PEAK_RPE: 13
PEAK_HR: 106
PEAK_METS: 2.8

## 2023-08-02 ENCOUNTER — HOSPITAL ENCOUNTER (OUTPATIENT)
Facility: HOSPITAL | Age: 63
Setting detail: RECURRING SERIES
End: 2023-08-02
Payer: COMMERCIAL

## 2023-08-02 ENCOUNTER — HOSPITAL ENCOUNTER (OUTPATIENT)
Facility: HOSPITAL | Age: 63
Setting detail: RECURRING SERIES
Discharge: HOME | End: 2023-08-05
Payer: COMMERCIAL

## 2023-08-02 VITALS — BODY MASS INDEX: 37.43 KG/M2 | WEIGHT: 276 LBS

## 2023-08-02 PROCEDURE — 93798 PHYS/QHP OP CAR RHAB W/ECG: CPT

## 2023-08-02 ASSESSMENT — EXERCISE STRESS TEST
PEAK_RPE: 13
PEAK_HR: 111
PEAK_METS: 2.8

## 2023-08-08 ENCOUNTER — HOSPITAL ENCOUNTER (OUTPATIENT)
Facility: HOSPITAL | Age: 63
Setting detail: RECURRING SERIES
Discharge: HOME | End: 2023-08-11
Payer: COMMERCIAL

## 2023-08-08 VITALS — BODY MASS INDEX: 37.7 KG/M2 | WEIGHT: 278 LBS

## 2023-08-08 PROCEDURE — 93798 PHYS/QHP OP CAR RHAB W/ECG: CPT

## 2023-08-08 ASSESSMENT — EXERCISE STRESS TEST
PEAK_HR: 114
PEAK_METS: 2.8
PEAK_RPE: 13

## 2023-08-11 RX ORDER — SEMAGLUTIDE 1.34 MG/ML
INJECTION, SOLUTION SUBCUTANEOUS
Qty: 9 ML | Refills: 5 | Status: SHIPPED | OUTPATIENT
Start: 2023-08-11

## 2023-08-15 ENCOUNTER — HOSPITAL ENCOUNTER (OUTPATIENT)
Facility: HOSPITAL | Age: 63
Setting detail: RECURRING SERIES
Discharge: HOME | End: 2023-08-18
Payer: COMMERCIAL

## 2023-08-15 VITALS — BODY MASS INDEX: 37.43 KG/M2 | WEIGHT: 276 LBS

## 2023-08-15 PROCEDURE — 93798 PHYS/QHP OP CAR RHAB W/ECG: CPT

## 2023-08-15 ASSESSMENT — LIFESTYLE VARIABLES
SMOKELESS_TOBACCO: NO
ALCOHOL_TYPE: BEER
ALCOHOL_USE: DAILY

## 2023-08-15 ASSESSMENT — EXERCISE STRESS TEST
PEAK_METS: 2.8
PEAK_RPE: 13
PEAK_HR: 112

## 2023-08-15 ASSESSMENT — EJECTION FRACTION: EF_VALUE: 35

## 2023-08-16 ENCOUNTER — HOSPITAL ENCOUNTER (OUTPATIENT)
Facility: HOSPITAL | Age: 63
Setting detail: RECURRING SERIES
Discharge: HOME | End: 2023-08-19
Payer: COMMERCIAL

## 2023-08-16 ENCOUNTER — APPOINTMENT (OUTPATIENT)
Facility: HOSPITAL | Age: 63
End: 2023-08-16
Payer: COMMERCIAL

## 2023-08-16 VITALS — BODY MASS INDEX: 37.43 KG/M2 | WEIGHT: 276 LBS

## 2023-08-16 PROCEDURE — 93798 PHYS/QHP OP CAR RHAB W/ECG: CPT

## 2023-08-16 ASSESSMENT — EXERCISE STRESS TEST
PEAK_RPE: 13
PEAK_HR: 115
PEAK_METS: 2.8

## 2023-08-22 ENCOUNTER — APPOINTMENT (OUTPATIENT)
Facility: HOSPITAL | Age: 63
End: 2023-08-22
Payer: COMMERCIAL

## 2023-08-23 ENCOUNTER — APPOINTMENT (OUTPATIENT)
Facility: HOSPITAL | Age: 63
End: 2023-08-23
Payer: COMMERCIAL

## 2023-08-28 ENCOUNTER — OFFICE VISIT (OUTPATIENT)
Age: 63
End: 2023-08-28
Payer: COMMERCIAL

## 2023-08-28 VITALS
DIASTOLIC BLOOD PRESSURE: 57 MMHG | TEMPERATURE: 97.8 F | OXYGEN SATURATION: 98 % | RESPIRATION RATE: 16 BRPM | SYSTOLIC BLOOD PRESSURE: 104 MMHG | HEIGHT: 73 IN | BODY MASS INDEX: 38.04 KG/M2 | HEART RATE: 75 BPM | WEIGHT: 287 LBS

## 2023-08-28 DIAGNOSIS — I10 ESSENTIAL (PRIMARY) HYPERTENSION: ICD-10-CM

## 2023-08-28 DIAGNOSIS — E11.8 TYPE 2 DIABETES MELLITUS WITH UNSPECIFIED COMPLICATIONS (HCC): Primary | ICD-10-CM

## 2023-08-28 DIAGNOSIS — E78.2 MIXED HYPERLIPIDEMIA: ICD-10-CM

## 2023-08-28 DIAGNOSIS — M54.2 NECK PAIN: ICD-10-CM

## 2023-08-28 PROCEDURE — 3078F DIAST BP <80 MM HG: CPT | Performed by: FAMILY MEDICINE

## 2023-08-28 PROCEDURE — 99214 OFFICE O/P EST MOD 30 MIN: CPT | Performed by: FAMILY MEDICINE

## 2023-08-28 PROCEDURE — 3074F SYST BP LT 130 MM HG: CPT | Performed by: FAMILY MEDICINE

## 2023-08-28 PROCEDURE — 3044F HG A1C LEVEL LT 7.0%: CPT | Performed by: FAMILY MEDICINE

## 2023-08-28 RX ORDER — TIZANIDINE 4 MG/1
4 TABLET ORAL EVERY 8 HOURS PRN
Qty: 30 TABLET | Refills: 1 | Status: SHIPPED | OUTPATIENT
Start: 2023-08-28

## 2023-08-28 NOTE — PROGRESS NOTES
Erinn Jimenez (:  1960) is a 57 y.o. male,Established patient, here for evaluation of the following chief complaint(s):  Hypertension, Diabetes, Cholesterol Problem, and Follow-up Chronic Condition         ASSESSMENT/PLAN:  1. Type 2 diabetes mellitus with unspecified complications (HCC)  -     Hemoglobin A1C; Future  2. Mixed hyperlipidemia  -     Lipid Panel; Future  3. Essential (primary) hypertension  -     Comprehensive Metabolic Panel; Future  -     CBC with Auto Differential; Future  4. Neck pain      Return in about 6 months (around 2024). Subjective   SUBJECTIVE/OBJECTIVE:  HPI Pt. Comes in for blood pressure, cholesterol, and diabetes check. Has also been having neck pain on both sides for one week. No radiation  of pain down arms, numbness ro weakenss in arms. No complaints of chest pain, shortness of breath, TIAs, claudication or edema. Review of Systems       Objective   Physical Exam  Cardiovascular:      Rate and Rhythm: Normal rate and regular rhythm. Heart sounds: Normal heart sounds. No murmur heard. Pulmonary:      Effort: Pulmonary effort is normal.      Breath sounds: Normal breath sounds. Abdominal:      General: Abdomen is flat. Bowel sounds are normal.      Palpations: Abdomen is soft. Musculoskeletal:      Right lower leg: No edema. Left lower leg: No edema. Comments: Neck- decreasedrom. Tenderness on both sides in paravertebral muscles. Ext- gross motor intact              An electronic signature was used to authenticate this note.     --Alla Wasserman MD

## 2023-08-28 NOTE — PROGRESS NOTES
Chief Complaint   Patient presents with    Hypertension    Diabetes    Cholesterol Problem    Follow-up Chronic Condition         1. \"Have you been to the ER, urgent care clinic since your last visit? Hospitalized since your last visit? \" no    2. \"Have you seen or consulted any other health care providers outside of the [address] [place] [address] since your last visit? \" YES= CARDIOLOGY - DR. DEL CID     3. For patients aged 43-73: Has the patient had a colonoscopy / FIT/ Cologuard? YES      If the patient is female:    4. For patients aged 43-66: Has the patient had a mammogram within the past 2 years? N/A      5. For patients aged 21-65: Has the patient had a pap smear?  N/A      Health Maintenance Due   Topic Date Due    Pneumococcal 0-64 years Vaccine (1 - PCV) Never done    HIV screen  Never done    Hepatitis C screen  Never done    DTaP/Tdap/Td vaccine (1 - Tdap) Never done    Shingles vaccine (1 of 2) Never done    Diabetic retinal exam  06/30/2016    Diabetic Alb to Cr ratio (uACR) test  07/29/2020    Flu vaccine (1) Never done

## 2023-08-29 ENCOUNTER — HOSPITAL ENCOUNTER (OUTPATIENT)
Facility: HOSPITAL | Age: 63
Setting detail: RECURRING SERIES
Discharge: HOME | End: 2023-09-01
Payer: COMMERCIAL

## 2023-08-29 VITALS — BODY MASS INDEX: 37.21 KG/M2 | WEIGHT: 282 LBS

## 2023-08-29 LAB
ALBUMIN SERPL-MCNC: 3.1 G/DL (ref 3.5–5)
ALBUMIN/GLOB SERPL: 0.9 (ref 1.1–2.2)
ALP SERPL-CCNC: 56 U/L (ref 45–117)
ALT SERPL-CCNC: 13 U/L (ref 12–78)
ANION GAP SERPL CALC-SCNC: 7 MMOL/L (ref 5–15)
AST SERPL-CCNC: 4 U/L (ref 15–37)
BASOPHILS # BLD: 0.1 K/UL (ref 0–0.1)
BASOPHILS NFR BLD: 1 % (ref 0–1)
BILIRUB SERPL-MCNC: 0.2 MG/DL (ref 0.2–1)
BUN SERPL-MCNC: 23 MG/DL (ref 6–20)
BUN/CREAT SERPL: 21 (ref 12–20)
CALCIUM SERPL-MCNC: 8.9 MG/DL (ref 8.5–10.1)
CHLORIDE SERPL-SCNC: 109 MMOL/L (ref 97–108)
CHOLEST SERPL-MCNC: 117 MG/DL
CO2 SERPL-SCNC: 24 MMOL/L (ref 21–32)
CREAT SERPL-MCNC: 1.09 MG/DL (ref 0.7–1.3)
DIFFERENTIAL METHOD BLD: ABNORMAL
EOSINOPHIL # BLD: 0.2 K/UL (ref 0–0.4)
EOSINOPHIL NFR BLD: 4 % (ref 0–7)
ERYTHROCYTE [DISTWIDTH] IN BLOOD BY AUTOMATED COUNT: 17.1 % (ref 11.5–14.5)
EST. AVERAGE GLUCOSE BLD GHB EST-MCNC: 212 MG/DL
GLOBULIN SER CALC-MCNC: 3.3 G/DL (ref 2–4)
GLUCOSE SERPL-MCNC: 165 MG/DL (ref 65–100)
HBA1C MFR BLD: 9 % (ref 4–5.6)
HCT VFR BLD AUTO: 39.3 % (ref 36.6–50.3)
HDLC SERPL-MCNC: 36 MG/DL
HDLC SERPL: 3.3 (ref 0–5)
HGB BLD-MCNC: 11.8 G/DL (ref 12.1–17)
IMM GRANULOCYTES # BLD AUTO: 0 K/UL (ref 0–0.04)
IMM GRANULOCYTES NFR BLD AUTO: 0 % (ref 0–0.5)
LDLC SERPL CALC-MCNC: 61.6 MG/DL (ref 0–100)
LYMPHOCYTES # BLD: 0.9 K/UL (ref 0.8–3.5)
LYMPHOCYTES NFR BLD: 17 % (ref 12–49)
MCH RBC QN AUTO: 26 PG (ref 26–34)
MCHC RBC AUTO-ENTMCNC: 30 G/DL (ref 30–36.5)
MCV RBC AUTO: 86.8 FL (ref 80–99)
MONOCYTES # BLD: 0.6 K/UL (ref 0–1)
MONOCYTES NFR BLD: 11 % (ref 5–13)
NEUTS SEG # BLD: 3.8 K/UL (ref 1.8–8)
NEUTS SEG NFR BLD: 67 % (ref 32–75)
NRBC # BLD: 0 K/UL (ref 0–0.01)
NRBC BLD-RTO: 0 PER 100 WBC
PLATELET # BLD AUTO: 274 K/UL (ref 150–400)
PMV BLD AUTO: 9.3 FL (ref 8.9–12.9)
POTASSIUM SERPL-SCNC: 4.3 MMOL/L (ref 3.5–5.1)
PROT SERPL-MCNC: 6.4 G/DL (ref 6.4–8.2)
RBC # BLD AUTO: 4.53 M/UL (ref 4.1–5.7)
SODIUM SERPL-SCNC: 140 MMOL/L (ref 136–145)
TRIGL SERPL-MCNC: 97 MG/DL
VLDLC SERPL CALC-MCNC: 19.4 MG/DL
WBC # BLD AUTO: 5.6 K/UL (ref 4.1–11.1)

## 2023-08-29 PROCEDURE — 93798 PHYS/QHP OP CAR RHAB W/ECG: CPT

## 2023-08-29 ASSESSMENT — EXERCISE STRESS TEST
PEAK_RPE: 15
PEAK_METS: 3.3
PEAK_HR: 107

## 2023-08-30 ENCOUNTER — HOSPITAL ENCOUNTER (OUTPATIENT)
Facility: HOSPITAL | Age: 63
Setting detail: RECURRING SERIES
End: 2023-08-30
Payer: COMMERCIAL

## 2023-08-30 ENCOUNTER — HOSPITAL ENCOUNTER (OUTPATIENT)
Facility: HOSPITAL | Age: 63
Setting detail: RECURRING SERIES
Discharge: HOME | End: 2023-09-02
Payer: COMMERCIAL

## 2023-08-30 VITALS — WEIGHT: 283 LBS | BODY MASS INDEX: 37.34 KG/M2

## 2023-08-30 PROCEDURE — 93798 PHYS/QHP OP CAR RHAB W/ECG: CPT

## 2023-08-30 ASSESSMENT — EXERCISE STRESS TEST
PEAK_RPE: 15
PEAK_HR: 112
PEAK_METS: 3.3

## 2023-09-05 ENCOUNTER — HOSPITAL ENCOUNTER (OUTPATIENT)
Facility: HOSPITAL | Age: 63
Setting detail: RECURRING SERIES
Discharge: HOME | End: 2023-09-08
Payer: COMMERCIAL

## 2023-09-05 VITALS — WEIGHT: 282 LBS | BODY MASS INDEX: 37.21 KG/M2

## 2023-09-05 PROCEDURE — 93798 PHYS/QHP OP CAR RHAB W/ECG: CPT

## 2023-09-05 RX ORDER — SEMAGLUTIDE 2.68 MG/ML
2 INJECTION, SOLUTION SUBCUTANEOUS
Qty: 3 ML | Refills: 5 | Status: SHIPPED | OUTPATIENT
Start: 2023-09-05

## 2023-09-05 ASSESSMENT — EXERCISE STRESS TEST
PEAK_METS: 3.3
PEAK_RPE: 13
PEAK_HR: 113

## 2023-09-06 ENCOUNTER — HOSPITAL ENCOUNTER (OUTPATIENT)
Facility: HOSPITAL | Age: 63
Setting detail: RECURRING SERIES
Discharge: HOME | End: 2023-09-09
Payer: COMMERCIAL

## 2023-09-06 ENCOUNTER — APPOINTMENT (OUTPATIENT)
Facility: HOSPITAL | Age: 63
End: 2023-09-06

## 2023-09-06 VITALS — BODY MASS INDEX: 37.07 KG/M2 | WEIGHT: 281 LBS

## 2023-09-06 PROCEDURE — 93798 PHYS/QHP OP CAR RHAB W/ECG: CPT

## 2023-09-06 ASSESSMENT — EXERCISE STRESS TEST
PEAK_RPE: 13
PEAK_METS: 3.3
PEAK_HR: 119

## 2023-09-12 ENCOUNTER — HOSPITAL ENCOUNTER (OUTPATIENT)
Facility: HOSPITAL | Age: 63
Setting detail: RECURRING SERIES
Discharge: HOME | End: 2023-09-15
Payer: COMMERCIAL

## 2023-09-12 VITALS — WEIGHT: 280 LBS | BODY MASS INDEX: 36.94 KG/M2

## 2023-09-12 PROCEDURE — 93798 PHYS/QHP OP CAR RHAB W/ECG: CPT

## 2023-09-12 ASSESSMENT — EXERCISE STRESS TEST
PEAK_RPE: 14
PEAK_HR: 112
PEAK_METS: 3.3

## 2023-09-12 ASSESSMENT — LIFESTYLE VARIABLES
ALCOHOL_TYPE: BEER
ALCOHOL_USE: DAILY
SMOKELESS_TOBACCO: NO

## 2023-09-12 ASSESSMENT — EJECTION FRACTION: EF_VALUE: 35

## 2023-09-13 ENCOUNTER — HOSPITAL ENCOUNTER (OUTPATIENT)
Facility: HOSPITAL | Age: 63
Setting detail: RECURRING SERIES
Discharge: HOME | End: 2023-09-16
Payer: COMMERCIAL

## 2023-09-13 VITALS — WEIGHT: 280 LBS | BODY MASS INDEX: 36.94 KG/M2

## 2023-09-13 PROCEDURE — 93798 PHYS/QHP OP CAR RHAB W/ECG: CPT

## 2023-09-13 ASSESSMENT — EXERCISE STRESS TEST
PEAK_RPE: 15
PEAK_HR: 112
PEAK_METS: 3.7

## 2023-09-14 ENCOUNTER — HOSPITAL ENCOUNTER (OUTPATIENT)
Facility: HOSPITAL | Age: 63
Setting detail: RECURRING SERIES
Discharge: HOME | End: 2023-09-17
Payer: COMMERCIAL

## 2023-09-14 VITALS — WEIGHT: 279 LBS | BODY MASS INDEX: 36.81 KG/M2

## 2023-09-14 PROCEDURE — 93798 PHYS/QHP OP CAR RHAB W/ECG: CPT

## 2023-09-14 ASSESSMENT — EXERCISE STRESS TEST
PEAK_METS: 3.7
PEAK_RPE: 14
PEAK_HR: 109

## 2023-09-19 ENCOUNTER — HOSPITAL ENCOUNTER (OUTPATIENT)
Facility: HOSPITAL | Age: 63
Setting detail: RECURRING SERIES
Discharge: HOME | End: 2023-09-22
Payer: COMMERCIAL

## 2023-09-19 VITALS — BODY MASS INDEX: 36.55 KG/M2 | WEIGHT: 277 LBS

## 2023-09-19 PROCEDURE — 93798 PHYS/QHP OP CAR RHAB W/ECG: CPT

## 2023-09-19 ASSESSMENT — EXERCISE STRESS TEST
PEAK_METS: 3.7
PEAK_HR: 110
PEAK_RPE: 13

## 2023-09-20 ENCOUNTER — TELEPHONE (OUTPATIENT)
Age: 63
End: 2023-09-20

## 2023-09-20 ENCOUNTER — HOSPITAL ENCOUNTER (OUTPATIENT)
Facility: HOSPITAL | Age: 63
Setting detail: RECURRING SERIES
Discharge: HOME | End: 2023-09-23
Payer: COMMERCIAL

## 2023-09-20 VITALS — BODY MASS INDEX: 36.68 KG/M2 | WEIGHT: 278 LBS

## 2023-09-20 PROCEDURE — 93798 PHYS/QHP OP CAR RHAB W/ECG: CPT

## 2023-09-20 ASSESSMENT — EXERCISE STRESS TEST
PEAK_METS: 3.7
PEAK_RPE: 13
PEAK_HR: 119

## 2023-09-20 NOTE — TELEPHONE ENCOUNTER
Davina with [facility] office Infectious disease states that patient will be seen at their office on 9/22 they need last labs faxed to them @ [phone] and call back number is [phone]

## 2023-09-21 ENCOUNTER — HOSPITAL ENCOUNTER (OUTPATIENT)
Facility: HOSPITAL | Age: 63
Setting detail: RECURRING SERIES
Discharge: HOME | End: 2023-09-24

## 2023-09-21 VITALS — WEIGHT: 278 LBS | BODY MASS INDEX: 36.68 KG/M2

## 2023-09-21 PROCEDURE — 93798 PHYS/QHP OP CAR RHAB W/ECG: CPT

## 2023-09-21 ASSESSMENT — EXERCISE STRESS TEST
PEAK_HR: 118
PEAK_METS: 3.7

## 2023-09-26 ENCOUNTER — HOSPITAL ENCOUNTER (OUTPATIENT)
Facility: HOSPITAL | Age: 63
Setting detail: RECURRING SERIES
Discharge: HOME | End: 2023-09-29

## 2023-09-26 PROCEDURE — 93798 PHYS/QHP OP CAR RHAB W/ECG: CPT

## 2023-09-26 NOTE — CARDIO/PULMONARY
Mr. Miranda Richter arrived to Cardiac Rehab this AM with BP 88/55. Denies dizziness. States he took all medications as prescribed this morning. +1 pitting edema noted to bilaterally to lower extremities. New skin wound to left calf with dressing. Multiple healing wounds to both lower legs. Skin is reddened in areas. Recheck BP 93/52 HR 65. Spoke with [facility] in Dr. Melva Stone office. Informed her of BP, edema, and skin breakdown. Plan to send patient home today with office follow up. Patient scheduled to return tomorrow. Will call us to update on plan of care following office touch base.

## 2023-09-27 ENCOUNTER — APPOINTMENT (OUTPATIENT)
Facility: HOSPITAL | Age: 63
End: 2023-09-27

## 2023-09-28 ENCOUNTER — APPOINTMENT (OUTPATIENT)
Facility: HOSPITAL | Age: 63
End: 2023-09-28

## 2023-10-03 ENCOUNTER — HOSPITAL ENCOUNTER (OUTPATIENT)
Facility: HOSPITAL | Age: 63
Setting detail: RECURRING SERIES
Discharge: HOME | End: 2023-10-06
Payer: COMMERCIAL

## 2023-10-03 VITALS — WEIGHT: 282 LBS | BODY MASS INDEX: 37.21 KG/M2

## 2023-10-03 PROCEDURE — 93798 PHYS/QHP OP CAR RHAB W/ECG: CPT

## 2023-10-03 ASSESSMENT — EXERCISE STRESS TEST
PEAK_HR: 116
PEAK_METS: 3.7
PEAK_RPE: 14

## 2023-10-04 ENCOUNTER — HOSPITAL ENCOUNTER (OUTPATIENT)
Facility: HOSPITAL | Age: 63
Setting detail: RECURRING SERIES
Discharge: HOME | End: 2023-10-07
Payer: COMMERCIAL

## 2023-10-04 VITALS — BODY MASS INDEX: 36.81 KG/M2 | WEIGHT: 279 LBS

## 2023-10-04 PROCEDURE — 93798 PHYS/QHP OP CAR RHAB W/ECG: CPT

## 2023-10-04 ASSESSMENT — EXERCISE STRESS TEST
PEAK_METS: 3.7
PEAK_RPE: 14
PEAK_HR: 116

## 2023-10-05 ENCOUNTER — HOSPITAL ENCOUNTER (OUTPATIENT)
Facility: HOSPITAL | Age: 63
Setting detail: RECURRING SERIES
Discharge: HOME | End: 2023-10-08
Payer: COMMERCIAL

## 2023-10-05 VITALS — WEIGHT: 280 LBS | BODY MASS INDEX: 36.94 KG/M2

## 2023-10-05 PROCEDURE — 93798 PHYS/QHP OP CAR RHAB W/ECG: CPT

## 2023-10-05 ASSESSMENT — EXERCISE STRESS TEST
PEAK_RPE: 14
PEAK_HR: 104
PEAK_METS: 3.7

## 2023-10-10 ENCOUNTER — HOSPITAL ENCOUNTER (OUTPATIENT)
Facility: HOSPITAL | Age: 63
Setting detail: RECURRING SERIES
Discharge: HOME | End: 2023-10-13
Payer: COMMERCIAL

## 2023-10-10 PROCEDURE — 93798 PHYS/QHP OP CAR RHAB W/ECG: CPT

## 2023-10-10 ASSESSMENT — LIFESTYLE VARIABLES
SMOKELESS_TOBACCO: NO
ALCOHOL_USE: DAILY
ALCOHOL_TYPE: BEER

## 2023-10-10 ASSESSMENT — EJECTION FRACTION: EF_VALUE: 35

## 2023-10-10 NOTE — CARDIO/PULMONARY
Patient arrived to cardiac rehab with blood pressure of 94/55, 89/54 and 76/52. Patient drank two cups of water. EF 35-40%. Denies dizziness of any symptoms. BLE 1+. Took all of his medications this morning. Final blood pressures 110/60 and a final of 121/68. Session canceled.

## 2023-10-11 ENCOUNTER — HOSPITAL ENCOUNTER (OUTPATIENT)
Facility: HOSPITAL | Age: 63
Setting detail: RECURRING SERIES
Discharge: HOME | End: 2023-10-14
Payer: COMMERCIAL

## 2023-10-11 VITALS — BODY MASS INDEX: 37.07 KG/M2 | WEIGHT: 281 LBS

## 2023-10-11 PROCEDURE — 93798 PHYS/QHP OP CAR RHAB W/ECG: CPT

## 2023-10-11 ASSESSMENT — LIFESTYLE VARIABLES
SMOKELESS_TOBACCO: NO
ALCOHOL_TYPE: BEER
ALCOHOL_USE: DAILY

## 2023-10-11 ASSESSMENT — EXERCISE STRESS TEST
PEAK_RPE: 14
PEAK_HR: 113
PEAK_METS: 3.7

## 2023-10-11 ASSESSMENT — EJECTION FRACTION: EF_VALUE: 35

## 2023-10-12 ENCOUNTER — HOSPITAL ENCOUNTER (OUTPATIENT)
Facility: HOSPITAL | Age: 63
Setting detail: RECURRING SERIES
Discharge: HOME | End: 2023-10-15
Payer: COMMERCIAL

## 2023-10-12 VITALS — BODY MASS INDEX: 37.21 KG/M2 | WEIGHT: 282 LBS

## 2023-10-12 PROCEDURE — 93798 PHYS/QHP OP CAR RHAB W/ECG: CPT

## 2023-10-12 ASSESSMENT — EXERCISE STRESS TEST
PEAK_HR: 127
PEAK_RPE: 14
PEAK_METS: 3.7

## 2023-10-17 ENCOUNTER — HOSPITAL ENCOUNTER (OUTPATIENT)
Facility: HOSPITAL | Age: 63
Setting detail: RECURRING SERIES
Discharge: HOME | End: 2023-10-20
Payer: COMMERCIAL

## 2023-10-17 VITALS — BODY MASS INDEX: 37.21 KG/M2 | WEIGHT: 282 LBS

## 2023-10-17 PROCEDURE — 93798 PHYS/QHP OP CAR RHAB W/ECG: CPT

## 2023-10-17 ASSESSMENT — EXERCISE STRESS TEST
PEAK_RPE: 14
PEAK_HR: 119
PEAK_METS: 3.7

## 2023-10-18 ENCOUNTER — HOSPITAL ENCOUNTER (OUTPATIENT)
Facility: HOSPITAL | Age: 63
Setting detail: RECURRING SERIES
Discharge: HOME | End: 2023-10-21
Payer: COMMERCIAL

## 2023-10-18 VITALS — BODY MASS INDEX: 37.21 KG/M2 | WEIGHT: 282 LBS

## 2023-10-18 PROCEDURE — 93798 PHYS/QHP OP CAR RHAB W/ECG: CPT

## 2023-10-18 ASSESSMENT — EXERCISE STRESS TEST
PEAK_HR: 119
PEAK_METS: 3.7
PEAK_RPE: 14

## 2023-10-18 NOTE — TELEPHONE ENCOUNTER
Last appointment: 8/28/23  Next appointment: 2/28/24    Requested Prescriptions     Pending Prescriptions Disp Refills    JARDIANCE 10 MG tablet [Pharmacy Med Name: Firmthiago [place] 10 MG TABLET] 90 tablet 3     Sig: TAKE 1 TAB BY MOUTH DAILY. FOR DIABETES    metFORMIN (GLUCOPHAGE) 1000 MG tablet [Pharmacy Med Name: METFORMIN HCL 1,000 MG TABLET] 180 tablet 3     Sig: TAKE 1 TABLET BY MOUTH TWICE A DAY WITH MEALS         For Pharmacy Admin Tracking Only    Program: Medication Refill  CPA in place:    Recommendation Provided To:    Intervention Detail: New Rx: 2, reason: Patient Preference  Intervention Accepted By:   Hector Huston Closed?:    Time Spent (min): 5

## 2023-10-19 ENCOUNTER — HOSPITAL ENCOUNTER (OUTPATIENT)
Facility: HOSPITAL | Age: 63
Setting detail: RECURRING SERIES
Discharge: HOME | End: 2023-10-22
Payer: COMMERCIAL

## 2023-10-19 VITALS — WEIGHT: 282 LBS | BODY MASS INDEX: 37.21 KG/M2

## 2023-10-19 PROCEDURE — 93798 PHYS/QHP OP CAR RHAB W/ECG: CPT

## 2023-10-19 RX ORDER — EMPAGLIFLOZIN 10 MG/1
TABLET, FILM COATED ORAL
Qty: 90 TABLET | Refills: 3 | Status: SHIPPED | OUTPATIENT
Start: 2023-10-19

## 2023-10-19 ASSESSMENT — EXERCISE STRESS TEST
PEAK_HR: 115
PEAK_RPE: 14
PEAK_METS: 4

## 2023-10-24 ENCOUNTER — HOSPITAL ENCOUNTER (OUTPATIENT)
Facility: HOSPITAL | Age: 63
Setting detail: RECURRING SERIES
Discharge: HOME | End: 2023-10-27
Payer: COMMERCIAL

## 2023-10-24 VITALS — BODY MASS INDEX: 37.21 KG/M2 | WEIGHT: 282 LBS

## 2023-10-24 PROCEDURE — 93798 PHYS/QHP OP CAR RHAB W/ECG: CPT

## 2023-10-24 ASSESSMENT — EXERCISE STRESS TEST
PEAK_HR: 110
PEAK_RPE: 14
PEAK_METS: 4

## 2023-10-25 ENCOUNTER — HOSPITAL ENCOUNTER (OUTPATIENT)
Facility: HOSPITAL | Age: 63
Setting detail: RECURRING SERIES
Discharge: HOME | End: 2023-10-28
Payer: COMMERCIAL

## 2023-10-25 VITALS — WEIGHT: 280 LBS | BODY MASS INDEX: 36.94 KG/M2

## 2023-10-25 PROCEDURE — 93798 PHYS/QHP OP CAR RHAB W/ECG: CPT

## 2023-10-25 ASSESSMENT — EXERCISE STRESS TEST
PEAK_RPE: 14
PEAK_METS: 4
PEAK_HR: 120

## 2023-10-26 RX ORDER — GEMFIBROZIL 600 MG/1
600 TABLET, FILM COATED ORAL 2 TIMES DAILY
Qty: 180 TABLET | Refills: 3 | Status: SHIPPED | OUTPATIENT
Start: 2023-10-26

## 2023-10-26 NOTE — TELEPHONE ENCOUNTER
Last appointment: 8/28/23  Next appointment: 2/28/24  Previous refill encounter(s): 10/17/22 90 d/s with 3 refills    Requested Prescriptions     Pending Prescriptions Disp Refills    gemfibrozil (LOPID) 600 MG tablet [Pharmacy Med Name: GEMFIBROZIL 600 MG TABLET] 180 tablet 3     Sig: TAKE 1 TABLET BY MOUTH TWO TIMES A DAY. For Pharmacy Admin Tracking Only    Program: Medication Refill  CPA in place:    Recommendation Provided To:    Intervention Detail: New Rx: 1, reason: Patient Preference  Intervention Accepted By:   Vinny Smart Closed?:    Time Spent (min): 5

## 2023-10-27 ENCOUNTER — HOSPITAL ENCOUNTER (OUTPATIENT)
Facility: HOSPITAL | Age: 63
Setting detail: RECURRING SERIES
Discharge: HOME | End: 2023-10-30

## 2023-10-27 VITALS — WEIGHT: 280 LBS | BODY MASS INDEX: 36.94 KG/M2

## 2023-10-27 PROCEDURE — 93798 PHYS/QHP OP CAR RHAB W/ECG: CPT

## 2023-10-27 ASSESSMENT — EXERCISE STRESS TEST
PEAK_METS: 4
PEAK_RPE: 14
PEAK_HR: 123

## 2023-12-13 ENCOUNTER — TELEPHONE (OUTPATIENT)
Age: 63
End: 2023-12-13

## 2023-12-13 NOTE — TELEPHONE ENCOUNTER
Patient wants a return call regarding the Semaglutide, 2 MG/DOSE, (OZEMPIC, 2 MG/DOSE,) 8 MG/3ML SOPN .    Please give him a call @ [phone]

## 2023-12-14 NOTE — TELEPHONE ENCOUNTER
Patient states he has not had his ozempic in three weeks due to being on back order. Patient recommended to check around with pharmacies for availability or contact insurance to see what med is on their formulary if patient wants to change meds do to back order.

## 2023-12-26 RX ORDER — FUROSEMIDE 80 MG
129 TABLET ORAL DAILY
Qty: 135 TABLET | Refills: 1 | Status: SHIPPED | OUTPATIENT
Start: 2023-12-26

## 2024-01-22 NOTE — PERIOP NOTES
Bactisure irrigation used intraop lot# [id number] exp: 02/26/2023
Report received from lesvia using [facility] and [facility]
Verbal Marjo Harney order given for consent by Dr. Guevara Hinson.
irresept used for irrigation  Lot # [id number] Exp: 08/31/2023
Declines

## 2024-02-28 ENCOUNTER — OFFICE VISIT (OUTPATIENT)
Age: 64
End: 2024-02-28
Payer: COMMERCIAL

## 2024-02-28 VITALS
HEIGHT: 73 IN | DIASTOLIC BLOOD PRESSURE: 58 MMHG | WEIGHT: 279.4 LBS | OXYGEN SATURATION: 97 % | HEART RATE: 77 BPM | RESPIRATION RATE: 16 BRPM | BODY MASS INDEX: 37.03 KG/M2 | SYSTOLIC BLOOD PRESSURE: 109 MMHG

## 2024-02-28 DIAGNOSIS — E11.8 TYPE 2 DIABETES MELLITUS WITH UNSPECIFIED COMPLICATIONS (HCC): ICD-10-CM

## 2024-02-28 DIAGNOSIS — G62.9 NEUROPATHY: ICD-10-CM

## 2024-02-28 DIAGNOSIS — E78.2 MIXED HYPERLIPIDEMIA: ICD-10-CM

## 2024-02-28 DIAGNOSIS — I10 ESSENTIAL (PRIMARY) HYPERTENSION: Primary | ICD-10-CM

## 2024-02-28 LAB
ALBUMIN SERPL-MCNC: 3.4 G/DL (ref 3.5–5)
ALBUMIN/GLOB SERPL: 1.1 (ref 1.1–2.2)
ALP SERPL-CCNC: 63 U/L (ref 45–117)
ALT SERPL-CCNC: 12 U/L (ref 12–78)
ANION GAP SERPL CALC-SCNC: 3 MMOL/L (ref 5–15)
AST SERPL-CCNC: 7 U/L (ref 15–37)
BASOPHILS # BLD: 0 K/UL (ref 0–0.1)
BASOPHILS NFR BLD: 1 % (ref 0–1)
BILIRUB SERPL-MCNC: 0.5 MG/DL (ref 0.2–1)
BUN SERPL-MCNC: 23 MG/DL (ref 6–20)
BUN/CREAT SERPL: 23 (ref 12–20)
CALCIUM SERPL-MCNC: 9.1 MG/DL (ref 8.5–10.1)
CHLORIDE SERPL-SCNC: 101 MMOL/L (ref 97–108)
CHOLEST SERPL-MCNC: 152 MG/DL
CO2 SERPL-SCNC: 28 MMOL/L (ref 21–32)
CREAT SERPL-MCNC: 1.01 MG/DL (ref 0.7–1.3)
CREAT UR-MCNC: 18.6 MG/DL
DIFFERENTIAL METHOD BLD: NORMAL
EOSINOPHIL # BLD: 0.2 K/UL (ref 0–0.4)
EOSINOPHIL NFR BLD: 3 % (ref 0–7)
ERYTHROCYTE [DISTWIDTH] IN BLOOD BY AUTOMATED COUNT: 13.4 % (ref 11.5–14.5)
EST. AVERAGE GLUCOSE BLD GHB EST-MCNC: 349 MG/DL
GLOBULIN SER CALC-MCNC: 3 G/DL (ref 2–4)
GLUCOSE SERPL-MCNC: 394 MG/DL (ref 65–100)
HBA1C MFR BLD: 13.8 % (ref 4–5.6)
HBA1C MFR BLD: 14.6 %
HCT VFR BLD AUTO: 38 % (ref 36.6–50.3)
HDLC SERPL-MCNC: 32 MG/DL
HDLC SERPL: 4.8 (ref 0–5)
HGB BLD-MCNC: 12.4 G/DL (ref 12.1–17)
IMM GRANULOCYTES # BLD AUTO: 0 K/UL (ref 0–0.04)
IMM GRANULOCYTES NFR BLD AUTO: 0 % (ref 0–0.5)
LDLC SERPL CALC-MCNC: 89.4 MG/DL (ref 0–100)
LYMPHOCYTES # BLD: 1.5 K/UL (ref 0.8–3.5)
LYMPHOCYTES NFR BLD: 24 % (ref 12–49)
MCH RBC QN AUTO: 28.4 PG (ref 26–34)
MCHC RBC AUTO-ENTMCNC: 32.6 G/DL (ref 30–36.5)
MCV RBC AUTO: 87.2 FL (ref 80–99)
MICROALBUMIN UR-MCNC: 30 MG/DL
MICROALBUMIN/CREAT UR-RTO: 1613 MG/G (ref 0–30)
MONOCYTES # BLD: 0.5 K/UL (ref 0–1)
MONOCYTES NFR BLD: 8 % (ref 5–13)
NEUTS SEG # BLD: 4.1 K/UL (ref 1.8–8)
NEUTS SEG NFR BLD: 64 % (ref 32–75)
NRBC # BLD: 0 K/UL (ref 0–0.01)
NRBC BLD-RTO: 0 PER 100 WBC
PLATELET # BLD AUTO: 254 K/UL (ref 150–400)
PMV BLD AUTO: 10 FL (ref 8.9–12.9)
POTASSIUM SERPL-SCNC: 4.5 MMOL/L (ref 3.5–5.1)
PROT SERPL-MCNC: 6.4 G/DL (ref 6.4–8.2)
RBC # BLD AUTO: 4.36 M/UL (ref 4.1–5.7)
SODIUM SERPL-SCNC: 132 MMOL/L (ref 136–145)
TRIGL SERPL-MCNC: 153 MG/DL
VLDLC SERPL CALC-MCNC: 30.6 MG/DL
WBC # BLD AUTO: 6.4 K/UL (ref 4.1–11.1)

## 2024-02-28 PROCEDURE — 83036 HEMOGLOBIN GLYCOSYLATED A1C: CPT | Performed by: FAMILY MEDICINE

## 2024-02-28 PROCEDURE — 99214 OFFICE O/P EST MOD 30 MIN: CPT | Performed by: FAMILY MEDICINE

## 2024-02-28 PROCEDURE — 3074F SYST BP LT 130 MM HG: CPT | Performed by: FAMILY MEDICINE

## 2024-02-28 PROCEDURE — 3078F DIAST BP <80 MM HG: CPT | Performed by: FAMILY MEDICINE

## 2024-02-28 PROCEDURE — 1123F ACP DISCUSS/DSCN MKR DOCD: CPT | Performed by: FAMILY MEDICINE

## 2024-02-28 RX ORDER — SPIRONOLACTONE 25 MG/1
25 TABLET ORAL DAILY
COMMUNITY
Start: 2024-02-23

## 2024-02-28 RX ORDER — EXENATIDE 2 MG/.65ML
2 INJECTION, SUSPENSION, EXTENDED RELEASE SUBCUTANEOUS
Qty: 4 PEN | Refills: 12 | Status: SHIPPED | OUTPATIENT
Start: 2024-02-28

## 2024-02-28 RX ORDER — METOPROLOL SUCCINATE 50 MG/1
50 TABLET, EXTENDED RELEASE ORAL
COMMUNITY
Start: 2023-12-28

## 2024-02-28 ASSESSMENT — PATIENT HEALTH QUESTIONNAIRE - PHQ9
2. FEELING DOWN, DEPRESSED OR HOPELESS: 0
SUM OF ALL RESPONSES TO PHQ QUESTIONS 1-9: 0
SUM OF ALL RESPONSES TO PHQ9 QUESTIONS 1 & 2: 0
SUM OF ALL RESPONSES TO PHQ QUESTIONS 1-9: 0
1. LITTLE INTEREST OR PLEASURE IN DOING THINGS: 0

## 2024-02-28 NOTE — PROGRESS NOTES
Chief Complaint   Patient presents with    Hypertension    Diabetes    Cholesterol Problem    Follow-up Chronic Condition         1. \"Have you been to the ER, urgent care clinic since your last visit?  Hospitalized since your last visit?\" NO    2. \"Have you seen or consulted any other health care providers outside of the [facility] since your last visit?\" INFECTIOUS DISEASE     3. For patients aged 45-75: Has the patient had a colonoscopy / FIT/ Cologuard? YES      If the patient is female:    4. For patients aged 40-74: Has the patient had a mammogram within the past 2 years? N/A      5. For patients aged 21-65: Has the patient had a pap smear? N/A      Health Maintenance Due   Topic Date Due    Pneumococcal 65+ years Vaccine (1 - PCV) Never done    HIV screen  Never done    Hepatitis C screen  Never done    DTaP/Tdap/Td vaccine (1 - Tdap) Never done    Shingles vaccine (1 of 2) Never done    Diabetic retinal exam  06/30/2016    Respiratory Syncytial Virus (RSV) Pregnant or age 60 yrs+ (1 - 1-dose 60+ series) Never done    Diabetic Alb to Cr ratio (uACR) test  07/29/2020    COVID-19 Vaccine (4 - 2023-24 season) 09/01/2023    AAA screen  Never done    A1C test (Diabetic or Prediabetic)  11/28/2023

## 2024-02-28 NOTE — PROGRESS NOTES
Zachary Britton (:  1960) is a 63 y.o. male,Established patient, here for evaluation of the following chief complaint(s):  Hypertension, Diabetes, Cholesterol Problem, and Follow-up Chronic Condition         ASSESSMENT/PLAN:  1. Essential (primary) hypertension  -     CBC with Auto Differential; Future  -     Comprehensive Metabolic Panel; Future  2. Type 2 diabetes mellitus with unspecified complications (HCC)  -     Microalbumin / Creatinine Urine Ratio; Future  -     AMB POC HEMOGLOBIN A1C  -     Exenatide (BYDUREON) 2 MG PEN; Inject 1 pen  into the skin every 7 days, Disp-4 pen, R-12Normal  -     Hemoglobin A1C; Future  3. Mixed hyperlipidemia  -     Lipid Panel; Future  4. Neuropathy      Return in about 3 months (around 2024).         Subjective   SUBJECTIVE/OBJECTIVE:  HPI Pt. Comes in for blood pressure, cholesterol, and diabetes check. No complaints of chest pain, shortness of breath, TIAs, claudication or edema.    Has been unable to get ozempic for 4 months. Was taking 2 mg weekly. Now is on jardiance and metformin. Has been having some numbness in both feet.         Review of Systems       Objective   Physical Exam  Cardiovascular:      Rate and Rhythm: Normal rate and regular rhythm.      Heart sounds: Normal heart sounds. No murmur heard.  Pulmonary:      Effort: Pulmonary effort is normal.      Breath sounds: Normal breath sounds.   Abdominal:      General: Abdomen is flat. Bowel sounds are normal.      Palpations: Abdomen is soft.   Musculoskeletal:      Right lower leg: No edema.      Left lower leg: No edema.   Neurological:      Comments: Decreased sensation to monnofilament in R medial sole of foot.                 An electronic signature was used to authenticate this note.    --Den Sarmiento MD

## 2024-02-29 ENCOUNTER — TELEPHONE (OUTPATIENT)
Age: 64
End: 2024-02-29

## 2024-02-29 NOTE — TELEPHONE ENCOUNTER
Verbal order given to call patient in Bydureon pen to pharmacy as transmission failed yesterday.  Verbal given to pharmacist.  Patient aware of update.

## 2024-02-29 NOTE — TELEPHONE ENCOUNTER
Patient states the pharmacy has not received the Exenatide (BYDUREON) 2 MG PEN .  Please give him a call @ [phone]

## 2024-03-01 ENCOUNTER — TELEPHONE (OUTPATIENT)
Age: 64
End: 2024-03-01

## 2024-03-01 ENCOUNTER — CLINICAL DOCUMENTATION (OUTPATIENT)
Age: 64
End: 2024-03-01

## 2024-03-01 NOTE — TELEPHONE ENCOUNTER
First prior auth initiated today was denied.  Form reviewed for possible errors and resubmitted.

## 2024-03-01 NOTE — TELEPHONE ENCOUNTER
A prior authorization is required for Bydureon BCise    Cover My Meds key: [id number]      For Pharmacy Admin Tracking Only    Program: Medication Refill  CPA in place:    Recommendation Provided To:   Intervention Detail: New Rx: 1, reason: Cost/Formulary Change  Intervention Accepted By:   Gap Closed?:    Time Spent (min): 5

## 2024-03-05 ENCOUNTER — TELEPHONE (OUTPATIENT)
Age: 64
End: 2024-03-05

## 2024-03-05 NOTE — TELEPHONE ENCOUNTER
Patient notified that both attempts on 3/1/24 to get prior auth approved for stefani Chavis.  Aic and GFR listed on prior auth attempts but med was still denied.  Will consult with Dr. Sarmiento for advisement.  Patient also instructed to reach out to insurance to see what is comparable that insurance covers.

## 2024-03-07 ENCOUNTER — TELEPHONE (OUTPATIENT)
Age: 64
End: 2024-03-07

## 2024-03-13 RX ORDER — SEMAGLUTIDE 2.68 MG/ML
2 INJECTION, SOLUTION SUBCUTANEOUS
Qty: 3 ML | Refills: 5 | Status: SHIPPED | OUTPATIENT
Start: 2024-03-13 | End: 2024-03-19 | Stop reason: SDUPTHER

## 2024-03-13 NOTE — PROGRESS NOTES
Pc with pt. [facility]

## 2024-03-19 RX ORDER — SEMAGLUTIDE 2.68 MG/ML
2 INJECTION, SOLUTION SUBCUTANEOUS
Qty: 9 ML | Refills: 5 | Status: SHIPPED | OUTPATIENT
Start: 2024-03-19

## 2024-05-29 ENCOUNTER — OFFICE VISIT (OUTPATIENT)
Age: 64
End: 2024-05-29
Payer: COMMERCIAL

## 2024-05-29 VITALS
BODY MASS INDEX: 36.82 KG/M2 | SYSTOLIC BLOOD PRESSURE: 122 MMHG | WEIGHT: 277.8 LBS | TEMPERATURE: 97.9 F | DIASTOLIC BLOOD PRESSURE: 57 MMHG | RESPIRATION RATE: 18 BRPM | HEIGHT: 73 IN | OXYGEN SATURATION: 98 % | HEART RATE: 75 BPM

## 2024-05-29 DIAGNOSIS — E66.01 SEVERE OBESITY (BMI 35.0-39.9) WITH COMORBIDITY (HCC): ICD-10-CM

## 2024-05-29 DIAGNOSIS — E11.42 TYPE 2 DIABETES MELLITUS WITH DIABETIC POLYNEUROPATHY, WITHOUT LONG-TERM CURRENT USE OF INSULIN (HCC): Primary | ICD-10-CM

## 2024-05-29 DIAGNOSIS — E11.42 DIABETIC POLYNEUROPATHY ASSOCIATED WITH TYPE 2 DIABETES MELLITUS (HCC): ICD-10-CM

## 2024-05-29 LAB
GLUCOSE, POC: 205 MG/DL
HBA1C MFR BLD: 9.8 %

## 2024-05-29 PROCEDURE — 83036 HEMOGLOBIN GLYCOSYLATED A1C: CPT | Performed by: FAMILY MEDICINE

## 2024-05-29 PROCEDURE — 1123F ACP DISCUSS/DSCN MKR DOCD: CPT | Performed by: FAMILY MEDICINE

## 2024-05-29 PROCEDURE — 3074F SYST BP LT 130 MM HG: CPT | Performed by: FAMILY MEDICINE

## 2024-05-29 PROCEDURE — 82962 GLUCOSE BLOOD TEST: CPT | Performed by: FAMILY MEDICINE

## 2024-05-29 PROCEDURE — 3078F DIAST BP <80 MM HG: CPT | Performed by: FAMILY MEDICINE

## 2024-05-29 PROCEDURE — 99213 OFFICE O/P EST LOW 20 MIN: CPT | Performed by: FAMILY MEDICINE

## 2024-05-29 PROCEDURE — 3046F HEMOGLOBIN A1C LEVEL >9.0%: CPT | Performed by: FAMILY MEDICINE

## 2024-05-29 RX ORDER — BACILLUS COAGULANS 1B CELL
1 CAPSULE ORAL 2 TIMES DAILY
Qty: 180 TABLET | Refills: 3 | Status: SHIPPED | OUTPATIENT
Start: 2024-05-29

## 2024-05-29 RX ORDER — MAGNESIUM 200 MG
1 TABLET ORAL DAILY
COMMUNITY
Start: 2024-03-19

## 2024-05-29 SDOH — ECONOMIC STABILITY: INCOME INSECURITY: HOW HARD IS IT FOR YOU TO PAY FOR THE VERY BASICS LIKE FOOD, HOUSING, MEDICAL CARE, AND HEATING?: NOT HARD AT ALL

## 2024-05-29 SDOH — ECONOMIC STABILITY: FOOD INSECURITY: WITHIN THE PAST 12 MONTHS, YOU WORRIED THAT YOUR FOOD WOULD RUN OUT BEFORE YOU GOT MONEY TO BUY MORE.: NEVER TRUE

## 2024-05-29 SDOH — ECONOMIC STABILITY: FOOD INSECURITY: WITHIN THE PAST 12 MONTHS, THE FOOD YOU BOUGHT JUST DIDN'T LAST AND YOU DIDN'T HAVE MONEY TO GET MORE.: NEVER TRUE

## 2024-05-29 ASSESSMENT — ANXIETY QUESTIONNAIRES
4. TROUBLE RELAXING: NOT AT ALL
3. WORRYING TOO MUCH ABOUT DIFFERENT THINGS: NOT AT ALL
6. BECOMING EASILY ANNOYED OR IRRITABLE: NOT AT ALL
GAD7 TOTAL SCORE: 0
1. FEELING NERVOUS, ANXIOUS, OR ON EDGE: NOT AT ALL
5. BEING SO RESTLESS THAT IT IS HARD TO SIT STILL: NOT AT ALL
2. NOT BEING ABLE TO STOP OR CONTROL WORRYING: NOT AT ALL
7. FEELING AFRAID AS IF SOMETHING AWFUL MIGHT HAPPEN: NOT AT ALL
IF YOU CHECKED OFF ANY PROBLEMS ON THIS QUESTIONNAIRE, HOW DIFFICULT HAVE THESE PROBLEMS MADE IT FOR YOU TO DO YOUR WORK, TAKE CARE OF THINGS AT HOME, OR GET ALONG WITH OTHER PEOPLE: NOT DIFFICULT AT ALL
GAD7 TOTAL SCORE: 0

## 2024-05-29 ASSESSMENT — PATIENT HEALTH QUESTIONNAIRE - PHQ9
SUM OF ALL RESPONSES TO PHQ QUESTIONS 1-9: 0
1. LITTLE INTEREST OR PLEASURE IN DOING THINGS: NOT AT ALL
SUM OF ALL RESPONSES TO PHQ9 QUESTIONS 1 & 2: 0
SUM OF ALL RESPONSES TO PHQ QUESTIONS 1-9: 0
SUM OF ALL RESPONSES TO PHQ QUESTIONS 1-9: 0
2. FEELING DOWN, DEPRESSED OR HOPELESS: NOT AT ALL
SUM OF ALL RESPONSES TO PHQ QUESTIONS 1-9: 0

## 2024-05-29 NOTE — PROGRESS NOTES
Chief Complaint   Patient presents with    3 Month Follow-Up     Here for routine follow up.          1. Have you been to the ER, urgent care clinic since your last visit?  Hospitalized since your last visit?No    2. Have you seen or consulted any other health care providers outside of the [facility] since your last visit?  Include any pap smears or colon screening. No

## 2024-05-29 NOTE — PROGRESS NOTES
Zachary Britton (:  1960) is a 63 y.o. male,Established patient, here for evaluation of the following chief complaint(s):  3 Month Follow-Up      Assessment & Plan   1. Type 2 diabetes mellitus with diabetic polyneuropathy, without long-term current use of insulin (HCC)  -     AMB POC HEMOGLOBIN A1C; Future  -     AMB POC GLUCOSE BLOOD, BY GLUCOSE MONITORING DEVICE; Future  -     empagliflozin (JARDIANCE) 25 MG tablet; Take 1 tablet by mouth daily For diabetes, Disp-90 tablet, R-3Normal  2. Severe obesity (BMI 35.0-39.9) with comorbidity (HCC)  3. Diabetic polyneuropathy associated with type 2 diabetes mellitus (HCC)      Return in about 4 months (around 2024).       Subjective   HPI In for diabetes recheck. Has been on ozempic 2 mg every 7 days. Thinks that blood sugar is in the high 200s when he checks it. No real weight loss. No polyuria, polydipsia. Last A1C 3 months ago was 13.8. is also on metformin and jardiance. Has been retired since . No complaints of chest pain, shortness of breath, TIAs, claudication or edema. Also co numbness in feet.       Review of Systems       Objective   Physical Exam  Cardiovascular:      Rate and Rhythm: Normal rate and regular rhythm.      Heart sounds: Normal heart sounds. No murmur heard.  Pulmonary:      Effort: Pulmonary effort is normal.      Breath sounds: Normal breath sounds.   Abdominal:      General: Abdomen is flat. Bowel sounds are normal.      Palpations: Abdomen is soft.   Musculoskeletal:      Right lower leg: No edema.      Left lower leg: No edema.   Neurological:      Comments: Absent sensation in both feet. No ulcers or lesions                  An electronic signature was used to authenticate this note.    --Den Sarmiento MD

## 2024-07-31 RX ORDER — FUROSEMIDE 80 MG
129 TABLET ORAL DAILY
Qty: 135 TABLET | Refills: 1 | Status: SHIPPED | OUTPATIENT
Start: 2024-07-31

## 2024-07-31 RX ORDER — ROSUVASTATIN CALCIUM 20 MG/1
TABLET, COATED ORAL
Qty: 90 TABLET | Refills: 3 | Status: SHIPPED | OUTPATIENT
Start: 2024-07-31

## 2024-09-06 ENCOUNTER — CLINICAL DOCUMENTATION (OUTPATIENT)
Age: 64
End: 2024-09-06

## 2024-09-20 RX ORDER — EMPAGLIFLOZIN 10 MG/1
TABLET, FILM COATED ORAL
Qty: 90 TABLET | Refills: 3 | OUTPATIENT
Start: 2024-09-20

## 2024-09-23 ENCOUNTER — OFFICE VISIT (OUTPATIENT)
Age: 64
End: 2024-09-23
Payer: MEDICARE

## 2024-09-23 VITALS — DIASTOLIC BLOOD PRESSURE: 60 MMHG | SYSTOLIC BLOOD PRESSURE: 130 MMHG

## 2024-09-23 DIAGNOSIS — E11.42 TYPE 2 DIABETES MELLITUS WITH DIABETIC POLYNEUROPATHY, WITHOUT LONG-TERM CURRENT USE OF INSULIN (HCC): ICD-10-CM

## 2024-09-23 DIAGNOSIS — I10 ESSENTIAL (PRIMARY) HYPERTENSION: ICD-10-CM

## 2024-09-23 DIAGNOSIS — I20.9 ANGINA PECTORIS, UNSPECIFIED (HCC): ICD-10-CM

## 2024-09-23 DIAGNOSIS — E11.8 TYPE 2 DIABETES MELLITUS WITH UNSPECIFIED COMPLICATIONS (HCC): ICD-10-CM

## 2024-09-23 DIAGNOSIS — Z00.00 ENCOUNTER FOR MEDICARE ANNUAL WELLNESS EXAM: Primary | ICD-10-CM

## 2024-09-23 DIAGNOSIS — Z12.5 PROSTATE CANCER SCREENING: ICD-10-CM

## 2024-09-23 PROBLEM — I25.119 ATHEROSCLEROTIC HEART DISEASE OF NATIVE CORONARY ARTERY WITH UNSPECIFIED ANGINA PECTORIS (HCC): Status: ACTIVE | Noted: 2024-09-23

## 2024-09-23 LAB
ALBUMIN SERPL-MCNC: 3.4 G/DL (ref 3.5–5)
ALBUMIN/GLOB SERPL: 1.1 (ref 1.1–2.2)
ALP SERPL-CCNC: 51 U/L (ref 45–117)
ALT SERPL-CCNC: 10 U/L (ref 12–78)
ANION GAP SERPL CALC-SCNC: 4 MMOL/L (ref 2–12)
AST SERPL-CCNC: 5 U/L (ref 15–37)
BASOPHILS # BLD: 0 K/UL (ref 0–0.1)
BASOPHILS NFR BLD: 1 % (ref 0–1)
BILIRUB SERPL-MCNC: 0.4 MG/DL (ref 0.2–1)
BUN SERPL-MCNC: 25 MG/DL (ref 6–20)
BUN/CREAT SERPL: 24 (ref 12–20)
CALCIUM SERPL-MCNC: 9.2 MG/DL (ref 8.5–10.1)
CHLORIDE SERPL-SCNC: 106 MMOL/L (ref 97–108)
CHOLEST SERPL-MCNC: 131 MG/DL
CO2 SERPL-SCNC: 27 MMOL/L (ref 21–32)
CREAT SERPL-MCNC: 1.04 MG/DL (ref 0.7–1.3)
CREAT UR-MCNC: 30.5 MG/DL
DIFFERENTIAL METHOD BLD: NORMAL
EOSINOPHIL # BLD: 0.2 K/UL (ref 0–0.4)
EOSINOPHIL NFR BLD: 3 % (ref 0–7)
ERYTHROCYTE [DISTWIDTH] IN BLOOD BY AUTOMATED COUNT: 13.2 % (ref 11.5–14.5)
EST. AVERAGE GLUCOSE BLD GHB EST-MCNC: 246 MG/DL
GLOBULIN SER CALC-MCNC: 3.1 G/DL (ref 2–4)
GLUCOSE SERPL-MCNC: 268 MG/DL (ref 65–100)
HBA1C MFR BLD: 10.2 % (ref 4–5.6)
HCT VFR BLD AUTO: 39.1 % (ref 36.6–50.3)
HDLC SERPL-MCNC: 31 MG/DL
HDLC SERPL: 4.2 (ref 0–5)
HGB BLD-MCNC: 12.5 G/DL (ref 12.1–17)
IMM GRANULOCYTES # BLD AUTO: 0 K/UL (ref 0–0.04)
IMM GRANULOCYTES NFR BLD AUTO: 0 % (ref 0–0.5)
LDLC SERPL CALC-MCNC: 68.8 MG/DL (ref 0–100)
LYMPHOCYTES # BLD: 1.4 K/UL (ref 0.8–3.5)
LYMPHOCYTES NFR BLD: 25 % (ref 12–49)
MCH RBC QN AUTO: 28.6 PG (ref 26–34)
MCHC RBC AUTO-ENTMCNC: 32 G/DL (ref 30–36.5)
MCV RBC AUTO: 89.5 FL (ref 80–99)
MICROALBUMIN UR-MCNC: 50.2 MG/DL
MICROALBUMIN/CREAT UR-RTO: 1646 MG/G (ref 0–30)
MONOCYTES # BLD: 0.5 K/UL (ref 0–1)
MONOCYTES NFR BLD: 8 % (ref 5–13)
NEUTS SEG # BLD: 3.4 K/UL (ref 1.8–8)
NEUTS SEG NFR BLD: 63 % (ref 32–75)
NRBC # BLD: 0 K/UL (ref 0–0.01)
NRBC BLD-RTO: 0 PER 100 WBC
PLATELET # BLD AUTO: 240 K/UL (ref 150–400)
PMV BLD AUTO: 9.8 FL (ref 8.9–12.9)
POTASSIUM SERPL-SCNC: 5.2 MMOL/L (ref 3.5–5.1)
PROT SERPL-MCNC: 6.5 G/DL (ref 6.4–8.2)
PSA SERPL-MCNC: 0.3 NG/ML (ref 0.01–4)
RBC # BLD AUTO: 4.37 M/UL (ref 4.1–5.7)
SODIUM SERPL-SCNC: 137 MMOL/L (ref 136–145)
TRIGL SERPL-MCNC: 156 MG/DL
VLDLC SERPL CALC-MCNC: 31.2 MG/DL
WBC # BLD AUTO: 5.5 K/UL (ref 4.1–11.1)

## 2024-09-23 PROCEDURE — G0439 PPPS, SUBSEQ VISIT: HCPCS | Performed by: FAMILY MEDICINE

## 2024-09-23 PROCEDURE — 99213 OFFICE O/P EST LOW 20 MIN: CPT | Performed by: FAMILY MEDICINE

## 2024-09-23 PROCEDURE — 3017F COLORECTAL CA SCREEN DOC REV: CPT | Performed by: FAMILY MEDICINE

## 2024-09-23 PROCEDURE — G8427 DOCREV CUR MEDS BY ELIG CLIN: HCPCS | Performed by: FAMILY MEDICINE

## 2024-09-23 PROCEDURE — 1123F ACP DISCUSS/DSCN MKR DOCD: CPT | Performed by: FAMILY MEDICINE

## 2024-09-23 PROCEDURE — 2022F DILAT RTA XM EVC RTNOPTHY: CPT | Performed by: FAMILY MEDICINE

## 2024-09-23 PROCEDURE — 3078F DIAST BP <80 MM HG: CPT | Performed by: FAMILY MEDICINE

## 2024-09-23 PROCEDURE — 3075F SYST BP GE 130 - 139MM HG: CPT | Performed by: FAMILY MEDICINE

## 2024-09-23 PROCEDURE — 3046F HEMOGLOBIN A1C LEVEL >9.0%: CPT | Performed by: FAMILY MEDICINE

## 2024-09-23 PROCEDURE — G8417 CALC BMI ABV UP PARAM F/U: HCPCS | Performed by: FAMILY MEDICINE

## 2024-09-23 PROCEDURE — 1036F TOBACCO NON-USER: CPT | Performed by: FAMILY MEDICINE

## 2024-09-23 RX ORDER — FEXOFENADINE HCL 180 MG/1
180 TABLET ORAL DAILY
Qty: 30 TABLET | Refills: 0
Start: 2024-09-23 | End: 2024-10-23

## 2024-09-23 RX ORDER — GEMFIBROZIL 600 MG/1
600 TABLET, FILM COATED ORAL 2 TIMES DAILY
Qty: 180 TABLET | Refills: 3 | Status: SHIPPED | OUTPATIENT
Start: 2024-09-23

## 2024-09-23 RX ORDER — FLUTICASONE PROPIONATE 50 MCG
2 SPRAY, SUSPENSION (ML) NASAL DAILY
Qty: 48 G | Refills: 1
Start: 2024-09-23

## 2024-09-23 RX ORDER — NITROGLYCERIN 0.4 MG/1
0.4 TABLET SUBLINGUAL EVERY 5 MIN PRN
Qty: 25 TABLET | Refills: 0 | Status: SHIPPED | OUTPATIENT
Start: 2024-09-23

## 2024-09-23 RX ORDER — MAGNESIUM 200 MG
1 TABLET ORAL DAILY
Qty: 90 TABLET | Refills: 3 | Status: SHIPPED | OUTPATIENT
Start: 2024-09-23

## 2024-09-23 RX ORDER — BACILLUS COAGULANS 1B CELL
1 CAPSULE ORAL 2 TIMES DAILY
Qty: 180 TABLET | Refills: 3 | Status: SHIPPED | OUTPATIENT
Start: 2024-09-23

## 2024-09-23 SDOH — ECONOMIC STABILITY: FOOD INSECURITY: WITHIN THE PAST 12 MONTHS, YOU WORRIED THAT YOUR FOOD WOULD RUN OUT BEFORE YOU GOT MONEY TO BUY MORE.: NEVER TRUE

## 2024-09-23 SDOH — ECONOMIC STABILITY: INCOME INSECURITY: HOW HARD IS IT FOR YOU TO PAY FOR THE VERY BASICS LIKE FOOD, HOUSING, MEDICAL CARE, AND HEATING?: NOT HARD AT ALL

## 2024-09-23 SDOH — ECONOMIC STABILITY: FOOD INSECURITY: WITHIN THE PAST 12 MONTHS, THE FOOD YOU BOUGHT JUST DIDN'T LAST AND YOU DIDN'T HAVE MONEY TO GET MORE.: NEVER TRUE

## 2024-09-23 ASSESSMENT — LIFESTYLE VARIABLES
HOW MANY STANDARD DRINKS CONTAINING ALCOHOL DO YOU HAVE ON A TYPICAL DAY: PATIENT DOES NOT DRINK
HOW OFTEN DO YOU HAVE A DRINK CONTAINING ALCOHOL: NEVER

## 2024-10-03 ENCOUNTER — TELEPHONE (OUTPATIENT)
Age: 64
End: 2024-10-03

## 2024-10-03 DIAGNOSIS — R80.1 PERSISTENT PROTEINURIA: Primary | ICD-10-CM

## 2024-10-03 NOTE — TELEPHONE ENCOUNTER
Pc with pt. Had been ouof ozempic for awhile, but has been back on it the last 3 weeks. Recheck A1C in 3 months. Will also refer to nephrology in view of heavy microalbuminuria.

## 2024-11-08 NOTE — PROGRESS NOTES
0545 Bedside and Verbal shift change report given to Mihir Crawford (oncoming nurse) by Colin Maxwell RN (offgoing nurse). Report included the following information SBAR, Kardex, MAR, and Cardiac Rhythm A paced . 1121 Informed that patient requires PCR for placement. 1900 End of Shift Note    Bedside shift change report given to Vicente Sy RN (oncoming nurse) by Mary Garcia RN (offgoing nurse). Report included the following information SBAR, Kardex, MAR, and Cardiac Rhythm A paced    Shift worked:  7 am- 7pm     Shift summary and any significant changes:     PCR completed this shift. No acute changes. Pacer wires cut by cardiovascular team. [facility] scheduled [facility]. Concerns for physician to address:       Zone phone for oncoming shift:          Activity:  Activity Level: Up with Assistance  Number times ambulated in hallways past shift: 2  Number of times OOB to chair past shift: 3    Cardiac:   Cardiac Monitoring: Yes      Cardiac Rhythm: Atrial Paced    Access:  Current line(s): PIV     Genitourinary:   Urinary status: voiding    Respiratory:   O2 Device: None (Room air)  Chronic home O2 use?: NO  Incentive spirometer at bedside: YES  Actual Volume (ml): 1500 ml    GI:  Last Bowel Movement Date: 04/24/23  Current diet:  ADULT DIET Regular; 4 carb choices (60 gm/meal);  Low Fat/Low Chol/High Fiber/PEDRO PABLO; No Concentrated Sweets  ADULT ORAL NUTRITION SUPPLEMENT Breakfast, Lunch, Dinner, HS Snack; Diabetic Supplement  Passing flatus: YES  Tolerating current diet: YES       Pain Management:   Patient states pain is manageable on current regimen: YES    Skin:  Joseph Score: 18  Interventions: float heels, increase time out of bed, and PT/OT consult    Patient Safety:  Fall Score:    Interventions: bed/chair alarm, assistive device (walker, cane, etc), gripper socks, and pt to call before getting OOB       Length of Stay:  Expected LOS: 8d 7h  Actual LOS: [facility] RN Left arm;

## 2024-12-02 RX ORDER — EMPAGLIFLOZIN 10 MG/1
TABLET, FILM COATED ORAL
Qty: 90 TABLET | Refills: 3 | Status: SHIPPED | OUTPATIENT
Start: 2024-12-02

## 2024-12-11 ENCOUNTER — TELEPHONE (OUTPATIENT)
Age: 64
End: 2024-12-11

## 2024-12-11 NOTE — TELEPHONE ENCOUNTER
Hello, is there any alternatives he can have for his medications or patient assistance he may qualify for. Let me know and I dont mind helping out     Thanks

## 2024-12-11 NOTE — TELEPHONE ENCOUNTER
Patient states that he can not afford medication  JARDIANCE 10 MG tablet   semaglutide, 2 MG/DOSE, (OZEMPIC, 2 MG/DOSE,) 8 MG/3ML SOPN sc injection   sacubitril-valsartan (ENTRESTO) 49-51 MG per tablet can  give him something cheaper he can be reached @ [phone]

## 2024-12-18 ENCOUNTER — ENROLLMENT (OUTPATIENT)
Age: 64
End: 2024-12-18

## 2024-12-18 ENCOUNTER — PHARMACY VISIT (OUTPATIENT)
Age: 64
End: 2024-12-18

## 2024-12-18 DIAGNOSIS — E11.42 TYPE 2 DIABETES MELLITUS WITH DIABETIC POLYNEUROPATHY, WITHOUT LONG-TERM CURRENT USE OF INSULIN (HCC): Primary | ICD-10-CM

## 2024-12-18 RX ORDER — SEMAGLUTIDE 0.68 MG/ML
0.5 INJECTION, SOLUTION SUBCUTANEOUS
Qty: 3 ML | Refills: 0 | Status: SHIPPED | COMMUNITY
Start: 2024-12-18

## 2024-12-18 NOTE — PROGRESS NOTES
Pharmacy Progress Note - Patient Assistance Application     Patient seen today to complete patient assistance application for Ozempic 2 mg (Marylou Nordisk), Jardiance 25 mg (BI) and Entresto ([employer]is). Application completed in its entirety, signed by patient and provider (if applicable), and submitted to PAP program. Provided patient with PAP program number to call and check on status of application. Instructed patient to call PharmD once they receive approval, or if they have not gotten a status update after at least 2 weeks. Patient expressed understanding. If medication(s) are shipped to the office, will call patient once medication(s) have arrived for . Informed patient that an authorized individual will need to sign for the medication(s) at .     Sample Medication     Patient provided with sample medication of: Jardiance 25 mg, Ozempic 0.5 mg and Entresto 49-51 mg. Patient has Medicare insurance (in coverage gap) and will be using samples in the interim while PAP application is being processed.     Confirmed medications, dosage and instructions with patient and/or nurse. Samples were entered in Epic, logged, and signed out per policy.     Orders Placed This Encounter    empagliflozin (JARDIANCE) 25 MG tablet     Sig: Take 1 tablet by mouth daily     Dispense:  28 tablet     Refill:  0     Order Specific Question:   Expiration Date     Answer:   11/30/2026     Order Specific Question:   Lot#     Answer:   [id number]     Order Specific Question:        Answer:   [place]     Order Specific Question:   NDC#     Answer:   7373-2168-98    sacubitril-valsartan (ENTRESTO) 49-51 MG per tablet     Sig: Take 1 tablet by mouth 2 times daily     Dispense:  56 tablet     Refill:  0     Order Specific Question:   Expiration Date     Answer:   11/30/2026     Order Specific Question:   Lot#     Answer:   [id number]     Order Specific Question:        Answer:   [employer]is     Order Specific Question:

## 2025-01-03 NOTE — CONSULTS
Anesthesia Pre Eval Note    Anesthesia ROS/Med Hx    Overall Review:  EKG was reviewed and Echo was reviewed     Anesthetic Complication History:    Patient does not have a history of anesthetic complications      Pulmonary Review:    The patient is a current smoker, smoking 0.3 packs per day.     Neuro/Psych Review:       Negative for TIA  Negative for CVA  Positive for psychiatric history - Depression    Cardiovascular Review:   Comments: 7-2022 echo  Normal left ventricular size and systolic function, EF 63 %.  Normal right ventricular size and systolic function.  Aortic valve sclerosis without stenosis or regurgitation.    Exercise tolerance: good (>4 METS)  Positive for past MI (2007)  Positive for CAD    Positive for cardiac stents (x 2 in 2006)  Positive for dysrhythmias (a flutter) - Paroxysmal A-fib  Positive for hypertension  Positive for hyperlipidemia    GI/HEPATIC/RENAL Review:     Positive for GERD - well controlled  Positive for renal disease - chronic renal insufficiency    End/Other Review:  Positive for diabetes - type 2  Positive for obesity class I - 30.00 - 34.99  Positive for hypothyroidism  Positive for chronic pain - Daily Opioid/Narcotics  Positive for cancer  Additional Results:  EKG:  No results found for this or any previous visit (from the past 4464 hour(s)).    ALLERGIES:   -- Demerol -- SHORTNESS OF BREATH   -- Morphine -- SHORTNESS OF BREATH   -- Adhesive   (Environmental) -- RASH   -- Clindamycin -- RASH   -- Neosporin [Neomycin-Bacitracin-Polymyxin] -- RASH   -- Penicillins -- RASH   -- Polysporin [Bacitracin-Polymyxin B] -- RASH      Relevant Problems   No relevant active problems       Physical Exam     Airway   Mallampati: II  TM Distance: >3 FB  Neck ROM: Full  Neck: Patient has a beard    Cardiovascular    Cardio Rhythm: Irregular  Cardio Rate: Normal    General Assessment  General Assessment: Alert and oriented and No acute distress    Dental Exam    Patient has:  Upper  Gastroenterology Attending Physician (for Nirmal Mancini) attestation statement and comments. This patient was seen and examined by me in a face-to-face visit today. I reviewed the medical record including lab work, imaging and other provider notes. I confirmed the history as described above. I spoke to the patient, reviewed the medical record including lab work, imaging and other provider notes. I discussed this case in detail with ROSA M Junior. I formulated an  assessment of this patient and developed a treatment plan. I agree with the above consultation note. I agree with the history, exam and assessment and plan as outlined in the note. I would like to add the followinyo M w/ noted anemia in setting of Eliquis and ASA use with no overt bleeding but drop in Hgb to 6.6. L CW pacer. Benign CV, Pulm, abd exam.  Meds and labs reviewed. Need to exclude UGI bleeding source. If negative can offer OP colonoscopy. Plan: 1) Transfuse PRN for goal Hgb>, 2) Hold ASA and Eliquis, 3) Allow CLD, then NPO after MN, 4) EGD tomorrow if can be accommodated in Endoscopy. , 5) PPI BID for now. \  Zak Aleman MD            GI CONSULTATION NOTE  Areli Terrazas, ROSA M  [phone] NP in-hospital cell phone M-F until 4:30  After 5pm or on weekends, please call  for physician on call    NAME: Harrison Chao  :  1960  MRN:  [id number]   Attending: Toby Leavitt MD Primary GI: Jesus Case MD  Date/Time:  2022 5:56 PM  Assessment:   Anemia Acute vs Chronic  · Hgb 6.6  With PCP 22  · Negative melena/rectal bleeding; + light headed and fatigue he has noticed increasingly recently; denies SOB at this time  · Eliquis/ASA daily held since 22    Plan:   · PRBC tranfuse for Hgb <7  · Continue PPI as scheduled  · Continue to hold blood thinner/anticoagulation and NSAIDs  · EGD proposed for AM 22  · NPO after midnight 22    Plan discussed with Dr. Joel Coles:     Cherrie Bansal ILLNESS:     Myer Cogan is an 59 y.o. male who we are asked to see for complaint of anemia. Hgb 6.6 with PCP 4/12/22. PCP referred to ED [facility] ED for evaluation and admission. Current Hgb 7.0 on admission. Chronic Hx anemia. Pt denies chest pain, dysphagia, hematemesis, hemoptysis. Denies rectal bleeding. Denies diarrhea. BM normal brown and formed. Has held ASA and Eliquis since 4/9/22. Hepatic function slightly decreased at this time AST 4, ALT 10, Alk Phos 36. Patient states has had progressive light headedness and fatigue. Denies abdominal pain. Denies N/V. Past Medical History:   Diagnosis Date    Arthritis     knees    [facility]rial [facility]     Benign essential hypertension 6/10/2011    CAD (coronary artery disease)     Diabetes ([facility] [address] [facility]     DX  AGE 33      Hypercholesterolemia     Hypertriglyceridemia     Obesity     Pacemaker     PAF (paroxysmal atrial fibrillation) ([facility] [address] 12/12/2018    Syncope       Past Surgical History:   Procedure Laterality Date    CARDIAC CATHETERIZATION  6/9/2011         CARDIAC CATHETERIZATION  11/1/2012         HC ANGIOSEAL VIP 6FR  6/9/2011         HX AFIB ABLATION  03/15/2021    HX COLONOSCOPY  05/2009    Dr. Jazmin Hercules- normal    HX ORTHOPAEDIC      left knee replacement    HX ORTHOPAEDIC      R knee replacement  2010 -[facility].     CO ABLATE L/R ATRIAL FIBRIL W/ISOLATED PULM VEIN N/A 5/10/2021    Ablation Following A-Fib  Addl performed by Rivka Piedra MD at [facility] CARDIAC CATH LAB    CO CARDIAC SURG PROCEDURE UNLIST      STENT RCA  6/11    CO COMPRE ELECTROPHYSIOL XM W/LEFT ATRIAL PACNG/REC N/A 5/10/2021    Lt Atrial Pace & Record During Ep Study performed by Rivka Piedra MD at [facility] CARDIAC CATH LAB    CO COMPRE EP EVAL ABLTJ ATR FIB PULM VEIN ISOLATION N/A 3/15/2021    ABLATION A-FIB  W COMPLETE EP STUDY performed by Rivka Piedra MD at [facility] CARDIAC CATH LAB    CO COMPRE EP EVAL ABLTJ ATR FIB PULM VEIN ISOLATION N/A 5/10/2021 Removable Equipment  Dental Note: Partial upper out    Pulmonary Exam    Patient Demonstrates:  Decreased Breath Sounds    Abdominal Exam  Abdominal exam normal      Anesthesia Plan:    ASA Status: 3  Anesthesia Type: MAC    Checklist  Reviewed: NPO Status, Problem list, Medications, Allergies, Past Med History, Beta Blocker Status, Patient Summary and Lab Results  Consent/Risks Discussed Statement:  The proposed anesthetic plan, including its risks and benefits, have been discussed with the Patient along with the risks and benefits of alternatives. Questions were encouraged and answered and the patient and/or representative understands and agrees to proceed.        I discussed with the patient (and/or patient's legal representative) the risks and benefits of the proposed anesthesia plan, MAC, which may include services performed by other anesthesia providers.    Alternative anesthesia plans, if available, were reviewed with the patient (and/or patient's legal representative). Discussion has been held with the patient (and/or patient's legal representative) regarding risks of anesthesia, which include Intra-operative Awareness and emergent situations that may require change in anesthesia plan.    The patient (and/or patient's legal representative) has indicated understanding, his/her questions have been answered, and he/she wishes to proceed with the planned anesthetic.    Blood Products: Not Anticipated   ABLATION A-FIB  W COMPLETE EP STUDY performed by Josh Leonardo MD at [address]  CARDIAC CATH LAB    PA DRUG-ELUTING STENTS, SINGLE  2011         PA INS NEW/RPLCMT PRM PM W/TRANSV ELTRD ATRIAL&VENT N/A 3/22/2021    INSERT PPM DUAL performed by Josh Leonardo MD at [facility] CARDIAC CATH LAB    PA INTRACARD ECHO, THER/DX INTERVENT N/A 3/15/2021    Intracardiac Echocardiogram performed by Josh Leonardo MD at [address]  CARDIAC CATH LAB    PA INTRACARD ECHO, THER/DX INTERVENT N/A 5/10/2021    Intracardiac Echocardiogram performed by Josh Leonardo MD at [facility] CARDIAC CATH LAB    PA INTRACARDIAC ELECTROPHYSIOLOGIC 3D MAPPING N/A 3/15/2021    Ep 3d Mapping performed by Josh Leonardo MD at [facility] CARDIAC CATH LAB    PA INTRACARDIAC ELECTROPHYSIOLOGIC 3D MAPPING N/A 5/10/2021    Ep 3d Mapping performed by Josh Leonardo MD at [facility] CARDIAC CATH LAB     Social History     Tobacco Use    Smoking status: Former Smoker     Types: Cigarettes     Quit date: 11/10/1989     Years since quittin.4    Smokeless tobacco: Never Used   Substance Use Topics    Alcohol use: Yes     Comment: social  DRINKS ONE WEEKEND A MONTH/\"BIG IMPROVEMENT\"      Family History   Problem Relation Age of Onset    Diabetes Mother     Hypertension Mother     Elevated Lipids Mother     Cancer Mother         unsure    Diabetes Father     Heart Disease Father     Hypertension Father     Elevated Lipids Father     Diabetes Brother     Suicide Brother     Diabetes Brother       Allergies   Allergen Reactions    Lisinopril Cough    Losartan Other (comments)     Hypotension        Prior to Admission medications    Medication Sig Start Date End Date Taking? Authorizing Provider   penicillin v potassium (VEETID) 500 mg tablet Take  by mouth four (4) times daily. Provider, Historical   oxyCODONE IR (ROXICODONE) 5 mg immediate release tablet Take 5 mg by mouth every four (4) hours as needed.   Patient not taking: Reported on 4/12/2022 1/6/22   Provider, Historical   senna-docusate (PERICOLACE) 8.6-50 mg per tablet Take 1 Tablet by mouth two (2) times daily as needed for Constipation. Patient not taking: Reported on 4/12/2022 2/15/22   Sandy Wagner MD   glipiZIDE (GLUCOTROL) 10 mg tablet TAKE 1 TABLET BY MOUTH TWICE A DAY 2/9/22   Sandy Wagner MD   cefTRIAXone 2 gram 2 g IV syringe 2 g by IntraVENous route every twenty-four (24) hours. Patient not taking: Reported on 4/12/2022 1/7/22   Galen Mustafa MD   polyethylene glycol [facility]) 17 gram packet Take 1 Packet by mouth daily as needed for Constipation. Patient not taking: Reported on 4/12/2022 1/6/22   Galen Mustafa MD   famotidine (PEPCID) 20 mg tablet Take 1 Tablet by mouth two (2) times daily as needed for Gastroesophageal Reflux Disease (GERD). Patient not taking: Reported on 4/12/2022 1/6/22   Galen Mustafa MD   metoprolol tartrate (LOPRESSOR) 25 mg tablet Take 1 Tablet by mouth two (2) times a day for 90 days. 12/9/21 3/9/22  Rylee Melendez ANP   magnesium oxide (MAG-OX) 400 mg tablet TAKE 2 TABLETS BY MOUTH EVERY DAY 12/6/21   Sandy Wagner MD   metFORMIN (GLUCOPHAGE) 1,000 mg tablet TAKE 1 TABLET BY MOUTH TWICE A DAY WITH MEALS 11/28/21   Sandy Wagner MD   Jardiance 10 mg tablet TAKE 1 TAB BY MOUTH DAILY. FOR DIABETES 10/18/21   Sandy Wagner MD   amiodarone (CORDARONE) 200 mg tablet Take 0.5 Tablets by mouth daily. Lowered 09/21/21  Patient taking differently: Take 200 mg by mouth daily. 9/21/21   Rylee Melendez ANP   gemfibroziL (LOPID) 600 mg tablet Take 1 Tablet by mouth two (2) times a day.  9/17/21   Sandy Wagner MD   Eliquis 5 mg tablet TAKE 1 TABLET BY MOUTH TWICE A DAY  Patient not taking: Reported on 4/12/2022 9/3/21   Dimas Eaton MD   semaglutide (Ozempic) 1 mg/dose (2 mg/1.5 mL) sub-q pen 1 MG BY SUBCUTANEOUS ROUTE EVERY SEVEN (7) DAYS. 7/12/21   Sandy Wagner MD   nitroglycerin (NITROSTAT) 0.4 mg SL tablet 1 Tablet by SubLINGual route every five (5) minutes as needed for Chest Pain (call 911 if CP/ SOB not relieved by 3 tabs). Patient not taking: Reported on 4/12/2022 7/12/21   Michelle Sauceda MD   furosemide (LASIX) 40 mg tablet TAKE 1-2 TABLET BY MOUTH DAILY AS NEEDED FOR FLUID 7/6/21   Michelle Sauceda MD   rosuvastatin (CRESTOR) 20 mg tablet TAKE 1 TABLET BY MOUTH EVERY DAY FOR CHOLESTEROL 4/16/21   Michelle Sauceda MD   ACETAMINOPHEN PO Take 500 mg by mouth as needed. 1-2 tabs prn as needed    Provider, Historical   aspirin delayed-release 81 mg tablet Take  by mouth daily. Provider, Historical   omega-3 fatty acids-vitamin e (FISH OIL) 1,000 mg Cap Take 2 Caps by mouth two (2) times a day.  7/13/11   Michelle Sauceda MD       Patient Active Problem List   Diagnosis Code    Hypertriglyceridemia E78.1    Arthritis M19.90    Coronary artery disease I25.10    S/P coronary artery stent placement Z95.5    Type 2 diabetes mellitus ([address] E11.9    Benign essential hypertension I10    Mitral valve disorders(424.0) I05.9    Mixed hyperlipidemia E78.2    Type 2 diabetes with nephropathy ([address] E11.21    Severe obesity ([address] E66.01    Controlled type 2 diabetes mellitus with complication, without long-term current use of insulin ([facility]) E11.8    PAF (paroxysmal atrial fibrillation) ([address] I48.0    Femur fracture ([address] S72.90XA    Supracondylar fracture of distal end of femur without intracondylar extension ([address] S72.453A    Tachy-amina syndrome ([address] I49.5    A-fib ([address] I48.91    Cellulitis L03.90    Staphylococcal arthritis of right knee (HCC) M00.061    Type 2 diabetes mellitus with chronic kidney disease ([address] E11.22    Anemia D64.9       REVIEW OF SYSTEMS:    Constitutional: negative fever, negative chills, negative weight loss, fatigue/light headed  Eyes:   negative visual changes  ENT:   negative sore throat, tongue or lip swelling   Respiratory:  negative cough, negative dyspnea  Cards: negative for chest pain, palpitations, lower extremity edema  GI:   See HPI  :  negative for frequency, dysuria  Integument:  negative for rash and pruritus  Heme:  negative for easy bruising and gum/nose bleeding  Musculoskel: negative for myalgias,  back pain and muscle weakness  Neuro: negative for headaches, dizziness, vertigo  Psych: negative for feelings of anxiety, depression     Objective:   VITALS:    Visit Vitals  BP (!) 128/54   Pulse 72   Temp 97.9 °F (36.6 °C)   Resp 16   Ht 6' 2\" (1.88 m)   Wt 134.1 kg (295 lb 10.2 oz)   SpO2 100%   BMI 37.96 kg/m²       PHYSICAL EXAM:   General:          Alert, WD, WN, cooperative, no distress, appears stated age. Head:               Normocephalic, without obvious abnormality, atraumatic. Eyes:               Conjunctivae clear and pale, anicteric sclerae. Pupils are equal  Nose:               Nares normal. No drainage or sinus tenderness. Throat:             Lips, mucosa, and tongue normal.  No Thrush  Back:               Symmetric  Lungs:             CTA bilaterally. No wheezing/rhonchi/rales. Chest wall:      No tenderness or deformity. No Accessory muscle use. Heart:              Regular rate and rhythm,  no murmur, rub or gallop. Extremities:     Atraumatic, No cyanosis. No edema. No clubbing  Skin:                Texture, turgor normal. No rashes/lesions/jaundice  Psych:             Good insight. Not depressed. Not anxious or agitated. Neurologic:      EOMs intact. No facial asymmetry. No aphasia or slurred speech. Normal                        strength, A/O X 3.      LAB DATA REVIEWED:    Recent Results (from the past 24 hour(s))   CBC WITH AUTOMATED DIFF    Collection Time: 04/12/22  2:17 PM   Result Value Ref Range    WBC 5.1 4.1 - 11.1 K/uL    RBC 3.51 (L) 4.10 - 5.70 M/uL    HGB 7.0 (L) 12.1 - 17.0 g/dL    HCT 26.2 (L) 36.6 - 50.3 %    MCV 74.6 (L) 80.0 - 99.0 FL    MCH 19.9 (L) 26.0 - 34.0 PG    MCHC 26.7 (L) 30.0 - 36.5 g/dL    RDW 19.2 (H) 11.5 - 14.5 %    PLATELET 782 241 - 642 K/uL    MPV 10.0 8.9 - 12.9 FL    NRBC 0.0 0  WBC    ABSOLUTE NRBC 0.00 0.00 - 0.01 K/uL    NEUTROPHILS 68 32 - 75 %    LYMPHOCYTES 21 12 - 49 %    MONOCYTES 7 5 - 13 %    EOSINOPHILS 3 0 - 7 %    BASOPHILS 1 0 - 1 %    IMMATURE GRANULOCYTES 0 0.0 - 0.5 %    ABS. NEUTROPHILS 3.3 1.8 - 8.0 K/UL    ABS. LYMPHOCYTES 1.1 0.8 - 3.5 K/UL    ABS. MONOCYTES 0.4 0.0 - 1.0 K/UL    ABS. EOSINOPHILS 0.2 0.0 - 0.4 K/UL    ABS. BASOPHILS 0.1 0.0 - 0.1 K/UL    ABS. IMM. GRANS. 0.0 0.00 - 0.04 K/UL    DF SMEAR SCANNED      RBC COMMENTS ANISOCYTOSIS  1+        RBC COMMENTS HYPOCHROMIA  1+       METABOLIC PANEL, COMPREHENSIVE    Collection Time: 04/12/22  2:17 PM   Result Value Ref Range    Sodium 138 136 - 145 mmol/L    Potassium 4.0 3.5 - 5.1 mmol/L    Chloride 110 (H) 97 - 108 mmol/L    CO2 23 21 - 32 mmol/L    Anion gap 5 5 - 15 mmol/L    Glucose 175 (H) 65 - 100 mg/dL    BUN 21 (H) 6 - 20 MG/DL    Creatinine 1.02 0.70 - 1.30 MG/DL    BUN/Creatinine ratio 21 (H) 12 - 20      GFR est AA >60 >60 ml/min/1.73m2    GFR est non-AA >60 >60 ml/min/1.73m2    Calcium 8.7 8.5 - 10.1 MG/DL    Bilirubin, total 0.4 0.2 - 1.0 MG/DL    ALT (SGPT) 10 (L) 12 - 78 U/L    AST (SGOT) 4 (L) 15 - 37 U/L    Alk.  phosphatase 36 (L) 45 - 117 U/L    Protein, total 6.9 6.4 - 8.2 g/dL    Albumin 3.1 (L) 3.5 - 5.0 g/dL    Globulin 3.8 2.0 - 4.0 g/dL    A-G Ratio 0.8 (L) 1.1 - 2.2     RETICULOCYTE COUNT    Collection Time: 04/12/22  2:17 PM   Result Value Ref Range    Reticulocyte count 1.7 0.7 - 2.1 %    Absolute Retic Cnt. 0.0551 0.0260 - 0.0950 M/ul   PROTHROMBIN TIME + INR    Collection Time: 04/12/22  2:17 PM   Result Value Ref Range    INR 1.1 0.9 - 1.1      Prothrombin time 11.1 9.0 - 11.1 sec   PTT    Collection Time: 04/12/22  2:17 PM   Result Value Ref Range    aPTT 24.7 22.1 - 31.0 sec    aPTT, therapeutic range     58.0 - 77.0 SECS   RBC, ALLOCATE    Collection Time: 04/12/22  5:30 PM   Result Value Ref Range    HISTORY CHECKED?  Historical check performed        IMAGING RESULTS:  I have personally reviewed the imaging reports    Total time spent with patient: 25 minutes ________________________________________________________________________  Care Plan discussed with:  Patient y   Family  n   RN n              Consultant:  n     ________________________________________________________________________    ___________________________________________________  Consulting Provider: Opal Tamayo NP      4/12/2022  5:56 PM

## 2025-01-13 ENCOUNTER — TELEPHONE (OUTPATIENT)
Age: 65
End: 2025-01-13

## 2025-01-20 NOTE — TELEPHONE ENCOUNTER
Pharmacy Progress Note - Telephone Encounter    Spoke with patient and informed him that we need POI for his assistance applications for Entresto and Jardiance. Patient states that he will drop this by the office this week so we can fax it in for him.     Billy Damon [facility]      For Pharmacy Admin Tracking Only    Program: Medical Group  CPA in place:  Yes  Recommendation Provided To: Patient/Caregiver: 0 via Telephone  Intervention Accepted By: Patient/Caregiver: 0  Gap Closed?: No   Time Spent (min): 10

## 2025-01-29 ENCOUNTER — TELEPHONE (OUTPATIENT)
Age: 65
End: 2025-01-29

## 2025-01-29 NOTE — TELEPHONE ENCOUNTER
Pharmacy Progress Note - Patient Assistance     Received notification from [facility] PAP that patient's application for Jardiance was approved through the end of 12/31/25.  Approval letter scanned into patient's chart.         Thank you,  Billy Damon, PHARMD, [facility]  Clinical Pharmacist         For Pharmacy Admin Tracking Only    Program: Medical Group  CPA in place:  Yes  Time Spent (min): 5

## 2025-02-06 ENCOUNTER — TELEPHONE (OUTPATIENT)
Age: 65
End: 2025-02-06

## 2025-02-06 NOTE — TELEPHONE ENCOUNTER
Called and made pt aware that PAP medications are here for pick at office     [address] [place] [address]

## 2025-02-21 ENCOUNTER — TELEPHONE (OUTPATIENT)
Age: 65
End: 2025-02-21

## 2025-02-21 NOTE — TELEPHONE ENCOUNTER
[phone] Patient brought in W2 for his spouse. Documents were placed in providers box with patient's name.

## 2025-02-21 NOTE — TELEPHONE ENCOUNTER
Noted. Will submit to PAP program on Monday.    Thank you,  Billy Damon, PHARMD, BCPS  Clinical Pharmacist   [facility]

## 2025-03-10 ENCOUNTER — PATIENT MESSAGE (OUTPATIENT)
Age: 65
End: 2025-03-10

## 2025-03-11 ENCOUNTER — TELEPHONE (OUTPATIENT)
Dept: PHARMACY | Facility: CLINIC | Age: 65
End: 2025-03-11

## 2025-03-11 NOTE — TELEPHONE ENCOUNTER
For Pharmacy Admin Tracking Only    Program: Medical Group  CPA in place:  Yes  Recommendation Provided To: Other: 1  Intervention Detail: Patient Access Assistance/Sample Provided  Intervention Accepted By: Other: 1  Gap Closed?: No   Time Spent (min): 20

## 2025-03-11 NOTE — TELEPHONE ENCOUNTER
Hello!   Could you do me a favor and call [facility] PAP to check on status of this patient's application for Entresto? We sent additional income information on 2/24/25 but have not heard anything. Their number is [phone].     Thanks!   Billy

## 2025-03-11 NOTE — TELEPHONE ENCOUNTER
Contacted [employer] and spoke with Agent,    Status of the application: has not been approved yet. Income information sent on 2/24/25 came back as ineligible. A notification letter was sent out to the patient with that information.    They will need proof of income for the second household member (Wife)  or 1040 form resent.     Cristina Duffy [facility]   Ambulatory Pharmacy Clinical   [phone]  Department, toll free: [phone], option 2     For Pharmacy Admin Tracking Only    Program: Medical Group  Gap Closed?: Yes   Time Spent (min): 15

## 2025-03-19 PROBLEM — L03.90 CELLULITIS: Status: RESOLVED | Noted: 2022-01-02 | Resolved: 2025-03-19

## 2025-03-19 PROBLEM — E11.21 TYPE 2 DIABETES WITH NEPHROPATHY (HCC): Status: RESOLVED | Noted: 2018-12-12 | Resolved: 2025-03-19

## 2025-03-19 PROBLEM — S72.453A SUPRACONDYLAR FRACTURE OF DISTAL END OF FEMUR WITHOUT INTRACONDYLAR EXTENSION (HCC): Status: RESOLVED | Noted: 2020-08-17 | Resolved: 2025-03-19

## 2025-03-19 PROBLEM — I20.9 ANGINA PECTORIS, UNSPECIFIED: Status: RESOLVED | Noted: 2024-09-23 | Resolved: 2025-03-19

## 2025-03-19 PROBLEM — I48.91 A-FIB (HCC): Status: RESOLVED | Noted: 2021-05-10 | Resolved: 2025-03-19

## 2025-03-19 PROBLEM — M00.061 STAPHYLOCOCCAL ARTHRITIS OF RIGHT KNEE (HCC): Status: RESOLVED | Noted: 2022-01-03 | Resolved: 2025-03-19

## 2025-03-19 PROBLEM — S72.90XA FEMUR FRACTURE (HCC): Status: RESOLVED | Noted: 2020-08-16 | Resolved: 2025-03-19

## 2025-03-19 PROBLEM — E11.8 CONTROLLED TYPE 2 DIABETES MELLITUS WITH COMPLICATION, WITHOUT LONG-TERM CURRENT USE OF INSULIN (HCC): Status: RESOLVED | Noted: 2018-12-12 | Resolved: 2025-03-19

## 2025-03-24 ENCOUNTER — OFFICE VISIT (OUTPATIENT)
Age: 65
End: 2025-03-24
Payer: MEDICARE

## 2025-03-24 VITALS
TEMPERATURE: 98.4 F | WEIGHT: 275.2 LBS | HEIGHT: 73 IN | HEART RATE: 71 BPM | SYSTOLIC BLOOD PRESSURE: 113 MMHG | RESPIRATION RATE: 16 BRPM | DIASTOLIC BLOOD PRESSURE: 60 MMHG | BODY MASS INDEX: 36.47 KG/M2 | OXYGEN SATURATION: 97 %

## 2025-03-24 DIAGNOSIS — E11.8 TYPE 2 DIABETES MELLITUS WITH UNSPECIFIED COMPLICATIONS: Primary | ICD-10-CM

## 2025-03-24 DIAGNOSIS — E78.2 MIXED HYPERLIPIDEMIA: ICD-10-CM

## 2025-03-24 DIAGNOSIS — I10 ESSENTIAL (PRIMARY) HYPERTENSION: ICD-10-CM

## 2025-03-24 DIAGNOSIS — E11.8 TYPE 2 DIABETES MELLITUS WITH UNSPECIFIED COMPLICATIONS: ICD-10-CM

## 2025-03-24 DIAGNOSIS — E66.01 MORBID (SEVERE) OBESITY DUE TO EXCESS CALORIES: ICD-10-CM

## 2025-03-24 DIAGNOSIS — I49.5 SICK SINUS SYNDROME (HCC): ICD-10-CM

## 2025-03-24 DIAGNOSIS — D17.79 LIPOMA OF OTHER SPECIFIED SITES: ICD-10-CM

## 2025-03-24 DIAGNOSIS — E11.42 TYPE 2 DIABETES MELLITUS WITH DIABETIC POLYNEUROPATHY, WITHOUT LONG-TERM CURRENT USE OF INSULIN (HCC): ICD-10-CM

## 2025-03-24 PROBLEM — F11.99 OPIOID USE, UNSPECIFIED WITH UNSPECIFIED OPIOID-INDUCED DISORDER: Status: RESOLVED | Noted: 2022-06-07 | Resolved: 2025-03-24

## 2025-03-24 LAB
ALBUMIN SERPL-MCNC: 3.4 G/DL (ref 3.5–5)
ALBUMIN/GLOB SERPL: 1.1 (ref 1.1–2.2)
ALP SERPL-CCNC: 43 U/L (ref 45–117)
ALT SERPL-CCNC: 6 U/L (ref 12–78)
ANION GAP SERPL CALC-SCNC: 5 MMOL/L (ref 2–12)
AST SERPL-CCNC: 8 U/L (ref 15–37)
BASOPHILS # BLD: 0.03 K/UL (ref 0–0.1)
BASOPHILS NFR BLD: 0.5 % (ref 0–1)
BILIRUB SERPL-MCNC: 0.3 MG/DL (ref 0.2–1)
BUN SERPL-MCNC: 18 MG/DL (ref 6–20)
BUN/CREAT SERPL: 20 (ref 12–20)
CALCIUM SERPL-MCNC: 9.6 MG/DL (ref 8.5–10.1)
CHLORIDE SERPL-SCNC: 108 MMOL/L (ref 97–108)
CHOLEST SERPL-MCNC: 156 MG/DL
CO2 SERPL-SCNC: 24 MMOL/L (ref 21–32)
CREAT SERPL-MCNC: 0.88 MG/DL (ref 0.7–1.3)
CREAT UR-MCNC: 69 MG/DL
DIFFERENTIAL METHOD BLD: NORMAL
EOSINOPHIL # BLD: 0.17 K/UL (ref 0–0.4)
EOSINOPHIL NFR BLD: 2.6 % (ref 0–7)
ERYTHROCYTE [DISTWIDTH] IN BLOOD BY AUTOMATED COUNT: 13.5 % (ref 11.5–14.5)
EST. AVERAGE GLUCOSE BLD GHB EST-MCNC: 295 MG/DL
GLOBULIN SER CALC-MCNC: 3 G/DL (ref 2–4)
GLUCOSE SERPL-MCNC: 264 MG/DL (ref 65–100)
HBA1C MFR BLD: 11.9 % (ref 4–5.6)
HCT VFR BLD AUTO: 40.3 % (ref 36.6–50.3)
HDLC SERPL-MCNC: 33 MG/DL
HDLC SERPL: 4.7 (ref 0–5)
HGB BLD-MCNC: 12.8 G/DL (ref 12.1–17)
IMM GRANULOCYTES # BLD AUTO: 0.02 K/UL (ref 0–0.04)
IMM GRANULOCYTES NFR BLD AUTO: 0.3 % (ref 0–0.5)
LDLC SERPL CALC-MCNC: 86.2 MG/DL (ref 0–100)
LYMPHOCYTES # BLD: 1.31 K/UL (ref 0.8–3.5)
LYMPHOCYTES NFR BLD: 20.4 % (ref 12–49)
MCH RBC QN AUTO: 27.9 PG (ref 26–34)
MCHC RBC AUTO-ENTMCNC: 31.8 G/DL (ref 30–36.5)
MCV RBC AUTO: 88 FL (ref 80–99)
MICROALBUMIN UR-MCNC: 116 MG/DL
MICROALBUMIN/CREAT UR-RTO: 1681 MG/G (ref 0–30)
MONOCYTES # BLD: 0.51 K/UL (ref 0–1)
MONOCYTES NFR BLD: 7.9 % (ref 5–13)
NEUTS SEG # BLD: 4.38 K/UL (ref 1.8–8)
NEUTS SEG NFR BLD: 68.3 % (ref 32–75)
NRBC # BLD: 0 K/UL (ref 0–0.01)
NRBC BLD-RTO: 0 PER 100 WBC
PLATELET # BLD AUTO: 254 K/UL (ref 150–400)
PMV BLD AUTO: 9.6 FL (ref 8.9–12.9)
POTASSIUM SERPL-SCNC: 4.2 MMOL/L (ref 3.5–5.1)
PROT SERPL-MCNC: 6.4 G/DL (ref 6.4–8.2)
RBC # BLD AUTO: 4.58 M/UL (ref 4.1–5.7)
SODIUM SERPL-SCNC: 137 MMOL/L (ref 136–145)
TRIGL SERPL-MCNC: 184 MG/DL
VLDLC SERPL CALC-MCNC: 36.8 MG/DL
WBC # BLD AUTO: 6.4 K/UL (ref 4.1–11.1)

## 2025-03-24 PROCEDURE — 2022F DILAT RTA XM EVC RTNOPTHY: CPT | Performed by: FAMILY MEDICINE

## 2025-03-24 PROCEDURE — 1159F MED LIST DOCD IN RCRD: CPT | Performed by: FAMILY MEDICINE

## 2025-03-24 PROCEDURE — 3078F DIAST BP <80 MM HG: CPT | Performed by: FAMILY MEDICINE

## 2025-03-24 PROCEDURE — 99214 OFFICE O/P EST MOD 30 MIN: CPT | Performed by: FAMILY MEDICINE

## 2025-03-24 PROCEDURE — 1123F ACP DISCUSS/DSCN MKR DOCD: CPT | Performed by: FAMILY MEDICINE

## 2025-03-24 PROCEDURE — G8417 CALC BMI ABV UP PARAM F/U: HCPCS | Performed by: FAMILY MEDICINE

## 2025-03-24 PROCEDURE — 3074F SYST BP LT 130 MM HG: CPT | Performed by: FAMILY MEDICINE

## 2025-03-24 PROCEDURE — 3017F COLORECTAL CA SCREEN DOC REV: CPT | Performed by: FAMILY MEDICINE

## 2025-03-24 PROCEDURE — 1036F TOBACCO NON-USER: CPT | Performed by: FAMILY MEDICINE

## 2025-03-24 PROCEDURE — G8427 DOCREV CUR MEDS BY ELIG CLIN: HCPCS | Performed by: FAMILY MEDICINE

## 2025-03-24 PROCEDURE — 3046F HEMOGLOBIN A1C LEVEL >9.0%: CPT | Performed by: FAMILY MEDICINE

## 2025-03-24 RX ORDER — SPIRONOLACTONE 25 MG/1
25 TABLET ORAL DAILY
Qty: 30 TABLET | Refills: 12 | Status: SHIPPED | OUTPATIENT
Start: 2025-03-24

## 2025-03-24 RX ORDER — MAGNESIUM OXIDE 400 MG/1
2 TABLET ORAL DAILY
Qty: 180 TABLET | Refills: 3 | Status: SHIPPED | OUTPATIENT
Start: 2025-03-24

## 2025-03-24 SDOH — ECONOMIC STABILITY: FOOD INSECURITY: WITHIN THE PAST 12 MONTHS, YOU WORRIED THAT YOUR FOOD WOULD RUN OUT BEFORE YOU GOT MONEY TO BUY MORE.: NEVER TRUE

## 2025-03-24 SDOH — ECONOMIC STABILITY: FOOD INSECURITY: WITHIN THE PAST 12 MONTHS, THE FOOD YOU BOUGHT JUST DIDN'T LAST AND YOU DIDN'T HAVE MONEY TO GET MORE.: NEVER TRUE

## 2025-03-24 SDOH — ECONOMIC STABILITY: INCOME INSECURITY: IN THE LAST 12 MONTHS, WAS THERE A TIME WHEN YOU WERE NOT ABLE TO PAY THE MORTGAGE OR RENT ON TIME?: NO

## 2025-03-24 SDOH — ECONOMIC STABILITY: TRANSPORTATION INSECURITY
IN THE PAST 12 MONTHS, HAS THE LACK OF TRANSPORTATION KEPT YOU FROM MEDICAL APPOINTMENTS OR FROM GETTING MEDICATIONS?: NO

## 2025-03-24 ASSESSMENT — PATIENT HEALTH QUESTIONNAIRE - PHQ9
2. FEELING DOWN, DEPRESSED OR HOPELESS: NOT AT ALL
SUM OF ALL RESPONSES TO PHQ QUESTIONS 1-9: 0
1. LITTLE INTEREST OR PLEASURE IN DOING THINGS: NOT AT ALL
SUM OF ALL RESPONSES TO PHQ QUESTIONS 1-9: 0

## 2025-03-24 NOTE — PROGRESS NOTES
Chief Complaint   Patient presents with    Hypertension    Diabetes    Follow-up Chronic Condition    Skin Problem     TAILBONE AREA       \"Have you been to the ER, urgent care clinic since your last visit?  Hospitalized since your last visit?\"    NO    “Have you seen or consulted any other health care providers outside of [facility] since your last visit?”       CARDIOLOGY - 2025 - DR. DEL CID  INFECTIOUS DISEASE -             Click Here for Release of Records Request       Vitals:    25 0952   BP: 113/60   Pulse: 71   Resp: 16   Temp: 98.4 °F (36.9 °C)   SpO2: 97%      Health Maintenance Due   Topic Date Due    Hepatitis C screen  Never done    Pneumococcal 50+ years Vaccine (1 of 2 - PCV) Never done    Shingles vaccine (1 of 2) Never done    Diabetic retinal exam  2016    Respiratory Syncytial Virus (RSV) Pregnant or age 60 yrs+ (1 - Risk 60-74 years 1-dose series) Never done    AAA screen  Never done    Diabetic foot exam  2024    A1C test (Diabetic or Prediabetic)  2024    COVID-19 Vaccine ( season) 2025        The patient, Zachary Britton, identity was verified by name and .

## 2025-03-24 NOTE — PROGRESS NOTES
Zachary Britton (:  1960) is a 64 y.o. male,Established patient, here for evaluation of the following chief complaint(s):  Hypertension, Diabetes, Follow-up Chronic Condition, and Skin Problem (TAILBONE AREA)         Assessment & Plan  Type 2 diabetes mellitus with unspecified complications       Orders:    Hemoglobin A1C; Future    Albumin/Creatinine Ratio, Urine; Future    Mixed hyperlipidemia       Orders:    Lipid Panel; Future    Essential (primary) hypertension   Chronic, at goal (stable), continue current treatment plan    Orders:    Comprehensive Metabolic Panel; Future    CBC with Auto Differential; Future    Sick sinus syndrome (HCC)  Symptoms resolved after pacemaker placement          Type 2 diabetes mellitus with diabetic polyneuropathy, without long-term current use of insulin (HCC)            Morbid (severe) obesity due to excess calories (E66.01)            Body mass index [BMI] 36.0-36.9, adult (Z68.36)            Lipoma of other specified sites    Reassured. Could also be a sebaceous cyst but no drainage or opening.            Return in about 6 months (around 2025).       Subjective   HPI Pt. Comes in for blood pressure, cholesterol, and diabetes check. No complaints of chest pain, shortness of breath, TIAs, claudication or edema.has a bump in sacral area, nontender, for one month. Had colonoscopy last year.         Review of Systems       Objective   Physical Exam  Cardiovascular:      Rate and Rhythm: Normal rate and regular rhythm.      Heart sounds: Normal heart sounds. No murmur heard.  Pulmonary:      Effort: Pulmonary effort is normal.      Breath sounds: Normal breath sounds.   Abdominal:      General: Abdomen is flat. Bowel sounds are normal.      Palpations: Abdomen is soft.   Musculoskeletal:      Right lower leg: No edema.      Left lower leg: No edema.   Skin:     Comments: 3 x 2 cm soft movable mass just to L of midline in sacral area. Nontender.                   An

## 2025-04-01 ENCOUNTER — RESULTS FOLLOW-UP (OUTPATIENT)
Age: 65
End: 2025-04-01

## 2025-05-06 ENCOUNTER — TRANSCRIBE ORDERS (OUTPATIENT)
Facility: HOSPITAL | Age: 65
End: 2025-05-06

## 2025-05-06 ENCOUNTER — HOSPITAL ENCOUNTER (OUTPATIENT)
Facility: HOSPITAL | Age: 65
Discharge: HOME | End: 2025-05-09
Payer: MEDICARE

## 2025-05-06 DIAGNOSIS — I51.89 LEFT VENTRICULAR SYSTOLIC DYSFUNCTION: ICD-10-CM

## 2025-05-06 DIAGNOSIS — I51.9 HEART DISEASE, UNSPECIFIED: ICD-10-CM

## 2025-05-06 DIAGNOSIS — I51.89 LEFT VENTRICULAR SYSTOLIC DYSFUNCTION: Primary | ICD-10-CM

## 2025-05-06 PROCEDURE — 71046 X-RAY EXAM CHEST 2 VIEWS: CPT

## 2025-05-13 ENCOUNTER — TELEPHONE (OUTPATIENT)
Age: 65
End: 2025-05-13

## 2025-05-13 NOTE — TELEPHONE ENCOUNTER
Called and LVM making pt aware that PAP medications are here for pick at office     [address] [place] [address]

## 2025-08-20 RX ORDER — ROSUVASTATIN CALCIUM 20 MG/1
TABLET, COATED ORAL
Qty: 90 TABLET | Refills: 3 | Status: SHIPPED | OUTPATIENT
Start: 2025-08-20

## (undated) DEVICE — Device: Brand: JELCO

## (undated) DEVICE — RADIFOCUS GLIDEWIRE: Brand: GLIDEWIRE

## (undated) DEVICE — DRAIN,WOUND,ROUND,24FR,5/16",FULL-FLUTED: Brand: MEDLINE

## (undated) DEVICE — SYR MED 10ML FIX M LT BLU -- MEDALLION

## (undated) DEVICE — DRESSING HEMOSTATIC SFT INTVENT W/O SLT DBL WRP QUIKCLOT LF

## (undated) DEVICE — GLOVE ORANGE PI 7 1/2   MSG9075

## (undated) DEVICE — COVER,TABLE,HEAVY DUTY,77"X90",STRL: Brand: MEDLINE

## (undated) DEVICE — BNDG ELAS HK LOOP 6X10YD LF -- MATRIX

## (undated) DEVICE — ROCKER SWITCH PENCIL BLADE ELECTRODE, HOLSTER: Brand: EDGE

## (undated) DEVICE — PACK PROCEDURE SURG HRT CATH

## (undated) DEVICE — TOTAL JOINT - SMH: Brand: MEDLINE INDUSTRIES, INC.

## (undated) DEVICE — BANDAGE COMPR M W6INXL10YD WHT BGE VELC E MTRX HK AND LOOP

## (undated) DEVICE — MEDI-TRACE CADENCE ADULT, DEFIBRILLATION ELECTRODE -RTS  (10 PR/PK) - PHILIPS: Brand: MEDI-TRACE CADENCE

## (undated) DEVICE — NEEDLE HYPO 22GA L1.5IN BLK S STL HUB POLYPR SHLD REG BVL

## (undated) DEVICE — PREP SKN CHLRAPRP APL 26ML STR --

## (undated) DEVICE — DRSG GZ OIL EMUL CURAD 3X3 --

## (undated) DEVICE — TTL1LYR 16FR10ML 100%SIL TMPST TR: Brand: MEDLINE

## (undated) DEVICE — CABLE RMFG CATH 34PIN ECO 2.7M

## (undated) DEVICE — PACK,BASIC,SIRUS,V: Brand: MEDLINE

## (undated) DEVICE — KIT COAX UMBILICAL FOR N20 --

## (undated) DEVICE — BLANKET WRM W25XL64IN NONWOVEN SFT LTWT PLIABLE HYPR

## (undated) DEVICE — IRRIGATION KT PIST SYR 60ML -- CONVERT TO ITEM 116415

## (undated) DEVICE — Device

## (undated) DEVICE — SOLUTION IRRIG 1000ML H2O STRL BLT

## (undated) DEVICE — SOLUTION IRRIG 1000ML 0.9% SOD CHL CONT

## (undated) DEVICE — RADIFOCUS OPTITORQUE ANGIOGRAPHIC CATHETER: Brand: OPTITORQUE

## (undated) DEVICE — CONNECTOR DRNGE W3/8-0.5XH3/16XL3/16IN 2:1 SIL CARD STR

## (undated) DEVICE — SOL INJ PLSM-LYTE5% 7.4 NRMSOL --

## (undated) DEVICE — BASIN EMSIS 16OZ GRAPHITE PLAS KID SHP MOLD GRAD FOR ORAL

## (undated) DEVICE — SPONGE GZ W4XL4IN COT 12 PLY TYP VII WVN C FLD DSGN

## (undated) DEVICE — KIT EVAC 0.13IN RECT TB DIA10FR 400CC PVC 3 SPR Y CONN DRN

## (undated) DEVICE — YANKAUER,FLEXIBLE HANDLE,REGLR CAPACITY: Brand: MEDLINE INDUSTRIES, INC.

## (undated) DEVICE — STERILE POLYISOPRENE POWDER-FREE SURGICAL GLOVES: Brand: PROTEXIS

## (undated) DEVICE — AORTIC PUNCHES ARE USED TO CREATE A UNIFORM OPENING IN BLOOD VESSELS DURING CARDIOVASCULAR SURGERY. THE VESSEL GRAFT IS INSERTED INTO THE CREATED OPENING AND SUTURED TO THE VESSEL WALL. AORTIC LANCETS ARE USED TO MAKE A SMALL UNIFORM CUT IN A BLOOD VESSEL TO FACILITATE INSERTION OF AN AORTIC PUNCH.  PUNCHES COME IN VARIOUS LENGTHS, DIAMETERS AND TIP CONFIGURATIONS.: Brand: CLEANCUT ROTATING AORTIC PUNCH

## (undated) DEVICE — SUTURE VCRL SZ 0 L18IN ABSRB VLT L40MM CT 1/2 CIR J752D

## (undated) DEVICE — KIT ANGIOGRAPHY CUST MRMC

## (undated) DEVICE — VISUALIZATION SYSTEM: Brand: CLEARIFY

## (undated) DEVICE — IMMOBILIZER KNEE CUTAWAY 24 IN

## (undated) DEVICE — 72" ARTERIAL PRESSURE TUBING: Brand: ICU MEDICAL

## (undated) DEVICE — PLEDGET SUT SFT OVL 3 8X5 16IN

## (undated) DEVICE — PLEDGET SURG W3/16XL0.25IN THK1.65MM PTFE OVL FELT FOR THE

## (undated) DEVICE — BLOWER MR MAL CLRVW 16.5CM --

## (undated) DEVICE — SUTURE MCRYL SZ 4 0 L18IN ABSRB UD PC 5 L19MM 3 8 CIR SGL Y823G

## (undated) DEVICE — SUTURE VCRL SZ 0 L36IN ABSRB VLT L36MM CT-1 1/2 CIR J346H

## (undated) DEVICE — SURGICAL PROCEDURE PACK BASIN MAJ SET CUST NO CAUT

## (undated) DEVICE — SYR 20ML LL STRL LF --

## (undated) DEVICE — PROVE COVER: Brand: UNBRANDED

## (undated) DEVICE — HYPODERMIC SAFETY NEEDLE: Brand: MAGELLAN

## (undated) DEVICE — DRAPE,REIN 53X77,STERILE: Brand: MEDLINE

## (undated) DEVICE — GDWIRE FIX COR J 3MM 0.035X260 --

## (undated) DEVICE — PACEMAKER PACK: Brand: MEDLINE INDUSTRIES, INC.

## (undated) DEVICE — DERMABOND SKIN ADH 0.7ML -- DERMABOND ADVANCED 12/BX

## (undated) DEVICE — TRANSFER PK BLD PROD 300ML --

## (undated) DEVICE — INTRO SHTH 9FR 13X20CM -- USE ITEM# 341577

## (undated) DEVICE — SOLUTION IV 1000ML 0.9% SOD CHL

## (undated) DEVICE — 3M™ TEGADERM™ TRANSPARENT FILM DRESSING FRAME STYLE, 1626W, 4 IN X 4-3/4 IN (10 CM X 12 CM), 50/CT 4CT/CASE: Brand: 3M™ TEGADERM™

## (undated) DEVICE — SYS VSL HARV HEMOPRO2 VASOVIEW -- HARV SYS MINIMUM ORDER 5/EA

## (undated) DEVICE — YANKAUER,BULB TIP,W/O VENT,RIGID,STERILE: Brand: MEDLINE

## (undated) DEVICE — DRAPE SLUSH DISC W44XL66IN ST FOR RND BSIN HUSH SLUSH SYS

## (undated) DEVICE — SUT PROL 4-0 36IN SH DA BLU --

## (undated) DEVICE — DEVICE COAG 3CM GUID 6130 FOR EPICARD ABLAT EPI-SENSE

## (undated) DEVICE — PRESSURE MONITORING SET: Brand: TRUWAVE

## (undated) DEVICE — KIT,1200CC CANISTER,3/16"X6' TUBING: Brand: MEDLINE INDUSTRIES, INC.

## (undated) DEVICE — STRAP,POSITIONING,KNEE/BODY,FOAM,4X60": Brand: MEDLINE

## (undated) DEVICE — CANNULA AORT ROOT INTRO STD TIP 5FR OVERALL LEN 55IN DLP

## (undated) DEVICE — INTRO SHTH 7FR 13X20CM -- TEARAWAY

## (undated) DEVICE — SOLUTION IV 1000ML 140MEQ/L SOD 5MEQ/L K 3MEQ/L MG 27MEQ/L

## (undated) DEVICE — PINNACLE INTRODUCER SHEATH: Brand: PINNACLE

## (undated) DEVICE — KIT,ANTI FOG,W/SPONGE & FLUID,SOFT PACK: Brand: MEDLINE

## (undated) DEVICE — DRAPE,EXTREMITY,89X128,STERILE: Brand: MEDLINE

## (undated) DEVICE — FORCEPS BX L160CM DIA8MM GRSP DISECT CUP TIP NONLOCKING ROT

## (undated) DEVICE — PATCH CARTO 3 EXT REF --

## (undated) DEVICE — SUTURE FIBERTAPE FIBERWIRE SZ 2-0 30IN NONABSORB BLU AR72377

## (undated) DEVICE — SUTURE ETHLN SZ 2-0 L18IN NONABSORBABLE BLK L26MM FS 3/8 664G

## (undated) DEVICE — Z DISCONTINUED PER MEDLINE LINE GAS SAMPLING O2/CO2 LNG AD 13 FT NSL W/ TBNG FILTERLINE

## (undated) DEVICE — SHTH GUID 8.5F 22MM MED CRV -- CARTO VIZIGO

## (undated) DEVICE — SUT SLK 0 30IN SH BLK --

## (undated) DEVICE — CONTAINER,SPECIMEN,3OZ,OR STRL: Brand: MEDLINE

## (undated) DEVICE — SYSTEM SKIN CLOSURE 42CM DERMABOND PRINEO

## (undated) DEVICE — PROBE VASC STD 1-1.5 MM 8 CM STRL DISP

## (undated) DEVICE — SOLUTION IRRIG 1000ML STRL H2O USP PLAS POUR BTL

## (undated) DEVICE — GLOVE SURG SZ 7 CRM LTX FREE POLYISOPRENE POLYMER BEAD ANTI

## (undated) DEVICE — INSERT SUT HLD F/OCTBS RETRCTR --

## (undated) DEVICE — 3M™ IOBAN™ 2 ANTIMICROBIAL INCISE DRAPE 6648EZ: Brand: IOBAN™ 2

## (undated) DEVICE — GLOVE SURG SZ 65 L12IN FNGR THK94MIL STD WHT LTX FREE

## (undated) DEVICE — CABLE CATH L10FT RED PIN CONN 34-34 FOR THERMOCOOL

## (undated) DEVICE — TUBING PRSS MON L6IN PVC M FEM CONN

## (undated) DEVICE — LABEL MED CARD MRMC STRL

## (undated) DEVICE — KIT ELECTRD SURF FOR DISPLAYING THE 3D POS OF EP CATH

## (undated) DEVICE — CANN VES FRE FLO BLNT TIP 3 --

## (undated) DEVICE — DRSG BORDR MPLX HEEL 8.7X9.1IN --

## (undated) DEVICE — TR BAND RADIAL ARTERY COMPRESSION DEVICE: Brand: TR BAND

## (undated) DEVICE — BAG WND LAV 1L CLR ETH ACET ACID SOD ACETT BENZALKONIUM CHL

## (undated) DEVICE — SYR LR LCK 1ML GRAD NSAF 30ML --

## (undated) DEVICE — SOLIDIFIER FLD 2OZ 1500CC N DISINF IN BTL DISP SAFESORB

## (undated) DEVICE — BLUNTFILL: Brand: MONOJECT

## (undated) DEVICE — SYR 3ML LL TIP 1/10ML GRAD --

## (undated) DEVICE — PACK,LAPAROTOMY,2 REINFORCED GOWNS: Brand: MEDLINE

## (undated) DEVICE — CATH IV AUTOGRD BC PNK 20GA 25 -- INSYTE

## (undated) DEVICE — REM POLYHESIVE ADULT PATIENT RETURN ELECTRODE: Brand: VALLEYLAB

## (undated) DEVICE — SUT SLK 0 30IN CT1 BLK --

## (undated) DEVICE — CABLE RMFG RSPONS ELEC EXT RED --

## (undated) DEVICE — SUTURE PROL SZ 5-0 L30IN NONABSORBABLE BLU L13MM RB-2 1/2 8710H

## (undated) DEVICE — DRAIN SURG L3/8-1/2IN DIA3/16IN SIL CARD CONN 1:1 BLAK

## (undated) DEVICE — PROBE ES TEMP HOT AND CLD FAST ACCURATE SFT FLX CIRCA S CATH

## (undated) DEVICE — BLADE OPHTH W1.5MM 15DEG ORNG HNDL SHRP SHRP DEL FOR CATRCT

## (undated) DEVICE — CUSTOM CAST PD STR

## (undated) DEVICE — TUBING, SUCTION, 1/4" X 12', STRAIGHT: Brand: MEDLINE

## (undated) DEVICE — BAG RED 3PLY 2MIL 30X40 IN

## (undated) DEVICE — T4 HOOD

## (undated) DEVICE — OPEN HEART A- RICHMOND: Brand: MEDLINE INDUSTRIES, INC.

## (undated) DEVICE — INTENDED FOR TISSUE SEPARATION, AND OTHER PROCEDURES THAT REQUIRE A SHARP SURGICAL BLADE TO PUNCTURE OR CUT.: Brand: BARD-PARKER ® CARBON RIB-BACK BLADES

## (undated) DEVICE — ANTI-FOG SOLUTION WITH FOAM PAD: Brand: DEVON

## (undated) DEVICE — GLIDESHEATH SLENDER ACCESS KIT: Brand: GLIDESHEATH SLENDER

## (undated) DEVICE — DRESSING HEMSTAT W3INXL2YD 1 PLY Z FLD QUIKCLOT CONTROL+

## (undated) DEVICE — 3M™ MEDIPORE™ H SOFT CLOTH SURGICAL TAPE 2864, 4 INCH X 10 YARD (10CM X 9,14M), 12 ROLLS/CASE: Brand: 3M™ MEDIPORE™

## (undated) DEVICE — SET ADMIN L104IN 18ML GRAV CK VLV RLER CLMP 2 SMRTSITE NDL

## (undated) DEVICE — SUTURE VCRL SZ 2-0 L36IN ABSRB UD L40MM CT 1/2 CIR J957H

## (undated) DEVICE — SOL INJ SOD CL 0.9% 500ML BG --

## (undated) DEVICE — SYR 10ML LUER LOK 1/5ML GRAD --

## (undated) DEVICE — SOLUTION IRRIG 1000ML 0.9% SOD CHL USP POUR PLAS BTL

## (undated) DEVICE — BLADE OPHTH 180DEG CUT SURF BLU STR SHRP DBL BVL GRINDLESS

## (undated) DEVICE — ABSORBABLE WOUND CLOSURE DEVICE: Brand: V-LOC 90

## (undated) DEVICE — SUTURE SZ 7 L18IN NONABSORBABLE SIL CCS L48MM 1/2 CIR STRNM M655G

## (undated) DEVICE — BLADE ELECTRODE: Brand: EDGE

## (undated) DEVICE — CATHETER REPROC ULTRASOUND 10 FR ACUSON ACUNAV

## (undated) DEVICE — RETRIEVER SUT ARTHSCP HOFFEE --

## (undated) DEVICE — GLOVE SURG SZ 7 L12IN FNGR THK79MIL GRN LTX FREE

## (undated) DEVICE — SOLUTION IRRIG 3000ML 0.9% SOD CHL USP UROMATIC PLAS CONT

## (undated) DEVICE — SUTURE SZ 0 27IN 5/8 CIR UR-6  TAPER PT VIOLET ABSRB VICRYL J603H

## (undated) DEVICE — CATH EP MAP 2-6-2 7FR F CRV -- PENTARAY

## (undated) DEVICE — 6 FOOT DISPOSABLE EXTENSION CABLE WITH SAFE CONNECT / SCREW-DOWN

## (undated) DEVICE — BAG SPEC BIOHZRD 10 X 10 IN --

## (undated) DEVICE — BLOCK BITE ENDOSCP AD 21 MM W/ DIL BLU LF DISP

## (undated) DEVICE — GUIDEWIRE ORTH DIA2.5MM LOK SMOOTH DRL TIP FLX THRD FOR

## (undated) DEVICE — SUTURE PROL SZ 7-0 L24IN NONABSORBABLE BLU L8MM BV175-6 3/8 8735H

## (undated) DEVICE — CONTAINER SPEC 20 ML LID NEUT BUFF FORMALIN 10 % POLYPR STS

## (undated) DEVICE — SUTURE ETHBND EXCEL SZ 3-0 L36IN NONABSORBABLE GRN BB L17MM X588H

## (undated) DEVICE — SET PERF L203CM 12IN RED AND BLU AORT ROOT MULT SLIP CONN

## (undated) DEVICE — CATH EP CRV 7F DUO 2/8 2M LG -- LIVEWIRE STRL

## (undated) DEVICE — SUTURE PDS II SZ 1 L36IN ABSRB VLT CT L40MM 1/2 CIR TAPR Z359T

## (undated) DEVICE — HI-TORQUE VERSACORE FLOPPY GUIDE WIRE SYSTEM 145 CM: Brand: HI-TORQUE VERSACORE

## (undated) DEVICE — AMD ANTIMICROBIAL I.V. DRAIN SPONGES 6 PLY, 0.2% POLYHEXAMETHYLENE BIGUANIDE HCI (PHMB): Brand: EXCILON

## (undated) DEVICE — CABLE CATH CONN 10 PIN DISP -- ACHIEVE ADVANCE

## (undated) DEVICE — SET ADMIN 16ML TBNG L100IN 2 Y INJ SITE IV PIGGY BK DISP

## (undated) DEVICE — DUAL LUMEN STOMACH TUBE MULTI-FUNCTIONAL PORT: Brand: SALEM SUMP

## (undated) DEVICE — 4-PORT MANIFOLD: Brand: NEPTUNE 2

## (undated) DEVICE — SUTURE V-LOC 180 SZ 2-0 L12IN ABSRB VLT GS-21 L37MM 1/2 CIR VLOCM0315

## (undated) DEVICE — CATH QUAD 6F 2/5/2 120CM JSN --

## (undated) DEVICE — SHTH STEERABLE FLEXCATH 12 FR --

## (undated) DEVICE — OPEN HEART B-RICHMOND: Brand: MEDLINE INDUSTRIES, INC.

## (undated) DEVICE — GOWN,SIRUS,NONRNF,SETINSLV,XL,20/CS: Brand: MEDLINE

## (undated) DEVICE — CABLE CATH L10FT YEL CONN 12-12 PIN ELECTROGRAM CONDUCTION

## (undated) DEVICE — SUTURE PROL SZ 6-0 L30IN NONABSORBABLE BLU L13MM C-1 3/8 M8706

## (undated) DEVICE — YANKAUER,TAPERED BULBOUS TIP,W/O VENT: Brand: MEDLINE

## (undated) DEVICE — DRESSING PETRO W3XL8IN N ADH OIL EMUL GZ CURAD

## (undated) DEVICE — AGENT HEMSTAT W4XL4IN OXIDIZED REGENERATED CELOS ABSRB SFT

## (undated) DEVICE — CATH EP ACHV MPPNG 3.3FR 20MM -- ACHIEVE

## (undated) DEVICE — ZIMMER® STERILE DISPOSABLE TOURNIQUET CUFF WITH PLC, DUAL PORT, SINGLE BLADDER, 34 IN. (86 CM)

## (undated) DEVICE — DECANTER BAG 9": Brand: MEDLINE INDUSTRIES, INC.

## (undated) DEVICE — CATH CRYOABLATN EP 28MM -- ARCTIC FRONT

## (undated) DEVICE — SUTURE STRATAFIX SYMMETRIC PDS + SZ 1 L18IN ABSRB VLT L48MM SXPP1A400

## (undated) DEVICE — ELECTRODE,RADIOTRANSLUCENT,FOAM,5PK: Brand: MEDLINE

## (undated) DEVICE — TOWEL SURG W17XL27IN STD BLU COT NONFENESTRATED PREWASHED

## (undated) DEVICE — TUBE SET IRR PUMP THERMALCOOL -- SMARTABLATE

## (undated) DEVICE — GOWN,SIRUS,NONRNF,SETINSLV,2XL,18/CS: Brand: MEDLINE

## (undated) DEVICE — CATHETER ULTRASOUND 10 FRX90 CM FOR CARTO 3 SOUNDSTAR ECO

## (undated) DEVICE — NEONATAL-ADULT SPO2 SENSOR: Brand: NELLCOR

## (undated) DEVICE — BIT DRL L145MM DIA3.2MM QUIK CPL W/O STP REUSE

## (undated) DEVICE — DRAIN SURG 19FR 0.25IN SIL RND W/ TRCR INDIC DOT RADPQ FULL

## (undated) DEVICE — CATH GUID COR EB35 5FR 100CM -- LAUNCHER

## (undated) DEVICE — KIT ACCS INTRO 4FR L10CM NDL 21GA L7CM GWIRE L40CM

## (undated) DEVICE — SUTURE VCRL SZ 2 L54IN ABSRB UD L65MM TP-1 1/2 CIR J880T

## (undated) DEVICE — INFECTION CONTROL KIT SYS

## (undated) DEVICE — CANISTER, RIGID, 3000CC: Brand: MEDLINE INDUSTRIES, INC.

## (undated) DEVICE — VALVE INSRT TOOL ADPT MTL 9FR -- ACCESS

## (undated) DEVICE — CATH RMFG DECA DUO 7F2/8 95S --

## (undated) DEVICE — SCRUB DRY SURG EZ SCRUB BRUSH PREOPERATIVE GRN

## (undated) DEVICE — TOWEL 4 PLY TISS 19X30 SUE WHT

## (undated) DEVICE — SYR 50ML LR LCK 1ML GRAD NSAF --

## (undated) DEVICE — SUTURE MCRYL SZ 3-0 L27IN ABSRB UD L24MM PS-1 3/8 CIR PRIM Y936H

## (undated) DEVICE — SOL IRR STRL H2O 1000ML BTL --

## (undated) DEVICE — CENTRAL VENOUS CATHETER SET: Brand: COOK

## (undated) DEVICE — BNDG ELAS HK LOOP 6X5YD NS -- MATRIX

## (undated) DEVICE — KIT ELECTRICAL UMBILICAL --

## (undated) DEVICE — CATHETER ABLAT 8FR L115CM 1-6-2MM SPC TIP 3.5MM FJ CRV

## (undated) DEVICE — 450 ML BOTTLE OF 0.05% CHLORHEXIDINE GLUCONATE IN 99.95% STERILE WATER FOR IRRIGATION, USP AND APPLICATOR.: Brand: IRRISEPT ANTIMICROBIAL WOUND LAVAGE

## (undated) DEVICE — DRESSING SIL W4XL5IN ANTIBACT GELLING FBR CYTOFORM

## (undated) DEVICE — CABLE RMFG SUPREME BPTPLR/QPLR --

## (undated) DEVICE — SPLINT WR POS F/ARTERIAL ACC -- BX/10

## (undated) DEVICE — SUTURE PDS II SZ 2-0 L27IN ABSRB UD FS-1 L24MM 3/8 CIR REV Z443H

## (undated) DEVICE — SYR ART 700 CLEAR MARK 7 -- ARTERION

## (undated) DEVICE — PAD,ABDOMINAL,5"X9",ST,LF,25/BX: Brand: MEDLINE INDUSTRIES, INC.

## (undated) DEVICE — 1200 GUARD II KIT W/5MM TUBE W/O VAC TUBE: Brand: GUARDIAN

## (undated) DEVICE — BIT DRL L5IN DIA3.2MM STD ST S STL TWST BUSA

## (undated) DEVICE — SWAB CULT DBL W/O CHAR RAYON TIP AMIES GEL CLMN FOR COLL

## (undated) DEVICE — CABLE RMFG EXT CATH --

## (undated) DEVICE — 3M™ IOBAN™ 2 ANTIMICROBIAL INCISE DRAPE 6650EZ: Brand: IOBAN™ 2

## (undated) DEVICE — COATED BRAIDED POLYESTER: Brand: TI-CRON